# Patient Record
Sex: MALE | Race: WHITE | NOT HISPANIC OR LATINO | Employment: OTHER | URBAN - METROPOLITAN AREA
[De-identification: names, ages, dates, MRNs, and addresses within clinical notes are randomized per-mention and may not be internally consistent; named-entity substitution may affect disease eponyms.]

---

## 2017-01-06 ENCOUNTER — MYC MEDICAL ADVICE (OUTPATIENT)
Dept: FAMILY MEDICINE | Facility: CLINIC | Age: 62
End: 2017-01-06

## 2017-01-14 DIAGNOSIS — E78.5 HYPERLIPIDEMIA LDL GOAL <100: ICD-10-CM

## 2017-01-14 LAB
CHOLEST SERPL-MCNC: 189 MG/DL
HDLC SERPL-MCNC: 39 MG/DL
LDLC SERPL CALC-MCNC: 130 MG/DL
NONHDLC SERPL-MCNC: 150 MG/DL
TRIGL SERPL-MCNC: 102 MG/DL

## 2017-01-14 PROCEDURE — 36415 COLL VENOUS BLD VENIPUNCTURE: CPT | Performed by: FAMILY MEDICINE

## 2017-01-14 PROCEDURE — 80061 LIPID PANEL: CPT | Performed by: FAMILY MEDICINE

## 2017-02-13 ENCOUNTER — TRANSFERRED RECORDS (OUTPATIENT)
Dept: HEALTH INFORMATION MANAGEMENT | Facility: CLINIC | Age: 62
End: 2017-02-13

## 2017-02-22 ENCOUNTER — OFFICE VISIT (OUTPATIENT)
Dept: FAMILY MEDICINE | Facility: CLINIC | Age: 62
End: 2017-02-22
Payer: COMMERCIAL

## 2017-02-22 VITALS
RESPIRATION RATE: 16 BRPM | SYSTOLIC BLOOD PRESSURE: 134 MMHG | HEART RATE: 72 BPM | HEIGHT: 71 IN | DIASTOLIC BLOOD PRESSURE: 72 MMHG | OXYGEN SATURATION: 98 % | TEMPERATURE: 96.2 F | WEIGHT: 228 LBS | BODY MASS INDEX: 31.92 KG/M2

## 2017-02-22 DIAGNOSIS — E66.811 OBESITY, CLASS I, BMI 30-34.9: ICD-10-CM

## 2017-02-22 DIAGNOSIS — E11.22 TYPE 2 DIABETES MELLITUS WITH STAGE 2 CHRONIC KIDNEY DISEASE, WITHOUT LONG-TERM CURRENT USE OF INSULIN (H): ICD-10-CM

## 2017-02-22 DIAGNOSIS — Z00.01 ENCOUNTER FOR ROUTINE ADULT HEALTH EXAMINATION WITH ABNORMAL FINDINGS: Primary | ICD-10-CM

## 2017-02-22 DIAGNOSIS — E78.5 HYPERLIPIDEMIA LDL GOAL <100: ICD-10-CM

## 2017-02-22 DIAGNOSIS — N18.2 TYPE 2 DIABETES MELLITUS WITH STAGE 2 CHRONIC KIDNEY DISEASE, WITHOUT LONG-TERM CURRENT USE OF INSULIN (H): ICD-10-CM

## 2017-02-22 DIAGNOSIS — N18.2 CKD (CHRONIC KIDNEY DISEASE) STAGE 2, GFR 60-89 ML/MIN: ICD-10-CM

## 2017-02-22 DIAGNOSIS — S13.4XXD WHIPLASH INJURY TO NECK, SUBSEQUENT ENCOUNTER: ICD-10-CM

## 2017-02-22 DIAGNOSIS — Z13.89 SCREENING FOR DIABETIC PERIPHERAL NEUROPATHY: ICD-10-CM

## 2017-02-22 DIAGNOSIS — F33.0 MAJOR DEPRESSIVE DISORDER, RECURRENT EPISODE, MILD (H): ICD-10-CM

## 2017-02-22 DIAGNOSIS — Z79.899 ENCOUNTER FOR MEDICATION MANAGEMENT: ICD-10-CM

## 2017-02-22 LAB
ANION GAP SERPL CALCULATED.3IONS-SCNC: 8 MMOL/L (ref 3–14)
BUN SERPL-MCNC: 19 MG/DL (ref 7–30)
CALCIUM SERPL-MCNC: 9 MG/DL (ref 8.5–10.1)
CHLORIDE SERPL-SCNC: 104 MMOL/L (ref 94–109)
CO2 SERPL-SCNC: 29 MMOL/L (ref 20–32)
CREAT SERPL-MCNC: 1.04 MG/DL (ref 0.66–1.25)
CREAT UR-MCNC: 117 MG/DL
GFR SERPL CREATININE-BSD FRML MDRD: 73 ML/MIN/1.7M2
GLUCOSE SERPL-MCNC: 105 MG/DL (ref 70–99)
MICROALBUMIN UR-MCNC: 615 MG/L
MICROALBUMIN/CREAT UR: 525.64 MG/G CR (ref 0–17)
POTASSIUM SERPL-SCNC: 4.3 MMOL/L (ref 3.4–5.3)
SODIUM SERPL-SCNC: 141 MMOL/L (ref 133–144)

## 2017-02-22 PROCEDURE — 99396 PREV VISIT EST AGE 40-64: CPT | Performed by: FAMILY MEDICINE

## 2017-02-22 PROCEDURE — 80048 BASIC METABOLIC PNL TOTAL CA: CPT | Performed by: FAMILY MEDICINE

## 2017-02-22 PROCEDURE — 99207 C FOOT EXAM  NO CHARGE: CPT | Mod: 25 | Performed by: FAMILY MEDICINE

## 2017-02-22 PROCEDURE — 82043 UR ALBUMIN QUANTITATIVE: CPT | Performed by: FAMILY MEDICINE

## 2017-02-22 PROCEDURE — 36415 COLL VENOUS BLD VENIPUNCTURE: CPT | Performed by: FAMILY MEDICINE

## 2017-02-22 RX ORDER — ASPIRIN 325 MG
TABLET, DELAYED RELEASE (ENTERIC COATED) ORAL
Qty: 100 TABLET | Refills: 3 | Status: ON HOLD | COMMUNITY
Start: 2017-02-22 | End: 2020-02-04

## 2017-02-22 RX ORDER — METHOCARBAMOL 750 MG/1
750 TABLET, FILM COATED ORAL 4 TIMES DAILY PRN
Qty: 60 TABLET | Refills: 3 | Status: SHIPPED | OUTPATIENT
Start: 2017-02-22 | End: 2017-11-08

## 2017-02-22 RX ORDER — FOLIC ACID 0.8 MG
TABLET ORAL
Qty: 30 CAPSULE | COMMUNITY
Start: 2017-02-22 | End: 2022-04-07

## 2017-02-22 ASSESSMENT — PAIN SCALES - GENERAL: PAINLEVEL: NO PAIN (0)

## 2017-02-22 NOTE — PATIENT INSTRUCTIONS
Preventive Health Recommendations  Male Ages 50 - 64    Yearly exam:             See your health care provider every year in order to  o   Review health changes.   o   Discuss preventive care.    o   Review your medicines if your doctor has prescribed any.     Have a cholesterol test every 5 years, or more frequently if you are at risk for high cholesterol/heart disease.     Have a diabetes test (fasting glucose) every three years. If you are at risk for diabetes, you should have this test more often.     Have a colonoscopy at age 50, or have a yearly FIT test (stool test). These exams will check for colon cancer.      Talk with your health care provider about whether or not a prostate cancer screening test (PSA) is right for you.    You should be tested each year for STDs (sexually transmitted diseases), if you re at risk.     Shots: Get a flu shot each year. Get a tetanus shot every 10 years.     Nutrition:    Eat at least 5 servings of fruits and vegetables daily.     Eat whole-grain bread, whole-wheat pasta and brown rice instead of white grains and rice.     Talk to your provider about Calcium and Vitamin D.     Lifestyle    Exercise for at least 150 minutes a week (30 minutes a day, 5 days a week). This will help you control your weight and prevent disease.     Limit alcohol to one drink per day.     No smoking.     Wear sunscreen to prevent skin cancer.     See your dentist every six months for an exam and cleaning.     See your eye doctor every 1 to 2 years.  Keep up the exercise.   Next time I see you we can treat the foot.
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
## 2017-02-22 NOTE — NURSING NOTE
"Chief Complaint   Patient presents with     Physical     Diabetes     Lipids     Gastric Problem       Initial /72 (BP Location: Right arm, Patient Position: Chair, Cuff Size: Adult Large)  Pulse 72  Temp 96.2  F (35.7  C) (Temporal)  Resp 16  Ht 5' 10.5\" (1.791 m)  Wt 228 lb (103.4 kg)  SpO2 98%  BMI 32.25 kg/m2 Estimated body mass index is 32.25 kg/(m^2) as calculated from the following:    Height as of this encounter: 5' 10.5\" (1.791 m).    Weight as of this encounter: 228 lb (103.4 kg).  Medication Reconciliation: complete  "
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
## 2017-05-08 ENCOUNTER — OFFICE VISIT (OUTPATIENT)
Dept: FAMILY MEDICINE | Facility: CLINIC | Age: 62
End: 2017-05-08
Payer: COMMERCIAL

## 2017-05-08 VITALS
DIASTOLIC BLOOD PRESSURE: 60 MMHG | TEMPERATURE: 98.1 F | WEIGHT: 227 LBS | SYSTOLIC BLOOD PRESSURE: 124 MMHG | HEART RATE: 93 BPM | BODY MASS INDEX: 32.11 KG/M2 | RESPIRATION RATE: 16 BRPM | OXYGEN SATURATION: 98 %

## 2017-05-08 DIAGNOSIS — G47.33 OSA (OBSTRUCTIVE SLEEP APNEA): ICD-10-CM

## 2017-05-08 DIAGNOSIS — B07.0 PLANTAR WARTS: ICD-10-CM

## 2017-05-08 DIAGNOSIS — F33.0 MAJOR DEPRESSIVE DISORDER, RECURRENT EPISODE, MILD (H): Primary | ICD-10-CM

## 2017-05-08 PROCEDURE — 17110 DESTRUCTION B9 LES UP TO 14: CPT | Performed by: FAMILY MEDICINE

## 2017-05-08 PROCEDURE — 99213 OFFICE O/P EST LOW 20 MIN: CPT | Mod: 25 | Performed by: FAMILY MEDICINE

## 2017-05-08 ASSESSMENT — ANXIETY QUESTIONNAIRES
GAD7 TOTAL SCORE: 3
6. BECOMING EASILY ANNOYED OR IRRITABLE: MORE THAN HALF THE DAYS
7. FEELING AFRAID AS IF SOMETHING AWFUL MIGHT HAPPEN: NOT AT ALL
IF YOU CHECKED OFF ANY PROBLEMS ON THIS QUESTIONNAIRE, HOW DIFFICULT HAVE THESE PROBLEMS MADE IT FOR YOU TO DO YOUR WORK, TAKE CARE OF THINGS AT HOME, OR GET ALONG WITH OTHER PEOPLE: SOMEWHAT DIFFICULT
5. BEING SO RESTLESS THAT IT IS HARD TO SIT STILL: NOT AT ALL
3. WORRYING TOO MUCH ABOUT DIFFERENT THINGS: NOT AT ALL
2. NOT BEING ABLE TO STOP OR CONTROL WORRYING: NOT AT ALL
1. FEELING NERVOUS, ANXIOUS, OR ON EDGE: NOT AT ALL

## 2017-05-08 ASSESSMENT — PAIN SCALES - GENERAL: PAINLEVEL: NO PAIN (0)

## 2017-05-08 ASSESSMENT — PATIENT HEALTH QUESTIONNAIRE - PHQ9: 5. POOR APPETITE OR OVEREATING: SEVERAL DAYS

## 2017-05-08 NOTE — MR AVS SNAPSHOT
After Visit Summary   5/8/2017    Joel Pike    MRN: [id number]           Patient Information     Date Of Birth          1955        Visit Information        Provider Department      5/8/2017 4:15 PM Cayden Ramsey MD [facility]        Care Instructions    Treat the area like a burn,  it should be better and not too painful in a short time  All else the same         Follow-ups after your visit        Who to contact     If you have questions or need follow up information about today's clinic visit or your schedule please contact [facility] directly at [phone].  Normal or non-critical lab and imaging results will be communicated to you by Aidinhart, letter or phone within 4 business days after the clinic has received the results. If you do not hear from us within 7 days, please contact the clinic through Peg Band[employer] or phone. If you have a critical or abnormal lab result, we will notify you by phone as soon as possible.  Submit refill requests through [employer] or call your pharmacy and they will forward the refill request to us. Please allow 3 business days for your refill to be completed.          Additional Information About Your Visit        MyChart Information     [employer] gives you secure access to your electronic health record. If you see a primary care provider, you can also send messages to your care team and make appointments. If you have questions, please call your primary care clinic.  If you do not have a primary care provider, please call [phone] and they will assist you.        Care EveryWhere ID     This is your Care EveryWhere ID. This could be used by other organizations to access your [place] medical records  [id number]        Your Vitals Were     Pulse Temperature Respirations Pulse Oximetry BMI (Body Mass Index)       93 98.1  F (36.7  C) (Tympanic) 16 98% 32.11 kg/m2        Blood Pressure from Last 3 Encounters:   05/08/17 124/60    02/22/17 134/72   11/09/16 136/68    Weight from Last 3 Encounters:   05/08/17 227 lb (103 kg)   02/22/17 228 lb (103.4 kg)   11/09/16 236 lb (107 kg)              Today, you had the following     No orders found for display       Primary Care Provider Office Phone # Fax #    Cayden Ramsey [phone]       [place] [address] [place] [address]        Thank you!     Thank you for choosing [facility]  for your care. Our goal is always to provide you with excellent care. Hearing back from our patients is one way we can continue to improve our services. Please take a few minutes to complete the written survey that you may receive in the mail after your visit with us. Thank you!             Your Updated Medication List - Protect others around you: Learn how to safely use, store and throw away your medicines at www.disposemymeds.org.          This list is accurate as of: 5/8/17  4:38 PM.  Always use your most recent med list.                   Brand Name Dispense Instructions for use    albuterol 108 (90 BASE) MCG/ACT Inhaler    albuterol    1 Inhaler    1-2 puffs every 2 -4 hrs as needed       aspirin 325 MG EC tablet     100 tablet    One a day       blood glucose calibration NORMAL solution     1 each    Use to calibrate blood glucose monitor as directed.       blood glucose lancets standard    no brand specified    1 Box    Use to test blood sugar one time daily or as directed.       blood glucose monitoring test strip    HUGO CONTOUR    1 Month    Use to test blood sugars 1 time daily or as directed.       buPROPion 150 MG 24 hr tablet    WELLBUTRIN XL    90 tablet    Take 1 tablet (150 mg) by mouth daily       cholecalciferol 5000 UNITS Tabs tablet    vitamin D3     Take 1 tablet (5,000 Units) by mouth       coenzyme Q-10 capsule     30 capsule    Take 1 capsule by mouth daily.       esomeprazole 40 MG CR capsule    nexIUM    90 capsule    TAKE  1 CAPSULE EVERY MORNING BEFORE BREAKFAST, 30 TO 60 MINUTES BEFORE EATING.       ezetimibe 10 MG tablet    ZETIA    90 tablet    Take 1 tablet (10 mg) by mouth daily       fexofenadine-pseudoePHEDrine 180-240 MG per 24 hr tablet    ALLEGRA-D 24    90 tablet    Take 1 tablet by mouth daily       fish oil-omega-3 fatty acids 1000 MG capsule     180 capsule    Take  by mouth. Take daily       levothyroxine 50 MCG tablet    SYNTHROID/LEVOTHROID    90 tablet    TAKE 1 TABLET EVERY MORNING       losartan 50 MG tablet    COZAAR    90 tablet    Take 1 tablet (50 mg) by mouth daily       Magnesium 500 MG Caps     30 capsule    One twice a day       metFORMIN 500 MG tablet    GLUCOPHAGE    180 tablet    TAKE 1 TABLET TWICE DAILY WITH MEALS       methocarbamol 750 MG tablet    ROBAXIN    60 tablet    Take 1 tablet (750 mg) by mouth 4 times daily as needed       * Multiple vitamin  s-Minerals Caps      Take  by mouth.       * multivitamin Tabs tablet     30 each    Take 1 tablet by mouth daily       NEW MED      Energy and Metabolism Anthony Men daily       OSTEO BI-FLEX ADV JOINT SHIELD Tabs      Take  by mouth.       STATIN NOT PRESCRIBED (INTENTIONAL)      Statin not prescribed intentionally due to Intolerance (with supporting documentation of trying a statin at least once within the last 5 years)       * Notice:  This list has 2 medication(s) that are the same as other medications prescribed for you. Read the directions carefully, and ask your doctor or other care provider to review them with you.

## 2017-05-08 NOTE — PROGRESS NOTES
SUBJECTIVE:                                                    Joel Pike is a 61 year old male who presents to clinic today for the following health issues:      Depression Followup    Status since last visit: Stable     See PHQ-9 for current symptoms.  Other associated symptoms: None    Complicating factors:   Significant life event:  No   Current substance abuse:  None  Anxiety or Panic symptoms:  yes    PHQ-9  English PHQ-9   Any Language            Amount of exercise or physical activity: None    Problems taking medications regularly: No    Medication side effects: none    Diet: regular (no restrictions)      Re: Mood and anxiety doing very well with current management. He will be retiring in July. He is looking forward to this. He will find other employment which is closer to home and less intense.  He has seen Equipment is wearing out. He would like evaluation for new equipment and would consent to study if necessary it has been sometime since his last study  On the bottom of his left foot he has a plantars wart that he could not take care of himself. Previously had another plantar wart which we burned out with electricity. He would like same done this time. The last one he waited too long.    Problem list and histories reviewed & adjusted, as indicated.  Additional history: as documented    BP Readings from Last 3 Encounters:   05/08/17 124/60   02/22/17 134/72   11/09/16 136/68    Wt Readings from Last 3 Encounters:   05/08/17 227 lb (103 kg)   02/22/17 228 lb (103.4 kg)   11/09/16 236 lb (107 kg)                  Labs reviewed in EPIC    Reviewed and updated as needed this visit by clinical staff  Tobacco  Allergies  Meds       Reviewed and updated as needed this visit by Provider         ROS:  Constitutional, HEENT, cardiovascular, pulmonary, gi and gu systems are negative, except as otherwise noted.    OBJECTIVE:                                                    /60  Pulse 93  Temp 98.1  " F (36.7  C) (Tympanic)  Resp 16  Wt 227 lb (103 kg)  SpO2 98%  BMI 32.11 kg/m2  Body mass index is 32.11 kg/(m^2).  GENERAL: healthy, alert and no distress  EYES: Eyes grossly normal to inspection, PERRL and conjunctivae and sclerae normal  NECK: no adenopathy, no asymmetry, masses, or scars and thyroid normal to palpation  RESP: lungs clear to auscultation - no rales, rhonchi or wheezes  CV: regular rate and rhythm, normal S1 S2, no S3 or S4, no murmur, click or rub, no peripheral edema and peripheral pulses strong  ABDOMEN: soft, nontender, no hepatosplenomegaly, no masses and bowel sounds normal  MS: no gross musculoskeletal defects noted, no edema  SKIN: warts and basically one 5 mm plantars wart in the mid MP joint area of the left foot.  PSYCH: mentation appears normal, affect normal/bright    Diagnostic Test Results:  none      ASSESSMENT/PLAN:                                                    Depression; recurrent episode-- Mild and Full remission   Associated with the following complications:    None   Plan:  No changes in the patient's current treatment plan      BMI:   Estimated body mass index is 32.11 kg/(m^2) as calculated from the following:    Height as of 2/22/17: 5' 10.5\" (1.791 m).    Weight as of this encounter: 227 lb (103 kg).   Weight management plan: Discussed healthy diet and exercise guidelines and patient will follow up in 6 months in clinic to re-evaluate.      1. Major depressive disorder, recurrent episode, mild (H)  Continue current treatments and no change made. jail may be helpful    2. HILARIO (obstructive sleep apnea)  Referral to sleep study team to determine if he needs another study ordered they can just manages equipment  - SLEEP EVALUATION & MANAGEMENT REFERRAL - ADULT; Future    3. Plantar warts  The area on the bottom of his foot is cleansed with alcohol number is 1% Xylocaine and hyfrecation is used to destroy the wart. He understands he will act like a burn but " should heal over the next 2 weeks  - DESTRUCT PREMALIGNANT LESION, FIRST    MEDICATIONS:  Continue current medications without change  Patient Instructions   Treat the area like a burn,  it should be better and not too painful in a short time  All else the same       Cayden Simone Ramsey MD  [facility]

## 2017-05-08 NOTE — PATIENT INSTRUCTIONS
Treat the area like a burn,  it should be better and not too painful in a short time  All else the same

## 2017-05-09 ASSESSMENT — PATIENT HEALTH QUESTIONNAIRE - PHQ9: SUM OF ALL RESPONSES TO PHQ QUESTIONS 1-9: 2

## 2017-05-09 ASSESSMENT — ANXIETY QUESTIONNAIRES: GAD7 TOTAL SCORE: 3

## 2017-06-07 ENCOUNTER — OFFICE VISIT (OUTPATIENT)
Dept: ORTHOPEDICS | Facility: CLINIC | Age: 62
End: 2017-06-07
Payer: COMMERCIAL

## 2017-06-07 ENCOUNTER — RADIANT APPOINTMENT (OUTPATIENT)
Dept: GENERAL RADIOLOGY | Facility: CLINIC | Age: 62
End: 2017-06-07
Attending: ORTHOPAEDIC SURGERY
Payer: COMMERCIAL

## 2017-06-07 VITALS — BODY MASS INDEX: 32.07 KG/M2 | WEIGHT: 229.1 LBS | TEMPERATURE: 98.1 F | HEIGHT: 71 IN

## 2017-06-07 DIAGNOSIS — M25.531 PAIN IN BOTH WRISTS: ICD-10-CM

## 2017-06-07 DIAGNOSIS — M25.532 PAIN IN BOTH WRISTS: ICD-10-CM

## 2017-06-07 DIAGNOSIS — G56.03 BILATERAL CARPAL TUNNEL SYNDROME: Primary | ICD-10-CM

## 2017-06-07 PROCEDURE — 73110 X-RAY EXAM OF WRIST: CPT | Mod: TC

## 2017-06-07 PROCEDURE — 99203 OFFICE O/P NEW LOW 30 MIN: CPT | Performed by: ORTHOPAEDIC SURGERY

## 2017-06-07 ASSESSMENT — PAIN SCALES - GENERAL: PAINLEVEL: MODERATE PAIN (4)

## 2017-06-07 NOTE — NURSING NOTE
"Chief Complaint   Patient presents with     Musculoskeletal Problem     Bilateral CTS     Consult       Initial Temp 98.1  F (36.7  C) (Temporal)  Ht 1.791 m (5' 10.5\")  Wt 103.9 kg (229 lb 1.6 oz)  BMI 32.41 kg/m2 Estimated body mass index is 32.41 kg/(m^2) as calculated from the following:    Height as of this encounter: 1.791 m (5' 10.5\").    Weight as of this encounter: 103.9 kg (229 lb 1.6 oz).  Medication Reconciliation: complete   Dajuan/KARI     "

## 2017-06-07 NOTE — MR AVS SNAPSHOT
After Visit Summary   6/7/2017    Joel Pike    MRN: [id number]           Patient Information     Date Of Birth          1955        Visit Information        Provider Department      6/7/2017 10:20 AM Jason Berman DO [facility]        Today's Diagnoses     Bilateral carpal tunnel syndrome    -  1       Follow-ups after your visit        Your next 10 appointments already scheduled     Jun 07, 2017 10:00 AM CDT   XR WRIST BILATERAL G/E 3 VIEWS with PHXRSP1   [facility])    [address] [place] [id number]              Please bring a list of your current medicines to your exam. (Include vitamins, minerals and over-thecounter medicines.) Leave your valuables at home.  Tell your doctor if there is a chance you may be pregnant.  You do not need to do anything special for this exam.            Jun 07, 2017 10:20 AM CDT   New Visit with Jason Berman DO   [facility] ([address] [id number] [phone]              Future tests that were ordered for you today     Open Future Orders        Priority Expected Expires Ordered    EMG Routine  12/7/2017 6/7/2017            Who to contact     If you have questions or need follow up information about today's clinic visit or your schedule please contact [facility] directly at [phone].  Normal or non-critical lab and imaging results will be communicated to you by MyChart, letter or phone within 4 business days after the clinic has received the results. If you do not hear from us within 7 days, please contact the clinic through MyChart or phone. If you have a critical or abnormal lab result, we will notify you by phone as soon as possible.  Submit refill requests through [employer] or call your pharmacy and they will forward the refill request to us. Please allow 3 business days  "for your refill to be completed.          Additional Information About Your Visit        MyChart Information     [employer] gives you secure access to your electronic health record. If you see a primary care provider, you can also send messages to your care team and make appointments. If you have questions, please call your primary care clinic.  If you do not have a primary care provider, please call [phone] and they will assist you.        Care EveryWhere ID     This is your Care EveryWhere ID. This could be used by other organizations to access your [place] medical records  [id number]        Your Vitals Were     Temperature Height BMI (Body Mass Index)             98.1  F (36.7  C) (Temporal) 5' 10.5\" (1.791 m) 32.41 kg/m2          Blood Pressure from Last 3 Encounters:   05/08/17 124/60   02/22/17 134/72   11/09/16 136/68    Weight from Last 3 Encounters:   06/07/17 229 lb 1.6 oz (103.9 kg)   05/08/17 227 lb (103 kg)   02/22/17 228 lb (103.4 kg)               Primary Care Provider Office Phone # Fax #    Cayden Simone Ramsey [phone]       [facility] [address] [place] [address]        Thank you!     Thank you for choosing [facility]  for your care. Our goal is always to provide you with excellent care. Hearing back from our patients is one way we can continue to improve our services. Please take a few minutes to complete the written survey that you may receive in the mail after your visit with us. Thank you!             Your Updated Medication List - Protect others around you: Learn how to safely use, store and throw away your medicines at www.disposemymeds.org.          This list is accurate as of: 6/7/17  9:56 AM.  Always use your most recent med list.                   Brand Name Dispense Instructions for use    albuterol 108 (90 BASE) MCG/ACT Inhaler    albuterol    1 Inhaler    1-2 puffs every 2 -4 hrs as needed       aspirin 325 MG EC " tablet     100 tablet    One a day       blood glucose calibration NORMAL solution     1 each    Use to calibrate blood glucose monitor as directed.       blood glucose lancets standard    no brand specified    1 Box    Use to test blood sugar one time daily or as directed.       blood glucose monitoring test strip    HUGO CONTOUR    1 Month    Use to test blood sugars 1 time daily or as directed.       buPROPion 150 MG 24 hr tablet    WELLBUTRIN XL    90 tablet    Take 1 tablet (150 mg) by mouth daily       cholecalciferol 5000 UNITS Tabs tablet    vitamin D3     Take 1 tablet (5,000 Units) by mouth       coenzyme Q-10 capsule     30 capsule    Take 1 capsule by mouth daily.       esomeprazole 40 MG CR capsule    nexIUM    90 capsule    TAKE 1 CAPSULE EVERY MORNING BEFORE BREAKFAST, 30 TO 60 MINUTES BEFORE EATING.       ezetimibe 10 MG tablet    ZETIA    90 tablet    Take 1 tablet (10 mg) by mouth daily       fexofenadine-pseudoePHEDrine 180-240 MG per 24 hr tablet    ALLEGRA-D 24    90 tablet    Take 1 tablet by mouth daily       fish oil-omega-3 fatty acids 1000 MG capsule     180 capsule    Take  by mouth. Take daily       levothyroxine 50 MCG tablet    SYNTHROID/LEVOTHROID    90 tablet    TAKE 1 TABLET EVERY MORNING       losartan 50 MG tablet    COZAAR    90 tablet    Take 1 tablet (50 mg) by mouth daily       Magnesium 500 MG Caps     30 capsule    One twice a day       metFORMIN 500 MG tablet    GLUCOPHAGE    180 tablet    TAKE 1 TABLET TWICE DAILY WITH MEALS       methocarbamol 750 MG tablet    ROBAXIN    60 tablet    Take 1 tablet (750 mg) by mouth 4 times daily as needed       * Multiple vitamin  s-Minerals Caps      Take  by mouth.       * multivitamin Tabs tablet     30 each    Take 1 tablet by mouth daily       NEW MED      Energy and Metabolism Anthony Men daily       OSTEO BI-FLEX ADV JOINT SHIELD Tabs      Take  by mouth.       STATIN NOT PRESCRIBED (INTENTIONAL)      Statin not prescribed  intentionally due to Intolerance (with supporting documentation of trying a statin at least once within the last 5 years)       * Notice:  This list has 2 medication(s) that are the same as other medications prescribed for you. Read the directions carefully, and ask your doctor or other care provider to review them with you.

## 2017-06-12 ENCOUNTER — TRANSFERRED RECORDS (OUTPATIENT)
Dept: HEALTH INFORMATION MANAGEMENT | Facility: CLINIC | Age: 62
End: 2017-06-12

## 2017-06-14 ENCOUNTER — TELEPHONE (OUTPATIENT)
Dept: ORTHOPEDICS | Facility: CLINIC | Age: 62
End: 2017-06-14

## 2017-06-14 ENCOUNTER — OFFICE VISIT (OUTPATIENT)
Dept: ORTHOPEDICS | Facility: CLINIC | Age: 62
End: 2017-06-14
Payer: COMMERCIAL

## 2017-06-14 VITALS — BODY MASS INDEX: 32.06 KG/M2 | TEMPERATURE: 98.1 F | HEIGHT: 71 IN | WEIGHT: 229 LBS

## 2017-06-14 DIAGNOSIS — G56.01 CARPAL TUNNEL SYNDROME OF RIGHT WRIST: Primary | ICD-10-CM

## 2017-06-14 PROCEDURE — 99213 OFFICE O/P EST LOW 20 MIN: CPT | Performed by: ORTHOPAEDIC SURGERY

## 2017-06-14 ASSESSMENT — PAIN SCALES - GENERAL: PAINLEVEL: MILD PAIN (3)

## 2017-06-14 NOTE — NURSING NOTE
"Chief Complaint   Patient presents with     RECHECK     EMG results       Initial Temp 98.1  F (36.7  C) (Temporal)  Ht 1.791 m (5' 10.5\")  Wt 103.9 kg (229 lb)  BMI 32.39 kg/m2 Estimated body mass index is 32.39 kg/(m^2) as calculated from the following:    Height as of this encounter: 1.791 m (5' 10.5\").    Weight as of this encounter: 103.9 kg (229 lb).  Medication Reconciliation: complete   Dajuan/KARI     "

## 2017-06-14 NOTE — TELEPHONE ENCOUNTER
EMG results reviewed by Dr. Berman and copy sent to scanning.................................................................Yoselin Denise CMA  ([facility])

## 2017-06-14 NOTE — MR AVS SNAPSHOT
After Visit Summary   6/14/2017    Joel Pike    MRN: [id number]           Patient Information     Date Of Birth          1955        Visit Information        Provider Department      6/14/2017 9:40 AM Jason Berman DO [facility]        Today's Diagnoses     Carpal tunnel syndrome of right wrist    -  1       Follow-ups after your visit        Your next 10 appointments already scheduled     Jun 28, 2017  8:10 AM CDT   [facility]    [address] [place] [id number] [phone]              Who to contact     If you have questions or need follow up information about today's clinic visit or your schedule please contact [facility] directly at [phone].  Normal or non-critical lab and imaging results will be communicated to you by MyChart, letter or phone within 4 business days after the clinic has received the results. If you do not hear from us within 7 days, please contact the clinic through Declarahart or phone. If you have a critical or abnormal lab result, we will notify you by phone as soon as possible.  Submit refill requests through [employer] or call your pharmacy and they will forward the refill request to us. Please allow 3 business days for your refill to be completed.          Additional Information About Your Visit        MyChart Information     [employer] gives you secure access to your electronic health record. If you see a primary care provider, you can also send messages to your care team and make appointments. If you have questions, please call your primary care clinic.  If you do not have a primary care provider, please call [phone] and they will assist you.        Care EveryWhere ID     This is your Care EveryWhere ID. This could be used by other organizations to access your [place] medical records  [id number]        Your  "Vitals Were     Temperature Height BMI (Body Mass Index)             98.1  F (36.7  C) (Temporal) 5' 10.5\" (1.791 m) 32.39 kg/m2          Blood Pressure from Last 3 Encounters:   05/08/17 124/60   02/22/17 134/72   11/09/16 136/68    Weight from Last 3 Encounters:   06/14/17 229 lb (103.9 kg)   06/07/17 229 lb 1.6 oz (103.9 kg)   05/08/17 227 lb (103 kg)              Today, you had the following     No orders found for display       Primary Care Provider Office Phone # Fax #    Cayden Simone Ramsey [phone]       [facility] [address] [place] [phone]        Thank you!     Thank you for choosing [facility]  for your care. Our goal is always to provide you with excellent care. Hearing back from our patients is one way we can continue to improve our services. Please take a few minutes to complete the written survey that you may receive in the mail after your visit with us. Thank you!             Your Updated Medication List - Protect others around you: Learn how to safely use, store and throw away your medicines at www.disposemymeds.org.          This list is accurate as of: 6/14/17 10:04 AM.  Always use your most recent med list.                   Brand Name Dispense Instructions for use    albuterol 108 (90 BASE) MCG/ACT Inhaler    albuterol    1 Inhaler    1-2 puffs every 2 -4 hrs as needed       aspirin 325 MG EC tablet     100 tablet    One a day       blood glucose calibration NORMAL solution     1 each    Use to calibrate blood glucose monitor as directed.       blood glucose lancets standard    no brand specified    1 Box    Use to test blood sugar one time daily or as directed.       blood glucose monitoring test strip    HUGO CONTOUR    1 Month    Use to test blood sugars 1 time daily or as directed.       buPROPion 150 MG 24 hr tablet    WELLBUTRIN XL    90 tablet    Take 1 tablet (150 mg) by mouth daily       cholecalciferol 5000 UNITS Tabs " tablet    vitamin D3     Take 1 tablet (5,000 Units) by mouth       coenzyme Q-10 capsule     30 capsule    Take 1 capsule by mouth daily.       esomeprazole 40 MG CR capsule    nexIUM    90 capsule    TAKE 1 CAPSULE EVERY MORNING BEFORE BREAKFAST, 30 TO 60 MINUTES BEFORE EATING.       ezetimibe 10 MG tablet    ZETIA    90 tablet    Take 1 tablet (10 mg) by mouth daily       fexofenadine-pseudoePHEDrine 180-240 MG per 24 hr tablet    ALLEGRA-D 24    90 tablet    Take 1 tablet by mouth daily       fish oil-omega-3 fatty acids 1000 MG capsule     180 capsule    Take  by mouth. Take daily       levothyroxine 50 MCG tablet    SYNTHROID/LEVOTHROID    90 tablet    TAKE 1 TABLET EVERY MORNING       losartan 50 MG tablet    COZAAR    90 tablet    Take 1 tablet (50 mg) by mouth daily       Magnesium 500 MG Caps     30 capsule    One twice a day       metFORMIN 500 MG tablet    GLUCOPHAGE    180 tablet    TAKE 1 TABLET TWICE DAILY WITH MEALS       methocarbamol 750 MG tablet    ROBAXIN    60 tablet    Take 1 tablet (750 mg) by mouth 4 times daily as needed       * Multiple vitamin  s-Minerals Caps      Take  by mouth.       * multivitamin Tabs tablet     30 each    Take 1 tablet by mouth daily       NEW MED      Energy and Metabolism Anthony Men daily       OSTEO BI-FLEX ADV JOINT SHIELD Tabs      Take  by mouth.       STATIN NOT PRESCRIBED (INTENTIONAL)      Statin not prescribed intentionally due to Intolerance (with supporting documentation of trying a statin at least once within the last 5 years)       * Notice:  This list has 2 medication(s) that are the same as other medications prescribed for you. Read the directions carefully, and ask your doctor or other care provider to review them with you.

## 2017-06-14 NOTE — LETTER
"  6/14/2017       RE: Joel Pike  [id number] [facility] [id number]           Dear Colleague,    Thank you for referring your patient, Joel Pike, to the [facility]. Please see a copy of my visit note below.    Office Visit-Follow up    Chief Complaint: Joel Pike is a 61 year old male who is being seen for   Chief Complaint   Patient presents with     RECHECK     EMG results       History of Present Illness:   Today's visit:  Symptoms unchanged. Returns for EMG results.  June 7, 2017 visit  Presents with bilateral intermittent hand numbness and tingling associated with some pain. He reports bilateral carpal tunnel releases 25 years ago from a work compensation injury. He initially received a short-term improvement however shortly after noticed a return of symptoms. Small finger is generally spared. He has issues with his . He has dropped objects. Over the years he has attempted anti-inflammatories, Tylenol, bracing with no improvement. Pain and numbness bother him when he is at work and at home.      REVIEW OF SYSTEMS  General: negative for, night sweats, dizziness, fatigue  Resp: No shortness of breath and no cough  CV: negative for chest pain, syncope or near-syncope  GI: negative for nausea, vomiting and diarrhea  : negative for dysuria and hematuria  Musculoskeletal: as above  Neurologic: negative for syncope   Hematologic: negative for bleeding disorder    Physical Exam:  Vitals: Temp 98.1  F (36.7  C) (Temporal)  Ht 5' 10.5\" (1.791 m)  Wt 229 lb (103.9 kg)  BMI 32.39 kg/m2  BMI= Body mass index is 32.39 kg/(m^2).  Constitutional: healthy, alert and no acute distress   Psychiatric: mentation appears normal and affect normal/bright  NEURO: no focal deficits  RESP: Normal with easy respirations and no use of accessory muscles to breathe, no audible wheezing or retractions  CV: RUE:  no edema           SKIN: No erythema, rashes, excoriation, or breakdown. No evidence of " infection.   JOINT/EXTREMITIES:right hand/wrist: Positive Tinel's. Positive carpal compression. 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 Affecting sensory components only. No other pathology noted. Could be residuals from previous carpal tunnel release.  Independent visualization of the images was performed.      Impression: bilateral carpal tunnel syndrome    Plan:  All of the above pertinent physical exam and imaging modalities findings was reviewed with Joel and his wife.    The patient has attempted conservative treatments that include rest, activity modification, bracing, Tylenol, anti-inflammatories yet still continues to have significant issues. These issues include pain at rest, increased pain with activity, progressive numbness. Furthermore, the symptoms aren't present many years. Secondary to these reasons I have offered surgery in the form of right carpal tunnel release with a left carpal tunnel release to follow in a staged manner.    The risks, benefits, alternatives, complication, limitations and expectations were reviewed at length. Complications such as infection, bleeding, nerve damage, incomplete release, pillar pain, repeat surgery, skin problems, scar sensitivity, aspiration, heart attack, stroke, and death were discussed. Also reviewed were expectations and the goal of surgery. With surgery the goal would be to prevent further damage to the nerve, the surgery may not be able to reverse any current nerve damage. Postoperatively there will be limitation in use for approximately 3-4 weeks or as clinically indicated. All questions were answered. The patient agrees to proceed with surgery.           Return to clinic 10 days post-operatively. , or sooner as needed for changes.  Re-x-ray on return: Sylvia Berman D.O.    Please schedule for surgery, pre op H&P, and post ops.      Patient  Name:  Joel Pike ([id number]).  :  1955  Gender:  male  Patient Type:  Same Day Surgery  Surgeon:  Jason Berman DO  Physician requests assist from:  first assist    Procedures:    Right carpal tunnel release   Approach:  NA  Diagnosis:  Carpal tunnel syndrome of right wrist  C-arm:  No  Mini C-arm:   No  Pathology Scheduled:  No  Special instruments/supplies:  None  Vendor Rep:    Anesthesia:  Local  Block:  No   Block type:   Time needed:  30 minutes    [facility] Discussed:  NO    Post op 1:                    Again, thank you for allowing me to participate in the care of your patient.        Sincerely,              Jason Berman DO

## 2017-06-14 NOTE — TELEPHONE ENCOUNTER
Surgery Scheduled    Date of Surgery 6/16/17 Time of Surgery   Procedure: Right carpal tunnel release  Hospital/Surgical Facility: [facility]  Surgeon: Dr. Berman  Type of Anesthesia : Local  Pre-op NA with   Post op:6/28/17 with Dr. Berman        Surgery packet mailed to patient's home address. Patients instructed to arrive 1 hours prior to surgery. Patient understood and agrees to plan.    Nataly Chambers  Surgery Scheduler

## 2017-06-14 NOTE — PROGRESS NOTES
"Office Visit-Follow up    Chief Complaint: Joel Pike is a 61 year old male who is being seen for   Chief Complaint   Patient presents with     RECHECK     EMG results       History of Present Illness:   Today's visit:  Symptoms unchanged. Returns for EMG results.  June 7, 2017 visit  Presents with bilateral intermittent hand numbness and tingling associated with some pain. He reports bilateral carpal tunnel releases 25 years ago from a work compensation injury. He initially received a short-term improvement however shortly after noticed a return of symptoms. Small finger is generally spared. He has issues with his . He has dropped objects. Over the years he has attempted anti-inflammatories, Tylenol, bracing with no improvement. Pain and numbness bother him when he is at work and at home.      REVIEW OF SYSTEMS  General: negative for, night sweats, dizziness, fatigue  Resp: No shortness of breath and no cough  CV: negative for chest pain, syncope or near-syncope  GI: negative for nausea, vomiting and diarrhea  : negative for dysuria and hematuria  Musculoskeletal: as above  Neurologic: negative for syncope   Hematologic: negative for bleeding disorder    Physical Exam:  Vitals: Temp 98.1  F (36.7  C) (Temporal)  Ht 5' 10.5\" (1.791 m)  Wt 229 lb (103.9 kg)  BMI 32.39 kg/m2  BMI= Body mass index is 32.39 kg/(m^2).  Constitutional: healthy, alert and no acute distress   Psychiatric: mentation appears normal and affect normal/bright  NEURO: no focal deficits  RESP: Normal with easy respirations and no use of accessory muscles to breathe, no audible wheezing or retractions  CV: RUE:  no edema           SKIN: No erythema, rashes, excoriation, or breakdown. No evidence of infection.   JOINT/EXTREMITIES:right hand/wrist: Positive Tinel's. Positive carpal compression. Full motion  GAIT: not tested             Diagnostic Modalities:  bilateral upper extremity EMG/NCV  very mild bilateral median neuropathy at the " wrist. Affecting sensory components only. No other pathology noted. Could be residuals from previous carpal tunnel release.  Independent visualization of the images was performed.      Impression: bilateral carpal tunnel syndrome    Plan:  All of the above pertinent physical exam and imaging modalities findings was reviewed with Joel and his wife.    The patient has attempted conservative treatments that include rest, activity modification, bracing, Tylenol, anti-inflammatories yet still continues to have significant issues. These issues include pain at rest, increased pain with activity, progressive numbness. Furthermore, the symptoms aren't present many years. Secondary to these reasons I have offered surgery in the form of right carpal tunnel release with a left carpal tunnel release to follow in a staged manner.    The risks, benefits, alternatives, complication, limitations and expectations were reviewed at length. Complications such as infection, bleeding, nerve damage, incomplete release, pillar pain, repeat surgery, skin problems, scar sensitivity, aspiration, heart attack, stroke, and death were discussed. Also reviewed were expectations and the goal of surgery. With surgery the goal would be to prevent further damage to the nerve, the surgery may not be able to reverse any current nerve damage. Postoperatively there will be limitation in use for approximately 3-4 weeks or as clinically indicated. All questions were answered. The patient agrees to proceed with surgery.           Return to clinic 10 days post-operatively. , or sooner as needed for changes.  Re-x-ray on return: Sylvia Berman D.O.    Please schedule for surgery, pre op H&P, and post ops.      Patient Name:  Joel Pike ([id number]).  :  1955  Gender:  male  Patient Type:  Same Day Surgery  Surgeon:  Jason Berman DO  Physician requests assist from:  first assist    Procedures:    Right carpal tunnel release   Approach:   NA  Diagnosis:  Carpal tunnel syndrome of right wrist  C-arm:  No  Mini C-arm:   No  Pathology Scheduled:  No  Special instruments/supplies:  None  Vendor Rep:    Anesthesia:  Local  Block:  No   Block type:   Time needed:  30 minutes    [facility] Discussed:  NO    Post op 1:

## 2017-06-15 ENCOUNTER — TELEPHONE (OUTPATIENT)
Dept: ORTHOPEDICS | Facility: CLINIC | Age: 62
End: 2017-06-15

## 2017-06-15 NOTE — TELEPHONE ENCOUNTER
Our goal is to have forms completed with 72 hours, however some forms may require a visit or additional information.    Who is the form from?: [employer] benefit [employer] (if other please explain)  Where the form came from: form was faxed in  What clinic location was the form placed at?: [place]  Where the form was placed: 's Box  What number is listed as a contact on the form?: [phone]    Phone call message- patient request for a letter, form or note:    Date needed: as soon as possible  Please fax to [phone]  Has the patient signed a consent form for release of information? d    Additional comments:     Call taken on 6/15/2017 at 9:53 AM by Deepali Traylor    Type of letter, form or note: disability

## 2017-06-16 ENCOUNTER — OFFICE VISIT (OUTPATIENT)
Dept: SLEEP MEDICINE | Facility: CLINIC | Age: 62
End: 2017-06-16
Payer: COMMERCIAL

## 2017-06-16 ENCOUNTER — HOSPITAL ENCOUNTER (OUTPATIENT)
Facility: CLINIC | Age: 62
Discharge: HOME | End: 2017-06-16
Attending: ORTHOPAEDIC SURGERY | Admitting: ORTHOPAEDIC SURGERY
Payer: COMMERCIAL

## 2017-06-16 ENCOUNTER — SURGERY (OUTPATIENT)
Age: 62
End: 2017-06-16

## 2017-06-16 VITALS
HEIGHT: 71 IN | OXYGEN SATURATION: 98 % | WEIGHT: 225 LBS | SYSTOLIC BLOOD PRESSURE: 127 MMHG | DIASTOLIC BLOOD PRESSURE: 67 MMHG | HEART RATE: 75 BPM | BODY MASS INDEX: 31.5 KG/M2

## 2017-06-16 VITALS
DIASTOLIC BLOOD PRESSURE: 70 MMHG | OXYGEN SATURATION: 99 % | TEMPERATURE: 98.7 F | RESPIRATION RATE: 16 BRPM | SYSTOLIC BLOOD PRESSURE: 141 MMHG

## 2017-06-16 DIAGNOSIS — G56.01 CARPAL TUNNEL SYNDROME OF RIGHT WRIST: Primary | ICD-10-CM

## 2017-06-16 DIAGNOSIS — G47.33 OSA (OBSTRUCTIVE SLEEP APNEA): Primary | ICD-10-CM

## 2017-06-16 LAB — GLUCOSE BLDC GLUCOMTR-MCNC: 125 MG/DL (ref 70–99)

## 2017-06-16 PROCEDURE — 99213 OFFICE O/P EST LOW 20 MIN: CPT | Performed by: PHYSICIAN ASSISTANT

## 2017-06-16 PROCEDURE — S0020 INJECTION, BUPIVICAINE HYDRO: HCPCS | Performed by: ORTHOPAEDIC SURGERY

## 2017-06-16 PROCEDURE — 64721 CARPAL TUNNEL SURGERY: CPT | Mod: RT | Performed by: ORTHOPAEDIC SURGERY

## 2017-06-16 PROCEDURE — 27210794 ZZH OR GENERAL SUPPLY STERILE: Performed by: ORTHOPAEDIC SURGERY

## 2017-06-16 PROCEDURE — 40000306 ZZH STATISTIC PRE PROC ASSESS II: Performed by: ORTHOPAEDIC SURGERY

## 2017-06-16 PROCEDURE — 82962 GLUCOSE BLOOD TEST: CPT

## 2017-06-16 PROCEDURE — 25000125 ZZHC RX 250: Performed by: ORTHOPAEDIC SURGERY

## 2017-06-16 PROCEDURE — 36000050 ZZH SURGERY LEVEL 2 1ST 30 MIN: Performed by: ORTHOPAEDIC SURGERY

## 2017-06-16 PROCEDURE — 71000027 ZZH RECOVERY PHASE 2 EACH 15 MINS: Performed by: ORTHOPAEDIC SURGERY

## 2017-06-16 RX ORDER — BUPIVACAINE HYDROCHLORIDE 5 MG/ML
INJECTION, SOLUTION PERINEURAL PRN
Status: DISCONTINUED | OUTPATIENT
Start: 2017-06-16 | End: 2017-06-16 | Stop reason: HOSPADM

## 2017-06-16 RX ORDER — HYDROCODONE BITARTRATE AND ACETAMINOPHEN 5; 325 MG/1; MG/1
1-2 TABLET ORAL
Status: DISCONTINUED | OUTPATIENT
Start: 2017-06-16 | End: 2017-06-16 | Stop reason: HOSPADM

## 2017-06-16 RX ADMIN — BUPIVACAINE HYDROCHLORIDE 10 ML: 5 INJECTION, SOLUTION PERINEURAL at 09:12

## 2017-06-16 NOTE — PATIENT INSTRUCTIONS

## 2017-06-16 NOTE — NURSING NOTE
"Chief Complaint   Patient presents with     Sleep Problem     needs dme orders for supplies       Initial /67  Pulse 75  Ht 1.791 m (5' 10.5\")  Wt 102.1 kg (225 lb)  SpO2 98%  BMI 31.83 kg/m2 Estimated body mass index is 31.83 kg/(m^2) as calculated from the following:    Height as of this encounter: 1.791 m (5' 10.5\").    Weight as of this encounter: 102.1 kg (225 lb).  Medication Reconciliation: complete    "

## 2017-06-16 NOTE — BRIEF OP NOTE
[facility] Brief Operative Note    Pre-operative diagnosis: carpal tunnel syndrome of right wrist   Post-operative diagnosis: Same   Procedure: Procedure(s):  RELEASE CARPAL TUNNEL-revision   Surgeon: Jason Berman DO   Assistant(s): Renetta Lopez PA-C   Estimated blood loss:  Fluids: Minimal  0 Crystalloids   Specimens: None   Findings: See dictated operative report for full details      Jacky Berman D.O.

## 2017-06-16 NOTE — DISCHARGE INSTRUCTIONS
Carpal Tunnel Release Discharge Instructions                                     [phone]  Bone and Joint Service Line for issues or concerns          General Care:  After surgery you may feel tired/sleepy. This is normal. . If you have any question along the way please contact the office. If you feel it is an issue cannot wait for normal office hours, contact the on-call physician.    Bandages:   Remove your bandage at 3-4 days after surgery. Keep this bandage clean and dry. Then keep the area clean, dry, and covered.    Bathing:  Do not submerge your incision in water such as a bath or pool. Keep your splint/bandage clean and dry. Keep your incision/splint covered with a sealed bandage when bathing. Saran wrap and a bag works well.     Follow up:  Your follow up appointment should already be scheduled. If its not, please call the office to verify an appointment about 10 days after surgery.     Diet:  Start with non-alcoholic liquids at first, particularly water or sports drinks after surgery. Progress to bland foods such as crackers and bread and finally to your normal diet if you have no problems.     Pain control:  Take your pain medications as prescribed. These medications may make you sleepy. Do not drive, operate equipment, or drink alcohol when taking these.  You may take Tylenol (Generic name is acetaminophen) as directed on the bottle in place of the prescribed pain medications as your pain gets better. You may take NSAIDs (Motrin, Ibuprofen, Aleve, Naproxen) as directed on the bottle for minor discomfort. If the medications cause a reaction such as nausea or skin rash, stop taking them and contact your doctor. Please plan accordingly, pain medications will not be re-filled on the weekends or at night. Call the office during the day if you need more medications.    Physical Therapy/Occupational Therapy:  At your first post-operative visit, your doctor will direct you on your personal therapy program if  needed.     Activity:  Keep your hand elevated for the first couple days after surgery. Avoid lifting, pushing, and or pulling with the operated hand.       Normal findings after surgery:  Numbness and tenderness around the incisions is normal.  You may have bruising around the incisions.  Low grade fevers less than 100.5 degrees Fahrenheit are normal.     When to call the Office:  Temperature greater than 101.5 degrees Fahreheit.  Fever, chills, and increasing pain in the hand and wrist.  Excessive drainage from the incisions that include bright red blood.  Drainage from the incisions sites that appear yellow, pus-like, or foul smelling.  Persistent nausea or vomiting.  Any other effects you feel are significant.

## 2017-06-16 NOTE — OP NOTE
PREOPERATIVE DIAGNOSES:   1. Right carpal tunnel syndrome.     POSTOPERATIVE DIAGNOSES:   1. Right carpal tunnel syndrome.     PROCEDURE:   1. Right carpal tunnel release-revision.     SURGEON: Jason Berman DO   ASSISTANT: Renetta Lopez PA-C  ANESTHESIA: Local   COMPLICATIONS: None.   ESTIMATED BLOOD LOSS: Minimal.   COUNTS: Correct.   TOURNIQUET TIME: 5 minutes at 250mm Hg  GROSS FINDINGS: There was evidence of compression of the median nerve. There was an hourglass deformity to the nerve. There were no space-occupying lesions or masses in the carpal tunnel. There was some scaring throughout the tissue planes.   DISPOSITION: To PACU in stable condition.     NOTE/INDICATIONS:Mr. Pike is a pleasant 61 year old year-old male with complaints of Bilateral hand numbness and tingling. The patient has attempted conservative treatments that include rest, activity modification, bracing, Tylenol, anti-inflammatories yet still continues to have significant issues. These issues include pain at rest, increased pain with activity, progressive numbness. Furthermore, the symptoms aren't present many years. Secondary to these reasons I have offered surgery in the form of right carpal tunnel release with a left carpal tunnel release to follow in a staged manner.    All risks, benefits, complications, alternatives, anticipated postop course were discussed at length prior to surgery.  Complications such as infection, bleeding, nerve damage, incomplete release, repeat surgery, scar sensitivity, and skin problems were discussed. Also reviewed were expectations and the goal of surgery. With surgery the goal would be to prevent further damage to the nerve, the surgery may not be able to reverse any current damage. Postoperatively there will be limitation in use for approximately 3-4 weeks or as clinically indicated. All questions were answered. The patient agrees to proceed with surgery.   DETAILS OF PROCEDURE: Mr. Pike was met in the  preop care unit. Again, informed consent was verified. The appropriate extremity was marked and the patient was wheeled back to the Operative Suite at [facility]. he was transferred off the cart to the OR table without incident. All bony prominence were padded and the patient was secured to the table.     A time-out was performed. The appropriate patient, extremity and surgery were verified. Then the skin and carpal tunnel on the surgical wrist was injected with a total of 10 mL of 0.25% Marcaine. ,The Right upper extremity was prepped and draped in a normal manner. An Esmarch was utilized to exsanguinate the extremity. The tourniquet was inflated. A longitudinal skin incision was made through the skin over the carpal tunnel. Care was taken to protect all pertinent neurovascular structures. Using a combination of sharp and blunt dissection the incision was advanced down to the transverse carpal ligament. Utilizing a knife and a scissors, the transverse carpal ligament was released in its entirety. There were no space-occupying lesions or masses. There was evidence of compression on the nerve as noted above. Next, the tourniquet was deflated. Hemostasis was achieved. The skin was closed with 4-0 nylon suture in interrupted format. A sterile dressing  was applied to the Right upper extremity.     When deemed appropriate the patient was transferred to the PACU in stable condition. The patient will be discharged home with pain medication and detailed post-operative care instructions.     Jason Berman D.O.
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
## 2017-06-16 NOTE — MR AVS SNAPSHOT
After Visit Summary   6/16/2017    Joel Pike    MRN: [id number]           Patient Information     Date Of Birth          1955        Visit Information        Provider Department      6/16/2017 10:30 AM Nik Andino PA-C [facility] [place]        Today's Diagnoses     HILARIO (obstructive sleep apnea)    -  1      Care Instructions      Your BMI is Body mass index is 31.83 kg/(m^2).  Weight management is a personal decision.  If you are interested in exploring weight loss strategies, the following discussion covers the approaches that may be successful. Body mass index (BMI) is one way to tell whether you are at a healthy weight, overweight, or obese. It measures your weight in relation to your height.  A BMI of 18.5 to 24.9 is in the healthy range. A person with a BMI of 25 to 29.9 is considered overweight, and someone with a BMI of 30 or greater is considered obese. More than two-thirds of American adults are considered overweight or obese.  Being overweight or obese increases the risk for further weight gain. Excess weight may lead to heart disease and diabetes.  Creating and following plans for healthy eating and physical activity may help you improve your health.  Weight control is part of healthy lifestyle and includes exercise, emotional health, and healthy eating habits. Careful eating habits lifelong are the mainstay of weight control. Though there are significant health benefits from weight loss, long-term weight loss with diet alone may be very difficult to achieve- studies show long-term success with dietary management in less than 10% of people. Attaining a healthy weight may be especially difficult to achieve in those with severe obesity. In some cases, medications, devices and surgical management might be considered.  What can you do?  If you are overweight or obese and are interested in methods for weight loss, you should discuss this with your provider.      Consider reducing daily calorie intake by 500 calories.     Keep a food journal.     Avoiding skipping meals, consider cutting portions instead.    Diet combined with exercise helps maintain muscle while optimizing fat loss. Strength training is particularly important for building and maintaining muscle mass. Exercise helps reduce stress, increase energy, and improves fitness. Increasing exercise without diet control, however, may not burn enough calories to loose weight.       Start walking three days a week 10-20 minutes at a time    Work towards walking thirty minutes five days a week     Eventually, increase the speed of your walking for 1-2 minutes at time    In addition, we recommend that you review healthy lifestyles and methods for weight loss available through the National Institutes of Health patient information sites:  http://win.niddk.nih.gov/publications/index.htm    And look into health and wellness programs that may be available through your health insurance provider, employer, local community center, or parag club.    Weight management plan: Patient was referred to their PCP to discuss a diet and exercise plan.              Follow-ups after your visit        Follow-up notes from your care team     Return in about 4 weeks (around 7/14/2017).      Your next 10 appointments already scheduled     Jun 22, 2017 12:30 PM CDT   PAP SETUP REPLACEMENT with  SLEEP CENTER DME   [facility] ([facility])    [address] [place] [id number] [phone]            Jun 28, 2017  8:10 AM CDT   Return Visit with Jason Berman,    [facility] ([facility])    [address] [place] [id number] [phone]              Who to contact     If you have questions or need follow up information about today's clinic visit or your schedule please contact [facility] directly at  "[phone].  Normal or non-critical lab and imaging results will be communicated to you by MyChart, letter or phone within 4 business days after the clinic has received the results. If you do not hear from us within 7 days, please contact the clinic through [email or web address] or phone. If you have a critical or abnormal lab result, we will notify you by phone as soon as possible.  Submit refill requests through [employer] or call your pharmacy and they will forward the refill request to us. Please allow 3 business days for your refill to be completed.          Additional Information About Your Visit        [email or web address] Information     [employer] gives you secure access to your electronic health record. If you see a primary care provider, you can also send messages to your care team and make appointments. If you have questions, please call your primary care clinic.  If you do not have a primary care provider, please call [phone] and they will assist you.        Care EveryWhere ID     This is your Care EveryWhere ID. This could be used by other organizations to access your [place] medical records  [id number]        Your Vitals Were     Pulse Height Pulse Oximetry BMI (Body Mass Index)          75 1.791 m (5' 10.5\") 98% 31.83 kg/m2         Blood Pressure from Last 3 Encounters:   06/16/17 127/67   06/16/17 141/70   05/08/17 124/60    Weight from Last 3 Encounters:   06/16/17 102.1 kg (225 lb)   06/14/17 103.9 kg (229 lb)   06/07/17 103.9 kg (229 lb 1.6 oz)              We Performed the Following     Comprehensive DME     Comprehensive DME        Primary Care Provider Office Phone # Fax #    Cayden Ramsey [phone]       [place] [id number] [place] [address]        Thank you!     Thank you for choosing [facility]  for your care. Our goal is always to provide you with excellent care. Hearing back from our patients is one way we can continue to improve our " services. Please take a few minutes to complete the written survey that you may receive in the mail after your visit with us. Thank you!             Your Updated Medication List - Protect others around you: Learn how to safely use, store and throw away your medicines at www.disposemymeds.org.          This list is accurate as of: 6/16/17 11:07 AM.  Always use your most recent med list.                   Brand Name Dispense Instructions for use    albuterol 108 (90 BASE) MCG/ACT Inhaler    albuterol    1 Inhaler    1-2 puffs every 2 -4 hrs as needed       aspirin 325 MG EC tablet     100 tablet    One a day       blood glucose calibration NORMAL solution     1 each    Use to calibrate blood glucose monitor as directed.       blood glucose lancets standard    no brand specified    1 Box    Use to test blood sugar one time daily or as directed.       blood glucose monitoring test strip    HUGO CONTOUR    1 Month    Use to test blood sugars 1 time daily or as directed.       buPROPion 150 MG 24 hr tablet    WELLBUTRIN XL    90 tablet    Take 1 tablet (150 mg) by mouth daily       cholecalciferol 5000 UNITS Tabs tablet    vitamin D3     Take 1 tablet (5,000 Units) by mouth       coenzyme Q-10 capsule     30 capsule    Take 1 capsule by mouth daily.       esomeprazole 40 MG CR capsule    nexIUM    90 capsule    TAKE 1 CAPSULE EVERY MORNING BEFORE BREAKFAST, 30 TO 60 MINUTES BEFORE EATING.       ezetimibe 10 MG tablet    ZETIA    90 tablet    Take 1 tablet (10 mg) by mouth daily       fexofenadine-pseudoePHEDrine 180-240 MG per 24 hr tablet    ALLEGRA-D 24    90 tablet    Take 1 tablet by mouth daily       fish oil-omega-3 fatty acids 1000 MG capsule     180 capsule    Take  by mouth. Take daily       levothyroxine 50 MCG tablet    SYNTHROID/LEVOTHROID    90 tablet    TAKE 1 TABLET EVERY MORNING       losartan 50 MG tablet    COZAAR    90 tablet    Take 1 tablet (50 mg) by mouth daily       Magnesium 500 MG Caps     30  capsule    One twice a day       metFORMIN 500 MG tablet    GLUCOPHAGE    180 tablet    TAKE 1 TABLET TWICE DAILY WITH MEALS       methocarbamol 750 MG tablet    ROBAXIN    60 tablet    Take 1 tablet (750 mg) by mouth 4 times daily as needed       * Multiple vitamin  s-Minerals Caps      Take  by mouth.       * multivitamin Tabs tablet     30 each    Take 1 tablet by mouth daily       NEW MED      Energy and Metabolism Anthony Men daily       OSTEO BI-FLEX ADV JOINT SHIELD Tabs      Take  by mouth.       STATIN NOT PRESCRIBED (INTENTIONAL)      Statin not prescribed intentionally due to Intolerance (with supporting documentation of trying a statin at least once within the last 5 years)       * Notice:  This list has 2 medication(s) that are the same as other medications prescribed for you. Read the directions carefully, and ask your doctor or other care provider to review them with you.

## 2017-06-16 NOTE — IP AVS SNAPSHOT
[facility]    [address]     [place] [address]    Phone:  [phone]                                       After Visit Summary   6/16/2017    Joel Pike    MRN: [id number]           After Visit Summary Signature Page     I have received my discharge instructions, and my questions have been answered. I have discussed any challenges I see with this plan with the nurse or doctor.    ..........................................................................................................................................  Patient/Patient Representative Signature      ..........................................................................................................................................  Patient Representative Print Name and Relationship to Patient    ..................................................               ................................................  Date                                            Time    ..........................................................................................................................................  Reviewed by Signature/Title    ...................................................              ..............................................  Date                                                            Time

## 2017-06-20 ENCOUNTER — TELEPHONE (OUTPATIENT)
Dept: FAMILY MEDICINE | Facility: CLINIC | Age: 62
End: 2017-06-20

## 2017-06-20 DIAGNOSIS — E11.22 TYPE 2 DIABETES MELLITUS WITH STAGE 2 CHRONIC KIDNEY DISEASE, WITHOUT LONG-TERM CURRENT USE OF INSULIN (H): Primary | ICD-10-CM

## 2017-06-20 DIAGNOSIS — N18.2 TYPE 2 DIABETES MELLITUS WITH STAGE 2 CHRONIC KIDNEY DISEASE, WITHOUT LONG-TERM CURRENT USE OF INSULIN (H): Primary | ICD-10-CM

## 2017-06-20 NOTE — TELEPHONE ENCOUNTER
----- Message from Joel Pike sent at 6/19/2017  4:50 PM CDT -----  Regarding: Appointment Request ()  Contact: [phone]  Appointment Request From: Joel Pike    With Provider: Cayden Ramsey MD [-Primary Care Physician-]    Preferred Date Range: Any date 6/28/2017 or later    Preferred Times: Any    Reason: To address the following health maintenance concerns.  A1c Q6 Mo    Comments:    I have an appointment with Dr Berman at 8:10 to have sticsteve taken out . Would it be possible to get one with you around 9:00 for this test. thank you

## 2017-06-22 ENCOUNTER — TELEPHONE (OUTPATIENT)
Dept: ORTHOPEDICS | Facility: CLINIC | Age: 62
End: 2017-06-22

## 2017-06-22 ENCOUNTER — DOCUMENTATION ONLY (OUTPATIENT)
Dept: SLEEP MEDICINE | Facility: CLINIC | Age: 62
End: 2017-06-22

## 2017-06-22 DIAGNOSIS — G47.33 OSA (OBSTRUCTIVE SLEEP APNEA): Primary | ICD-10-CM

## 2017-06-22 DIAGNOSIS — E11.22 TYPE 2 DIABETES MELLITUS WITH STAGE 2 CHRONIC KIDNEY DISEASE, WITHOUT LONG-TERM CURRENT USE OF INSULIN (H): ICD-10-CM

## 2017-06-22 DIAGNOSIS — N18.2 TYPE 2 DIABETES MELLITUS WITH STAGE 2 CHRONIC KIDNEY DISEASE, WITHOUT LONG-TERM CURRENT USE OF INSULIN (H): ICD-10-CM

## 2017-06-22 DIAGNOSIS — K21.9 GASTROESOPHAGEAL REFLUX DISEASE WITHOUT ESOPHAGITIS: Primary | ICD-10-CM

## 2017-06-22 LAB — HBA1C MFR BLD: 5.6 % (ref 4.3–6)

## 2017-06-22 PROCEDURE — 83036 HEMOGLOBIN GLYCOSYLATED A1C: CPT | Mod: QW | Performed by: FAMILY MEDICINE

## 2017-06-22 PROCEDURE — 36415 COLL VENOUS BLD VENIPUNCTURE: CPT | Performed by: FAMILY MEDICINE

## 2017-06-22 RX ORDER — OMEPRAZOLE 40 MG/1
40 CAPSULE, DELAYED RELEASE ORAL DAILY
Qty: 30 CAPSULE | Refills: 1 | Status: SHIPPED | OUTPATIENT
Start: 2017-06-22 | End: 2017-07-19

## 2017-06-22 NOTE — TELEPHONE ENCOUNTER
Metformin 500 mg tabs         Last Written Prescription Date: 02/22/2017  Last Fill Quantity: 180, # refills: 00  Last Office Visit with [facility], U[facility] or  Health prescribing provider:  06/16/2017   Next 5 appointments (look out 90 days)     Jun 28, 2017  8:10 AM CDT   [facility]   [facility]    [address] [place] [id number] [phone]                   BP Readings from Last 3 Encounters:   06/16/17 127/67   06/16/17 141/70   05/08/17 124/60     Lab Results   Component Value Date    MICROL 615 02/22/2017     Lab Results   Component Value Date    UMALCR 525.64 02/22/2017     Creatinine   Date Value Ref Range Status   02/22/2017 1.04 0.66 - 1.25 mg/dL Final   ]  GFR Estimate   Date Value Ref Range Status   02/22/2017 73 >60 mL/min/1.7m2 Final     Comment:     Non  GFR Calc   11/09/2016 68 >60 mL/min/1.7m2 Final     Comment:     Non  GFR Calc   02/17/2016 71 >60 mL/min/1.7m2 Final     Comment:     Non  GFR Calc     GFR Estimate If Black   Date Value Ref Range Status   02/22/2017 88 >60 mL/min/1.7m2 Final     Comment:      GFR Calc   11/09/2016 82 >60 mL/min/1.7m2 Final     Comment:      GFR Calc   02/17/2016 85 >60 mL/min/1.7m2 Final     Comment:      GFR Calc     Lab Results   Component Value Date    CHOL 189 01/14/2017     Lab Results   Component Value Date    HDL 39 01/14/2017     Lab Results   Component Value Date     01/14/2017     Lab Results   Component Value Date    TRIG 102 01/14/2017     Lab Results   Component Value Date    CHOLHDLRATIO 4.0 02/16/2015     Lab Results   Component Value Date     08/01/2014     Lab Results   Component Value Date    ALT 49 09/19/2014     Lab Results   Component Value Date    A1C 5.6 11/09/2016    A1C 5.4 02/17/2016    A1C 5.9 07/01/2015    A1C 6.0 02/16/2015    A1C 5.8  09/19/2014     Potassium   Date Value Ref Range Status   02/22/2017 4.3 3.4 - 5.3 mmol/L Final     Omeprazole 40 mg caps      Last Written Prescription Date: n/a  Last Fill Quantity: 30  # refills: 1   Last Office Visit with [facility], [facility] or [facility] prescribing provider: 06-                                         Next 5 appointments (look out 90 days)     Jun 28, 2017  8:10 AM CDT   [facility]   [facility]    [address] [place] [id number] [phone]                    The patient usually does his RX's through [facility]'s mail order service.  However he is completely out of both of these meds and would like to have them filled here at [facility] pharmacy      Honorio Andrade Technician  [facility]

## 2017-06-22 NOTE — PROGRESS NOTES
Once again great # for this HGBA1C, recheck for me could be one year but Health Maintenance may require it sooner.

## 2017-06-22 NOTE — TELEPHONE ENCOUNTER
Reason for Call:  Form, our goal is to have forms completed with 72 hours, however, some forms may require a visit or additional information.    Type of letter, form or note:  disability    Who is the form from?: Insurance comp    Where did the form come from: Patient or family brought in       What clinic location was the form placed at?: [place]    Where the form was placed: 's Box    What number is listed as a contact on the form?: fax # [phone]       Additional comments: [employer] Benefit admin     Call taken on 6/22/2017 at 12:29 PM by Deepali Bass

## 2017-06-22 NOTE — PROGRESS NOTES
Patient was offered choice of vendor and chose [facility].  Patient Joel Pike was set up at [place] on June 22, 2017. Patient received a Resmed AirSense 10 Auto. Pressures were set at 8-14 cm H2O.   Patient s ramp is 5 cm H2O for Off and FLEX/EPR is EPR.  Patient received a [employer] Mask name: BREVIDA  Pillow mask Size Medium, heated tubing and heated humidifier.  Patient is not enrolled in the STM Program and does not need to meet compliance. Patient has a follow up on TBD with Dr. Owusu.    Kaci Muse

## 2017-06-28 ENCOUNTER — TELEPHONE (OUTPATIENT)
Dept: ORTHOPEDICS | Facility: CLINIC | Age: 62
End: 2017-06-28

## 2017-06-28 ENCOUNTER — OFFICE VISIT (OUTPATIENT)
Dept: ORTHOPEDICS | Facility: CLINIC | Age: 62
End: 2017-06-28
Payer: COMMERCIAL

## 2017-06-28 VITALS — WEIGHT: 231 LBS | BODY MASS INDEX: 32.68 KG/M2 | TEMPERATURE: 98.1 F

## 2017-06-28 DIAGNOSIS — G56.03 BILATERAL CARPAL TUNNEL SYNDROME: Primary | ICD-10-CM

## 2017-06-28 PROCEDURE — 99024 POSTOP FOLLOW-UP VISIT: CPT | Performed by: ORTHOPAEDIC SURGERY
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
## 2017-06-28 NOTE — PROGRESS NOTES
Orthopedic Clinic Post-Operative Note    CHIEF COMPLAINT:   Chief Complaint   Patient presents with     Surgical Followup     DOS: 6/16/17~Right carpal tunnel release-revision~12 days postop       HISTORY OF PRESENT ILLNESS  Doing very well. No pain. No further numbness and tingling. He is eager to have his left side done. Returns with his wife.      Patient's past medical, surgical, social and family histories reviewed.     Past Medical History:   Diagnosis Date     Calculus of kidney      CKD (chronic kidney disease) stage 2, GFR 60-89 ml/min 10/30/2015     Depressive disorder, not elsewhere classified      Diabetic eye exam (H) 02/06/12, 11/1/13     Diabetic eye exam (H) 12/17/14     Hyperlipidaemia      Hypothyroidism 1/17/2010     Obesity, Class I, BMI 30-34.9 10/30/2015       Past Surgical History:   Procedure Laterality Date     COLONOSCOPY  02/02/09    Repeat in 5 yrs     COLONOSCOPY N/A 9/5/2014    Procedure: COMBINED COLONOSCOPY, SINGLE BIOPSY/POLYPECTOMY BY BIOPSY;  Surgeon: Denis Oakes MD;  Location:  GI     ESOPHAGOSCOPY, GASTROSCOPY, DUODENOSCOPY (EGD), COMBINED N/A 2/20/2015    Procedure: COMBINED ESOPHAGOSCOPY, GASTROSCOPY, DUODENOSCOPY (EGD), BIOPSY SINGLE OR MULTIPLE;  Surgeon: Alfred Young MD;  Location:  GI     HC REMV CATARACT EXTRACAP,INSERT LENS  2000    Bilateral     HC REVISE MEDIAN N/CARPAL TUNNEL SURG  1991     HC TOOTH EXTRACTION W/FORCEP  1980's    [place] teeth removed     RELEASE CARPAL TUNNEL Right 6/16/2017    Procedure: RELEASE CARPAL TUNNEL;  Right carpal tunnel release;  Surgeon: Jason Berman DO;  Location:  OR       Medications:    Current Outpatient Prescriptions on File Prior to Visit:  metFORMIN (GLUCOPHAGE) 500 MG tablet TAKE 1 TABLET TWICE DAILY WITH MEALS   omeprazole (PRILOSEC) 40 MG capsule Take 1 capsule (40 mg) by mouth daily Take 30-60 minutes before a meal.   order for DME Equipment ordered: RESMED Auto PAP Mask type: Nasal Settings:  8-14 CM H2O   aspirin 325 MG EC tablet One a day   cholecalciferol (VITAMIN D3) 5000 UNITS TABS tablet Take 1 tablet (5,000 Units) by mouth   Magnesium 500 MG CAPS One twice a day   multivitamin (OCUVITE) TABS tablet Take 1 tablet by mouth daily   NEW MED Energy and Metabolism Anthony Men daily   STATIN NOT PRESCRIBED, INTENTIONAL, Statin not prescribed intentionally due to Intolerance (with supporting documentation of trying a statin at least once within the last 5 years)   methocarbamol (ROBAXIN) 750 MG tablet Take 1 tablet (750 mg) by mouth 4 times daily as needed   ezetimibe (ZETIA) 10 MG tablet Take 1 tablet (10 mg) by mouth daily   esomeprazole (NEXIUM) 40 MG CR capsule TAKE 1 CAPSULE EVERY MORNING BEFORE BREAKFAST, 30 TO 60 MINUTES BEFORE EATING.   blood glucose calibration (HUGO CONTOUR) NORMAL solution Use to calibrate blood glucose monitor as directed.   blood glucose monitoring (HUGO CONTOUR) test strip Use to test blood sugars 1 time daily or as directed.   albuterol (ALBUTEROL) 108 (90 BASE) MCG/ACT inhaler 1-2 puffs every 2 -4 hrs as needed   blood glucose (NO BRAND SPECIFIED) lancets standard Use to test blood sugar one time daily or as directed.   fexofenadine-pseudoePHEDrine (ALLEGRA-D 24) 180-240 MG per tablet Take 1 tablet by mouth daily   buPROPion (WELLBUTRIN XL) 150 MG 24 hr tablet Take 1 tablet (150 mg) by mouth daily   levothyroxine (SYNTHROID, LEVOTHROID) 50 MCG tablet TAKE 1 TABLET EVERY MORNING   losartan (COZAAR) 50 MG tablet Take 1 tablet (50 mg) by mouth daily   Misc Natural Products (OSTEO BI-FLEX ADV JOINT SHIELD) TABS Take  by mouth.   Coenzyme Q-10 capsule Take 1 capsule by mouth daily.   Multiple Vitamins-Minerals CAPS Take  by mouth.   fish oil-omega-3 fatty acids 1000 MG capsule Take  by mouth. Take daily     No current facility-administered medications on file prior to visit.     Allergies   Allergen Reactions     Dust Mites Cough     Penicillins Rash       Social History      Occupational History      [employer]     Social History Main Topics     Smoking status: Never Smoker     Smokeless tobacco: Never Used     Alcohol use No     Drug use: No     Sexual activity: No       Family History   Problem Relation Age of Onset     Hypertension Father      DIABETES Father      Adult     HEART DISEASE Father      Hx: Bypass     CANCER Sister      Liver     Thyroid Disease Brother        REVIEW OF SYSTEMS  General: negative for, night sweats, dizziness, fatigue  Resp: No shortness of breath and no cough  CV: negative for chest pain, syncope or near-syncope  GI: negative for nausea, vomiting and diarrhea  : negative for dysuria and hematuria  Musculoskeletal: as above  Neurologic: negative for syncope   Hematologic: negative for bleeding disorder    Physical Exam:  Vitals: Temp 98.1  F (36.7  C) (Temporal)  Wt 231 lb (104.8 kg)  BMI 32.68 kg/m2  BMI= Body mass index is 32.68 kg/(m^2).  Constitutional: healthy, alert and no acute distress   Psychiatric: mentation appears normal and affect normal/bright  NEURO: no focal deficits  SKIN: .healing well, well approximated skin edges, without signs of infection including no erythema, incision breakdown or purlent drainage  JOINT/EXTREMITIES: Right: Full motion. Appropriate incisional tenderness. No edema. Opposition intact  GAIT: not tested     Diagnostic Modalities:  None today.  Independent visualization of the images was performed.      Impression:   Chief Complaint   Patient presents with     Surgical Followup     DOS: 6/16/17~Right carpal tunnel release-revision~12 days postop    doing extremely well. Very happy with his outcome.    Plan:   Activity: Keep incision clean and dry, Gradual return back to full activity  Staples/Sutures out: Yes.  Pain controlled: Yes. Continue to use: Nothing  Immobilzation: No  Physical Therapy: none at this time.     Left carpal tunnel syndrome  Plan to proceed with surgical  decompression in the near future. Local anesthetic.  Return to clinic 10 days post-operatively. , or sooner as needed for changes.    Re-x-ray on return: No    Jacky Berman D.O.    Please schedule for surgery, pre op H&P, and post ops.      Patient Name:  Joel Pike ([id number]).  :  1955  Gender:  male  Patient Type:  Same Day Surgery  Surgeon:  Jason Berman DO  Physician requests assist from:  Single scrub tech    Procedures:    Left carpal tunnel release   Approach:  NA  Diagnosis:  Left carpal tunnel syndrome  C-arm:  No  Mini C-arm:   No  Pathology Scheduled:  No  Special instruments/supplies:  None  Vendor Rep:    Anesthesia:  Local only  Block:  No   Block type:   Time needed:  30 minutes    [facility] Discussed:  Not Applicable    Post op 1:

## 2017-06-28 NOTE — TELEPHONE ENCOUNTER
Surgery Scheduled    Date of Surgery 7/11/17 Time of Surgery   Procedure: Left carpal tunnel release  Hospital/Surgical Facility: [facility]  Surgeon: Dr. Berman  Type of Anesthesia : local only  Pre-op na with   Post op:7/26/17 with Dr. Berman        Surgery packet mailed to patient's home address. Patients instructed to arrive 1 hours prior to surgery. Patient understood and agrees to plan.    Nataly Chambers  Surgery Scheduler

## 2017-06-28 NOTE — LETTER
6/28/2017         RE: Joel Pike  [id number]  [facility] [address]        Dear Colleague,    Thank you for referring your patient, Joel Pike, to the [facility]. Please see a copy of my visit note below.    Orthopedic Clinic Post-Operative Note    CHIEF COMPLAINT:   Chief Complaint   Patient presents with     Surgical Followup     DOS: 6/16/17~Right carpal tunnel release-revision~12 days postop       HISTORY OF PRESENT ILLNESS  Doing very well. No pain. No further numbness and tingling. He is eager to have his left side done. Returns with his wife.      Patient's past medical, surgical, social and family histories reviewed.     Past Medical History:   Diagnosis Date     Calculus of kidney      CKD (chronic kidney disease) stage 2, GFR 60-89 ml/min 10/30/2015     Depressive disorder, not elsewhere classified      Diabetic eye exam (H) 02/06/12, 11/1/13     Diabetic eye exam (H) 12/17/14     Hyperlipidaemia      Hypothyroidism 1/17/2010     Obesity, Class I, BMI 30-34.9 10/30/2015       Past Surgical History:   Procedure Laterality Date     COLONOSCOPY  02/02/09    Repeat in 5 yrs     COLONOSCOPY N/A 9/5/2014    Procedure: COMBINED COLONOSCOPY, SINGLE BIOPSY/POLYPECTOMY BY BIOPSY;  Surgeon: Denis Oakes MD;  Location:  GI     ESOPHAGOSCOPY, GASTROSCOPY, DUODENOSCOPY (EGD), COMBINED N/A 2/20/2015    Procedure: COMBINED ESOPHAGOSCOPY, GASTROSCOPY, DUODENOSCOPY (EGD), BIOPSY SINGLE OR MULTIPLE;  Surgeon: Alfred Young MD;  Location:  GI     HC REMV CATARACT EXTRACAP,INSERT LENS  2000    Bilateral     HC REVISE MEDIAN N/CARPAL TUNNEL SURG  1991     HC TOOTH EXTRACTION W/FORCEP  1980's    [place] teeth removed     RELEASE CARPAL TUNNEL Right 6/16/2017    Procedure: RELEASE CARPAL TUNNEL;  Right carpal tunnel release;  Surgeon: Jason Berman DO;  Location:  OR       Medications:    Current Outpatient Prescriptions on File Prior to Visit:  metFORMIN  (GLUCOPHAGE) 500 MG tablet TAKE 1 TABLET TWICE DAILY WITH MEALS   omeprazole (PRILOSEC) 40 MG capsule Take 1 capsule (40 mg) by mouth daily Take 30-60 minutes before a meal.   order for DME Equipment ordered: RESMED Auto PAP Mask type: Nasal Settings: 8-14 CM H2O   aspirin 325 MG EC tablet One a day   cholecalciferol (VITAMIN D3) 5000 UNITS TABS tablet Take 1 tablet (5,000 Units) by mouth   Magnesium 500 MG CAPS One twice a day   multivitamin (OCUVITE) TABS tablet Take 1 tablet by mouth daily   NEW MED Energy and Metabolism Anthony Men daily   STATIN NOT PRESCRIBED, INTENTIONAL, Statin not prescribed intentionally due to Intolerance (with supporting documentation of trying a statin at least once within the last 5 years)   methocarbamol (ROBAXIN) 750 MG tablet Take 1 tablet (750 mg) by mouth 4 times daily as needed   ezetimibe (ZETIA) 10 MG tablet Take 1 tablet (10 mg) by mouth daily   esomeprazole (NEXIUM) 40 MG CR capsule TAKE 1 CAPSULE EVERY MORNING BEFORE BREAKFAST, 30 TO 60 MINUTES BEFORE EATING.   blood glucose calibration (HUGO CONTOUR) NORMAL solution Use to calibrate blood glucose monitor as directed.   blood glucose monitoring (HUGO CONTOUR) test strip Use to test blood sugars 1 time daily or as directed.   albuterol (ALBUTEROL) 108 (90 BASE) MCG/ACT inhaler 1-2 puffs every 2 -4 hrs as needed   blood glucose (NO BRAND SPECIFIED) lancets standard Use to test blood sugar one time daily or as directed.   fexofenadine-pseudoePHEDrine (ALLEGRA-D 24) 180-240 MG per tablet Take 1 tablet by mouth daily   buPROPion (WELLBUTRIN XL) 150 MG 24 hr tablet Take 1 tablet (150 mg) by mouth daily   levothyroxine (SYNTHROID, LEVOTHROID) 50 MCG tablet TAKE 1 TABLET EVERY MORNING   losartan (COZAAR) 50 MG tablet Take 1 tablet (50 mg) by mouth daily   Misc Natural Products (OSTEO BI-FLEX ADV JOINT SHIELD) TABS Take  by mouth.   Coenzyme Q-10 capsule Take 1 capsule by mouth daily.   Multiple Vitamins-Minerals CAPS Take  by mouth.    fish oil-omega-3 fatty acids 1000 MG capsule Take  by mouth. Take daily     No current facility-administered medications on file prior to visit.     Allergies   Allergen Reactions     Dust Mites Cough     Penicillins Rash       Social History     Occupational History      [employer]     Social History Main Topics     Smoking status: Never Smoker     Smokeless tobacco: Never Used     Alcohol use No     Drug use: No     Sexual activity: No       Family History   Problem Relation Age of Onset     Hypertension Father      DIABETES Father      Adult     HEART DISEASE Father      Hx: Bypass     CANCER Sister      Liver     Thyroid Disease Brother        REVIEW OF SYSTEMS  General: negative for, night sweats, dizziness, fatigue  Resp: No shortness of breath and no cough  CV: negative for chest pain, syncope or near-syncope  GI: negative for nausea, vomiting and diarrhea  : negative for dysuria and hematuria  Musculoskeletal: as above  Neurologic: negative for syncope   Hematologic: negative for bleeding disorder    Physical Exam:  Vitals: Temp 98.1  F (36.7  C) (Temporal)  Wt 231 lb (104.8 kg)  BMI 32.68 kg/m2  BMI= Body mass index is 32.68 kg/(m^2).  Constitutional: healthy, alert and no acute distress   Psychiatric: mentation appears normal and affect normal/bright  NEURO: no focal deficits  SKIN: .healing well, well approximated skin edges, without signs of infection including no erythema, incision breakdown or purlent drainage  JOINT/EXTREMITIES: Right: Full motion. Appropriate incisional tenderness. No edema. Opposition intact  GAIT: not tested     Diagnostic Modalities:  None today.  Independent visualization of the images was performed.      Impression:   Chief Complaint   Patient presents with     Surgical Followup     DOS: 6/16/17~Right carpal tunnel release-revision~12 days postop    doing extremely well. Very happy with his outcome.    Plan:   Activity: Keep incision clean and  dry, Gradual return back to full activity  Staples/Sutures out: Yes.  Pain controlled: Yes. Continue to use: Nothing  Immobilzation: No  Physical Therapy: none at this time.     Left carpal tunnel syndrome  Plan to proceed with surgical decompression in the near future. Local anesthetic.  Return to clinic 10 days post-operatively. , or sooner as needed for changes.    Re-x-ray on return: No    Jacky Berman D.O.    Please schedule for surgery, pre op H&P, and post ops.      Patient Name:  Joel Pike ([id number]).  :  1955  Gender:  male  Patient Type:  Same Day Surgery  Surgeon:  Jason Berman DO  Physician requests assist from:  Single scrub tech    Procedures:    Left carpal tunnel release   Approach:  NA  Diagnosis:  Left carpal tunnel syndrome  C-arm:  No  Mini C-arm:   No  Pathology Scheduled:  No  Special instruments/supplies:  None  Vendor Rep:    Anesthesia:  Local only  Block:  No   Block type:   Time needed:  30 minutes    [facility] Discussed:  Not Applicable    Post op 1:              Again, thank you for allowing me to participate in the care of your patient.        Sincerely,        Jason Berman DO

## 2017-06-28 NOTE — NURSING NOTE
"Chief Complaint   Patient presents with     Surgical Followup     DOS: 6/16/17~Right carpal tunnel release-revision~12 days postop       Initial Temp 98.1  F (36.7  C) (Temporal)  Wt 104.8 kg (231 lb)  BMI 32.68 kg/m2 Estimated body mass index is 32.68 kg/(m^2) as calculated from the following:    Height as of 6/16/17: 1.791 m (5' 10.5\").    Weight as of this encounter: 104.8 kg (231 lb).  Medication Reconciliation: complete    "

## 2017-07-11 ENCOUNTER — SURGERY (OUTPATIENT)
Age: 62
End: 2017-07-11

## 2017-07-11 ENCOUNTER — HOSPITAL ENCOUNTER (OUTPATIENT)
Facility: CLINIC | Age: 62
Discharge: HOME | End: 2017-07-11
Attending: ORTHOPAEDIC SURGERY | Admitting: ORTHOPAEDIC SURGERY
Payer: COMMERCIAL

## 2017-07-11 VITALS
RESPIRATION RATE: 16 BRPM | OXYGEN SATURATION: 99 % | DIASTOLIC BLOOD PRESSURE: 99 MMHG | SYSTOLIC BLOOD PRESSURE: 151 MMHG | TEMPERATURE: 99 F

## 2017-07-11 DIAGNOSIS — G56.02 CARPAL TUNNEL SYNDROME OF LEFT WRIST: Primary | ICD-10-CM

## 2017-07-11 LAB — GLUCOSE BLDC GLUCOMTR-MCNC: 126 MG/DL (ref 70–99)

## 2017-07-11 PROCEDURE — 36000050 ZZH SURGERY LEVEL 2 1ST 30 MIN: Performed by: ORTHOPAEDIC SURGERY

## 2017-07-11 PROCEDURE — 64721 CARPAL TUNNEL SURGERY: CPT | Mod: 79 | Performed by: ORTHOPAEDIC SURGERY

## 2017-07-11 PROCEDURE — S0020 INJECTION, BUPIVICAINE HYDRO: HCPCS | Performed by: ORTHOPAEDIC SURGERY

## 2017-07-11 PROCEDURE — 25000125 ZZHC RX 250: Performed by: ORTHOPAEDIC SURGERY

## 2017-07-11 PROCEDURE — 36000052 ZZH SURGERY LEVEL 2 EA 15 ADDTL MIN: Performed by: ORTHOPAEDIC SURGERY

## 2017-07-11 PROCEDURE — 27210794 ZZH OR GENERAL SUPPLY STERILE: Performed by: ORTHOPAEDIC SURGERY

## 2017-07-11 PROCEDURE — 82962 GLUCOSE BLOOD TEST: CPT

## 2017-07-11 PROCEDURE — 71000027 ZZH RECOVERY PHASE 2 EACH 15 MINS: Performed by: ORTHOPAEDIC SURGERY

## 2017-07-11 PROCEDURE — 40000305 ZZH STATISTIC PRE PROC ASSESS I: Performed by: ORTHOPAEDIC SURGERY

## 2017-07-11 RX ORDER — HYDROCODONE BITARTRATE AND ACETAMINOPHEN 5; 325 MG/1; MG/1
1-2 TABLET ORAL
Status: DISCONTINUED | OUTPATIENT
Start: 2017-07-11 | End: 2017-07-11 | Stop reason: HOSPADM

## 2017-07-11 RX ORDER — BUPIVACAINE HYDROCHLORIDE 5 MG/ML
INJECTION, SOLUTION PERINEURAL PRN
Status: DISCONTINUED | OUTPATIENT
Start: 2017-07-11 | End: 2017-07-11 | Stop reason: HOSPADM

## 2017-07-11 RX ADMIN — BUPIVACAINE HYDROCHLORIDE 10 ML: 5 INJECTION, SOLUTION PERINEURAL at 09:40

## 2017-07-11 NOTE — BRIEF OP NOTE
[facility] Brief Operative Note    Pre-operative diagnosis: Left carpal tunnel syndrome   Post-operative diagnosis: Same   Procedure: Procedure(s):  RELEASE CARPAL TUNNEL   Surgeon: Jason Berman DO   Assistant(s): None   Estimated blood loss:  Fluids: 2 ml  0 Crystalloids   Specimens: None   Findings: See dictated operative report for full details      Jacky Berman D.O.

## 2017-07-11 NOTE — IP AVS SNAPSHOT
[facility]    [address]     [place] [address]    Phone:  [phone]                                       After Visit Summary   7/11/2017    Joel Pike    MRN: [id number]           After Visit Summary Signature Page     I have received my discharge instructions, and my questions have been answered. I have discussed any challenges I see with this plan with the nurse or doctor.    ..........................................................................................................................................  Patient/Patient Representative Signature      ..........................................................................................................................................  Patient Representative Print Name and Relationship to Patient    ..................................................               ................................................  Date                                            Time    ..........................................................................................................................................  Reviewed by Signature/Title    ...................................................              ..............................................  Date                                                            Time

## 2017-07-11 NOTE — DISCHARGE INSTRUCTIONS
Carpal Tunnel Release Discharge Instructions                                     [phone]  [facility] Service Line for issues or concerns          General Care:  After surgery you may feel tired/sleepy. This is normal.  If you have any question along the way please contact the office. If you feel it is an issue cannot wait for normal office hours, contact the on-call physician.    Bandages:   Remove your bandage at 3-4 days after surgery. Keep this bandage clean and dry. Then keep the area clean, dry, and covered.    Bathing:  Do not submerge your incision in water such as a bath or pool. Keep your splint/bandage clean and dry. Keep your incision/splint covered with a sealed bandage when bathing. Saran wrap and a bag works well.     Follow up:  Your follow up appointment should already be scheduled. If its not, please call the office to verify an appointment about 10 days after surgery.     Diet:  Start with non-alcoholic liquids at first, particularly water or sports drinks after surgery. Progress to bland foods such as crackers and bread and finally to your normal diet if you have no problems.     Pain control:  Take your pain medications as prescribed. These medications may make you sleepy. Do not drive, operate equipment, or drink alcohol when taking these.  You may take Tylenol (Generic name is acetaminophen) as directed on the bottle in place of the prescribed pain medications as your pain gets better. You may take NSAIDs (Motrin, Ibuprofen, Aleve, Naproxen) as directed on the bottle for minor discomfort. If the medications cause a reaction such as nausea or skin rash, stop taking them and contact your doctor. Please plan accordingly, pain medications will not be re-filled on the weekends or at night. Call the office during the day if you need more medications.    Physical Therapy/Occupational Therapy:  At your first post-operative visit, your doctor will direct you on your personal therapy program if  needed.     Activity:  Keep your hand elevated for the first couple days after surgery. Avoid lifting, pushing, and or pulling with the operated hand.       Normal findings after surgery:  Numbness and tenderness around the incisions is normal.  You may have bruising around the incisions.  Low grade fevers less than 100.5 degrees Fahrenheit are normal.     When to call the Office:  Temperature greater than 101.5 degrees Fahreheit.  Fever, chills, and increasing pain in the hand and wrist.  Excessive drainage from the incisions that include bright red blood.  Drainage from the incisions sites that appear yellow, pus-like, or foul smelling.  Persistent nausea or vomiting.  Any other effects you feel are significant.
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
## 2017-07-11 NOTE — OP NOTE
PREOPERATIVE DIAGNOSES:   1. Left carpal tunnel syndrome.     POSTOPERATIVE DIAGNOSES:   1. Left carpal tunnel syndrome.     PROCEDURE:   1. Left carpal tunnel release.     SURGEON: Jason Berman DO   ASSISTANT: None  ANESTHESIA: Local   COMPLICATIONS: None.   ESTIMATED BLOOD LOSS: Minimal.   COUNTS: Correct.   TOURNIQUET TIME: 15 minutes at 250mm Hg  GROSS FINDINGS: There was evidence of compression of the median nerve. There was an hourglass deformity to the nerve. Significant scarring and thickened tissues throughout the palm. There were no space-occupying lesions or masses in the carpal tunnel.   DISPOSITION: To PACU in stable condition.     NOTE/INDICATIONS:Mr. Pike is a pleasant 61 year old year-old male with complaints of Left hand numbness and tingling. The patient has attempted conservative treatments that include rest, activity modification, bracing, Tylenol, anti-inflammatories yet still continues to have significant issues. These issues include pain at rest, increased pain with activity, progressive numbness. Furthermore, the symptoms aren't present many years. Secondary to these reasons I have offered surgery in the form of left carpal tunnel release.    All risks, benefits, complications, alternatives, anticipated postop course were discussed at length prior to surgery.  Complications such as infection, bleeding, nerve damage, incomplete release, repeat surgery, scar sensitivity, and skin problems were discussed. Also reviewed were expectations and the goal of surgery. With surgery the goal would be to prevent further damage to the nerve, the surgery may not be able to reverse any current damage. Postoperatively there will be limitation in use for approximately 3-4 weeks or as clinically indicated. All questions were answered. The patient agrees to proceed with surgery.   DETAILS OF PROCEDURE: Mr. Pike was met in the preop care unit. Again, informed consent was verified. The appropriate extremity was  marked and the patient was wheeled back to the Operative Suite at [facility]. he was transferred off the cart to the OR table without incident. All bony prominence were padded and the patient was secured to the table.     A time-out was performed. The appropriate patient, extremity and surgery were verified. Then the skin and carpal tunnel on the surgical wrist was injected with a total of 10 mL of 0.25% Marcaine. ,The Left upper extremity was prepped and draped in a normal manner. An Esmarch was utilized to exsanguinate the extremity. The tourniquet was inflated. A longitudinal skin incision was made through the skin over the carpal tunnel. Care was taken to protect all pertinent neurovascular structures. Using a combination of sharp and blunt dissection the incision was advanced down to the transverse carpal ligament. Utilizing a knife and a scissors, the transverse carpal ligament was released in its entirety. There were no space-occupying lesions or masses. There was evidence of compression on the nerve as noted above. Next, the tourniquet was deflated. Hemostasis was achieved. The skin was closed with 4-0 nylon suture in interrupted format. A sterile dressing  was applied to the Left upper extremity.     When deemed appropriate the patient was transferred to the PACU in stable condition. The patient will be discharged home with pain medication and detailed post-operative care instructions.     Jason Berman D.O.

## 2017-07-19 DIAGNOSIS — N18.2 TYPE 2 DIABETES MELLITUS WITH STAGE 2 CHRONIC KIDNEY DISEASE, WITHOUT LONG-TERM CURRENT USE OF INSULIN (H): ICD-10-CM

## 2017-07-19 DIAGNOSIS — E11.22 TYPE 2 DIABETES MELLITUS WITH STAGE 2 CHRONIC KIDNEY DISEASE, WITHOUT LONG-TERM CURRENT USE OF INSULIN (H): ICD-10-CM

## 2017-07-19 DIAGNOSIS — K21.9 GASTROESOPHAGEAL REFLUX DISEASE WITHOUT ESOPHAGITIS: ICD-10-CM

## 2017-07-19 NOTE — TELEPHONE ENCOUNTER
Metformin      Last Written Prescription Date: 6/22/17  Last Fill Quantity: 180,  # refills: 3   Last Office Visit with [facility], [facility] or [facility] prescribing provider: 5/8/17                                         Next 5 appointments (look out 90 days)     Jul 26, 2017  9:10 AM CDT   Return Visit with Jason Berman DO   [facility] ([address] [id number] [phone]                    omeprazole      Last Written Prescription Date: 6/22/17  Last Fill Quantity: 30,  # refills: 1   Last Office Visit with [facility], [facility] or  [facility] prescribing provider: 5/8/17                                         Next 5 appointments (look out 90 days)     Jul 26, 2017  9:10 AM CDT   Return Visit with Jason Berman DO   [facility] ([facility])    [address] [place] [id number]   [phone]                    Maddie Shah MA 7/19/2017

## 2017-07-20 RX ORDER — OMEPRAZOLE 40 MG/1
40 CAPSULE, DELAYED RELEASE ORAL DAILY
Qty: 30 CAPSULE | Refills: 9 | Status: SHIPPED | OUTPATIENT
Start: 2017-07-20 | End: 2017-11-08

## 2017-07-25 ENCOUNTER — OFFICE VISIT (OUTPATIENT)
Dept: SLEEP MEDICINE | Facility: CLINIC | Age: 62
End: 2017-07-25
Payer: COMMERCIAL

## 2017-07-25 VITALS
BODY MASS INDEX: 31.5 KG/M2 | OXYGEN SATURATION: 98 % | WEIGHT: 225 LBS | HEIGHT: 71 IN | SYSTOLIC BLOOD PRESSURE: 153 MMHG | DIASTOLIC BLOOD PRESSURE: 66 MMHG | HEART RATE: 80 BPM

## 2017-07-25 DIAGNOSIS — G47.33 OSA ON CPAP: Primary | ICD-10-CM

## 2017-07-25 PROCEDURE — 99213 OFFICE O/P EST LOW 20 MIN: CPT | Performed by: PHYSICIAN ASSISTANT
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
## 2017-07-25 NOTE — NURSING NOTE
"Chief Complaint   Patient presents with     CPAP Follow Up     Pt states that it is going good.       Initial /66  Pulse 80  Ht 1.791 m (5' 10.5\")  Wt 102.1 kg (225 lb)  SpO2 98%  BMI 31.83 kg/m2 Estimated body mass index is 31.83 kg/(m^2) as calculated from the following:    Height as of this encounter: 1.791 m (5' 10.5\").    Weight as of this encounter: 102.1 kg (225 lb).  Medication Reconciliation: complete       Elizabeth Hernandez CMA (AAMA)      "

## 2017-07-26 ENCOUNTER — OFFICE VISIT (OUTPATIENT)
Dept: ORTHOPEDICS | Facility: CLINIC | Age: 62
End: 2017-07-26
Payer: COMMERCIAL

## 2017-07-26 VITALS — TEMPERATURE: 98 F | BODY MASS INDEX: 31.97 KG/M2 | WEIGHT: 226 LBS

## 2017-07-26 DIAGNOSIS — G56.03 BILATERAL CARPAL TUNNEL SYNDROME: Primary | ICD-10-CM

## 2017-07-26 PROCEDURE — 99024 POSTOP FOLLOW-UP VISIT: CPT | Performed by: ORTHOPAEDIC SURGERY

## 2017-07-26 NOTE — NURSING NOTE
TIERA Berman-all stitches removed, steri-strips applied. Patient tolerated removal with no concerns......................................................Yoselin Denise CMA  ([facility])

## 2017-07-26 NOTE — LETTER
7/26/2017         RE: Joel Pike  [id number]  [facility] [address]        Dear Colleague,    Thank you for referring your patient, Joel Pike, to the [facility]. Please see a copy of my visit note below.    Orthopedic Clinic Post-Operative Note    CHIEF COMPLAINT:   Chief Complaint   Patient presents with     Surgical Followup     DOS: 7/11/17~Left carpal tunnel release~15 days postop       HISTORY OF PRESENT ILLNESS  Doing great. No pain. No further numbness and tingling in either hand.    Patient's past medical, surgical, social and family histories reviewed.     Past Medical History:   Diagnosis Date     Calculus of kidney      CKD (chronic kidney disease) stage 2, GFR 60-89 ml/min 10/30/2015     Depressive disorder, not elsewhere classified      Diabetes (H)      Diabetic eye exam (H) 02/06/12, 11/1/13     Diabetic eye exam (H) 12/17/14     Hyperlipidaemia      Hypothyroidism 1/17/2010     Obesity, Class I, BMI 30-34.9 10/30/2015       Past Surgical History:   Procedure Laterality Date     COLONOSCOPY  02/02/09    Repeat in 5 yrs     COLONOSCOPY N/A 9/5/2014    Procedure: COMBINED COLONOSCOPY, SINGLE BIOPSY/POLYPECTOMY BY BIOPSY;  Surgeon: Denis Oakes MD;  Location:  GI     ESOPHAGOSCOPY, GASTROSCOPY, DUODENOSCOPY (EGD), COMBINED N/A 2/20/2015    Procedure: COMBINED ESOPHAGOSCOPY, GASTROSCOPY, DUODENOSCOPY (EGD), BIOPSY SINGLE OR MULTIPLE;  Surgeon: Alfred Young MD;  Location:  GI     HC REMV CATARACT EXTRACAP,INSERT LENS  2000    Bilateral     HC REVISE MEDIAN N/CARPAL TUNNEL SURG  1991     HC TOOTH EXTRACTION W/FORCEP  1980's    [place] teeth removed     RELEASE CARPAL TUNNEL Right 6/16/2017    Procedure: RELEASE CARPAL TUNNEL;  Right carpal tunnel release;  Surgeon: Jason Berman DO;  Location: [facility] OR     RELEASE CARPAL TUNNEL Left 7/11/2017    Procedure: RELEASE CARPAL TUNNEL;  Left carpal tunnel release;  Surgeon: Jason Berman DO;   Location: [facility] OR       Medications:    Current Outpatient Prescriptions on File Prior to Visit:  omeprazole (PRILOSEC) 40 MG capsule Take 1 capsule (40 mg) by mouth daily Take 30-60 minutes before a meal.   metFORMIN (GLUCOPHAGE) 500 MG tablet TAKE 1 TABLET TWICE DAILY WITH MEALS   order for DME Equipment ordered: RESMED Auto PAP Mask type: Nasal Settings: 8-14 CM H2O   aspirin 325 MG EC tablet One a day   cholecalciferol (VITAMIN D3) 5000 UNITS TABS tablet Take 1 tablet (5,000 Units) by mouth   Magnesium 500 MG CAPS One twice a day   multivitamin (OCUVITE) TABS tablet Take 1 tablet by mouth daily   NEW MED Energy and Metabolism Anthony Men daily   STATIN NOT PRESCRIBED, INTENTIONAL, Statin not prescribed intentionally due to Intolerance (with supporting documentation of trying a statin at least once within the last 5 years)   methocarbamol (ROBAXIN) 750 MG tablet Take 1 tablet (750 mg) by mouth 4 times daily as needed   ezetimibe (ZETIA) 10 MG tablet Take 1 tablet (10 mg) by mouth daily   esomeprazole (NEXIUM) 40 MG CR capsule TAKE 1 CAPSULE EVERY MORNING BEFORE BREAKFAST, 30 TO 60 MINUTES BEFORE EATING.   blood glucose calibration (HUGO CONTOUR) NORMAL solution Use to calibrate blood glucose monitor as directed.   blood glucose monitoring (HUGO CONTOUR) test strip Use to test blood sugars 1 time daily or as directed.   albuterol (ALBUTEROL) 108 (90 BASE) MCG/ACT inhaler 1-2 puffs every 2 -4 hrs as needed   blood glucose (NO BRAND SPECIFIED) lancets standard Use to test blood sugar one time daily or as directed.   fexofenadine-pseudoePHEDrine (ALLEGRA-D 24) 180-240 MG per tablet Take 1 tablet by mouth daily   buPROPion (WELLBUTRIN XL) 150 MG 24 hr tablet Take 1 tablet (150 mg) by mouth daily   levothyroxine (SYNTHROID, LEVOTHROID) 50 MCG tablet TAKE 1 TABLET EVERY MORNING   losartan (COZAAR) 50 MG tablet Take 1 tablet (50 mg) by mouth daily   Misc Natural Products (OSTEO BI-FLEX ADV JOINT SHIELD) TABS Take  by mouth.    Coenzyme Q-10 capsule Take 1 capsule by mouth daily.   Multiple Vitamins-Minerals CAPS Take  by mouth.   fish oil-omega-3 fatty acids 1000 MG capsule Take  by mouth. Take daily     No current facility-administered medications on file prior to visit.     Allergies   Allergen Reactions     Dust Mites Cough     Penicillins Rash and Hives       Social History     Occupational History      [employer]     Social History Main Topics     Smoking status: Never Smoker     Smokeless tobacco: Never Used     Alcohol use No     Drug use: No     Sexual activity: No       Family History   Problem Relation Age of Onset     Hypertension Father      DIABETES Father      Adult     HEART DISEASE Father      Hx: Bypass     CANCER Sister      Liver     Thyroid Disease Brother        REVIEW OF SYSTEMS  General: negative for, night sweats, dizziness, fatigue  Resp: No shortness of breath and no cough  CV: negative for chest pain, syncope or near-syncope  GI: negative for nausea, vomiting and diarrhea  : negative for dysuria and hematuria  Musculoskeletal: as above  Neurologic: negative for syncope   Hematologic: negative for bleeding disorder    Physical Exam:  Vitals: Temp 98  F (36.7  C) (Temporal)  Wt 226 lb (102.5 kg)  BMI 31.97 kg/m2  BMI= Body mass index is 31.97 kg/(m^2).  Constitutional: healthy, alert and no acute distress   Psychiatric: mentation appears normal and affect normal/bright  NEURO: no focal deficits  SKIN: .healing well, well approximated skin edges, without signs of infection including no erythema, incision breakdown or purlent drainage  JOINT/EXTREMITIES: Full motion. Appropriate incisional tenderness. No edema. Distal neurovascular intact  GAIT: not tested     Diagnostic Modalities:  None today.  Independent visualization of the images was performed.      Impression:   Chief Complaint   Patient presents with     Surgical Followup     DOS: 7/11/17~Left carpal tunnel release~15 days  postop    doing well.    Plan:   Activity: Keep incision clean and dry, Gradual return back to full activity  Staples/Sutures out: Yes.  Pain controlled: Yes. Continue to use: Nothing  Immobilzation: No  Physical Therapy: none at this time.   Rest  Return to clinic PRN, or sooner as needed for changes.    Re-x-ray on return: No    Jacky Berman D.O.    Again, thank you for allowing me to participate in the care of your patient.        Sincerely,        Jason Berman, DO

## 2017-07-26 NOTE — NURSING NOTE
"Chief Complaint   Patient presents with     Surgical Followup     DOS: 7/11/17~Left carpal tunnel release~15 days postop       Initial Temp 98  F (36.7  C) (Temporal)  Wt 102.5 kg (226 lb)  BMI 31.97 kg/m2 Estimated body mass index is 31.97 kg/(m^2) as calculated from the following:    Height as of 7/25/17: 1.791 m (5' 10.5\").    Weight as of this encounter: 102.5 kg (226 lb).  Medication Reconciliation: complete    "
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
## 2017-07-26 NOTE — PROGRESS NOTES
Orthopedic Clinic Post-Operative Note    CHIEF COMPLAINT:   Chief Complaint   Patient presents with     Surgical Followup     DOS: 7/11/17~Left carpal tunnel release~15 days postop       HISTORY OF PRESENT ILLNESS  Doing great. No pain. No further numbness and tingling in either hand.    Patient's past medical, surgical, social and family histories reviewed.     Past Medical History:   Diagnosis Date     Calculus of kidney      CKD (chronic kidney disease) stage 2, GFR 60-89 ml/min 10/30/2015     Depressive disorder, not elsewhere classified      Diabetes (H)      Diabetic eye exam (H) 02/06/12, 11/1/13     Diabetic eye exam (H) 12/17/14     Hyperlipidaemia      Hypothyroidism 1/17/2010     Obesity, Class I, BMI 30-34.9 10/30/2015       Past Surgical History:   Procedure Laterality Date     COLONOSCOPY  02/02/09    Repeat in 5 yrs     COLONOSCOPY N/A 9/5/2014    Procedure: COMBINED COLONOSCOPY, SINGLE BIOPSY/POLYPECTOMY BY BIOPSY;  Surgeon: Denis Oakes MD;  Location: [facility]     ESOPHAGOSCOPY, GASTROSCOPY, DUODENOSCOPY (EGD), COMBINED N/A 2/20/2015    Procedure: COMBINED ESOPHAGOSCOPY, GASTROSCOPY, DUODENOSCOPY (EGD), BIOPSY SINGLE OR MULTIPLE;  Surgeon: Alfred Young MD;  Location: [facility]     HC REMV CATARACT EXTRACAP,INSERT LENS  2000    Bilateral     HC REVISE MEDIAN N/CARPAL TUNNEL SURG  1991     HC TOOTH EXTRACTION W/FORCEP  1980's    [place] teeth removed     RELEASE CARPAL TUNNEL Right 6/16/2017    Procedure: RELEASE CARPAL TUNNEL;  Right carpal tunnel release;  Surgeon: Jason Berman DO;  Location: [facility]     RELEASE CARPAL TUNNEL Left 7/11/2017    Procedure: RELEASE CARPAL TUNNEL;  Left carpal tunnel release;  Surgeon: Jason Berman DO;  Location: [facility]       Medications:    Current Outpatient Prescriptions on File Prior to Visit:  omeprazole (PRILOSEC) 40 MG capsule Take 1 capsule (40 mg) by mouth daily Take 30-60 minutes before a meal.   metFORMIN (GLUCOPHAGE) 500 MG  tablet TAKE 1 TABLET TWICE DAILY WITH MEALS   order for DME Equipment ordered: RESMED Auto PAP Mask type: Nasal Settings: 8-14 CM H2O   aspirin 325 MG EC tablet One a day   cholecalciferol (VITAMIN D3) 5000 UNITS TABS tablet Take 1 tablet (5,000 Units) by mouth   Magnesium 500 MG CAPS One twice a day   multivitamin (OCUVITE) TABS tablet Take 1 tablet by mouth daily   NEW MED Energy and Metabolism Anthony Men daily   STATIN NOT PRESCRIBED, INTENTIONAL, Statin not prescribed intentionally due to Intolerance (with supporting documentation of trying a statin at least once within the last 5 years)   methocarbamol (ROBAXIN) 750 MG tablet Take 1 tablet (750 mg) by mouth 4 times daily as needed   ezetimibe (ZETIA) 10 MG tablet Take 1 tablet (10 mg) by mouth daily   esomeprazole (NEXIUM) 40 MG CR capsule TAKE 1 CAPSULE EVERY MORNING BEFORE BREAKFAST, 30 TO 60 MINUTES BEFORE EATING.   blood glucose calibration (HUGO CONTOUR) NORMAL solution Use to calibrate blood glucose monitor as directed.   blood glucose monitoring (HUGO CONTOUR) test strip Use to test blood sugars 1 time daily or as directed.   albuterol (ALBUTEROL) 108 (90 BASE) MCG/ACT inhaler 1-2 puffs every 2 -4 hrs as needed   blood glucose (NO BRAND SPECIFIED) lancets standard Use to test blood sugar one time daily or as directed.   fexofenadine-pseudoePHEDrine (ALLEGRA-D 24) 180-240 MG per tablet Take 1 tablet by mouth daily   buPROPion (WELLBUTRIN XL) 150 MG 24 hr tablet Take 1 tablet (150 mg) by mouth daily   levothyroxine (SYNTHROID, LEVOTHROID) 50 MCG tablet TAKE 1 TABLET EVERY MORNING   losartan (COZAAR) 50 MG tablet Take 1 tablet (50 mg) by mouth daily   Misc Natural Products (OSTEO BI-FLEX ADV JOINT SHIELD) TABS Take  by mouth.   Coenzyme Q-10 capsule Take 1 capsule by mouth daily.   Multiple Vitamins-Minerals CAPS Take  by mouth.   fish oil-omega-3 fatty acids 1000 MG capsule Take  by mouth. Take daily     No current facility-administered medications on file  prior to visit.     Allergies   Allergen Reactions     Dust Mites Cough     Penicillins Rash and Hives       Social History     Occupational History      [employer]     Social History Main Topics     Smoking status: Never Smoker     Smokeless tobacco: Never Used     Alcohol use No     Drug use: No     Sexual activity: No       Family History   Problem Relation Age of Onset     Hypertension Father      DIABETES Father      Adult     HEART DISEASE Father      Hx: Bypass     CANCER Sister      Liver     Thyroid Disease Brother        REVIEW OF SYSTEMS  General: negative for, night sweats, dizziness, fatigue  Resp: No shortness of breath and no cough  CV: negative for chest pain, syncope or near-syncope  GI: negative for nausea, vomiting and diarrhea  : negative for dysuria and hematuria  Musculoskeletal: as above  Neurologic: negative for syncope   Hematologic: negative for bleeding disorder    Physical Exam:  Vitals: Temp 98  F (36.7  C) (Temporal)  Wt 226 lb (102.5 kg)  BMI 31.97 kg/m2  BMI= Body mass index is 31.97 kg/(m^2).  Constitutional: healthy, alert and no acute distress   Psychiatric: mentation appears normal and affect normal/bright  NEURO: no focal deficits  SKIN: .healing well, well approximated skin edges, without signs of infection including no erythema, incision breakdown or purlent drainage  JOINT/EXTREMITIES: Full motion. Appropriate incisional tenderness. No edema. Distal neurovascular intact  GAIT: not tested     Diagnostic Modalities:  None today.  Independent visualization of the images was performed.      Impression:   Chief Complaint   Patient presents with     Surgical Followup     DOS: 7/11/17~Left carpal tunnel release~15 days postop    doing well.    Plan:   Activity: Keep incision clean and dry, Gradual return back to full activity  Staples/Sutures out: Yes.  Pain controlled: Yes. Continue to use: Nothing  Immobilzation: No  Physical Therapy: none at this  time.   Rest  Return to clinic PRN, or sooner as needed for changes.    Re-x-ray on return: No    Jacky Berman D.O.

## 2017-08-02 ENCOUNTER — TELEPHONE (OUTPATIENT)
Dept: ORTHOPEDICS | Facility: CLINIC | Age: 62
End: 2017-08-02

## 2017-08-28 ENCOUNTER — TELEPHONE (OUTPATIENT)
Dept: FAMILY MEDICINE | Facility: CLINIC | Age: 62
End: 2017-08-28

## 2017-08-28 DIAGNOSIS — F33.0 MAJOR DEPRESSIVE DISORDER, RECURRENT EPISODE, MILD (H): ICD-10-CM

## 2017-08-28 DIAGNOSIS — E03.8 SUBCLINICAL HYPOTHYROIDISM: ICD-10-CM

## 2017-08-28 NOTE — TELEPHONE ENCOUNTER
Levothyroxine     Last Written Prescription Date: 9/2/16  Last Quantity: 90, # refills: 3  Last Office Visit with [facility], [facility] or [facility] prescribing provider: 5/8/17        TSH   Date Value Ref Range Status   02/17/2016 3.56 0.40 - 4.00 mU/L Final     Bupropion       Last Written Prescription Date: 9/2/16  Last Fill Quantity: 90; # refills: 3  Last Office Visit with [facility], [facility] or [facility] prescribing provider:  5/8/17        Last PHQ-9 score on record=   PHQ-9 SCORE 5/8/2017   Total Score -   Total Score 2       Lab Results   Component Value Date     08/01/2014     Lab Results   Component Value Date    ALT 49 09/19/2014     Maddie Shah MA 8/28/2017

## 2017-08-30 RX ORDER — LEVOTHYROXINE SODIUM 50 UG/1
TABLET ORAL
Qty: 90 TABLET | Refills: 2 | Status: SHIPPED | OUTPATIENT
Start: 2017-08-30 | End: 2017-09-15

## 2017-08-30 RX ORDER — BUPROPION HYDROCHLORIDE 150 MG/1
TABLET ORAL
Qty: 90 TABLET | Refills: 0 | Status: SHIPPED | OUTPATIENT
Start: 2017-08-30 | End: 2017-11-08

## 2017-08-30 NOTE — TELEPHONE ENCOUNTER
Wellbutrin  Prescription approved per [facility] Refill Protocol.    Levothyroxine  Rx was filled, but called to adjust quantity.  RX was sent to [facility] and caller was informed that rx might be not be able to stopped but if it is, a new rx will need to be sent.  They will call to notify pt if rx is cancelled.    Future TSH lab ordered, routing to schedulers    Augustine Bellamy RN, BSN

## 2017-08-31 ENCOUNTER — TELEPHONE (OUTPATIENT)
Dept: FAMILY MEDICINE | Facility: CLINIC | Age: 62
End: 2017-08-31

## 2017-08-31 DIAGNOSIS — E03.8 SUBCLINICAL HYPOTHYROIDISM: ICD-10-CM

## 2017-08-31 LAB — TSH SERPL DL<=0.005 MIU/L-ACNC: 6.87 MU/L (ref 0.4–4)

## 2017-08-31 PROCEDURE — 36415 COLL VENOUS BLD VENIPUNCTURE: CPT | Performed by: FAMILY MEDICINE

## 2017-08-31 PROCEDURE — 84443 ASSAY THYROID STIM HORMONE: CPT | Performed by: FAMILY MEDICINE

## 2017-08-31 NOTE — TELEPHONE ENCOUNTER
With patient's TSH today, the levothyroxine dose of 50  g approved yesterday should be changed to 75  g daily. This would be one and a half tablets daily. Please notify patient. Cayden Ramsey M.D.

## 2017-09-01 NOTE — TELEPHONE ENCOUNTER
When can patient be worked in with Dr. Ramsey if he is willing to come in for the appt?  Thank you,  Laura Santamaria   for [facility]

## 2017-09-15 ENCOUNTER — TELEPHONE (OUTPATIENT)
Dept: FAMILY MEDICINE | Facility: CLINIC | Age: 62
End: 2017-09-15

## 2017-09-15 DIAGNOSIS — E03.8 SUBCLINICAL HYPOTHYROIDISM: ICD-10-CM

## 2017-09-15 RX ORDER — LEVOTHYROXINE SODIUM 50 UG/1
75 TABLET ORAL DAILY
Qty: 90 TABLET | Refills: 0 | Status: SHIPPED | OUTPATIENT
Start: 2017-09-15 | End: 2017-11-08

## 2017-09-15 NOTE — TELEPHONE ENCOUNTER
Note from PCP:  With patient's TSH today, the levothyroxine dose of 50  g approved yesterday should be changed to 75  g daily. This would be one and a half tablets daily. Please notify patient. Cayden Ramsey M.D.    Rx refilled per RN protocol.  For 2 months as he will need to recheck his TSH 2 months after dose change.  Closing this encounter.  Deepali Jurado RN

## 2017-09-15 NOTE — TELEPHONE ENCOUNTER
Patient is now getting medication filled at [facility]. Says he was told his Levothyroxine is a dose increase to 75mcg now. Please verify and send new RX to [facility], thank you.    Eleni Andrade, Pharmacy Technician  [facility]  [phone]

## 2017-09-17 DIAGNOSIS — N18.2 TYPE 2 DIABETES MELLITUS WITH STAGE 2 CHRONIC KIDNEY DISEASE (H): ICD-10-CM

## 2017-09-17 DIAGNOSIS — E11.22 TYPE 2 DIABETES MELLITUS WITH STAGE 2 CHRONIC KIDNEY DISEASE (H): ICD-10-CM

## 2017-09-18 NOTE — TELEPHONE ENCOUNTER
losartan (COZAAR) 50 MG tablet      Last Written Prescription Date: 9/2/16  Last Fill Quantity: 90, # refills: 3  [facility] Visit with [facility] prescribing provider: 5/8/17  Next 5 appointments (look out 90 days)     Nov 08, 2017  7:00 AM CST   [facility]    [address] [place] [address] [phone]                   Potassium   Date Value Ref Range Status   02/22/2017 4.3 3.4 - 5.3 mmol/L Final     Creatinine   Date Value Ref Range Status   02/22/2017 1.04 0.66 - 1.25 mg/dL Final     BP Readings from Last 3 Encounters:   07/25/17 153/66   07/11/17 (!) 151/99   06/16/17 127/67

## 2017-09-19 ENCOUNTER — ALLIED HEALTH/NURSE VISIT (OUTPATIENT)
Dept: FAMILY MEDICINE | Facility: OTHER | Age: 62
End: 2017-09-19
Payer: COMMERCIAL

## 2017-09-19 DIAGNOSIS — Z23 NEED FOR PROPHYLACTIC VACCINATION AND INOCULATION AGAINST INFLUENZA: Primary | ICD-10-CM

## 2017-09-19 PROCEDURE — 90686 IIV4 VACC NO PRSV 0.5 ML IM: CPT

## 2017-09-19 PROCEDURE — 90471 IMMUNIZATION ADMIN: CPT

## 2017-09-19 RX ORDER — LOSARTAN POTASSIUM 50 MG/1
TABLET ORAL
Qty: 90 TABLET | Refills: 1 | Status: SHIPPED | OUTPATIENT
Start: 2017-09-19 | End: 2017-11-08
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
## 2017-09-19 NOTE — TELEPHONE ENCOUNTER
Prescription approved per [facility] Refill Protocol..............CATY Reis

## 2017-09-19 NOTE — NURSING NOTE
Injectable Influenza Immunization Documentation      1.  Is the person to be vaccinated sick today?  No    2. Does the person to be vaccinated have an allergy to eggs or to a component of the vaccine?   No      3. Has the person to be vaccinated today ever had a serious reaction to influenza vaccine in the past?  No      4. Has the person to be vaccinated ever had Guillain-Folsom syndrome?  No    Prior to injection verified patient identity using patient's name and date of birth.    Patient instructed to wait 20 minutes and report any reactions such as shortness of breath, swelling, itching to medical staff.     Form completed by Gretchen Lloyd MA

## 2017-11-08 ENCOUNTER — OFFICE VISIT (OUTPATIENT)
Dept: FAMILY MEDICINE | Facility: CLINIC | Age: 62
End: 2017-11-08
Payer: COMMERCIAL

## 2017-11-08 VITALS
HEART RATE: 72 BPM | TEMPERATURE: 97.5 F | BODY MASS INDEX: 33.24 KG/M2 | WEIGHT: 235 LBS | DIASTOLIC BLOOD PRESSURE: 60 MMHG | SYSTOLIC BLOOD PRESSURE: 122 MMHG | RESPIRATION RATE: 16 BRPM | OXYGEN SATURATION: 99 %

## 2017-11-08 DIAGNOSIS — K21.00 GASTROESOPHAGEAL REFLUX DISEASE WITH ESOPHAGITIS: ICD-10-CM

## 2017-11-08 DIAGNOSIS — S13.4XXD WHIPLASH INJURY TO NECK, SUBSEQUENT ENCOUNTER: ICD-10-CM

## 2017-11-08 DIAGNOSIS — E78.5 HYPERLIPIDEMIA LDL GOAL <100: ICD-10-CM

## 2017-11-08 DIAGNOSIS — E11.22 TYPE 2 DIABETES MELLITUS WITH STAGE 2 CHRONIC KIDNEY DISEASE, WITHOUT LONG-TERM CURRENT USE OF INSULIN (H): ICD-10-CM

## 2017-11-08 DIAGNOSIS — E03.8 SUBCLINICAL HYPOTHYROIDISM: ICD-10-CM

## 2017-11-08 DIAGNOSIS — Z12.5 SCREENING FOR PROSTATE CANCER: ICD-10-CM

## 2017-11-08 DIAGNOSIS — N18.2 TYPE 2 DIABETES MELLITUS WITH STAGE 2 CHRONIC KIDNEY DISEASE, WITHOUT LONG-TERM CURRENT USE OF INSULIN (H): ICD-10-CM

## 2017-11-08 DIAGNOSIS — F33.0 MAJOR DEPRESSIVE DISORDER, RECURRENT EPISODE, MILD (H): Primary | ICD-10-CM

## 2017-11-08 LAB
ANION GAP SERPL CALCULATED.3IONS-SCNC: 5 MMOL/L (ref 3–14)
BUN SERPL-MCNC: 25 MG/DL (ref 7–30)
CALCIUM SERPL-MCNC: 9 MG/DL (ref 8.5–10.1)
CHLORIDE SERPL-SCNC: 107 MMOL/L (ref 94–109)
CHOLEST SERPL-MCNC: 197 MG/DL
CO2 SERPL-SCNC: 28 MMOL/L (ref 20–32)
CREAT SERPL-MCNC: 1.16 MG/DL (ref 0.66–1.25)
ERYTHROCYTE [DISTWIDTH] IN BLOOD BY AUTOMATED COUNT: 13.1 % (ref 10–15)
GFR SERPL CREATININE-BSD FRML MDRD: 64 ML/MIN/1.7M2
GLUCOSE SERPL-MCNC: 144 MG/DL (ref 70–99)
HCT VFR BLD AUTO: 45.7 % (ref 40–53)
HDLC SERPL-MCNC: 43 MG/DL
HGB BLD-MCNC: 15.2 G/DL (ref 13.3–17.7)
LDLC SERPL CALC-MCNC: 131 MG/DL
MCH RBC QN AUTO: 31.2 PG (ref 26.5–33)
MCHC RBC AUTO-ENTMCNC: 33.3 G/DL (ref 31.5–36.5)
MCV RBC AUTO: 94 FL (ref 78–100)
NONHDLC SERPL-MCNC: 154 MG/DL
PLATELET # BLD AUTO: 212 10E9/L (ref 150–450)
POTASSIUM SERPL-SCNC: 4.5 MMOL/L (ref 3.4–5.3)
PSA SERPL-ACNC: 2.98 UG/L (ref 0–4)
RBC # BLD AUTO: 4.87 10E12/L (ref 4.4–5.9)
SODIUM SERPL-SCNC: 140 MMOL/L (ref 133–144)
TRIGL SERPL-MCNC: 115 MG/DL
TSH SERPL DL<=0.005 MIU/L-ACNC: 3.78 MU/L (ref 0.4–4)
WBC # BLD AUTO: 8.2 10E9/L (ref 4–11)

## 2017-11-08 PROCEDURE — 80061 LIPID PANEL: CPT | Performed by: FAMILY MEDICINE

## 2017-11-08 PROCEDURE — 80048 BASIC METABOLIC PNL TOTAL CA: CPT | Performed by: FAMILY MEDICINE

## 2017-11-08 PROCEDURE — 99214 OFFICE O/P EST MOD 30 MIN: CPT | Performed by: FAMILY MEDICINE

## 2017-11-08 PROCEDURE — 36415 COLL VENOUS BLD VENIPUNCTURE: CPT | Performed by: FAMILY MEDICINE

## 2017-11-08 PROCEDURE — G0103 PSA SCREENING: HCPCS | Performed by: FAMILY MEDICINE

## 2017-11-08 PROCEDURE — 84443 ASSAY THYROID STIM HORMONE: CPT | Performed by: FAMILY MEDICINE

## 2017-11-08 PROCEDURE — 85027 COMPLETE CBC AUTOMATED: CPT | Performed by: FAMILY MEDICINE

## 2017-11-08 RX ORDER — SUCRALFATE ORAL 1 G/10ML
1 SUSPENSION ORAL 4 TIMES DAILY
Qty: 420 ML | Refills: 1 | Status: SHIPPED | OUTPATIENT
Start: 2017-11-08 | End: 2018-02-23

## 2017-11-08 RX ORDER — OMEPRAZOLE 40 MG/1
40 CAPSULE, DELAYED RELEASE ORAL DAILY
Qty: 90 CAPSULE | Refills: 3 | Status: SHIPPED | OUTPATIENT
Start: 2017-11-08 | End: 2018-02-23

## 2017-11-08 RX ORDER — BUPROPION HYDROCHLORIDE 150 MG/1
150 TABLET ORAL DAILY
Qty: 90 TABLET | Refills: 3 | Status: SHIPPED | OUTPATIENT
Start: 2017-11-08 | End: 2018-02-23

## 2017-11-08 RX ORDER — METHOCARBAMOL 750 MG/1
750 TABLET, FILM COATED ORAL 4 TIMES DAILY PRN
Qty: 60 TABLET | Refills: 5 | Status: SHIPPED | OUTPATIENT
Start: 2017-11-08 | End: 2018-02-23

## 2017-11-08 RX ORDER — EZETIMIBE 10 MG/1
10 TABLET ORAL DAILY
Qty: 90 TABLET | Refills: 3 | Status: SHIPPED | OUTPATIENT
Start: 2017-11-08 | End: 2018-02-23

## 2017-11-08 RX ORDER — ALBUTEROL SULFATE 90 UG/1
AEROSOL, METERED RESPIRATORY (INHALATION)
Qty: 1 INHALER | Refills: 5 | Status: SHIPPED | OUTPATIENT
Start: 2017-11-08 | End: 2018-02-23

## 2017-11-08 RX ORDER — LOSARTAN POTASSIUM 50 MG/1
50 TABLET ORAL DAILY
Qty: 90 TABLET | Refills: 3 | Status: SHIPPED | OUTPATIENT
Start: 2017-11-08 | End: 2018-02-23

## 2017-11-08 RX ORDER — LEVOTHYROXINE SODIUM 50 UG/1
75 TABLET ORAL DAILY
Qty: 90 TABLET | Refills: 3 | Status: SHIPPED | OUTPATIENT
Start: 2017-11-08 | End: 2018-02-23

## 2017-11-08 RX ORDER — ESOMEPRAZOLE MAGNESIUM 40 MG/1
CAPSULE, DELAYED RELEASE ORAL
Qty: 90 CAPSULE | Refills: 3 | Status: SHIPPED | OUTPATIENT
Start: 2017-11-08 | End: 2018-02-23

## 2017-11-08 ASSESSMENT — PAIN SCALES - GENERAL: PAINLEVEL: MILD PAIN (2)

## 2017-11-08 ASSESSMENT — ANXIETY QUESTIONNAIRES
2. NOT BEING ABLE TO STOP OR CONTROL WORRYING: NOT AT ALL
IF YOU CHECKED OFF ANY PROBLEMS ON THIS QUESTIONNAIRE, HOW DIFFICULT HAVE THESE PROBLEMS MADE IT FOR YOU TO DO YOUR WORK, TAKE CARE OF THINGS AT HOME, OR GET ALONG WITH OTHER PEOPLE: NOT DIFFICULT AT ALL
5. BEING SO RESTLESS THAT IT IS HARD TO SIT STILL: SEVERAL DAYS
1. FEELING NERVOUS, ANXIOUS, OR ON EDGE: SEVERAL DAYS
6. BECOMING EASILY ANNOYED OR IRRITABLE: MORE THAN HALF THE DAYS
GAD7 TOTAL SCORE: 4
3. WORRYING TOO MUCH ABOUT DIFFERENT THINGS: NOT AT ALL
7. FEELING AFRAID AS IF SOMETHING AWFUL MIGHT HAPPEN: NOT AT ALL

## 2017-11-08 ASSESSMENT — PATIENT HEALTH QUESTIONNAIRE - PHQ9
SUM OF ALL RESPONSES TO PHQ QUESTIONS 1-9: 3
5. POOR APPETITE OR OVEREATING: NOT AT ALL

## 2017-11-08 NOTE — LETTER
My Depression Action Plan  Name: Joel Pike   Date of Birth 1955  Date: 11/8/2017    My doctor: Cayden Ramsey   My clinic: [address] [address] [phone]          GREEN    ZONE   Good Control    What it looks like:     Things are going generally well. You have normal up s and down s. You may even feel depressed from time to time, but bad moods usually last less than a day.   What you need to do:  1. Continue to care for yourself (see self care plan)  2. Check your depression survival kit and update it as needed  3. Follow your physician s recommendations including any medication.  4. Do not stop taking medication unless you consult with your physician first.           YELLOW         ZONE Getting Worse    What it looks like:     Depression is starting to interfere with your life.     It may be hard to get out of bed; you may be starting to isolate yourself from others.    Symptoms of depression are starting to last most all day and this has happened for several days.     You may have suicidal thoughts but they are not constant.   What you need to do:     1. Call your care team, your response to treatment will improve if you keep your care team informed of your progress. Yellow periods are signs an adjustment may need to be made.     2. Continue your self-care, even if you have to fake it!    3. Talk to someone in your support network    4. Open up your depression survival kit           RED    ZONE Medical Alert - Get Help    What it looks like:     Depression is seriously interfering with your life.     You may experience these or other symptoms: You can t get out of bed most days, can t work or engage in other necessary activities, you have trouble taking care of basic hygiene, or basic responsibilities, thoughts of suicide or death that will not go away, self-injurious behavior.     What you need to do:  1. Call your care team and  request a same-day appointment. If they are not available (weekends or after hours) call your local crisis line, emergency room or 911.      Electronically signed by: Macey Calvert, November 8, 2017    Depression Self Care Plan / Survival Kit    Self-Care for Depression  Here s the deal. Your body and mind are really not as separate as most people think.  What you do and think affects how you feel and how you feel influences what you do and think. This means if you do things that people who feel good do, it will help you feel better.  Sometimes this is all it takes.  There is also a place for medication and therapy depending on how severe your depression is, so be sure to consult with your medical provider and/ or Behavioral Health Consultant if your symptoms are worsening or not improving.     In order to better manage my stress, I will:    Exercise  Get some form of exercise, every day. This will help reduce pain and release endorphins, the  feel good  chemicals in your brain. This is almost as good as taking antidepressants!  This is not the same as joining a gym and then never going! (they count on that by the way ) It can be as simple as just going for a walk or doing some gardening, anything that will get you moving.      Hygiene   Maintain good hygiene (Get out of bed in the morning, Make your bed, Brush your teeth, Take a shower, and Get dressed like you were going to work, even if you are unemployed).  If your clothes don't fit try to get ones that do.    Diet  I will strive to eat foods that are good for me, drink plenty of water, and avoid excessive sugar, caffeine, alcohol, and other mood-altering substances.  Some foods that are helpful in depression are: complex carbohydrates, B vitamins, flaxseed, fish or fish oil, fresh fruits and vegetables.    Psychotherapy  I agree to participate in Individual Therapy (if recommended).    Medication  If prescribed medications, I agree to take them.  Missing doses  can result in serious side effects.  I understand that drinking alcohol, or other illicit drug use, may cause potential side effects.  I will not stop my medication abruptly without first discussing it with my provider.    Staying Connected With Others  I will stay in touch with my friends, family members, and my primary care provider/team.    Use your imagination  Be creative.  We all have a creative side; it doesn t matter if it s oil painting, sand castles, or mud pies! This will also kick up the endorphins.    Witness Beauty  (AKA stop and smell the roses) Take a look outside, even in mid-winter. Notice colors, textures. Watch the squirrels and birds.     Service to others  Be of service to others.  There is always someone else in need.  By helping others we can  get out of ourselves  and remember the really important things.  This also provides opportunities for practicing all the other parts of the program.    Humor  Laugh and be silly!  Adjust your TV habits for less news and crime-drama and more comedy.    Control your stress  Try breathing deep, massage therapy, biofeedback, and meditation. Find time to relax each day.     My support system    Clinic Contact:  Phone number:    Contact 1:  Phone number:    Contact 2:  Phone number:    Restoration/:  Phone number:    Therapist:  Phone number:    Local crisis center:    Phone number:    Other community support:  Phone number:
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
## 2017-11-08 NOTE — PATIENT INSTRUCTIONS
Take sucralfate 4 times daily to start, if better in a couple weeks, bedtime and maybe morning should work.   All else the same for meds  Stretching is good

## 2017-11-08 NOTE — NURSING NOTE
"Chief Complaint   Patient presents with     Diabetes     Recheck     Lipids     Recheck     Thyroid Disease     Recheck     Anxiety     Recheck     Depression     Recheck     Gastrophageal Reflux     Recheck       Initial /60 (BP Location: Right arm, Patient Position: Sitting, Cuff Size: Adult Large)  Pulse 72  Temp 97.5  F (36.4  C) (Temporal)  Resp 16  Wt 235 lb (106.6 kg)  SpO2 99%  BMI 33.24 kg/m2 Estimated body mass index is 33.24 kg/(m^2) as calculated from the following:    Height as of 7/25/17: 5' 10.5\" (1.791 m).    Weight as of this encounter: 235 lb (106.6 kg).  Medication Reconciliation: complete  "

## 2017-11-09 ASSESSMENT — ANXIETY QUESTIONNAIRES: GAD7 TOTAL SCORE: 4

## 2017-11-28 DIAGNOSIS — F33.0 MAJOR DEPRESSIVE DISORDER, RECURRENT EPISODE, MILD (H): ICD-10-CM

## 2017-11-29 RX ORDER — BUPROPION HYDROCHLORIDE 150 MG/1
TABLET ORAL
Qty: 90 TABLET | Refills: 0 | OUTPATIENT
Start: 2017-11-29

## 2017-12-29 DIAGNOSIS — F33.0 MAJOR DEPRESSIVE DISORDER, RECURRENT EPISODE, MILD (H): ICD-10-CM

## 2017-12-29 RX ORDER — BUPROPION HYDROCHLORIDE 150 MG/1
150 TABLET ORAL DAILY
Qty: 90 TABLET | Refills: 3 | OUTPATIENT
Start: 2017-12-29

## 2017-12-29 NOTE — TELEPHONE ENCOUNTER
Wellbutrin  Filled 11/8/17 for 90 tabs w/3 refills.  Refill not appropriate, should have refills available.    Augustine Bellamy RN, BSN          Requested Prescriptions   Pending Prescriptions Disp Refills     buPROPion (WELLBUTRIN XL) 150 MG 24 hr tablet 90 tablet 3     Sig: Take 1 tablet (150 mg) by mouth daily    SSRIs Protocol Passed    12/29/2017 11:36 AM       Passed - PHQ-9 score less than 5 in past 6 months    The PHQ-9 criteria is meant to fail. It requires a PHQ-9 score review         Passed - Medication is Bupropion    If the medication is Bupropion (Wellbutrin), and the patient is taking for smoking cessation; OK to refill.         Passed - Patient is age 18 or older       Passed - Recent (6 mo) or future visit with authorizing provider's specialty    Patient had office visit in the last 6 months or has a visit in the next 30 days with authorizing provider.  See chart review.

## 2017-12-29 NOTE — TELEPHONE ENCOUNTER
Joel would like to  today please    Thanks  Moira Ramos [facility]   [phone]

## 2018-01-03 DIAGNOSIS — E78.5 HYPERLIPIDEMIA LDL GOAL <100: ICD-10-CM

## 2018-01-03 DIAGNOSIS — E11.22 TYPE 2 DIABETES MELLITUS WITH STAGE 2 CHRONIC KIDNEY DISEASE, WITHOUT LONG-TERM CURRENT USE OF INSULIN (H): ICD-10-CM

## 2018-01-03 DIAGNOSIS — N18.2 TYPE 2 DIABETES MELLITUS WITH STAGE 2 CHRONIC KIDNEY DISEASE, WITHOUT LONG-TERM CURRENT USE OF INSULIN (H): ICD-10-CM

## 2018-01-03 RX ORDER — EZETIMIBE 10 MG/1
TABLET ORAL
Qty: 90 TABLET | Refills: 2 | Status: SHIPPED | OUTPATIENT
Start: 2018-01-03 | End: 2018-09-30

## 2018-01-03 NOTE — TELEPHONE ENCOUNTER
Last Written Prescription Date:  11/8/17  Last Fill Quantity: 90,  # refills: 3   Last Office Visit with [facility] or [facility] prescribing provider:  11/8/17   Future Office Visit:     Requested Prescriptions   Pending Prescriptions Disp Refills     ezetimibe (ZETIA) 10 MG tablet [Pharmacy Med Name: EZETIMIBE TABS 10MG] 90 tablet 3     Sig: TAKE 1 TABLET DAILY    Antihyperlipidemic agents Failed    1/3/2018 12:17 AM       Failed - Lipid panel on file in past 12 mos    Recent Labs   Lab Test  11/08/17   0755   02/16/15   0908   CHOL  197   < >  148   TRIG  115   < >  102   HDL  43   < >  37*   LDL  131*   < >  91   VLDL   --    --   20   CHOLHDLRATIO   --    --   4.0    < > = values in this interval not displayed.              Failed - Normal serum ALT on record in past 12 mos    Recent Labs   Lab Test  09/19/14   0751   ALT  49            Passed - Recent or future visit with authorizing provider's specialty    Patient had office visit in the last year or has a visit in the next 30 days with authorizing provider.  See chart review.              Passed - Patient is age 18 years or older

## 2018-01-03 NOTE — TELEPHONE ENCOUNTER
Prescription approved per [facility] Refill Protocol.  Shauna Campbell RN

## 2018-02-23 ENCOUNTER — OFFICE VISIT (OUTPATIENT)
Dept: FAMILY MEDICINE | Facility: CLINIC | Age: 63
End: 2018-02-23
Payer: COMMERCIAL

## 2018-02-23 VITALS
DIASTOLIC BLOOD PRESSURE: 60 MMHG | HEIGHT: 70 IN | TEMPERATURE: 98 F | BODY MASS INDEX: 32.74 KG/M2 | WEIGHT: 228.7 LBS | HEART RATE: 63 BPM | SYSTOLIC BLOOD PRESSURE: 120 MMHG | OXYGEN SATURATION: 99 %

## 2018-02-23 DIAGNOSIS — J31.0 CHRONIC RHINITIS, UNSPECIFIED TYPE: ICD-10-CM

## 2018-02-23 DIAGNOSIS — N18.2 TYPE 2 DIABETES MELLITUS WITH STAGE 2 CHRONIC KIDNEY DISEASE, WITHOUT LONG-TERM CURRENT USE OF INSULIN (H): ICD-10-CM

## 2018-02-23 DIAGNOSIS — K21.00 GASTROESOPHAGEAL REFLUX DISEASE WITH ESOPHAGITIS: ICD-10-CM

## 2018-02-23 DIAGNOSIS — E03.8 SUBCLINICAL HYPOTHYROIDISM: ICD-10-CM

## 2018-02-23 DIAGNOSIS — F33.42 RECURRENT MAJOR DEPRESSION IN COMPLETE REMISSION (H): ICD-10-CM

## 2018-02-23 DIAGNOSIS — E11.22 TYPE 2 DIABETES MELLITUS WITH STAGE 2 CHRONIC KIDNEY DISEASE, WITHOUT LONG-TERM CURRENT USE OF INSULIN (H): ICD-10-CM

## 2018-02-23 DIAGNOSIS — E78.5 HYPERLIPIDEMIA LDL GOAL <100: ICD-10-CM

## 2018-02-23 DIAGNOSIS — S13.4XXD WHIPLASH INJURY TO NECK, SUBSEQUENT ENCOUNTER: ICD-10-CM

## 2018-02-23 DIAGNOSIS — Z00.01 ENCOUNTER FOR ROUTINE ADULT HEALTH EXAMINATION WITH ABNORMAL FINDINGS: Primary | ICD-10-CM

## 2018-02-23 LAB
ANION GAP SERPL CALCULATED.3IONS-SCNC: 8 MMOL/L (ref 3–14)
BUN SERPL-MCNC: 23 MG/DL (ref 7–30)
CALCIUM SERPL-MCNC: 9 MG/DL (ref 8.5–10.1)
CHLORIDE SERPL-SCNC: 103 MMOL/L (ref 94–109)
CHOLEST SERPL-MCNC: 190 MG/DL
CO2 SERPL-SCNC: 28 MMOL/L (ref 20–32)
CREAT SERPL-MCNC: 1.21 MG/DL (ref 0.66–1.25)
CREAT UR-MCNC: 144 MG/DL
ERYTHROCYTE [DISTWIDTH] IN BLOOD BY AUTOMATED COUNT: 13.2 % (ref 10–15)
GFR SERPL CREATININE-BSD FRML MDRD: 61 ML/MIN/1.7M2
GLUCOSE SERPL-MCNC: 122 MG/DL (ref 70–99)
HCT VFR BLD AUTO: 47.1 % (ref 40–53)
HDLC SERPL-MCNC: 33 MG/DL
HGB BLD-MCNC: 15.7 G/DL (ref 13.3–17.7)
LDLC SERPL CALC-MCNC: 138 MG/DL
MCH RBC QN AUTO: 31.2 PG (ref 26.5–33)
MCHC RBC AUTO-ENTMCNC: 33.3 G/DL (ref 31.5–36.5)
MCV RBC AUTO: 94 FL (ref 78–100)
MICROALBUMIN UR-MCNC: 632 MG/L
MICROALBUMIN/CREAT UR: 438.89 MG/G CR (ref 0–17)
NONHDLC SERPL-MCNC: 157 MG/DL
PLATELET # BLD AUTO: 217 10E9/L (ref 150–450)
POTASSIUM SERPL-SCNC: 4.6 MMOL/L (ref 3.4–5.3)
RBC # BLD AUTO: 5.03 10E12/L (ref 4.4–5.9)
SODIUM SERPL-SCNC: 139 MMOL/L (ref 133–144)
TRIGL SERPL-MCNC: 94 MG/DL
WBC # BLD AUTO: 7.9 10E9/L (ref 4–11)

## 2018-02-23 PROCEDURE — 80048 BASIC METABOLIC PNL TOTAL CA: CPT | Performed by: FAMILY MEDICINE

## 2018-02-23 PROCEDURE — 99396 PREV VISIT EST AGE 40-64: CPT | Performed by: FAMILY MEDICINE

## 2018-02-23 PROCEDURE — 80061 LIPID PANEL: CPT | Performed by: FAMILY MEDICINE

## 2018-02-23 PROCEDURE — 82043 UR ALBUMIN QUANTITATIVE: CPT | Performed by: FAMILY MEDICINE

## 2018-02-23 PROCEDURE — 85027 COMPLETE CBC AUTOMATED: CPT | Performed by: FAMILY MEDICINE

## 2018-02-23 PROCEDURE — 36415 COLL VENOUS BLD VENIPUNCTURE: CPT | Performed by: FAMILY MEDICINE

## 2018-02-23 RX ORDER — METHOCARBAMOL 750 MG/1
750 TABLET, FILM COATED ORAL 4 TIMES DAILY PRN
Qty: 60 TABLET | Refills: 5 | Status: SHIPPED | OUTPATIENT
Start: 2018-02-23 | End: 2019-10-29

## 2018-02-23 RX ORDER — LOSARTAN POTASSIUM 50 MG/1
50 TABLET ORAL DAILY
Qty: 90 TABLET | Refills: 3 | Status: SHIPPED | OUTPATIENT
Start: 2018-02-23 | End: 2019-03-11

## 2018-02-23 RX ORDER — LEVOTHYROXINE SODIUM 50 UG/1
75 TABLET ORAL DAILY
Qty: 90 TABLET | Refills: 3 | Status: SHIPPED | OUTPATIENT
Start: 2018-02-23 | End: 2019-03-11

## 2018-02-23 RX ORDER — BUPROPION HYDROCHLORIDE 150 MG/1
150 TABLET ORAL DAILY
Qty: 90 TABLET | Refills: 3 | Status: SHIPPED | OUTPATIENT
Start: 2018-02-23 | End: 2019-02-25 | Stop reason: DRUGHIGH

## 2018-02-23 RX ORDER — ESOMEPRAZOLE MAGNESIUM 40 MG/1
CAPSULE, DELAYED RELEASE ORAL
Qty: 90 CAPSULE | Refills: 3 | Status: SHIPPED | OUTPATIENT
Start: 2018-02-23 | End: 2019-02-25

## 2018-02-23 RX ORDER — FEXOFENADINE HCL AND PSEUDOEPHEDRINE HCL 180; 240 MG/1; MG/1
1 TABLET, EXTENDED RELEASE ORAL DAILY
Qty: 90 TABLET | Refills: 3 | Status: SHIPPED | OUTPATIENT
Start: 2018-02-23 | End: 2019-05-24

## 2018-02-23 RX ORDER — SUCRALFATE ORAL 1 G/10ML
1 SUSPENSION ORAL 4 TIMES DAILY
Qty: 420 ML | Refills: 1 | Status: SHIPPED | OUTPATIENT
Start: 2018-02-23 | End: 2019-02-25

## 2018-02-23 RX ORDER — ALBUTEROL SULFATE 90 UG/1
AEROSOL, METERED RESPIRATORY (INHALATION)
Qty: 1 INHALER | Refills: 5 | Status: SHIPPED | OUTPATIENT
Start: 2018-02-23 | End: 2022-04-07

## 2018-02-23 RX ORDER — OMEPRAZOLE 40 MG/1
40 CAPSULE, DELAYED RELEASE ORAL DAILY
Qty: 90 CAPSULE | Refills: 3 | Status: SHIPPED | OUTPATIENT
Start: 2018-02-23 | End: 2019-02-25 | Stop reason: ALTCHOICE

## 2018-02-23 NOTE — PROGRESS NOTES
"  SUBJECTIVE:   CC: Joel Pike is an 62 year old male who presents for preventative health visit.     Healthy Habits:    Do you get at least three servings of calcium containing foods daily (dairy, green leafy vegetables, etc.)? {YES/NO, DAIRY INTAKE:663953::\"yes\"}    Amount of exercise or daily activities, outside of work: {AMOUNT EXERCISE:893403}    Problems taking medications regularly {Yes /No default:057477::\"No\"}    Medication side effects: {Yes /No default.:886725::\"No\"}    Have you had an eye exam in the past two years? {YESNOBLANK:139496}    Do you see a dentist twice per year? {YESNOBLANK:970286}    Do you have sleep apnea, excessive snoring or daytime drowsiness?{YESNOBLANK:474930}  {Outside tests to abstract? :939794}     {additional problems to add (Optional):407366}    Today's PHQ-2 Score:   PHQ-2 ( 1999 [employer]) 2/22/2018 2/22/2017   Q1: Little interest or pleasure in doing things 1 0   Q2: Feeling down, depressed or hopeless 1 0   PHQ-2 Score 2 0   Q1: Little interest or pleasure in doing things Several days -   Q2: Feeling down, depressed or hopeless Several days -   PHQ-2 Score 2 -     {PHQ-2 LOOK IN ASSESSMENTS :171958}  Abuse: Current or Past(Physical, Sexual or Emotional)- {YES/NO/NA:099152}  Do you feel safe in your environment - {YES/NO/NA:045430}    Social History   Substance Use Topics     Smoking status: Never Smoker     Smokeless tobacco: Never Used     Alcohol use No      If you drink alcohol do you typically have >3 drinks per day or >7 drinks per week? {ETOH :368102}                      Last PSA:   PSA   Date Value Ref Range Status   11/08/2017 2.98 0 - 4 ug/L Final     Comment:     Assay Method:  Chemiluminescence using Siemens Vista analyzer       Reviewed orders with patient. Reviewed health maintenance and updated orders accordingly - {Yes/No:527881::\"Yes\"}  {Chronicprobdata (Optional):509891}    Reviewed and updated as needed this visit by clinical staff         Reviewed and " "updated as needed this visit by Provider        {HISTORY OPTIONS (Optional):059948}    ROS:  {MALE ROS-adult preventive care package:081157::\"C: NEGATIVE for fever, chills, change in weight\",\"I: NEGATIVE for worrisome rashes, moles or lesions\",\"E: NEGATIVE for vision changes or irritation\",\"ENT: NEGATIVE for ear, mouth and throat problems\",\"R: NEGATIVE for significant cough or SOB\",\"CV: NEGATIVE for chest pain, palpitations or peripheral edema\",\"GI: NEGATIVE for nausea, abdominal pain, heartburn, or change in bowel habits\",\" male: negative for dysuria, hematuria, decreased urinary stream, erectile dysfunction, urethral discharge\",\"M: NEGATIVE for significant arthralgias or myalgia\",\"N: NEGATIVE for weakness, dizziness or paresthesias\",\"P: NEGATIVE for changes in mood or affect\"}    OBJECTIVE:   There were no vitals taken for this visit.  EXAM:  {Exam Choices:077357}    ASSESSMENT/PLAN:   {Diag Picklist:067308}    COUNSELING:  {MALE COUNSELING MESSAGES:718410::\"Reviewed preventive health counseling, as reflected in patient instructions\"}    {BP Counseling- Complete if BP >= 120/80  (Optional):308940}   reports that he has never smoked. He has never used smokeless tobacco.  {Tobacco Cessation -- Complete if patient is a smoker (Optional):586668}  Estimated body mass index is 33.24 kg/(m^2) as calculated from the following:    Height as of 7/25/17: 5' 10.5\" (1.791 m).    Weight as of 11/8/17: 235 lb (106.6 kg).   {Weight Management Plan (ACO) Complete if BMI is abnormal-  Ages 18-64  BMI >24.9.  Age 65+ with BMI <23 or >30 (Optional):084424}    Counseling Resources:  ATP IV Guidelines  Pooled Cohorts Equation Calculator  FRAX Risk Assessment  ICSI Preventive Guidelines  Dietary Guidelines for Americans, 2010  USDA's MyPlate  ASA Prophylaxis  Lung CA Screening    Cayden Simone Ramsey MD  [facility]  "
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
## 2018-02-23 NOTE — NURSING NOTE
"Chief Complaint   Patient presents with     Physical     Male health care        Initial /60 (BP Location: Right arm, Patient Position: Chair, Cuff Size: Adult Large)  Pulse 63  Temp 98  F (36.7  C) (Temporal)  Ht 5' 10.25\" (1.784 m)  Wt 228 lb 11.2 oz (103.7 kg)  SpO2 99%  BMI 32.58 kg/m2 Estimated body mass index is 32.58 kg/(m^2) as calculated from the following:    Height as of this encounter: 5' 10.25\" (1.784 m).    Weight as of this encounter: 228 lb 11.2 oz (103.7 kg).  Medication Reconciliation: complete     "
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
## 2018-04-23 ENCOUNTER — TRANSFERRED RECORDS (OUTPATIENT)
Dept: HEALTH INFORMATION MANAGEMENT | Facility: CLINIC | Age: 63
End: 2018-04-23

## 2018-04-27 ENCOUNTER — TELEPHONE (OUTPATIENT)
Dept: FAMILY MEDICINE | Facility: CLINIC | Age: 63
End: 2018-04-27

## 2018-04-27 NOTE — TELEPHONE ENCOUNTER
Panel Management Review      Patient has the following on his problem list:     Diabetes    ASA: Passed    Last A1C  Lab Results   Component Value Date    A1C 5.6 06/22/2017    A1C 5.6 11/09/2016    A1C 5.4 02/17/2016    A1C 5.9 07/01/2015    A1C 6.0 02/16/2015     A1C tested: FAILED    Last LDL:    Lab Results   Component Value Date    CHOL 190 02/23/2018     Lab Results   Component Value Date    HDL 33 02/23/2018     Lab Results   Component Value Date     02/23/2018     Lab Results   Component Value Date    TRIG 94 02/23/2018     Lab Results   Component Value Date    CHOLHDLRATIO 4.0 02/16/2015     Lab Results   Component Value Date    NHDL 157 02/23/2018       Is the patient on a Statin? NO             Is the patient on Aspirin? YES    Medications     HMG CoA Reductase Inhibitors    STATIN NOT PRESCRIBED, INTENTIONAL,    Salicylates    aspirin 325 MG EC tablet          Last three blood pressure readings:  BP Readings from Last 3 Encounters:   02/23/18 120/60   11/08/17 122/60   07/25/17 153/66       Date of last diabetes office visit: 2/23/2018     Tobacco History:     History   Smoking Status     Never Smoker   Smokeless Tobacco     Never Used           Composite cancer screening  Chart review shows that this patient is due/due soon for the following None  Summary:    Patient is due/failing the following:   A1C    Action needed:   Patient needs non-fasting lab only appointment    Type of outreach:    Sent Sweeten message.    Questions for provider review:    None                                                                                                                                    Janay Yates CMA (AAMA)       Chart routed to Closed

## 2018-05-02 DIAGNOSIS — E11.22 TYPE 2 DIABETES MELLITUS WITH STAGE 2 CHRONIC KIDNEY DISEASE, WITHOUT LONG-TERM CURRENT USE OF INSULIN (H): ICD-10-CM

## 2018-05-02 DIAGNOSIS — N18.2 TYPE 2 DIABETES MELLITUS WITH STAGE 2 CHRONIC KIDNEY DISEASE, WITHOUT LONG-TERM CURRENT USE OF INSULIN (H): ICD-10-CM

## 2018-05-02 LAB — HBA1C MFR BLD: 5.6 % (ref 0–6.4)

## 2018-05-02 PROCEDURE — 36415 COLL VENOUS BLD VENIPUNCTURE: CPT | Performed by: FAMILY MEDICINE

## 2018-05-02 PROCEDURE — 83036 HEMOGLOBIN GLYCOSYLATED A1C: CPT | Mod: QW | Performed by: FAMILY MEDICINE

## 2018-07-25 DIAGNOSIS — G47.33 OSA (OBSTRUCTIVE SLEEP APNEA): Primary | ICD-10-CM

## 2018-09-30 DIAGNOSIS — E11.22 TYPE 2 DIABETES MELLITUS WITH STAGE 2 CHRONIC KIDNEY DISEASE, WITHOUT LONG-TERM CURRENT USE OF INSULIN (H): ICD-10-CM

## 2018-09-30 DIAGNOSIS — N18.2 TYPE 2 DIABETES MELLITUS WITH STAGE 2 CHRONIC KIDNEY DISEASE, WITHOUT LONG-TERM CURRENT USE OF INSULIN (H): ICD-10-CM

## 2018-09-30 DIAGNOSIS — E78.5 HYPERLIPIDEMIA LDL GOAL <100: ICD-10-CM

## 2018-10-01 RX ORDER — EZETIMIBE 10 MG/1
TABLET ORAL
Qty: 90 TABLET | Refills: 1 | Status: SHIPPED | OUTPATIENT
Start: 2018-10-01 | End: 2018-10-25

## 2018-10-01 NOTE — TELEPHONE ENCOUNTER
"ezetimibe  Last Written Prescription Date:  01/03/2018  Last Fill Quantity: 90,  # refills: 2   Last office visit: 2/23/2018 with prescribing provider:  Roxy   Future Office Visit:      Requested Prescriptions   Pending Prescriptions Disp Refills     ezetimibe (ZETIA) 10 MG tablet [Pharmacy Med Name: EZETIMIBE TABS 10MG] 90 tablet 2     Sig: TAKE 1 TABLET DAILY    Antihyperlipidemic agents Failed    9/30/2018  6:01 AM       Failed - Normal serum ALT on record in past 12 mos    Recent Labs   Lab Test  09/19/14   0751   ALT  49            Passed - Lipid panel on file in past 12 mos    Recent Labs   Lab Test  02/23/18   0852   02/16/15   0908   CHOL  190   < >  148   TRIG  94   < >  102   HDL  33*   < >  37*   LDL  138*   < >  91   NHDL  157*   < >   --    VLDL   --    --   20   CHOLHDLRATIO   --    --   4.0    < > = values in this interval not displayed.              Passed - Recent (12 mo) or future (30 days) visit within the authorizing provider's specialty    Patient had office visit in the last 12 months or has a visit in the next 30 days with authorizing provider or within the authorizing provider's specialty.  See \"Patient Info\" tab in inbasket, or \"Choose Columns\" in Meds & Orders section of the refill encounter.           Passed - Patient is age 18 years or older          Routing refill request to provider for review/approval because:  Labs out of range:  LDL  138 also ALT is from 2014  Shauna Campbell RN on 10/1/2018 at 2:45 PM    "

## 2018-10-25 DIAGNOSIS — E11.22 TYPE 2 DIABETES MELLITUS WITH STAGE 2 CHRONIC KIDNEY DISEASE, WITHOUT LONG-TERM CURRENT USE OF INSULIN (H): ICD-10-CM

## 2018-10-25 DIAGNOSIS — E78.5 HYPERLIPIDEMIA LDL GOAL <100: ICD-10-CM

## 2018-10-25 DIAGNOSIS — N18.2 TYPE 2 DIABETES MELLITUS WITH STAGE 2 CHRONIC KIDNEY DISEASE, WITHOUT LONG-TERM CURRENT USE OF INSULIN (H): ICD-10-CM

## 2018-10-25 NOTE — TELEPHONE ENCOUNTER
Joel would like to  at [facility] instead of the [facility] pharmacy please    Thanks  Moira Ramos [facility]   [phone]

## 2018-10-26 RX ORDER — EZETIMIBE 10 MG/1
10 TABLET ORAL DAILY
Qty: 30 TABLET | Refills: 0 | Status: SHIPPED | OUTPATIENT
Start: 2018-10-26 | End: 2018-11-28

## 2018-10-26 NOTE — TELEPHONE ENCOUNTER
"Last Written Prescription Date:  10/1/18  Last Fill Quantity: 90,  # refills: 1   Last office visit: 2/23/2018 with prescribing provider:  Cayden Ramsey   Future Office Visit:      Requested Prescriptions   Pending Prescriptions Disp Refills     ezetimibe (ZETIA) 10 MG tablet 90 tablet 3     Sig: Take 1 tablet (10 mg) by mouth daily    Antihyperlipidemic agents Failed    10/25/2018 10:26 AM       Failed - Normal serum ALT on record in past 12 mos    Recent Labs   Lab Test  09/19/14   0751   ALT  49            Passed - Lipid panel on file in past 12 mos    Recent Labs   Lab Test  02/23/18   0852   02/16/15   0908   CHOL  190   < >  148   TRIG  94   < >  102   HDL  33*   < >  37*   LDL  138*   < >  91   NHDL  157*   < >   --    VLDL   --    --   20   CHOLHDLRATIO   --    --   4.0    < > = values in this interval not displayed.              Passed - Recent (12 mo) or future (30 days) visit within the authorizing provider's specialty    Patient had office visit in the last 12 months or has a visit in the next 30 days with authorizing provider or within the authorizing provider's specialty.  See \"Patient Info\" tab in inbasket, or \"Choose Columns\" in Meds & Orders section of the refill encounter.             Passed - Patient is age 18 years or older        Medication is being filled for 1 time refill only due to:  Future labs ordered ALT. Routing to provider to sign pended order and to scheduling to contact pt for lab appt.     Lina Isaac RN        "

## 2018-10-31 DIAGNOSIS — R94.5 ABNORMAL RESULTS OF LIVER FUNCTION STUDIES: Primary | ICD-10-CM

## 2018-10-31 DIAGNOSIS — N18.2 TYPE 2 DIABETES MELLITUS WITH STAGE 2 CHRONIC KIDNEY DISEASE, WITHOUT LONG-TERM CURRENT USE OF INSULIN (H): ICD-10-CM

## 2018-10-31 DIAGNOSIS — E11.22 TYPE 2 DIABETES MELLITUS WITH STAGE 2 CHRONIC KIDNEY DISEASE, WITHOUT LONG-TERM CURRENT USE OF INSULIN (H): ICD-10-CM

## 2018-10-31 DIAGNOSIS — E78.5 HYPERLIPIDEMIA LDL GOAL <100: ICD-10-CM

## 2018-10-31 LAB — ALT SERPL W P-5'-P-CCNC: 91 U/L (ref 0–70)

## 2018-10-31 PROCEDURE — 84460 ALANINE AMINO (ALT) (SGPT): CPT | Performed by: FAMILY MEDICINE

## 2018-10-31 PROCEDURE — 36415 COLL VENOUS BLD VENIPUNCTURE: CPT | Performed by: FAMILY MEDICINE

## 2018-11-28 DIAGNOSIS — N18.2 TYPE 2 DIABETES MELLITUS WITH STAGE 2 CHRONIC KIDNEY DISEASE, WITHOUT LONG-TERM CURRENT USE OF INSULIN (H): ICD-10-CM

## 2018-11-28 DIAGNOSIS — E78.5 HYPERLIPIDEMIA LDL GOAL <100: ICD-10-CM

## 2018-11-28 DIAGNOSIS — K21.00 GASTROESOPHAGEAL REFLUX DISEASE WITH ESOPHAGITIS: ICD-10-CM

## 2018-11-28 DIAGNOSIS — E11.22 TYPE 2 DIABETES MELLITUS WITH STAGE 2 CHRONIC KIDNEY DISEASE, WITHOUT LONG-TERM CURRENT USE OF INSULIN (H): ICD-10-CM

## 2018-11-30 RX ORDER — EZETIMIBE 10 MG/1
TABLET ORAL
Qty: 30 TABLET | Refills: 0 | Status: SHIPPED | OUTPATIENT
Start: 2018-11-30 | End: 2018-12-28

## 2018-11-30 RX ORDER — ESOMEPRAZOLE MAGNESIUM 40 MG/1
CAPSULE, DELAYED RELEASE ORAL
Qty: 90 CAPSULE | Refills: 3 | OUTPATIENT
Start: 2018-11-30

## 2018-11-30 NOTE — TELEPHONE ENCOUNTER
"ezetimibe  Last Written Prescription Date:  10/26/2018  Last Fill Quantity: 30,  # refills: 0   Last office visit: 2/23/2018 with prescribing provider:      Future Office Visit:      Requested Prescriptions   Pending Prescriptions Disp Refills     ezetimibe (ZETIA) 10 MG tablet [Pharmacy Med Name: EZETIMIBE 10MG TABS] 30 tablet 0     Sig: TAKE ONE TABLET BY MOUTH EVERY DAY (LAB APPOINTMENT NEDED FOR FURTHER REFILLS)    Antihyperlipidemic agents Failed    11/28/2018  8:14 PM       Failed - Normal serum ALT on record in past 12 mos    Recent Labs   Lab Test  10/31/18   0732   ALT  91*            Passed - Lipid panel on file in past 12 mos    Recent Labs   Lab Test  02/23/18   0852   02/16/15   0908   CHOL  190   < >  148   TRIG  94   < >  102   HDL  33*   < >  37*   LDL  138*   < >  91   NHDL  157*   < >   --    VLDL   --    --   20   CHOLHDLRATIO   --    --   4.0    < > = values in this interval not displayed.              Passed - Recent (12 mo) or future (30 days) visit within the authorizing provider's specialty    Patient had office visit in the last 12 months or has a visit in the next 30 days with authorizing provider or within the authorizing provider's specialty.  See \"Patient Info\" tab in inbasket, or \"Choose Columns\" in Meds & Orders section of the refill encounter.             Passed - Patient is age 18 years or older     Routing refill request to provider for review/approval because:  Labs out of range:  ALT 91              Esomeprazole  Last Written Prescription Date:  02/23/2019  Last Fill Quantity: 90,  # refills: 3   Last office visit: 2/23/2018 with prescribing provider:      Future Office Visit:       esomeprazole (NEXIUM) 40 MG DR capsule [Pharmacy Med Name: ESOMEPRAZOLE MAGNESIUM 40MG CPDR] 90 capsule 3     Sig: TAKE ONE CAPSULE BY MOUTH EVERY MORNING BEFORE BREAKFAST, 30 TO 60 MINUTES BEFORE EATING    PPI Protocol Passed    11/28/2018  8:14 PM       Passed - Not on Clopidogrel (unless " "Pantoprazole ordered)       Passed - No diagnosis of osteoporosis on record       Passed - Recent (12 mo) or future (30 days) visit within the authorizing provider's specialty    Patient had office visit in the last 12 months or has a visit in the next 30 days with authorizing provider or within the authorizing provider's specialty.  See \"Patient Info\" tab in inbasket, or \"Choose Columns\" in Meds & Orders section of the refill encounter.             Passed - Patient is age 18 or older          Refused-has refills avail through pharmacy    Shauna Campbell RN on 11/30/2018 at 11:53 AM    "

## 2018-12-28 DIAGNOSIS — E78.5 HYPERLIPIDEMIA LDL GOAL <100: ICD-10-CM

## 2018-12-28 DIAGNOSIS — N18.2 TYPE 2 DIABETES MELLITUS WITH STAGE 2 CHRONIC KIDNEY DISEASE, WITHOUT LONG-TERM CURRENT USE OF INSULIN (H): ICD-10-CM

## 2018-12-28 DIAGNOSIS — E11.22 TYPE 2 DIABETES MELLITUS WITH STAGE 2 CHRONIC KIDNEY DISEASE, WITHOUT LONG-TERM CURRENT USE OF INSULIN (H): ICD-10-CM

## 2018-12-31 RX ORDER — EZETIMIBE 10 MG/1
10 TABLET ORAL DAILY
Qty: 30 TABLET | Refills: 2 | Status: SHIPPED | OUTPATIENT
Start: 2018-12-31 | End: 2019-04-01

## 2018-12-31 NOTE — TELEPHONE ENCOUNTER
"ezetimibe  Last Written Prescription Date:  11/30/2018  Last Fill Quantity: 30,  # refills: 0   Last office visit: 2/23/2018 with prescribing provider:      Future Office Visit:      Requested Prescriptions   Pending Prescriptions Disp Refills     ezetimibe (ZETIA) 10 MG tablet [Pharmacy Med Name: EZETIMIBE 10MG TABS] 30 tablet 0     Sig: TAKE ONE TABLET BY MOUTH EVERY DAY (LAB APPOINTMENT NEEDED FOR FURTHER REFILLS)    Antihyperlipidemic agents Failed - 12/28/2018 11:59 AM       Failed - Normal serum ALT on record in past 12 mos    Recent Labs   Lab Test 10/31/18  0732   ALT 91*            Passed - Lipid panel on file in past 12 mos    Recent Labs   Lab Test 02/23/18  0852  02/16/15  0908   CHOL 190   < > 148   TRIG 94   < > 102   HDL 33*   < > 37*   *   < > 91   NHDL 157*   < >  --    VLDL  --   --  20   CHOLHDLRATIO  --   --  4.0    < > = values in this interval not displayed.              Passed - Recent (12 mo) or future (30 days) visit within the authorizing provider's specialty    Patient had office visit in the last 12 months or has a visit in the next 30 days with authorizing provider or within the authorizing provider's specialty.  See \"Patient Info\" tab in inbasket, or \"Choose Columns\" in Meds & Orders section of the refill encounter.             Passed - Patient is age 18 years or older          Prescription approved per [facility] Refill Protocol.      Shauna Campbell RN on 12/31/2018 at 12:33 PM    "

## 2019-02-24 ASSESSMENT — ENCOUNTER SYMPTOMS
DIZZINESS: 1
WEAKNESS: 1
ARTHRALGIAS: 1
SHORTNESS OF BREATH: 1
FREQUENCY: 1
MYALGIAS: 1
ABDOMINAL PAIN: 1
HEARTBURN: 0
PALPITATIONS: 1
CHILLS: 1
NERVOUS/ANXIOUS: 1
COUGH: 1

## 2019-02-25 ENCOUNTER — OFFICE VISIT (OUTPATIENT)
Dept: FAMILY MEDICINE | Facility: CLINIC | Age: 64
End: 2019-02-25
Payer: COMMERCIAL

## 2019-02-25 VITALS
BODY MASS INDEX: 33.61 KG/M2 | HEART RATE: 78 BPM | WEIGHT: 235.9 LBS | TEMPERATURE: 98.1 F | OXYGEN SATURATION: 98 % | RESPIRATION RATE: 18 BRPM | DIASTOLIC BLOOD PRESSURE: 62 MMHG | SYSTOLIC BLOOD PRESSURE: 124 MMHG

## 2019-02-25 DIAGNOSIS — R80.9 MICROALBUMINURIA: ICD-10-CM

## 2019-02-25 DIAGNOSIS — N18.2 TYPE 2 DIABETES MELLITUS WITH STAGE 2 CHRONIC KIDNEY DISEASE, WITHOUT LONG-TERM CURRENT USE OF INSULIN (H): ICD-10-CM

## 2019-02-25 DIAGNOSIS — Z00.01 ENCOUNTER FOR ROUTINE ADULT HEALTH EXAMINATION WITH ABNORMAL FINDINGS: Primary | ICD-10-CM

## 2019-02-25 DIAGNOSIS — E11.22 TYPE 2 DIABETES MELLITUS WITH STAGE 2 CHRONIC KIDNEY DISEASE, WITHOUT LONG-TERM CURRENT USE OF INSULIN (H): ICD-10-CM

## 2019-02-25 DIAGNOSIS — N18.2 CKD (CHRONIC KIDNEY DISEASE) STAGE 2, GFR 60-89 ML/MIN: ICD-10-CM

## 2019-02-25 DIAGNOSIS — M51.379 DEGENERATION OF LUMBAR OR LUMBOSACRAL INTERVERTEBRAL DISC: ICD-10-CM

## 2019-02-25 DIAGNOSIS — K21.00 GASTROESOPHAGEAL REFLUX DISEASE WITH ESOPHAGITIS: ICD-10-CM

## 2019-02-25 DIAGNOSIS — F33.42 RECURRENT MAJOR DEPRESSION IN COMPLETE REMISSION (H): ICD-10-CM

## 2019-02-25 DIAGNOSIS — R05.9 COUGH: ICD-10-CM

## 2019-02-25 DIAGNOSIS — G47.33 OSA (OBSTRUCTIVE SLEEP APNEA): ICD-10-CM

## 2019-02-25 DIAGNOSIS — E03.8 SUBCLINICAL HYPOTHYROIDISM: ICD-10-CM

## 2019-02-25 LAB
ALBUMIN SERPL-MCNC: 3.8 G/DL (ref 3.4–5)
ALP SERPL-CCNC: 87 U/L (ref 40–150)
ALT SERPL W P-5'-P-CCNC: 96 U/L (ref 0–70)
ANION GAP SERPL CALCULATED.3IONS-SCNC: 7 MMOL/L (ref 3–14)
AST SERPL W P-5'-P-CCNC: 38 U/L (ref 0–45)
BILIRUB SERPL-MCNC: 0.7 MG/DL (ref 0.2–1.3)
BUN SERPL-MCNC: 20 MG/DL (ref 7–30)
CALCIUM SERPL-MCNC: 8.8 MG/DL (ref 8.5–10.1)
CHLORIDE SERPL-SCNC: 109 MMOL/L (ref 94–109)
CHOLEST SERPL-MCNC: 224 MG/DL
CO2 SERPL-SCNC: 26 MMOL/L (ref 20–32)
CREAT SERPL-MCNC: 1.15 MG/DL (ref 0.66–1.25)
CREAT UR-MCNC: 129 MG/DL
ERYTHROCYTE [DISTWIDTH] IN BLOOD BY AUTOMATED COUNT: 12.5 % (ref 10–15)
GFR SERPL CREATININE-BSD FRML MDRD: 68 ML/MIN/{1.73_M2}
GLUCOSE SERPL-MCNC: 128 MG/DL (ref 70–99)
HBA1C MFR BLD: 5.7 % (ref 0–5.6)
HCT VFR BLD AUTO: 45.6 % (ref 40–53)
HDLC SERPL-MCNC: 38 MG/DL
HGB BLD-MCNC: 15.7 G/DL (ref 13.3–17.7)
LDLC SERPL CALC-MCNC: 162 MG/DL
MCH RBC QN AUTO: 31.8 PG (ref 26.5–33)
MCHC RBC AUTO-ENTMCNC: 34.4 G/DL (ref 31.5–36.5)
MCV RBC AUTO: 93 FL (ref 78–100)
MICROALBUMIN UR-MCNC: 857 MG/L
MICROALBUMIN/CREAT UR: 664.34 MG/G CR (ref 0–17)
NONHDLC SERPL-MCNC: 186 MG/DL
PLATELET # BLD AUTO: 215 10E9/L (ref 150–450)
POTASSIUM SERPL-SCNC: 4.5 MMOL/L (ref 3.4–5.3)
PROT SERPL-MCNC: 7.1 G/DL (ref 6.8–8.8)
RBC # BLD AUTO: 4.93 10E12/L (ref 4.4–5.9)
SODIUM SERPL-SCNC: 142 MMOL/L (ref 133–144)
TRIGL SERPL-MCNC: 119 MG/DL
TSH SERPL DL<=0.005 MIU/L-ACNC: 1.84 MU/L (ref 0.4–4)
WBC # BLD AUTO: 8.5 10E9/L (ref 4–11)

## 2019-02-25 PROCEDURE — 80061 LIPID PANEL: CPT | Performed by: FAMILY MEDICINE

## 2019-02-25 PROCEDURE — 85027 COMPLETE CBC AUTOMATED: CPT | Performed by: FAMILY MEDICINE

## 2019-02-25 PROCEDURE — 99207 C FOOT EXAM  NO CHARGE: CPT | Mod: 25 | Performed by: FAMILY MEDICINE

## 2019-02-25 PROCEDURE — 36415 COLL VENOUS BLD VENIPUNCTURE: CPT | Performed by: FAMILY MEDICINE

## 2019-02-25 PROCEDURE — 84443 ASSAY THYROID STIM HORMONE: CPT | Performed by: FAMILY MEDICINE

## 2019-02-25 PROCEDURE — 82043 UR ALBUMIN QUANTITATIVE: CPT | Performed by: FAMILY MEDICINE

## 2019-02-25 PROCEDURE — 99214 OFFICE O/P EST MOD 30 MIN: CPT | Mod: 25 | Performed by: FAMILY MEDICINE

## 2019-02-25 PROCEDURE — 99396 PREV VISIT EST AGE 40-64: CPT | Performed by: FAMILY MEDICINE

## 2019-02-25 PROCEDURE — 80053 COMPREHEN METABOLIC PANEL: CPT | Performed by: FAMILY MEDICINE

## 2019-02-25 PROCEDURE — 83036 HEMOGLOBIN GLYCOSYLATED A1C: CPT | Performed by: FAMILY MEDICINE

## 2019-02-25 RX ORDER — SUCRALFATE ORAL 1 G/10ML
1 SUSPENSION ORAL 4 TIMES DAILY
Qty: 420 ML | Refills: 1 | Status: SHIPPED | OUTPATIENT
Start: 2019-02-25 | End: 2020-11-04

## 2019-02-25 RX ORDER — BUPROPION HYDROCHLORIDE 300 MG/1
300 TABLET ORAL EVERY MORNING
Qty: 90 TABLET | Refills: 3 | Status: SHIPPED | OUTPATIENT
Start: 2019-02-25 | End: 2020-02-24

## 2019-02-25 RX ORDER — ESOMEPRAZOLE MAGNESIUM 40 MG/1
CAPSULE, DELAYED RELEASE ORAL
Qty: 90 CAPSULE | Refills: 3 | Status: SHIPPED | OUTPATIENT
Start: 2019-02-25 | End: 2020-02-24

## 2019-02-25 ASSESSMENT — ENCOUNTER SYMPTOMS
MYALGIAS: 1
NERVOUS/ANXIOUS: 1
COUGH: 1
SHORTNESS OF BREATH: 1
ARTHRALGIAS: 1
FREQUENCY: 1
CHILLS: 1
ABDOMINAL PAIN: 1
WEAKNESS: 1
HEARTBURN: 0
PALPITATIONS: 1
DIZZINESS: 1

## 2019-02-25 ASSESSMENT — PATIENT HEALTH QUESTIONNAIRE - PHQ9: SUM OF ALL RESPONSES TO PHQ QUESTIONS 1-9: 4

## 2019-02-25 ASSESSMENT — PAIN SCALES - GENERAL: PAINLEVEL: MILD PAIN (3)

## 2019-02-25 NOTE — PROGRESS NOTES
"SUBJECTIVE:   CC: Joel Pike is an 63 year old male who presents for preventative health visit.     Physical   Annual:     Getting at least 3 servings of Calcium per day:  Yes    Bi-annual eye exam:  Yes    Dental care twice a year:  Yes    Sleep apnea or symptoms of sleep apnea:  Excessive snoring and Sleep apnea    Diet:  Low salt, Diabetic, Carbohydrate counting, Gluten-free/reduced and Other    Frequency of exercise:  4-5 days/week    Duration of exercise:  Greater than 60 minutes    Taking medications regularly:  Yes    Medication side effects:  Muscle aches, Significant flushing and Lightheadedness    Additional concerns today:  No    PHQ-2 Total Score: 1          Coughing a lot more recently.   \"Depression is getting the best of me\" \"sometimes not every day\"      Today's PHQ-2 Score:   PHQ-2 ( 1999 [employer]) 2/24/2019   Q1: Little interest or pleasure in doing things 0   Q2: Feeling down, depressed or hopeless 1   PHQ-2 Score 1   Q1: Little interest or pleasure in doing things Not at all   Q2: Feeling down, depressed or hopeless Several days   PHQ-2 Score 1       Abuse: Current or Past(Physical, Sexual or Emotional)- No  Do you feel safe in your environment? Yes    Social History     Tobacco Use     Smoking status: Never Smoker     Smokeless tobacco: Never Used   Substance Use Topics     Alcohol use: No     Alcohol/week: 0.0 oz     Alcohol Use 2/24/2019   If you drink alcohol do you typically have greater than 3 drinks per day OR greater than 7 drinks per week? Not Applicable   No flowsheet data found.    Last PSA:   PSA   Date Value Ref Range Status   11/08/2017 2.98 0 - 4 ug/L Final     Comment:     Assay Method:  Chemiluminescence using Siemens Vista analyzer       Reviewed orders with patient. Reviewed health maintenance and updated orders accordingly - Yes  Labs reviewed in EPIC  BP Readings from Last 3 Encounters:   02/25/19 124/62   02/23/18 120/60   11/08/17 122/60    Wt Readings from Last 3 " Encounters:   02/25/19 107 kg (235 lb 14.4 oz)   02/23/18 103.7 kg (228 lb 11.2 oz)   11/08/17 106.6 kg (235 lb)                    Reviewed and updated as needed this visit by clinical staff         Reviewed and updated as needed this visit by Provider            Review of Systems   Constitutional: Positive for chills.   HENT: Positive for congestion, ear pain and hearing loss.    Eyes: Positive for visual disturbance.   Respiratory: Positive for cough and shortness of breath.    Cardiovascular: Positive for palpitations and peripheral edema.   Gastrointestinal: Positive for abdominal pain. Negative for heartburn.   Genitourinary: Positive for frequency and urgency. Negative for discharge and impotence.   Musculoskeletal: Positive for arthralgias and myalgias.   Neurological: Positive for dizziness and weakness.   Psychiatric/Behavioral: Positive for mood changes. The patient is nervous/anxious.        OBJECTIVE:   /62   Pulse 78   Temp 98.1  F (36.7  C) (Temporal)   Resp 18   Wt 107 kg (235 lb 14.4 oz)   SpO2 98%   BMI 33.61 kg/m      Physical Exam  GENERAL: healthy, alert and no distress  EYES: Eyes grossly normal to inspection, PERRL and conjunctivae and sclerae normal  HENT: ear canals and TM's normal, nose and mouth without ulcers or lesions  NECK: no adenopathy, no asymmetry, masses, or scars and thyroid normal to palpation  RESP: lungs clear to auscultation - no rales, rhonchi or wheezes  CV: regular rate and rhythm, normal S1 S2, no S3 or S4, no murmur, click or rub, no peripheral edema and peripheral pulses strong  ABDOMEN: soft, nontender, no hepatosplenomegaly, no masses and bowel sounds normal  MS: mild protects his back with movement, slightly tender low back to palpations  SKIN: no suspicious lesions or rashes  NEURO: Normal strength and tone, sensory exam grossly normal and mentation intact  PSYCH: mentation appears normal, affect normal/bright  LYMPH: no cervical, supraclavicular,  axillary, or inguinal adenopathy    Diagnostic Test Results:  Results for orders placed or performed in visit on 02/25/19   Lipid panel reflex to direct LDL Fasting   Result Value Ref Range    Cholesterol 224 (H) <200 mg/dL    Triglycerides 119 <150 mg/dL    HDL Cholesterol 38 (L) >39 mg/dL    LDL Cholesterol Calculated 162 (H) <100 mg/dL    Non HDL Cholesterol 186 (H) <130 mg/dL   Hemoglobin A1c   Result Value Ref Range    Hemoglobin A1C 5.7 (H) 0 - 5.6 %   Comprehensive metabolic panel (BMP + Alb, Alk Phos, ALT, AST, Total. Bili, TP)   Result Value Ref Range    Sodium 142 133 - 144 mmol/L    Potassium 4.5 3.4 - 5.3 mmol/L    Chloride 109 94 - 109 mmol/L    Carbon Dioxide 26 20 - 32 mmol/L    Anion Gap 7 3 - 14 mmol/L    Glucose 128 (H) 70 - 99 mg/dL    Urea Nitrogen 20 7 - 30 mg/dL    Creatinine 1.15 0.66 - 1.25 mg/dL    GFR Estimate 68 >60 mL/min/[1.73_m2]    GFR Estimate If Black 78 >60 mL/min/[1.73_m2]    Calcium 8.8 8.5 - 10.1 mg/dL    Bilirubin Total 0.7 0.2 - 1.3 mg/dL    Albumin 3.8 3.4 - 5.0 g/dL    Protein Total 7.1 6.8 - 8.8 g/dL    Alkaline Phosphatase 87 40 - 150 U/L    ALT 96 (H) 0 - 70 U/L    AST 38 0 - 45 U/L   Albumin Random Urine Quantitative with Creat Ratio   Result Value Ref Range    Creatinine Urine 129 mg/dL    Albumin Urine mg/L 857 mg/L    Albumin Urine mg/g Cr 664.34 (H) 0 - 17 mg/g Cr   CBC with platelets   Result Value Ref Range    WBC 8.5 4.0 - 11.0 10e9/L    RBC Count 4.93 4.4 - 5.9 10e12/L    Hemoglobin 15.7 13.3 - 17.7 g/dL    Hematocrit 45.6 40.0 - 53.0 %    MCV 93 78 - 100 fl    MCH 31.8 26.5 - 33.0 pg    MCHC 34.4 31.5 - 36.5 g/dL    RDW 12.5 10.0 - 15.0 %    Platelet Count 215 150 - 450 10e9/L   TSH   Result Value Ref Range    TSH 1.84 0.40 - 4.00 mU/L       ASSESSMENT/PLAN:   1. Encounter for routine adult health examination with abnormal findings  Multiple problems but most are controlled and health ok    2. Recurrent major depression in complete remission (H)  I will double  his antidepressant dose and see if this helps or hurts. He will let me know if still issues in a few weeks. It has affected relationships and his wife will tell him if more is needed  - buPROPion (WELLBUTRIN XL) 300 MG 24 hr tablet; Take 1 tablet (300 mg) by mouth every morning  Dispense: 90 tablet; Refill: 3    3. Type 2 diabetes mellitus with stage 2 chronic kidney disease, without long-term current use of insulin (H)  Well controlled and will continue meds, diet and exercise. He does not tolerate a statin and LDL not as good as should be.  Will follow in 6 months  - Lipid panel reflex to direct LDL Fasting  - Hemoglobin A1c  - Comprehensive metabolic panel (BMP + Alb, Alk Phos, ALT, AST, Total. Bili, TP)  - FOOT EXAM  - Albumin Random Urine Quantitative with Creat Ratio    4. Gastroesophageal reflux disease with esophagitis  Has long standing cough, may be from this.  Omeprazole not as effectove as esomeprazole. Will switch and use sucralfate  - sucralfate (CARAFATE) 1 GM/10ML suspension; Take 10 mLs (1 g) by mouth 4 times daily  Dispense: 420 mL; Refill: 1  - esomeprazole (NEXIUM) 40 MG DR capsule; TAKE 1 CAPSULE EVERY MORNING BEFORE BREAKFAST, 30 TO 60 MINUTES BEFORE EATING.  Dispense: 90 capsule; Refill: 3  - CBC with platelets    5. Cough  See above, it may be ARB but suspect GERD    6. CKD (chronic kidney disease) stage 2, GFR 60-89 ml/min  Remains the same but spills some albumin in urine    7. Subclinical hypothyroidism  controlled    8. HILARIO (obstructive sleep apnea)  Wears CPAP  - TSH    9. Degeneration of lumbar or lumbosacral intervertebral disc  In fitness and is careful with activity    10. Microalbuminuria  Present, recheck 6 months      COUNSELING:   Reviewed preventive health counseling, as reflected in patient instructions       Regular exercise       Healthy diet/nutrition       Colon cancer screening    BP Readings from Last 1 Encounters:   02/23/18 120/60     Estimated body mass index is 32.58  "kg/m  as calculated from the following:    Height as of 2/23/18: 1.784 m (5' 10.25\").    Weight as of 2/23/18: 103.7 kg (228 lb 11.2 oz).      Weight management plan: Discussed healthy diet and exercise guidelines     reports that  has never smoked. he has never used smokeless tobacco.      Counseling Resources:  ATP IV Guidelines  Pooled Cohorts Equation Calculator  FRAX Risk Assessment  ICSI Preventive Guidelines  Dietary Guidelines for Americans, 2010  USDA's MyPlate  ASA Prophylaxis  Lung CA Screening    Cayden Simone Ramsey MD  [facility]  "

## 2019-02-26 PROBLEM — R80.9 MICROALBUMINURIA: Status: ACTIVE | Noted: 2019-02-26

## 2019-03-11 DIAGNOSIS — E03.8 SUBCLINICAL HYPOTHYROIDISM: ICD-10-CM

## 2019-03-11 DIAGNOSIS — E11.22 TYPE 2 DIABETES MELLITUS WITH STAGE 2 CHRONIC KIDNEY DISEASE, WITHOUT LONG-TERM CURRENT USE OF INSULIN (H): ICD-10-CM

## 2019-03-11 DIAGNOSIS — N18.2 TYPE 2 DIABETES MELLITUS WITH STAGE 2 CHRONIC KIDNEY DISEASE, WITHOUT LONG-TERM CURRENT USE OF INSULIN (H): ICD-10-CM

## 2019-03-13 RX ORDER — LOSARTAN POTASSIUM 50 MG/1
TABLET ORAL
Qty: 90 TABLET | Refills: 3 | Status: SHIPPED | OUTPATIENT
Start: 2019-03-13 | End: 2020-02-24

## 2019-03-13 RX ORDER — LEVOTHYROXINE SODIUM 50 UG/1
TABLET ORAL
Qty: 135 TABLET | Refills: 3 | Status: SHIPPED | OUTPATIENT
Start: 2019-03-13 | End: 2020-03-18

## 2019-03-13 NOTE — TELEPHONE ENCOUNTER
"Requested Prescriptions   Pending Prescriptions Disp Refills     levothyroxine (SYNTHROID/LEVOTHROID) 50 MCG tablet [Pharmacy Med Name: LEVOTHYROXINE SODIUM 50MCG TABS] 135 tablet 3    Last Written Prescription Date:  2/23/18  Last Fill Quantity: 90,  # refills: 3   Last office visit: 2/25/2019 with prescribing provider:     Future Office Visit:     Sig: TAKE ONE AND ONE-HALF TABLETS BY MOUTH DAILY    Thyroid Protocol Passed - 3/11/2019  6:51 AM       Passed - Patient is 12 years or older       Passed - Recent (12 mo) or future (30 days) visit within the authorizing provider's specialty    Patient had office visit in the last 12 months or has a visit in the next 30 days with authorizing provider or within the authorizing provider's specialty.  See \"Patient Info\" tab in inbasket, or \"Choose Columns\" in Meds & Orders section of the refill encounter.           Passed - Medication is active on med list       Passed - Normal TSH on file in past 12 months    Recent Labs   Lab Test 02/25/19  1047   TSH 1.84       Prescription approved per [facility] Refill Protocol.            metFORMIN (GLUCOPHAGE) 500 MG tablet [Pharmacy Med Name: METFORMIN HCL 500MG TABS] 180 tablet 3    Last Written Prescription Date:  2/23/18  Last Fill Quantity: 180,  # refills: 3   Last office visit: 2/25/2019 with prescribing provider:     Future Office Visit:     Sig: TAKE ONE TABLET BY MOUTH TWICE A DAY WITH MEALS    Biguanide Agents Passed - 3/11/2019  6:51 AM       Passed - Blood pressure less than 140/90 in past 6 months    BP Readings from Last 3 Encounters:   02/25/19 124/62   02/23/18 120/60   11/08/17 122/60          Passed - Patient has documented LDL within the past 12 mos.    Recent Labs   Lab Test 02/25/19  1047   *          Passed - Patient has had a Microalbumin in the past 15 mos.    Recent Labs   Lab Test 02/25/19  1054   MICROL 857   UMALCR 664.34*          Passed - Patient is age 10 or older       Passed - Patient has documented " "A1c within the specified period of time.    If HgbA1C is 8 or greater, it needs to be on file within the past 3 months.  If less than 8, must be on file within the past 6 months.     Recent Labs   Lab Test 02/25/19  1047   A1C 5.7*          Passed - Patient's CR is NOT>1.4 OR Patient's EGFR is NOT<45 within past 12 mos.    Recent Labs   Lab Test 02/25/19  1047   GFRESTIMATED 68   GFRESTBLACK 78     Recent Labs   Lab Test 02/25/19  1047   CR 1.15          Passed - Patient does NOT have a diagnosis of CHF.       Passed - Medication is active on med list       Passed - Recent (6 mo) or future (30 days) visit within the authorizing provider's specialty    Patient had office visit in the last 6 months or has a visit in the next 30 days with authorizing provider or within the authorizing provider's specialty.  See \"Patient Info\" tab in inbasket, or \"Choose Columns\" in Meds & Orders section of the refill encounter.       Prescription approved per [facility] Refill Protocol.           losartan (COZAAR) 50 MG tablet [Pharmacy Med Name: LOSARTAN POTASSIUM 50MG TABS] 90 tablet 3    Last Written Prescription Date:  2/23/18  Last Fill Quantity: 90,  # refills: 3   Last office visit: 2/25/2019 with prescribing provider:     Future Office Visit:     Sig: TAKE ONE TABLET BY MOUTH EVERY DAY    Angiotensin-II Receptors Passed - 3/11/2019  6:51 AM       Passed - Blood pressure under 140/90 in past 12 months    BP Readings from Last 3 Encounters:   02/25/19 124/62   02/23/18 120/60   11/08/17 122/60          Passed - Recent (12 mo) or future (30 days) visit within the authorizing provider's specialty    Patient had office visit in the last 12 months or has a visit in the next 30 days with authorizing provider or within the authorizing provider's specialty.  See \"Patient Info\" tab in inbasket, or \"Choose Columns\" in Meds & Orders section of the refill encounter.           Passed - Medication is active on med list       Passed - Patient is age " 18 or older       Passed - Normal serum creatinine on file in past 12 months    Recent Labs   Lab Test 02/25/19  1047   CR 1.15          Passed - Normal serum potassium on file in past 12 months    Recent Labs   Lab Test 02/25/19  1047   POTASSIUM 4.5          Prescription approved per [facility] Refill Protocol.    Lay Santamaria RN

## 2019-04-01 DIAGNOSIS — N18.2 TYPE 2 DIABETES MELLITUS WITH STAGE 2 CHRONIC KIDNEY DISEASE, WITHOUT LONG-TERM CURRENT USE OF INSULIN (H): ICD-10-CM

## 2019-04-01 DIAGNOSIS — E11.22 TYPE 2 DIABETES MELLITUS WITH STAGE 2 CHRONIC KIDNEY DISEASE, WITHOUT LONG-TERM CURRENT USE OF INSULIN (H): ICD-10-CM

## 2019-04-01 DIAGNOSIS — E78.5 HYPERLIPIDEMIA LDL GOAL <100: Primary | ICD-10-CM

## 2019-04-03 RX ORDER — EZETIMIBE 10 MG/1
TABLET ORAL
Qty: 30 TABLET | Refills: 4 | Status: SHIPPED | OUTPATIENT
Start: 2019-04-03 | End: 2019-09-18

## 2019-04-03 NOTE — TELEPHONE ENCOUNTER
"ZETIA  Last Written Prescription Date:  12/31/18  Last Fill Quantity: 30,  # refills: 2   Last office visit: 2/25/2019 with prescribing provider:     Future Office Visit:  NONE  Requested Prescriptions   Pending Prescriptions Disp Refills     ezetimibe (ZETIA) 10 MG tablet [Pharmacy Med Name: EZETIMIBE 10MG TABS] 30 tablet 2     Sig: TAKE ONE TABLET BY MOUTH ONCE DAILY    Antihyperlipidemic agents Failed - 4/1/2019 12:52 PM       Failed - Normal serum ALT on record in past 12 mos    Recent Labs   Lab Test 02/25/19  1047   ALT 96*            Passed - Lipid panel on file in past 12 mos    Recent Labs   Lab Test 02/25/19  1047  02/16/15  0908   CHOL 224*   < > 148   TRIG 119   < > 102   HDL 38*   < > 37*   *   < > 91   NHDL 186*   < >  --    VLDL  --   --  20   CHOLHDLRATIO  --   --  4.0    < > = values in this interval not displayed.              Passed - Recent (12 mo) or future (30 days) visit within the authorizing provider's specialty    Patient had office visit in the last 12 months or has a visit in the next 30 days with authorizing provider or within the authorizing provider's specialty.  See \"Patient Info\" tab in inbasket, or \"Choose Columns\" in Meds & Orders section of the refill encounter.             Passed - Medication is active on med list       Passed - Patient is age 18 years or older        Prescription approved per [facility] Refill Protocol. For 6 months as Lipid is above goal. Will need to recheck it again in August.   CATY Reis        "

## 2019-05-21 ENCOUNTER — APPOINTMENT (OUTPATIENT)
Dept: SLEEP MEDICINE | Facility: CLINIC | Age: 64
End: 2019-05-21
Payer: COMMERCIAL

## 2019-05-22 DIAGNOSIS — J31.0 CHRONIC RHINITIS: ICD-10-CM

## 2019-05-24 RX ORDER — FEXOFENADINE HCL AND PSEUDOEPHEDRINE HCL 180; 240 MG/1; MG/1
1 TABLET, EXTENDED RELEASE ORAL DAILY
Qty: 90 TABLET | Refills: 0 | Status: SHIPPED | OUTPATIENT
Start: 2019-05-24 | End: 2020-11-06

## 2019-06-28 ENCOUNTER — TRANSFERRED RECORDS (OUTPATIENT)
Dept: HEALTH INFORMATION MANAGEMENT | Facility: CLINIC | Age: 64
End: 2019-06-28

## 2019-09-18 DIAGNOSIS — E11.22 TYPE 2 DIABETES MELLITUS WITH STAGE 2 CHRONIC KIDNEY DISEASE, WITHOUT LONG-TERM CURRENT USE OF INSULIN (H): ICD-10-CM

## 2019-09-18 DIAGNOSIS — N18.2 TYPE 2 DIABETES MELLITUS WITH STAGE 2 CHRONIC KIDNEY DISEASE, WITHOUT LONG-TERM CURRENT USE OF INSULIN (H): ICD-10-CM

## 2019-09-18 DIAGNOSIS — E78.5 HYPERLIPIDEMIA LDL GOAL <100: ICD-10-CM

## 2019-09-19 NOTE — TELEPHONE ENCOUNTER
"ZETIA  Last Written Prescription Date:  4/3/19  Last Fill Quantity: 30,  # refills: 4   Last office visit: 2/25/2019 with prescribing provider:     Future Office Visit:  NONE  Requested Prescriptions   Pending Prescriptions Disp Refills     ezetimibe (ZETIA) 10 MG tablet [Pharmacy Med Name: EZETIMIBE 10MG TABS] 30 tablet 4     Sig: TAKE ONE TABLET BY MOUTH ONCE DAILY       Antihyperlipidemic agents Failed - 9/18/2019  7:47 PM        Failed - Normal serum ALT on record in past 12 mos     Recent Labs   Lab Test 02/25/19  1047   ALT 96*           Passed - Lipid panel on file in past 12 mos     Recent Labs   Lab Test 02/25/19  1047  02/16/15  0908   CHOL 224*   < > 148   TRIG 119   < > 102   HDL 38*   < > 37*   *   < > 91   NHDL 186*   < >  --    VLDL  --   --  20   CHOLHDLRATIO  --   --  4.0    < > = values in this interval not displayed.           Passed - Recent (12 mo) or future (30 days) visit within the authorizing provider's specialty     Patient had office visit in the last 12 months or has a visit in the next 30 days with authorizing provider or within the authorizing provider's specialty.  See \"Patient Info\" tab in inbasket, or \"Choose Columns\" in Meds & Orders section of the refill encounter.         Passed - Medication is active on med list        Passed - Patient is age 18 years or older      Routing refill request to provider for review/approval because:  Labs out of range:  Will forward to Dr. Andrade in the absence of Dr. Ramsey from office.  CATY Reis              "

## 2019-09-20 RX ORDER — EZETIMIBE 10 MG/1
TABLET ORAL
Qty: 30 TABLET | Refills: 4 | Status: SHIPPED | OUTPATIENT
Start: 2019-09-20 | End: 2020-02-24

## 2019-09-27 ENCOUNTER — HEALTH MAINTENANCE LETTER (OUTPATIENT)
Age: 64
End: 2019-09-27

## 2019-10-01 ENCOUNTER — TELEPHONE (OUTPATIENT)
Dept: FAMILY MEDICINE | Facility: CLINIC | Age: 64
End: 2019-10-01

## 2019-10-01 NOTE — TELEPHONE ENCOUNTER
Reason for Call:  Same Day Appointment, Requested Provider:  Cayden Ramsey MD    PCP: Cayden Ramsey    Reason for visit: fell last April, left leg and knee pain    Duration of symptoms: months    Have you been treated for this in the past? no  Additional comments: can you work in    Can we leave a detailed message on this number? YES    Phone number patient can be reached at: Cell number on file:    Telephone Information:   Mobile [phone]       Best Time: any    Call taken on 10/1/2019 at 10:49 AM by Renetta Zhao

## 2019-10-01 NOTE — TELEPHONE ENCOUNTER
Left message to call back. Please assist in scheduling pt: 8 am on 10/2    Per Dr. Ramsey:  He may have Ktyenifere's time 10/2 as she was seen last week. We saved it for something like this. I VERY much doubt she will come and if she does it is my fault.

## 2019-10-02 ENCOUNTER — OFFICE VISIT (OUTPATIENT)
Dept: FAMILY MEDICINE | Facility: CLINIC | Age: 64
End: 2019-10-02
Payer: COMMERCIAL

## 2019-10-02 VITALS
DIASTOLIC BLOOD PRESSURE: 68 MMHG | OXYGEN SATURATION: 98 % | HEART RATE: 82 BPM | RESPIRATION RATE: 16 BRPM | WEIGHT: 225.1 LBS | TEMPERATURE: 98.2 F | HEIGHT: 70 IN | BODY MASS INDEX: 32.22 KG/M2 | SYSTOLIC BLOOD PRESSURE: 124 MMHG

## 2019-10-02 DIAGNOSIS — R80.9 MICROALBUMINURIA: ICD-10-CM

## 2019-10-02 DIAGNOSIS — N18.2 TYPE 2 DIABETES MELLITUS WITH STAGE 2 CHRONIC KIDNEY DISEASE, WITHOUT LONG-TERM CURRENT USE OF INSULIN (H): ICD-10-CM

## 2019-10-02 DIAGNOSIS — E11.22 TYPE 2 DIABETES MELLITUS WITH STAGE 2 CHRONIC KIDNEY DISEASE, WITHOUT LONG-TERM CURRENT USE OF INSULIN (H): ICD-10-CM

## 2019-10-02 DIAGNOSIS — M25.562 CHRONIC PAIN OF LEFT KNEE: ICD-10-CM

## 2019-10-02 DIAGNOSIS — G89.29 CHRONIC PAIN OF LEFT KNEE: ICD-10-CM

## 2019-10-02 DIAGNOSIS — M79.605 PAIN OF LEFT LOWER EXTREMITY: Primary | ICD-10-CM

## 2019-10-02 DIAGNOSIS — Z12.11 SPECIAL SCREENING FOR MALIGNANT NEOPLASMS, COLON: ICD-10-CM

## 2019-10-02 DIAGNOSIS — N18.2 CKD (CHRONIC KIDNEY DISEASE) STAGE 2, GFR 60-89 ML/MIN: ICD-10-CM

## 2019-10-02 DIAGNOSIS — K21.9 GASTROESOPHAGEAL REFLUX DISEASE WITHOUT ESOPHAGITIS: ICD-10-CM

## 2019-10-02 PROCEDURE — 99213 OFFICE O/P EST LOW 20 MIN: CPT | Performed by: FAMILY MEDICINE

## 2019-10-02 ASSESSMENT — PAIN SCALES - GENERAL: PAINLEVEL: SEVERE PAIN (6)

## 2019-10-02 ASSESSMENT — MIFFLIN-ST. JEOR: SCORE: 1825.43

## 2019-10-02 NOTE — PROGRESS NOTES
"Subjective     Joel Pike is a 64 year old male who presents to clinic today for the following health issues:    HPI   Joint Pain    Onset: x 5 months    Description:   Location: left knee and leg  Character: Sharp, Dull ache, Stabbing and Cramping    Intensity: severe    Progression of Symptoms: same    Accompanying Signs & Symptoms:  Other symptoms: numbness, tingling, weakness of leg and swelling    History:   Previous similar pain: no       Precipitating factors:   Trauma or overuse: YES    Alleviating factors:  Improved by: \"left over pain pills\"    Therapies Tried and outcome: ice, heat      In April patient was fishing and fell down a river bank.  He felt something snap in his left lower extremity any is uncertain if it was hip or knee area.  He has had increasing pain in that region since that time.  Massage therapy has helped minimally.  Currently he is having trouble anytime he tries to move his left lower extremity and he feels the pain especially around his left knee.  He has trouble getting in and out of a car.  He has trouble turning over in bed.  He noticed no swelling initially.  Foot and ankle seem okay.  He is uncertain about hip.  Abduction of the hip is more uncomfortable than abduction.  He feels the pain in his knee though when he does this.    BP Readings from Last 3 Encounters:   10/02/19 124/68   02/25/19 124/62   02/23/18 120/60    Wt Readings from Last 3 Encounters:   10/02/19 102.1 kg (225 lb 1.6 oz)   02/25/19 107 kg (235 lb 14.4 oz)   02/23/18 103.7 kg (228 lb 11.2 oz)                      Reviewed and updated as needed this visit by Provider         Review of Systems   ROS COMP: Constitutional, HEENT, cardiovascular, pulmonary, gi and gu systems are negative, except as otherwise noted.      Objective    /68   Pulse 82   Temp 98.2  F (36.8  C) (Temporal)   Resp 16   Ht 1.783 m (5' 10.2\")   Wt 102.1 kg (225 lb 1.6 oz)   SpO2 98%   BMI 32.12 kg/m    Body mass index is " 32.12 kg/m .  Physical Exam   GENERAL: healthy, alert and no distress  MS: Patient's right lower extremity is negative.  Left lower extremity there is pain in and around his knee when he does certain activities.  When lying on his back and lifting his left lower extremity with flexing at the hip, pain was noted in the knee.  With knee bent internal and external rotation of the hip was uncomfortable.  Palpably though hip and knee are not tender.  There is some laxity of the kneecap.  Very definitely walks protecting left lower extremity.  He cannot stand on his tiptoe left leg.  Right lower extremity again is negative.  Low back is unremarkable.  SKIN: no suspicious lesions or rashes  NEURO: He seems to have equal strength in muscles of the lower extremity hip to knee to ankle.    Diagnostic Test Results:  Labs reviewed in Epic        Assessment & Plan     1. Pain of left lower extremity  This seems to be localized more to his knee after exam than on history although cannot rule out hip or back problems.  Because of this apparent localization to the knee and previous knee problems and persistent problems for months, MRI of the knee will be done first.  We may need to investigate other joints.  No changes in treatment at this point.    2. Special screening for malignant neoplasms, colon  Colonoscopy is arranged for this year with every 5-year screening needed      3. Chronic pain of left knee  MRI as discussed above #1      4. CKD (chronic kidney disease) stage 2, GFR 60-89 ml/min  Patient will be coming for routine exam later this month and all labs below are ordered accordingly    5. Microalbuminuria    - Albumin Random Urine Quantitative with Creat Ratio; Future    6. Type 2 diabetes mellitus with stage 2 chronic kidney disease, without long-term current use of insulin (H)    - **A1C FUTURE anytime; Future  - **Basic metabolic panel FUTURE anytime; Future  - **CBC with platelets FUTURE anytime; Future  - Albumin  "Random Urine Quantitative with Creat Ratio; Future    7. Gastroesophageal reflux disease without esophagitis    - **CBC with platelets FUTURE anytime; Future     BMI:   Estimated body mass index is 32.12 kg/m  as calculated from the following:    Height as of this encounter: 1.783 m (5' 10.2\").    Weight as of this encounter: 102.1 kg (225 lb 1.6 oz).           Patient Instructions   We will obtain the MRI and decide what to do next.      Return in about 3 weeks (around 10/23/2019) for Physical Exam.    Cayden Ramsey MD  [facility]      "

## 2019-10-04 ENCOUNTER — TELEPHONE (OUTPATIENT)
Dept: FAMILY MEDICINE | Facility: CLINIC | Age: 64
End: 2019-10-04

## 2019-10-04 NOTE — TELEPHONE ENCOUNTER
Date of colonoscopy/EGD: 10/9  Surgeon: Dr. Fair  Prep:Miralax  Packet:Colonoscopy/EGD instructions mailed to patient's home address. and will get via [employer]   Date: 10/4/2019      Surgery Scheduler

## 2019-10-04 NOTE — TELEPHONE ENCOUNTER
Patient states he has no CKD. Can you please verify diagnosis and update chart.    I did give patient Miralax prep for upcoming colonoscopy.  If he truly has CKD, and needs Golytley please let me know ASAP as scope is 10/9

## 2019-10-04 NOTE — TELEPHONE ENCOUNTER
Prep given for his colonoscopy is appropriate.  Technically he has chronic kidney disease stage II because of diabetes which is really insignificant.  Cayden Ramsey MD

## 2019-10-07 NOTE — H&P
[facility] History and Physical    Joel Pike MRN# [id number]   Age: 64 year old YOB: 1955     Date of Admission:  (Not on file)    Home clinic: [facility]  Primary care provider: Cayden Ramsey          Impression and Plan:   Impression:   Screening for colon cancer [Z12.11]  Hx of polyps.  Last colonoscopy 2014      Plan:   Proceed to Colonoscopy as planned.  The procedure, risks(bleeding, perforation), benefits and alternatives were discussed and the patient agrees to proceed. Cleared for Anesthesia             Chief Complaint:   Screening for colon cancer [Z12.11]    History is obtained from the patient          History of Present Illness:   This 64 year old male is being seen at this time for evaluation for repeat colonoscopy.  Due for repeat due to polyps.  No complaints.           Past Medical History:     Past Medical History:   Diagnosis Date     Calculus of kidney      CKD (chronic kidney disease) stage 2, GFR 60-89 ml/min 10/30/2015     Depressive disorder, not elsewhere classified      Diabetes (H)      Diabetic eye exam (H) 02/06/12, 11/1/13     Diabetic eye exam (H) 12/17/14     Hyperlipidaemia      Hypothyroidism 1/17/2010     Obesity, Class I, BMI 30-34.9 10/30/2015            Past Surgical History:     Past Surgical History:   Procedure Laterality Date     COLONOSCOPY  02/02/09    Repeat in 5 yrs     COLONOSCOPY N/A 9/5/2014    Procedure: COMBINED COLONOSCOPY, SINGLE BIOPSY/POLYPECTOMY BY BIOPSY;  Surgeon: Denis Oakes MD;  Location:  GI     ESOPHAGOSCOPY, GASTROSCOPY, DUODENOSCOPY (EGD), COMBINED N/A 2/20/2015    Procedure: COMBINED ESOPHAGOSCOPY, GASTROSCOPY, DUODENOSCOPY (EGD), BIOPSY SINGLE OR MULTIPLE;  Surgeon: Alfred Young MD;  Location:  GI     HC REMV CATARACT EXTRACAP,INSERT LENS  2000    Bilateral     HC REVISE MEDIAN N/CARPAL TUNNEL SURG  1991     HC TOOTH EXTRACTION W/FORCEP  1980's    [place] teeth removed     RELEASE CARPAL  TUNNEL Right 6/16/2017    Procedure: RELEASE CARPAL TUNNEL;  Right carpal tunnel release;  Surgeon: Jason Berman DO;  [facility]     RELEASE CARPAL TUNNEL Left 7/11/2017    Procedure: RELEASE CARPAL TUNNEL;  Left carpal tunnel release;  Surgeon: Jason Berman DO;  Location: [facility]            Social History:     Social History     Tobacco Use     Smoking status: Never Smoker     Smokeless tobacco: Never Used   Substance Use Topics     Alcohol use: No     Alcohol/week: 0.0 standard drinks            Family History:     Family History   Problem Relation Age of Onset     Hypertension Father      Diabetes Father         Adult     Heart Disease Father         Hx: Bypass     Cancer Sister         Liver     Thyroid Disease Brother             Immunizations:     VACCINE/DOSE   Diptheria   DPT   DTAP   HBIG   Hepatitis A   Hepatitis B   HIB   Influenza   Measles   Meningococcal   MMR   Mumps   Pneumococcal   Polio   Rubella   Small Pox   TDAP   Varicella   Zoster            Allergies:     Allergies   Allergen Reactions     Dust Mites Cough     Penicillins Rash and Hives            Medications:     No current facility-administered medications for this encounter.      Current Outpatient Medications   Medication Sig     albuterol (PROAIR HFA) 108 (90 BASE) MCG/ACT Inhaler 1-2 puffs every 2 -4 hrs as needed (Patient not taking: Reported on 2/25/2019)     aspirin 325 MG EC tablet One a day     blood glucose (NO BRAND SPECIFIED) lancets standard Use to test blood sugar one time daily or as directed.     blood glucose calibration (HUGO CONTOUR) NORMAL solution Use to calibrate blood glucose monitor as directed.     blood glucose monitoring (UHGO CONTOUR) test strip Use to test blood sugars 1 time daily or as directed.     buPROPion (WELLBUTRIN XL) 300 MG 24 hr tablet Take 1 tablet (300 mg) by mouth every morning     cholecalciferol (VITAMIN D3) 5000 UNITS TABS tablet Take 1 tablet (5,000 Units) by  mouth     Coenzyme Q-10 capsule Take 1 capsule by mouth daily.     esomeprazole (NEXIUM) 40 MG DR capsule TAKE 1 CAPSULE EVERY MORNING BEFORE BREAKFAST, 30 TO 60 MINUTES BEFORE EATING.     ezetimibe (ZETIA) 10 MG tablet TAKE ONE TABLET BY MOUTH ONCE DAILY     fexofenadine-pseudoePHEDrine (ALLEGRA-D 24) 180-240 MG 24 hr tablet Take 1 tablet by mouth daily (Patient not taking: Reported on 10/2/2019)     fish oil-omega-3 fatty acids 1000 MG capsule Take  by mouth. Take daily       levothyroxine (SYNTHROID/LEVOTHROID) 50 MCG tablet TAKE ONE AND ONE-HALF TABLETS BY MOUTH DAILY     losartan (COZAAR) 50 MG tablet TAKE ONE TABLET BY MOUTH EVERY DAY     Magnesium 500 MG CAPS One twice a day     metFORMIN (GLUCOPHAGE) 500 MG tablet TAKE ONE TABLET BY MOUTH TWICE A DAY WITH MEALS     methocarbamol (ROBAXIN) 750 MG tablet Take 1 tablet (750 mg) by mouth 4 times daily as needed     Misc Natural Products (OSTEO BI-FLEX ADV JOINT SHIELD) TABS Take  by mouth.     Multiple Vitamins-Minerals CAPS Take  by mouth.     multivitamin (OCUVITE) TABS tablet Take 1 tablet by mouth daily     NEW MED [employer] Met[employer] Anthony Men daily     order for DME Equipment ordered: RESMED Auto PAP Mask type: Nasal Settings: 8-14 CM H2O     STATIN NOT PRESCRIBED, INTENTIONAL, 1 each 8 times daily Please choose reason not prescribed, below     sucralfate (CARAFATE) 1 GM/10ML suspension Take 10 mLs (1 g) by mouth 4 times daily             Review of Systems:   The review of systems was positive for the following findings.  polyps.  The remainder of the review of systems was unremarkable.          Physical Exam:   All vitals have been reviewed  There were no vitals taken for this visit.  No intake or output data in the 24 hours ending 10/07/19 1630  SHEENT examination revealed NC/AT, EOMI.  Examination of the chest revealed CTA  Examination of the heart revealed S1, S2, (-)m/r/g  Examination of the abdomen revealed soft, non tender, non distended  The  neuromuscular examination was WNL          Data:   All laboratory data reviewed  Results for orders placed or performed in visit on 02/25/19   Lipid panel reflex to direct LDL Fasting   Result Value Ref Range    Cholesterol 224 (H) <200 mg/dL    Triglycerides 119 <150 mg/dL    HDL Cholesterol 38 (L) >39 mg/dL    LDL Cholesterol Calculated 162 (H) <100 mg/dL    Non HDL Cholesterol 186 (H) <130 mg/dL   Hemoglobin A1c   Result Value Ref Range    Hemoglobin A1C 5.7 (H) 0 - 5.6 %   Comprehensive metabolic panel (BMP + Alb, Alk Phos, ALT, AST, Total. Bili, TP)   Result Value Ref Range    Sodium 142 133 - 144 mmol/L    Potassium 4.5 3.4 - 5.3 mmol/L    Chloride 109 94 - 109 mmol/L    Carbon Dioxide 26 20 - 32 mmol/L    Anion Gap 7 3 - 14 mmol/L    Glucose 128 (H) 70 - 99 mg/dL    Urea Nitrogen 20 7 - 30 mg/dL    Creatinine 1.15 0.66 - 1.25 mg/dL    GFR Estimate 68 >60 mL/min/[1.73_m2]    GFR Estimate If Black 78 >60 mL/min/[1.73_m2]    Calcium 8.8 8.5 - 10.1 mg/dL    Bilirubin Total 0.7 0.2 - 1.3 mg/dL    Albumin 3.8 3.4 - 5.0 g/dL    Protein Total 7.1 6.8 - 8.8 g/dL    Alkaline Phosphatase 87 40 - 150 U/L    ALT 96 (H) 0 - 70 U/L    AST 38 0 - 45 U/L   Albumin Random Urine Quantitative with Creat Ratio   Result Value Ref Range    Creatinine Urine 129 mg/dL    Albumin Urine mg/L 857 mg/L    Albumin Urine mg/g Cr 664.34 (H) 0 - 17 mg/g Cr   CBC with platelets   Result Value Ref Range    WBC 8.5 4.0 - 11.0 10e9/L    RBC Count 4.93 4.4 - 5.9 10e12/L    Hemoglobin 15.7 13.3 - 17.7 g/dL    Hematocrit 45.6 40.0 - 53.0 %    MCV 93 78 - 100 fl    MCH 31.8 26.5 - 33.0 pg    MCHC 34.4 31.5 - 36.5 g/dL    RDW 12.5 10.0 - 15.0 %    Platelet Count 215 150 - 450 10e9/L   TSH   Result Value Ref Range    TSH 1.84 0.40 - 4.00 mU/L        Eric Fair MD, FACS

## 2019-10-08 ENCOUNTER — HOSPITAL ENCOUNTER (OUTPATIENT)
Dept: MRI IMAGING | Facility: CLINIC | Age: 64
Discharge: HOME | End: 2019-10-08
Attending: FAMILY MEDICINE | Admitting: FAMILY MEDICINE
Payer: COMMERCIAL

## 2019-10-08 ENCOUNTER — ANESTHESIA EVENT (OUTPATIENT)
Dept: GASTROENTEROLOGY | Facility: CLINIC | Age: 64
End: 2019-10-08
Payer: COMMERCIAL

## 2019-10-08 DIAGNOSIS — M25.562 CHRONIC PAIN OF LEFT KNEE: ICD-10-CM

## 2019-10-08 DIAGNOSIS — G89.29 CHRONIC PAIN OF LEFT KNEE: ICD-10-CM

## 2019-10-08 DIAGNOSIS — M79.605 PAIN OF LEFT LOWER EXTREMITY: ICD-10-CM

## 2019-10-08 PROCEDURE — 73721 MRI JNT OF LWR EXTRE W/O DYE: CPT | Mod: LT

## 2019-10-09 ENCOUNTER — ANESTHESIA (OUTPATIENT)
Dept: GASTROENTEROLOGY | Facility: CLINIC | Age: 64
End: 2019-10-09
Payer: COMMERCIAL

## 2019-10-09 ENCOUNTER — HOSPITAL ENCOUNTER (OUTPATIENT)
Facility: CLINIC | Age: 64
Discharge: HOME | End: 2019-10-09
Attending: SPECIALIST | Admitting: SPECIALIST
Payer: COMMERCIAL

## 2019-10-09 VITALS
SYSTOLIC BLOOD PRESSURE: 120 MMHG | OXYGEN SATURATION: 100 % | DIASTOLIC BLOOD PRESSURE: 70 MMHG | HEART RATE: 64 BPM | TEMPERATURE: 98.5 F | RESPIRATION RATE: 16 BRPM

## 2019-10-09 DIAGNOSIS — E11.22 TYPE 2 DIABETES MELLITUS WITH STAGE 2 CHRONIC KIDNEY DISEASE, WITHOUT LONG-TERM CURRENT USE OF INSULIN (H): ICD-10-CM

## 2019-10-09 DIAGNOSIS — N18.2 TYPE 2 DIABETES MELLITUS WITH STAGE 2 CHRONIC KIDNEY DISEASE, WITHOUT LONG-TERM CURRENT USE OF INSULIN (H): ICD-10-CM

## 2019-10-09 LAB
COLONOSCOPY: NORMAL
GLUCOSE BLDC GLUCOMTR-MCNC: 107 MG/DL (ref 70–99)

## 2019-10-09 PROCEDURE — 88305 TISSUE EXAM BY PATHOLOGIST: CPT | Mod: 26 | Performed by: SPECIALIST

## 2019-10-09 PROCEDURE — 25800030 ZZH RX IP 258 OP 636: Performed by: NURSE ANESTHETIST, CERTIFIED REGISTERED

## 2019-10-09 PROCEDURE — 82962 GLUCOSE BLOOD TEST: CPT

## 2019-10-09 PROCEDURE — 88305 TISSUE EXAM BY PATHOLOGIST: CPT | Performed by: SPECIALIST

## 2019-10-09 PROCEDURE — 45385 COLONOSCOPY W/LESION REMOVAL: CPT | Mod: PT | Performed by: SPECIALIST

## 2019-10-09 PROCEDURE — 45385 COLONOSCOPY W/LESION REMOVAL: CPT | Performed by: SPECIALIST

## 2019-10-09 PROCEDURE — 25000125 ZZHC RX 250: Performed by: NURSE ANESTHETIST, CERTIFIED REGISTERED

## 2019-10-09 PROCEDURE — 25000128 H RX IP 250 OP 636: Performed by: NURSE ANESTHETIST, CERTIFIED REGISTERED

## 2019-10-09 PROCEDURE — 37000008 ZZH ANESTHESIA TECHNICAL FEE, 1ST 30 MIN: Performed by: SPECIALIST

## 2019-10-09 RX ORDER — PROPOFOL 10 MG/ML
INJECTION, EMULSION INTRAVENOUS CONTINUOUS PRN
Status: DISCONTINUED | OUTPATIENT
Start: 2019-10-09 | End: 2019-10-09

## 2019-10-09 RX ORDER — LIDOCAINE HYDROCHLORIDE 20 MG/ML
INJECTION, SOLUTION INFILTRATION; PERINEURAL PRN
Status: DISCONTINUED | OUTPATIENT
Start: 2019-10-09 | End: 2019-10-09

## 2019-10-09 RX ORDER — ONDANSETRON 2 MG/ML
4 INJECTION INTRAMUSCULAR; INTRAVENOUS EVERY 30 MIN PRN
Status: DISCONTINUED | OUTPATIENT
Start: 2019-10-09 | End: 2019-10-09 | Stop reason: HOSPADM

## 2019-10-09 RX ORDER — NALOXONE HYDROCHLORIDE 0.4 MG/ML
.1-.4 INJECTION, SOLUTION INTRAMUSCULAR; INTRAVENOUS; SUBCUTANEOUS
Status: DISCONTINUED | OUTPATIENT
Start: 2019-10-09 | End: 2019-10-09 | Stop reason: HOSPADM

## 2019-10-09 RX ORDER — LIDOCAINE 40 MG/G
CREAM TOPICAL
Status: DISCONTINUED | OUTPATIENT
Start: 2019-10-09 | End: 2019-10-09 | Stop reason: HOSPADM

## 2019-10-09 RX ORDER — SODIUM CHLORIDE, SODIUM LACTATE, POTASSIUM CHLORIDE, CALCIUM CHLORIDE 600; 310; 30; 20 MG/100ML; MG/100ML; MG/100ML; MG/100ML
INJECTION, SOLUTION INTRAVENOUS CONTINUOUS
Status: DISCONTINUED | OUTPATIENT
Start: 2019-10-09 | End: 2019-10-09 | Stop reason: HOSPADM

## 2019-10-09 RX ORDER — ONDANSETRON 4 MG/1
4 TABLET, ORALLY DISINTEGRATING ORAL EVERY 30 MIN PRN
Status: DISCONTINUED | OUTPATIENT
Start: 2019-10-09 | End: 2019-10-09 | Stop reason: HOSPADM

## 2019-10-09 RX ORDER — FLUMAZENIL 0.1 MG/ML
0.2 INJECTION, SOLUTION INTRAVENOUS
Status: DISCONTINUED | OUTPATIENT
Start: 2019-10-09 | End: 2019-10-09 | Stop reason: HOSPADM

## 2019-10-09 RX ORDER — PROPOFOL 10 MG/ML
INJECTION, EMULSION INTRAVENOUS PRN
Status: DISCONTINUED | OUTPATIENT
Start: 2019-10-09 | End: 2019-10-09

## 2019-10-09 RX ORDER — NALOXONE HYDROCHLORIDE 0.4 MG/ML
.1-.4 INJECTION, SOLUTION INTRAMUSCULAR; INTRAVENOUS; SUBCUTANEOUS
Status: DISCONTINUED | OUTPATIENT
Start: 2019-10-09 | End: 2019-10-09

## 2019-10-09 RX ADMIN — SODIUM CHLORIDE, POTASSIUM CHLORIDE, SODIUM LACTATE AND CALCIUM CHLORIDE: 600; 310; 30; 20 INJECTION, SOLUTION INTRAVENOUS at 15:03

## 2019-10-09 RX ADMIN — LIDOCAINE HYDROCHLORIDE 100 MG: 20 INJECTION, SOLUTION INFILTRATION; PERINEURAL at 15:29

## 2019-10-09 RX ADMIN — PROPOFOL 175 MCG/KG/MIN: 10 INJECTION, EMULSION INTRAVENOUS at 15:31

## 2019-10-09 RX ADMIN — PROPOFOL 50 MG: 10 INJECTION, EMULSION INTRAVENOUS at 15:31

## 2019-10-09 RX ADMIN — PROPOFOL 50 MG: 10 INJECTION, EMULSION INTRAVENOUS at 15:30

## 2019-10-09 NOTE — ANESTHESIA PREPROCEDURE EVALUATION
Anesthesia Pre-Procedure Evaluation    Patient: Joel Pike   MRN: [id number] : 1955          Preoperative Diagnosis: Screening for colon cancer [Z12.11]    Procedure(s):  COLONOSCOPY    Past Medical History:   Diagnosis Date     Calculus of kidney      CKD (chronic kidney disease) stage 2, GFR 60-89 ml/min 10/30/2015     Depressive disorder, not elsewhere classified      Diabetes (H)      Diabetic eye exam (H) 12, 13     Diabetic eye exam (H) 14     Hyperlipidaemia      Hypothyroidism 2010     Obesity, Class I, BMI 30-34.9 10/30/2015     Past Surgical History:   Procedure Laterality Date     COLONOSCOPY  09    Repeat in 5 yrs     COLONOSCOPY N/A 2014    Procedure: COMBINED COLONOSCOPY, SINGLE BIOPSY/POLYPECTOMY BY BIOPSY;  Surgeon: Denis Oakes MD;  Location:  GI     ESOPHAGOSCOPY, GASTROSCOPY, DUODENOSCOPY (EGD), COMBINED N/A 2015    Procedure: COMBINED ESOPHAGOSCOPY, GASTROSCOPY, DUODENOSCOPY (EGD), BIOPSY SINGLE OR MULTIPLE;  Surgeon: Alfred Young MD;  Location:  GI     HC REMV CATARACT EXTRACAP,INSERT LENS      Bilateral     HC REVISE MEDIAN N/CARPAL TUNNEL SURG       HC TOOTH EXTRACTION W/FORCEP      [place] teeth removed     RELEASE CARPAL TUNNEL Right 2017    Procedure: RELEASE CARPAL TUNNEL;  Right carpal tunnel release;  Surgeon: Jason Berman DO;  [facility]     RELEASE CARPAL TUNNEL Left 2017    Procedure: RELEASE CARPAL TUNNEL;  Left carpal tunnel release;  Surgeon: Jason Berman DO;  Location: [facility]       Anesthesia Evaluation     . Pt has had prior anesthetic. Type: MAC    No history of anesthetic complications          ROS/MED HX    ENT/Pulmonary:     (+)sleep apnea, uses CPAP , . .    Neurologic:  - neg neurologic ROS     Cardiovascular:     (+) Dyslipidemia, ----. : . . . :. . No previous cardiac testing       METS/Exercise Tolerance:     Hematologic:  - neg hematologic  ROS        Musculoskeletal:  - neg musculoskeletal ROS       GI/Hepatic:     (+) GERD Asymptomatic on medication,       Renal/Genitourinary:     (+) chronic renal disease, type: CRI, Nephrolithiasis ,       Endo:     (+) type I DM, Last HgA1c: 5.7 date: 2/25/19 Not using insulin - not using insulin pump Normal glucose range: low 100's not previously admitted for DM/DKA thyroid problem hypothyroidism, .      Psychiatric:     (+) psychiatric history depression and anxiety      Infectious Disease:  - neg infectious disease ROS       Malignancy:      - no malignancy   Other:    - neg other ROS                      Physical Exam  Normal systems: cardiovascular, pulmonary and dental    Airway   Mallampati: II  TM distance: >3 FB  Neck ROM: full    Dental     Cardiovascular   Rhythm and rate: regular and normal      Pulmonary    breath sounds clear to auscultation            Lab Results   Component Value Date    WBC 8.5 02/25/2019    HGB 15.7 02/25/2019    HCT 45.6 02/25/2019     02/25/2019    SED 6 12/20/2006     02/25/2019    POTASSIUM 4.5 02/25/2019    CHLORIDE 109 02/25/2019    CO2 26 02/25/2019    BUN 20 02/25/2019    CR 1.15 02/25/2019     (H) 02/25/2019    AURORA 8.8 02/25/2019    ALBUMIN 3.8 02/25/2019    PROTTOTAL 7.1 02/25/2019    ALT 96 (H) 02/25/2019    AST 38 02/25/2019    ALKPHOS 87 02/25/2019    BILITOTAL 0.7 02/25/2019    PTT 26 12/20/2006    INR 0.96 12/20/2006    TSH 1.84 02/25/2019    T4 0.87 05/14/2003       Preop Vitals  BP Readings from Last 3 Encounters:   10/02/19 124/68   02/25/19 124/62   02/23/18 120/60    Pulse Readings from Last 3 Encounters:   10/02/19 82   02/25/19 78   02/23/18 63      Resp Readings from Last 3 Encounters:   10/02/19 16   02/25/19 18   11/08/17 16    SpO2 Readings from Last 3 Encounters:   10/02/19 98%   02/25/19 98%   02/23/18 99%      Temp Readings from Last 1 Encounters:   10/02/19 98.2  F (36.8  C) (Temporal)    Ht Readings from Last 1 Encounters:   10/02/19  "1.783 m (5' 10.2\")      Wt Readings from Last 1 Encounters:   10/02/19 102.1 kg (225 lb 1.6 oz)    Estimated body mass index is 32.12 kg/m  as calculated from the following:    Height as of 10/2/19: 1.783 m (5' 10.2\").    Weight as of 10/2/19: 102.1 kg (225 lb 1.6 oz).       Anesthesia Plan      History & Physical Review  History and physical reviewed and following examination; no interval change.    ASA Status:  3 .    NPO Status:  > 8 hours    Plan for MAC Maintenance will be TIVA.  Reason for MAC:  Deep or markedly invasive procedure (G8)         Postoperative Care      Consents  Anesthetic plan, risks, benefits and alternatives discussed with:  Patient.  Use of blood products discussed: No .   .                 SYLVIA Samaniego CRNA  "

## 2019-10-09 NOTE — TELEPHONE ENCOUNTER
"  Requested Prescriptions   Pending Prescriptions Disp Refills     blood glucose (HUGO CONTOUR) test strip 1 Box 3     Sig: Use to test blood sugars 1 time daily or as directed.   Last Written Prescription Date:  11/8/2017  Last Fill Quantity: 1 box,  # refills: 3   Last office visit: 10/2/2019 with prescribing provider:     Future Office Visit:   Next 5 appointments (look out 90 days)    Oct 28, 2019  9:30 AM CDT  Office Visit with Cayden Ramsey MD  [facility] ([address] [address]  [phone]             Diabetic Supplies Protocol Passed - 10/9/2019  5:23 PM        Passed - Medication is active on med list        Passed - Patient is 18 years of age or older        Passed - Recent (6 mo) or future (30 days) visit within the authorizing provider's specialty     Patient had office visit in the last 6 months or has a visit in the next 30 days with authorizing provider.  See \"Patient Info\" tab in inbasket, or \"Choose Columns\" in Meds & Orders section of the refill encounter.          Prescription approved per [facility] Refill Protocol.  Lay Santamaria RN    "

## 2019-10-09 NOTE — ANESTHESIA POSTPROCEDURE EVALUATION
Patient: Joel Pike    Procedure(s):  COLONOSCOPY, FLEXIBLE, WITH polyp REMOVAL USING SNARE    Diagnosis:Screening for colon cancer [Z12.11]  Diagnosis Additional Information: No value filed.    Anesthesia Type:  MAC    Note:  Anesthesia Post Evaluation    Patient location during evaluation: Phase 2 and Bedside  Patient participation: Able to fully participate in evaluation  Level of consciousness: awake  Pain management: adequate  Airway patency: patent  Cardiovascular status: acceptable and hemodynamically stable  Respiratory status: acceptable, room air and nonlabored ventilation  Hydration status: stable  PONV: none     Anesthetic complications: None    Comments: Patient was happy with the anesthesia care received and no anesthesia related complications were noted.  I will follow up with the patient again if it is needed.        Last vitals:  Vitals:    10/09/19 1343   BP: (!) 141/86   Resp: 16   Temp: 98.5  F (36.9  C)   SpO2: 99%         Electronically Signed By: SYLVIA Samaniego CRNA  October 9, 2019  3:50 PM

## 2019-10-09 NOTE — ANESTHESIA CARE TRANSFER NOTE
Patient: Joel Pike    Procedure(s):  COLONOSCOPY, FLEXIBLE, WITH polyp REMOVAL USING SNARE    Diagnosis: Screening for colon cancer [Z12.11]  Diagnosis Additional Information: No value filed.    Anesthesia Type:   MAC     Note:  Airway :Room Air  Patient transferred to:Phase II  Handoff Report: Identifed the Patient, Identified the Reponsible Provider, Reviewed the pertinent medical history, Discussed the surgical course, Reviewed Intra-OP anesthesia mangement and issues during anesthesia, Set expectations for post-procedure period and Allowed opportunity for questions and acknowledgement of understanding      Vitals: (Last set prior to Anesthesia Care Transfer)    CRNA VITALS  10/9/2019 1516 - 10/9/2019 1550      10/9/2019             Resp Rate (observed):  (!) 5                Electronically Signed By: SYLVIA Samaniego CRNA  October 9, 2019  3:50 PM

## 2019-10-09 NOTE — DISCHARGE INSTRUCTIONS
[facility]    Home Care Following Endoscopy          Activity:    You have just undergone an endoscopic procedure usually performed with conscious sedation.  Do not work or operate machinery (including a car) for at least 12 hours.      I encourage you to walk and attempt to pass this air as soon as possible.    Diet:    Return to the diet you were on before your procedure but eat lightly for the first 12-24 hours.    Drink plenty of water.    Resume any regular medications unless otherwise advised by your physician.  Please begin any new medication prescribed as a result of your procedure as directed by your physician.     If you had any biopsy or polyp removed please refrain from aspirin or aspirin products for 2 days.  If on Coumadin please restart as instructed by your physician.   Pain:    You may take Tylenol as needed for pain.  Expected Recovery:    You can expect some mild abdominal fullness and/or discomfort due to the air used to inflate your intestinal tract. It is also normal to have a mild sore throat after upper endoscopy.    Call Your Physician if You Have:    After Upper Endoscopy:  o Shoulder, back or chest pain.  o Difficulty breathing or swallowing.  o Vomiting blood.    After Colonoscopy:  o Worsening persisting abdominal pain which is worse with activity.  o Fevers (>101 degrees F), chills or shakes.  o Passage of continued blood with bowel movements.   Any questions or concerns about your recovery, please call [phone] or after hours [phone] Nurse Advice Line.    Follow-up Care:    You should receive a call or letter with your results within 1 week. Please call if you have not received a notification of your results.  If asked to return to clinic please make an appointment 1 week after your procedure.  Call [phone].

## 2019-10-09 NOTE — RESULT ENCOUNTER NOTE
Due to automatic fill in, last message stated this was normal and reported same to patient.  In reality we told him about the major damage and need to see orthopedist.

## 2019-10-15 LAB — COPATH REPORT: NORMAL

## 2019-10-24 ENCOUNTER — OFFICE VISIT (OUTPATIENT)
Dept: ORTHOPEDICS | Facility: OTHER | Age: 64
End: 2019-10-24
Attending: FAMILY MEDICINE
Payer: COMMERCIAL

## 2019-10-24 ENCOUNTER — ANCILLARY PROCEDURE (OUTPATIENT)
Dept: GENERAL RADIOLOGY | Facility: OTHER | Age: 64
End: 2019-10-24
Attending: ORTHOPAEDIC SURGERY
Payer: COMMERCIAL

## 2019-10-24 VITALS
SYSTOLIC BLOOD PRESSURE: 120 MMHG | WEIGHT: 222.5 LBS | DIASTOLIC BLOOD PRESSURE: 70 MMHG | HEIGHT: 70 IN | BODY MASS INDEX: 31.85 KG/M2

## 2019-10-24 DIAGNOSIS — M17.12 PRIMARY OSTEOARTHRITIS OF LEFT KNEE: Primary | ICD-10-CM

## 2019-10-24 DIAGNOSIS — M25.562 LEFT KNEE PAIN: ICD-10-CM

## 2019-10-24 PROCEDURE — 20610 DRAIN/INJ JOINT/BURSA W/O US: CPT | Mod: LT | Performed by: ORTHOPAEDIC SURGERY

## 2019-10-24 PROCEDURE — 73564 X-RAY EXAM KNEE 4 OR MORE: CPT | Mod: LT

## 2019-10-24 PROCEDURE — 99213 OFFICE O/P EST LOW 20 MIN: CPT | Mod: 25 | Performed by: ORTHOPAEDIC SURGERY

## 2019-10-24 RX ORDER — TRIAMCINOLONE ACETONIDE 40 MG/ML
40 INJECTION, SUSPENSION INTRA-ARTICULAR; INTRAMUSCULAR ONCE
Status: COMPLETED | OUTPATIENT
Start: 2019-10-24 | End: 2019-10-24

## 2019-10-24 RX ADMIN — TRIAMCINOLONE ACETONIDE 40 MG: 40 INJECTION, SUSPENSION INTRA-ARTICULAR; INTRAMUSCULAR at 18:33

## 2019-10-24 ASSESSMENT — PAIN SCALES - GENERAL: PAINLEVEL: SEVERE PAIN (7)

## 2019-10-24 ASSESSMENT — MIFFLIN-ST. JEOR: SCORE: 1813.68

## 2019-10-24 NOTE — PROGRESS NOTES
Clinic Administered Medication Documentation    MEDICATION LIST:   Injection Documentation     Injection was administered by provider (please see MAR for given by information). Please see MAR and medication order for additional information.     Site: Joint injection   Medication Used: Kenalog 40mg    Exp: 4/2021  The following medication was given by SYLVIA Stevens, CNP, DNP:     MEDICATION: Kenalog 40mg/1ml  ROUTE: Joint Injection  SITE: LEFT KNEE  DOSE: 1 mL  LOT #: [id number]  : Calm  EXPIRATION DATE:  4/2021  NDC: [address]-3

## 2019-10-24 NOTE — LETTER
10/24/2019         RE: Joel Pike  [address]  [facility] [address]        Dear Colleague,    Thank you for referring your patient, Joel Pike, to the [facility]. Please see a copy of my visit note below.    Clinic Administered Medication Documentation    MEDICATION LIST:   Injection Documentation     Injection was administered by provider (please see MAR for given by information). Please see MAR and medication order for additional information.     Site: Joint injection   Medication Used: Kenalog 40mg    Exp: 4/2021  The following medication was given by Alden Montanez, APRN, CNP, DNP:     MEDICATION: Kenalog 40mg/1ml  ROUTE: Joint Injection  SITE: LEFT KNEE  DOSE: 1 mL  LOT #: [id number]  : [employer]  EXPIRATION DATE:  4/2021  NDC: [address]-9                            ORTHOPEDIC CONSULT      Chief Complaint: Jeol Pike is a 64 year old male who is being seen for   Chief Complaint   Patient presents with     Knee Pain     left knee pain       History of Present Illness:   Seen for other issues in the past  Mechanism of Injury: trip [place] fall [place]  Location: left knee generally, initially pain from hip to ankle which has resolved  Duration of Pain:  Date of injury: April 2019  Rating of Pain:  moderate.    Pain Quality: aching and sharp  Pain is better with: Rest  Pain is worse with:  weightbearing  Treatment so far consists of:  rest, Tylenol, activity modification.   Associated Features: Swelling  Prior history of related problems:   No previous problem in the affected area.  Pain is limiting normal activities of daily living.            Patient's past medical, surgical, social and family histories reviewed.     Past Medical History:   Diagnosis Date     Calculus of kidney      CKD (chronic kidney disease) stage 2, GFR 60-89 ml/min 10/30/2015     Depressive disorder, not elsewhere classified      Diabetes (H)      Diabetic eye exam (H)  02/06/12, 11/1/13     Diabetic eye exam (H) 12/17/14     Hyperlipidaemia      Hypothyroidism 1/17/2010     Obesity, Class I, BMI 30-34.9 10/30/2015       Past Surgical History:   Procedure Laterality Date     COLONOSCOPY  02/02/09    Repeat in 5 yrs     COLONOSCOPY N/A 9/5/2014    Procedure: COMBINED COLONOSCOPY, SINGLE BIOPSY/POLYPECTOMY BY BIOPSY;  Surgeon: Denis Oakes MD;  [facility]: [facility]     ESOPHAGOSCOPY, GASTROSCOPY, DUODENOSCOPY (EGD), COMBINED N/A 2/20/2015    Procedure: COMBINED ESOPHAGOSCOPY, GASTROSCOPY, DUODENOSCOPY (EGD), BIOPSY SINGLE OR MULTIPLE;  Surgeon: Alfred Young MD;  Location: [facility]     HC REMV CATARACT EXTRACAP,INSERT LENS  2000    Bilateral     HC REVISE MEDIAN N/CARPAL TUNNEL SURG  1991     HC TOOTH EXTRACTION W/FORCEP  1980's    [place] teeth removed     RELEASE CARPAL TUNNEL Right 6/16/2017    Procedure: RELEASE CARPAL TUNNEL;  Right carpal tunnel release;  Surgeon: Jason Berman DO;  [facility]     RELEASE CARPAL TUNNEL Left 7/11/2017    Procedure: RELEASE CARPAL TUNNEL;  Left carpal tunnel release;  Surgeon: Jason Berman DO;  [facility]       Medications:  albuterol (PROAIR HFA) 108 (90 BASE) MCG/ACT Inhaler, 1-2 puffs every 2 -4 hrs as needed  aspirin 325 MG EC tablet, One a day  blood glucose (HUGO CONTOUR) test strip, Use to test blood sugars 1 time daily or as directed.  blood glucose (NO BRAND SPECIFIED) lancets standard, Use to test blood sugar one time daily or as directed.  blood glucose calibration (HUGO CONTOUR) NORMAL solution, Use to calibrate blood glucose monitor as directed.  buPROPion (WELLBUTRIN XL) 300 MG 24 hr tablet, Take 1 tablet (300 mg) by mouth every morning  cholecalciferol (VITAMIN D3) 5000 UNITS TABS tablet, Take 1 tablet (5,000 Units) by mouth  Coenzyme Q-10 capsule, Take 1 capsule by mouth daily.  esomeprazole (NEXIUM) 40 MG DR capsule, TAKE 1 CAPSULE EVERY MORNING BEFORE BREAKFAST, 30 TO 60 MINUTES BEFORE  EATING.  ezetimibe (ZETIA) 10 MG tablet, TAKE ONE TABLET BY MOUTH ONCE DAILY  fexofenadine-pseudoePHEDrine (ALLEGRA-D 24) 180-240 MG 24 hr tablet, Take 1 tablet by mouth daily  fish oil-omega-3 fatty acids 1000 MG capsule, Take  by mouth. Take daily    levothyroxine (SYNTHROID/LEVOTHROID) 50 MCG tablet, TAKE ONE AND ONE-HALF TABLETS BY MOUTH DAILY  losartan (COZAAR) 50 MG tablet, TAKE ONE TABLET BY MOUTH EVERY DAY  Magnesium 500 MG CAPS, One twice a day  metFORMIN (GLUCOPHAGE) 500 MG tablet, TAKE ONE TABLET BY MOUTH TWICE A DAY WITH MEALS  Misc Natural Products (OSTEO BI-FLEX ADV JOINT SHIELD) TABS, Take  by mouth.  Multiple Vitamins-Minerals CAPS, Take  by mouth.  multivitamin (OCUVITE) TABS tablet, Take 1 tablet by mouth daily  order for DME, Equipment ordered: RESMED Auto PAP Mask type: Nasal Settings: 8-14 CM H2O  STATIN NOT PRESCRIBED, INTENTIONAL,, 1 each 8 times daily Please choose reason not prescribed, below  sucralfate (CARAFATE) 1 GM/10ML suspension, Take 10 mLs (1 g) by mouth 4 times daily  methocarbamol (ROBAXIN) 750 MG tablet, Take 1 tablet (750 mg) by mouth 4 times daily as needed (Patient not taking: Reported on 10/24/2019)    No current facility-administered medications on file prior to visit.       Allergies   Allergen Reactions     Dust Mites Cough     Penicillins Rash and Hives       Social History     Occupational History     Occupation:      Employer: [employer]   Tobacco Use     Smoking status: Never Smoker     Smokeless tobacco: Never Used   Substance and Sexual Activity     Alcohol use: No     Alcohol/week: 0.0 standard drinks     Drug use: No     Sexual activity: Not Currently     Partners: Female       Family History   Problem Relation Age of Onset     Hypertension Father      Diabetes Father         Adult     Heart Disease Father         Hx: Bypass     Cancer Sister         Liver     Thyroid Disease Brother        REVIEW OF SYSTEMS  10 point review systems  "performed otherwise negative as noted as per history of present illness.    Physical Exam:  Vitals: /70   Ht 1.783 m (5' 10.2\")   Wt 100.9 kg (222 lb 8 oz)   BMI 31.74 kg/m     BMI= Body mass index is 31.74 kg/m .  Constitutional: healthy, alert and no acute distress   Psychiatric: mentation appears normal and affect normal/bright  NEURO: no focal deficits  RESP: Normal with easy respirations and no use of accessory muscles to breathe, no audible wheezing or retractions  CV: LLE:  no edema         Regular rate and rhythm by palpation  SKIN: No erythema, rashes, excoriation, or breakdown. No evidence of infection.   JOINT/EXTREMITIES:left Knee Exam: Inspection: small effusion, No quad atrophy, varus deformity  Tender: medial joint line  Non-tender: medial tibial plateau, lateral tibial plateau, medial femoral condyle, lateral femoral condyle  Active Range of Motion: 0-105  Strength: some weakness with extension, extension mechanism intact  Special tests: pseudolaxity with valgus testing. Collaterals intact    GAIT: antalgic    Diagnostic Modalities:  left knee X-ray: varus alignment medial compartment:  with bone on bone wear lateral compartment:  no significant joint space narrowing patellofemoral compartment:  with moderate joint space narrowing, osteophytes  left knee MRI:  Subtle tear medial meniscus posterior horn. Grade 4 changes medial compartment, 3-4 changes patellofemoral. Lateral compartment intact. Stress reaction medial aspect of LFC   Independent visualization of the images was performed.      Impression: left knee primary osteoarthritis     Plan:  All of the above pertinent physical exam and imaging modalities findings was reviewed with Joel.                                          CONSERVATIVE CARE:  I recommend conservative care for the patient to include Tylenol, focused self directed physical therapy, formal physical therapy, steroid injections, activity modifications. Today I provided " or dispensed physical therapy.                                        INJECTION PROCEDURE:  The patient was counseled about an  injection, including discussion of risks (including infection), contents of the injection, rationale for performing the injection, and expected benefits of the injection. The skin was prepped with alcohol and betadine and then utilizing sterile technique an injection of the left knee joint from the anterolateral approach in the seated position was performed. The injection consisted 1ml of Kenalog (40mg per 1 ml) mixed with 3ml of 0.5% Marcaine. The patient tolerated the injection well, and there were no complications. The injection site was covered with a Band-Aid. The injection was performed by Jakob Montanez, APRN, CNP, DNP    Options reviewed. Injury most likely exacerbated underlying osteoarthritis. MRI does show underlying osteoarthritis with subtle meniscus tear. Would not recommend treating meniscus tear given the degree of osteoarthritis.     Provided him a work note for a few days off in order to allow for injection to help.    Return to clinic 4-6 , week(s), or sooner as needed for changes.  Re-x-ray on return: No    Jacky Berman D.O.    Again, thank you for allowing me to participate in the care of your patient.        Sincerely,        Jason Berman, DO

## 2019-10-24 NOTE — LETTER
[address]  [address]  [facility] [address]  Phone: [phone]    October 24, 2019        Joel Pike  [address] [facility] [address]          To whom it may concern:    RE: Joel Pike    Patient was seen and treated today at our clinic.  Patient may return to work Monday 10/28/2019 with the following:  No restrictions    Please contact me for questions or concerns.      Sincerely,        Jason Berman, DO

## 2019-10-25 NOTE — PROGRESS NOTES
ORTHOPEDIC CONSULT      Chief Complaint: Joel Pike is a 64 year old male who is being seen for   Chief Complaint   Patient presents with     Knee Pain     left knee pain       History of Present Illness:   Seen for other issues in the past  Mechanism of Injury: trip and fall [place] a [place]  Location: left knee generally, initially pain from hip to ankle which has resolved  Duration of Pain:  Date of injury: April 2019  Rating of Pain:  moderate.    Pain Quality: aching and sharp  Pain is better with: Rest  Pain is worse with:  weightbearing  Treatment so far consists of:  rest, Tylenol, activity modification.   Associated Features: Swelling  Prior history of related problems:   No previous problem in the affected area.  Pain is limiting normal activities of daily living.            Patient's past medical, surgical, social and family histories reviewed.     Past Medical History:   Diagnosis Date     Calculus of kidney      CKD (chronic kidney disease) stage 2, GFR 60-89 ml/min 10/30/2015     Depressive disorder, not elsewhere classified      Diabetes (H)      Diabetic eye exam (H) 02/06/12, 11/1/13     Diabetic eye exam (H) 12/17/14     Hyperlipidaemia      Hypothyroidism 1/17/2010     Obesity, Class I, BMI 30-34.9 10/30/2015       Past Surgical History:   Procedure Laterality Date     COLONOSCOPY  02/02/09    Repeat in 5 yrs     COLONOSCOPY N/A 9/5/2014    Procedure: COMBINED COLONOSCOPY, SINGLE BIOPSY/POLYPECTOMY BY BIOPSY;  Surgeon: Denis Oakes MD;  Location:  GI     ESOPHAGOSCOPY, GASTROSCOPY, DUODENOSCOPY (EGD), COMBINED N/A 2/20/2015    Procedure: COMBINED ESOPHAGOSCOPY, GASTROSCOPY, DUODENOSCOPY (EGD), BIOPSY SINGLE OR MULTIPLE;  Surgeon: Alfred Young MD;  Location:  GI     HC REMV CATARACT EXTRACAP,INSERT LENS  2000    Bilateral     HC REVISE MEDIAN N/CARPAL TUNNEL SURG  1991     HC TOOTH EXTRACTION W/FORCEP  1980's    [place] teeth removed     RELEASE CARPAL TUNNEL Right 6/16/2017     Procedure: RELEASE CARPAL TUNNEL;  Right carpal tunnel release;  Surgeon: Jason Berman DO;  [facility]     RELEASE CARPAL TUNNEL Left 7/11/2017    Procedure: RELEASE CARPAL TUNNEL;  Left carpal tunnel release;  Surgeon: Jason Berman DO;  [facility]       Medications:  albuterol (PROAIR HFA) 108 (90 BASE) MCG/ACT Inhaler, 1-2 puffs every 2 -4 hrs as needed  aspirin 325 MG EC tablet, One a day  blood glucose (HUGO CONTOUR) test strip, Use to test blood sugars 1 time daily or as directed.  blood glucose (NO BRAND SPECIFIED) lancets standard, Use to test blood sugar one time daily or as directed.  blood glucose calibration (HUGO CONTOUR) NORMAL solution, Use to calibrate blood glucose monitor as directed.  buPROPion (WELLBUTRIN XL) 300 MG 24 hr tablet, Take 1 tablet (300 mg) by mouth every morning  cholecalciferol (VITAMIN D3) 5000 UNITS TABS tablet, Take 1 tablet (5,000 Units) by mouth  Coenzyme Q-10 capsule, Take 1 capsule by mouth daily.  esomeprazole (NEXIUM) 40 MG DR capsule, TAKE 1 CAPSULE EVERY MORNING BEFORE BREAKFAST, 30 TO 60 MINUTES BEFORE EATING.  ezetimibe (ZETIA) 10 MG tablet, TAKE ONE TABLET BY MOUTH ONCE DAILY  fexofenadine-pseudoePHEDrine (ALLEGRA-D 24) 180-240 MG 24 hr tablet, Take 1 tablet by mouth daily  fish oil-omega-3 fatty acids 1000 MG capsule, Take  by mouth. Take daily    levothyroxine (SYNTHROID/LEVOTHROID) 50 MCG tablet, TAKE ONE AND ONE-HALF TABLETS BY MOUTH DAILY  losartan (COZAAR) 50 MG tablet, TAKE ONE TABLET BY MOUTH EVERY DAY  Magnesium 500 MG CAPS, One twice a day  metFORMIN (GLUCOPHAGE) 500 MG tablet, TAKE ONE TABLET BY MOUTH TWICE A DAY WITH MEALS  Misc Natural Products (OSTEO BI-FLEX ADV JOINT SHIELD) TABS, Take  by mouth.  Multiple Vitamins-Minerals CAPS, Take  by mouth.  multivitamin (OCUVITE) TABS tablet, Take 1 tablet by mouth daily  order for DME, Equipment ordered: RESMED Auto PAP Mask type: Nasal Settings: 8-14 CM H2O  STATIN NOT  "PRESCRIBED, INTENTIONAL,, 1 each 8 times daily Please choose reason not prescribed, below  sucralfate (CARAFATE) 1 GM/10ML suspension, Take 10 mLs (1 g) by mouth 4 times daily  methocarbamol (ROBAXIN) 750 MG tablet, Take 1 tablet (750 mg) by mouth 4 times daily as needed (Patient not taking: Reported on 10/24/2019)    No current facility-administered medications on file prior to visit.       Allergies   Allergen Reactions     Dust Mites Cough     Penicillins Rash and Hives       Social History     Occupational History     Occupation:      Employer: [employer]   Tobacco Use     Smoking status: Never Smoker     Smokeless tobacco: Never Used   Substance and Sexual Activity     Alcohol use: No     Alcohol/week: 0.0 standard drinks     Drug use: No     Sexual activity: Not Currently     Partners: Female       Family History   Problem Relation Age of Onset     Hypertension Father      Diabetes Father         Adult     Heart Disease Father         Hx: Bypass     Cancer Sister         Liver     Thyroid Disease Brother        REVIEW OF SYSTEMS  10 point review systems performed otherwise negative as noted as per history of present illness.    Physical Exam:  Vitals: /70   Ht 1.783 m (5' 10.2\")   Wt 100.9 kg (222 lb 8 oz)   BMI 31.74 kg/m    BMI= Body mass index is 31.74 kg/m .  Constitutional: healthy, alert and no acute distress   Psychiatric: mentation appears normal and affect normal/bright  NEURO: no focal deficits  RESP: Normal with easy respirations and no use of accessory muscles to breathe, no audible wheezing or retractions  CV: LLE:  no edema         Regular rate and rhythm by palpation  SKIN: No erythema, rashes, excoriation, or breakdown. No evidence of infection.   JOINT/EXTREMITIES:left Knee Exam: Inspection: small effusion, No quad atrophy, varus deformity  Tender: medial joint line  Non-tender: medial tibial plateau, lateral tibial plateau, medial femoral condyle, lateral " femoral condyle  Active Range of Motion: 0-105  Strength: some weakness with extension, extension mechanism intact  Special tests: pseudolaxity with valgus testing. Collaterals intact    GAIT: antalgic    Diagnostic Modalities:  left knee X-ray: varus alignment medial compartment:  with bone on bone wear lateral compartment:  no significant joint space narrowing patellofemoral compartment:  with moderate joint space narrowing, osteophytes  left knee MRI:  Subtle tear medial meniscus posterior horn. Grade 4 changes medial compartment, 3-4 changes patellofemoral. Lateral compartment intact. Stress reaction medial aspect of LFC   Independent visualization of the images was performed.      Impression: left knee primary osteoarthritis     Plan:  All of the above pertinent physical exam and imaging modalities findings was reviewed with Joel.                                          CONSERVATIVE CARE:  I recommend conservative care for the patient to include Tylenol, focused self directed physical therapy, formal physical therapy, steroid injections, activity modifications. Today I provided or dispensed physical therapy.                                        INJECTION PROCEDURE:  The patient was counseled about an  injection, including discussion of risks (including infection), contents of the injection, rationale for performing the injection, and expected benefits of the injection. The skin was prepped with alcohol and betadine and then utilizing sterile technique an injection of the left knee joint from the anterolateral approach in the seated position was performed. The injection consisted 1ml of Kenalog (40mg per 1 ml) mixed with 3ml of 0.5% Marcaine. The patient tolerated the injection well, and there were no complications. The injection site was covered with a Band-Aid. The injection was performed by Jakob Montanez, APRN, CNP, DNP    Options reviewed. Injury most likely exacerbated underlying osteoarthritis. MRI does  show underlying osteoarthritis with subtle meniscus tear. Would not recommend treating meniscus tear given the degree of osteoarthritis.     Provided him a work note for a few days off in order to allow for injection to help.    Return to clinic 4-6 , week(s), or sooner as needed for changes.  Re-x-ray on return: No    Jacky Berman D.O.

## 2019-10-28 ENCOUNTER — OFFICE VISIT (OUTPATIENT)
Dept: FAMILY MEDICINE | Facility: CLINIC | Age: 64
End: 2019-10-28
Payer: COMMERCIAL

## 2019-10-28 VITALS
WEIGHT: 223.3 LBS | BODY MASS INDEX: 31.86 KG/M2 | HEART RATE: 74 BPM | OXYGEN SATURATION: 100 % | DIASTOLIC BLOOD PRESSURE: 76 MMHG | SYSTOLIC BLOOD PRESSURE: 138 MMHG | RESPIRATION RATE: 16 BRPM | TEMPERATURE: 96.7 F

## 2019-10-28 DIAGNOSIS — M17.12 PRIMARY OSTEOARTHRITIS OF LEFT KNEE: ICD-10-CM

## 2019-10-28 DIAGNOSIS — E11.22 TYPE 2 DIABETES MELLITUS WITH STAGE 2 CHRONIC KIDNEY DISEASE, WITHOUT LONG-TERM CURRENT USE OF INSULIN (H): Primary | ICD-10-CM

## 2019-10-28 DIAGNOSIS — N18.2 TYPE 2 DIABETES MELLITUS WITH STAGE 2 CHRONIC KIDNEY DISEASE, WITHOUT LONG-TERM CURRENT USE OF INSULIN (H): Primary | ICD-10-CM

## 2019-10-28 DIAGNOSIS — R80.9 MICROALBUMINURIA: ICD-10-CM

## 2019-10-28 DIAGNOSIS — K21.9 GASTROESOPHAGEAL REFLUX DISEASE WITHOUT ESOPHAGITIS: ICD-10-CM

## 2019-10-28 LAB
ANION GAP SERPL CALCULATED.3IONS-SCNC: 3 MMOL/L (ref 3–14)
BUN SERPL-MCNC: 22 MG/DL (ref 7–30)
CALCIUM SERPL-MCNC: 9.1 MG/DL (ref 8.5–10.1)
CHLORIDE SERPL-SCNC: 105 MMOL/L (ref 94–109)
CO2 SERPL-SCNC: 31 MMOL/L (ref 20–32)
CREAT SERPL-MCNC: 1.08 MG/DL (ref 0.66–1.25)
CREAT UR-MCNC: 123 MG/DL
ERYTHROCYTE [DISTWIDTH] IN BLOOD BY AUTOMATED COUNT: 12.3 % (ref 10–15)
GFR SERPL CREATININE-BSD FRML MDRD: 73 ML/MIN/{1.73_M2}
GLUCOSE SERPL-MCNC: 132 MG/DL (ref 70–99)
HBA1C MFR BLD: 5.5 % (ref 0–5.6)
HCT VFR BLD AUTO: 45 % (ref 40–53)
HGB BLD-MCNC: 15.4 G/DL (ref 13.3–17.7)
MCH RBC QN AUTO: 32.6 PG (ref 26.5–33)
MCHC RBC AUTO-ENTMCNC: 34.2 G/DL (ref 31.5–36.5)
MCV RBC AUTO: 95 FL (ref 78–100)
MICROALBUMIN UR-MCNC: 787 MG/L
MICROALBUMIN/CREAT UR: 639.84 MG/G CR (ref 0–17)
PLATELET # BLD AUTO: 227 10E9/L (ref 150–450)
POTASSIUM SERPL-SCNC: 4.4 MMOL/L (ref 3.4–5.3)
RBC # BLD AUTO: 4.72 10E12/L (ref 4.4–5.9)
SODIUM SERPL-SCNC: 139 MMOL/L (ref 133–144)
WBC # BLD AUTO: 9.5 10E9/L (ref 4–11)

## 2019-10-28 PROCEDURE — 36415 COLL VENOUS BLD VENIPUNCTURE: CPT | Performed by: FAMILY MEDICINE

## 2019-10-28 PROCEDURE — 83036 HEMOGLOBIN GLYCOSYLATED A1C: CPT | Performed by: FAMILY MEDICINE

## 2019-10-28 PROCEDURE — 82043 UR ALBUMIN QUANTITATIVE: CPT | Performed by: FAMILY MEDICINE

## 2019-10-28 PROCEDURE — 99214 OFFICE O/P EST MOD 30 MIN: CPT | Performed by: FAMILY MEDICINE

## 2019-10-28 PROCEDURE — 80048 BASIC METABOLIC PNL TOTAL CA: CPT | Performed by: FAMILY MEDICINE

## 2019-10-28 PROCEDURE — 85027 COMPLETE CBC AUTOMATED: CPT | Performed by: FAMILY MEDICINE

## 2019-10-28 ASSESSMENT — PAIN SCALES - GENERAL: PAINLEVEL: MODERATE PAIN (5)

## 2019-10-28 NOTE — PATIENT INSTRUCTIONS
Michelle may make an appointment with float nurse for flu shot  Give cortisone shot a couple more weeks  If the knee is not doing well, contact Dr Berman team someetime next month to arrange next step.  If we do a preop in the next 3 montsh, we will cover all that is needed  By amber  Mid February is your next wellness exam time

## 2019-10-28 NOTE — PROGRESS NOTES
Subjective     Joel Pike is a 64 year old male who presents to clinic today for the following health issues:    HPI   Diabetes Follow-up      How often are you checking your blood sugar? Rarely    What time of day are you checking your blood sugars (select all that apply)?  Before meals    Have you had any blood sugars above 200?  No    Have you had any blood sugars below 70?  No    What symptoms do you notice when your blood sugar is low?  None    What concerns do you have today about your diabetes? None     Do you have any of these symptoms? (Select all that apply)  No numbness or tingling in feet.  No redness, sores or blisters on feet.  No complaints of excessive thirst.  No reports of blurry vision.  No significant changes to weight.     Have you had a diabetic eye exam in the last 12 months? Yes- Date of last eye exam: 6/28/2019    BP Readings from Last 2 Encounters:   10/28/19 138/76   10/24/19 120/70     Hemoglobin A1C (%)   Date Value   10/28/2019 5.5   02/25/2019 5.7 (H)     LDL Cholesterol Calculated (mg/dL)   Date Value   02/25/2019 162 (H)   02/23/2018 138 (H)       Diabetes Management Resources      How many servings of fruits and vegetables do you eat daily?  2-3    On average, how many sweetened beverages do you drink each day (soda, juice, sweet tea, etc)?   0    How many days per week do you miss taking your medication? 0        Patient has maintained weight despite having problems with both knees.  He was noted to have turned meniscus of his left knee from injury.  Discussed options with orthopedics and cortisone injection was done in the last week.  Left knee still hurts considerably and he wonders what further options might be from here since basically he has bone-on-bone in both knees.    Regarding diabetes he has modified his diet greatly and reduced sweets and sugar-containing carbonated beverages.    Recent Labs   Lab Test 10/28/19  0854 02/25/19  1047 10/31/18  0732 05/02/18  0742  02/23/18  0852 11/08/17  0755  09/19/14  0751   A1C 5.5 5.7*  --  5.6  --   --    < > 5.8   LDL  --  162*  --   --  138* 131*   < >  --    HDL  --  38*  --   --  33* 43   < >  --    TRIG  --  119  --   --  94 115   < >  --    ALT  --  96* 91*  --   --   --   --  49   CR 1.08 1.15  --   --  1.21 1.16   < > 1.09   GFRESTIMATED 73 68  --   --  61 64   < > 69   GFRESTBLACK 84 78  --   --  74 77   < > 84   POTASSIUM 4.4 4.5  --   --  4.6 4.5   < > 4.5   TSH  --  1.84  --   --   --  3.78   < >  --     < > = values in this interval not displayed.      BP Readings from Last 3 Encounters:   10/28/19 138/76   10/24/19 120/70   10/09/19 120/70    Wt Readings from Last 3 Encounters:   10/28/19 101.3 kg (223 lb 4.8 oz)   10/24/19 100.9 kg (222 lb 8 oz)   10/02/19 102.1 kg (225 lb 1.6 oz)                      Reviewed and updated as needed this visit by Provider         Review of Systems   ROS COMP: Constitutional, HEENT, cardiovascular, pulmonary, gi and gu systems are negative, except as otherwise noted.      Objective    /76   Pulse 74   Temp 96.7  F (35.9  C) (Temporal)   Resp 16   Wt 101.3 kg (223 lb 4.8 oz)   SpO2 100%   BMI 31.86 kg/m    Body mass index is 31.86 kg/m .  Physical Exam   GENERAL: healthy, alert and no distress  EYES: Eyes grossly normal to inspection, PERRL and conjunctivae and sclerae normal  NECK: no adenopathy, no asymmetry, masses, or scars and thyroid normal to palpation  RESP: lungs clear to auscultation - no rales, rhonchi or wheezes  CV: regular rate and rhythm, normal S1 S2, no S3 or S4, no murmur, click or rub, no peripheral edema and peripheral pulses strong  ABDOMEN: soft, nontender, no hepatosplenomegaly, no masses and bowel sounds normal  MS: Very much favors both knees but especially his left with diminished range of motion.  Tenderness in the joint spaces.  Not a lot of swelling today in either knee.  There is no edema.  Mostly okay use of upper extremities as well.  SKIN: no  suspicious lesions or rashes  NEURO: Normal strength and tone, mentation intact and some diminished sensory touch fingers and feet.  PSYCH: mentation appears normal, affect normal/bright  Diabetic foot exam: normal DP and PT pulses and no trophic changes or ulcerative lesions    Diagnostic Test Results:  Labs reviewed in Epic  Results for orders placed or performed in visit on 10/28/19   Albumin Random Urine Quantitative with Creat Ratio   Result Value Ref Range    Creatinine Urine 123 mg/dL    Albumin Urine mg/L 787 mg/L    Albumin Urine mg/g Cr 639.84 (H) 0 - 17 mg/g Cr   **CBC with platelets FUTURE anytime   Result Value Ref Range    WBC 9.5 4.0 - 11.0 10e9/L    RBC Count 4.72 4.4 - 5.9 10e12/L    Hemoglobin 15.4 13.3 - 17.7 g/dL    Hematocrit 45.0 40.0 - 53.0 %    MCV 95 78 - 100 fl    MCH 32.6 26.5 - 33.0 pg    MCHC 34.2 31.5 - 36.5 g/dL    RDW 12.3 10.0 - 15.0 %    Platelet Count 227 150 - 450 10e9/L   **Basic metabolic panel FUTURE anytime   Result Value Ref Range    Sodium 139 133 - 144 mmol/L    Potassium 4.4 3.4 - 5.3 mmol/L    Chloride 105 94 - 109 mmol/L    Carbon Dioxide 31 20 - 32 mmol/L    Anion Gap 3 3 - 14 mmol/L    Glucose 132 (H) 70 - 99 mg/dL    Urea Nitrogen 22 7 - 30 mg/dL    Creatinine 1.08 0.66 - 1.25 mg/dL    GFR Estimate 73 >60 mL/min/[1.73_m2]    GFR Estimate If Black 84 >60 mL/min/[1.73_m2]    Calcium 9.1 8.5 - 10.1 mg/dL   **A1C FUTURE anytime   Result Value Ref Range    Hemoglobin A1C 5.5 0 - 5.6 %           Assessment & Plan     1. Type 2 diabetes mellitus with stage 2 chronic kidney disease, without long-term current use of insulin (H)  Regarding blood testing he is doing quite well.  He continues to have moderate or more months of albumin in his urine.  This should be followed with at least every 6-month exams.  - Albumin Random Urine Quantitative with Creat Ratio  - **CBC with platelets FUTURE anytime  - **Basic metabolic panel FUTURE anytime  - **A1C FUTURE anytime    2.  "Microalbuminuria  As noted above.  Perhaps there is some underlying renal problems but blood work looks okay with slightly diminished GFR and chronic kidney disease considered to be stage II.  We just need to follow as above  - Albumin Random Urine Quantitative with Creat Ratio    3. Gastroesophageal reflux disease without esophagitis  He does well as long as he takes his medications.  Will continue on the same.  - **CBC with platelets FUTURE anytime    4. Primary osteoarthritis of left knee  Discussed what to do about his left knee.  Told him cortisone may still have an effect since it has only been slightly more than a week since injection.  He will determine how much better it becomes.  He asks about surgery.  I suspect knee replacement will be the suggestion.  He is wondering about timing of this surgery because he has met most of his out-of-pocket expenses already this year.  He will contact his orthopedist and ask about potential surgeries this year versus next year.       BMI:   Estimated body mass index is 31.86 kg/m  as calculated from the following:    Height as of 10/24/19: 1.783 m (5' 10.2\").    Weight as of this encounter: 101.3 kg (223 lb 4.8 oz).   Weight management plan: Discussed healthy diet and exercise guidelines        Patient Instructions   Michelle may make an appointment with float nurse for flu shot  Give cortisone shot a couple more weeks  If the knee is not doing well, contact Dr Berman team someetime next month to arrange next step.  If we do a preop in the next 3 montsh, we will cover all that is needed  By amber  Mid February is your next wellness exam time      Return in about 3 months (around 1/28/2020).    Cayden Simone Ramsey MD  [facility]      "

## 2019-11-04 ENCOUNTER — HOSPITAL ENCOUNTER (OUTPATIENT)
Dept: PHYSICAL THERAPY | Facility: CLINIC | Age: 64
Setting detail: THERAPIES SERIES
End: 2019-11-04
Attending: ORTHOPAEDIC SURGERY
Payer: COMMERCIAL

## 2019-11-04 DIAGNOSIS — M17.12 PRIMARY OSTEOARTHRITIS OF LEFT KNEE: ICD-10-CM

## 2019-11-04 PROCEDURE — 97110 THERAPEUTIC EXERCISES: CPT | Mod: GP

## 2019-11-04 PROCEDURE — 97161 PT EVAL LOW COMPLEX 20 MIN: CPT | Mod: GP

## 2019-11-04 NOTE — PROGRESS NOTES
"   19 0829   General Information   Type of Visit Initial OP Ortho PT Evaluation   Start of Care Date 19   Referring Physician Jason Berman, DO    Patient/Family Goals Statement Hoping to get through the next few months so he can get his surgeries.    Orders Evaluate and Treat   Date of Order 10/24/19   Certification Required? No   Medical Diagnosis Primary OA of left knee   Surgical/Medical history reviewed Yes   Precautions/Limitations no known precautions/limitations   Weight-Bearing Status - LUE full weight-bearing   Weight-Bearing Status - RUE full weight-bearing   Weight-Bearing Status - LLE full weight-bearing   Weight-Bearing Status - RLE full weight-bearing   Body Part(s)   Body Part(s) Knee   Presentation and Etiology   Pertinent history of current problem (include personal factors and/or comorbidities that impact the POC) Pt presents with knee pain. Last April/May was fishing and slid down on his knee caps onto rocks/boulders, and hasnt' really gotten better. Did get a steriod shot which helped. Has had periods of R knee \"going out on him\" which would cause him to tip, but can always catch himself. Has been having pain for years. R used to be worse but L took more abuse during the fall. PMHx: Injection 10/24/19 (Kenalog/Marcaine) to L knee, primary OA L knee, lumbar disc degeneration, CKD II, DM, HTN, hyperlipidemia, hypothyroidism, Obesity class 1. Sur2017 carpal tunnel release L, 2017 carpal tunnel release R,  cataract surgery. MEDS: mm relaxers and OTC per pt.   Impairments A. Pain;B. Decreased WB tolerance;D. Decreased ROM;E. Decreased flexibility;F. Decreased strength and endurance;H. Impaired gait   Functional Limitations perform required work activities;perform activities of daily living;perform desired leisure / sports activities   Symptom Location All over the knee, usually lower thigh and back of superior calf, can be all the way down the lateral thigh and over " "the foot.    How/Where did it occur With a fall  (slipped down a slope while fishing)   Onset date of current episode/exacerbation 19  (\"April or may\")   Chronicity Recurrent   Pain rating (0-10 point scale) Best (/10);Worst (/10)  (av/10)   Best (/10) 5/10   Worst (/10) 10/10   Pain quality A. Sharp;C. Aching;D. Burning;E. Shooting  (\"everything but dull\")   Frequency of pain/symptoms A. Constant   Pain/symptoms are: Worse during the day  (worse as workday progresses)   Pain/symptoms exacerbated by B. Walking  (stairs (1 step at a time, Left leads ascending, R leads desc)   Pain/symptoms eased by J. Braces/supports;I. OTC medication(s);C. Rest  (steroid injection, topical, mm relaxer)   Progression of symptoms since onset: Worsened  (apart from recent improvement related to steroid shot)   Current / Previous Interventions   Diagnostic Tests: X-ray;MRI   X-ray Results Results   X-ray results left knee X-ray: varus alignment Medial compartment:  with bone on bone wear, Lateral compartment:  no significant joint space narrowing patellofemoral compartment:  with moderate joint space narrowing, osteophytes.    MRI Results Results   MRI results left knee MRI:  Subtle tear medial meniscus posterior horn. Grade 4 changes medial compartment, 3-4 changes patellofemoral. Lateral compartment intact. Stress reaction medial aspect of LFC. Independent visualization of the images was performed.   Prior Level of Function   Prior Level of Function-Mobility Difficulty getting in and out of car (self assist of L leg)   Prior Level of Function-ADLs Difficulty putting socks on    Functional Level Prior Comment Able to perform all functional movements but with additional pain/difficulty   Current Level of Function   Current Community Support Family/friend caregiver   Patient role/employment history A. Employed   Employment Comments Part time at [employer], notices it most when working foot-pedal for reeling in line, bending, " "standing, picking things up off floor and lower shelves   [facility]   Home/community accessibility 5 steps to get to porch and 4 steps getting in, rails on both. Taking steps in step-to pattern. Flat inside house. Pain with getting into shower/tub but able to do.    Current equipment-Gait/Locomotion None   Current equipment-ADL None   Fall Risk Screen   Fall screen completed by PT   Have you fallen 2 or more times in the past year? No   Have you fallen and had an injury in the past year? Yes   Is patient a fall risk? No   Fall screen comments Able to self correct when knee \"goes out\" and does not truly fall. Initial fall was a matter of circumstance (slippery slope involved).   Functional Scales   Functional Scales Other   Other Scales  LEFS: 31   Knee Objective Findings   Side (if bilateral, select both right and left) Left;Right   Observation Massages knee, able to bend and straighten but moves frequently while sitting as if uncomfortable.    Gait/Locomotion Antalgic L, significant toe-out/pronated gait   Balance/Proprioception (Single Leg Stance) 9\" on R 4\" on L   Foot Position In Standing Toe out, slight flexion at knee   Knee ROM Comment Limited by pain, PROM and AROM FLX not tested d/t time   Lachmans Test Neg B   Varus Stress Test Neg B   Valgus Stress Test Neg B   Apley's Test Pos L neg R   Palpation Pain on L knee at quad tendon, patellar tendon, patellar fat pad, and posterior knee including HS tendons, pain on R with patellar fatpad and posterior knee (less tender than L). Low back/SI: Piriformis L tender and pain down L posterior leg to knee but did not feel like typical knee pain, sacrum and L4-S1 tender to compression but did not increase knee sx.    Accessory Motion/Joint Mobility Normal movement of patella BL   Left Knee Extension AROM 3 degrees   Left Knee Extension PROM -1 with heel propped   Left Knee Flexion AROM 95 in heel slide with pain   Left Knee Flexion PROM 116 with " heel slide and pressure from therapist   Left Knee Flexion Strength 4+/5 with pain   Left Knee Extension Strength 4/5 with pain   Left Hip Abduction Strength 3+/5   Left Quad Set Strength Fair   L VMO Strength Fair   Left Quadricep Flexibility Normal R, limited L d/t pain in knee (just past 90 deg)   Left ITB Flexibility neg obers   Right Knee Extension AROM 0   Right Knee Flexion Strength 5/5 with pain   Right Knee Extension Strength 5/5 with shaking   Right Hip Abduction Strength 4-/5   Right Quad Set Strength Good   R VMO Strength Good   Right ITB Flexibility neg obers   Planned Therapy Interventions   Planned Therapy Interventions balance training;gait training;joint mobilization;manual therapy;neuromuscular re-education;ROM;strengthening;stretching   Planned Modality Interventions   Planned Modality Interventions Electrical stimulation;Cryotherapy;Hot packs   Planned Modality Interventions Comments prn   Clinical Impression   Criteria for Skilled Therapeutic Interventions Met yes, treatment indicated   PT Diagnosis B knee pain   Influenced by the following impairments Pain, limited ROM, weakness, impaired balance   Functional limitations due to impairments Impaired work function, impaired ADLs, impaired home mobility   Clinical Presentation Stable/Uncomplicated   Clinical Presentation Rationale Improving since steriod shot, typical and predictable presentation of OA   Clinical Decision Making (Complexity) Low complexity   Therapy Frequency 1 time/week   Predicted Duration of Therapy Intervention (days/wks) 8 weeks   Risk & Benefits of therapy have been explained Yes   Patient, Family & other staff in agreement with plan of care Yes   Clinical Impression Comments Pt presents with typical OA in B knees, worse on L, with reduced hip and knee mm strength, worse on L, reduced ROM and reduced balance. Pt is planning on B knee replacement in the coming calendar year and hoping to fulfill requirements of attempting  conservative treatment and be as comfortable and ready as possible for surgeries.    Education Assessment   Preferred Learning Style Listening;Demonstration;Pictures/video   Barriers to Learning No barriers   ORTHO GOALS   PT Ortho Eval Goals 1;2;3   Ortho Goal 1   Goal Identifier LEFS   Goal Description Pt will improve LEFS score by 9 points in order to show clinically significant change in LE function.    Target Date 12/30/19   Ortho Goal 2   Goal Identifier Work   Goal Description Pt will report an average pain of 3/10 during days that he works in order to improve tolerance of work.    Target Date 12/30/19   Ortho Goal 3   Goal Identifier Strength   Goal Description Pt will demonstrate 4+/5 or higher B hip and knee strength in all motions in order to better support the knee and prepare for surgery   Target Date 12/30/19   Total Evaluation Time   PT Jennifer, Low Complexity Minutes ([id number]) 45     Thank you for your referral,    Huy Echavarria, PT, DPT    [facility]    O: [phone]  E: [email or web address]

## 2019-11-11 ENCOUNTER — HOSPITAL ENCOUNTER (OUTPATIENT)
Dept: PHYSICAL THERAPY | Facility: CLINIC | Age: 64
Setting detail: THERAPIES SERIES
End: 2019-11-11
Attending: ORTHOPAEDIC SURGERY
Payer: COMMERCIAL

## 2019-11-11 PROCEDURE — 97110 THERAPEUTIC EXERCISES: CPT | Mod: GP

## 2019-11-21 ENCOUNTER — HOSPITAL ENCOUNTER (OUTPATIENT)
Dept: PHYSICAL THERAPY | Facility: CLINIC | Age: 64
Setting detail: THERAPIES SERIES
End: 2019-11-21
Attending: ORTHOPAEDIC SURGERY
Payer: COMMERCIAL

## 2019-11-21 PROCEDURE — 97110 THERAPEUTIC EXERCISES: CPT | Mod: GP

## 2019-11-25 ENCOUNTER — HOSPITAL ENCOUNTER (OUTPATIENT)
Dept: PHYSICAL THERAPY | Facility: CLINIC | Age: 64
Setting detail: THERAPIES SERIES
End: 2019-11-25
Attending: ORTHOPAEDIC SURGERY
Payer: COMMERCIAL

## 2019-11-25 PROCEDURE — 97112 NEUROMUSCULAR REEDUCATION: CPT | Mod: GP

## 2019-11-25 PROCEDURE — 97110 THERAPEUTIC EXERCISES: CPT | Mod: GP

## 2019-12-02 ENCOUNTER — HOSPITAL ENCOUNTER (OUTPATIENT)
Dept: PHYSICAL THERAPY | Facility: CLINIC | Age: 64
Setting detail: THERAPIES SERIES
End: 2019-12-02
Attending: ORTHOPAEDIC SURGERY
Payer: COMMERCIAL

## 2019-12-02 PROCEDURE — 97110 THERAPEUTIC EXERCISES: CPT | Mod: GP

## 2019-12-02 NOTE — PROGRESS NOTES
"Outpatient Physical Therapy Progress Note     Patient: Joel Pike  : 1955    Beginning/End Dates of Reporting Period:  2019 to 2019    Referring Provider: Jason Berman DO    Therapy Diagnosis: B Knee Pain     Client Self Report: Overall feeling pretty good. L knee does hurt, but doing okay. Starts out at work at 3-4/10 pain and at end of day gets up toward 8-9/10. Feels the more he uses is leg the more tired he gets, and the more tired he gets the more it hurts. Knees feel swollen and tight. Feels things are getting a bit better, starting to do things a little bit easier. \"50/50\" regarding consistency getting to exercises, tries to do them when he can. Sometimes feels he doesn't have enough time. Tries to target painful exercises rather than avoid them, feels he gets freer range of motion after he's done with them. Feels walking is getting easier, especially on level ground. Prefers walking/standing on carpet over harder surfaces. He still feels surgery is going to be the path to go; he can feel what we're doing is helping but can tell it's not enough to get him back to where he wants to be. He also mentioned he felt his doctors did not feel he had an option, that surgery was the only thing that would really help.     Objective Measurements:  Objective Measure: Pain  Details: 6/10 during N[place]ep, 4/10 when arrived.  Objective Measure: AROM L knee EXT LAQ  Details: 4 deg  Objective Measure: B MMT  Details: L knee EXT 5-/5 and FLX 4/5; R knee EXT 5/5 and FLX 5/5. Hip ABD 5/5 B. Hip EXT 4/5 B.  Objective Measure: L Knee ROM  Details: 0 with heel prop-99 with towel stretch  Objective Measure: LEFS  Details: 38 (compared to 31 at Eval), MCID of 9     Goals:  Goal Identifier LEFS   Goal Description Pt will improve LEFS score by 9 points in order to show clinically significant change in LE function.    Target Date 19   Date Met      Progress: Not met, though pt did present with improved score of " 38 Compared to eval score of 31.      Goal Identifier Work   Goal Description Pt will report an average pain of 3/10 during days that he works in order to improve tolerance of work.    Target Date 12/30/19   Date Met      Progress: Not met; pt feels things are improving overall but still feels that work duties aggravate symptoms, even if feeling better at the beginning of his shift.      Goal Identifier Strength   Goal Description Pt will demonstrate 4+/5 or higher B hip and knee strength in all motions in order to better support the knee and prepare for surgery   Target Date 12/30/19   Date Met      Progress: Not met with knee flexion or hip extension, but improvements noted in all other motions.      Goal Identifier L Knee ROM   Goal Description Pt will demonstrate L knee ROM of 0-120 in order to improve gait pattern and functional movement.    Target Date 12/30/19   Date Met      Progress: New Goal Today         Progress Toward Goals:   Progress this reporting period: Pt has shown slow but steady progress toward his goals, reporting some improvement in ease of movement and symptoms, but he still feels surgery will be his best option in the coming year. In therapy and at home he has been working on strengthening and stretching exercises to build support for his knees and reduce symptoms, as well as balance for overall functional movement and increased proprioception. He has shown improvements in strength, ROM, and functional movement self report, and could still benefit from skilled therapy to continue improving and increase the likelihood of success after surgery, if he does end up getting a TKA.           Plan:  Continue therapy per current plan of care.    Discharge:  No

## 2019-12-09 ENCOUNTER — HOSPITAL ENCOUNTER (OUTPATIENT)
Dept: PHYSICAL THERAPY | Facility: CLINIC | Age: 64
Setting detail: THERAPIES SERIES
End: 2019-12-09
Attending: ORTHOPAEDIC SURGERY
Payer: COMMERCIAL

## 2019-12-09 PROCEDURE — 97140 MANUAL THERAPY 1/> REGIONS: CPT | Mod: GP

## 2019-12-09 PROCEDURE — 97110 THERAPEUTIC EXERCISES: CPT | Mod: GP

## 2019-12-16 ENCOUNTER — HOSPITAL ENCOUNTER (OUTPATIENT)
Dept: PHYSICAL THERAPY | Facility: CLINIC | Age: 64
Setting detail: THERAPIES SERIES
End: 2019-12-16
Attending: ORTHOPAEDIC SURGERY
Payer: COMMERCIAL

## 2019-12-16 PROCEDURE — 97110 THERAPEUTIC EXERCISES: CPT | Mod: GP

## 2019-12-23 ENCOUNTER — HOSPITAL ENCOUNTER (OUTPATIENT)
Dept: PHYSICAL THERAPY | Facility: CLINIC | Age: 64
Setting detail: THERAPIES SERIES
End: 2019-12-23
Attending: ORTHOPAEDIC SURGERY
Payer: COMMERCIAL

## 2019-12-23 PROCEDURE — 97110 THERAPEUTIC EXERCISES: CPT | Mod: GP

## 2019-12-23 NOTE — PROGRESS NOTES
Outpatient Physical Therapy Discharge Note     Patient: Joel Pike  : 1955    Beginning/End Dates of Reporting Period:  2019 to 2019    Referring Provider: Dr. Jason Berman    Therapy Diagnosis: B Knee Pain     Client Self Report: Knees are actually feeling pretty good, but his low back on the R side has been bothering him a lot. Feeling good about being done with therapy, likes his HEP, feels things are improving.     Objective Measurements:  Objective Measure: Pain in L knee  Details: 2/10  Objective Measure: L Knee ROM  Details: 0 with PT overpressure-116 with PT overpressure  Objective Measure: LEFS  Details: 42, up from 38 from last progress note and 31 from eval (MCID of 9).  Objective Measure: Strength  Details: L Knee FLX 4+/5, L hip EXT 4+/5    Goals:  Goal Identifier LEFS   Goal Description Pt will improve LEFS score by 9 points in order to show clinically significant change in LE function.    Target Date 19   Date Met   19   Progress:12 point increase     Goal Identifier Work   Goal Description Pt will report an average pain of 3/10 during days that he works in order to improve tolerance of work.    Target Date 19   Date Met      Progress: Not measurable, as pt has not worked in the past 2 weeks, but not met based on data then, still painful, though knees are improving overall.     Goal Identifier Strength   Goal Description Pt will demonstrate 4+/5 or higher B hip and knee strength in all motions in order to better support the knee and prepare for surgery   Target Date 19   Date Met   2019   Progress: Knee FLX and hip EXT tested today at 4+/5 each. Other motions measured previously at or above goal.      Goal Identifier L Knee ROM   Goal Description Pt will demonstrate L knee ROM of 0-120 in order to improve gait pattern and functional movement.    Target Date 19   Date Met      Progress: Not met, 0-116, but improved overall. Pt will continue  address this at home.        Progress Toward Goals:   Progress this reporting period: Pt has made significant progress this reporting period, working on strengthening and stabilization for his L knee at home and in treatment session, and reached the majority of his goals. He will continue to improve and prepare for TKAs this coming year with his comprehensive home program. Pt is ready to discharge.           Plan:  Discharge from therapy.    Discharge:    Reason for Discharge: Patient chooses to discontinue therapy.    Equipment Issued: None    Discharge Plan: Patient to continue home program.

## 2020-01-06 ENCOUNTER — OFFICE VISIT (OUTPATIENT)
Dept: ORTHOPEDICS | Facility: CLINIC | Age: 65
End: 2020-01-06
Payer: COMMERCIAL

## 2020-01-06 ENCOUNTER — TELEPHONE (OUTPATIENT)
Dept: ORTHOPEDICS | Facility: OTHER | Age: 65
End: 2020-01-06

## 2020-01-06 VITALS
BODY MASS INDEX: 32.71 KG/M2 | WEIGHT: 228.5 LBS | HEIGHT: 70 IN | DIASTOLIC BLOOD PRESSURE: 82 MMHG | SYSTOLIC BLOOD PRESSURE: 124 MMHG

## 2020-01-06 DIAGNOSIS — M17.11 PRIMARY OSTEOARTHRITIS OF RIGHT KNEE: ICD-10-CM

## 2020-01-06 DIAGNOSIS — M17.12 PRIMARY OSTEOARTHRITIS OF LEFT KNEE: Primary | ICD-10-CM

## 2020-01-06 DIAGNOSIS — Z01.818 PREOPERATIVE EXAMINATION: Primary | ICD-10-CM

## 2020-01-06 PROCEDURE — 99214 OFFICE O/P EST MOD 30 MIN: CPT | Performed by: ORTHOPAEDIC SURGERY

## 2020-01-06 ASSESSMENT — PAIN SCALES - GENERAL: PAINLEVEL: SEVERE PAIN (6)

## 2020-01-06 ASSESSMENT — MIFFLIN-ST. JEOR: SCORE: 1837.72

## 2020-01-06 NOTE — PROGRESS NOTES
Office Visit-Follow up    Chief Complaint: Joel Pike is a 64 year old male who is being seen for   Chief Complaint   Patient presents with     RECHECK     left knee primary osteoarthritis        History of Present Illness:   Today's visit  Returns to clinic.  States therapy has helped with some of the motion but the pain continues. The injection at the last visit lasted a few weeks but the pain has been progressively getting worse and coming back. Difficult time sleeping, difficult time walking, and difficult time with work due to the pain and stiffness. Negatively impacting his quality of life. Would like to proceed with left knee replacement.   Treatments to date: 6 weeks formal physical therapy, 1 x steroid injection, tylenol, rest, activity modification.   Hx of DM: last A1c 5.5.  Denies blood thinner use except aspirin.   Does note has had a long hx of occasional shooting pain from back to the foot and very rare numbness that follows this path. States limping from the knee has worsened those symptoms. Those symptoms are different from the knee.   10/24/19 visit  Seen for other issues in the past  Mechanism of Injury: trip and fall [place] a [place]  Location: left knee generally, initially pain from hip to ankle which has resolved  Duration of Pain:  Date of injury: April 2019  Rating of Pain:  moderate.    Pain Quality: aching and sharp  Pain is better with: Rest  Pain is worse with:  weightbearing  Treatment so far consists of:  rest, Tylenol, activity modification.   Associated Features: Swelling  Prior history of related problems:   No previous problem in the affected area.  Pain is limiting normal activities of daily living.       Social History     Occupational History     Occupation:      Employer: [employer]   Tobacco Use     Smoking status: Never Smoker     Smokeless tobacco: Never Used   Substance and Sexual Activity     Alcohol use: No     Alcohol/week: 0.0 standard drinks  "    Drug use: No     Sexual activity: Not Currently     Partners: Female       REVIEW OF SYSTEMS  General: negative for, night sweats, dizziness, fatigue  Resp: No shortness of breath and no cough  CV: negative for chest pain, syncope or near-syncope  GI: negative for nausea, vomiting and diarrhea  : negative for dysuria and hematuria  Musculoskeletal: as above  Neurologic: negative for syncope   Hematologic: negative for bleeding disorder    Physical Exam:  Vitals: /82   Ht 1.778 m (5' 10\")   Wt 103.6 kg (228 lb 8 oz)   BMI 32.79 kg/m    BMI= Body mass index is 32.79 kg/m .  Constitutional: healthy, alert and no acute distress   Psychiatric: mentation appears normal and affect normal/bright  NEURO: no focal deficits  RESP: Normal with easy respirations and no use of accessory muscles to breathe, no audible wheezing or retractions  CV: LLE:  no edema         Regular rate and rhythm by palpation  SKIN: No erythema, rashes, excoriation, or breakdown. No evidence of infection.  JOINT/EXTREMITIES:left Knee moderate effusion, AROM 3-90, PROM 0-95. Extensor intact.  No quad atrophy, varus deformity, corrects to midline with valgus stressing.  tender at  medial joint line, and some to the lateral joint line along with popliteal tenderness. pseudolaxity with valgus testing. Collaterals intact. Distal neurovascular grossly intact. PF/DF intact and strong.     Diagnostic Modalities:  None today.  Previous imaging reviewed.  Independent visualization of the images was performed.      Impression: left greater than right primary knee osteoarthritis    Plan:  All of the above pertinent physical exam and imaging modalities findings was reviewed with Joel. He has failed conservative care and would like to proceed with knee replacement.     The patient has attempted conservative treatments that include 6 weeks formal physical therapy, 1 x steroid injection, tylenol, rest, activity modification yet still continues to have " significant issues. These issues include Difficult time sleeping, difficult time walking, and difficult time with work due to the pain and stiffness. Limp is causing  Negatively impacting his quality of life.. Secondary to these reasons I have offered surgery in the form of left total knee arthroplasty    Risks, benefits, complications, alternatives, limitations, and post operative course were discussed. Risks including infection (requiring long-term antibiotics and repeat surgeries), implant loosening (requiring revision surgery), heart attacks, strokes, bleeding (possibly requiring blood transfusion), scars, instability, numbness around the knee, stiffness (requiring manipulations or repeat surgeries), instability and dislocations, fractures, blood clots the legs and lungs, nerve injury (possibly leading to foot drop).  Postoperative course was discussed as far as limitations and expected recovery times. It was also discussed that after surgery there is a need for blood thinners to prevent blood clots, but even when being treated it is possible to develop a blood clot. Anticipate being hospitalized 1 days and subsequently either discharged home or to a rehabilitation facility. Determinations of this will be based on progress with physical therapy while in the hospital. The importance of post-operative physical therapy was discussed. If discharge home from the hospital expect [facility] home physical therapy for about 2 weeks and then transition to going to a physical therapy location for more aggressive therapy. Without physical therapy, outcomes may not be optimal. All questions were answered. The patient agrees to proceed with surgery.  Past medical and surgical history was reviewed. It is positive for diabetes. Mr. Pike will need a pre-operative medical evaluation and clearance by a primary care provider. We will obtain a CBC, UA, nasal swab for MRSA as well as the appropriate radiographs prior to surgery. I  will leave it to the discretion of the primary care provider for additional pre-operative testing.     Has help at home, no stairs. Anticipate POD1 discharge with aspirin for DVT prevention.    Surgery will be scheduled after 1/24/20, as that would be 3 months since last injection.  He would like to proceed as soon as possible with the right side.  Recommend following clinical progression.    Return to clinic 1 week prior to surgery, 14 days post-op. or sooner as needed for changes.  Re-x-ray on return: Yes; post-op only.     Scribed by:  SYLVIA Yoder, CNP  11:41 AM  1/6/2020  The information in this document, created by a scribe for me, accurately reflects the services I personally performed and the decisions made by me. I have reviewed and approved this document for accuracy    Jason Berman, DO

## 2020-01-06 NOTE — LETTER
1/6/2020         RE: Joel Pike  [address]  [facility] [id number]        Dear Colleague,    Thank you for referring your patient, Joel Pike, to the [facility]. Please see a copy of my visit note below.    Office Visit-Follow up    Chief Complaint: Joel Pike is a 64 year old male who is being seen for   Chief Complaint   Patient presents with     RECHECK     left knee primary osteoarthritis        History of Present Illness:   Today's visit  Returns to clinic.  States therapy has helped with some of the motion but the pain continues. The injection at the last visit lasted a few weeks but the pain has been progressively getting worse and coming back. Difficult time sleeping, difficult time walking, and difficult time with work due to the pain and stiffness. Negatively impacting his quality of life. Would like to proceed with left knee replacement.   Treatments to date: 6 weeks formal physical therapy, 1 x steroid injection, tylenol, rest, activity modification.   Hx of DM: last A1c 5.5.  Denies blood thinner use except aspirin.   Does note has had a long hx of occasional shooting pain from back to the foot and very rare numbness that follows this path. States limping from the knee has worsened those symptoms. Those symptoms are different from the knee.   10/24/19 visit  Seen for other issues in the past  Mechanism of Injury: trip and fall on a river [place]  Location: left knee generally, initially pain from hip to ankle which has resolved  Duration of Pain:  Date of injury: April 2019  Rating of Pain:  moderate.    Pain Quality: aching and sharp  Pain is better with: Rest  Pain is worse with:  weightbearing  Treatment so far consists of:  rest, Tylenol, activity modification.   Associated Features: Swelling  Prior history of related problems:   No previous problem in the affected area.  Pain is limiting normal activities of daily living.       Social History     Occupational  "History     Occupation:      Employer: [employer]   Tobacco Use     Smoking status: Never Smoker     Smokeless tobacco: Never Used   Substance and Sexual Activity     Alcohol use: No     Alcohol/week: 0.0 standard drinks     Drug use: No     Sexual activity: Not Currently     Partners: Female       REVIEW OF SYSTEMS  General: negative for, night sweats, dizziness, fatigue  Resp: No shortness of breath and no cough  CV: negative for chest pain, syncope or near-syncope  GI: negative for nausea, vomiting and diarrhea  : negative for dysuria and hematuria  Musculoskeletal: as above  Neurologic: negative for syncope   Hematologic: negative for bleeding disorder    Physical Exam:  Vitals: /82   Ht 1.778 m (5' 10\")   Wt 103.6 kg (228 lb 8 oz)   BMI 32.79 kg/m     BMI= Body mass index is 32.79 kg/m .  Constitutional: healthy, alert and no acute distress   Psychiatric: mentation appears normal and affect normal/bright  NEURO: no focal deficits  RESP: Normal with easy respirations and no use of accessory muscles to breathe, no audible wheezing or retractions  CV: LLE:  no edema         Regular rate and rhythm by palpation  SKIN: No erythema, rashes, excoriation, or breakdown. No evidence of infection.  JOINT/EXTREMITIES:left Knee  moderate effusion, AROM 3-90, PROM 0-95. Extensor intact.  No quad atrophy, varus deformity, corrects to midline with valgus stressing.  tender at  medial joint line, and some to the lateral joint line along with popliteal tenderness. pseudolaxity with valgus testing. Collaterals intact. Distal neurovascular grossly intact. PF/DF intact and strong.     Diagnostic Modalities:  None today.  Previous imaging reviewed.  Independent visualization of the images was performed.      Impression: left greater than right primary knee osteoarthritis    Plan:  All of the above pertinent physical exam and imaging modalities findings was reviewed with Joel. He has failed " conservative care and would like to proceed with knee replacement.     The patient has attempted conservative treatments that include 6 weeks formal physical therapy, 1 x steroid injection, tylenol, rest, activity modification yet still continues to have significant issues. These issues include Difficult time sleeping, difficult time walking, and difficult time with work due to the pain and stiffness. Limp is causing  Negatively impacting his quality of life.. Secondary to these reasons I have offered surgery in the form of left total knee arthroplasty    Risks, benefits, complications, alternatives, limitations, and post operative course were discussed. Risks including infection (requiring long-term antibiotics and repeat surgeries), implant loosening (requiring revision surgery), heart attacks, strokes, bleeding (possibly requiring blood transfusion), scars, instability, numbness around the knee, stiffness (requiring manipulations or repeat surgeries), instability and dislocations, fractures, blood clots the legs and lungs, nerve injury (possibly leading to foot drop).  Postoperative course was discussed as far as limitations and expected recovery times. It was also discussed that after surgery there is a need for blood thinners to prevent blood clots, but even when being treated it is possible to develop a blood clot. Anticipate being hospitalized 1 days and subsequently either discharged home or to a rehabilitation facility. Determinations of this will be based on progress with physical therapy while in the hospital. The importance of post-operative physical therapy was discussed. If discharge home from the hospital expect [facility] home physical therapy for about 2 weeks and then transition to going to a physical therapy location for more aggressive therapy. Without physical therapy, outcomes may not be optimal. All questions were answered. The patient agrees to proceed with surgery.  Past medical and surgical  history was reviewed. It is positive for diabetes. Mr. Pike will need a pre-operative medical evaluation and clearance by a primary care provider. We will obtain a CBC, UA, nasal swab for MRSA as well as the appropriate radiographs prior to surgery. I will leave it to the discretion of the primary care provider for additional pre-operative testing.     Has help at home, no stairs. Anticipate POD1 discharge with aspirin for DVT prevention.    Surgery will be scheduled after 1/24/20, as that would be 3 months since last injection.  He would like to proceed as soon as possible with the right side.  Recommend following clinical progression.    Return to clinic 1 week prior to surgery, 14 days post-op. or sooner as needed for changes.  Re-x-ray on return: Yes; post-op only.     Scribed by:  SYLVIA Yoder, CNP  11:41 AM  1/6/2020  The information in this document, created by a scribe for me, accurately reflects the services I personally performed and the decisions made by me. I have reviewed and approved this document for accuracy    Jason Berman DO          Again, thank you for allowing me to participate in the care of your patient.        Sincerely,        Jason Berman DO

## 2020-01-06 NOTE — TELEPHONE ENCOUNTER
Patient is scheduled on 1/29/2020 at 8:00 am with Dr. Ramsey. Patient was informed.    Eliana Manuel [facility]

## 2020-01-06 NOTE — TELEPHONE ENCOUNTER
Type of surgery: left total knee replacement  Location of surgery: [facility]   Date of surgery: 2/4/20  Surgeon: Dr. Berman  Pre-Op Appt Date: message sent to PCP  Post-Op Appt Date: 2/19/20   Packet sent out: Surgery packet was given to patient in clinic.   Pre-cert/Authorization completed: NA  Date: 1/6/2020    *labs ordered  *class scheduled  *soap given      Nataly Chambers  Surgery Scheduler

## 2020-01-07 NOTE — TELEPHONE ENCOUNTER
Tried to reach patient, left message for patient to call the clinic back.      Patient is scheduled for preop on 1/17/2020 at 1030 AM. If he is unable to make this appointment, please cancel it and let us know. If this does work for him, please cancel 1/29/2020 appointment with Dr. Ramsey.    Thank you.    Eliana Manuel CMA

## 2020-01-08 ENCOUNTER — MYC MEDICAL ADVICE (OUTPATIENT)
Dept: FAMILY MEDICINE | Facility: CLINIC | Age: 65
End: 2020-01-08

## 2020-01-08 NOTE — TELEPHONE ENCOUNTER
Patient was informed. He is scheduled for 1/17/2020 at 1030 AM with Dr. Ramsey.    Eliana Manuel, [facility]

## 2020-01-08 NOTE — TELEPHONE ENCOUNTER
Tried to reach patient, left message for patient to call the clinic back. MyChart sent to patient.    Eliana Manuel [facility]

## 2020-01-17 ENCOUNTER — OFFICE VISIT (OUTPATIENT)
Dept: FAMILY MEDICINE | Facility: CLINIC | Age: 65
End: 2020-01-17
Payer: COMMERCIAL

## 2020-01-17 VITALS
DIASTOLIC BLOOD PRESSURE: 70 MMHG | HEART RATE: 82 BPM | BODY MASS INDEX: 32.73 KG/M2 | WEIGHT: 228.1 LBS | OXYGEN SATURATION: 98 % | TEMPERATURE: 97.6 F | SYSTOLIC BLOOD PRESSURE: 122 MMHG | RESPIRATION RATE: 16 BRPM

## 2020-01-17 DIAGNOSIS — E03.8 SUBCLINICAL HYPOTHYROIDISM: ICD-10-CM

## 2020-01-17 DIAGNOSIS — N18.2 TYPE 2 DIABETES MELLITUS WITH STAGE 2 CHRONIC KIDNEY DISEASE, WITHOUT LONG-TERM CURRENT USE OF INSULIN (H): ICD-10-CM

## 2020-01-17 DIAGNOSIS — N18.2 CKD (CHRONIC KIDNEY DISEASE) STAGE 2, GFR 60-89 ML/MIN: ICD-10-CM

## 2020-01-17 DIAGNOSIS — M17.12 PRIMARY OSTEOARTHRITIS OF LEFT KNEE: ICD-10-CM

## 2020-01-17 DIAGNOSIS — Z01.818 PREOP GENERAL PHYSICAL EXAM: Primary | ICD-10-CM

## 2020-01-17 DIAGNOSIS — E11.22 TYPE 2 DIABETES MELLITUS WITH STAGE 2 CHRONIC KIDNEY DISEASE, WITHOUT LONG-TERM CURRENT USE OF INSULIN (H): ICD-10-CM

## 2020-01-17 DIAGNOSIS — G47.33 OSA (OBSTRUCTIVE SLEEP APNEA): ICD-10-CM

## 2020-01-17 PROCEDURE — 99215 OFFICE O/P EST HI 40 MIN: CPT | Performed by: FAMILY MEDICINE

## 2020-01-17 PROCEDURE — 93000 ELECTROCARDIOGRAM COMPLETE: CPT | Performed by: FAMILY MEDICINE

## 2020-01-17 ASSESSMENT — PAIN SCALES - GENERAL: PAINLEVEL: MODERATE PAIN (5)

## 2020-01-17 NOTE — PROGRESS NOTES
[address] [address]  [phone]  Dept: [phone]    PRE-OP EVALUATION:  Today's date: 2020    Joel Pike (: 1955) presents for pre-operative evaluation assessment as requested by Dr. Berman.  He requires evaluation and anesthesia risk assessment prior to undergoing surgery/procedure for treatment of knee pain .    Proposed Surgery/ Procedure: left total knee replacement  Date of Surgery/ Procedure: 2020  Time of Surgery/ Procedure: 7:30 am  Hospital/Surgical Facility: [facility]  Fax number for surgical facility: local facility  Primary Physician: Cayden Ramsey  Type of Anesthesia Anticipated: Spinal    Patient has a Health Care Directive or Living Will:  YES     1. NO - Do you have a history of heart attack, stroke, stent, bypass or surgery on an artery in the head, neck, heart or legs?  2. NO - Do you ever have any pain or discomfort in your chest?  3. NO - Do you have a history of  Heart Failure?  4. NO - Are you troubled by shortness of breath when: walking on the level, up a slight hill or at night?  5. NO - Do you currently have a cold, bronchitis or other respiratory infection?  6. NO - Do you have a cough, shortness of breath or wheezing?  7. NO - Do you sometimes get pains in the calves of your legs when you walk?  8. YES - DO YOU OR ANYONE IN YOUR FAMILY HAVE PREVIOUS HISTORY OF BLOOD CLOTS? Only surface leg inflammation  9. NO - Do you or does anyone in your family have a serious bleeding problem such as prolonged bleeding following surgeries or cuts?  10. NO - Have you ever had problems with anemia or been told to take iron pills?  11. NO - Have you had any abnormal blood loss such as black, tarry or bloody stools, or abnormal vaginal bleeding?  12. NO - Have you ever had a blood transfusion?  13. YES - HAVE YOU OR ANY OF YOUR RELATIVES EVER HAD PROBLEMS WITH ANESTHESIA? Slowly effective when wisdom teeth  removed  14. YES - DO YOU HAVE SLEEP APNEA, EXCESSIVE SNORING OR DAYTIME DROWSINESS? CPAP, HILARIO  15. NO - Do you have any prosthetic heart valves?  16. NO - Do you have prosthetic joints?  17. NO - Is there any chance that you may be pregnant?      HPI:     HPI related to upcoming procedure: TOTALLY worn out knee, will be replaced because unable to walk without pain, left      See problem list for active medical problems.  Problems all longstanding and stable, except as noted/documented.  See ROS for pertinent symptoms related to these conditions.      MEDICAL HISTORY:     Patient Active Problem List    Diagnosis Date Noted     Primary osteoarthritis of right knee 01/06/2020     Priority: Medium     Primary osteoarthritis of left knee 10/24/2019     Priority: Medium     Microalbuminuria 02/26/2019     Priority: Medium     Recurrent major depression in complete remission (H) 11/08/2017     Priority: Medium     CKD (chronic kidney disease) stage 2, GFR 60-89 ml/min 10/30/2015     Priority: Medium     Obesity, Class I, BMI 30-34.9 10/30/2015     Priority: Medium     Type 2 diabetes mellitus with stage 2 chronic kidney disease (H) 04/19/2013     Priority: Medium     Subclinical hypothyroidism 03/02/2012     Priority: Medium     Anxiety 03/02/2012     Priority: Medium     Sebaceous cyst 02/03/2012     Priority: Medium     left forehead       Advanced directives, counseling/discussion 08/19/2011     Priority: Medium     Advance Directive Problem List Overview:   Name Relationship Phone    Primary Health Care Agent            Alternative Health Care Agent          Discussed advance care planning with patient; information given to patient to review.//Macey Calvert/AISHA(AAMA)  8/19/2011 and again 11/9/16         Tinnitus 07/22/2011     Priority: Medium     right ear, began after accident, immediately       Hyperlipidemia LDL goal <100 01/28/2011     Priority: Medium     HILARIO (obstructive sleep apnea) 01/28/2011     Priority:  Medium     Chronic rhinitis 01/15/2010     Priority: Medium     Degeneration of lumbar or lumbosacral intervertebral disc 12/19/2005     Priority: Medium     Cough 12/19/2005     Priority: Medium     DERMATITIS WHEN IN THE SUN, NOT BURN 12/19/2005     Priority: Medium     Gastroesophageal reflux disease without esophagitis 05/14/2003     Priority: Medium      Past Medical History:   Diagnosis Date     Calculus of kidney      CKD (chronic kidney disease) stage 2, GFR 60-89 ml/min 10/30/2015     Degeneration of lumbar or lumbosacral intervertebral disc      Depressive disorder      Depressive disorder, not elsewhere classified      Diabetes (H)      Diabetic eye exam (H) 02/06/12, 11/1/13     Diabetic eye exam (H) 12/17/14     Hyperlipidaemia      Hypertension      Hypothyroidism 1/17/2010     Obesity, Class I, BMI 30-34.9 10/30/2015     Primary osteoarthritis of left knee      Uncomplicated asthma      Past Surgical History:   Procedure Laterality Date     COLONOSCOPY  02/02/09    Repeat in 5 yrs     COLONOSCOPY N/A 9/5/2014    Procedure: COMBINED COLONOSCOPY, SINGLE BIOPSY/POLYPECTOMY BY BIOPSY;  Surgeon: Denis Oakes MD;  Location: [facility]     ESOPHAGOSCOPY, GASTROSCOPY, DUODENOSCOPY (EGD), COMBINED N/A 2/20/2015    Procedure: COMBINED ESOPHAGOSCOPY, GASTROSCOPY, DUODENOSCOPY (EGD), BIOPSY SINGLE OR MULTIPLE;  Surgeon: Alfred Young MD;  Location: [facility]     HC REMV CATARACT EXTRACAP,INSERT LENS, W/O ECP  2000    Bilateral     HC REVISE MEDIAN N/CARPAL TUNNEL SURG  1991     HC TOOTH EXTRACTION W/FORCEP  1980's    [place] teeth removed     RELEASE CARPAL TUNNEL Right 6/16/2017    Procedure: RELEASE CARPAL TUNNEL;  Right carpal tunnel release;  Surgeon: Jason Berman DO;  Location: [facility]     RELEASE CARPAL TUNNEL Left 7/11/2017    Procedure: RELEASE CARPAL TUNNEL;  Left carpal tunnel release;  Surgeon: Jason Berman DO;  Location: [facility]     SOFT TISSUE SURGERY       Current  Outpatient Medications   Medication Sig Dispense Refill     aspirin 325 MG EC tablet One a day 100 tablet 3     buPROPion (WELLBUTRIN XL) 300 MG 24 hr tablet Take 1 tablet (300 mg) by mouth every morning 90 tablet 3     cholecalciferol (VITAMIN D3) 5000 UNITS TABS tablet Take 1 tablet (5,000 Units) by mouth       Coenzyme Q-10 capsule Take 1 capsule by mouth daily. 30 capsule 12     esomeprazole (NEXIUM) 40 MG DR capsule TAKE 1 CAPSULE EVERY MORNING BEFORE BREAKFAST, 30 TO 60 MINUTES BEFORE EATING. 90 capsule 3     ezetimibe (ZETIA) 10 MG tablet TAKE ONE TABLET BY MOUTH ONCE DAILY 30 tablet 4     fexofenadine-pseudoePHEDrine (ALLEGRA-D 24) 180-240 MG 24 hr tablet Take 1 tablet by mouth daily 90 tablet 0     fish oil-omega-3 fatty acids 1000 MG capsule Take  by mouth. Take daily   180 capsule 12     levothyroxine (SYNTHROID/LEVOTHROID) 50 MCG tablet TAKE ONE AND ONE-HALF TABLETS BY MOUTH DAILY 135 tablet 3     losartan (COZAAR) 50 MG tablet TAKE ONE TABLET BY MOUTH EVERY DAY 90 tablet 3     Magnesium 500 MG CAPS One twice a day 30 capsule      metFORMIN (GLUCOPHAGE) 500 MG tablet TAKE ONE TABLET BY MOUTH TWICE A DAY WITH MEALS 180 tablet 3     Misc Natural Products (OSTEO BI-FLEX ADV JOINT SHIELD) TABS Take  by mouth.       Multiple Vitamins-Minerals CAPS Take  by mouth.       multivitamin (OCUVITE) TABS tablet Take 1 tablet by mouth daily 30 each 0     sucralfate (CARAFATE) 1 GM/10ML suspension Take 10 mLs (1 g) by mouth 4 times daily 420 mL 1     albuterol (PROAIR HFA) 108 (90 BASE) MCG/ACT Inhaler 1-2 puffs every 2 -4 hrs as needed (Patient not taking: Reported on 1/6/2020) 1 Inhaler 5     blood glucose (HUGO CONTOUR) test strip Use to test blood sugars 1 time daily or as directed. 1 Box 5     blood glucose (NO BRAND SPECIFIED) lancets standard Use to test blood sugar one time daily or as directed. 100 each 3     blood glucose calibration (HUGO CONTOUR) NORMAL solution Use to calibrate blood glucose monitor as  directed. 1 each 3     order for DME Equipment ordered: RESMED Auto PAP Mask type: Nasal Settings: 8-14 CM H2O       STATIN NOT PRESCRIBED, INTENTIONAL, 1 each 8 times daily Please choose reason not prescribed, below       OTC products: None, except as noted above    Allergies   Allergen Reactions     Dust Mites Cough     Penicillins Rash and Hives      Latex Allergy: NO    Social History     Tobacco Use     Smoking status: Never Smoker     Smokeless tobacco: Never Used   Substance Use Topics     Alcohol use: No     Alcohol/week: 0.0 standard drinks     History   Drug Use No       REVIEW OF SYSTEMS:   CONSTITUTIONAL: NEGATIVE for fever, chills, change in weight  INTEGUMENTARY/SKIN: NEGATIVE for worrisome rashes, moles or lesions  EYES: NEGATIVE for vision changes or irritation  ENT/MOUTH: NEGATIVE for ear, mouth and throat problems  RESP: NEGATIVE for significant cough or SOB  BREAST: NEGATIVE for masses, tenderness or discharge  CV: NEGATIVE for chest pain, palpitations or peripheral edema  GI: NEGATIVE for nausea, abdominal pain, heartburn, or change in bowel habits  : NEGATIVE for frequency, dysuria, or hematuria  MUSCULOSKELETAL:worn out left knee and sometimes back pain  NEURO: NEGATIVE for weakness, dizziness or paresthesias  ENDOCRINE: NEGATIVE for temperature intolerance, skin/hair changes  HEME: NEGATIVE for bleeding problems  PSYCHIATRIC: NEGATIVE for changes in mood or affect    EXAM:   /70   Pulse 82   Temp 97.6  F (36.4  C) (Temporal)   Resp 16   Wt 103.5 kg (228 lb 1.6 oz)   SpO2 98%   BMI 32.73 kg/m      GENERAL APPEARANCE: healthy, alert and no distress     EYES: EOMI,  PERRL     HENT: ear canals and TM's normal and nose and mouth without ulcers or lesions     NECK: no adenopathy, no asymmetry, masses, or scars and thyroid normal to palpation     RESP: lungs clear to auscultation - no rales, rhonchi or wheezes     CV: regular rates and rhythm, normal S1 S2, no S3 or S4 and no murmur,  click or rub     ABDOMEN:  soft, nontender, no HSM or masses and bowel sounds normal     MS: extremities normal- no gross deformities noted and except favors left knee     SKIN: no suspicious lesions or rashes     NEURO: Normal strength and tone, sensory exam grossly normal, mentation intact and speech normal     PSYCH: mentation appears normal. and affect normal/bright     LYMPHATICS: No cervical adenopathy    DIAGNOSTICS:   EKG: appears normal, NSR, normal axis, normal intervals, no acute ST/T changes c/w ischemia, no LVH by voltage criteria, and maybe minor changes with PAC'S    Recent Labs   Lab Test 10/28/19  0854 02/25/19  1047   HGB 15.4 15.7    215    142   POTASSIUM 4.4 4.5   CR 1.08 1.15   A1C 5.5 5.7*        IMPRESSION:   Reason for surgery/procedure: worn out left knee needs replacement. Planned same.  Diagnosis/reason for consult:     ICD-10-CM    1. Preop general physical exam Z01.818 EKG 12-lead complete w/read - Clinics   2. Primary osteoarthritis of left knee M17.12    3. HILARIO (obstructive sleep apnea) G47.33    4. CKD (chronic kidney disease) stage 2, GFR 60-89 ml/min N18.2    5. Type 2 diabetes mellitus with stage 2 chronic kidney disease, without long-term current use of insulin (H) E11.22 **A1C FUTURE anytime    N18.2 **Basic metabolic panel FUTURE anytime   6. Subclinical hypothyroidism E03.9 **TSH with free T4 reflex FUTURE anytime       The proposed surgical procedure is considered INTERMEDIATE risk.    REVISED CARDIAC RISK INDEX  The patient has the following serious cardiovascular risks for perioperative complications such as (MI, PE, VFib and 3  AV Block):  No serious cardiac risks  INTERPRETATION: 0 risks: Class I (very low risk - 0.4% complication rate)    The patient has the following additional risks for perioperative complications:  No identified additional risks      ICD-10-CM    1. Preop general physical exam Z01.818 EKG 12-lead complete w/read - Clinics   2. Primary  osteoarthritis of left knee M17.12    3. HILARIO (obstructive sleep apnea) G47.33    4. CKD (chronic kidney disease) stage 2, GFR 60-89 ml/min N18.2    5. Type 2 diabetes mellitus with stage 2 chronic kidney disease, without long-term current use of insulin (H) E11.22 **A1C FUTURE anytime    N18.2 **Basic metabolic panel FUTURE anytime   6. Subclinical hypothyroidism E03.9 **TSH with free T4 reflex FUTURE anytime     Labs will be done before surgery. I expect no surprises.  RECOMMENDATIONS:     --Consult hospital rounder / IM to assist post-op medical management if needed  --Patient is to take all scheduled medications on the day of surgery if surgery directs him to take them or as directed which was to hold all meds. None really needed. Holding ASA in time    APPROVAL GIVEN to proceed with proposed procedure, without further diagnostic evaluation       Signed Electronically by: Cayden Ramsey MD    Copy of this evaluation report is provided to requesting physician.    [facility] Preop Guidelines    Revised Cardiac Risk Index

## 2020-01-17 NOTE — NURSING NOTE
Per Dr. Ramsey, I have performed an EKG on pt. Results given to provider. Pt tolerated well. Janay Yates CMA ([facility])

## 2020-01-17 NOTE — H&P (VIEW-ONLY)
[address] [address]  [phone]  Dept: [phone]    PRE-OP EVALUATION:  Today's date: 2020    Joel Pike (: 1955) presents for pre-operative evaluation assessment as requested by Dr. Berman.  He requires evaluation and anesthesia risk assessment prior to undergoing surgery/procedure for treatment of knee pain .    Proposed Surgery/ Procedure: left total knee replacement  Date of Surgery/ Procedure: 2020  Time of Surgery/ Procedure: 7:30 am  Hospital/Surgical Facility: [facility]  Fax number for surgical facility: local facility  Primary Physician: Cayden Ramsey  Type of Anesthesia Anticipated: Spinal    Patient has a Health Care Directive or Living Will:  YES     1. NO - Do you have a history of heart attack, stroke, stent, bypass or surgery on an artery in the head, neck, heart or legs?  2. NO - Do you ever have any pain or discomfort in your chest?  3. NO - Do you have a history of  Heart Failure?  4. NO - Are you troubled by shortness of breath when: walking on the level, up a slight hill or at night?  5. NO - Do you currently have a cold, bronchitis or other respiratory infection?  6. NO - Do you have a cough, shortness of breath or wheezing?  7. NO - Do you sometimes get pains in the calves of your legs when you walk?  8. YES - DO YOU OR ANYONE IN YOUR FAMILY HAVE PREVIOUS HISTORY OF BLOOD CLOTS? Only surface leg inflammation  9. NO - Do you or does anyone in your family have a serious bleeding problem such as prolonged bleeding following surgeries or cuts?  10. NO - Have you ever had problems with anemia or been told to take iron pills?  11. NO - Have you had any abnormal blood loss such as black, tarry or bloody stools, or abnormal vaginal bleeding?  12. NO - Have you ever had a blood transfusion?  13. YES - HAVE YOU OR ANY OF YOUR RELATIVES EVER HAD PROBLEMS WITH ANESTHESIA? Slowly effective when wisdom teeth  removed  14. YES - DO YOU HAVE SLEEP APNEA, EXCESSIVE SNORING OR DAYTIME DROWSINESS? CPAP, HILARIO  15. NO - Do you have any prosthetic heart valves?  16. NO - Do you have prosthetic joints?  17. NO - Is there any chance that you may be pregnant?      HPI:     HPI related to upcoming procedure: TOTALLY worn out knee, will be replaced because unable to walk without pain, left      See problem list for active medical problems.  Problems all longstanding and stable, except as noted/documented.  See ROS for pertinent symptoms related to these conditions.      MEDICAL HISTORY:     Patient Active Problem List    Diagnosis Date Noted     Primary osteoarthritis of right knee 01/06/2020     Priority: Medium     Primary osteoarthritis of left knee 10/24/2019     Priority: Medium     Microalbuminuria 02/26/2019     Priority: Medium     Recurrent major depression in complete remission (H) 11/08/2017     Priority: Medium     CKD (chronic kidney disease) stage 2, GFR 60-89 ml/min 10/30/2015     Priority: Medium     Obesity, Class I, BMI 30-34.9 10/30/2015     Priority: Medium     Type 2 diabetes mellitus with stage 2 chronic kidney disease (H) 04/19/2013     Priority: Medium     Subclinical hypothyroidism 03/02/2012     Priority: Medium     Anxiety 03/02/2012     Priority: Medium     Sebaceous cyst 02/03/2012     Priority: Medium     left forehead       Advanced directives, counseling/discussion 08/19/2011     Priority: Medium     Advance Directive Problem List Overview:   Name Relationship Phone    Primary Health Care Agent            Alternative Health Care Agent          Discussed advance care planning with patient; information given to patient to review.//Macey Calvert/AISHA([facility])  8/19/2011 and again 11/9/16         Tinnitus 07/22/2011     Priority: Medium     right ear, began after accident, immediately       Hyperlipidemia LDL goal <100 01/28/2011     Priority: Medium     HILARIO (obstructive sleep apnea) 01/28/2011     Priority:  Medium     Chronic rhinitis 01/15/2010     Priority: Medium     Degeneration of lumbar or lumbosacral intervertebral disc 12/19/2005     Priority: Medium     Cough 12/19/2005     Priority: Medium     DERMATITIS WHEN IN THE SUN, NOT BURN 12/19/2005     Priority: Medium     Gastroesophageal reflux disease without esophagitis 05/14/2003     Priority: Medium      Past Medical History:   Diagnosis Date     Calculus of kidney      CKD (chronic kidney disease) stage 2, GFR 60-89 ml/min 10/30/2015     Degeneration of lumbar or lumbosacral intervertebral disc      Depressive disorder      Depressive disorder, not elsewhere classified      Diabetes (H)      Diabetic eye exam (H) 02/06/12, 11/1/13     Diabetic eye exam (H) 12/17/14     Hyperlipidaemia      Hypertension      Hypothyroidism 1/17/2010     Obesity, Class I, BMI 30-34.9 10/30/2015     Primary osteoarthritis of left knee      Uncomplicated asthma      Past Surgical History:   Procedure Laterality Date     COLONOSCOPY  02/02/09    Repeat in 5 yrs     COLONOSCOPY N/A 9/5/2014    Procedure: COMBINED COLONOSCOPY, SINGLE BIOPSY/POLYPECTOMY BY BIOPSY;  Surgeon: Denis Oakes MD;  Location: [facility]     ESOPHAGOSCOPY, GASTROSCOPY, DUODENOSCOPY (EGD), COMBINED N/A 2/20/2015    Procedure: COMBINED ESOPHAGOSCOPY, GASTROSCOPY, DUODENOSCOPY (EGD), BIOPSY SINGLE OR MULTIPLE;  Surgeon: Alfred Young MD;  Location: [facility]     HC REMV CATARACT EXTRACAP,INSERT LENS, W/O ECP  2000    Bilateral     HC REVISE MEDIAN N/CARPAL TUNNEL SURG  1991     HC TOOTH EXTRACTION W/FORCEP  1980's    [place] teeth removed     RELEASE CARPAL TUNNEL Right 6/16/2017    Procedure: RELEASE CARPAL TUNNEL;  Right carpal tunnel release;  Surgeon: Jason Berman DO;  Location: [facility]     RELEASE CARPAL TUNNEL Left 7/11/2017    Procedure: RELEASE CARPAL TUNNEL;  Left carpal tunnel release;  Surgeon: Jason Berman DO;  Location: [facility]     SOFT TISSUE SURGERY       Current  Outpatient Medications   Medication Sig Dispense Refill     aspirin 325 MG EC tablet One a day 100 tablet 3     buPROPion (WELLBUTRIN XL) 300 MG 24 hr tablet Take 1 tablet (300 mg) by mouth every morning 90 tablet 3     cholecalciferol (VITAMIN D3) 5000 UNITS TABS tablet Take 1 tablet (5,000 Units) by mouth       Coenzyme Q-10 capsule Take 1 capsule by mouth daily. 30 capsule 12     esomeprazole (NEXIUM) 40 MG DR capsule TAKE 1 CAPSULE EVERY MORNING BEFORE BREAKFAST, 30 TO 60 MINUTES BEFORE EATING. 90 capsule 3     ezetimibe (ZETIA) 10 MG tablet TAKE ONE TABLET BY MOUTH ONCE DAILY 30 tablet 4     fexofenadine-pseudoePHEDrine (ALLEGRA-D 24) 180-240 MG 24 hr tablet Take 1 tablet by mouth daily 90 tablet 0     fish oil-omega-3 fatty acids 1000 MG capsule Take  by mouth. Take daily   180 capsule 12     levothyroxine (SYNTHROID/LEVOTHROID) 50 MCG tablet TAKE ONE AND ONE-HALF TABLETS BY MOUTH DAILY 135 tablet 3     losartan (COZAAR) 50 MG tablet TAKE ONE TABLET BY MOUTH EVERY DAY 90 tablet 3     Magnesium 500 MG CAPS One twice a day 30 capsule      metFORMIN (GLUCOPHAGE) 500 MG tablet TAKE ONE TABLET BY MOUTH TWICE A DAY WITH MEALS 180 tablet 3     Misc Natural Products (OSTEO BI-FLEX ADV JOINT SHIELD) TABS Take  by mouth.       Multiple Vitamins-Minerals CAPS Take  by mouth.       multivitamin (OCUVITE) TABS tablet Take 1 tablet by mouth daily 30 each 0     sucralfate (CARAFATE) 1 GM/10ML suspension Take 10 mLs (1 g) by mouth 4 times daily 420 mL 1     albuterol (PROAIR HFA) 108 (90 BASE) MCG/ACT Inhaler 1-2 puffs every 2 -4 hrs as needed (Patient not taking: Reported on 1/6/2020) 1 Inhaler 5     blood glucose (HUGO CONTOUR) test strip Use to test blood sugars 1 time daily or as directed. 1 Box 5     blood glucose (NO BRAND SPECIFIED) lancets standard Use to test blood sugar one time daily or as directed. 100 each 3     blood glucose calibration (HUGO CONTOUR) NORMAL solution Use to calibrate blood glucose monitor as  directed. 1 each 3     order for DME Equipment ordered: RESMED Auto PAP Mask type: Nasal Settings: 8-14 CM H2O       STATIN NOT PRESCRIBED, INTENTIONAL, 1 each 8 times daily Please choose reason not prescribed, below       OTC products: None, except as noted above    Allergies   Allergen Reactions     Dust Mites Cough     Penicillins Rash and Hives      Latex Allergy: NO    Social History     Tobacco Use     Smoking status: Never Smoker     Smokeless tobacco: Never Used   Substance Use Topics     Alcohol use: No     Alcohol/week: 0.0 standard drinks     History   Drug Use No       REVIEW OF SYSTEMS:   CONSTITUTIONAL: NEGATIVE for fever, chills, change in weight  INTEGUMENTARY/SKIN: NEGATIVE for worrisome rashes, moles or lesions  EYES: NEGATIVE for vision changes or irritation  ENT/MOUTH: NEGATIVE for ear, mouth and throat problems  RESP: NEGATIVE for significant cough or SOB  BREAST: NEGATIVE for masses, tenderness or discharge  CV: NEGATIVE for chest pain, palpitations or peripheral edema  GI: NEGATIVE for nausea, abdominal pain, heartburn, or change in bowel habits  : NEGATIVE for frequency, dysuria, or hematuria  MUSCULOSKELETAL:worn out left knee and sometimes back pain  NEURO: NEGATIVE for weakness, dizziness or paresthesias  ENDOCRINE: NEGATIVE for temperature intolerance, skin/hair changes  HEME: NEGATIVE for bleeding problems  PSYCHIATRIC: NEGATIVE for changes in mood or affect    EXAM:   /70   Pulse 82   Temp 97.6  F (36.4  C) (Temporal)   Resp 16   Wt 103.5 kg (228 lb 1.6 oz)   SpO2 98%   BMI 32.73 kg/m      GENERAL APPEARANCE: healthy, alert and no distress     EYES: EOMI,  PERRL     HENT: ear canals and TM's normal and nose and mouth without ulcers or lesions     NECK: no adenopathy, no asymmetry, masses, or scars and thyroid normal to palpation     RESP: lungs clear to auscultation - no rales, rhonchi or wheezes     CV: regular rates and rhythm, normal S1 S2, no S3 or S4 and no murmur,  click or rub     ABDOMEN:  soft, nontender, no HSM or masses and bowel sounds normal     MS: extremities normal- no gross deformities noted and except favors left knee     SKIN: no suspicious lesions or rashes     NEURO: Normal strength and tone, sensory exam grossly normal, mentation intact and speech normal     PSYCH: mentation appears normal. and affect normal/bright     LYMPHATICS: No cervical adenopathy    DIAGNOSTICS:   EKG: appears normal, NSR, normal axis, normal intervals, no acute ST/T changes c/w ischemia, no LVH by voltage criteria, and maybe minor changes with PAC'S    Recent Labs   Lab Test 10/28/19  0854 02/25/19  1047   HGB 15.4 15.7    215    142   POTASSIUM 4.4 4.5   CR 1.08 1.15   A1C 5.5 5.7*        IMPRESSION:   Reason for surgery/procedure: worn out left knee needs replacement. Planned same.  Diagnosis/reason for consult:     ICD-10-CM    1. Preop general physical exam Z01.818 EKG 12-lead complete w/read - Clinics   2. Primary osteoarthritis of left knee M17.12    3. HILARIO (obstructive sleep apnea) G47.33    4. CKD (chronic kidney disease) stage 2, GFR 60-89 ml/min N18.2    5. Type 2 diabetes mellitus with stage 2 chronic kidney disease, without long-term current use of insulin (H) E11.22 **A1C FUTURE anytime    N18.2 **Basic metabolic panel FUTURE anytime   6. Subclinical hypothyroidism E03.9 **TSH with free T4 reflex FUTURE anytime       The proposed surgical procedure is considered INTERMEDIATE risk.    REVISED CARDIAC RISK INDEX  The patient has the following serious cardiovascular risks for perioperative complications such as (MI, PE, VFib and 3  AV Block):  No serious cardiac risks  INTERPRETATION: 0 risks: Class I (very low risk - 0.4% complication rate)    The patient has the following additional risks for perioperative complications:  No identified additional risks      ICD-10-CM    1. Preop general physical exam Z01.818 EKG 12-lead complete w/read - Clinics   2. Primary  osteoarthritis of left knee M17.12    3. HILARIO (obstructive sleep apnea) G47.33    4. CKD (chronic kidney disease) stage 2, GFR 60-89 ml/min N18.2    5. Type 2 diabetes mellitus with stage 2 chronic kidney disease, without long-term current use of insulin (H) E11.22 **A1C FUTURE anytime    N18.2 **Basic metabolic panel FUTURE anytime   6. Subclinical hypothyroidism E03.9 **TSH with free T4 reflex FUTURE anytime     Labs will be done before surgery. I expect no surprises.  RECOMMENDATIONS:     --Consult hospital rounder / IM to assist post-op medical management if needed  --Patient is to take all scheduled medications on the day of surgery if surgery directs him to take them or as directed which was to hold all meds. None really needed. Holding ASA in time    APPROVAL GIVEN to proceed with proposed procedure, without further diagnostic evaluation       Signed Electronically by: Cayden Ramsey MD    Copy of this evaluation report is provided to requesting physician.    [place] Preop Guidelines    Revised Cardiac Risk Index

## 2020-01-17 NOTE — PATIENT INSTRUCTIONS
Before Your Surgery      Call your surgeon if there is any change in your health. This includes signs of a cold or flu (such as a sore throat, runny nose, cough, rash or fever).    Do not smoke, drink alcohol or take over the counter medicine (unless your surgeon or primary care doctor tells you to) for the 24 hours before and after surgery.    If you take prescribed drugs: Follow your doctor s orders about which medicines to take and which to stop until after surgery.    Eating and drinking prior to surgery: follow the instructions from your surgeon    Take a shower or bath the night before surgery. Use the soap your surgeon gave you to gently clean your skin. If you do not have soap from your surgeon, use your regular soap. Do not shave or scrub the surgery site.  Wear clean pajamas and have clean sheets on your bed.     Have lab work again sometime about 1 week before surgery. Schedule with lab and stop at  before going to lab    Stop aspirin one week ahead    Do not take any meds morning of surgery

## 2020-01-27 ENCOUNTER — OFFICE VISIT (OUTPATIENT)
Dept: ORTHOPEDICS | Facility: CLINIC | Age: 65
End: 2020-01-27
Payer: COMMERCIAL

## 2020-01-27 VITALS
DIASTOLIC BLOOD PRESSURE: 66 MMHG | SYSTOLIC BLOOD PRESSURE: 110 MMHG | HEIGHT: 70 IN | WEIGHT: 227.5 LBS | BODY MASS INDEX: 32.57 KG/M2

## 2020-01-27 DIAGNOSIS — E03.8 SUBCLINICAL HYPOTHYROIDISM: ICD-10-CM

## 2020-01-27 DIAGNOSIS — Z01.818 PREOPERATIVE EXAMINATION: ICD-10-CM

## 2020-01-27 DIAGNOSIS — N18.2 TYPE 2 DIABETES MELLITUS WITH STAGE 2 CHRONIC KIDNEY DISEASE, WITHOUT LONG-TERM CURRENT USE OF INSULIN (H): ICD-10-CM

## 2020-01-27 DIAGNOSIS — E11.22 TYPE 2 DIABETES MELLITUS WITH STAGE 2 CHRONIC KIDNEY DISEASE, WITHOUT LONG-TERM CURRENT USE OF INSULIN (H): ICD-10-CM

## 2020-01-27 LAB
ALBUMIN UR-MCNC: 100 MG/DL
ANION GAP SERPL CALCULATED.3IONS-SCNC: 6 MMOL/L (ref 3–14)
APPEARANCE UR: CLEAR
BILIRUB UR QL STRIP: NEGATIVE
BUN SERPL-MCNC: 18 MG/DL (ref 7–30)
CALCIUM SERPL-MCNC: 9 MG/DL (ref 8.5–10.1)
CHLORIDE SERPL-SCNC: 108 MMOL/L (ref 94–109)
CO2 SERPL-SCNC: 27 MMOL/L (ref 20–32)
COLOR UR AUTO: YELLOW
CREAT SERPL-MCNC: 1.25 MG/DL (ref 0.66–1.25)
ERYTHROCYTE [DISTWIDTH] IN BLOOD BY AUTOMATED COUNT: 12.2 % (ref 10–15)
GFR SERPL CREATININE-BSD FRML MDRD: 61 ML/MIN/{1.73_M2}
GLUCOSE SERPL-MCNC: 129 MG/DL (ref 70–99)
GLUCOSE UR STRIP-MCNC: NEGATIVE MG/DL
HBA1C MFR BLD: 5.5 % (ref 0–5.6)
HCT VFR BLD AUTO: 44.9 % (ref 40–53)
HGB BLD-MCNC: 15 G/DL (ref 13.3–17.7)
HGB UR QL STRIP: NEGATIVE
KETONES UR STRIP-MCNC: NEGATIVE MG/DL
LEUKOCYTE ESTERASE UR QL STRIP: NEGATIVE
MCH RBC QN AUTO: 31.8 PG (ref 26.5–33)
MCHC RBC AUTO-ENTMCNC: 33.4 G/DL (ref 31.5–36.5)
MCV RBC AUTO: 95 FL (ref 78–100)
MUCOUS THREADS #/AREA URNS LPF: PRESENT /LPF
NITRATE UR QL: NEGATIVE
PH UR STRIP: 6 PH (ref 5–7)
PLATELET # BLD AUTO: 240 10E9/L (ref 150–450)
POTASSIUM SERPL-SCNC: 4.7 MMOL/L (ref 3.4–5.3)
RBC # BLD AUTO: 4.71 10E12/L (ref 4.4–5.9)
RBC #/AREA URNS AUTO: <1 /HPF (ref 0–2)
SODIUM SERPL-SCNC: 141 MMOL/L (ref 133–144)
SOURCE: ABNORMAL
SP GR UR STRIP: 1.01 (ref 1–1.03)
T4 FREE SERPL-MCNC: 0.86 NG/DL (ref 0.76–1.46)
TSH SERPL DL<=0.005 MIU/L-ACNC: 8.46 MU/L (ref 0.4–4)
UROBILINOGEN UR STRIP-MCNC: 0 MG/DL (ref 0–2)
WBC # BLD AUTO: 8.3 10E9/L (ref 4–11)
WBC #/AREA URNS AUTO: 0 /HPF (ref 0–5)

## 2020-01-27 PROCEDURE — 80048 BASIC METABOLIC PNL TOTAL CA: CPT | Performed by: FAMILY MEDICINE

## 2020-01-27 PROCEDURE — 36415 COLL VENOUS BLD VENIPUNCTURE: CPT | Performed by: FAMILY MEDICINE

## 2020-01-27 PROCEDURE — 81001 URINALYSIS AUTO W/SCOPE: CPT | Performed by: ORTHOPAEDIC SURGERY

## 2020-01-27 PROCEDURE — 99207 ZZC PREOP VISIT IN GLOBAL PKG: CPT | Performed by: ORTHOPAEDIC SURGERY

## 2020-01-27 PROCEDURE — 85027 COMPLETE CBC AUTOMATED: CPT | Performed by: ORTHOPAEDIC SURGERY

## 2020-01-27 PROCEDURE — 84439 ASSAY OF FREE THYROXINE: CPT | Performed by: FAMILY MEDICINE

## 2020-01-27 PROCEDURE — 83036 HEMOGLOBIN GLYCOSYLATED A1C: CPT | Mod: QW | Performed by: FAMILY MEDICINE

## 2020-01-27 PROCEDURE — 84443 ASSAY THYROID STIM HORMONE: CPT | Performed by: FAMILY MEDICINE

## 2020-01-27 ASSESSMENT — PAIN SCALES - GENERAL: PAINLEVEL: MODERATE PAIN (4)

## 2020-01-27 ASSESSMENT — MIFFLIN-ST. JEOR: SCORE: 1833.18

## 2020-01-27 NOTE — LETTER
1/27/2020         RE: Joel Pike  [address] [facility] [facility] [address]        Dear Colleague,    Thank you for referring your patient, Joel Pike, to the [facility]. Please see a copy of my visit note below.    History and physical reviewed-recommended hospitalist to follow.  Labs reviewed unremarkable.  Wife present.  Anticipate next day discharge.  Aspirin for DVT prophylaxis.  All questions answered.  Will see his clinical progression on this side before considering contralateral side.  However he would like to do it sooner than later if possible.    Again, thank you for allowing me to participate in the care of your patient.        Sincerely,        Jason Berman, DO

## 2020-01-27 NOTE — PROGRESS NOTES
History and physical reviewed-recommended hospitalist to follow.  Labs reviewed unremarkable.  Wife present.  Anticipate next day discharge.  Aspirin for DVT prophylaxis.  All questions answered.  Will see his clinical progression on this side before considering contralateral side.  However he would like to do it sooner than later if possible.

## 2020-02-03 ENCOUNTER — ANESTHESIA EVENT (OUTPATIENT)
Dept: SURGERY | Facility: CLINIC | Age: 65
End: 2020-02-03
Payer: COMMERCIAL

## 2020-02-04 ENCOUNTER — APPOINTMENT (OUTPATIENT)
Dept: PHYSICAL THERAPY | Facility: CLINIC | Age: 65
End: 2020-02-04
Attending: ORTHOPAEDIC SURGERY
Payer: COMMERCIAL

## 2020-02-04 ENCOUNTER — APPOINTMENT (OUTPATIENT)
Dept: GENERAL RADIOLOGY | Facility: CLINIC | Age: 65
End: 2020-02-04
Attending: ORTHOPAEDIC SURGERY
Payer: COMMERCIAL

## 2020-02-04 ENCOUNTER — HOSPITAL ENCOUNTER (OUTPATIENT)
Facility: CLINIC | Age: 65
LOS: 1 days | Discharge: HOME | End: 2020-02-05
Attending: ORTHOPAEDIC SURGERY | Admitting: ORTHOPAEDIC SURGERY
Payer: COMMERCIAL

## 2020-02-04 ENCOUNTER — ANESTHESIA (OUTPATIENT)
Dept: SURGERY | Facility: CLINIC | Age: 65
End: 2020-02-04
Payer: COMMERCIAL

## 2020-02-04 ENCOUNTER — ANCILLARY PROCEDURE (OUTPATIENT)
Dept: ULTRASOUND IMAGING | Facility: CLINIC | Age: 65
End: 2020-02-04
Payer: COMMERCIAL

## 2020-02-04 DIAGNOSIS — Z96.652 S/P TOTAL KNEE REPLACEMENT USING CEMENT, LEFT: Primary | ICD-10-CM

## 2020-02-04 DIAGNOSIS — M17.12 PRIMARY OSTEOARTHRITIS OF LEFT KNEE: ICD-10-CM

## 2020-02-04 LAB
GLUCOSE BLDC GLUCOMTR-MCNC: 102 MG/DL (ref 70–99)
GLUCOSE BLDC GLUCOMTR-MCNC: 193 MG/DL (ref 70–99)
GLUCOSE BLDC GLUCOMTR-MCNC: 90 MG/DL (ref 70–99)

## 2020-02-04 PROCEDURE — 25800030 ZZH RX IP 258 OP 636: Performed by: ORTHOPAEDIC SURGERY

## 2020-02-04 PROCEDURE — 27447 TOTAL KNEE ARTHROPLASTY: CPT | Mod: LT | Performed by: ORTHOPAEDIC SURGERY

## 2020-02-04 PROCEDURE — 25800030 ZZH RX IP 258 OP 636: Performed by: NURSE ANESTHETIST, CERTIFIED REGISTERED

## 2020-02-04 PROCEDURE — 25000128 H RX IP 250 OP 636: Performed by: NURSE PRACTITIONER

## 2020-02-04 PROCEDURE — 27810169 ZZH OR IMPLANT GENERAL: Performed by: ORTHOPAEDIC SURGERY

## 2020-02-04 PROCEDURE — 25000125 ZZHC RX 250: Performed by: ORTHOPAEDIC SURGERY

## 2020-02-04 PROCEDURE — 25000128 H RX IP 250 OP 636: Performed by: NURSE ANESTHETIST, CERTIFIED REGISTERED

## 2020-02-04 PROCEDURE — 82962 GLUCOSE BLOOD TEST: CPT | Mod: 91

## 2020-02-04 PROCEDURE — 27447 TOTAL KNEE ARTHROPLASTY: CPT | Mod: AS | Performed by: NURSE PRACTITIONER

## 2020-02-04 PROCEDURE — 40000306 ZZH STATISTIC PRE PROC ASSESS II: Performed by: ORTHOPAEDIC SURGERY

## 2020-02-04 PROCEDURE — 25000132 ZZH RX MED GY IP 250 OP 250 PS 637: Performed by: NURSE PRACTITIONER

## 2020-02-04 PROCEDURE — 97162 PT EVAL MOD COMPLEX 30 MIN: CPT | Mod: GP | Performed by: PHYSICAL THERAPIST

## 2020-02-04 PROCEDURE — 97110 THERAPEUTIC EXERCISES: CPT | Mod: GP | Performed by: PHYSICAL THERAPIST

## 2020-02-04 PROCEDURE — 25000125 ZZHC RX 250: Performed by: NURSE ANESTHETIST, CERTIFIED REGISTERED

## 2020-02-04 PROCEDURE — 25000128 H RX IP 250 OP 636: Performed by: ORTHOPAEDIC SURGERY

## 2020-02-04 PROCEDURE — 37000008 ZZH ANESTHESIA TECHNICAL FEE, 1ST 30 MIN: Performed by: ORTHOPAEDIC SURGERY

## 2020-02-04 PROCEDURE — 27210794 ZZH OR GENERAL SUPPLY STERILE: Performed by: ORTHOPAEDIC SURGERY

## 2020-02-04 PROCEDURE — 97530 THERAPEUTIC ACTIVITIES: CPT | Mod: GP | Performed by: PHYSICAL THERAPIST

## 2020-02-04 PROCEDURE — 97116 GAIT TRAINING THERAPY: CPT | Mod: GP | Performed by: PHYSICAL THERAPIST

## 2020-02-04 PROCEDURE — 25000132 ZZH RX MED GY IP 250 OP 250 PS 637: Performed by: ORTHOPAEDIC SURGERY

## 2020-02-04 PROCEDURE — 37000009 ZZH ANESTHESIA TECHNICAL FEE, EACH ADDTL 15 MIN: Performed by: ORTHOPAEDIC SURGERY

## 2020-02-04 PROCEDURE — 36000063 ZZH SURGERY LEVEL 4 EA 15 ADDTL MIN: Performed by: ORTHOPAEDIC SURGERY

## 2020-02-04 PROCEDURE — 71000014 ZZH RECOVERY PHASE 1 LEVEL 2 FIRST HR: Performed by: ORTHOPAEDIC SURGERY

## 2020-02-04 PROCEDURE — 25800030 ZZH RX IP 258 OP 636: Performed by: NURSE PRACTITIONER

## 2020-02-04 PROCEDURE — 36000093 ZZH SURGERY LEVEL 4 1ST 30 MIN: Performed by: ORTHOPAEDIC SURGERY

## 2020-02-04 PROCEDURE — C1776 JOINT DEVICE (IMPLANTABLE): HCPCS | Performed by: ORTHOPAEDIC SURGERY

## 2020-02-04 PROCEDURE — 40000985 XR KNEE PORT LT 1/2 VW: Mod: LT

## 2020-02-04 DEVICE — BONE CEMENT GENTAMYCIN SMART SET GHV 5450-35-500: Type: IMPLANTABLE DEVICE | Site: KNEE | Status: FUNCTIONAL

## 2020-02-04 DEVICE — IMPLANTABLE DEVICE: Type: IMPLANTABLE DEVICE | Site: KNEE | Status: FUNCTIONAL

## 2020-02-04 RX ORDER — ACETAMINOPHEN 325 MG/1
975 TABLET ORAL EVERY 8 HOURS PRN
Qty: 100 TABLET | Refills: 1 | Status: SHIPPED | OUTPATIENT
Start: 2020-02-04 | End: 2021-01-06

## 2020-02-04 RX ORDER — OXYCODONE HYDROCHLORIDE 5 MG/1
5-10 TABLET ORAL
Status: DISCONTINUED | OUTPATIENT
Start: 2020-02-04 | End: 2020-02-05 | Stop reason: HOSPADM

## 2020-02-04 RX ORDER — SODIUM CHLORIDE, SODIUM LACTATE, POTASSIUM CHLORIDE, CALCIUM CHLORIDE 600; 310; 30; 20 MG/100ML; MG/100ML; MG/100ML; MG/100ML
INJECTION, SOLUTION INTRAVENOUS CONTINUOUS
Status: DISCONTINUED | OUTPATIENT
Start: 2020-02-04 | End: 2020-02-05 | Stop reason: HOSPADM

## 2020-02-04 RX ORDER — LIDOCAINE 40 MG/G
CREAM TOPICAL
Status: DISCONTINUED | OUTPATIENT
Start: 2020-02-04 | End: 2020-02-05 | Stop reason: HOSPADM

## 2020-02-04 RX ORDER — BUPIVACAINE HYDROCHLORIDE AND EPINEPHRINE 5; 5 MG/ML; UG/ML
INJECTION, SOLUTION PERINEURAL PRN
Status: DISCONTINUED | OUTPATIENT
Start: 2020-02-04 | End: 2020-02-04

## 2020-02-04 RX ORDER — DIMENHYDRINATE 50 MG/ML
25 INJECTION, SOLUTION INTRAMUSCULAR; INTRAVENOUS
Status: DISCONTINUED | OUTPATIENT
Start: 2020-02-04 | End: 2020-02-04 | Stop reason: HOSPADM

## 2020-02-04 RX ORDER — ONDANSETRON 2 MG/ML
INJECTION INTRAMUSCULAR; INTRAVENOUS PRN
Status: DISCONTINUED | OUTPATIENT
Start: 2020-02-04 | End: 2020-02-04

## 2020-02-04 RX ORDER — PROCHLORPERAZINE MALEATE 5 MG
10 TABLET ORAL EVERY 6 HOURS PRN
Status: DISCONTINUED | OUTPATIENT
Start: 2020-02-04 | End: 2020-02-05 | Stop reason: HOSPADM

## 2020-02-04 RX ORDER — HYDROXYZINE HYDROCHLORIDE 25 MG/1
25 TABLET, FILM COATED ORAL EVERY 6 HOURS PRN
Qty: 40 TABLET | Refills: 1 | Status: SHIPPED | OUTPATIENT
Start: 2020-02-04 | End: 2020-03-23

## 2020-02-04 RX ORDER — ASPIRIN 325 MG
325 TABLET, DELAYED RELEASE (ENTERIC COATED) ORAL 2 TIMES DAILY
Qty: 84 TABLET | Refills: 0 | Status: SHIPPED | OUTPATIENT
Start: 2020-02-04 | End: 2020-03-17

## 2020-02-04 RX ORDER — NALOXONE HYDROCHLORIDE 0.4 MG/ML
.1-.4 INJECTION, SOLUTION INTRAMUSCULAR; INTRAVENOUS; SUBCUTANEOUS
Status: ACTIVE | OUTPATIENT
Start: 2020-02-04 | End: 2020-02-05

## 2020-02-04 RX ORDER — NICOTINE POLACRILEX 4 MG
15-30 LOZENGE BUCCAL
Status: DISCONTINUED | OUTPATIENT
Start: 2020-02-04 | End: 2020-02-05 | Stop reason: HOSPADM

## 2020-02-04 RX ORDER — ONDANSETRON 4 MG/1
4 TABLET, ORALLY DISINTEGRATING ORAL EVERY 30 MIN PRN
Status: DISCONTINUED | OUTPATIENT
Start: 2020-02-04 | End: 2020-02-04 | Stop reason: HOSPADM

## 2020-02-04 RX ORDER — DOCUSATE SODIUM 100 MG/1
100 CAPSULE, LIQUID FILLED ORAL 2 TIMES DAILY PRN
Qty: 60 CAPSULE | Refills: 1 | Status: ON HOLD | OUTPATIENT
Start: 2020-02-04 | End: 2021-01-19

## 2020-02-04 RX ORDER — CLINDAMYCIN PHOSPHATE 900 MG/50ML
900 INJECTION, SOLUTION INTRAVENOUS SEE ADMIN INSTRUCTIONS
Status: DISCONTINUED | OUTPATIENT
Start: 2020-02-04 | End: 2020-02-04 | Stop reason: HOSPADM

## 2020-02-04 RX ORDER — TIZANIDINE 2 MG/1
2-4 TABLET ORAL EVERY 6 HOURS PRN
Status: DISCONTINUED | OUTPATIENT
Start: 2020-02-04 | End: 2020-02-05 | Stop reason: HOSPADM

## 2020-02-04 RX ORDER — ONDANSETRON 2 MG/ML
4 INJECTION INTRAMUSCULAR; INTRAVENOUS EVERY 30 MIN PRN
Status: DISCONTINUED | OUTPATIENT
Start: 2020-02-04 | End: 2020-02-04 | Stop reason: HOSPADM

## 2020-02-04 RX ORDER — BUPIVACAINE HYDROCHLORIDE 7.5 MG/ML
INJECTION, SOLUTION INTRASPINAL PRN
Status: DISCONTINUED | OUTPATIENT
Start: 2020-02-04 | End: 2020-02-04

## 2020-02-04 RX ORDER — ONDANSETRON 4 MG/1
4 TABLET, ORALLY DISINTEGRATING ORAL EVERY 6 HOURS PRN
Status: DISCONTINUED | OUTPATIENT
Start: 2020-02-04 | End: 2020-02-05 | Stop reason: HOSPADM

## 2020-02-04 RX ORDER — NALOXONE HYDROCHLORIDE 0.4 MG/ML
.1-.4 INJECTION, SOLUTION INTRAMUSCULAR; INTRAVENOUS; SUBCUTANEOUS
Status: DISCONTINUED | OUTPATIENT
Start: 2020-02-04 | End: 2020-02-05 | Stop reason: HOSPADM

## 2020-02-04 RX ORDER — METOPROLOL TARTRATE 1 MG/ML
1-2 INJECTION, SOLUTION INTRAVENOUS EVERY 5 MIN PRN
Status: DISCONTINUED | OUTPATIENT
Start: 2020-02-04 | End: 2020-02-04 | Stop reason: HOSPADM

## 2020-02-04 RX ORDER — HYDROMORPHONE HYDROCHLORIDE 1 MG/ML
0.2 INJECTION, SOLUTION INTRAMUSCULAR; INTRAVENOUS; SUBCUTANEOUS
Status: DISCONTINUED | OUTPATIENT
Start: 2020-02-04 | End: 2020-02-05 | Stop reason: HOSPADM

## 2020-02-04 RX ORDER — CLINDAMYCIN PHOSPHATE 900 MG/50ML
900 INJECTION, SOLUTION INTRAVENOUS
Status: COMPLETED | OUTPATIENT
Start: 2020-02-04 | End: 2020-02-04

## 2020-02-04 RX ORDER — DEXTROSE MONOHYDRATE 25 G/50ML
25-50 INJECTION, SOLUTION INTRAVENOUS
Status: DISCONTINUED | OUTPATIENT
Start: 2020-02-04 | End: 2020-02-05 | Stop reason: HOSPADM

## 2020-02-04 RX ORDER — CALCIUM CARBONATE 500 MG/1
1000 TABLET, CHEWABLE ORAL 4 TIMES DAILY PRN
Status: DISCONTINUED | OUTPATIENT
Start: 2020-02-04 | End: 2020-02-05 | Stop reason: HOSPADM

## 2020-02-04 RX ORDER — HYDROMORPHONE HYDROCHLORIDE 1 MG/ML
.3-.5 INJECTION, SOLUTION INTRAMUSCULAR; INTRAVENOUS; SUBCUTANEOUS EVERY 5 MIN PRN
Status: DISCONTINUED | OUTPATIENT
Start: 2020-02-04 | End: 2020-02-04 | Stop reason: HOSPADM

## 2020-02-04 RX ORDER — ONDANSETRON 2 MG/ML
4 INJECTION INTRAMUSCULAR; INTRAVENOUS EVERY 6 HOURS PRN
Status: DISCONTINUED | OUTPATIENT
Start: 2020-02-04 | End: 2020-02-05 | Stop reason: HOSPADM

## 2020-02-04 RX ORDER — FENTANYL CITRATE 50 UG/ML
INJECTION, SOLUTION INTRAMUSCULAR; INTRAVENOUS PRN
Status: DISCONTINUED | OUTPATIENT
Start: 2020-02-04 | End: 2020-02-04

## 2020-02-04 RX ORDER — ACETAMINOPHEN 325 MG/1
975 TABLET ORAL EVERY 8 HOURS
Status: DISCONTINUED | OUTPATIENT
Start: 2020-02-04 | End: 2020-02-05 | Stop reason: HOSPADM

## 2020-02-04 RX ORDER — HYDROXYZINE HYDROCHLORIDE 25 MG/1
25 TABLET, FILM COATED ORAL EVERY 6 HOURS PRN
Status: DISCONTINUED | OUTPATIENT
Start: 2020-02-04 | End: 2020-02-05 | Stop reason: HOSPADM

## 2020-02-04 RX ORDER — FENTANYL CITRATE 50 UG/ML
25-50 INJECTION, SOLUTION INTRAMUSCULAR; INTRAVENOUS
Status: DISCONTINUED | OUTPATIENT
Start: 2020-02-04 | End: 2020-02-04 | Stop reason: HOSPADM

## 2020-02-04 RX ORDER — PROPOFOL 10 MG/ML
INJECTION, EMULSION INTRAVENOUS CONTINUOUS PRN
Status: DISCONTINUED | OUTPATIENT
Start: 2020-02-04 | End: 2020-02-04

## 2020-02-04 RX ORDER — SODIUM CHLORIDE, SODIUM LACTATE, POTASSIUM CHLORIDE, CALCIUM CHLORIDE 600; 310; 30; 20 MG/100ML; MG/100ML; MG/100ML; MG/100ML
INJECTION, SOLUTION INTRAVENOUS CONTINUOUS
Status: DISCONTINUED | OUTPATIENT
Start: 2020-02-04 | End: 2020-02-04 | Stop reason: HOSPADM

## 2020-02-04 RX ORDER — DEXAMETHASONE SODIUM PHOSPHATE 10 MG/ML
INJECTION, SOLUTION INTRAMUSCULAR; INTRAVENOUS PRN
Status: DISCONTINUED | OUTPATIENT
Start: 2020-02-04 | End: 2020-02-04

## 2020-02-04 RX ORDER — OXYCODONE HYDROCHLORIDE 5 MG/1
5-10 TABLET ORAL
Qty: 50 TABLET | Refills: 0 | Status: SHIPPED | OUTPATIENT
Start: 2020-02-04 | End: 2020-02-19

## 2020-02-04 RX ORDER — PROCHLORPERAZINE 25 MG
25 SUPPOSITORY, RECTAL RECTAL EVERY 12 HOURS PRN
Status: DISCONTINUED | OUTPATIENT
Start: 2020-02-04 | End: 2020-02-05 | Stop reason: HOSPADM

## 2020-02-04 RX ORDER — TIZANIDINE HYDROCHLORIDE 4 MG/1
4 CAPSULE, GELATIN COATED ORAL 3 TIMES DAILY PRN
Qty: 45 CAPSULE | Refills: 1 | Status: SHIPPED | OUTPATIENT
Start: 2020-02-04 | End: 2020-02-19

## 2020-02-04 RX ORDER — DOCUSATE SODIUM 100 MG/1
100 CAPSULE, LIQUID FILLED ORAL 2 TIMES DAILY
Status: DISCONTINUED | OUTPATIENT
Start: 2020-02-04 | End: 2020-02-05 | Stop reason: HOSPADM

## 2020-02-04 RX ORDER — CLINDAMYCIN PHOSPHATE 900 MG/50ML
900 INJECTION, SOLUTION INTRAVENOUS EVERY 8 HOURS
Status: COMPLETED | OUTPATIENT
Start: 2020-02-04 | End: 2020-02-05

## 2020-02-04 RX ORDER — ONDANSETRON 4 MG/1
4 TABLET, ORALLY DISINTEGRATING ORAL EVERY 8 HOURS PRN
Qty: 10 TABLET | Refills: 1 | Status: SHIPPED | OUTPATIENT
Start: 2020-02-04 | End: 2020-11-10

## 2020-02-04 RX ADMIN — METFORMIN HYDROCHLORIDE 500 MG: 500 TABLET ORAL at 17:15

## 2020-02-04 RX ADMIN — FENTANYL CITRATE 50 MCG: 50 INJECTION, SOLUTION INTRAMUSCULAR; INTRAVENOUS at 07:22

## 2020-02-04 RX ADMIN — METFORMIN HYDROCHLORIDE 500 MG: 500 TABLET ORAL at 11:07

## 2020-02-04 RX ADMIN — TRANEXAMIC ACID 1 G: 100 INJECTION, SOLUTION INTRAVENOUS at 08:47

## 2020-02-04 RX ADMIN — Medication 10 ML: at 09:46

## 2020-02-04 RX ADMIN — Medication 10 ML: at 09:45

## 2020-02-04 RX ADMIN — OXYCODONE HYDROCHLORIDE 5 MG: 5 TABLET ORAL at 17:15

## 2020-02-04 RX ADMIN — ONDANSETRON 4 MG: 2 INJECTION INTRAMUSCULAR; INTRAVENOUS at 08:40

## 2020-02-04 RX ADMIN — DEXAMETHASONE SODIUM PHOSPHATE 3 MG: 10 INJECTION, SOLUTION INTRAMUSCULAR; INTRAVENOUS at 09:44

## 2020-02-04 RX ADMIN — CLINDAMYCIN PHOSPHATE 900 MG: 900 INJECTION, SOLUTION INTRAVENOUS at 23:26

## 2020-02-04 RX ADMIN — ACETAMINOPHEN 975 MG: 325 TABLET, FILM COATED ORAL at 11:07

## 2020-02-04 RX ADMIN — DOCUSATE SODIUM 100 MG: 100 CAPSULE, LIQUID FILLED ORAL at 20:26

## 2020-02-04 RX ADMIN — PROPOFOL 25 MCG/KG/MIN: 10 INJECTION, EMULSION INTRAVENOUS at 07:29

## 2020-02-04 RX ADMIN — FENTANYL CITRATE 50 MCG: 50 INJECTION, SOLUTION INTRAMUSCULAR; INTRAVENOUS at 07:29

## 2020-02-04 RX ADMIN — DOCUSATE SODIUM 100 MG: 100 CAPSULE, LIQUID FILLED ORAL at 11:07

## 2020-02-04 RX ADMIN — MIDAZOLAM 1 MG: 1 INJECTION INTRAMUSCULAR; INTRAVENOUS at 07:20

## 2020-02-04 RX ADMIN — ASPIRIN 325 MG: 325 TABLET, COATED ORAL at 20:26

## 2020-02-04 RX ADMIN — CLINDAMYCIN PHOSPHATE 900 MG: 900 INJECTION, SOLUTION INTRAVENOUS at 17:14

## 2020-02-04 RX ADMIN — ASPIRIN 325 MG: 325 TABLET, COATED ORAL at 11:07

## 2020-02-04 RX ADMIN — SODIUM CHLORIDE, POTASSIUM CHLORIDE, SODIUM LACTATE AND CALCIUM CHLORIDE: 600; 310; 30; 20 INJECTION, SOLUTION INTRAVENOUS at 06:48

## 2020-02-04 RX ADMIN — MIDAZOLAM 1 MG: 1 INJECTION INTRAMUSCULAR; INTRAVENOUS at 07:42

## 2020-02-04 RX ADMIN — OXYCODONE HYDROCHLORIDE 5 MG: 5 TABLET ORAL at 20:26

## 2020-02-04 RX ADMIN — HYDROXYZINE HYDROCHLORIDE 25 MG: 25 TABLET, FILM COATED ORAL at 14:17

## 2020-02-04 RX ADMIN — Medication 10 ML: at 09:44

## 2020-02-04 RX ADMIN — SODIUM CHLORIDE, POTASSIUM CHLORIDE, SODIUM LACTATE AND CALCIUM CHLORIDE: 600; 310; 30; 20 INJECTION, SOLUTION INTRAVENOUS at 10:43

## 2020-02-04 RX ADMIN — DEXAMETHASONE SODIUM PHOSPHATE 4 MG: 10 INJECTION, SOLUTION INTRAMUSCULAR; INTRAVENOUS at 09:46

## 2020-02-04 RX ADMIN — ACETAMINOPHEN 975 MG: 325 TABLET, FILM COATED ORAL at 18:13

## 2020-02-04 RX ADMIN — DEXAMETHASONE SODIUM PHOSPHATE 3 MG: 10 INJECTION, SOLUTION INTRAMUSCULAR; INTRAVENOUS at 09:45

## 2020-02-04 RX ADMIN — CLINDAMYCIN PHOSPHATE 900 MG: 900 INJECTION, SOLUTION INTRAVENOUS at 07:44

## 2020-02-04 RX ADMIN — BUPIVACAINE HYDROCHLORIDE IN DEXTROSE 2 ML: 7.5 INJECTION, SOLUTION SUBARACHNOID at 07:33

## 2020-02-04 RX ADMIN — SODIUM CHLORIDE, POTASSIUM CHLORIDE, SODIUM LACTATE AND CALCIUM CHLORIDE: 600; 310; 30; 20 INJECTION, SOLUTION INTRAVENOUS at 08:59

## 2020-02-04 NOTE — ANESTHESIA PROCEDURE NOTES
Peripheral nerve/Neuraxial procedure note : intrathecal  Pre-Procedure  Performed by  Bouchra Corley APRN CRNA   Location: OR      Pre-Anesthestic Checklist: patient identified, IV checked, risks and benefits discussed, informed consent, monitors and equipment checked and pre-op evaluation    Timeout  Correct Patient: Yes   Correct Procedure: Yes   Correct Site: Yes   Correct Laterality: N/A   Correct Position: Yes   Site Marked: N/A   .   Procedure Documentation    .    Procedure: intrathecal, .   Patient Position:sitting Insertion Site:L3-4  (midline approach)     Patient Prep/Sterile Barriers; mask, sterile gloves, povidone-iodine 7.5% surgical scrub, patient draped.  .  Needle:  Spinal Needle (gauge): 25  Spinal/LP Needle Length (inches): 3.5 # of attempts: 1 and  # of redirects:  3 Introducer used Introducer: 20 G .        Assessment/Narrative  Paresthesias: No.  .  .  clear CSF fluid removed . Time Injected:while sitting   Sensory Level: T6 Comments:  Spinal placed as per note above without difficulty.  Dano XAVIER under the direct supervision of Bouchra Corley CRNA attempted once.  Bouchra Corley CRNA with a re-direct was able to get clear CSF negative for paresthesia.  No anesthesia concerns.

## 2020-02-04 NOTE — PLAN OF CARE
Discharge Planner PT   Patient plan for discharge: Home with wife  Current status: Patient is a 64 year old male, day 0 status post left total knee arthroplasty. Patient with a previous medical history of bilateral knee OA, depression, anxiety, CKD, obesity, DM type 2, HILARIO, tinnitus, hyperlipidemia, and DDD of lumbar and lumbosacral spine. Prior to surgery patient living in a single family home with 9 stairs to enter, none within. His wife is available for 24/7 assistance at discharge. Patient at baseline reports not using any assistance device, he has a walker and cane available for use upon discharge. Patient attended outpatient PT and pre-operative teaching course prior to surgery. Patient reports no falls in the past 6 months. Currently, patient with 4/10 pain at rest, wife present and supportive. IND supine to/from sitting EOB with knee immobilizer. Sit to/from stand with front wheeled walker with SBA, good control and no LOB. Able to static stance without hand support and increased WB on RLE. Bed to toilet transfer SBA with walker, fair quad control. Standing to void with good tolerance. Ambulated 50 ft with 2WW, verbal cues for sequencing, knee control and short step length for limb control. Patient IND doffing knee immobilizer. Completed post operative HEP with verbal cues, patient co-kassandra and able to complete properly with verbal cues. Right knee AROM 0-80 degrees without pain. Throughout visit patient and wife educated regarding: knee immobilizer, HHPT and OP PT, walker, activity instructions at home, constipation prevention, HEP, ice and elevate.   Barriers to return to prior living situation: Medical status, stairs  Recommendations for discharge: Home with 24/7 assistance, HHPT and family transport  Rationale for recommendations: Patient with good post operative expectations, good support at home and tolerated mobilization well. Anticipate patient to make good progress towards discharge home  tomorrow. Patient would benefit from continued skilled therapeutic intervention in order to progress them towards a higher level of function in accordance with their surgeon's protocol.       Entered by: Carolina Brenner 02/04/2020 5:06 PM     Thank you for your referral.  Carolina Brenner, PT, DPT, ATC    [facility]  O: [phone]  E: [email or web address]

## 2020-02-04 NOTE — ANESTHESIA CARE TRANSFER NOTE
Patient: Joel Pike    Procedure(s):  left total knee replacement    Diagnosis: Primary osteoarthritis of left knee [M17.12]  Diagnosis Additional Information: No value filed.    Anesthesia Type:   MAC, Spinal, Peripheral Nerve Block for post-op pain at surgeon's request     Note:  Airway :Face Mask  Patient transferred to:PACU  Handoff Report: Identifed the Patient, Identified the Reponsible Provider, Reviewed the pertinent medical history, Discussed the surgical course, Reviewed Intra-OP anesthesia mangement and issues during anesthesia, Set expectations for post-procedure period and Allowed opportunity for questions and acknowledgement of understanding      Vitals: (Last set prior to Anesthesia Care Transfer)    CRNA VITALS  2/4/2020 0851 - 2/4/2020 0925      2/4/2020             Resp Rate (observed):  (!) 6                Electronically Signed By: SYLVIA Ritter CRNA  February 4, 2020  9:25 AM

## 2020-02-04 NOTE — OP NOTE
Procedure Date: 02/04/2020      PREOPERATIVE DIAGNOSIS:  Left knee primary osteoarthritis.      POSTOPERATIVE DIAGNOSIS:  Left knee primary osteoarthritis.      PROCEDURE:  Left total knee arthroplasty.      SURGEON:  Jason Berman DO.      FIRST ASSISTANT:  SYLVIA Yoder (utilized throughout the procedure, assisting with soft tissue retraction, assisting with trial and final implant placement, and he provided skin closure).      ANESTHESIA:  Spinal with IV sedation.      COMPLICATIONS:  None.      ESTIMATED BLOOD LOSS:  Less than 10 mL.      TOURNIQUET TIME/PRESSURE:  75 minutes at 360 mmHg.      URINE OUTPUT:  250 mL.      FLUIDS:  1 liter crystalloids.      COUNTS:  Correct.      DISPOSITION:  To PACU in stable condition.      GROSS FINDINGS:  Preoperative motion was approximately 2-125 degrees.  There was a flexible varus contracture with bone-on-bone arthritis, medial compartment with rimming osteophytes.  There was also complete articular loss throughout the patellofemoral compartment, more so the patella than the trochlea.  Lateral compartment had some full thickness fissuring.      INDICATIONS:  This is a pleasant 64-year-old male with approximately 1-year history of progressive knee pain.  He has attempted rest, activity modification, formal physical therapy, home therapy, steroid injections, Tylenol with no improvement.  Complains of swelling.  He has been limping.  Has pain at rest, difficulty sleeping and walking.  His radiographs are consistent with his clinical exam.  Given his continued pain refractory to conservative care, impacting the quality of his life, we discussed proceeding with left total knee replacement.  The risks, benefits, complications, alternatives and anticipated postop course discussed.  Postoperative timeframe for recovery reviewed.  Once this was thoroughly reviewed, he opted to proceed.      DETAILS OF PROCEDURE:  He was met preoperatively and again informed consent was  verified, appropriate extremity was marked, and he was wheeled to operative suite #1.  Transferred to the OR table without any issues.  When deemed appropriate by Anesthesia, the left leg was sterilely prepped and draped in normal manner.  Prior to incision, a timeout was performed.  Again, the appropriate patient, surgery and extremity were verified and antibiotics had been administered.  A HemaClear tourniquet was applied; total tourniquet time was 75 minutes at 316 mmHg.        A midline skin incision was performed.  This was followed with a medial parapatellar arthrotomy.  The anterior fat pad was excised.  The medial tibia was skeletonized with no releases being performed.  Rimming osteophytes were removed.  The knee was gently flexed up.  Collateral ligament retractors were placed and maintained through the key components of the procedure.  A drill was utilized to enter the distal intramedullary canal of the femur with no issues.  The contents were aspirated.  The distal guide was very gradually and easily inserted intramedullary.  It was set to take 10 mm off distally in 5 degrees of valgus.  With this jig pinned into position and the collateral ligaments protected, the medial and lateral aspect of the distal femur was cut with no issues.  The ACL was excised.  A posterior retractor was placed, presenting the tibia as well as protecting the posterior neurovascular structures.  Once again with retractors in placed on the medial and lateral aspect, the extramedullary tibia guide was placed.  It was set to take 4 mm off the medial side.  With care to recreate the slope and varus and valgus alignment, the jig was pinned into position and the cut on the medial and lateral aspect of the proximal tibia was performed with no issues.  The bone remnant was removed.  The knee was brought out to full extension.  A spacer block was placed.  It was balanced in full extension and with the drop aaliyah showed normal alignment.   The block was removed.  The knee was flexed up to 90 degrees and a tibial tray was placed.  A drop aaliyah was then again placed showing an appropriate resection of the tibia.  The plate was removed.  Retractors were placed.  The sizing guide was placed on the distal femur.  Rotation was based on the Nikunj line and the epicondylar axis.  The pins were placed.  It measured a size 7.  The size 7, 4-in-1 block was placed.  At 90 degrees of flexion on the posterior surface of the block the flexion and extension space was equivalent.  The cuts were then performed with no issues.  This was followed with the box cut.  Excess bone and meniscal remnants were removed.  Trial components were placed.  He had 0-130 degrees of motion.  The patella tracked midline with the no thumbs technique.  The knee was balanced through the full arc of motion.  The patella was then resurfaced back down to anatomic dimensions using a freehand technique.  Again, taken through full range of motion, showing normal tracking.  The tibia and femur were then prepared.  The final components as listed above were cemented in position, held in approximately 20 degrees of flexion until the cement cured.  During this process, a 3-minute dilute Betadine lavage was performed followed with pulse lavage.  Excess bone and cement fragments had been removed.  The arthrotomy was then closed with 0 Vicryl, followed with 2-0 Vicryl subcutaneous, followed with a running Monocryl suture with skin glue.  A sterile bandage applied and he was subsequently transferred to the PACU in stable condition.      He will be brought to the hospital for orthopedic care, DVT prophylaxis, pain management, and physical therapy.         MARIAM PORRAS DO             D: 2020   T: 2020   MT: MARLENE      Name:     YANI ARIZMENDI   MRN:      [id number]        Account:        [id number]   :      1955           Procedure Date: 2020      Document: [id number]

## 2020-02-04 NOTE — INTERVAL H&P NOTE
This H&P has been reviewed and there are no clinically significant changes in the patient s condition.  The patient was evaluated by myself as well as Dr. Ramsey prior to surgery. The Patient is approved for the surgery and the stated surgical procedure is still clinically indicated.

## 2020-02-04 NOTE — ANESTHESIA PREPROCEDURE EVALUATION
Anesthesia Pre-Procedure Evaluation    Patient: Joel Pike   MRN: [id number] : 1955          Preoperative Diagnosis: Primary osteoarthritis of left knee [M17.12]    Procedure(s):  left total knee replacement    Past Medical History:   Diagnosis Date     Calculus of kidney      CKD (chronic kidney disease) stage 2, GFR 60-89 ml/min 10/30/2015     Degeneration of lumbar or lumbosacral intervertebral disc      Depressive disorder      Depressive disorder, not elsewhere classified      Diabetes (H)      Diabetic eye exam (H) 12, 13     Diabetic eye exam (H) 14     Hyperlipidaemia      Hypertension      Hypothyroidism 2010     Obesity, Class I, BMI 30-34.9 10/30/2015     Primary osteoarthritis of left knee      Uncomplicated asthma      Past Surgical History:   Procedure Laterality Date     COLONOSCOPY  09    Repeat in 5 yrs     COLONOSCOPY N/A 2014    Procedure: COMBINED COLONOSCOPY, SINGLE BIOPSY/POLYPECTOMY BY BIOPSY;  Surgeon: Denis Oakes MD;  Location:  GI     ESOPHAGOSCOPY, GASTROSCOPY, DUODENOSCOPY (EGD), COMBINED N/A 2015    Procedure: COMBINED ESOPHAGOSCOPY, GASTROSCOPY, DUODENOSCOPY (EGD), BIOPSY SINGLE OR MULTIPLE;  Surgeon: Alfred Young MD;  Location:  GI     HC REMV CATARACT EXTRACAP,INSERT LENS, W/O ECP      Bilateral     HC REVISE MEDIAN N/CARPAL TUNNEL SURG       HC TOOTH EXTRACTION W/FORCEP      [place] teeth removed     RELEASE CARPAL TUNNEL Right 2017    Procedure: RELEASE CARPAL TUNNEL;  Right carpal tunnel release;  Surgeon: Jason Berman DO;  Location: [facility]     RELEASE CARPAL TUNNEL Left 2017    Procedure: RELEASE CARPAL TUNNEL;  Left carpal tunnel release;  Surgeon: Jason Berman DO;  Location: [facility]     SOFT TISSUE SURGERY         Anesthesia Evaluation     . Pt has had prior anesthetic. Type: MAC    History of anesthetic complications   - PONV        ROS/MED HX    ENT/Pulmonary:  Comment: tinnitus     (+)sleep apnea, HILARIO risk factors allergic rhinitis, Mild Persistent asthma uses CPAP , . .    Neurologic:  - neg neurologic ROS     Cardiovascular:     (+) Dyslipidemia, hypertension----. : . . . :. . Previous cardiac testing date:results:date: results:ECG reviewed date:1/17/20 results:SR with occasional PVC's date: results:          METS/Exercise Tolerance:     Hematologic:  - neg hematologic  ROS       Musculoskeletal:   (+)  other musculoskeletal- DDD lumbar, knee pain      GI/Hepatic:     (+) GERD Asymptomatic on medication,       Renal/Genitourinary:     (+) chronic renal disease, type: CRI, Nephrolithiasis , Pt does not require dialysis, Pt has no history of transplant,       Endo:     (+) type I DM, Last HgA1c: 5.7 date: 2/25/19 Not using insulin - not using insulin pump Normal glucose range: low 100's not previously admitted for DM/DKA Diabetic complications: retinopathy, thyroid problem hypothyroidism, .      Psychiatric:     (+) psychiatric history depression and anxiety      Infectious Disease:  - neg infectious disease ROS       Malignancy:      - no malignancy   Other:    (+) No chance of pregnancy C-spine cleared: N/A, H/O Chronic Pain,  - neg other ROS                             Lab Results   Component Value Date    WBC 8.3 01/27/2020    HGB 15.0 01/27/2020    HCT 44.9 01/27/2020     01/27/2020    SED 6 12/20/2006     01/27/2020    POTASSIUM 4.7 01/27/2020    CHLORIDE 108 01/27/2020    CO2 27 01/27/2020    BUN 18 01/27/2020    CR 1.25 01/27/2020     (H) 01/27/2020    AURORA 9.0 01/27/2020    ALBUMIN 3.8 02/25/2019    PROTTOTAL 7.1 02/25/2019    ALT 96 (H) 02/25/2019    AST 38 02/25/2019    ALKPHOS 87 02/25/2019    BILITOTAL 0.7 02/25/2019    PTT 26 12/20/2006    INR 0.96 12/20/2006    TSH 8.46 (H) 01/27/2020    T4 0.86 01/27/2020       Preop Vitals  BP Readings from Last 3 Encounters:   01/27/20 110/66   01/17/20 122/70   01/06/20 124/82    Pulse Readings from  "Last 3 Encounters:   01/17/20 82   10/28/19 74   10/09/19 64      Resp Readings from Last 3 Encounters:   01/17/20 16   10/28/19 16   10/09/19 16    SpO2 Readings from Last 3 Encounters:   01/17/20 98%   10/28/19 100%   10/09/19 100%      Temp Readings from Last 1 Encounters:   01/17/20 97.6  F (36.4  C) (Temporal)    Ht Readings from Last 1 Encounters:   01/27/20 1.778 m (5' 10\")      Wt Readings from Last 1 Encounters:   01/27/20 103.2 kg (227 lb 8 oz)    Estimated body mass index is 32.64 kg/m  as calculated from the following:    Height as of 1/27/20: 1.778 m (5' 10\").    Weight as of 1/27/20: 103.2 kg (227 lb 8 oz).       Anesthesia Plan      History & Physical Review  History and physical reviewed and following examination; no interval change.    ASA Status:  3 .    NPO Status:  > 8 hours    Plan for Spinal and MAC with Intravenous and Propofol induction. Maintenance will be Balanced.  Reason for MAC:  Deep or markedly invasive procedure (G8)  PONV prophylaxis:  Ondansetron (or other 5HT-3)       Postoperative Care  Postoperative pain management:  Peripheral nerve block (Single Shot) and Multi-modal analgesia.      Consents  Anesthetic plan, risks, benefits and alternatives discussed with:  Patient..                 SYLVIA Ritter CRNA  "

## 2020-02-04 NOTE — PROGRESS NOTES
Saw and examined patient.  POD0 s/p total knee replacement, left  Per patient doing very well. Mild pain controlled by oral medications.  No current nausea toleraing oral intact, will be advancing diet as tolerated.  Has voided. Has stood at bedside. Numbness wearing off from spinal.  Patient has 24/7 help available upon discharge.  No current concerns.    Answered all questions.     BP (!) 152/76 (BP Location: Right arm)   Pulse 74   Temp 98.6  F (37  C) (Oral)   Resp 16   SpO2 97%     Alert and orient x 4, sitting up in bed in NAD.   Dressing CDI.  Immobilizer in place.   Sensation, motor intact to foot.  Toes well perfused. D.p. Pulse 2+    Plan: will see again tomorrow Am.   Plan for discharge tomorrow afternoon pending progress.    SYLVIA Stevens, CNP  Orthopedic Surgery

## 2020-02-04 NOTE — BRIEF OP NOTE
[facility] Brief Operative Note    Pre-operative diagnosis: Primary osteoarthritis of left knee [M17.12]   Post-operative diagnosis: Same   Procedure: Procedure(s):  left total knee replacement   Surgeon:  Anesthesia: Jason Berman DO  Spinal   Assistant(s): Jakob Montanez APRN, CNP, DNP (A advanced practice provider was necessary for his expertise, exposure and surgical assistance throughout the case.)   Estimated blood loss:  Tourniquet time/pressure:  Urine output:  Fluids: Less than 10 ml  75 minutes at 316 mmHg  250 ml  1000 ml Crystalloids   Specimens: None   Findings: left knee primary osteoarthritis [id number]     Jacky Berman D.O.      Implant Name Type Inv. Item Serial No.  Lot No. LRB No. Used   BONE CEMENT GENTAMYCIN SMART SET V 5450- Cement, Bone BONE CEMENT GENTAMYCIN SMART SET GHV [id number]  J [id number], [id number] Left 1   FEMORAL SIZE 7    Depuy [id number] Left 1   TIBIAL BASE ROTATING PLATFORM     Depuy [id number] Left 1   TIBIAL INSERT SIZE 7    Depuy [id number] Left 1   PATELLA DOME 38MM     [id number] Left 1

## 2020-02-04 NOTE — PROGRESS NOTES
02/04/20 1600   Quick Adds   Type of Visit Initial PT Evaluation       Present no   Living Environment   Lives With spouse   Living Arrangements house   Home Accessibility stairs to enter home;stairs within home   Number of Stairs, Main Entrance 5  (+4)   Stair Railings, Main Entrance railing on left side (ascending)   Number of Stairs, Within Home, Primary none   Transportation Anticipated family or friend will provide  (crossover vehicle)   Living Environment Comment Wife available for 24/7 assistance. Step over tub.    Self-Care   Usual Activity Tolerance moderate   Current Activity Tolerance fair   Regular Exercise No   Equipment Currently Used at Home cane, straight;walker, rolling  (walking cane)   Activity/Exercise/Self-Care Comment PT HEP as tolerated, however pain prevented greater activity   Functional Level Prior   Ambulation 0-->independent   Transferring 0-->independent   Toileting 0-->independent   Bathing 0-->independent   Communication 0-->understands/communicates without difficulty   Swallowing 0-->swallows foods/liquids without difficulty   Cognition 0 - no cognition issues reported   Fall history within last six months no   Which of the above functional risks had a recent onset or change? none   General Information   Onset of Illness/Injury or Date of Surgery - Date 02/04/20   Referring Physician Dr. Berman   Patient/Family Goals Statement Home with HHPT for short time then outpatient PT at [facility]   Pertinent History of Current Problem (include personal factors and/or comorbidities that impact the POC) Patient is a 64 year old male, day 0 status post left total knee arthroplasty. Patient with a previous medical history of bilateral knee OA, depression, anxiety, CKD, obesity, DM type 2, HILARIO, tinnitus, hyperlipidemia, and DDD of lumbar and lumbosacral spine.    Precautions/Limitations fall precautions   Weight-Bearing Status - LUE full weight-bearing   Weight-Bearing Status -  RUE full weight-bearing   Weight-Bearing Status - LLE weight-bearing as tolerated   Weight-Bearing Status - RLE full weight-bearing   General Observations wife present and supportive.    General Info Comments PT orders: eval and treat post operative knee ambulate and exercise. Activity orders: up with assistance, up in chair, ice, incentive spirometer, WBAT, position knee, elevate, knee immobilizer, PCDs and CAPNO.    Cognitive Status Examination   Orientation orientation to person, place and time   Level of Consciousness alert   Follows Commands and Answers Questions 100% of the time;able to follow multistep instructions   Personal Safety and Judgment intact   Memory intact   Pain Assessment   Patient Currently in Pain Yes, see Vital Sign flowsheet  (4/10)   Integumentary/Edema   Integumentary/Edema Comments post operative dressings CDI   Posture    Posture Not impaired   Range of Motion (ROM)   ROM Comment left knee flexion 80 degrees   Strength   Strength Comments good quad strength, able to SLR flexion LLE   Bed Mobility   Bed Mobility Bed mobility skill: Rolling/Turning;Bed mobility skill: Scooting/Bridging;Bed mobility skill: Sit to supine;Bed mobility skill: Supine to sit   Bed Mobility Comments baseline bed height nearly to patient's hip   Bed Mobility Skill: Rolling/Turning   Level of [place]: Rolling/Turning independent   Bed Mobility Skill: Scooting/Bridging   Level of [place]: Scooting/Bridging independent   Bed Mobility Skill: Sit to Supine   Level of [place]: Sit/Supine independent   Bed Mobility Skill: Supine to Sit   Level of [place]: Supine/Sit independent   Transfer Skills   Transfer Transfer Skill: Sit to Stand;Transfer Skill:  Stand to Sit   Transfer Skill:  Sit to Stand   Level of [place]: Sit/Stand stand-by assist   Physical Assist/Nonphysical Assist: Sit/Stand supervision;verbal cues   Weightbearing Restrictions: Sit/Stand weight-bearing as tolerated   Assistive Device  for Transfer: Sit/Stand rolling walker   Transfer Skill: Stand to Sit   Level of [place]: Stand/Sit stand-by assist   Physical Assist/Nonphysical Assist: Stand/Sit supervision;verbal cues   Weight-Bearing Restrictions: Stand/Sit weight-bearing as tolerated   Assistive Device: Stand to Sit rolling walker   Gait   Gait Gait Skill;Gait Analysis   Gait Skills   Level of [place]: Gait contact guard   Physical Assist/Nonphysical Assist: Gait supervision;verbal cues   Weight-Bearing Restrictions: Gait weight-bearing as tolerated   Assistive Device for Transfer: Gait rolling walker   Gait Distance 50 feet   Gait Analysis   Gait Pattern Used swing-to gait   Gait Deviations Noted decreased toe-to-floor clearance;decreased stride length;decreased step length;increased time in double stance;decreased german   Impairments Contributing to Gait Deviations decreased ROM;pain;decreased strength   Balance   Balance Comments No LOB with mobilization however reliance on knee immobilizer and walker for gait demontrates decreased safety   Sensory Examination   Sensory Perception Comments decreased left medial knee sensation   Coordination   Coordination no deficits were identified   Muscle Tone   Muscle Tone no deficits were identified   Modality Interventions   Planned Modality Interventions Cryotherapy   Planned Modality Interventions Comments left knee PRN   General Therapy Interventions   Planned Therapy Interventions gait training;ROM;strengthening;home program guidelines;progressive activity/exercise   Clinical Impression   Criteria for Skilled Therapeutic Intervention yes, treatment indicated   PT Diagnosis muscle weakness, muscle tension, impaired gait, impaired balance   Influenced by the following impairments day 0 status post TKA   Functional limitations due to impairments decreased activity tolerance, impaired functional mobility, increased physical assistance for baseline mobility   Clinical Presentation  "Evolving/Changing   Clinical Presentation Rationale clinical judgement, pain, medical history, post operative status   Clinical Decision Making (Complexity) Moderate complexity   Therapy Frequency 2x/day   Predicted Duration of Therapy Intervention (days/wks) 1-2 days   Anticipated Equipment Needs at Discharge   (none)   Anticipated Discharge Disposition Home with Assist;Home with Home Therapy   Risk & Benefits of therapy have been explained Yes   Patient, Family & other staff in agreement with plan of care Yes   Clinical Impression Comments Patient with good post operative expectations, good support at home and tolerated mobilization well. Anticipate patient to make good progress towards discharge home tomorrow. Patient would benefit from continued skilled therapeutic intervention in order to progress them towards a higher level of function in accordance with their surgeon's protocol.   [facility] "[employer]-PAC TM \"6 Clicks\"   2016, Trustees of [facility], under license to [employer].  All rights reserved.   6 Clicks Short Forms Basic Mobility Inpatient Short Form   [facility] AM-PAC  \"6 Clicks\" V.2 Basic Mobility Inpatient Short Form   1. Turning from your back to your side while in a flat bed without using bedrails? 4 - None   2. Moving from lying on your back to sitting on the side of a flat bed without using bedrails? 4 - None   3. Moving to and from a bed to a chair (including a wheelchair)? 3 - A Little   4. Standing up from a chair using your arms (e.g., wheelchair, or bedside chair)? 4 - None   5. To walk in hospital room? 3 - A Little   6. Climbing 3-5 steps with a railing? 2 - A Lot   Basic Mobility Raw Score (Score out of 24.Lower scores equate to lower levels of function) 20   Total Evaluation Time   Total Evaluation Time (Minutes) 15     Thank you for your referral.  Carolina Brenner, PT, DPT, ATC    [facility]  O: [phone]  E: [email or web address]      "

## 2020-02-04 NOTE — OR NURSING
Transfer from  PACU to Room MED/SURG  Transferred to bed via AIR MAT     S: 63 y/o MALE  S/P LEFT TOTAL KNEE       Anesthesia Type:  GEN WITH A SPINAL       Surgeon:  Dr. PORRAS       Allergies:  See Medication Reconciliation Record       DNR: NO     B:  Pertinent Medical History:   Past Medical History:   Diagnosis Date     Calculus of kidney      CKD (chronic kidney disease) stage 2, GFR 60-89 ml/min 10/30/2015     Degeneration of lumbar or lumbosacral intervertebral disc      Depressive disorder      Depressive disorder, not elsewhere classified      Diabetes (H)      Diabetic eye exam (H) 02/06/12, 11/1/13     Diabetic eye exam (H) 12/17/14     Hyperlipidaemia      Hypertension      Hypothyroidism 1/17/2010     Obesity, Class I, BMI 30-34.9 10/30/2015     Primary osteoarthritis of left knee      Uncomplicated asthma                Surgical History:    Past Surgical History:   Procedure Laterality Date     COLONOSCOPY  02/02/09    Repeat in 5 yrs     COLONOSCOPY N/A 9/5/2014    Procedure: COMBINED COLONOSCOPY, SINGLE BIOPSY/POLYPECTOMY BY BIOPSY;  Surgeon: Denis Oakes MD;  Location:  GI     ESOPHAGOSCOPY, GASTROSCOPY, DUODENOSCOPY (EGD), COMBINED N/A 2/20/2015    Procedure: COMBINED ESOPHAGOSCOPY, GASTROSCOPY, DUODENOSCOPY (EGD), BIOPSY SINGLE OR MULTIPLE;  Surgeon: Alfred Young MD;  Location:  GI     HC REMV CATARACT EXTRACAP,INSERT LENS, W/O ECP  2000    Bilateral     HC REVISE MEDIAN N/CARPAL TUNNEL SURG  1991     HC TOOTH EXTRACTION W/FORCEP  1980's    [place] teeth removed     RELEASE CARPAL TUNNEL Right 6/16/2017    Procedure: RELEASE CARPAL TUNNEL;  Right carpal tunnel release;  Surgeon: Jason Porras DO;  Location: [facility]     RELEASE CARPAL TUNNEL Left 7/11/2017    Procedure: RELEASE CARPAL TUNNEL;  Left carpal tunnel release;  Surgeon: Jason Porras DO;  Location: [facility] OR     SOFT TISSUE SURGERY         A:  EBL: 10        IVF:  1600        UOP:  50        NPO:   ___Yes ___No         Vomiting:  ___Yes ___No         Drainage: NONE        Skin Integrity: INTACT EXCEPT INCISIONS       RFO: ___YesX__No (identify item if present)        SSI Patient?  __X_Yes___No (if yes, see checklist for actions)        Brace/sling/equipment:  _X__Yes___No KNEE IMMOBILIZER         See PACU record for ongoing assessment, vital signs and pain assessment.    R: Post-Op vitals and assessments as ordered/indicated per patient's condition.       Follow Post-Op orders and notify Physician prn.       Continue to involve patient/family in plan of care and discharge planning.       Reinforce Pre-Operative education.       Implement skin safety interventions as appropriate.

## 2020-02-04 NOTE — PROGRESS NOTES
S-(situation): Patient registered to Out pt in a bed. Patient arrived to room 256 via cart from PACU    B-(background): L knee replacement    A-(assessment): VSS. A & O x4. Denies pain. O2 98% on RA  Scheduled tylenol given. No c/o NV. LR at 100/hr. Knee immobilizer on. Ice pack to groin. Good oral intake. Tolerated regular diet snack. Due to void by 6 pm. CMS intact.     R-(recommendations): Orders and observation goals reviewed with patient and wife    Nursing Observation criteria listed below was met:    Skin issues/needs documented:Yes  Isolation needs addressed, if appropriate: NA  Fall Prevention: Education given and documented: Yes  Education Assessment documented:Yes  Education Documented (Pre-existing chronic infection such as, MRSA/VRE need education on admission): Yes  Medication Reconciliation Complete: Yes  New medication patient education completed and documented (Possible Side Effects of Common Medications handout): Yes  Home medications if not able to send immediately home with family stored here: NA  Reminder note placed in discharge instructions: NA  Patient has discharge needs (If yes, please explain): No

## 2020-02-04 NOTE — ANESTHESIA PROCEDURE NOTES
Peripheral nerve/Neuraxial procedure note : Adductor canal  Pre-Procedure  Performed by  Bandar Samano APRN CRNA   Location: post-op      Pre-Anesthestic Checklist: patient identified, IV checked, site marked, risks and benefits discussed, informed consent, monitors and equipment checked, pre-op evaluation, at physician/surgeon's request and post-op pain management    Timeout  Correct Patient: Yes   Correct Procedure: Yes   Correct Site: Yes   Correct Laterality: Yes   Correct Position: Yes   Site Marked: Yes   .   Procedure Documentation    Diagnosis:SP LEFT TKA.    Procedure: Adductor canal, left.   Patient Position:supine Local skin infiltrated with 1 mL of 1% lidocaine.    Ultrasound used to identify targeted nerve, plexus, or vascular marker and placed a needle adjacent to it., Ultrasound was used to visualize the spread of the anesthetic in close proximity to the above stated nerve. A permanent image is entered into the patient's record.  Patient Prep/Sterile Barriers; mask, sterile gloves, chlorhexidine gluconate and isopropyl alcohol.  .  Needle: insulated   Needle Gauge: 22.  Needle Length (millimeters) 100  Insertion Method: Single Shot.        Assessment/Narrative  Injection made incrementally with aspirations every 5 mL..  The placement was negative for: blood aspirated, painful injection and site bleeding.  Bolus given via needle..   Secured via.   Complications: none. Comments:  Left adductor canal block performed per MORENITA Luna under the direct observation / supervision of SANTA Samano CRNA.  No problems/complications were noted during the block.    Pt tolerated procedure well.  Patient's pain rated as 0/10.  Obvious effects remain in effect from spinal anesthetic.  No complications noted.  Will follow if needed.

## 2020-02-05 ENCOUNTER — APPOINTMENT (OUTPATIENT)
Dept: PHYSICAL THERAPY | Facility: CLINIC | Age: 65
End: 2020-02-05
Attending: ORTHOPAEDIC SURGERY
Payer: COMMERCIAL

## 2020-02-05 VITALS
RESPIRATION RATE: 20 BRPM | SYSTOLIC BLOOD PRESSURE: 149 MMHG | HEART RATE: 64 BPM | OXYGEN SATURATION: 98 % | DIASTOLIC BLOOD PRESSURE: 73 MMHG | TEMPERATURE: 97.9 F

## 2020-02-05 LAB
GLUCOSE BLDC GLUCOMTR-MCNC: 151 MG/DL (ref 70–99)
HGB BLD-MCNC: 13.7 G/DL (ref 13.3–17.7)

## 2020-02-05 PROCEDURE — 97110 THERAPEUTIC EXERCISES: CPT | Mod: GP | Performed by: PHYSICAL THERAPIST

## 2020-02-05 PROCEDURE — 97530 THERAPEUTIC ACTIVITIES: CPT | Mod: GP | Performed by: PHYSICAL THERAPIST

## 2020-02-05 PROCEDURE — 85018 HEMOGLOBIN: CPT | Performed by: ORTHOPAEDIC SURGERY

## 2020-02-05 PROCEDURE — 25000132 ZZH RX MED GY IP 250 OP 250 PS 637: Performed by: ORTHOPAEDIC SURGERY

## 2020-02-05 PROCEDURE — 25000132 ZZH RX MED GY IP 250 OP 250 PS 637: Performed by: NURSE PRACTITIONER

## 2020-02-05 PROCEDURE — 82962 GLUCOSE BLOOD TEST: CPT

## 2020-02-05 PROCEDURE — 97116 GAIT TRAINING THERAPY: CPT | Mod: GP | Performed by: PHYSICAL THERAPIST

## 2020-02-05 PROCEDURE — 36415 COLL VENOUS BLD VENIPUNCTURE: CPT | Performed by: ORTHOPAEDIC SURGERY

## 2020-02-05 RX ADMIN — OXYCODONE HYDROCHLORIDE 5 MG: 5 TABLET ORAL at 09:08

## 2020-02-05 RX ADMIN — ASPIRIN 325 MG: 325 TABLET, COATED ORAL at 08:09

## 2020-02-05 RX ADMIN — HYDROXYZINE HYDROCHLORIDE 25 MG: 25 TABLET, FILM COATED ORAL at 03:16

## 2020-02-05 RX ADMIN — METFORMIN HYDROCHLORIDE 500 MG: 500 TABLET ORAL at 08:09

## 2020-02-05 RX ADMIN — OXYCODONE HYDROCHLORIDE 5 MG: 5 TABLET ORAL at 05:21

## 2020-02-05 RX ADMIN — ACETAMINOPHEN 975 MG: 325 TABLET, FILM COATED ORAL at 03:16

## 2020-02-05 RX ADMIN — OXYCODONE HYDROCHLORIDE 5 MG: 5 TABLET ORAL at 10:36

## 2020-02-05 RX ADMIN — DOCUSATE SODIUM 100 MG: 100 CAPSULE, LIQUID FILLED ORAL at 08:09

## 2020-02-05 RX ADMIN — ACETAMINOPHEN 975 MG: 325 TABLET, FILM COATED ORAL at 10:36

## 2020-02-05 NOTE — PLAN OF CARE
Discharge Planner PT   Patient plan for discharge: Home with wife and HHPT  Current status: IND donning and doffing knee immobilizer with wife. IND supine to/from sitting EOB with knee immobilizer with bed at home environment set up. Sit to/from stand with 2WW and knee immobilizer MOD IND. Ambulated 20 ft MOD IND with walker and knee immobilizer. MOD IND standing balance to void at toilet with right weight shift, no LOB. Ambulated 250 ft with 2WW, knee immobilizer, MOD IND, no LOB, good mechanics and good knee control. Ascend/descend 12 stairs with IND pattern recall, step to method using hand rails per home environment. Completed post operative HEP in supine and short sitting, left knee AROM 0-85 degrees in supine, able to complete SLR flexion however encouraged to use brace through POD 3. Reviewed education with patient and wife throughout visit: ice, elevation, position at rest, HHPT, knee immobilizer, OP PT scheduled 2/20/20 with desired PT, activity instructions, car transfer, constipation prevention, HANK stockings.   Barriers to return to prior living situation: none  Recommendations for discharge: Home with 24/7 assistance, HHPT, walker from home  Rationale for recommendations: patient completed functional mobility greater than his home environment without physical assistance. Patient and wife with good recall of previous education and eager to progress his program. Anticipate patient to do well with discharge home. Patient would benefit from continued skilled therapeutic intervention in order to progress them towards a higher level of function in accordance with their surgeon's protocol.       Entered by: Carolina Brenner 02/05/2020 10:26 AM     Physical Therapy Discharge Summary    Reason for therapy discharge:    Discharged to home with home therapy.    Progress towards therapy goal(s). See goals on Care Plan in [facility] electronic health record for goal details.  Goals met    Therapy recommendation(s):     Continued therapy is recommended.  Rationale/Recommendations:  see above.    Thank you for your referral.  Carolina Brenner PT, DPT, ATC    [facility]  O: [phone]  E: [email or web address]

## 2020-02-05 NOTE — PROGRESS NOTES
S-(situation): Patient discharged to home via private car with wife.    B-(background): LTKA    A-(assessment): Patient is alert and oriented x4. Patient reports pain in left knee and states oxycodone is effective for pain relief. Patient is able to ambulate with minimal stand by assistance and walker/gaitbelt. Vital signs are stable on room air, afebrile.     R-(recommendations): Discharge instructions reviewed with patient and wife. Listed belongings gathered and returned to patient.          Discharge Nursing Criteria:     Care Plan and Patient education resolved: Yes    New Medications- pt has been educated about purpose and side effects: Yes    Vaccines  Influenza status verified at discharge:  Yes    MISC  Prescriptions if needed, hard copies sent with patient  NA  Home and hospital aquired medications returned to patient: NA  Medication Bin checked and emptied on discharge Yes  Patient reports post-discharge pain management plan is effective: Yes    TKA/PAULA ONLY:   Discharged with HANK Stockings Yes  HANK stocking Education Completed Yes

## 2020-02-05 NOTE — DISCHARGE INSTRUCTIONS
Total Knee Replacement Discharge Instructions                                     [phone]  Bone and Joint Service Line for issues or concerns    General Care:  After surgery you may feel tired/sleepy. This is normal. If you have any question along the way please contact the office. If you feel it is an issue cannot wait for normal office hours, contact the 24 hour bone and joint line at [phone]. You should not drive or operate machines/equipment until released by your physician to do so.     Wound Care:  Keep incision covered with hospital dressing (Aquacel) for one week. It is okay to shower with this dressing on. However, do not submerge your dressing and incision in water for roughly 2 weeks. After one week remove this dressing and then keep it clean, dry, and covered. If this dressing become looses or falls off it is ok to cover with gauze and tape.  Wash the incision gently with warm soapy water after removing your hospital dressing and lightly pat dry.       Diet:  Start with non-alcoholic liquids at first, particularly water or sports drinks after surgery. Progress to bland foods such as crackers and bread and finally to your normal diet if you have no problems. Avoid alcohol when taking narcotic pain medications.      Pain control:  Take your pain medications as prescribed. These medications may make you sleepy. Do not drive, operate equipment, or drink alcohol when taking these.  You may take Tylenol (Generic name is acetaminophen) as directed on the bottle for additional relief or in place of the prescribed pain medications as your pain gets better. Do not take any other NSAIDs (Motrin, Ibuprofen, Aleve, Naproxen) while taking the blood thinner. If the medications cause a reaction such as nausea or skin rash, stop taking them and contact your doctor. Please plan accordingly, pain medications will not be re-filled on the weekends or at night. Call the office during the day if you need more  medications.    Blood thinner:  It is very important to take it as prescribed. It is a medication to help prevent blood clots in your legs or lungs. No medication is perfect, so if you notice a sudden onset of pain/swelling in your calf area call your doctor. If you notice a sudden onset of troubles breathing and/or chest pain call 911.     Swelling (edema) control:  Preventing swelling (edema) in your legs after surgery is very important. It is helpful for achieving optimal range of motion as well as preventing blood clots.  It starts with simple things such as elevation of your legs and icing. Elevate your legs above your heart.    Do this for 20 minutes every couple hours the first few days after surgery. We also recommend HANK hose (compression hose) to be worn. Wear on both legs during the day. You may remove at night. Wear these until directed to stop.      Icing:  It is common for some swelling, aching and stiffness to occur for up to 6-9 months after a knee replacement. If you knee swells, get off your feet, elevate your leg and apply some ice packs. Apply for 20 minutes at a time. For the first 1-2 weeks apply ice 2-3 x day or more after therapy.    Walker/crutches:  Use a walker/crutches when you go home. You will transition to the use of a cane and finally to no additional support.     Braces:  You will go home with a knee immobilizer. You can take it off when you are lying or sitting down. Wear it when you are walking. This immobilizer can come off after you are doing a straight leg raise. Your physician and or physical therapist will help to determine when to stop wearing it.     Physical Therapy:  The success of your knee replacement is based on doing physical therapy. You will have some pain and discomfort along the way. If you feel your pain is limiting your progress make sure to take some pain medication prior to your therapy session. If you pain medications are not working talk to your surgeon.    For the first 2 weeks after going home you will have in-home physical therapy. The goal is to work on range of motion. While it is important to working on bending your knee, it is equally important to make sure you knee comes out all the way straight. To assist in this do not place any bumps, pillows and or blankets under your knee when you are lying down. You should have an outpatient physical therapy appointment scheduled for about 2 weeks after surgery.     Activity:  Unless otherwise instructed, you can weight bear as tolerated. While laying or sitting down you should straighten your knee all the way out and then gently work on bending the knee back. Do not worry at first if your knee feels stiff and will not bend like normal, this will get better. Never put a pillow or bump under you knee, instead put a pillow under your ankle so your knee will straighten out all the way.     Normal findings after surgery:  Numbness and tenderness around the incisions and to the outside of the incision is normal.  You may have bruising around the incisions and down the lower leg.   Your knee will be swollen for months after surgery. It will feel  tight  to move.   Low grade fevers less than 100.5 degrees Fahrenheit are normal.   You may have some minor swelling in the leg/calf area.  You will have some increased pain after your therapy sessions.     When to call the Office:  Temperature greater than 101.5 degrees Fahreheit.  Fever, chills, and increasing pain in the knee.  Excessive drainage from the incisions that include bright red blood.  Drainage from the incisions sites that appear yellow, pus-like, or foul smelling.  Increasing pain the knee not relieved by the prescribed pain medications or ice.  Persistent nausea or vomiting not helped by the Zofran.  Increased pain or swelling in your calf area (in back above your ankle) that wasn t there when in the hospital.  Any other effects you feel are significant.  Call 911 if  you experience any chest pain and/or shortness or breath.

## 2020-02-05 NOTE — PROGRESS NOTES
[facility]  Orthopedics Progress Note           Assessment and Plan:    Assessment:   Post-operative day #1 Procedure(s):  left total knee replacement        Plan:   Encourage IS  Start or continue physical therapy  Activity as tolerated  Weight-bear as tolerated.  Discharge from hospital today.  Pain control measures  Advance diet as tolerated  Routine wound care  DVT Prophylaxis: Aspirin , SCD's, Compression Hose  No acute medical issues.            Interval History:   Doing well.  Continues to improve. Mild pain controlled with oral medications. No fevers.  Ambulating, voiding, tolerating oral intake. Expresses desire for discharge. Has no concerns, questions, or issues. Mildly HTN, has hx of this. Holding ARB for 24 hours post-op. Can resume at home. Asymptomatic with this.           Review of Systems:    The patient denies any chest pain, shortness of breath, excessive pain, fever, chills, purulent drainage from the wound, nausea or vomiting.               Physical Exam:   General: awake, alert, appropriate and in no acute distress  Blood pressure (!) 149/73, pulse 64, temperature 97.9  F (36.6  C), temperature source Oral, resp. rate 20, SpO2 98 %.  Left knee:  Dressing clean, dry and intact. Surrounding skin intact, no breakdown. Calf is soft, nontender with no significant swelling. Distal neurovascular grossly intact.  Compartments soft and non-tender.  2+ distal pulse, sensation intact to foot, able to df/pf against resistance. Brisk cap refill.            Data:     Results for orders placed or performed during the hospital encounter of 02/04/20 (from the past 24 hour(s))   POC US GUIDANCE NEEDLE PLACEMENT    Impression    Needle placement for left adductor canal block   XR Knee Port Left 1/2 Views    Narrative    KNEE PORTABLE LEFT 1/2 VIEW(S)   2/4/2020 9:54 AM     HISTORY: Post-op total knee.    COMPARISON: None.      Impression    IMPRESSION: Single AP view demonstrates a total knee  arthroplasty in  place without evidence of complication.    LORENZO CARIAS MD   Glucose by meter   Result Value Ref Range    Glucose 90 70 - 99 mg/dL   Glucose by meter   Result Value Ref Range    Glucose 193 (H) 70 - 99 mg/dL   Hemoglobin   Result Value Ref Range    Hemoglobin 13.7 13.3 - 17.7 g/dL     SYLVIA Stevens, CNP  Orthopedic Surgery

## 2020-02-05 NOTE — CONSULTS
CARE TRANSITION SOCIAL WORK INITIAL ASSESSMENT:      Met with: Patient and Family.    DATA  Principal Problem:    Primary osteoarthritis of left knee  Active Problems:    S/P total knee replacement using cement, left        ASSESSMENT  Cognitive Status: awake, alert and oriented.       Resources List: Home Care       Insurance Concerns: No Insurance issues identified  Living Arrangements: house    This writer met with pt and his wife, introduced self and role. Discussed discharge planning and medicare guidelines in regards to home care and SNF benefits. Pt/family was given the Medicare Compare list for Home Care, with associated star ratings to assist with choice for referrals/discharge planning Yes    Education was given to pt/family that star ratings are updated/maintained by Medicare and can be reviewed by visiting www.medicare.gov Yes    [facility] Phone: [phone] for P/T.    PLAN    [facility] P/T    LYDIA Epps  [facility] [phone]/ [facility] [phone]

## 2020-02-05 NOTE — PROGRESS NOTES
Name: Joel Pike    MRN#: [id number]    Reason for Hospitalization: Primary osteoarthritis of left knee [M17.12]    Discharge Date: 2/5/2020    Patient / Family response to discharge plan: in agreement    Other Providers (Care Coordinator, County Services, PCA services etc): No    CTS Hand Off Completed: Not needed    PAS #: N/A    MELINA Score: N/A    Future Appointments:   Future Appointments   Date Time Provider Department Center   2/5/2020  9:15 AM Carolina Brenner, PT PHPT [facility]   2/5/2020  1:00 PM [facility]   2/19/2020 11:00 AM Jason Berman,  [facility]   2/20/2020  1:45 PM Huy Echavarria, PT PHPT [facility]   2/24/2020 10:30 AM Cayden Ramsey MD [facility]       Discharge Disposition: home    Discharge Planner   Discharge Plans in progress: [facility] P/T  Barriers to discharge plan: None  Follow up plan: Ortho    LYDIA Epps  [facility] [phone]/ [facility] [phone]         Entered by: Kristi Renee 02/05/2020 9:09 AM

## 2020-02-05 NOTE — ANESTHESIA POSTPROCEDURE EVALUATION
Patient: Joel Pike    Procedure(s):  left total knee replacement    Diagnosis:Primary osteoarthritis of left knee [M17.12]  Diagnosis Additional Information: No value filed.    Anesthesia Type:  MAC, Spinal, Peripheral Nerve Block for post-op pain at surgeon's request    Note:  Anesthesia Post Evaluation    Patient participation: Able to fully participate in evaluation  Level of consciousness: awake and alert  Pain management: adequate  Airway patency: patent  Respiratory status: acceptable and room air  Hydration status: acceptable  PONV: none     Anesthetic complications: None (Call to home)    Comments:  Patient was happy with the anesthesia care received and no anesthesia related complications were noted. Patient stated that his pain has been well controlled and has denied nausea postoperatively. I will follow up with the patient again if it is needed.        Last vitals:  Vitals:    02/04/20 2337 02/05/20 0317 02/05/20 0655   BP: 133/68 (!) 159/80 (!) 149/73   Pulse: 68 59 64   Resp: 18 12 20   Temp:  97.1  F (36.2  C) 97.9  F (36.6  C)   SpO2: 98% 98% 98%         Electronically Signed By: SYLVIA Barber CRNA  February 5, 2020  2:36 PM

## 2020-02-05 NOTE — DISCHARGE SUMMARY
[facility] Discharge Summary     Joel Pike MRN# [id number]   YOB: 1955 Age: 64 year old     Date of Admission:  2/4/2020  Date of Discharge:  2/5/2020 11:10 AM  Admitting Physician:  Jason Berman DO  Discharge Physician:  SYLVIA Rodriges CNP  Discharging Service:  Orthopedics     Primary Provider: Cayden Ramsey          Admission Diagnoses:   Primary osteoarthritis of left knee [M17.12]          Discharge Diagnosis:     Principal Problem:    Primary osteoarthritis of left knee  Active Problems:    S/P total knee replacement using cement, left               Discharge Disposition:     Discharged to home           Condition on Discharge:     Discharge condition: Stable   Discharge vitals: Blood pressure (!) 149/73, pulse 64, temperature 97.9  F (36.6  C), temperature source Oral, resp. rate 20, SpO2 98 %.   Code status on discharge: Full Code           Procedures / Labs / Imaging:   No procedures performed during this admission          Medications Prior to Admission:     Medications Prior to Admission   Medication Sig Dispense Refill Last Dose     blood glucose (HUGO CONTOUR) test strip Use to test blood sugars 1 time daily or as directed. 1 Box 5 2/3/2020 at Unknown time     blood glucose (NO BRAND SPECIFIED) lancets standard Use to test blood sugar one time daily or as directed. 100 each 3 2/3/2020 at Unknown time     blood glucose calibration (HUGO CONTOUR) NORMAL solution Use to calibrate blood glucose monitor as directed. 1 each 3 2/3/2020 at Unknown time     buPROPion (WELLBUTRIN XL) 300 MG 24 hr tablet Take 1 tablet (300 mg) by mouth every morning 90 tablet 3 2/3/2020 at Unknown time     cholecalciferol (VITAMIN D3) 5000 UNITS TABS tablet Take 1 tablet (5,000 Units) by mouth   2/3/2020 at Unknown time     Coenzyme Q-10 capsule Take 1 capsule by mouth daily. 30 capsule 12 2/3/2020 at Unknown time     ezetimibe (ZETIA) 10 MG tablet TAKE ONE TABLET BY MOUTH  ONCE DAILY 30 tablet 4 2/3/2020 at Unknown time     fish oil-omega-3 fatty acids 1000 MG capsule Take  by mouth. Take daily   180 capsule 12 Past Week at Unknown time     levothyroxine (SYNTHROID/LEVOTHROID) 50 MCG tablet TAKE ONE AND ONE-HALF TABLETS BY MOUTH DAILY 135 tablet 3 2/3/2020 at Unknown time     losartan (COZAAR) 50 MG tablet TAKE ONE TABLET BY MOUTH EVERY DAY 90 tablet 3 2/3/2020 at Unknown time     Magnesium 500 MG CAPS One twice a day 30 capsule  2/3/2020 at Unknown time     metFORMIN (GLUCOPHAGE) 500 MG tablet TAKE ONE TABLET BY MOUTH TWICE A DAY WITH MEALS 180 tablet 3 2/3/2020 at Unknown time     Misc Natural Products (OSTEO BI-FLEX ADV JOINT SHIELD) TABS Take  by mouth.   2/3/2020 at Unknown time     Multiple Vitamins-Minerals CAPS Take  by mouth.   2/3/2020 at Unknown time     multivitamin (OCUVITE) TABS tablet Take 1 tablet by mouth daily 30 each 0 2/3/2020 at Unknown time     order for DME Equipment ordered: RESMED Auto PAP Mask type: Nasal Settings: 8-14 CM H2O   2/3/2020 at Unknown time     STATIN NOT PRESCRIBED, INTENTIONAL, 1 each 8 times daily Please choose reason not prescribed, below   2/3/2020 at Unknown time     albuterol (PROAIR HFA) 108 (90 BASE) MCG/ACT Inhaler 1-2 puffs every 2 -4 hrs as needed 1 Inhaler 5 Unknown at Unknown time     esomeprazole (NEXIUM) 40 MG DR capsule TAKE 1 CAPSULE EVERY MORNING BEFORE BREAKFAST, 30 TO 60 MINUTES BEFORE EATING. 90 capsule 3 Unknown at Unknown time     fexofenadine-pseudoePHEDrine (ALLEGRA-D 24) 180-240 MG 24 hr tablet Take 1 tablet by mouth daily 90 tablet 0 More than a month at Unknown time     sucralfate (CARAFATE) 1 GM/10ML suspension Take 10 mLs (1 g) by mouth 4 times daily 420 mL 1 More than a month at Unknown time     [DISCONTINUED] aspirin 325 MG EC tablet One a day 100 tablet 3 Past Week at Unknown time             Discharge Medications:     Current Discharge Medication List      START taking these medications    Details    acetaminophen (TYLENOL) 325 MG tablet Take 3 tablets (975 mg) by mouth every 8 hours as needed for mild pain  Qty: 100 tablet, Refills: 1    Associated Diagnoses: S/P total knee replacement using cement, left      docusate sodium (COLACE) 100 MG capsule Take 1 capsule (100 mg) by mouth 2 times daily as needed for constipation  Qty: 60 capsule, Refills: 1    Associated Diagnoses: S/P total knee replacement using cement, left      hydrOXYzine (ATARAX) 25 MG tablet Take 1 tablet (25 mg) by mouth every 6 hours as needed for itching or other (adjuvant pain)  Qty: 40 tablet, Refills: 1    Associated Diagnoses: S/P total knee replacement using cement, left      ondansetron (ZOFRAN-ODT) 4 MG ODT tab Take 1 tablet (4 mg) by mouth every 8 hours as needed for nausea or vomiting  Qty: 10 tablet, Refills: 1    Associated Diagnoses: S/P total knee replacement using cement, left      oxyCODONE (ROXICODONE) 5 MG tablet Take 1-2 tablets (5-10 mg) by mouth every 3 hours as needed for moderate to severe pain  Qty: 50 tablet, Refills: 0    Associated Diagnoses: S/P total knee replacement using cement, left      tiZANidine (ZANAFLEX) 4 MG capsule Take 1 capsule (4 mg) by mouth 3 times daily as needed for muscle spasms  Qty: 45 capsule, Refills: 1    Associated Diagnoses: S/P total knee replacement using cement, left         CONTINUE these medications which have CHANGED    Details   aspirin (ASA) 325 MG EC tablet Take 1 tablet (325 mg) by mouth 2 times daily  Qty: 84 tablet, Refills: 0    Associated Diagnoses: S/P total knee replacement using cement, left         CONTINUE these medications which have NOT CHANGED    Details   blood glucose (HUGO CONTOUR) test strip Use to test blood sugars 1 time daily or as directed.  Qty: 1 Box, Refills: 5    Associated Diagnoses: Type 2 diabetes mellitus with stage 2 chronic kidney disease, without long-term current use of insulin (H)      blood glucose (NO BRAND SPECIFIED) lancets standard Use to  test blood sugar one time daily or as directed.  Qty: 100 each, Refills: 3    Comments: Profile Rx: patient will contact pharmacy when needed  Associated Diagnoses: Type 2 diabetes mellitus with stage 2 chronic kidney disease, without long-term current use of insulin (H)      blood glucose calibration (HUGO CONTOUR) NORMAL solution Use to calibrate blood glucose monitor as directed.  Qty: 1 each, Refills: 3    Comments: Profile Rx: patient will contact pharmacy when needed  Associated Diagnoses: Type 2 diabetes mellitus with stage 2 chronic kidney disease, without long-term current use of insulin (H)      buPROPion (WELLBUTRIN XL) 300 MG 24 hr tablet Take 1 tablet (300 mg) by mouth every morning  Qty: 90 tablet, Refills: 3    Comments: Note dose increase  Associated Diagnoses: Recurrent major depression in complete remission (H)      cholecalciferol (VITAMIN D3) 5000 UNITS TABS tablet Take 1 tablet (5,000 Units) by mouth      Coenzyme Q-10 capsule Take 1 capsule by mouth daily.  Qty: 30 capsule, Refills: 12      ezetimibe (ZETIA) 10 MG tablet TAKE ONE TABLET BY MOUTH ONCE DAILY  Qty: 30 tablet, Refills: 4    Associated Diagnoses: Type 2 diabetes mellitus with stage 2 chronic kidney disease, without long-term current use of insulin (H); Hyperlipidemia LDL goal <100      fish oil-omega-3 fatty acids 1000 MG capsule Take  by mouth. Take daily    Qty: 180 capsule, Refills: 12      levothyroxine (SYNTHROID/LEVOTHROID) 50 MCG tablet TAKE ONE AND ONE-HALF TABLETS BY MOUTH DAILY  Qty: 135 tablet, Refills: 3    Associated Diagnoses: Subclinical hypothyroidism      losartan (COZAAR) 50 MG tablet TAKE ONE TABLET BY MOUTH EVERY DAY  Qty: 90 tablet, Refills: 3    Associated Diagnoses: Type 2 diabetes mellitus with stage 2 chronic kidney disease, without long-term current use of insulin (H)      Magnesium 500 MG CAPS One twice a day  Qty: 30 capsule      metFORMIN (GLUCOPHAGE) 500 MG tablet TAKE ONE TABLET BY MOUTH TWICE A DAY  WITH MEALS  Qty: 180 tablet, Refills: 3    Associated Diagnoses: Type 2 diabetes mellitus with stage 2 chronic kidney disease, without long-term current use of insulin (H)      Misc Natural Products (OSTEO BI-FLEX ADV JOINT SHIELD) TABS Take  by mouth.      Multiple Vitamins-Minerals CAPS Take  by mouth.      multivitamin (OCUVITE) TABS tablet Take 1 tablet by mouth daily  Qty: 30 each, Refills: 0      order for DME Equipment ordered: RESMED Auto PAP Mask type: Nasal Settings: 8-14 CM H2O      STATIN NOT PRESCRIBED, INTENTIONAL, 1 each 8 times daily Please choose reason not prescribed, below    Associated Diagnoses: Type 2 diabetes mellitus with stage 2 chronic kidney disease, without long-term current use of insulin (H)      albuterol (PROAIR HFA) 108 (90 BASE) MCG/ACT Inhaler 1-2 puffs every 2 -4 hrs as needed  Qty: 1 Inhaler, Refills: 5    Associated Diagnoses: Gastroesophageal reflux disease with esophagitis      esomeprazole (NEXIUM) 40 MG DR capsule TAKE 1 CAPSULE EVERY MORNING BEFORE BREAKFAST, 30 TO 60 MINUTES BEFORE EATING.  Qty: 90 capsule, Refills: 3    Comments: If able but want to replace current PPI with this one which works better for him. When he requests omeprazole next time.  Associated Diagnoses: Gastroesophageal reflux disease with esophagitis      fexofenadine-pseudoePHEDrine (ALLEGRA-D 24) 180-240 MG 24 hr tablet Take 1 tablet by mouth daily  Qty: 90 tablet, Refills: 0    Associated Diagnoses: Chronic rhinitis      sucralfate (CARAFATE) 1 GM/10ML suspension Take 10 mLs (1 g) by mouth 4 times daily  Qty: 420 mL, Refills: 1    Associated Diagnoses: Gastroesophageal reflux disease with esophagitis                   Consultations:     No consultations were requested during this admission             Brief History of Illness:   See operative report          Hospital Course:     Patient admitted after routine elective surgery.  Patient discharge POD1 without incident.  By day of discharge discharge:  Did well. Per patient felt good.    Continued to improve.  Pain was well-controlled with oral medications.  No fevers.   denied N/v. tolerated oral intake.   [place] had been discontinued in recovery room and had voiding adequate.  Per patient PT went well. Patient expressed desire to discharge this day.  Has family at home to help.  No concerns, questions, or new issues.  Therapy oked for discharge home with family support.                    Significant Results:     None             Pending Results:     None           Discharge Instructions and Follow-Up:     Discharge diet: Diabetic (2000 ADA)   Discharge activity: Activity as tolerated   Discharge follow-up: 14 days with Dr. Berman   Effected Extremity Left leg       Weight Bearing status Weight bearing as tolerated       Lines and drains: None    Wound care: Keep incision covered with hospital dressing (Aquacel) for one week. It is okay to shower with this dressing on. However, do not submerge your dressing and incision in water for roughly 2 weeks. After one week remove this dressing and then keep it clean, dry, and covered. If this dressing become looses or falls off it is ok to cover with gauze and tape.  Wash the incision gently with warm soapy water after removing your hospital dressing and lightly pat dry.            SYLVIA Stevens, CNP  Orthopedic Surgery

## 2020-02-05 NOTE — PROGRESS NOTES
Patient vital signs are at baseline: Yes  Patient able to ambulate as they were prior to admission or with assist devices provided by therapies during their stay:  Yes with walker   Patient MUST void prior to discharge:  Yes  Patient able to tolerate oral intake:  Yes  Pain has adequate pain control using Oral analgesics:  Yes

## 2020-02-05 NOTE — PLAN OF CARE
S-(situation): End of shift note    B-(background): Left knee replacement    A-(assessment): Vital signs stable. BP (!) 159/80 (BP Location: Right arm)   Pulse 59   Temp 97.1  F (36.2  C) (Oral)   Resp 12   SpO2 98% .  Afebrile. Lung sounds CTA B/L. CPAP used at night for sleeping.   A&O Pt rated knee pain 3/10. Controlled well with 5 mg oxycodone x 1 per MAR and Atarax x 1. Ambulating with SBA, walker and gait belt. Steady on feet and using proper mechanics. UTV incision, ace wrapped, CDI. CMS intact.  Voiding adequately.  Able to make needs known and uses call light appropriately.     R-(recommendations): Continue to monitor per care plan.

## 2020-02-19 ENCOUNTER — ANCILLARY PROCEDURE (OUTPATIENT)
Dept: GENERAL RADIOLOGY | Facility: CLINIC | Age: 65
End: 2020-02-19
Attending: ORTHOPAEDIC SURGERY
Payer: COMMERCIAL

## 2020-02-19 ENCOUNTER — OFFICE VISIT (OUTPATIENT)
Dept: ORTHOPEDICS | Facility: CLINIC | Age: 65
End: 2020-02-19
Payer: COMMERCIAL

## 2020-02-19 VITALS
BODY MASS INDEX: 31.74 KG/M2 | WEIGHT: 226.7 LBS | SYSTOLIC BLOOD PRESSURE: 153 MMHG | HEIGHT: 71 IN | DIASTOLIC BLOOD PRESSURE: 88 MMHG

## 2020-02-19 DIAGNOSIS — Z96.652 S/P TOTAL KNEE REPLACEMENT USING CEMENT, LEFT: Primary | ICD-10-CM

## 2020-02-19 DIAGNOSIS — Z96.652 S/P TOTAL KNEE REPLACEMENT USING CEMENT, LEFT: ICD-10-CM

## 2020-02-19 PROCEDURE — 73562 X-RAY EXAM OF KNEE 3: CPT | Mod: TC

## 2020-02-19 PROCEDURE — 99024 POSTOP FOLLOW-UP VISIT: CPT | Performed by: NURSE PRACTITIONER

## 2020-02-19 RX ORDER — TIZANIDINE HYDROCHLORIDE 4 MG/1
4 CAPSULE, GELATIN COATED ORAL 3 TIMES DAILY PRN
Qty: 45 CAPSULE | Refills: 1 | Status: SHIPPED | OUTPATIENT
Start: 2020-02-19 | End: 2020-11-10

## 2020-02-19 RX ORDER — OXYCODONE HYDROCHLORIDE 5 MG/1
5-10 TABLET ORAL EVERY 6 HOURS PRN
Qty: 25 TABLET | Refills: 0 | Status: SHIPPED | OUTPATIENT
Start: 2020-02-19 | End: 2020-10-29

## 2020-02-19 ASSESSMENT — PAIN SCALES - GENERAL: PAINLEVEL: MODERATE PAIN (4)

## 2020-02-19 ASSESSMENT — MIFFLIN-ST. JEOR: SCORE: 1837.49

## 2020-02-19 NOTE — LETTER
"    2/19/2020         RE: Joel Pike  [address]  [place] [facility] [address]        Dear Colleague,    Thank you for referring your patient, Joel Pike, to the [facility]. Please see a copy of my visit note below.    Joel to follow up with Primary Care provider regarding elevated blood pressure.    Orthopedic Clinic Post-Operative Note    CHIEF COMPLAINT:   Chief Complaint   Patient presents with     Surgical Followup     DOS: 2/4/2020~Left total knee arthroplasty~15 days postop       HISTORY OF PRESENT ILLNESS  Returns 2 weeks post-op. States feels the knee is already much improved from pre-op. \"night and day difference\". Taking very occasional oxycodone, would like a refill.  Taking tylenol, zanaflex, atarax more regularly.  Starting outpatient physical therapy next week, has finished with home therapy. No incision concerns. No new injuries. No new symptoms. Ambulating improving daily. Using a walking stick for ambulation only. Taking aspirin and using TEDs.     Patient's past medical, surgical, social and family histories reviewed.     Past Medical History:   Diagnosis Date     Calculus of kidney      CKD (chronic kidney disease) stage 2, GFR 60-89 ml/min 10/30/2015     Degeneration of lumbar or lumbosacral intervertebral disc      Depressive disorder      Depressive disorder, not elsewhere classified      Diabetes (H)      Diabetic eye exam (H) 02/06/12, 11/1/13     Diabetic eye exam (H) 12/17/14     Hyperlipidaemia      Hypertension      Hypothyroidism 1/17/2010     Obesity, Class I, BMI 30-34.9 10/30/2015     Primary osteoarthritis of left knee      Uncomplicated asthma        Past Surgical History:   Procedure Laterality Date     ARTHROPLASTY KNEE Left 2/4/2020    Procedure: left total knee replacement;  Surgeon: Jason Berman DO;  Location: [facility] OR     COLONOSCOPY  02/02/09    Repeat in 5 yrs     COLONOSCOPY N/A 9/5/2014    Procedure: COMBINED COLONOSCOPY, SINGLE " BIOPSY/POLYPECTOMY BY BIOPSY;  Surgeon: Denis Oakes MD;  Location: [facility] GI     ESOPHAGOSCOPY, GASTROSCOPY, DUODENOSCOPY (EGD), COMBINED N/A 2/20/2015    Procedure: COMBINED ESOPHAGOSCOPY, GASTROSCOPY, DUODENOSCOPY (EGD), BIOPSY SINGLE OR MULTIPLE;  Surgeon: Alfred Young MD;  Location: [facility] GI     HC REMV CATARACT EXTRACAP,INSERT LENS, W/O ECP  2000    Bilateral     HC REVISE MEDIAN N/CARPAL TUNNEL SURG  1991     HC TOOTH EXTRACTION W/FORCEP  1980's    [place] teeth removed     RELEASE CARPAL TUNNEL Right 6/16/2017    Procedure: RELEASE CARPAL TUNNEL;  Right carpal tunnel release;  Surgeon: Jason Berman DO;  Location: [facility] OR     RELEASE CARPAL TUNNEL Left 7/11/2017    Procedure: RELEASE CARPAL TUNNEL;  Left carpal tunnel release;  Surgeon: Jason Berman DO;  Location: [facility] OR     SOFT TISSUE SURGERY         Medications:  aspirin (ASA) 325 MG EC tablet, Take 1 tablet (325 mg) by mouth 2 times daily  buPROPion (WELLBUTRIN XL) 300 MG 24 hr tablet, Take 1 tablet (300 mg) by mouth every morning  esomeprazole (NEXIUM) 40 MG DR capsule, TAKE 1 CAPSULE EVERY MORNING BEFORE BREAKFAST, 30 TO 60 MINUTES BEFORE EATING.  levothyroxine (SYNTHROID/LEVOTHROID) 50 MCG tablet, TAKE ONE AND ONE-HALF TABLETS BY MOUTH DAILY  losartan (COZAAR) 50 MG tablet, TAKE ONE TABLET BY MOUTH EVERY DAY  acetaminophen (TYLENOL) 325 MG tablet, Take 3 tablets (975 mg) by mouth every 8 hours as needed for mild pain  albuterol (PROAIR HFA) 108 (90 BASE) MCG/ACT Inhaler, 1-2 puffs every 2 -4 hrs as needed  blood glucose (HUGO CONTOUR) test strip, Use to test blood sugars 1 time daily or as directed.  blood glucose (NO BRAND SPECIFIED) lancets standard, Use to test blood sugar one time daily or as directed.  blood glucose calibration (HUGO CONTOUR) NORMAL solution, Use to calibrate blood glucose monitor as directed.  cholecalciferol (VITAMIN D3) 5000 UNITS TABS tablet, Take 1 tablet (5,000 Units) by mouth  Coenzyme Q-10  capsule, Take 1 capsule by mouth daily.  docusate sodium (COLACE) 100 MG capsule, Take 1 capsule (100 mg) by mouth 2 times daily as needed for constipation  ezetimibe (ZETIA) 10 MG tablet, TAKE ONE TABLET BY MOUTH ONCE DAILY  fexofenadine-pseudoePHEDrine (ALLEGRA-D 24) 180-240 MG 24 hr tablet, Take 1 tablet by mouth daily  fish oil-omega-3 fatty acids 1000 MG capsule, Take  by mouth. Take daily    hydrOXYzine (ATARAX) 25 MG tablet, Take 1 tablet (25 mg) by mouth every 6 hours as needed for itching or other (adjuvant pain)  Magnesium 500 MG CAPS, One twice a day  metFORMIN (GLUCOPHAGE) 500 MG tablet, TAKE ONE TABLET BY MOUTH TWICE A DAY WITH MEALS  Misc Natural Products (OSTEO BI-FLEX ADV JOINT SHIELD) TABS, Take  by mouth.  Multiple Vitamins-Minerals CAPS, Take  by mouth.  multivitamin (OCUVITE) TABS tablet, Take 1 tablet by mouth daily  ondansetron (ZOFRAN-ODT) 4 MG ODT tab, Take 1 tablet (4 mg) by mouth every 8 hours as needed for nausea or vomiting (Patient not taking: Reported on 2/19/2020)  order for DME, Equipment ordered: RESMED Auto PAP Mask type: Nasal Settings: 8-14 CM H2O  STATIN NOT PRESCRIBED, INTENTIONAL,, 1 each 8 times daily Please choose reason not prescribed, below  sucralfate (CARAFATE) 1 GM/10ML suspension, Take 10 mLs (1 g) by mouth 4 times daily    No current facility-administered medications on file prior to visit.       Allergies   Allergen Reactions     Dust Mites Cough     Penicillins Rash and Hives       Social History     Occupational History     Occupation:      Employer: [employer]   Tobacco Use     Smoking status: Never Smoker     Smokeless tobacco: Never Used   Substance and Sexual Activity     Alcohol use: No     Alcohol/week: 0.0 standard drinks     Drug use: No     Sexual activity: Not Currently     Partners: Female       Family History   Problem Relation Age of Onset     Hypertension Mother      Hypertension Father      Diabetes Father         Adult      "Heart Disease Father         Hx: Bypass     Cancer Sister         Liver     Thyroid Disease Brother      Thyroid Disease Brother        REVIEW OF SYSTEMS  General: negative for, night sweats, dizziness, fatigue  Resp: No shortness of breath and no cough  CV: negative for chest pain, syncope or near-syncope  GI: negative for nausea, vomiting and diarrhea  : negative for dysuria and hematuria  Musculoskeletal: as above  Neurologic: negative for syncope   Hematologic: negative for bleeding disorder    Physical Exam:  Vitals: BP (!) 153/88   Ht 1.791 m (5' 10.5\")   Wt 102.8 kg (226 lb 11.2 oz)   BMI 32.07 kg/m     BMI= Body mass index is 32.07 kg/m .  Constitutional: healthy, alert and no acute distress   Psychiatric: mentation appears normal and affect normal/bright  NEURO: no focal deficits  SKIN: .healing well, well approximated skin edges, without signs of infection including no erythema, incision breakdown or purlent drainage  JOINT/EXTREMITIES: mild expected post-op swelling to the knee. None to the lower extremity otherwise. AROM 5-100, PROM 0-105. Extensor intact. Good quad tone. No instability. Calf soft non-tender. Distal neurovascular grossly intact.   GAIT: with assistive device (walking stick)    Diagnostic Modalities:  left knee X-ray: The prosthesis has acceptable alignment. No fractures or dislocations. Prosthesis is well seated with no evidence of loosening. Small lucency proximal to the prosthesis to the femur. Probable postop gas.   Independent visualization of the images was performed.      Impression:   Chief Complaint   Patient presents with     Surgical Followup     DOS: 2/4/2020~Left total knee arthroplasty~15 days postop   Recovering well     Plan:   Activity: starting with outpatient physical therapy, continue to increase activity as tolerated. Continue to work on ROM.  Did refill oxycodone, to use as needed, will probably not need further refills.  Refilled zanaflex. Continue 325mg BID " aspirin for another 4 weeks. Continue TEDs for another 4 weeks, new Rx provided.   Staples/Sutures out: Not applicable. monocryl tail to the distal pole of the incision clipped to skin. exofin still in place.   Rest, Ice, Elevation and compression (with HANK)  Return to clinic 4, or sooner as needed for changes.    Re-x-ray on return: SYLVIA Thorne, CNP  Orthopedic Surgery        Again, thank you for allowing me to participate in the care of your patient.        Sincerely,        Jason Berman, DO

## 2020-02-19 NOTE — PROGRESS NOTES
"Orthopedic Clinic Post-Operative Note    CHIEF COMPLAINT:   Chief Complaint   Patient presents with     Surgical Followup     DOS: 2/4/2020~Left total knee arthroplasty~15 days postop       HISTORY OF PRESENT ILLNESS  Returns 2 weeks post-op. States feels the knee is already much improved from pre-op. \"night and day difference\". Taking very occasional oxycodone, would like a refill.  Taking tylenol, zanaflex, atarax more regularly.  Starting outpatient physical therapy next week, has finished with home therapy. No incision concerns. No new injuries. No new symptoms. Ambulating improving daily. Using a walking stick for ambulation only. Taking aspirin and using TEDs.     Patient's past medical, surgical, social and family histories reviewed.     Past Medical History:   Diagnosis Date     Calculus of kidney      CKD (chronic kidney disease) stage 2, GFR 60-89 ml/min 10/30/2015     Degeneration of lumbar or lumbosacral intervertebral disc      Depressive disorder      Depressive disorder, not elsewhere classified      Diabetes (H)      Diabetic eye exam (H) 02/06/12, 11/1/13     Diabetic eye exam (H) 12/17/14     Hyperlipidaemia      Hypertension      Hypothyroidism 1/17/2010     Obesity, Class I, BMI 30-34.9 10/30/2015     Primary osteoarthritis of left knee      Uncomplicated asthma        Past Surgical History:   Procedure Laterality Date     ARTHROPLASTY KNEE Left 2/4/2020    Procedure: left total knee replacement;  Surgeon: Jason Berman DO;  Location:  OR     COLONOSCOPY  02/02/09    Repeat in 5 yrs     COLONOSCOPY N/A 9/5/2014    Procedure: COMBINED COLONOSCOPY, SINGLE BIOPSY/POLYPECTOMY BY BIOPSY;  Surgeon: Denis Oakes MD;  Location:  GI     ESOPHAGOSCOPY, GASTROSCOPY, DUODENOSCOPY (EGD), COMBINED N/A 2/20/2015    Procedure: COMBINED ESOPHAGOSCOPY, GASTROSCOPY, DUODENOSCOPY (EGD), BIOPSY SINGLE OR MULTIPLE;  Surgeon: Alfred Young MD;  Location:  GI     HC REMV CATARACT " EXTRACAP,INSERT LENS, W/O ECP  2000    Bilateral     HC REVISE MEDIAN N/CARPAL TUNNEL SURG  1991     HC TOOTH EXTRACTION W/FORCEP  1980's    [place] teeth removed     RELEASE CARPAL TUNNEL Right 6/16/2017    Procedure: RELEASE CARPAL TUNNEL;  Right carpal tunnel release;  Surgeon: Jason Berman DO;  Location: [facility] OR     RELEASE CARPAL TUNNEL Left 7/11/2017    Procedure: RELEASE CARPAL TUNNEL;  Left carpal tunnel release;  Surgeon: Jason Berman DO;  Location: [facility] OR     SOFT TISSUE SURGERY         Medications:  aspirin (ASA) 325 MG EC tablet, Take 1 tablet (325 mg) by mouth 2 times daily  buPROPion (WELLBUTRIN XL) 300 MG 24 hr tablet, Take 1 tablet (300 mg) by mouth every morning  esomeprazole (NEXIUM) 40 MG DR capsule, TAKE 1 CAPSULE EVERY MORNING BEFORE BREAKFAST, 30 TO 60 MINUTES BEFORE EATING.  levothyroxine (SYNTHROID/LEVOTHROID) 50 MCG tablet, TAKE ONE AND ONE-HALF TABLETS BY MOUTH DAILY  losartan (COZAAR) 50 MG tablet, TAKE ONE TABLET BY MOUTH EVERY DAY  acetaminophen (TYLENOL) 325 MG tablet, Take 3 tablets (975 mg) by mouth every 8 hours as needed for mild pain  albuterol (PROAIR HFA) 108 (90 BASE) MCG/ACT Inhaler, 1-2 puffs every 2 -4 hrs as needed  blood glucose (HUGO CONTOUR) test strip, Use to test blood sugars 1 time daily or as directed.  blood glucose (NO BRAND SPECIFIED) lancets standard, Use to test blood sugar one time daily or as directed.  blood glucose calibration (HUGO CONTOUR) NORMAL solution, Use to calibrate blood glucose monitor as directed.  cholecalciferol (VITAMIN D3) 5000 UNITS TABS tablet, Take 1 tablet (5,000 Units) by mouth  Coenzyme Q-10 capsule, Take 1 capsule by mouth daily.  docusate sodium (COLACE) 100 MG capsule, Take 1 capsule (100 mg) by mouth 2 times daily as needed for constipation  ezetimibe (ZETIA) 10 MG tablet, TAKE ONE TABLET BY MOUTH ONCE DAILY  fexofenadine-pseudoePHEDrine (ALLEGRA-D 24) 180-240 MG 24 hr tablet, Take 1 tablet by mouth  daily  fish oil-omega-3 fatty acids 1000 MG capsule, Take  by mouth. Take daily    hydrOXYzine (ATARAX) 25 MG tablet, Take 1 tablet (25 mg) by mouth every 6 hours as needed for itching or other (adjuvant pain)  Magnesium 500 MG CAPS, One twice a day  metFORMIN (GLUCOPHAGE) 500 MG tablet, TAKE ONE TABLET BY MOUTH TWICE A DAY WITH MEALS  Misc Natural Products (OSTEO BI-FLEX ADV JOINT SHIELD) TABS, Take  by mouth.  Multiple Vitamins-Minerals CAPS, Take  by mouth.  multivitamin (OCUVITE) TABS tablet, Take 1 tablet by mouth daily  ondansetron (ZOFRAN-ODT) 4 MG ODT tab, Take 1 tablet (4 mg) by mouth every 8 hours as needed for nausea or vomiting (Patient not taking: Reported on 2/19/2020)  order for DME, Equipment ordered: RESMED Auto PAP Mask type: Nasal Settings: 8-14 CM H2O  STATIN NOT PRESCRIBED, INTENTIONAL,, 1 each 8 times daily Please choose reason not prescribed, below  sucralfate (CARAFATE) 1 GM/10ML suspension, Take 10 mLs (1 g) by mouth 4 times daily    No current facility-administered medications on file prior to visit.       Allergies   Allergen Reactions     Dust Mites Cough     Penicillins Rash and Hives       Social History     Occupational History     Occupation:      Employer: [email or web address]   Tobacco Use     Smoking status: Never Smoker     Smokeless tobacco: Never Used   Substance and Sexual Activity     Alcohol use: No     Alcohol/week: 0.0 standard drinks     Drug use: No     Sexual activity: Not Currently     Partners: Female       Family History   Problem Relation Age of Onset     Hypertension Mother      Hypertension Father      Diabetes Father         Adult     Heart Disease Father         Hx: Bypass     Cancer Sister         Liver     Thyroid Disease Brother      Thyroid Disease Brother        REVIEW OF SYSTEMS  General: negative for, night sweats, dizziness, fatigue  Resp: No shortness of breath and no cough  CV: negative for chest pain, syncope or near-syncope  GI:  "negative for nausea, vomiting and diarrhea  : negative for dysuria and hematuria  Musculoskeletal: as above  Neurologic: negative for syncope   Hematologic: negative for bleeding disorder    Physical Exam:  Vitals: BP (!) 153/88   Ht 1.791 m (5' 10.5\")   Wt 102.8 kg (226 lb 11.2 oz)   BMI 32.07 kg/m    BMI= Body mass index is 32.07 kg/m .  Constitutional: healthy, alert and no acute distress   Psychiatric: mentation appears normal and affect normal/bright  NEURO: no focal deficits  SKIN: .healing well, well approximated skin edges, without signs of infection including no erythema, incision breakdown or purlent drainage  JOINT/EXTREMITIES: mild expected post-op swelling to the knee. None to the lower extremity otherwise. AROM 5-100, PROM 0-105. Extensor intact. Good quad tone. No instability. Calf soft non-tender. Distal neurovascular grossly intact.   GAIT: with assistive device (walking stick)    Diagnostic Modalities:  left knee X-ray: The prosthesis has acceptable alignment. No fractures or dislocations. Prosthesis is well seated with no evidence of loosening. Small lucency proximal to the prosthesis to the femur. Probable postop gas.   Independent visualization of the images was performed.      Impression:   Chief Complaint   Patient presents with     Surgical Followup     DOS: 2/4/2020~Left total knee arthroplasty~15 days postop   Recovering well     Plan:   Activity: starting with outpatient physical therapy, continue to increase activity as tolerated. Continue to work on ROM.  Did refill oxycodone, to use as needed, will probably not need further refills.  Refilled zanaflex. Continue 325mg BID aspirin for another 4 weeks. Continue TEDs for another 4 weeks, new Rx provided.   Staples/Sutures out: Not applicable. monocryl tail to the distal pole of the incision clipped to skin. exofin still in place.   Rest, Ice, Elevation and compression (with HANK)  Return to clinic 4, or sooner as needed for " changes.    Re-x-ray on return: No    SYLVIA Stevens, CNP  Orthopedic Surgery

## 2020-02-20 ENCOUNTER — HOSPITAL ENCOUNTER (OUTPATIENT)
Dept: PHYSICAL THERAPY | Facility: CLINIC | Age: 65
Setting detail: THERAPIES SERIES
End: 2020-02-20
Attending: ORTHOPAEDIC SURGERY
Payer: COMMERCIAL

## 2020-02-20 DIAGNOSIS — Z96.652 S/P TOTAL KNEE REPLACEMENT USING CEMENT, LEFT: ICD-10-CM

## 2020-02-20 PROCEDURE — 97530 THERAPEUTIC ACTIVITIES: CPT | Mod: GP

## 2020-02-20 PROCEDURE — 97110 THERAPEUTIC EXERCISES: CPT | Mod: GP

## 2020-02-20 PROCEDURE — 97161 PT EVAL LOW COMPLEX 20 MIN: CPT | Mod: GP

## 2020-02-21 NOTE — PROGRESS NOTES
02/20/20 1353   General Information   Type of Visit Initial OP Ortho PT Evaluation   Start of Care Date 02/20/20   Referring Physician Alden Montanez APRN CNP    Patient/Family Goals Statement Get well enough on L leg to handle surgery on R   Orders Evaluate and Treat   Orders Comment PT to eval and treat, 2 weeeks in home followed by outpatient, s/p total knee replacement. Gait abnormality. DOS 2/4/2020. Anticipated discharge date: 2/5/2020. Call for seamless transition from 2 weeks in home therapy to outpatient physical therapy. Dx: s/p total knee replacement using cement, left.    Date of Order 02/04/20   Certification Required? No   Medical Diagnosis s/p total knee replacement using cement, left.    Surgical/Medical history reviewed Yes   Precautions/Limitations no known precautions/limitations   Weight-Bearing Status - LUE weight-bearing as tolerated       Present No   Body Part(s)   Body Part(s) Knee   Presentation and Etiology   Pertinent history of current problem (include personal factors and/or comorbidities that impact the POC) Pt presents s/p TKA L on 2/4/2020. Things are going quite well, a lot better than he had even hoped for. Had an immediate reduction in OA pain after surgery, then kept improving since surgery. Stairs are still hard, and he can't walk very far before getting fatigued. His exercises from home PT are going well, and he is glad he had OP PT prior to going to surgery, he feels he wouldn't be doing as well otherwise. He is planning on getting his R knee done as soon as he is feeling well enough with his L. 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 PMHx: PT end of 2019 for OA with mild improvement, back now after surgery: TKA L DOS 2/4/2020. CKD stage 2, obesity, hypothyroidism, degeneration of lumbar or lumbosacral  intervertebral disc, depression, diabetes, hypertension, hyperlimidaemia, uncomplicated asthma. SURG (additional): release of carpal tunnel B in 2017. MEDS: Tylenol, Zanaflex, atarax, occasional oxycodone, BID aspirin for 4 weeks.    Impairments A. Pain;B. Decreased WB tolerance;C. Swelling;D. Decreased ROM;E. Decreased flexibility;G. Impaired balance;H. Impaired gait;K. Numbness;M. Locking or catching   Functional Limitations perform activities of daily living;perform required work activities;perform desired leisure / sports activities   Symptom Location Around kneecap on both sides   How/Where did it occur From Degenerative Joint Disease  (TKA)   Onset date of current episode/exacerbation 02/04/20  (DOS)   Chronicity New  (surgery; from chronic OA)   Pain rating (0-10 point scale) Best (/10);Worst (/10)  (4/10 (just had a pill))   Best (/10) 2/10   Worst (/10) 7/10    Pain quality C. Aching  (pressure, throb)   Frequency of pain/symptoms A. Constant   Pain/symptoms are: Worse during the night  (evening after exercising)   Pain/symptoms exacerbated by B. Walking;J. ADL;K. Home tasks;L. Work tasks  (stairs)   Pain/symptoms eased by C. Rest;H. Cold;I. OTC medication(s)  (wearing TEDs)   Progression of symptoms since onset: Improved   Current / Previous Interventions   Diagnostic Tests: X-ray   X-ray Results Results   X-ray results left knee X-ray: The prosthesis has acceptable alignment. No fractures or dislocations. Prosthesis is well seated with no evidence of loosening. Small lucency proximal to the prosthesis to the femur. Probable postop gas.    Prior Level of Function   Prior Level of Function-Mobility IND   Prior Level of Function-ADLs IND   Functional Level Prior Comment Initially painful with OA, but able to perform indepently    Current Level of Function   Current Community Support Family/friend caregiver   Patient role/employment history A. 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    Current equipment-Gait/Locomotion Walker  (Using Walking stick, has brace--uses out and about)   Current equipment-ADL Shower/tub grab bar  (tub shower)   Fall Risk Screen   Fall screen completed by PT   Have you fallen 2 or more times in the past year? Yes   Have you fallen and had an injury in the past year? No   Timed Up and Go score (seconds) 14   Is patient a fall risk? Yes   Fall screen comments Recent LE surgery, hx of knee buckling   Functional Scales   Functional Scales Other   Other Scales  LEFS: 21/80   Knee Objective Findings   Side (if bilateral, select both right and left) Left   Observation Careful movements with L leg, came in with ornate wooden walking stick instead of cane. Unlike R leg which is tucked under, L foot out but knee bent while sitting   Integumentary  Several scabbed places on incision, no more steri strips, mild swelling   Posture Comfortable, no major abnormalities   Gait/Locomotion Limited L knee extension and flexion, toe out gait, reduced step length, heavily antalgic gait, utilizing walking stick in R hand.   Balance/Proprioception (Single Leg Stance) NT d/t time, test as able   Foot Position In Standing Toe out   Knee ROM Comment Limited and painful   Knee/Hip Strength Comments Hip flx 4/5 with limited range in sitting likely d/t weakness, though SLR easy in supine   Palpation Mild swelling compared to R in knee   Left Knee Extension AROM Lacking 11   Left Knee Extension PROM Lacking 5   Left Knee Flexion AROM 90   Left Knee Flexion PROM 100   Left Knee Flexion Strength 5-/5   Left Knee Extension Strength 5/5   Left Quad Set Strength Good   L VMO Strength Good   Planned Therapy  Interventions   Planned Therapy Interventions balance training;gait training;joint mobilization;manual therapy;ROM;strengthening;stretching   Planned Therapy Interventions Comment prn   Planned Modality Interventions   Planned Modality Interventions Cryotherapy;Electrical stimulation;Hot packs   Planned Modality Interventions Comments prn   Clinical Impression   Criteria for Skilled Therapeutic Interventions Met yes, treatment indicated   PT Diagnosis L knee pain s/p L TKA   Influenced by the following impairments Pain, ROM impairment, weakness, gait impairment, dependence on AD   Functional limitations due to impairments Unable to perform job tasks, impaired household tasks, impaired household and community ambulation   Clinical Presentation Stable/Uncomplicated   Clinical Presentation Rationale Typical presentation, improving; clinical judgment   Clinical Decision Making (Complexity) Low complexity   Therapy Frequency 2 times/Week   Predicted Duration of Therapy Intervention (days/wks) 8 weeks   Risk & Benefits of therapy have been explained Yes   Patient, Family & other staff in agreement with plan of care Yes   Clinical Impression Comments Pt presents s/p TKA L on 2/4/2020, improving well, still having pain and ROM loss and swell expected of condition. He has been doing home PT and starting OP today, familiar with exercises and open to progressing. Beginning with emphasis on ROM to progress to strength, functional movement, balance, and gait as able and appropriate.    Education Assessment   Preferred Learning Style Listening;Demonstration;Pictures/video   Barriers to Learning No barriers   ORTHO GOALS   PT Ortho Eval Goals 1;2;3   Ortho Goal 1   Goal Identifier ROM   Goal Description Pt will demonstrate 0-120 AROM in L knee in order to improve functional movement as related to L knee.    Target Date 04/16/20   Ortho Goal 2   Goal Identifier Stairs   Goal Description Pt will demonstrate or report  ascending/descending one flight of stairs with alternating pattern using the hand rail in order to ease household and community ambulation   Target Date 04/16/20   Ortho Goal 3   Goal Identifier LEFS   Goal Description Pt will improve LEFS score from 21/80 at eval to at least 45/80 in order to show significant improvement in functional movement.    Target Date 04/16/20   Total Evaluation Time   PT Eval, Low Complexity Minutes ([id number]) 35     Thank you for your referral,    Huy Echavarria, PT, DPT    [facility]    O: [phone]  E: [email or web address]

## 2020-02-22 ASSESSMENT — ENCOUNTER SYMPTOMS
ARTHRALGIAS: 1
FREQUENCY: 1
PARESTHESIAS: 0
ABDOMINAL PAIN: 0
CHILLS: 0
HEARTBURN: 1
NERVOUS/ANXIOUS: 0
CONSTIPATION: 0
DIZZINESS: 0
HEADACHES: 0
JOINT SWELLING: 0
SORE THROAT: 0
COUGH: 0
EYE PAIN: 0
HEMATOCHEZIA: 0
WEAKNESS: 0
PALPITATIONS: 0
DIARRHEA: 0
DYSURIA: 0
NAUSEA: 0
FEVER: 0
SHORTNESS OF BREATH: 0
MYALGIAS: 0
HEMATURIA: 0

## 2020-02-24 ENCOUNTER — OFFICE VISIT (OUTPATIENT)
Dept: FAMILY MEDICINE | Facility: CLINIC | Age: 65
End: 2020-02-24
Payer: COMMERCIAL

## 2020-02-24 VITALS
DIASTOLIC BLOOD PRESSURE: 62 MMHG | BODY MASS INDEX: 32.24 KG/M2 | SYSTOLIC BLOOD PRESSURE: 120 MMHG | WEIGHT: 227.9 LBS | HEART RATE: 85 BPM | RESPIRATION RATE: 16 BRPM | TEMPERATURE: 96.6 F | OXYGEN SATURATION: 99 %

## 2020-02-24 DIAGNOSIS — E11.22 TYPE 2 DIABETES MELLITUS WITH STAGE 2 CHRONIC KIDNEY DISEASE, WITHOUT LONG-TERM CURRENT USE OF INSULIN (H): ICD-10-CM

## 2020-02-24 DIAGNOSIS — N18.2 CKD (CHRONIC KIDNEY DISEASE) STAGE 2, GFR 60-89 ML/MIN: ICD-10-CM

## 2020-02-24 DIAGNOSIS — F33.42 RECURRENT MAJOR DEPRESSION IN COMPLETE REMISSION (H): ICD-10-CM

## 2020-02-24 DIAGNOSIS — E03.8 SUBCLINICAL HYPOTHYROIDISM: ICD-10-CM

## 2020-02-24 DIAGNOSIS — Z00.01 ENCOUNTER FOR ROUTINE ADULT HEALTH EXAMINATION WITH ABNORMAL FINDINGS: Primary | ICD-10-CM

## 2020-02-24 DIAGNOSIS — K21.00 GASTROESOPHAGEAL REFLUX DISEASE WITH ESOPHAGITIS: ICD-10-CM

## 2020-02-24 DIAGNOSIS — N18.2 TYPE 2 DIABETES MELLITUS WITH STAGE 2 CHRONIC KIDNEY DISEASE, WITHOUT LONG-TERM CURRENT USE OF INSULIN (H): ICD-10-CM

## 2020-02-24 DIAGNOSIS — E78.5 HYPERLIPIDEMIA LDL GOAL <100: ICD-10-CM

## 2020-02-24 DIAGNOSIS — Z96.652 S/P TOTAL KNEE REPLACEMENT USING CEMENT, LEFT: ICD-10-CM

## 2020-02-24 PROCEDURE — 99396 PREV VISIT EST AGE 40-64: CPT | Performed by: FAMILY MEDICINE

## 2020-02-24 RX ORDER — BUPROPION HYDROCHLORIDE 300 MG/1
300 TABLET ORAL EVERY MORNING
Qty: 90 TABLET | Refills: 3 | Status: SHIPPED | OUTPATIENT
Start: 2020-02-24 | End: 2021-03-26

## 2020-02-24 RX ORDER — LOSARTAN POTASSIUM 50 MG/1
50 TABLET ORAL DAILY
Qty: 90 TABLET | Refills: 3 | Status: SHIPPED | OUTPATIENT
Start: 2020-02-24 | End: 2021-02-26

## 2020-02-24 RX ORDER — EZETIMIBE 10 MG/1
10 TABLET ORAL DAILY
Qty: 90 TABLET | Refills: 3 | Status: SHIPPED | OUTPATIENT
Start: 2020-02-24 | End: 2021-03-03

## 2020-02-24 RX ORDER — ESOMEPRAZOLE MAGNESIUM 40 MG/1
CAPSULE, DELAYED RELEASE ORAL
Qty: 90 CAPSULE | Refills: 3 | Status: SHIPPED | OUTPATIENT
Start: 2020-02-24 | End: 2021-03-26

## 2020-02-24 ASSESSMENT — PAIN SCALES - GENERAL: PAINLEVEL: MODERATE PAIN (4)

## 2020-02-24 NOTE — PROGRESS NOTES
SUBJECTIVE:   CC: Joel Pike is an 64 year old male who presents for preventative health visit.     Healthy Habits:     Getting at least 3 servings of Calcium per day:  Yes    Bi-annual eye exam:  Yes    Dental care twice a year:  NO    Sleep apnea or symptoms of sleep apnea:  Sleep apnea    Diet:  Low salt and Diabetic    Frequency of exercise:  2-3 days/week    Duration of exercise:  30-45 minutes    Taking medications regularly:  Yes    Medication side effects:  None    PHQ-2 Total Score: 1    Additional concerns today:  No      Patient recently had left knee replacement.  He thinks this is going quite well for him.  His other medical problems are in reasonable control.  His wife who is here for part of the visit indicates that perhaps his mood is more volatile.  Patient himself had described in her absence that being at home 100% of the time recovering from surgery is difficult.        Today's PHQ-2 Score:   PHQ-2 ( 1999 [employer]) 2/22/2020   Q1: Little interest or pleasure in doing things 1   Q2: Feeling down, depressed or hopeless 0   PHQ-2 Score 1   Q1: Little interest or pleasure in doing things Several days   Q2: Feeling down, depressed or hopeless Not at all   PHQ-2 Score 1       Abuse: Current or Past(Physical, Sexual or Emotional)- No  Do you feel safe in your environment? Yes        Social History     Tobacco Use     Smoking status: Never Smoker     Smokeless tobacco: Never Used   Substance Use Topics     Alcohol use: No     Alcohol/week: 0.0 standard drinks         Alcohol Use 2/22/2020   Prescreen: >3 drinks/day or >7 drinks/week? No   Prescreen: >3 drinks/day or >7 drinks/week? -   No flowsheet data found.    Last PSA:   PSA   Date Value Ref Range Status   11/08/2017 2.98 0 - 4 ug/L Final     Comment:     Assay Method:  Chemiluminescence using Siemens Vista analyzer       Reviewed orders with patient. Reviewed health maintenance and updated orders accordingly - Yes  Labs reviewed in EPIC  BP  Readings from Last 3 Encounters:   02/24/20 120/62   02/19/20 (!) 153/88   02/05/20 (!) 149/73    Wt Readings from Last 3 Encounters:   02/24/20 103.4 kg (227 lb 14.4 oz)   02/19/20 102.8 kg (226 lb 11.2 oz)   01/27/20 103.2 kg (227 lb 8 oz)                    Reviewed and updated as needed this visit by clinical staff  Tobacco  Allergies  Meds  Fam Hx  Soc Hx        Reviewed and updated as needed this visit by Provider            Review of Systems  CONSTITUTIONAL: NEGATIVE for fever, chills, change in weight  INTEGUMENTARY/SKIN: NEGATIVE for worrisome rashes, moles or lesions  EYES: NEGATIVE for vision changes or irritation  ENT: NEGATIVE for ear, mouth and throat problems  RESP: NEGATIVE for significant cough or SOB  CV: NEGATIVE for chest pain, palpitations or peripheral edema  GI: NEGATIVE for nausea, abdominal pain, heartburn, or change in bowel habits   male: negative for dysuria, hematuria, decreased urinary stream, erectile dysfunction, urethral discharge  MUSCULOSKELETAL: NEGATIVE for significant arthralgias or myalgia  NEURO: NEGATIVE for weakness, dizziness or paresthesias  PSYCHIATRIC: NEGATIVE for changes in mood or affect    OBJECTIVE:   /62   Pulse 85   Temp 96.6  F (35.9  C) (Temporal)   Resp 16   Wt 103.4 kg (227 lb 14.4 oz)   SpO2 99%   BMI 32.24 kg/m      Physical Exam  GENERAL: healthy, alert and no distress  EYES: Eyes grossly normal to inspection, PERRL and conjunctivae and sclerae normal  NECK: no adenopathy, no asymmetry, masses, or scars and thyroid normal to palpation  RESP: lungs clear to auscultation - no rales, rhonchi or wheezes  CV: regular rate and rhythm, normal S1 S2, no S3 or S4, no murmur, click or rub, no peripheral edema and peripheral pulses strong  ABDOMEN: soft, nontender, no hepatosplenomegaly, no masses and bowel sounds normal  MS: Patient moves as if his knee did recently have a surgical replacement.  There is no swelling of the left leg.  Other  "extremities are working reasonably well for him today.  SKIN: no suspicious lesions or rashes  NEURO: Normal strength and tone, mentation intact and speech normal  PSYCH: mentation appears normal, affect normal/bright, judgement and insight intact and appearance well groomed  LYMPH: no cervical, supraclavicular, axillary, or inguinal adenopathy    Diagnostic Test Results:  Labs reviewed in Epic  No results found for any visits on 02/24/20.    ASSESSMENT/PLAN:       ICD-10-CM    1. Encounter for routine adult health examination with abnormal findings Z00.01    2. CKD (chronic kidney disease) stage 2, GFR 60-89 ml/min N18.2    3. Subclinical hypothyroidism E03.9 **TSH with free T4 reflex FUTURE anytime   4. Type 2 diabetes mellitus with stage 2 chronic kidney disease, without long-term current use of insulin (H) E11.22 ezetimibe (ZETIA) 10 MG tablet    N18.2 losartan (COZAAR) 50 MG tablet     metFORMIN (GLUCOPHAGE) 500 MG tablet   5. S/P total knee replacement using cement, left Z96.652    6. Recurrent major depression in complete remission (H) F33.42 buPROPion (WELLBUTRIN XL) 300 MG 24 hr tablet   7. Gastroesophageal reflux disease with esophagitis K21.0 esomeprazole (NEXIUM) 40 MG DR capsule   8. Hyperlipidemia LDL goal <100 E78.5 Lipid panel reflex to direct LDL Fasting     ezetimibe (ZETIA) 10 MG tablet       COUNSELING:   Reviewed preventive health counseling, as reflected in patient instructions       Regular exercise       Healthy diet/nutrition    Estimated body mass index is 32.24 kg/m  as calculated from the following:    Height as of 2/19/20: 1.791 m (5' 10.5\").    Weight as of this encounter: 103.4 kg (227 lb 14.4 oz).     Weight management plan: Discussed healthy diet and exercise guidelines     reports that he has never smoked. He has never used smokeless tobacco.      Counseling Resources:  ATP IV Guidelines  Pooled Cohorts Equation Calculator  FRAX Risk Assessment  ICSI Preventive Guidelines  Dietary " Guidelines for Americans, 2010  USDA's MyPlate  ASA Prophylaxis  Lung CA Screening    Cayden Simone Ramsey MD  [facility]

## 2020-02-25 ENCOUNTER — HOSPITAL ENCOUNTER (OUTPATIENT)
Dept: PHYSICAL THERAPY | Facility: CLINIC | Age: 65
Setting detail: THERAPIES SERIES
End: 2020-02-25
Attending: NURSE PRACTITIONER
Payer: COMMERCIAL

## 2020-02-25 PROCEDURE — 97116 GAIT TRAINING THERAPY: CPT | Mod: GP | Performed by: PHYSICAL THERAPIST

## 2020-02-25 PROCEDURE — 97110 THERAPEUTIC EXERCISES: CPT | Mod: GP | Performed by: PHYSICAL THERAPIST

## 2020-02-26 ENCOUNTER — HOSPITAL ENCOUNTER (OUTPATIENT)
Dept: PHYSICAL THERAPY | Facility: CLINIC | Age: 65
Setting detail: THERAPIES SERIES
End: 2020-02-26
Attending: NURSE PRACTITIONER
Payer: COMMERCIAL

## 2020-02-26 PROCEDURE — 97110 THERAPEUTIC EXERCISES: CPT | Mod: GP

## 2020-02-26 NOTE — PATIENT INSTRUCTIONS
Continue recovery from left knee and anticipate right knee surgery.  Please keep us informed about mood and listen to your wife  We should follow-up with labs etc. sometime in the next 6 months.

## 2020-03-02 ENCOUNTER — HOSPITAL ENCOUNTER (OUTPATIENT)
Dept: PHYSICAL THERAPY | Facility: CLINIC | Age: 65
Setting detail: THERAPIES SERIES
End: 2020-03-02
Attending: NURSE PRACTITIONER
Payer: COMMERCIAL

## 2020-03-02 PROCEDURE — 97110 THERAPEUTIC EXERCISES: CPT | Mod: GP | Performed by: PHYSICAL THERAPIST

## 2020-03-02 PROCEDURE — 97116 GAIT TRAINING THERAPY: CPT | Mod: GP | Performed by: PHYSICAL THERAPIST

## 2020-03-05 ENCOUNTER — HOSPITAL ENCOUNTER (OUTPATIENT)
Dept: PHYSICAL THERAPY | Facility: CLINIC | Age: 65
Setting detail: THERAPIES SERIES
End: 2020-03-05
Attending: NURSE PRACTITIONER
Payer: COMMERCIAL

## 2020-03-05 PROCEDURE — 97110 THERAPEUTIC EXERCISES: CPT | Mod: GP

## 2020-03-09 ENCOUNTER — TELEPHONE (OUTPATIENT)
Dept: ORTHOPEDICS | Facility: CLINIC | Age: 65
End: 2020-03-09

## 2020-03-09 ENCOUNTER — HOSPITAL ENCOUNTER (OUTPATIENT)
Dept: PHYSICAL THERAPY | Facility: CLINIC | Age: 65
Setting detail: THERAPIES SERIES
End: 2020-03-09
Attending: NURSE PRACTITIONER
Payer: COMMERCIAL

## 2020-03-09 PROCEDURE — 97140 MANUAL THERAPY 1/> REGIONS: CPT | Mod: GP | Performed by: PHYSICAL THERAPIST

## 2020-03-09 PROCEDURE — 97110 THERAPEUTIC EXERCISES: CPT | Mod: GP | Performed by: PHYSICAL THERAPIST

## 2020-03-09 PROCEDURE — 97116 GAIT TRAINING THERAPY: CPT | Mod: GP | Performed by: PHYSICAL THERAPIST

## 2020-03-09 NOTE — TELEPHONE ENCOUNTER
Our goal is to have forms completed with 72 hours, however some forms may require a visit or additional information.    Who is the form from?: Patient  Where the form came from: Patient or family brought in     What clinic location was the form placed at?: [place]  Where the form was placed:   What number is listed as a contact on the form?: [employer]    Phone call message- patient request for a letter, form or note:    Date needed: within one week  Please fax to [phone]  Has the patient signed a consent form for release of information? YES    Additional comments:     Call taken on 3/9/2020 at 7:53 AM by Norma Santana    Type of letter, form or note: disability

## 2020-03-11 ENCOUNTER — HOSPITAL ENCOUNTER (OUTPATIENT)
Dept: PHYSICAL THERAPY | Facility: CLINIC | Age: 65
Setting detail: THERAPIES SERIES
End: 2020-03-11
Attending: NURSE PRACTITIONER
Payer: COMMERCIAL

## 2020-03-11 PROCEDURE — 97110 THERAPEUTIC EXERCISES: CPT | Mod: GP | Performed by: PHYSICAL THERAPIST

## 2020-03-11 PROCEDURE — 97140 MANUAL THERAPY 1/> REGIONS: CPT | Mod: GP | Performed by: PHYSICAL THERAPIST

## 2020-03-18 DIAGNOSIS — N18.2 TYPE 2 DIABETES MELLITUS WITH STAGE 2 CHRONIC KIDNEY DISEASE, WITHOUT LONG-TERM CURRENT USE OF INSULIN (H): ICD-10-CM

## 2020-03-18 DIAGNOSIS — E11.22 TYPE 2 DIABETES MELLITUS WITH STAGE 2 CHRONIC KIDNEY DISEASE, WITHOUT LONG-TERM CURRENT USE OF INSULIN (H): ICD-10-CM

## 2020-03-18 DIAGNOSIS — E03.8 SUBCLINICAL HYPOTHYROIDISM: ICD-10-CM

## 2020-03-18 RX ORDER — LEVOTHYROXINE SODIUM 50 UG/1
TABLET ORAL
Qty: 135 TABLET | Refills: 3 | Status: SHIPPED | OUTPATIENT
Start: 2020-03-18 | End: 2020-11-04 | Stop reason: DRUGHIGH

## 2020-03-18 NOTE — TELEPHONE ENCOUNTER
Prescription approved per [facility] Refill Protocol.  Shauna Campbell RN

## 2020-03-23 ENCOUNTER — MYC REFILL (OUTPATIENT)
Dept: ORTHOPEDICS | Facility: CLINIC | Age: 65
End: 2020-03-23

## 2020-03-23 DIAGNOSIS — Z96.652 S/P TOTAL KNEE REPLACEMENT USING CEMENT, LEFT: ICD-10-CM

## 2020-03-24 RX ORDER — HYDROXYZINE HYDROCHLORIDE 25 MG/1
25 TABLET, FILM COATED ORAL EVERY 8 HOURS PRN
Qty: 40 TABLET | Refills: 0 | Status: SHIPPED | OUTPATIENT
Start: 2020-03-24 | End: 2021-02-01

## 2020-03-24 NOTE — TELEPHONE ENCOUNTER
I reviewed this patient's chart and this is appropriate to have the refill of the Atarax.  Prescription sent and signed.

## 2020-04-21 NOTE — PROGRESS NOTES
"Outpatient Physical Therapy Discharge Note     Patient: Joel Pike  : 1955    Beginning/End Dates of Reporting Period:  20 to 2020    Referring Provider: Alden Montanez APRN CNP    Therapy Diagnosis: L knee pain s/p L TKA     Client Self Report: Pretty good.      Objective Measurements:   not present at time of discharge    Goals:  Goal Identifier ROM   Goal Description Pt will demonstrate 0-120 AROM in L knee in order to improve functional movement as related to L knee.    Target Date 20   Date Met      Progress:  Measured 120 degrees flex last visit, but may be lacking a couple degrees in ext when flex achieved.     Goal Identifier Stairs   Goal Description Pt will demonstrate or report ascending/descending one flight of stairs with alternating pattern using the hand rail in order to ease household and community ambulation   Target Date 20   Date Met   20   Progress:     Goal Identifier LEFS   Goal Description Pt will improve LEFS score from 21/80 at eval to at least 45/80 in order to show significant improvement in functional movement.    Target Date 20   Date Met      Progress: not present to test score at time of discharge, but functionally doing very well     Progress Toward Goals:   Progress this reporting period: Joel has done very well in progressing with ROM and function with his knee.  His appointments were cancelled in March and April due to COVID time, but did well with home ex program.  He reports he is \"doing very well\" plans to try working at [employer] in May.    Plan:  Discharge from therapy.    Discharge:    Reason for Discharge: Patient has met goals and will continue to progress with home program.    Equipment Issued: .    Discharge Plan: Patient to continue home program.  "

## 2020-05-04 ENCOUNTER — TELEPHONE (OUTPATIENT)
Dept: ORTHOPEDICS | Facility: OTHER | Age: 65
End: 2020-05-04

## 2020-05-04 NOTE — TELEPHONE ENCOUNTER
Patient is post op left TKA from 2/4/2020 with Dr. Berman.     Angi MARLOW, Lead RN, BSN. . .  5/4/2020, 1:23 PM

## 2020-05-04 NOTE — LETTER
[address]  [facility] [address]  Phone: [phone]    May 4, 2020        Joel Pike  [address] [facility] [address]          To whom it may concern:    RE: Joel Pike    Patient may return to work with the following:  As tolerated.      Please contact me for questions or concerns.      Sincerely,        Jason Berman, DO

## 2020-05-04 NOTE — TELEPHONE ENCOUNTER
Reason for Call:  Other call back    Detailed comments: Call taken on 5/4/2020 at 10:27 AM by Ashley Perry  Pt would like a note so he can return to work. Please call him and let him know. [facility]  Micheal Gillette [phone] Fax #     Phone Number Patient can be reached at: Cell number on file:    Telephone Information:   Mobile [phone]       Best Time: NA    Can we leave a detailed message on this number? Not Applicable

## 2020-05-07 NOTE — TELEPHONE ENCOUNTER
I emailed the letter.  We have no way to know that it went through.    Pt notified via phone.    [employer]

## 2020-05-07 NOTE — TELEPHONE ENCOUNTER
Patient calling to state that his work didn't get this fax. Is there anyway that we can email it to this email address: [email or web address]  Thank you

## 2020-08-24 ENCOUNTER — TRANSFERRED RECORDS (OUTPATIENT)
Dept: HEALTH INFORMATION MANAGEMENT | Facility: CLINIC | Age: 65
End: 2020-08-24

## 2020-08-24 LAB — RETINOPATHY: NEGATIVE

## 2020-08-27 ENCOUNTER — ANCILLARY PROCEDURE (OUTPATIENT)
Dept: GENERAL RADIOLOGY | Facility: CLINIC | Age: 65
End: 2020-08-27
Attending: ORTHOPAEDIC SURGERY
Payer: COMMERCIAL

## 2020-08-27 ENCOUNTER — TELEPHONE (OUTPATIENT)
Dept: ORTHOPEDICS | Facility: CLINIC | Age: 65
End: 2020-08-27

## 2020-08-27 ENCOUNTER — OFFICE VISIT (OUTPATIENT)
Dept: ORTHOPEDICS | Facility: CLINIC | Age: 65
End: 2020-08-27
Payer: COMMERCIAL

## 2020-08-27 VITALS
BODY MASS INDEX: 33.89 KG/M2 | DIASTOLIC BLOOD PRESSURE: 74 MMHG | SYSTOLIC BLOOD PRESSURE: 128 MMHG | WEIGHT: 228.8 LBS | HEIGHT: 69 IN

## 2020-08-27 DIAGNOSIS — Z11.59 ENCOUNTER FOR SCREENING FOR OTHER VIRAL DISEASES: Primary | ICD-10-CM

## 2020-08-27 DIAGNOSIS — Z01.818 PREOPERATIVE EXAMINATION: ICD-10-CM

## 2020-08-27 DIAGNOSIS — M17.11 PRIMARY OSTEOARTHRITIS OF RIGHT KNEE: Primary | ICD-10-CM

## 2020-08-27 DIAGNOSIS — Z01.818 PREOP GENERAL PHYSICAL EXAM: Primary | ICD-10-CM

## 2020-08-27 DIAGNOSIS — M17.11 PRIMARY OSTEOARTHRITIS OF RIGHT KNEE: ICD-10-CM

## 2020-08-27 PROCEDURE — 73564 X-RAY EXAM KNEE 4 OR MORE: CPT | Mod: TC

## 2020-08-27 PROCEDURE — 99214 OFFICE O/P EST MOD 30 MIN: CPT | Performed by: ORTHOPAEDIC SURGERY

## 2020-08-27 ASSESSMENT — MIFFLIN-ST. JEOR: SCORE: 1822.17

## 2020-08-27 ASSESSMENT — PAIN SCALES - GENERAL: PAINLEVEL: NO PAIN (1)

## 2020-08-27 NOTE — TELEPHONE ENCOUNTER
Type of surgery: right total knee replacement  Location of surgery: [facility]   Date of surgery: 11/17/20  Surgeon: Dr. Berman  Pre-Op Appt Date: 11/4/20  Post-Op Appt Date: 12/2/20   Packet sent out: Surgery packet was given to patient in clinic.   Pre-cert/Authorization completed: NA  Date: 8/27/2020    *cbc ordered  *soap and packet given in clinic  *had class within a year    Nataly Chambers  Surgery Scheduler

## 2020-08-27 NOTE — LETTER
8/27/2020         RE: Joel Pike  [address] [facility] [address]        Dear Colleague,    Thank you for referring your patient, Joel Pike, to the [facility]. Please see a copy of my visit note below.    Office Visit-Follow up    Chief Complaint: Joel Pike is a 65 year old male who is being seen for   Chief Complaint   Patient presents with     Consult     Right total knee       History of Present Illness:   Treated for a contralateral left total knee replacement.  Presents today for right knee pain.    Longstanding right knee pain.  Progressively worsening.  Worse towards the end of the day workouts and weight.  Knee pain is caused him to fall.  It wakes him at night.  He is attempted rest activity modification occasional Tylenol occasional ibuprofen home exercise therapy and physical therapy.  He is done really well with his contralateral knee replacement.  It is the same type of pain not quite as intense she had no.  He would like to proceed with knee replacement.      Social History     Occupational History     Occupation:      Employer: [employer]   Tobacco Use     Smoking status: Never Smoker     Smokeless tobacco: Never Used   Substance and Sexual Activity     Alcohol use: No     Alcohol/week: 0.0 standard drinks     Drug use: No     Sexual activity: Not Currently     Partners: Female       REVIEW OF SYSTEMS  General: negative for, night sweats, dizziness, fatigue  Resp: No shortness of breath and no cough  CV: negative for chest pain, syncope or near-syncope  GI: negative for nausea, vomiting and diarrhea  : negative for dysuria and hematuria  Musculoskeletal: as above  Neurologic: negative for syncope   Hematologic: negative for bleeding disorder    Physical Exam:  Vitals: There were no vitals taken for this visit.  BMI= There is no height or weight on file to calculate BMI.  Constitutional: healthy, alert and no acute distress    Psychiatric: mentation appears normal and affect normal/bright  NEURO: no focal deficits  RESP: Normal with easy respirations and no use of accessory muscles to breathe, no audible wheezing or retractions  CV: RLE: no edema         Regular rate and rhythm by palpation  SKIN: No erythema, rashes, excoriation, or breakdown. No evidence of infection.   JOINT/EXTREMITIES:right knee: Varus alignment.  Knee corrects to neutral with valgus stressing.  Collateral ligaments are intact.  Small effusion.  No gross instability.  Patella tracks midline.  Good muscle tone  GAIT: not tested             Diagnostic Modalities:  right knee X-ray: Bone-on-bone arthritis medial compartment with varus deformity.  Patellofemoral compartment does show some subtle osteophytes noted along the lateral lateral patellar facet as well as the medial facet.  Overall lateral joint spaces well maintained.  Independent visualization of the images was performed.      Impression: right knee primary osteoarthritis    Plan:  All of the above pertinent physical exam and imaging modalities findings was reviewed with Joel.    The patient has attempted conservative treatments that include NSAIDs, Tylenol, focused self directed physical therapy, formal physical therapy, activity modifications, rest yet still continues to have significant issues. These issues include pain at rest, increased pain with activity, pain that wakes at night, gait disturbances, falls. Secondary to these reasons I have offered surgery in the form of right total knee replacement.    Risks, benefits, complications, alternatives, limitations, and post operative course were discussed. Risks including infection (requiring long-term antibiotics and repeat surgeries), implant loosening (requiring revision surgery), heart attacks, strokes, bleeding (possibly requiring blood transfusion), scars, instability, numbness around the knee, stiffness (requiring manipulations or repeat surgeries),  instability and dislocations, fractures, blood clots the legs and lungs, nerve injury (possibly leading to foot drop).  Postoperative course was discussed as far as limitations and expected recovery times. It was also discussed that after surgery there is a need for blood thinners to prevent blood clots, but even when being treated it is possible to develop a blood clot. Anticipate being hospitalized 1 days and subsequently either discharged home or to a rehabilitation facility. Determinations of this will be based on progress with physical therapy while in the hospital. The importance of post-operative physical therapy was discussed. If discharge home from the hospital expect [facility] home physical therapy for about 2 weeks and then transition to going to a physical therapy location for more aggressive therapy. Without physical therapy, outcomes may not be optimal. All questions were answered. The patient agrees to proceed with surgery.  Past medical and surgical history was reviewed. It is positive for DM. Mr. Pike will need a pre-operative medical evaluation and clearance by a primary care provider. We will obtain a CBC as well as the appropriate radiographs prior to surgery. I will leave it to the discretion of the primary care provider for additional pre-operative testing.     Anticipate next day discharge.  Aspirin for DVT prophylaxis.  He would like to proceed in November.  I will plan to see him 1 week prior to surgery.    Return to clinic 14 days post-operatively. , or sooner as needed for changes.  Re-x-ray on return: Yes.    Jacky Bemran D.O.          Again, thank you for allowing me to participate in the care of your patient.        Sincerely,        Jason Berman, DO

## 2020-08-27 NOTE — PROGRESS NOTES
Office Visit-Follow up    Chief Complaint: Joel Pike is a 65 year old male who is being seen for   Chief Complaint   Patient presents with     Knee Pain     Right knee pain       History of Present Illness:   Treated for a contralateral left total knee replacement.  Presents today for right knee pain.    Longstanding right knee pain.  Progressively worsening.  Worse towards the end of the day workouts and weight.  Knee pain is caused him to fall.  It wakes him at night.  He is attempted rest activity modification occasional Tylenol occasional ibuprofen home exercise therapy and physical therapy.  He is done really well with his contralateral knee replacement.  It is the same type of pain not quite as intense she had no.  He would like to proceed with knee replacement.      Social History     Occupational History     Occupation:      Employer: [employer]   Tobacco Use     Smoking status: Never Smoker     Smokeless tobacco: Never Used   Substance and Sexual Activity     Alcohol use: No     Alcohol/week: 0.0 standard drinks     Drug use: No     Sexual activity: Not Currently     Partners: Female       REVIEW OF SYSTEMS  General: negative for, night sweats, dizziness, fatigue  Resp: No shortness of breath and no cough  CV: negative for chest pain, syncope or near-syncope  GI: negative for nausea, vomiting and diarrhea  : negative for dysuria and hematuria  Musculoskeletal: as above  Neurologic: negative for syncope   Hematologic: negative for bleeding disorder    Physical Exam:  Vitals: There were no vitals taken for this visit.  BMI= There is no height or weight on file to calculate BMI.  Constitutional: healthy, alert and no acute distress   Psychiatric: mentation appears normal and affect normal/bright  NEURO: no focal deficits  RESP: Normal with easy respirations and no use of accessory muscles to breathe, no audible wheezing or retractions  CV: RLE: no edema         Regular rate and  rhythm by palpation  SKIN: No erythema, rashes, excoriation, or breakdown. No evidence of infection.   JOINT/EXTREMITIES:right knee: Varus alignment.  Knee corrects to neutral with valgus stressing.  Collateral ligaments are intact.  Small effusion.  No gross instability.  Patella tracks midline.  Good muscle tone  GAIT: not tested             Diagnostic Modalities:  right knee X-ray: Bone-on-bone arthritis medial compartment with varus deformity.  Patellofemoral compartment does show some subtle osteophytes noted along the lateral lateral patellar facet as well as the medial facet.  Overall lateral joint spaces well maintained.  Independent visualization of the images was performed.      Impression: right knee primary osteoarthritis    Plan:  All of the above pertinent physical exam and imaging modalities findings was reviewed with Joel.    The patient has attempted conservative treatments that include NSAIDs, Tylenol, focused self directed physical therapy, formal physical therapy, activity modifications, rest yet still continues to have significant issues. These issues include pain at rest, increased pain with activity, pain that wakes at night, gait disturbances, falls. Secondary to these reasons I have offered surgery in the form of right total knee replacement.    Risks, benefits, complications, alternatives, limitations, and post operative course were discussed. Risks including infection (requiring long-term antibiotics and repeat surgeries), implant loosening (requiring revision surgery), heart attacks, strokes, bleeding (possibly requiring blood transfusion), scars, instability, numbness around the knee, stiffness (requiring manipulations or repeat surgeries), instability and dislocations, fractures, blood clots the legs and lungs, nerve injury (possibly leading to foot drop).  Postoperative course was discussed as far as limitations and expected recovery times. It was also discussed that after surgery  there is a need for blood thinners to prevent blood clots, but even when being treated it is possible to develop a blood clot. Anticipate being hospitalized 1 days and subsequently either discharged home or to a rehabilitation facility. Determinations of this will be based on progress with physical therapy while in the hospital. The importance of post-operative physical therapy was discussed. If discharge home from the hospital expect [facility] home physical therapy for about 2 weeks and then transition to going to a physical therapy location for more aggressive therapy. Without physical therapy, outcomes may not be optimal. All questions were answered. The patient agrees to proceed with surgery.  Past medical and surgical history was reviewed. It is positive for DM. Mr. Pike will need a pre-operative medical evaluation and clearance by a primary care provider. We will obtain a CBC as well as the appropriate radiographs prior to surgery. I will leave it to the discretion of the primary care provider for additional pre-operative testing.     Anticipate next day discharge.  Aspirin for DVT prophylaxis.  He would like to proceed in November.  I will plan to see him 1 week prior to surgery.    Return to clinic 14 days post-operatively. , or sooner as needed for changes.  Re-x-ray on return: Yes.    Jakcy Berman D.O.

## 2020-10-20 ENCOUNTER — TELEPHONE (OUTPATIENT)
Dept: SLEEP MEDICINE | Facility: CLINIC | Age: 65
End: 2020-10-20

## 2020-10-20 DIAGNOSIS — G47.33 OSA (OBSTRUCTIVE SLEEP APNEA): Primary | ICD-10-CM

## 2020-10-20 NOTE — TELEPHONE ENCOUNTER
Reason for call:  Other   Patient called regarding (reason for call): prescription    Additional comments: Sleep Supply Refill, receives from [facility]    Phone number to reach patient:  Home number on file [phone] (home)    Best Time:  any    Can we leave a detailed message on this number?  YES    Travel screening: Not Applicable

## 2020-10-21 NOTE — TELEPHONE ENCOUNTER
The patient should see me in follow-up or one of my colleagues in [facility] sleep [place] even if it is virtual visit.  He has not been seen in a couple years.  I have refilled his sleep supplies for now.  However any further refills would require a visit.  Michoacano Owusu MD

## 2020-10-28 NOTE — PROGRESS NOTES
Obstructive Sleep Apnea - PAP Follow-Up Visit:    Chief Complaint   Patient presents with     CPAP Follow Up       Joel Pike comes in today for follow-up of their moderate sleep apnea, managed with CPAP.     No specialty comments available.    Overall, he rates the experience with PAP as 10 (0 poor, 10 great). The mask is comfortable.    The mask is not leaking .  He is not snoring with the mask on. He is not having gasp arousals.  He is not having significant oral/nasal dryness. The pressure is comfortable.   His PAP interface is Nasal Pillows.     The patient is usually getting 8 hours of sleep per night.    He does feel rested in the morning.    [place] Sleepiness Scale: 4/24      No data recorded    ResMed       Auto-PAP 8 - 14 cmH2O 30 day usage data:    29% of days with > 4 hours of use. 0/30 days with no use.   Average use 7 hours 45 minutes per day.   95%ile Leak 14.6 L/min.   CPAP 95% pressure 9.2 cm.   AHI 0 events per hour.               Past medical/surgical history, family history, social history, medications and allergies were reviewed.      Problem List:  Patient Active Problem List    Diagnosis Date Noted     Primary osteoarthritis of right knee 08/27/2020     Priority: Medium     Added automatically from request for surgery [id number]       S/P total knee replacement using cement, left 02/04/2020     Priority: Medium     Microalbuminuria 02/26/2019     Priority: Medium     Recurrent major depression in complete remission (H) 11/08/2017     Priority: Medium     CKD (chronic kidney disease) stage 2, GFR 60-89 ml/min 10/30/2015     Priority: Medium     Obesity, Class I, BMI 30-34.9 10/30/2015     Priority: Medium     Type 2 diabetes mellitus with stage 2 chronic kidney disease (H) 04/19/2013     Priority: Medium     Subclinical hypothyroidism 03/02/2012     Priority: Medium     Anxiety 03/02/2012     Priority: Medium     Sebaceous cyst 02/03/2012     Priority: Medium     left forehead       Advanced  directives, counseling/discussion 08/19/2011     Priority: Medium     Advance Directive Problem List Overview:   Name Relationship Phone    Primary Health Care Agent            Alternative Health Care Agent          Discussed advance care planning with patient; information given to patient to review.//Macey Calvert/AISHA(AAMA)  8/19/2011 and again 11/9/16         Tinnitus 07/22/2011     Priority: Medium     right ear, began after accident, immediately       Hyperlipidemia LDL goal <100 01/28/2011     Priority: Medium     HILARIO (obstructive sleep apnea) 01/28/2011     Priority: Medium     Chronic rhinitis 01/15/2010     Priority: Medium     Degeneration of lumbar or lumbosacral intervertebral disc 12/19/2005     Priority: Medium     Cough 12/19/2005     Priority: Medium     DERMATITIS WHEN IN THE SUN, NOT BURN 12/19/2005     Priority: Medium     Gastroesophageal reflux disease without esophagitis 05/14/2003     Priority: Medium        There were no vitals taken for this visit.    Impression/Plan:  Patient with moderate HILARIO optimally titrated.   Moderate Sleep apnea.  Tolerating PAP well. Daytime symptoms are improved.       Joel Pike will follow up in about 1 year(s).     Fifteen minutes spent with patient, all of which were spent face-to-face counseling, consulting, coordinating plan of care.      CC:  Cayden Ramsey Oleg Froymovich, MD

## 2020-10-28 NOTE — NURSING NOTE
Does Joel have a CPAP/Bipap?  Yes             Type of mask: Nasal Pillows     [place]: [facility] [phone]    [email or web address]     Weight management plan: Patient was referred to their PCP to discuss a diet and exercise plan.

## 2020-10-29 ENCOUNTER — OFFICE VISIT (OUTPATIENT)
Dept: SLEEP MEDICINE | Facility: CLINIC | Age: 65
End: 2020-10-29
Payer: COMMERCIAL

## 2020-10-29 VITALS
HEIGHT: 69 IN | BODY MASS INDEX: 33.92 KG/M2 | HEART RATE: 77 BPM | SYSTOLIC BLOOD PRESSURE: 128 MMHG | OXYGEN SATURATION: 93 % | WEIGHT: 229 LBS | DIASTOLIC BLOOD PRESSURE: 80 MMHG

## 2020-10-29 DIAGNOSIS — G47.33 OSA (OBSTRUCTIVE SLEEP APNEA): Primary | ICD-10-CM

## 2020-10-29 PROCEDURE — 99201 PR OFFICE/OUTPT VISIT, NEW, LEVEL I: CPT | Performed by: OTOLARYNGOLOGY

## 2020-10-29 ASSESSMENT — MIFFLIN-ST. JEOR: SCORE: 1823.08

## 2020-11-04 ENCOUNTER — OFFICE VISIT (OUTPATIENT)
Dept: FAMILY MEDICINE | Facility: CLINIC | Age: 65
End: 2020-11-04
Payer: COMMERCIAL

## 2020-11-04 VITALS
RESPIRATION RATE: 20 BRPM | WEIGHT: 229 LBS | OXYGEN SATURATION: 94 % | DIASTOLIC BLOOD PRESSURE: 82 MMHG | HEART RATE: 92 BPM | SYSTOLIC BLOOD PRESSURE: 138 MMHG | BODY MASS INDEX: 33.57 KG/M2 | TEMPERATURE: 98.3 F

## 2020-11-04 DIAGNOSIS — M17.11 PRIMARY OSTEOARTHRITIS OF RIGHT KNEE: ICD-10-CM

## 2020-11-04 DIAGNOSIS — E78.5 HYPERLIPIDEMIA LDL GOAL <100: ICD-10-CM

## 2020-11-04 DIAGNOSIS — E03.8 SUBCLINICAL HYPOTHYROIDISM: ICD-10-CM

## 2020-11-04 DIAGNOSIS — E11.22 TYPE 2 DIABETES MELLITUS WITH STAGE 2 CHRONIC KIDNEY DISEASE, WITHOUT LONG-TERM CURRENT USE OF INSULIN (H): ICD-10-CM

## 2020-11-04 DIAGNOSIS — N18.2 TYPE 2 DIABETES MELLITUS WITH STAGE 2 CHRONIC KIDNEY DISEASE, WITHOUT LONG-TERM CURRENT USE OF INSULIN (H): ICD-10-CM

## 2020-11-04 DIAGNOSIS — Z01.818 PREOP GENERAL PHYSICAL EXAM: Primary | ICD-10-CM

## 2020-11-04 DIAGNOSIS — E03.8 SUBCLINICAL HYPOTHYROIDISM: Primary | ICD-10-CM

## 2020-11-04 LAB
ALBUMIN SERPL-MCNC: 3.8 G/DL (ref 3.4–5)
ALP SERPL-CCNC: 102 U/L (ref 40–150)
ALT SERPL W P-5'-P-CCNC: 72 U/L (ref 0–70)
ANION GAP SERPL CALCULATED.3IONS-SCNC: 9 MMOL/L (ref 3–14)
AST SERPL W P-5'-P-CCNC: 38 U/L (ref 0–45)
BASOPHILS # BLD AUTO: 0 10E9/L (ref 0–0.2)
BASOPHILS NFR BLD AUTO: 0.7 %
BILIRUB SERPL-MCNC: 0.6 MG/DL (ref 0.2–1.3)
BUN SERPL-MCNC: 20 MG/DL (ref 7–30)
CALCIUM SERPL-MCNC: 9.3 MG/DL (ref 8.5–10.1)
CHLORIDE SERPL-SCNC: 106 MMOL/L (ref 94–109)
CHOLEST SERPL-MCNC: 174 MG/DL
CO2 SERPL-SCNC: 24 MMOL/L (ref 20–32)
CREAT SERPL-MCNC: 1.12 MG/DL (ref 0.66–1.25)
DIFFERENTIAL METHOD BLD: NORMAL
EOSINOPHIL NFR BLD AUTO: 4.5 %
ERYTHROCYTE [DISTWIDTH] IN BLOOD BY AUTOMATED COUNT: 12.2 % (ref 10–15)
GFR SERPL CREATININE-BSD FRML MDRD: 69 ML/MIN/{1.73_M2}
GLUCOSE SERPL-MCNC: 161 MG/DL (ref 70–99)
HBA1C MFR BLD: 5.7 % (ref 0–5.6)
HCT VFR BLD AUTO: 44.8 % (ref 40–53)
HDLC SERPL-MCNC: 34 MG/DL
HGB BLD-MCNC: 15.5 G/DL (ref 13.3–17.7)
IMM GRANULOCYTES # BLD: 0.1 10E9/L (ref 0–0.4)
IMM GRANULOCYTES NFR BLD: 1.7 %
LDLC SERPL CALC-MCNC: 90 MG/DL
LYMPHOCYTES # BLD AUTO: 1.3 10E9/L (ref 0.8–5.3)
LYMPHOCYTES NFR BLD AUTO: 21.4 %
MCH RBC QN AUTO: 31.9 PG (ref 26.5–33)
MCHC RBC AUTO-ENTMCNC: 34.6 G/DL (ref 31.5–36.5)
MCV RBC AUTO: 92 FL (ref 78–100)
MONOCYTES # BLD AUTO: 0.6 10E9/L (ref 0–1.3)
MONOCYTES NFR BLD AUTO: 10.3 %
NEUTROPHILS # BLD AUTO: 3.7 10E9/L (ref 1.6–8.3)
NEUTROPHILS NFR BLD AUTO: 61.4 %
NONHDLC SERPL-MCNC: 140 MG/DL
NRBC # BLD AUTO: 0 10*3/UL
NRBC BLD AUTO-RTO: 0 /100
PLATELET # BLD AUTO: 197 10E9/L (ref 150–450)
POTASSIUM SERPL-SCNC: 4.6 MMOL/L (ref 3.4–5.3)
PROT SERPL-MCNC: 7.2 G/DL (ref 6.8–8.8)
RBC # BLD AUTO: 4.86 10E12/L (ref 4.4–5.9)
SODIUM SERPL-SCNC: 139 MMOL/L (ref 133–144)
T4 FREE SERPL-MCNC: 1.08 NG/DL (ref 0.76–1.46)
TRIGL SERPL-MCNC: 252 MG/DL
TSH SERPL DL<=0.005 MIU/L-ACNC: 6.02 MU/L (ref 0.4–4)
WBC # BLD AUTO: 6 10E9/L (ref 4–11)

## 2020-11-04 PROCEDURE — 36415 COLL VENOUS BLD VENIPUNCTURE: CPT | Performed by: ORTHOPAEDIC SURGERY

## 2020-11-04 PROCEDURE — 84439 ASSAY OF FREE THYROXINE: CPT | Performed by: ORTHOPAEDIC SURGERY

## 2020-11-04 PROCEDURE — 80050 GENERAL HEALTH PANEL: CPT | Performed by: ORTHOPAEDIC SURGERY

## 2020-11-04 PROCEDURE — 83036 HEMOGLOBIN GLYCOSYLATED A1C: CPT | Performed by: ORTHOPAEDIC SURGERY

## 2020-11-04 PROCEDURE — 99214 OFFICE O/P EST MOD 30 MIN: CPT | Performed by: FAMILY MEDICINE

## 2020-11-04 PROCEDURE — 80061 LIPID PANEL: CPT | Performed by: ORTHOPAEDIC SURGERY

## 2020-11-04 RX ORDER — LEVOTHYROXINE SODIUM 100 UG/1
100 TABLET ORAL DAILY
Qty: 90 TABLET | Refills: 1 | Status: SHIPPED | OUTPATIENT
Start: 2020-11-04 | End: 2021-06-03

## 2020-11-04 ASSESSMENT — PAIN SCALES - GENERAL: PAINLEVEL: NO PAIN (0)

## 2020-11-04 NOTE — NURSING NOTE
Chief Complaint   Patient presents with     Pre-Op Exam     11/17/2020 Dr. Berman right total knee replacement

## 2020-11-04 NOTE — PATIENT INSTRUCTIONS

## 2020-11-04 NOTE — PROGRESS NOTES
[address] [address] [phone]  Dept: [phone]    PRE-OP EVALUATION:  Today's date: 2020    Joel Pike (: 1955) presents for pre-operative evaluation assessment as requested by Dr. Berman.  He requires evaluation and anesthesia risk assessment prior to undergoing surgery/procedure for treatment of right total knee replacement .    Fax number for surgical facility:   Primary Physician: Cayden Ramsey  Type of Anesthesia Anticipated: General    Preop Questionnnaire:  Pre-op Questionnaire 2020   1. Have you ever had a heart attack or stroke? No   2. Have you ever had surgery on your heart or blood vessels, such as a stent placement, a coronary artery bypass, or surgery on an artery in your head, neck, heart, or legs? No   3. Do you have chest pain with activity? No   4. Do you have a history of  heart failure? No   5. Do you currently have a cold, bronchitis or symptoms of other infection? No   6. Do you have a cough, shortness of breath, or wheezing? No   7. Do you or anyone in your family have previous history of blood clots? No   8. Do you or does anyone in your family have a serious bleeding problem such as prolonged bleeding following surgeries or cuts? No   9. Have you ever had problems with anemia or been told to take iron pills? No   10. Have you had any abnormal blood loss such as black, tarry or bloody stools? No   11. Have you ever had a blood transfusion? No   12. Are you willing to have a blood transfusion if it is medically needed before, during, or after your surgery? Yes   13. Have you or any of your relatives ever had problems with anesthesia? YES - had issues when he had his wisdom teeth taken out when he was a teenager, nothing recently   14. Do you have sleep apnea, excessive snoring or daytime drowsiness? YES - uses CPAP, will bring it to the hospital   14a. Do you have a CPAP machine? Yes   15. Do you have  any artifical heart valves or other implanted medical devices like a pacemaker, defibrillator, or continuous glucose monitor? No   16. Do you have artificial joints? YES - left knee   17. Are you allergic to latex? No         HPI:     HPI related to upcoming procedure: history of degenerative arthritis in both knees, left was replaced in 2/2020 and now scheduled for the right.      See problem list for active medical problems.  Problems all longstanding and stable, except as noted/documented.  See ROS for pertinent symptoms related to these conditions.      MEDICAL HISTORY:     Patient Active Problem List    Diagnosis Date Noted     Primary osteoarthritis of right knee 08/27/2020     Priority: Medium     Added automatically from request for surgery [id number]       S/P total knee replacement using cement, left 02/04/2020     Priority: Medium     Microalbuminuria 02/26/2019     Priority: Medium     Recurrent major depression in complete remission (H) 11/08/2017     Priority: Medium     CKD (chronic kidney disease) stage 2, GFR 60-89 ml/min 10/30/2015     Priority: Medium     Obesity, Class I, BMI 30-34.9 10/30/2015     Priority: Medium     Type 2 diabetes mellitus with stage 2 chronic kidney disease (H) 04/19/2013     Priority: Medium     Subclinical hypothyroidism 03/02/2012     Priority: Medium     Anxiety 03/02/2012     Priority: Medium     Sebaceous cyst 02/03/2012     Priority: Medium     left forehead       Advanced directives, counseling/discussion 08/19/2011     Priority: Medium     Advance Directive Problem List Overview:   Name Relationship Phone    Primary Health Care Agent            Alternative Health Care Agent          Discussed advance care planning with patient; information given to patient to review.//Macey Calvert/AISAH(AAMA)  8/19/2011 and again 11/9/16         Tinnitus 07/22/2011     Priority: Medium     right ear, began after accident, immediately       Hyperlipidemia LDL goal <100 01/28/2011      Priority: Medium     HILARIO (obstructive sleep apnea) 01/28/2011     Priority: Medium     Chronic rhinitis 01/15/2010     Priority: Medium     Degeneration of lumbar or lumbosacral intervertebral disc 12/19/2005     Priority: Medium     Cough 12/19/2005     Priority: Medium     DERMATITIS WHEN IN THE SUN, NOT BURN 12/19/2005     Priority: Medium     Gastroesophageal reflux disease without esophagitis 05/14/2003     Priority: Medium      Past Medical History:   Diagnosis Date     Calculus of kidney      CKD (chronic kidney disease) stage 2, GFR 60-89 ml/min 10/30/2015     Degeneration of lumbar or lumbosacral intervertebral disc      Depressive disorder      Depressive disorder, not elsewhere classified      Diabetes (H)      Diabetic eye exam (H) 02/06/12, 11/1/13     Diabetic eye exam (H) 12/17/14     Hyperlipidaemia      Hypertension      Hypothyroidism 1/17/2010     Obesity, Class I, BMI 30-34.9 10/30/2015     Primary osteoarthritis of left knee      Uncomplicated asthma      Past Surgical History:   Procedure Laterality Date     ARTHROPLASTY KNEE Left 2/4/2020    Procedure: left total knee replacement;  Surgeon: Jason Berman DO;  Location: [facility]     COLONOSCOPY  02/02/09    Repeat in 5 yrs     COLONOSCOPY N/A 9/5/2014    Procedure: COMBINED COLONOSCOPY, SINGLE BIOPSY/POLYPECTOMY BY BIOPSY;  Surgeon: Denis Oakes MD;  Location: [facility]     ESOPHAGOSCOPY, GASTROSCOPY, DUODENOSCOPY (EGD), COMBINED N/A 2/20/2015    Procedure: COMBINED ESOPHAGOSCOPY, GASTROSCOPY, DUODENOSCOPY (EGD), BIOPSY SINGLE OR MULTIPLE;  Surgeon: Alfred Young MD;  Location: [facility]     HC REMV CATARACT EXTRACAP,INSERT LENS, W/O ECP  2000    Bilateral     HC REVISE MEDIAN N/CARPAL TUNNEL SURG  1991     HC TOOTH EXTRACTION W/FORCEP  1980's    Arnold teeth removed     RELEASE CARPAL TUNNEL Right 6/16/2017    Procedure: RELEASE CARPAL TUNNEL;  Right carpal tunnel release;  Surgeon: Jason Berman DO;  Location: [facility]      RELEASE CARPAL TUNNEL Left 7/11/2017    Procedure: RELEASE CARPAL TUNNEL;  Left carpal tunnel release;  Surgeon: Jason Breman DO;  Location: [facility]     SOFT TISSUE SURGERY       Current Outpatient Medications   Medication Sig Dispense Refill     acetaminophen (TYLENOL) 325 MG tablet Take 3 tablets (975 mg) by mouth every 8 hours as needed for mild pain 100 tablet 1     albuterol (PROAIR HFA) 108 (90 BASE) MCG/ACT Inhaler 1-2 puffs every 2 -4 hrs as needed 1 Inhaler 5     blood glucose (HUGO CONTOUR) test strip Use to test blood sugars 1 time daily or as directed. 1 Box 5     blood glucose (NO BRAND SPECIFIED) lancets standard Use to test blood sugar one time daily or as directed. 100 each 3     blood glucose calibration (HUGO CONTOUR) NORMAL solution Use to calibrate blood glucose monitor as directed. 1 each 3     buPROPion (WELLBUTRIN XL) 300 MG 24 hr tablet Take 1 tablet (300 mg) by mouth every morning 90 tablet 3     cholecalciferol (VITAMIN D3) 5000 UNITS TABS tablet Take 1 tablet (5,000 Units) by mouth       Coenzyme Q-10 capsule Take 1 capsule by mouth daily. 30 capsule 12     docusate sodium (COLACE) 100 MG capsule Take 1 capsule (100 mg) by mouth 2 times daily as needed for constipation 60 capsule 1     esomeprazole (NEXIUM) 40 MG DR capsule TAKE 1 CAPSULE EVERY MORNING BEFORE BREAKFAST, 30 TO 60 MINUTES BEFORE EATING. 90 capsule 3     ezetimibe (ZETIA) 10 MG tablet Take 1 tablet (10 mg) by mouth daily 90 tablet 3     fexofenadine-pseudoePHEDrine (ALLEGRA-D 24) 180-240 MG 24 hr tablet Take 1 tablet by mouth daily 90 tablet 0     fish oil-omega-3 fatty acids 1000 MG capsule Take  by mouth. Take daily   180 capsule 12     hydrOXYzine (ATARAX) 25 MG tablet Take 1 tablet (25 mg) by mouth every 8 hours as needed for itching or other (adjuvant pain) 40 tablet 0     levothyroxine (SYNTHROID/LEVOTHROID) 50 MCG tablet TAKE ONE AND ONE-HALF TABLETS BY MOUTH DAILY 135 tablet 3     losartan (COZAAR) 50  MG tablet Take 1 tablet (50 mg) by mouth daily 90 tablet 3     Magnesium 500 MG CAPS One twice a day 30 capsule      metFORMIN (GLUCOPHAGE) 500 MG tablet TAKE ONE TABLET BY MOUTH TWICE A DAY WITH MEALS 180 tablet 3     Misc Natural Products (OSTEO BI-FLEX ADV JOINT SHIELD) TABS Take  by mouth.       multivitamin (OCUVITE) TABS tablet Take 1 tablet by mouth daily 30 each 0     ondansetron (ZOFRAN-ODT) 4 MG ODT tab Take 1 tablet (4 mg) by mouth every 8 hours as needed for nausea or vomiting 10 tablet 1     order for DME Equipment being ordered: Large thigh high compression stockings. 30-40mmHg for compression. 1 Device 1     sucralfate (CARAFATE) 1 GM/10ML suspension Take 10 mLs (1 g) by mouth 4 times daily 420 mL 1     tiZANidine 4 MG PO capsule Take 1 capsule (4 mg) by mouth 3 times daily as needed for muscle spasms 45 capsule 1     OTC products: None, except as noted above takes a daily aspirin but will stop 7 days prior to surgery    Allergies   Allergen Reactions     Dust Mites Cough     Penicillins Rash and Hives      Latex Allergy: NO    Social History     Tobacco Use     Smoking status: Never Smoker     Smokeless tobacco: Never Used   Substance Use Topics     Alcohol use: No     Alcohol/week: 0.0 standard drinks     History   Drug Use No       REVIEW OF SYSTEMS:   Constitutional, neuro, ENT, endocrine, pulmonary, cardiac, gastrointestinal, genitourinary, musculoskeletal, integument and psychiatric systems are negative, except as otherwise noted.    EXAM:   /82   Pulse 92   Temp 98.3  F (36.8  C) (Temporal)   Resp 20   Wt 103.9 kg (229 lb)   SpO2 94%   BMI 33.57 kg/m      GENERAL APPEARANCE: healthy, alert and no distress     EYES: EOMI,  PERRL     HENT: ear canals and TM's normal and nose and mouth without ulcers or lesions     NECK: no adenopathy, no asymmetry, masses, or scars and thyroid normal to palpation     RESP: lungs clear to auscultation - no rales, rhonchi or wheezes     CV: regular  rates and rhythm, normal S1 S2, no S3 or S4 and no murmur, click or rub     ABDOMEN:  soft, nontender, no HSM or masses and bowel sounds normal     MS: moves all extremities well.  No focal exam of right knee done today.     SKIN: no suspicious lesions or rashes     NEURO: none focal mentation intact and speech normal     PSYCH: mentation appears normal. and affect normal/bright     LYMPHATICS: No cervical adenopathy    DIAGNOSTICS:     Labs Drawn and in Process:   Unresulted Labs Ordered in the Past 30 Days of this Admission     No orders found from 10/5/2020 to 11/5/2020.          Recent Labs   Lab Test 02/05/20  0515 01/27/20  0745 10/28/19  0854   HGB 13.7 15.0 15.4   PLT  --  240 227   NA  --  141 139   POTASSIUM  --  4.7 4.4   CR  --  1.25 1.08   A1C  --  5.5 5.5        IMPRESSION:   Reason for surgery/procedure: Primary osteoarthritis (degenerative) of the right knee scheduled for a total knee replacement with Dr. Berman on 11/17/2020  Diagnosis/reason for consult: Preoperative evaluation for surgery and anesthesia     The proposed surgical procedure is considered INTERMEDIATE risk.    REVISED CARDIAC RISK INDEX  The patient has the following serious cardiovascular risks for perioperative complications such as (MI, PE, VFib and 3  AV Block):  No serious cardiac risks  INTERPRETATION: 0 risks: Class I (very low risk - 0.4% complication rate)    The patient has the following additional risks for perioperative complications:  No identified additional risks      ICD-10-CM    1. Preop general physical exam  Z01.818 Asymptomatic COVID-19 Virus (Coronavirus) by PCR     **CBC with platelets differential FUTURE 2mo   2. Primary osteoarthritis of right knee  M17.11 Asymptomatic COVID-19 Virus (Coronavirus) by PCR   3. Subclinical hypothyroidism  E03.9 **TSH with free T4 reflex FUTURE anytime   4. Hyperlipidemia LDL goal <100  E78.5 Lipid panel reflex to direct LDL Fasting     **Comprehensive metabolic panel FUTURE  anytime   5. Type 2 diabetes mellitus with stage 2 chronic kidney disease, without long-term current use of insulin (H)  E11.22 **A1C FUTURE anytime    N18.2 **Comprehensive metabolic panel FUTURE anytime       RECOMMENDATIONS:   His last knee replacement in February 2020 he did spinal anethesia.  He will do a spinal again with this surgery.  He was due for his labs today so did obtain them to make sure all is still stable.  As long as there is not any significant changes, he can proceed with surgery.    Diabetes Medication Use  -----Hold usual oral and non-insulin diabetic meds (e.g. Metformin, Actos, Glipizide) while NPO.       ACE Inhibitor or Angiotensin Receptor Blocker (ARB) Use  Ace inhibitor or Angiotensin Receptor Blocker (ARB) and should HOLD this medication for the 24 hours prior to surgery.    Will hold aspirin 7 days prior to surgery.  He will take his levothyroxine with a sip of water the morning of surgery.      APPROVAL GIVEN to proceed with proposed procedure, without further diagnostic evaluation       Signed Electronically by: Mal Gonzales MD    Copy of this evaluation report is provided to requesting physician.    [facility] Preop Guidelines    Revised Cardiac Risk Index

## 2020-11-05 ENCOUNTER — MYC MEDICAL ADVICE (OUTPATIENT)
Dept: FAMILY MEDICINE | Facility: CLINIC | Age: 65
End: 2020-11-05

## 2020-11-05 ENCOUNTER — OFFICE VISIT (OUTPATIENT)
Dept: ORTHOPEDICS | Facility: CLINIC | Age: 65
End: 2020-11-05
Payer: COMMERCIAL

## 2020-11-05 VITALS
BODY MASS INDEX: 33.77 KG/M2 | WEIGHT: 228 LBS | HEIGHT: 69 IN | SYSTOLIC BLOOD PRESSURE: 138 MMHG | DIASTOLIC BLOOD PRESSURE: 70 MMHG

## 2020-11-05 DIAGNOSIS — J31.0 CHRONIC RHINITIS: ICD-10-CM

## 2020-11-05 DIAGNOSIS — M17.11 PRIMARY OSTEOARTHRITIS OF RIGHT KNEE: Primary | ICD-10-CM

## 2020-11-05 DIAGNOSIS — Z20.822 COVID-19 RULED OUT: Primary | ICD-10-CM

## 2020-11-05 PROCEDURE — 99207 PR PREOP VISIT IN GLOBAL PKG: CPT | Performed by: NURSE PRACTITIONER

## 2020-11-05 ASSESSMENT — MIFFLIN-ST. JEOR: SCORE: 1818.54

## 2020-11-05 ASSESSMENT — PAIN SCALES - GENERAL: PAINLEVEL: MILD PAIN (3)

## 2020-11-05 NOTE — PROGRESS NOTES
History and physical reviewed he is been medically optimized for right total knee replacement.  Labs reviewed and unremarkable.  Anticipate next day discharge aspirin for DVT prophylaxis.  Antibiotic cement due to prior total.  Will plan for direct to outpatient physical therapy. Order placed today  Oxycodone for pain.   Left total knee arthroplasty performed February 2020.      SYLVIA Stevens, CNP  Orthopedic Surgery

## 2020-11-05 NOTE — LETTER
11/5/2020         RE: Joel Pike  [address]  [facility] [address]        Dear Colleague,    Thank you for referring your patient, Joel Pike, to the [facility]. Please see a copy of my visit note below.    History and physical reviewed he is been medically optimized for right total knee replacement.  Labs reviewed and unremarkable.  Anticipate next day discharge aspirin for DVT prophylaxis.  Antibiotic cement due to prior total.  Will plan for direct to outpatient physical therapy. Order placed today  Oxycodone for pain.   Left total knee arthroplasty performed February 2020.      SYLVIA Stevens, CNP  Orthopedic Surgery          Again, thank you for allowing me to participate in the care of your patient.        Sincerely,        Jason Berman, DO

## 2020-11-06 ENCOUNTER — VIRTUAL VISIT (OUTPATIENT)
Dept: FAMILY MEDICINE | Facility: CLINIC | Age: 65
End: 2020-11-06
Payer: COMMERCIAL

## 2020-11-06 DIAGNOSIS — Z20.822 EXPOSURE TO COVID-19 VIRUS: Primary | ICD-10-CM

## 2020-11-06 PROCEDURE — 99212 OFFICE O/P EST SF 10 MIN: CPT | Mod: 95 | Performed by: PHYSICIAN ASSISTANT

## 2020-11-06 RX ORDER — FEXOFENADINE HYDROCHLORIDE AND PSEUDOEPHEDRINE HYDROCHLORIDE 180; 240 MG/1; MG/1
TABLET, FILM COATED, EXTENDED RELEASE ORAL
Qty: 90 TABLET | Refills: 0 | Status: SHIPPED | OUTPATIENT
Start: 2020-11-06 | End: 2022-03-16

## 2020-11-06 NOTE — PROGRESS NOTES
"Joel Pike is a 65 year old male who is being evaluated via a billable telephone visit.      The patient has been notified of following:     \"This telephone visit will be conducted via a call between you and your physician/provider. We have found that certain health care needs can be provided without the need for a physical exam.  This service lets us provide the care you need with a short phone conversation.  If a prescription is necessary we can send it directly to your pharmacy.  If lab work is needed we can place an order for that and you can then stop by our lab to have the test done at a later time.    Telephone visits are billed at different rates depending on your insurance coverage. During this emergency period, for some insurers they may be billed the same as an in-person visit.  Please reach out to your insurance provider with any questions.    If during the course of the call the physician/provider feels a telephone visit is not appropriate, you will not be charged for this service.\"    Patient has given verbal consent for Telephone visit?  Yes    What phone number would you like to be contacted at?     How would you like to obtain your AVS? MyChart    Subjective     Joel Pike is a 65 year old male who presents via phone visit today for the following health issues:    HPI     Exposure to covid 9 days ago to brother and his wife and brother's wife has covid   No symptoms    I have knee surgery scheduled on the 17 th and they want covid test before the surgery  Patient's mother is 87 years old   covid test was ordered this morning by PCP but they have not been called to schedule the test         Review of Systems   Constitutional, HEENT, cardiovascular, pulmonary, gi and gu systems are negative, except as otherwise noted.       Objective          Vitals:  No vitals were obtained today due to virtual visit.    healthy, alert and no distress  PSYCH: Alert and oriented times 3; coherent speech, normal " "  rate and volume, able to articulate logical thoughts, able   to abstract reason, no tangential thoughts, no hallucinations   or delusions  His affect is normal and pleasant  RESP: No cough, no audible wheezing, able to talk in full sentences  Remainder of exam unable to be completed due to telephone visits            Assessment/Plan:    Assessment & Plan     Exposure to COVID-19 virus  Patient is asymptomatic- advised to quarantine for total of 14 days since exposure  Testing has been ordered and in chart from PCP this am.   Reviewed hygiene recommendations and advised someone would be calling to set up test - may need a second test before surgery.               Patient Instructions   Someone will call to schedule covid testing  Quarantine yourself for 14 days from exposure or 10 days since onset of symptoms or 24 hours since symptoms resolved- whichever is longer.   Ideally you should be tested 5-7 days since exposure but it has been long enough that any testing in the next several days is appropriate   Contact your surgeon to see   Discharge Instructions for COVID-19  Call [facility] [place] if no one has contacted you by Monday     Patients  You have--or may have--COVID-19. Please follow the instructions listed below.   If you have a weakened immune system, discuss with your doctor any other actions you need to take.  How can I protect others?  If you have symptoms (fever, cough, body aches or trouble breathing):    Stay home and away from others (self-isolate) until:  ? At least 10 days have passed since your symptoms started, And   ? You've had no fever--and no medicine that reduces fever--for 1 full day (24 hours), And    ? Your other symptoms have resolved (gotten better).  If you don't show symptoms, but testing showed that you have COVID-19:    Stay home and away from others (self-isolate). Follow the tips under \"How do I self-isolate?\" below for 10 days (20 days if you have a weak immune system).    You don't " "need to be retested for COVID-19 before going back to school or work. As long as you're fever-free and feeling better, you can go back to school, work and other activities after waiting the 10 or 20 days.   How do I self-isolate?    Stay in your own room, even for meals. Use your own bathroom if you can.    Stay away from others in your home. No hugging, kissing or shaking hands. No visitors.    Don't go to work, school or anywhere else.    Clean \"high touch\" surfaces often (doorknobs, counters, handles). Use household cleaning spray or wipes. You'll find a full list of  on the EPA website: www.epa.gov/pesticide-registration/list-n-disinfectants-use-against-sars-cov-2.    Cover your mouth and nose with a mask or other face covering to avoid spreading germs.    Wash your hands and face often. Use soap and water.    Caregivers in these groups are at risk for severe illness due to COVID-19:  ? People 65 years and older  ? People who live in a nursing home or long-term care facility  ? People with chronic disease (lung, heart, cancer, diabetes, kidney, liver, immunologic)  ? People who have a weakened immune system, including those who:    Are in cancer treatment    Take medicine that weakens the immune system, such as corticosteroids    Had a bone marrow or organ transplant    Have an immune deficiency    Have poorly controlled HIV or AIDS    Are obese (body mass index of 40 or higher)    Smoke regularly    Caregivers should wear gloves while washing dishes, handling laundry and cleaning bedrooms and bathrooms.    Use caution when washing and drying laundry: Don't shake dirty laundry and use the warmest water setting that you can.    For more tips on managing your health at home, go to www.cdc.gov/coronavirus/2019-ncov/downloads/10Things.pdf.  How can I take care of myself at home?  1. Get lots of rest. Drink extra fluids (unless a doctor has told you not to).    2. Take Tylenol (acetaminophen) for fever or " pain. If you have liver or kidney problems, ask your family doctor if it's okay to take Tylenol.     Adults can take either:  ? 650 mg (two 325 mg pills) every 4 to 6 hours, or   ? 1,000 mg (two 500 mg pills) every 8 hours as needed.  ? Note: Don't take more than 3,000 mg in one day. Acetaminophen is found in many medicines (both prescribed and over-the-counter medicines). Read all labels to be sure you don't take too much.   For children, check the Tylenol bottle for the right dose. The dose is based on the child's age or weight.  3. If you have other health problems (like cancer, heart failure, an organ transplant or severe kidney disease): Call your specialty clinic if you don't feel better in the next 2 days.    4. Know when to call 911. Emergency warning signs include:  ? Trouble breathing or shortness of breath  ? Pain or pressure in the chest that doesn't go away  ? Feeling confused like you haven't felt before, or not being able to wake up  ? Bluish-colored lips or face    5. Your doctor may have prescribed a blood thinner medicine. Follow their instructions.  Where can I get more information?    [facility] - About COVID-19: [email or web address]    CDC - What to Do If You're Sick: www.cdc.gov/coronavirus/2019-ncov/about/steps-when-sick.html    CDC - Ending Home Isolation: www.cdc.gov/coronavirus/2019-ncov/hcp/disposition-in-home-patients.html    CDC - Caring for Someone: www.cdc.gov/coronavirus/2019-ncov/if-you-are-sick/care-for-someone.html    [facility] - Interim Guidance for Hospital Discharge to Home: www.health.[facility].[place]/diseases/coronavirus/hcp/hospdischarge.pdf    [facility] clinical trials (COVID-19 research studies): clinicalaffairs.[facility]/umn-clinical-trials    Below are the COVID-19 hotlines at the [place] Department of Health ([facility]). Interpreters are available.  ? For health questions: Call 526-841-1459 or 1-147.936.9892 (7 a.m. to 7 p.m.)  ? For questions about schools  and childcare: Call 547-306-7716 or 1-289.452.6083 (7 a.m. to 7 p.m.)    For informational purposes only. Not to replace the advice of your health care provider. Clinically reviewed by the Infection Prevention Team. Copyright   2020 [facility]. All rights reserved. [employer] 244824 - REV 08/04/20.          Return in about 4 weeks (around 12/4/2020), or if symptoms worsen or fail to improve, for in person.    Mar Silverio PA-C  [facility]    Phone call duration:  9 minutes

## 2020-11-06 NOTE — PATIENT INSTRUCTIONS
"Someone will call to schedule covid testing  Quarantine yourself for 14 days from exposure or 10 days since onset of symptoms or 24 hours since symptoms resolved- whichever is longer.   Ideally you should be tested 5-7 days since exposure but it has been long enough that any testing in the next several days is appropriate   Contact your surgeon to see   Discharge Instructions for COVID-19  Call [phone] if no one has contacted you by Monday     Patients  You have--or may have--COVID-19. Please follow the instructions listed below.   If you have a weakened immune system, discuss with your doctor any other actions you need to take.  How can I protect others?  If you have symptoms (fever, cough, body aches or trouble breathing):    Stay home and away from others (self-isolate) until:  ? At least 10 days have passed since your symptoms started, And   ? You've had no fever--and no medicine that reduces fever--for 1 full day (24 hours), And    ? Your other symptoms have resolved (gotten better).  If you don't show symptoms, but testing showed that you have COVID-19:    Stay home and away from others (self-isolate). Follow the tips under \"How do I self-isolate?\" below for 10 days (20 days if you have a weak immune system).    You don't need to be retested for COVID-19 before going back to school or work. As long as you're fever-free and feeling better, you can go back to school, work and other activities after waiting the 10 or 20 days.   How do I self-isolate?    Stay in your own room, even for meals. Use your own bathroom if you can.    Stay away from others in your home. No hugging, kissing or shaking hands. No visitors.    Don't go to work, school or anywhere else.    Clean \"high touch\" surfaces often (doorknobs, counters, handles). Use household cleaning spray or wipes. You'll find a full list of  on the EPA website: www.epa.gov/pesticide-registration/list-n-disinfectants-use-against-sars-cov-2.    Cover " your mouth and nose with a mask or other face covering to avoid spreading germs.    Wash your hands and face often. Use soap and water.    Caregivers in these groups are at risk for severe illness due to COVID-19:  ? People 65 years and older  ? People who live in a nursing home or long-term care facility  ? People with chronic disease (lung, heart, cancer, diabetes, kidney, liver, immunologic)  ? People who have a weakened immune system, including those who:    Are in cancer treatment    Take medicine that weakens the immune system, such as corticosteroids    Had a bone marrow or organ transplant    Have an immune deficiency    Have poorly controlled HIV or AIDS    Are obese (body mass index of 40 or higher)    Smoke regularly    Caregivers should wear gloves while washing dishes, handling laundry and cleaning bedrooms and bathrooms.    Use caution when washing and drying laundry: Don't shake dirty laundry and use the warmest water setting that you can.    For more tips on managing your health at home, go to www.cdc.gov/coronavirus/2019-ncov/downloads/10Things.pdf.  How can I take care of myself at home?  1. Get lots of rest. Drink extra fluids (unless a doctor has told you not to).    2. Take Tylenol (acetaminophen) for fever or pain. If you have liver or kidney problems, ask your family doctor if it's okay to take Tylenol.     Adults can take either:  ? 650 mg (two 325 mg pills) every 4 to 6 hours, or   ? 1,000 mg (two 500 mg pills) every 8 hours as needed.  ? Note: Don't take more than 3,000 mg in one day. Acetaminophen is found in many medicines (both prescribed and over-the-counter medicines). Read all labels to be sure you don't take too much.   For children, check the Tylenol bottle for the right dose. The dose is based on the child's age or weight.  3. If you have other health problems (like cancer, heart failure, an organ transplant or severe kidney disease): Call your specialty clinic if you don't feel  better in the next 2 days.    4. Know when to call 911. Emergency warning signs include:  ? Trouble breathing or shortness of breath  ? Pain or pressure in the chest that doesn't go away  ? Feeling confused like you haven't felt before, or not being able to wake up  ? Bluish-colored lips or face    5. Your doctor may have prescribed a blood thinner medicine. Follow their instructions.  Where can I get more information?    [facility] - About COVID-19: [email or web address]    CDC - What to Do If You're Sick: www.cdc.gov/coronavirus/2019-ncov/about/steps-when-sick.html    CDC - Ending Home Isolation: www.cdc.gov/coronavirus/2019-ncov/hcp/disposition-in-home-patients.html    CDC - Caring for Someone: www.cdc.gov/coronavirus/2019-ncov/if-you-are-sick/care-for-someone.html    [facility] - Interim Guidance for Hospital Discharge to Home: www.[facility].[place]/diseases/coronavirus/hcp/hospdischarge.pdf    [facility] clinical trials (COVID-19 research studies): clinicalaffairs.[facility]-clinical-trials    Below are the COVID-19 hotlines at the [place] Department of [facility] ([facility]). Interpreters are available.  ? For health questions: Call 529-730-1820 or 1-871.629.9000 (7 a.m. to 7 p.m.)  ? For questions about schools and childcare: Call 492-418-3073 or 1-668.390.6661 (7 a.m. to 7 p.m.)    For informational purposes only. Not to replace the advice of your health care provider. Clinically reviewed by the Infection Prevention Team. Copyright   2020 [facility] [employer]. All rights reserved. [employer] 701747 - REV 08/04/20.

## 2020-11-12 NOTE — TELEPHONE ENCOUNTER
Notified patient that due to covid we are going to have to reschedule his surgery to another date. I will be in touch with him tomorrow with more details. Patient was very understand and was OK with this plan.

## 2020-11-13 DIAGNOSIS — Z11.59 ENCOUNTER FOR SCREENING FOR OTHER VIRAL DISEASES: Primary | ICD-10-CM

## 2020-11-13 NOTE — TELEPHONE ENCOUNTER
Surgery has been rescheduled to 11/24, ok per Lorge.  covid and postop were also reschedule.  Patient ok with new dates.

## 2020-11-20 NOTE — TELEPHONE ENCOUNTER
Due to Covid, please cancel 11/24 Surgery. Place in covid depot.    Patient informed we will call back to reschedule when we are able.

## 2020-12-22 NOTE — TELEPHONE ENCOUNTER
Type of surgery: right total knee replacement  Location of surgery: [facility]   Date of surgery: 1/18/21  Surgeon: Dr. Berman  Pre-Op Appt Date: 1/6/21  Post-Op Appt Date: 2/1/21   Packet sent out: Surgery packet mailed to patient's home address.   Pre-cert/Authorization completed: NA  Date: 12/22/2020    Nataly Chambers  Surgery Scheduler

## 2020-12-31 DIAGNOSIS — Z11.59 ENCOUNTER FOR SCREENING FOR OTHER VIRAL DISEASES: Primary | ICD-10-CM

## 2021-01-06 ENCOUNTER — OFFICE VISIT (OUTPATIENT)
Dept: FAMILY MEDICINE | Facility: CLINIC | Age: 66
End: 2021-01-06
Payer: COMMERCIAL

## 2021-01-06 VITALS
BODY MASS INDEX: 34.8 KG/M2 | WEIGHT: 235 LBS | HEIGHT: 69 IN | RESPIRATION RATE: 20 BRPM | DIASTOLIC BLOOD PRESSURE: 80 MMHG | TEMPERATURE: 97.3 F | SYSTOLIC BLOOD PRESSURE: 134 MMHG | HEART RATE: 94 BPM

## 2021-01-06 DIAGNOSIS — N18.2 CKD (CHRONIC KIDNEY DISEASE) STAGE 2, GFR 60-89 ML/MIN: ICD-10-CM

## 2021-01-06 DIAGNOSIS — N18.2 TYPE 2 DIABETES MELLITUS WITH STAGE 2 CHRONIC KIDNEY DISEASE, WITHOUT LONG-TERM CURRENT USE OF INSULIN (H): ICD-10-CM

## 2021-01-06 DIAGNOSIS — E11.22 TYPE 2 DIABETES MELLITUS WITH STAGE 2 CHRONIC KIDNEY DISEASE, WITHOUT LONG-TERM CURRENT USE OF INSULIN (H): ICD-10-CM

## 2021-01-06 DIAGNOSIS — Z01.818 PREOP GENERAL PHYSICAL EXAM: Primary | ICD-10-CM

## 2021-01-06 DIAGNOSIS — M17.11 PRIMARY OSTEOARTHRITIS OF RIGHT KNEE: ICD-10-CM

## 2021-01-06 DIAGNOSIS — G47.33 OSA (OBSTRUCTIVE SLEEP APNEA): ICD-10-CM

## 2021-01-06 LAB
ANION GAP SERPL CALCULATED.3IONS-SCNC: 4 MMOL/L (ref 3–14)
BUN SERPL-MCNC: 21 MG/DL (ref 7–30)
CALCIUM SERPL-MCNC: 9.6 MG/DL (ref 8.5–10.1)
CHLORIDE SERPL-SCNC: 106 MMOL/L (ref 94–109)
CO2 SERPL-SCNC: 30 MMOL/L (ref 20–32)
CREAT SERPL-MCNC: 1.21 MG/DL (ref 0.66–1.25)
CREAT UR-MCNC: 127 MG/DL
ERYTHROCYTE [DISTWIDTH] IN BLOOD BY AUTOMATED COUNT: 12.4 % (ref 10–15)
GFR SERPL CREATININE-BSD FRML MDRD: 63 ML/MIN/{1.73_M2}
GLUCOSE SERPL-MCNC: 140 MG/DL (ref 70–99)
HCT VFR BLD AUTO: 42.8 % (ref 40–53)
HGB BLD-MCNC: 14.7 G/DL (ref 13.3–17.7)
MCH RBC QN AUTO: 32 PG (ref 26.5–33)
MCHC RBC AUTO-ENTMCNC: 34.3 G/DL (ref 31.5–36.5)
MCV RBC AUTO: 93 FL (ref 78–100)
MICROALBUMIN UR-MCNC: 1140 MG/L
MICROALBUMIN/CREAT UR: 897.64 MG/G CR (ref 0–17)
PLATELET # BLD AUTO: 222 10E9/L (ref 150–450)
POTASSIUM SERPL-SCNC: 4.1 MMOL/L (ref 3.4–5.3)
RBC # BLD AUTO: 4.6 10E12/L (ref 4.4–5.9)
SODIUM SERPL-SCNC: 140 MMOL/L (ref 133–144)
WBC # BLD AUTO: 8.5 10E9/L (ref 4–11)

## 2021-01-06 PROCEDURE — 36415 COLL VENOUS BLD VENIPUNCTURE: CPT | Performed by: FAMILY MEDICINE

## 2021-01-06 PROCEDURE — 85027 COMPLETE CBC AUTOMATED: CPT | Performed by: FAMILY MEDICINE

## 2021-01-06 PROCEDURE — 99214 OFFICE O/P EST MOD 30 MIN: CPT | Performed by: FAMILY MEDICINE

## 2021-01-06 PROCEDURE — 82043 UR ALBUMIN QUANTITATIVE: CPT | Performed by: FAMILY MEDICINE

## 2021-01-06 PROCEDURE — 80048 BASIC METABOLIC PNL TOTAL CA: CPT | Performed by: FAMILY MEDICINE

## 2021-01-06 ASSESSMENT — ANXIETY QUESTIONNAIRES
GAD7 TOTAL SCORE: 1
GAD7 TOTAL SCORE: 1
6. BECOMING EASILY ANNOYED OR IRRITABLE: SEVERAL DAYS
7. FEELING AFRAID AS IF SOMETHING AWFUL MIGHT HAPPEN: NOT AT ALL
7. FEELING AFRAID AS IF SOMETHING AWFUL MIGHT HAPPEN: NOT AT ALL
2. NOT BEING ABLE TO STOP OR CONTROL WORRYING: NOT AT ALL
5. BEING SO RESTLESS THAT IT IS HARD TO SIT STILL: NOT AT ALL
GAD7 TOTAL SCORE: 1
3. WORRYING TOO MUCH ABOUT DIFFERENT THINGS: NOT AT ALL
4. TROUBLE RELAXING: NOT AT ALL
1. FEELING NERVOUS, ANXIOUS, OR ON EDGE: NOT AT ALL

## 2021-01-06 ASSESSMENT — PATIENT HEALTH QUESTIONNAIRE - PHQ9
SUM OF ALL RESPONSES TO PHQ QUESTIONS 1-9: 2
SUM OF ALL RESPONSES TO PHQ QUESTIONS 1-9: 2
10. IF YOU CHECKED OFF ANY PROBLEMS, HOW DIFFICULT HAVE THESE PROBLEMS MADE IT FOR YOU TO DO YOUR WORK, TAKE CARE OF THINGS AT HOME, OR GET ALONG WITH OTHER PEOPLE: NOT DIFFICULT AT ALL

## 2021-01-06 ASSESSMENT — PAIN SCALES - GENERAL: PAINLEVEL: NO PAIN (0)

## 2021-01-06 ASSESSMENT — MIFFLIN-ST. JEOR: SCORE: 1850.29

## 2021-01-06 NOTE — H&P (VIEW-ONLY)
[address] [address]  Phone: [phone]  Fax: [phone]  Primary Provider: Cayden Ramsey  Pre-op Performing Provider: JAMI BOB    PREOPERATIVE EVALUATION:  Today's date: 1/6/2021    Joel Pike is a 65 year old male who presents for a preoperative evaluation.    Surgical Information:  Surgery/Procedure: right total knee replacement  Surgery Location: [facility]  Surgeon: Cullen  Surgery Date: 01/18/2020  Time of Surgery: 1530  Where patient plans to recover: At home with family  Fax number for surgical facility: Note does not need to be faxed, will be available electronically in Epic.    Type of Anesthesia Anticipated: Spinal    Subjective     HPI related to upcoming procedure:     Preop Questions 1/6/2021   1. Have you ever had a heart attack or stroke? No   2. Have you ever had surgery on your heart or blood vessels, such as a stent placement, a coronary artery bypass, or surgery on an artery in your head, neck, heart, or legs? No   3. Do you have chest pain with activity? No   4. Do you have a history of  heart failure? No   5. Do you currently have a cold, bronchitis or symptoms of other infection? No   6. Do you have a cough, shortness of breath, or wheezing? No   7. Do you or anyone in your family have previous history of blood clots? YES - had superficial blood clot on leg several years ago - not DVT, was superficial due to varicose vein. Was not on blood thinner   8. Do you or does anyone in your family have a serious bleeding problem such as prolonged bleeding following surgeries or cuts? No   9. Have you ever had problems with anemia or been told to take iron pills? No   10. Have you had any abnormal blood loss such as black, tarry or bloody stools? No   11. Have you ever had a blood transfusion? No   12. Are you willing to have a blood transfusion if it is medically needed before, during, or after your surgery? Yes   13. Have you or  any of your relatives ever had problems with anesthesia? YES - did not respond to the anesthesia when 20 - not sure what kind.  Has been doing well since then.  No problem with the last surgery   14. Do you have sleep apnea, excessive snoring or daytime drowsiness? YES - on CPAP and is doing well   14a. Do you have a CPAP machine? Yes   15. Do you have any artifical heart valves or other implanted medical devices like a pacemaker, defibrillator, or continuous glucose monitor? No   16. Do you have artificial joints? YES - Left knee replaced   17. Are you allergic to latex? No       Health Care Directive:  Patient does not have a Health Care Directive or Living Will: Discussed advance care planning with patient; information given to patient to review.  Full code.  POA - Wife     Preoperative Review of :   reviewed - no concern finding.  not on pain med chronically      Joel is here today for pre-op physical. He is scheduled for right knee arthroplasty on 1/18/21 with Dr. Berman at [facility] due to osteoarthritis. Planned for general anesthesia and overnight inpatient recovery.   No personal or family history of anesthesia complication or pre-matured CAD or MI.  Has not been on steroid orally in the last 6 months.  Takes aspirin 325 mg but other form of blood thinner.  Last dose of NSAID was over a month ago.      Generally is  doing well.  Medical problem and medication list reviewed and updated.  Orthopedist diabetes and high blood pressure are controlled.  His asthma is also controlled, has not used the rescue inhaler for years.  He has sleep apnea which is also controlled with the CPAP.    He generally is doing well and has no concern today. No headache or dizziness. No running nose, nasal congestion, ST, coughing, fever or chill.  No chest pain or SOB.  No N/V/D/C or urinary problem.  He does not smoke and denied of having breathing problem.       Review of Systems  Constitutional, neuro, ENT,  endocrine, pulmonary, cardiac, gastrointestinal, genitourinary, musculoskeletal, integument and psychiatric systems are negative, except as otherwise noted.    Patient Active Problem List    Diagnosis Date Noted     Primary osteoarthritis of right knee 08/27/2020     Priority: Medium     Added automatically from request for surgery [id number]       S/P total knee replacement using cement, left 02/04/2020     Priority: Medium     Microalbuminuria 02/26/2019     Priority: Medium     Recurrent major depression in complete remission (H) 11/08/2017     Priority: Medium     CKD (chronic kidney disease) stage 2, GFR 60-89 ml/min 10/30/2015     Priority: Medium     Obesity, Class I, BMI 30-34.9 10/30/2015     Priority: Medium     Type 2 diabetes mellitus with stage 2 chronic kidney disease (H) 04/19/2013     Priority: Medium     Subclinical hypothyroidism 03/02/2012     Priority: Medium     Anxiety 03/02/2012     Priority: Medium     Sebaceous cyst 02/03/2012     Priority: Medium     left forehead       Advanced directives, counseling/discussion 08/19/2011     Priority: Medium     Advance Directive Problem List Overview:   Name Relationship Phone    Primary Health Care Agent            Alternative Health Care Agent          Discussed advance care planning with patient; information given to patient to review.//Macey Calvert/AISHA(AAMA)  8/19/2011 and again 11/9/16         Tinnitus 07/22/2011     Priority: Medium     right ear, began after accident, immediately       Hyperlipidemia LDL goal <100 01/28/2011     Priority: Medium     HILARIO (obstructive sleep apnea) 01/28/2011     Priority: Medium     Chronic rhinitis 01/15/2010     Priority: Medium     Degeneration of lumbar or lumbosacral intervertebral disc 12/19/2005     Priority: Medium     Cough 12/19/2005     Priority: Medium     DERMATITIS WHEN IN THE SUN, NOT BURN 12/19/2005     Priority: Medium     Gastroesophageal reflux disease without esophagitis 05/14/2003     Priority:  Medium      Past Medical History:   Diagnosis Date     Calculus of kidney      CKD (chronic kidney disease) stage 2, GFR 60-89 ml/min 10/30/2015     Degeneration of lumbar or lumbosacral intervertebral disc      Depressive disorder      Depressive disorder, not elsewhere classified      Diabetes (H)      Diabetic eye exam (H) 02/06/12, 11/1/13     Diabetic eye exam (H) 12/17/14     Hyperlipidaemia      Hypertension      Hypothyroidism 1/17/2010     Obesity, Class I, BMI 30-34.9 10/30/2015     Primary osteoarthritis of left knee      Uncomplicated asthma      Past Surgical History:   Procedure Laterality Date     ARTHROPLASTY KNEE Left 2/4/2020    Procedure: left total knee replacement;  Surgeon: Jason Berman DO;  Location: [facility]     COLONOSCOPY  02/02/09    Repeat in 5 yrs     COLONOSCOPY N/A 9/5/2014    Procedure: COMBINED COLONOSCOPY, SINGLE BIOPSY/POLYPECTOMY BY BIOPSY;  Surgeon: Denis Oakes MD;  Location: [facility]     ESOPHAGOSCOPY, GASTROSCOPY, DUODENOSCOPY (EGD), COMBINED N/A 2/20/2015    Procedure: COMBINED ESOPHAGOSCOPY, GASTROSCOPY, DUODENOSCOPY (EGD), BIOPSY SINGLE OR MULTIPLE;  Surgeon: Alfred Young MD;  Location: [facility]     HC REMV CATARACT EXTRACAP,INSERT LENS, W/O ECP  2000    Bilateral     HC REVISE MEDIAN N/CARPAL TUNNEL SURG  1991     HC TOOTH EXTRACTION W/FORCEP  1980's    [place] teeth removed     RELEASE CARPAL TUNNEL Right 6/16/2017    Procedure: RELEASE CARPAL TUNNEL;  Right carpal tunnel release;  Surgeon: Jason Berman DO;  Location: [facility]     RELEASE CARPAL TUNNEL Left 7/11/2017    Procedure: RELEASE CARPAL TUNNEL;  Left carpal tunnel release;  Surgeon: Jason Berman DO;  Location: [facility]     SOFT TISSUE SURGERY       Current Outpatient Medications   Medication Sig Dispense Refill     acetaminophen (TYLENOL) 325 MG tablet Take 3 tablets (975 mg) by mouth every 8 hours as needed for mild pain 100 tablet 1     ALLEGRA-D ALLERGY & CONGESTION  180-240 MG 24 hr tablet TAKE ONE TABLET BY MOUTH EVERY DAY 90 tablet 0     Ascorbic Acid (VITAMIN C) 500 MG CHEW Take 500 mg by mouth daily       blood glucose (HUGO CONTOUR) test strip Use to test blood sugars 1 time daily or as directed. 1 Box 5     blood glucose (NO BRAND SPECIFIED) lancets standard Use to test blood sugar one time daily or as directed. 100 each 3     blood glucose calibration (HUGO CONTOUR) NORMAL solution Use to calibrate blood glucose monitor as directed. 1 each 3     buPROPion (WELLBUTRIN XL) 300 MG 24 hr tablet Take 1 tablet (300 mg) by mouth every morning 90 tablet 3     cholecalciferol (VITAMIN D3) 5000 UNITS TABS tablet Take 1 tablet (5,000 Units) by mouth       Coenzyme Q-10 capsule Take 1 capsule by mouth daily. 30 capsule 12     docusate sodium (COLACE) 100 MG capsule Take 1 capsule (100 mg) by mouth 2 times daily as needed for constipation 60 capsule 1     esomeprazole (NEXIUM) 40 MG DR capsule TAKE 1 CAPSULE EVERY MORNING BEFORE BREAKFAST, 30 TO 60 MINUTES BEFORE EATING. 90 capsule 3     ezetimibe (ZETIA) 10 MG tablet Take 1 tablet (10 mg) by mouth daily 90 tablet 3     fish oil-omega-3 fatty acids 1000 MG capsule Take  by mouth. Take daily   180 capsule 12     hydrOXYzine (ATARAX) 25 MG tablet Take 1 tablet (25 mg) by mouth every 8 hours as needed for itching or other (adjuvant pain) 40 tablet 0     levothyroxine (SYNTHROID/LEVOTHROID) 100 MCG tablet Take 1 tablet (100 mcg) by mouth daily 90 tablet 1     losartan (COZAAR) 50 MG tablet Take 1 tablet (50 mg) by mouth daily 90 tablet 3     Magnesium 500 MG CAPS One twice a day 30 capsule      metFORMIN (GLUCOPHAGE) 500 MG tablet TAKE ONE TABLET BY MOUTH TWICE A DAY WITH MEALS 180 tablet 3     Misc Natural Products (OSTEO BI-FLEX ADV JOINT SHIELD) TABS Take  by mouth.       multivitamin (OCUVITE) TABS tablet Take 1 tablet by mouth daily 30 each 0     order for DME Equipment being ordered: Large thigh high compression stockings.  "30-40mmHg for compression. 1 Device 1     albuterol (PROAIR HFA) 108 (90 BASE) MCG/ACT Inhaler 1-2 puffs every 2 -4 hrs as needed (Patient not taking: Reported on 1/6/2021) 1 Inhaler 5     STATIN NOT PRESCRIBED (INTENTIONAL) Please choose reason not prescribed, below (Patient not taking: Reported on 1/6/2021)         Allergies   Allergen Reactions     Dust Mites Cough     Hmg-Coa-R Inhibitors      Body aches     Penicillins Rash and Hives        Social History     Tobacco Use     Smoking status: Never Smoker     Smokeless tobacco: Never Used   Substance Use Topics     Alcohol use: No     Alcohol/week: 0.0 standard drinks     Family History   Problem Relation Age of Onset     Hypertension Mother      Hypertension Father      Diabetes Father         Adult     Heart Disease Father         Hx: Bypass     Unknown/Adopted Maternal Grandmother      Unknown/Adopted Maternal Grandfather      Unknown/Adopted Paternal Grandmother      Unknown/Adopted Paternal Grandfather      Cancer Sister         Liver     Thyroid Disease Brother      No Known Problems Son      No Known Problems Sister      History   Drug Use No         Objective     /80   Pulse 94   Temp 97.3  F (36.3  C) (Temporal)   Resp 20   Ht 1.759 m (5' 9.25\")   Wt 106.6 kg (235 lb)   BMI 34.45 kg/m      Physical Exam    GENERAL APPEARANCE: healthy, alert and no distress     EYES: EOMI,  PERRL     HENT: ear canals and TM's normal and nose and mouth without ulcers or lesions.  Nares are non-congested. Oropharynx is pink and moist. No tender with palpation to the sinuses.      NECK: Supple, no lymphadenopathy or thyromegaly.  No tender with palpation to the cervical spine.     RESP: lungs clear to auscultation - no rales, rhonchi or wheezes     CV: regular rates and rhythm, no murmur     ABDOMEN:  soft, nontender, no HSM or masses and bowel sounds normal     MS: no gross deformities noted.  No focal weakness       NEURO: Normal strength and tone, mentation " intact and speech normal.  No focal neurological deficit.     PSYCH: mentation appears normal. and affect normal/bright     LYMPHATICS: No cervical adenopathy    Recent Labs   Lab Test 20  0953 20  0515 20  0745   HGB 15.5 13.7 15.0     --  240     --  141   POTASSIUM 4.6  --  4.7   CR 1.12  --  1.25   A1C 5.7*  --  5.5        Diagnostics:  Recent Results (from the past 168 hour(s))   CBC with platelets    Collection Time: 21  3:53 PM   Result Value Ref Range    WBC 8.5 4.0 - 11.0 10e9/L    RBC Count 4.60 4.4 - 5.9 10e12/L    Hemoglobin 14.7 13.3 - 17.7 g/dL    Hematocrit 42.8 40.0 - 53.0 %    MCV 93 78 - 100 fl    MCH 32.0 26.5 - 33.0 pg    MCHC 34.3 31.5 - 36.5 g/dL    RDW 12.4 10.0 - 15.0 %    Platelet Count 222 150 - 450 10e9/L   Basic metabolic panel  (Ca, Cl, CO2, Creat, Gluc, K, Na, BUN)    Collection Time: 21  3:53 PM   Result Value Ref Range    Sodium 140 133 - 144 mmol/L    Potassium 4.1 3.4 - 5.3 mmol/L    Chloride 106 94 - 109 mmol/L    Carbon Dioxide 30 20 - 32 mmol/L    Anion Gap 4 3 - 14 mmol/L    Glucose 140 (H) 70 - 99 mg/dL    Urea Nitrogen 21 7 - 30 mg/dL    Creatinine 1.21 0.66 - 1.25 mg/dL    GFR Estimate 63 >60 mL/min/[1.73_m2]    GFR Estimate If Black 73 >60 mL/min/[1.73_m2]    Calcium 9.6 8.5 - 10.1 mg/dL   Albumin Random Urine Quantitative with Creat Ratio    Collection Time: 21  4:17 PM   Result Value Ref Range    Creatinine Urine 127 mg/dL    Albumin Urine mg/L 1,140 mg/L    Albumin Urine mg/g Cr 897.64 (H) 0 - 17 mg/g Cr        EK/2020 showed sinus rhythm with occasional PAC, no concerning finding.    Revised Cardiac Risk Index (RCRI):  The patient has the following serious cardiovascular risks for perioperative complications:   - No serious cardiac risks = 0 points     RCRI Interpretation: 0 points: Class I (very low risk - 0.4% complication rate)       Assessment & Plan   The proposed surgical procedure is considered INTERMEDIATE  risk.        ICD-10-CM    1. Preop general physical exam  Z01.818 CBC with platelets   2. Primary osteoarthritis of right knee  M17.11    3. Type 2 diabetes mellitus with stage 2 chronic kidney disease, without long-term current use of insulin (H)  E11.22 Albumin Random Urine Quantitative with Creat Ratio    N18.2 Basic metabolic panel  (Ca, Cl, CO2, Creat, Gluc, K, Na, BUN)   4. CKD (chronic kidney disease) stage 2, GFR 60-89 ml/min  N18.2 Basic metabolic panel  (Ca, Cl, CO2, Creat, Gluc, K, Na, BUN)   5. HILARIO (obstructive sleep apnea)  G47.33           Risks and Recommendations:  The patient has the following additional risks and recommendations for perioperative complications:   - Consider consult Hospitalist / IM to assist with post-op medical management as needed for high blood pressure and/or diabetes. Otherwise resume his outpatients med after the surgery.      Cardiovascular:  No cardiovascular risks identified.  No history of heart disease or CVA.  His blood pressure is controlled and has been stable.  Most recent EKG in January 2020 showed sinus rhythm, no concerning finding was identified.  No further cardiovascular work-up is needed for this procedure.    Diabetes:  His diabetes is controlled with the Metformin.  He was instructed to hold off on Metformin for 24 hours before the procedure.  Recommend to resume the Metformin after the procedure unless contraindication is identified.  May consider insulin sliding scale during inpatient recovery to maintain his fasting blood sugar below 120.      Pulmonary:    - Incentive spirometry post-op   - Consider Respiratory Therapy (Respiratory Care IP Consult) post-op as needed to due to history of asthma.  His asthma has been controlled - has not used the rescue inhaler for years.  -  Oxygen as needed to maintain O2 sat above 93% during and before the procedure.    Obstructive Sleep Apnea:    - He is known to have sleep apnea which has been controlled with the  CPAP.  He was recommended to bring the CPAP with him to be used during the hospitalization.   - Recommended to monitor O2 sat closely and oxygen as needed to maintain O2 sat above 92%.    Anemia/Bleeding/Clotting:    - No history of PE/DVT, prophylaxis for DVT/PE per surgeon's recommendation/desire.  Recommend to consider leg pumps during and after the procedure until he is fully ambulatory.     - No history of blood transfusion or anemia he is okay to be transfused if necessary.    Social and Substance:    - Social concerns that may affect postoperative recovery plan:  None    Infection:    - No history of MRSA   - Prophylactic antibiotics per surgeon's recommendation/desire   - Covid screening per surgeon's order   - Shower with the provided antimicrobial shampoo the day before and a.m of procedure    Medication Instructions:  Please take your medications as prescribed except.  Hold aspirin for 7 days before the procedure.    No NSAID (Ibuprofen, Aleve, Motrin, Naproxen, ect.) for 7 days before the procedure.  Ok to take Tylenol as needed for the pain  Hold herbs and supplements for 5 days before the procedure  Hold Losartan and Metformin for 24 hours before the procedure. Resume them after the procedure    Shower with the provided shampoo the day before and am of procedure        Take other prescribed meds as usual on am of procedure with small sip of water.    RECOMMENDATION:  APPROVAL GIVEN to proceed with proposed procedure, without further diagnostic evaluation.    Signed Electronically by: Nishi Hdz Mai, MD    Copy of this evaluation report is provided to requesting physician.    [facility]    Revised Cardiac Risk Index    Answers for HPI/ROS submitted by the patient on 1/6/2021   If you checked off any problems, how difficult have these problems made it for you to do your work, take care of things at home, or get along with other people?: Not difficult at all  PHQ9  TOTAL SCORE: 2  PRISCILLA 7 TOTAL SCORE: 1

## 2021-01-06 NOTE — PATIENT INSTRUCTIONS

## 2021-01-06 NOTE — PROGRESS NOTES
[address] [address]  Phone: [phone]  Fax: [phone]  Primary Provider: Cayden Ramsey  Pre-op Performing Provider: JAMI BOB    PREOPERATIVE EVALUATION:  Today's date: 1/6/2021    Joel Pike is a 65 year old male who presents for a preoperative evaluation.    Surgical Information:  Surgery/Procedure: right total knee replacement  Surgery Location: [facility]  Surgeon: Cullen  Surgery Date: 01/18/2020  Time of Surgery: 1530  Where patient plans to recover: At home with family  Fax number for surgical facility: Note does not need to be faxed, will be available electronically in Epic.    Type of Anesthesia Anticipated: Spinal    Subjective     HPI related to upcoming procedure:     Preop Questions 1/6/2021   1. Have you ever had a heart attack or stroke? No   2. Have you ever had surgery on your heart or blood vessels, such as a stent placement, a coronary artery bypass, or surgery on an artery in your head, neck, heart, or legs? No   3. Do you have chest pain with activity? No   4. Do you have a history of  heart failure? No   5. Do you currently have a cold, bronchitis or symptoms of other infection? No   6. Do you have a cough, shortness of breath, or wheezing? No   7. Do you or anyone in your family have previous history of blood clots? YES - had superficial blood clot on leg several years ago - not DVT, was superficial due to varicose vein. Was not on blood thinner   8. Do you or does anyone in your family have a serious bleeding problem such as prolonged bleeding following surgeries or cuts? No   9. Have you ever had problems with anemia or been told to take iron pills? No   10. Have you had any abnormal blood loss such as black, tarry or bloody stools? No   11. Have you ever had a blood transfusion? No   12. Are you willing to have a blood transfusion if it is medically needed before, during, or after your surgery? Yes   13. Have you or  any of your relatives ever had problems with anesthesia? YES - did not respond to the anesthesia when 20 - not sure what kind.  Has been doing well since then.  No problem with the last surgery   14. Do you have sleep apnea, excessive snoring or daytime drowsiness? YES - on CPAP and is doing well   14a. Do you have a CPAP machine? Yes   15. Do you have any artifical heart valves or other implanted medical devices like a pacemaker, defibrillator, or continuous glucose monitor? No   16. Do you have artificial joints? YES - Left knee replaced   17. Are you allergic to latex? No       Health Care Directive:  Patient does not have a Health Care Directive or Living Will: Discussed advance care planning with patient; information given to patient to review.  Full code.  POA - Wife     Preoperative Review of :   reviewed - no concern finding.  not on pain med chronically      Joel is here today for pre-op physical. He is scheduled for right knee arthroplasty on 1/18/21 with Dr. Berman at [facility] due to osteoarthritis. Planned for general anesthesia and overnight inpatient recovery.   No personal or family history of anesthesia complication or pre-matured CAD or MI.  Has not been on steroid orally in the last 6 months.  Takes aspirin 325 mg but other form of blood thinner.  Last dose of NSAID was over a month ago.      Generally is  doing well.  Medical problem and medication list reviewed and updated.  Orthopedist diabetes and high blood pressure are controlled.  His asthma is also controlled, has not used the rescue inhaler for years.  He has sleep apnea which is also controlled with the CPAP.    He generally is doing well and has no concern today. No headache or dizziness. No running nose, nasal congestion, ST, coughing, fever or chill.  No chest pain or SOB.  No N/V/D/C or urinary problem.  He does not smoke and denied of having breathing problem.       Review of Systems  Constitutional, neuro, ENT,  endocrine, pulmonary, cardiac, gastrointestinal, genitourinary, musculoskeletal, integument and psychiatric systems are negative, except as otherwise noted.    Patient Active Problem List    Diagnosis Date Noted     Primary osteoarthritis of right knee 08/27/2020     Priority: Medium     Added automatically from request for surgery [id number]       S/P total knee replacement using cement, left 02/04/2020     Priority: Medium     Microalbuminuria 02/26/2019     Priority: Medium     Recurrent major depression in complete remission (H) 11/08/2017     Priority: Medium     CKD (chronic kidney disease) stage 2, GFR 60-89 ml/min 10/30/2015     Priority: Medium     Obesity, Class I, BMI 30-34.9 10/30/2015     Priority: Medium     Type 2 diabetes mellitus with stage 2 chronic kidney disease (H) 04/19/2013     Priority: Medium     Subclinical hypothyroidism 03/02/2012     Priority: Medium     Anxiety 03/02/2012     Priority: Medium     Sebaceous cyst 02/03/2012     Priority: Medium     left forehead       Advanced directives, counseling/discussion 08/19/2011     Priority: Medium     Advance Directive Problem List Overview:   Name Relationship Phone    Primary Health Care Agent            Alternative Health Care Agent          Discussed advance care planning with patient; information given to patient to review.//Macey Calvert/AISHA(AAMA)  8/19/2011 and again 11/9/16         Tinnitus 07/22/2011     Priority: Medium     right ear, began after accident, immediately       Hyperlipidemia LDL goal <100 01/28/2011     Priority: Medium     HILARIO (obstructive sleep apnea) 01/28/2011     Priority: Medium     Chronic rhinitis 01/15/2010     Priority: Medium     Degeneration of lumbar or lumbosacral intervertebral disc 12/19/2005     Priority: Medium     Cough 12/19/2005     Priority: Medium     DERMATITIS WHEN IN THE SUN, NOT BURN 12/19/2005     Priority: Medium     Gastroesophageal reflux disease without esophagitis 05/14/2003     Priority:  Medium      Past Medical History:   Diagnosis Date     Calculus of kidney      CKD (chronic kidney disease) stage 2, GFR 60-89 ml/min 10/30/2015     Degeneration of lumbar or lumbosacral intervertebral disc      Depressive disorder      Depressive disorder, not elsewhere classified      Diabetes (H)      Diabetic eye exam (H) 02/06/12, 11/1/13     Diabetic eye exam (H) 12/17/14     Hyperlipidaemia      Hypertension      Hypothyroidism 1/17/2010     Obesity, Class I, BMI 30-34.9 10/30/2015     Primary osteoarthritis of left knee      Uncomplicated asthma      Past Surgical History:   Procedure Laterality Date     ARTHROPLASTY KNEE Left 2/4/2020    Procedure: left total knee replacement;  Surgeon: Jason Berman DO;  [facility]     COLONOSCOPY  02/02/09    Repeat in 5 yrs     COLONOSCOPY N/A 9/5/2014    Procedure: COMBINED COLONOSCOPY, SINGLE BIOPSY/POLYPECTOMY BY BIOPSY;  Surgeon: Denis Oakes MD;  [facility]     ESOPHAGOSCOPY, GASTROSCOPY, DUODENOSCOPY (EGD), COMBINED N/A 2/20/2015    Procedure: COMBINED ESOPHAGOSCOPY, GASTROSCOPY, DUODENOSCOPY (EGD), BIOPSY SINGLE OR MULTIPLE;  Surgeon: Alfred Young MD;  [facility]     HC REMV CATARACT EXTRACAP,INSERT LENS, W/O ECP  2000    Bilateral     HC REVISE MEDIAN N/CARPAL TUNNEL SURG  1991     HC TOOTH EXTRACTION W/FORCEP  1980's    [place] teeth removed     RELEASE CARPAL TUNNEL Right 6/16/2017    Procedure: RELEASE CARPAL TUNNEL;  Right carpal tunnel release;  Surgeon: Jason Berman DO;  [facility]     RELEASE CARPAL TUNNEL Left 7/11/2017    Procedure: RELEASE CARPAL TUNNEL;  Left carpal tunnel release;  Surgeon: Jason Berman DO;  Location: [facility]     SOFT TISSUE SURGERY       Current Outpatient Medications   Medication Sig Dispense Refill     acetaminophen (TYLENOL) 325 MG tablet Take 3 tablets (975 mg) by mouth every 8 hours as needed for mild pain 100 tablet 1     ALLEGRA-D ALLERGY & CONGESTION  180-240 MG 24 hr tablet TAKE ONE TABLET BY MOUTH EVERY DAY 90 tablet 0     Ascorbic Acid (VITAMIN C) 500 MG CHEW Take 500 mg by mouth daily       blood glucose (HUGO CONTOUR) test strip Use to test blood sugars 1 time daily or as directed. 1 Box 5     blood glucose (NO BRAND SPECIFIED) lancets standard Use to test blood sugar one time daily or as directed. 100 each 3     blood glucose calibration (HUGO CONTOUR) NORMAL solution Use to calibrate blood glucose monitor as directed. 1 each 3     buPROPion (WELLBUTRIN XL) 300 MG 24 hr tablet Take 1 tablet (300 mg) by mouth every morning 90 tablet 3     cholecalciferol (VITAMIN D3) 5000 UNITS TABS tablet Take 1 tablet (5,000 Units) by mouth       Coenzyme Q-10 capsule Take 1 capsule by mouth daily. 30 capsule 12     docusate sodium (COLACE) 100 MG capsule Take 1 capsule (100 mg) by mouth 2 times daily as needed for constipation 60 capsule 1     esomeprazole (NEXIUM) 40 MG DR capsule TAKE 1 CAPSULE EVERY MORNING BEFORE BREAKFAST, 30 TO 60 MINUTES BEFORE EATING. 90 capsule 3     ezetimibe (ZETIA) 10 MG tablet Take 1 tablet (10 mg) by mouth daily 90 tablet 3     fish oil-omega-3 fatty acids 1000 MG capsule Take  by mouth. Take daily   180 capsule 12     hydrOXYzine (ATARAX) 25 MG tablet Take 1 tablet (25 mg) by mouth every 8 hours as needed for itching or other (adjuvant pain) 40 tablet 0     levothyroxine (SYNTHROID/LEVOTHROID) 100 MCG tablet Take 1 tablet (100 mcg) by mouth daily 90 tablet 1     losartan (COZAAR) 50 MG tablet Take 1 tablet (50 mg) by mouth daily 90 tablet 3     Magnesium 500 MG CAPS One twice a day 30 capsule      metFORMIN (GLUCOPHAGE) 500 MG tablet TAKE ONE TABLET BY MOUTH TWICE A DAY WITH MEALS 180 tablet 3     Misc Natural Products (OSTEO BI-FLEX ADV JOINT SHIELD) TABS Take  by mouth.       multivitamin (OCUVITE) TABS tablet Take 1 tablet by mouth daily 30 each 0     order for DME Equipment being ordered: Large thigh high compression stockings.  "30-40mmHg for compression. 1 Device 1     albuterol (PROAIR HFA) 108 (90 BASE) MCG/ACT Inhaler 1-2 puffs every 2 -4 hrs as needed (Patient not taking: Reported on 1/6/2021) 1 Inhaler 5     STATIN NOT PRESCRIBED (INTENTIONAL) Please choose reason not prescribed, below (Patient not taking: Reported on 1/6/2021)         Allergies   Allergen Reactions     Dust Mites Cough     Hmg-Coa-R Inhibitors      Body aches     Penicillins Rash and Hives        Social History     Tobacco Use     Smoking status: Never Smoker     Smokeless tobacco: Never Used   Substance Use Topics     Alcohol use: No     Alcohol/week: 0.0 standard drinks     Family History   Problem Relation Age of Onset     Hypertension Mother      Hypertension Father      Diabetes Father         Adult     Heart Disease Father         Hx: Bypass     Unknown/Adopted Maternal Grandmother      Unknown/Adopted Maternal Grandfather      Unknown/Adopted Paternal Grandmother      Unknown/Adopted Paternal Grandfather      Cancer Sister         Liver     Thyroid Disease Brother      No Known Problems Son      No Known Problems Sister      History   Drug Use No         Objective     /80   Pulse 94   Temp 97.3  F (36.3  C) (Temporal)   Resp 20   Ht 1.759 m (5' 9.25\")   Wt 106.6 kg (235 lb)   BMI 34.45 kg/m      Physical Exam    GENERAL APPEARANCE: healthy, alert and no distress     EYES: EOMI,  PERRL     HENT: ear canals and TM's normal and nose and mouth without ulcers or lesions.  Nares are non-congested. Oropharynx is pink and moist. No tender with palpation to the sinuses.      NECK: Supple, no lymphadenopathy or thyromegaly.  No tender with palpation to the cervical spine.     RESP: lungs clear to auscultation - no rales, rhonchi or wheezes     CV: regular rates and rhythm, no murmur     ABDOMEN:  soft, nontender, no HSM or masses and bowel sounds normal     MS: no gross deformities noted.  No focal weakness       NEURO: Normal strength and tone, mentation " intact and speech normal.  No focal neurological deficit.     PSYCH: mentation appears normal. and affect normal/bright     LYMPHATICS: No cervical adenopathy    Recent Labs   Lab Test 20  0953 20  0515 20  0745   HGB 15.5 13.7 15.0     --  240     --  141   POTASSIUM 4.6  --  4.7   CR 1.12  --  1.25   A1C 5.7*  --  5.5        Diagnostics:  Recent Results (from the past 168 hour(s))   CBC with platelets    Collection Time: 21  3:53 PM   Result Value Ref Range    WBC 8.5 4.0 - 11.0 10e9/L    RBC Count 4.60 4.4 - 5.9 10e12/L    Hemoglobin 14.7 13.3 - 17.7 g/dL    Hematocrit 42.8 40.0 - 53.0 %    MCV 93 78 - 100 fl    MCH 32.0 26.5 - 33.0 pg    MCHC 34.3 31.5 - 36.5 g/dL    RDW 12.4 10.0 - 15.0 %    Platelet Count 222 150 - 450 10e9/L   Basic metabolic panel  (Ca, Cl, CO2, Creat, Gluc, K, Na, BUN)    Collection Time: 21  3:53 PM   Result Value Ref Range    Sodium 140 133 - 144 mmol/L    Potassium 4.1 3.4 - 5.3 mmol/L    Chloride 106 94 - 109 mmol/L    Carbon Dioxide 30 20 - 32 mmol/L    Anion Gap 4 3 - 14 mmol/L    Glucose 140 (H) 70 - 99 mg/dL    Urea Nitrogen 21 7 - 30 mg/dL    Creatinine 1.21 0.66 - 1.25 mg/dL    GFR Estimate 63 >60 mL/min/[1.73_m2]    GFR Estimate If Black 73 >60 mL/min/[1.73_m2]    Calcium 9.6 8.5 - 10.1 mg/dL   Albumin Random Urine Quantitative with Creat Ratio    Collection Time: 21  4:17 PM   Result Value Ref Range    Creatinine Urine 127 mg/dL    Albumin Urine mg/L 1,140 mg/L    Albumin Urine mg/g Cr 897.64 (H) 0 - 17 mg/g Cr        EK/2020 showed sinus rhythm with occasional PAC, no concerning finding.    Revised Cardiac Risk Index (RCRI):  The patient has the following serious cardiovascular risks for perioperative complications:   - No serious cardiac risks = 0 points     RCRI Interpretation: 0 points: Class I (very low risk - 0.4% complication rate)       Assessment & Plan   The proposed surgical procedure is considered INTERMEDIATE  risk.        ICD-10-CM    1. Preop general physical exam  Z01.818 CBC with platelets   2. Primary osteoarthritis of right knee  M17.11    3. Type 2 diabetes mellitus with stage 2 chronic kidney disease, without long-term current use of insulin (H)  E11.22 Albumin Random Urine Quantitative with Creat Ratio    N18.2 Basic metabolic panel  (Ca, Cl, CO2, Creat, Gluc, K, Na, BUN)   4. CKD (chronic kidney disease) stage 2, GFR 60-89 ml/min  N18.2 Basic metabolic panel  (Ca, Cl, CO2, Creat, Gluc, K, Na, BUN)   5. HILARIO (obstructive sleep apnea)  G47.33           Risks and Recommendations:  The patient has the following additional risks and recommendations for perioperative complications:   - Consider consult Hospitalist / IM to assist with post-op medical management as needed for high blood pressure and/or diabetes. Otherwise resume his outpatients med after the surgery.      Cardiovascular:  No cardiovascular risks identified.  No history of heart disease or CVA.  His blood pressure is controlled and has been stable.  Most recent EKG in January 2020 showed sinus rhythm, no concerning finding was identified.  No further cardiovascular work-up is needed for this procedure.    Diabetes:  His diabetes is controlled with the Metformin.  He was instructed to hold off on Metformin for 24 hours before the procedure.  Recommend to resume the Metformin after the procedure unless contraindication is identified.  May consider insulin sliding scale during inpatient recovery to maintain his fasting blood sugar below 120.      Pulmonary:    - Incentive spirometry post-op   - Consider Respiratory Therapy (Respiratory Care IP Consult) post-op as needed to due to history of asthma.  His asthma has been controlled - has not used the rescue inhaler for years.  -  Oxygen as needed to maintain O2 sat above 93% during and before the procedure.    Obstructive Sleep Apnea:    - He is known to have sleep apnea which has been controlled with the  CPAP.  He was recommended to bring the CPAP with him to be used during the hospitalization.   - Recommended to monitor O2 sat closely and oxygen as needed to maintain O2 sat above 92%.    Anemia/Bleeding/Clotting:    - No history of PE/DVT, prophylaxis for DVT/PE per surgeon's recommendation/desire.  Recommend to consider leg pumps during and after the procedure until he is fully ambulatory.     - No history of blood transfusion or anemia he is okay to be transfused if necessary.    Social and Substance:    - Social concerns that may affect postoperative recovery plan:  None    Infection:    - No history of MRSA   - Prophylactic antibiotics per surgeon's recommendation/desire   - Covid screening per surgeon's order   - Shower with the provided antimicrobial shampoo the day before and a.m of procedure    Medication Instructions:  Please take your medications as prescribed except.  Hold aspirin for 7 days before the procedure.    No NSAID (Ibuprofen, Aleve, Motrin, Naproxen, ect.) for 7 days before the procedure.  Ok to take Tylenol as needed for the pain  Hold herbs and supplements for 5 days before the procedure  Hold Losartan and Metformin for 24 hours before the procedure. Resume them after the procedure    Shower with the provided shampoo the day before and am of procedure        Take other prescribed meds as usual on am of procedure with small sip of water.    RECOMMENDATION:  APPROVAL GIVEN to proceed with proposed procedure, without further diagnostic evaluation.    Signed Electronically by: Nishi Hdz Mai, MD    Copy of this evaluation report is provided to requesting physician.    [facility] Guidelines    Revised Cardiac Risk Index    Answers for HPI/ROS submitted by the patient on 1/6/2021   If you checked off any problems, how difficult have these problems made it for you to do your work, take care of things at home, or get along with other people?: Not difficult at all  PHQ9  TOTAL SCORE: 2  PRISCILLA 7 TOTAL SCORE: 1

## 2021-01-07 ASSESSMENT — ANXIETY QUESTIONNAIRES: GAD7 TOTAL SCORE: 1

## 2021-01-09 ENCOUNTER — HEALTH MAINTENANCE LETTER (OUTPATIENT)
Age: 66
End: 2021-01-09

## 2021-01-11 ENCOUNTER — OFFICE VISIT (OUTPATIENT)
Dept: ORTHOPEDICS | Facility: CLINIC | Age: 66
End: 2021-01-11
Payer: COMMERCIAL

## 2021-01-11 VITALS
BODY MASS INDEX: 33.36 KG/M2 | DIASTOLIC BLOOD PRESSURE: 79 MMHG | HEIGHT: 70 IN | SYSTOLIC BLOOD PRESSURE: 149 MMHG | WEIGHT: 233 LBS

## 2021-01-11 DIAGNOSIS — Z96.652 S/P TOTAL KNEE REPLACEMENT USING CEMENT, LEFT: ICD-10-CM

## 2021-01-11 DIAGNOSIS — M17.11 PRIMARY OSTEOARTHRITIS OF RIGHT KNEE: Primary | ICD-10-CM

## 2021-01-11 PROCEDURE — 99207 PR PREOP VISIT IN GLOBAL PKG: CPT | Performed by: PHYSICIAN ASSISTANT

## 2021-01-11 ASSESSMENT — MIFFLIN-ST. JEOR: SCORE: 1853.13

## 2021-01-11 ASSESSMENT — KOOS JR
HOW SEVERE IS YOUR KNEE STIFFNESS AFTER FIRST WAKING IN MORNING: MILD
KOOS JR SCORING: 68.28
RISING FROM SITTING: MILD
TWISING OR PIVOTING ON KNEE: MODERATE
BENDING TO THE FLOOR TO PICK UP OBJECT: MILD
GOING UP OR DOWN STAIRS: MILD
STANDING UPRIGHT: MILD

## 2021-01-11 ASSESSMENT — PAIN SCALES - GENERAL: PAINLEVEL: NO PAIN (1)

## 2021-01-11 NOTE — PROGRESS NOTES
One Week Prior to Total Joint    1. Surgery: 1/18/2021, right total knee arthroplasty by Dr. Berman  2. Labs: Within normal limits  3. H&P: Cleared by Dr. Lin 1/6/2021.  Dr. Lin's note is not complete however the after visit summary indicates the patient has been cleared.  Patient has chronic kidney disease stage II, GFR 63, anxiety, diabetes mellitus type 2, hemoglobin A1c 5.7, obesity, hypothyroidism, hyperlipidemia, sleep apnea, reflux.  History of left total knee arthroplasty on 2/4/2020 by Dr. Berman.  4. Covid test: Scheduled 1/15/2021  5. DVT prophylaxis: Asprin 325mg BID x 6 weeks   6. Dispo: Plan for discharge to home postop day 1 with his wife to take care of him, in-home physical therapy the patient would prefer.  Oxycodone for pain.  Patient would like to get an extra Kirill stocking if possible also.      This note was dictated with [place].    Alexei Vasquez PA-C

## 2021-01-11 NOTE — LETTER
1/11/2021         RE: Joel Pike  [address]  [facility] [address]        Dear Colleague,    Thank you for referring your patient, Joel Pike, to the [facility]. Please see a copy of my visit note below.    One Week Prior to Total Joint    1. Surgery: 1/18/2021, right total knee arthroplasty by Dr. Berman  2. Labs: Within normal limits  3. H&P: Cleared by Dr. Lin 1/6/2021.  Dr. Lin's note is not complete however the after visit summary indicates the patient has been cleared.  Patient has chronic kidney disease stage II, GFR 63, anxiety, diabetes mellitus type 2, hemoglobin A1c 5.7, obesity, hypothyroidism, hyperlipidemia, sleep apnea, reflux.  History of left total knee arthroplasty on 2/4/2020 by Dr. Berman.  4. Covid test: Scheduled 1/15/2021  5. DVT prophylaxis: Asprin 325mg BID x 6 weeks   6. Dispo: Plan for discharge to home postop day 1 with his wife to take care of him, in-home physical therapy the patient would prefer.  Oxycodone for pain.  Patient would like to get an extra Kirill stocking if possible also.      This note was dictated with [employer].    Alexei Vasquez PA-C          Again, thank you for allowing me to participate in the care of your patient.        Sincerely,        Alexei Vasquez PA-C

## 2021-01-15 DIAGNOSIS — Z11.59 ENCOUNTER FOR SCREENING FOR OTHER VIRAL DISEASES: ICD-10-CM

## 2021-01-15 LAB
LABORATORY COMMENT REPORT: NORMAL
SARS-COV-2 RNA RESP QL NAA+PROBE: NEGATIVE
SARS-COV-2 RNA RESP QL NAA+PROBE: NORMAL
SPECIMEN SOURCE: NORMAL
SPECIMEN SOURCE: NORMAL

## 2021-01-15 PROCEDURE — U0005 INFEC AGEN DETEC AMPLI PROBE: HCPCS | Performed by: ORTHOPAEDIC SURGERY

## 2021-01-15 PROCEDURE — U0003 INFECTIOUS AGENT DETECTION BY NUCLEIC ACID (DNA OR RNA); SEVERE ACUTE RESPIRATORY SYNDROME CORONAVIRUS 2 (SARS-COV-2) (CORONAVIRUS DISEASE [COVID-19]), AMPLIFIED PROBE TECHNIQUE, MAKING USE OF HIGH THROUGHPUT TECHNOLOGIES AS DESCRIBED BY CMS-2020-01-R: HCPCS | Performed by: ORTHOPAEDIC SURGERY

## 2021-01-18 ENCOUNTER — ANESTHESIA (OUTPATIENT)
Dept: SURGERY | Facility: CLINIC | Age: 66
End: 2021-01-18
Payer: COMMERCIAL

## 2021-01-18 ENCOUNTER — ANCILLARY PROCEDURE (OUTPATIENT)
Dept: ULTRASOUND IMAGING | Facility: CLINIC | Age: 66
End: 2021-01-18
Attending: NURSE ANESTHETIST, CERTIFIED REGISTERED
Payer: COMMERCIAL

## 2021-01-18 ENCOUNTER — HOSPITAL ENCOUNTER (OUTPATIENT)
Facility: CLINIC | Age: 66
Discharge: HOME | End: 2021-01-19
Attending: ORTHOPAEDIC SURGERY | Admitting: ORTHOPAEDIC SURGERY
Payer: COMMERCIAL

## 2021-01-18 ENCOUNTER — ANESTHESIA EVENT (OUTPATIENT)
Dept: SURGERY | Facility: CLINIC | Age: 66
End: 2021-01-18
Payer: COMMERCIAL

## 2021-01-18 ENCOUNTER — APPOINTMENT (OUTPATIENT)
Dept: GENERAL RADIOLOGY | Facility: CLINIC | Age: 66
End: 2021-01-18
Attending: NURSE PRACTITIONER
Payer: COMMERCIAL

## 2021-01-18 DIAGNOSIS — Z96.651 S/P TOTAL KNEE REPLACEMENT USING CEMENT, RIGHT: Primary | ICD-10-CM

## 2021-01-18 DIAGNOSIS — M17.11 PRIMARY OSTEOARTHRITIS OF RIGHT KNEE: ICD-10-CM

## 2021-01-18 LAB
GLUCOSE BLDC GLUCOMTR-MCNC: 121 MG/DL (ref 70–99)
GLUCOSE BLDC GLUCOMTR-MCNC: 154 MG/DL (ref 70–99)

## 2021-01-18 PROCEDURE — 250N000013 HC RX MED GY IP 250 OP 250 PS 637: Performed by: ORTHOPAEDIC SURGERY

## 2021-01-18 PROCEDURE — 27447 TOTAL KNEE ARTHROPLASTY: CPT | Mod: AS | Performed by: NURSE PRACTITIONER

## 2021-01-18 PROCEDURE — 999N000141 HC STATISTIC PRE-PROCEDURE NURSING ASSESSMENT: Performed by: ORTHOPAEDIC SURGERY

## 2021-01-18 PROCEDURE — 250N000009 HC RX 250: Performed by: ORTHOPAEDIC SURGERY

## 2021-01-18 PROCEDURE — 27447 TOTAL KNEE ARTHROPLASTY: CPT | Mod: RT | Performed by: ORTHOPAEDIC SURGERY

## 2021-01-18 PROCEDURE — 370N000017 HC ANESTHESIA TECHNICAL FEE, PER MIN: Performed by: ORTHOPAEDIC SURGERY

## 2021-01-18 PROCEDURE — 278N000051 HC OR IMPLANT GENERAL: Performed by: ORTHOPAEDIC SURGERY

## 2021-01-18 PROCEDURE — C1776 JOINT DEVICE (IMPLANTABLE): HCPCS | Performed by: ORTHOPAEDIC SURGERY

## 2021-01-18 PROCEDURE — 250N000013 HC RX MED GY IP 250 OP 250 PS 637: Performed by: NURSE PRACTITIONER

## 2021-01-18 PROCEDURE — 250N000011 HC RX IP 250 OP 636: Performed by: NURSE ANESTHETIST, CERTIFIED REGISTERED

## 2021-01-18 PROCEDURE — 258N000003 HC RX IP 258 OP 636: Performed by: NURSE ANESTHETIST, CERTIFIED REGISTERED

## 2021-01-18 PROCEDURE — 360N000077 HC SURGERY LEVEL 4, PER MIN: Performed by: ORTHOPAEDIC SURGERY

## 2021-01-18 PROCEDURE — 999N000063 XR KNEE PORT RT 1/2 VW: Mod: RT

## 2021-01-18 PROCEDURE — 250N000009 HC RX 250: Performed by: NURSE PRACTITIONER

## 2021-01-18 PROCEDURE — 272N000001 HC OR GENERAL SUPPLY STERILE: Performed by: ORTHOPAEDIC SURGERY

## 2021-01-18 PROCEDURE — 710N000010 HC RECOVERY PHASE 1, LEVEL 2, PER MIN: Performed by: ORTHOPAEDIC SURGERY

## 2021-01-18 PROCEDURE — 250N000009 HC RX 250: Performed by: NURSE ANESTHETIST, CERTIFIED REGISTERED

## 2021-01-18 PROCEDURE — 250N000011 HC RX IP 250 OP 636: Performed by: ORTHOPAEDIC SURGERY

## 2021-01-18 PROCEDURE — 999N001017 HC STATISTIC GLUCOSE BY METER IP

## 2021-01-18 PROCEDURE — 258N000003 HC RX IP 258 OP 636: Performed by: ORTHOPAEDIC SURGERY

## 2021-01-18 DEVICE — BONE CEMENT GENTAMYCIN SMART SET GHV 5450-35-500: Type: IMPLANTABLE DEVICE | Site: KNEE | Status: FUNCTIONAL

## 2021-01-18 DEVICE — IMPLANTABLE DEVICE: Type: IMPLANTABLE DEVICE | Site: KNEE | Status: FUNCTIONAL

## 2021-01-18 RX ORDER — HYDROXYZINE HYDROCHLORIDE 25 MG/1
25 TABLET, FILM COATED ORAL EVERY 6 HOURS PRN
Status: DISCONTINUED | OUTPATIENT
Start: 2021-01-18 | End: 2021-01-19 | Stop reason: HOSPADM

## 2021-01-18 RX ORDER — MEPERIDINE HYDROCHLORIDE 25 MG/ML
12.5 INJECTION INTRAMUSCULAR; INTRAVENOUS; SUBCUTANEOUS
Status: DISCONTINUED | OUTPATIENT
Start: 2021-01-18 | End: 2021-01-18 | Stop reason: HOSPADM

## 2021-01-18 RX ORDER — CLINDAMYCIN PHOSPHATE 900 MG/50ML
900 INJECTION, SOLUTION INTRAVENOUS SEE ADMIN INSTRUCTIONS
Status: DISCONTINUED | OUTPATIENT
Start: 2021-01-18 | End: 2021-01-18 | Stop reason: HOSPADM

## 2021-01-18 RX ORDER — CLINDAMYCIN PHOSPHATE 900 MG/50ML
900 INJECTION, SOLUTION INTRAVENOUS EVERY 8 HOURS
Status: COMPLETED | OUTPATIENT
Start: 2021-01-18 | End: 2021-01-19

## 2021-01-18 RX ORDER — PROPOFOL 10 MG/ML
INJECTION, EMULSION INTRAVENOUS CONTINUOUS PRN
Status: DISCONTINUED | OUTPATIENT
Start: 2021-01-18 | End: 2021-01-18

## 2021-01-18 RX ORDER — SODIUM CHLORIDE, SODIUM LACTATE, POTASSIUM CHLORIDE, CALCIUM CHLORIDE 600; 310; 30; 20 MG/100ML; MG/100ML; MG/100ML; MG/100ML
INJECTION, SOLUTION INTRAVENOUS CONTINUOUS
Status: DISCONTINUED | OUTPATIENT
Start: 2021-01-18 | End: 2021-01-18 | Stop reason: HOSPADM

## 2021-01-18 RX ORDER — NICOTINE POLACRILEX 4 MG
15-30 LOZENGE BUCCAL
Status: DISCONTINUED | OUTPATIENT
Start: 2021-01-18 | End: 2021-01-19 | Stop reason: HOSPADM

## 2021-01-18 RX ORDER — LEVOTHYROXINE SODIUM 100 UG/1
100 TABLET ORAL
Status: DISCONTINUED | OUTPATIENT
Start: 2021-01-19 | End: 2021-01-19 | Stop reason: HOSPADM

## 2021-01-18 RX ORDER — HYDROMORPHONE HYDROCHLORIDE 1 MG/ML
.3-.5 INJECTION, SOLUTION INTRAMUSCULAR; INTRAVENOUS; SUBCUTANEOUS EVERY 10 MIN PRN
Status: DISCONTINUED | OUTPATIENT
Start: 2021-01-18 | End: 2021-01-18 | Stop reason: HOSPADM

## 2021-01-18 RX ORDER — NALOXONE HYDROCHLORIDE 0.4 MG/ML
0.2 INJECTION, SOLUTION INTRAMUSCULAR; INTRAVENOUS; SUBCUTANEOUS
Status: DISCONTINUED | OUTPATIENT
Start: 2021-01-18 | End: 2021-01-19 | Stop reason: HOSPADM

## 2021-01-18 RX ORDER — FENTANYL CITRATE 50 UG/ML
25-50 INJECTION, SOLUTION INTRAMUSCULAR; INTRAVENOUS
Status: DISCONTINUED | OUTPATIENT
Start: 2021-01-18 | End: 2021-01-18 | Stop reason: HOSPADM

## 2021-01-18 RX ORDER — DEXAMETHASONE SODIUM PHOSPHATE 10 MG/ML
INJECTION, SOLUTION INTRAMUSCULAR; INTRAVENOUS PRN
Status: DISCONTINUED | OUTPATIENT
Start: 2021-01-18 | End: 2021-01-18

## 2021-01-18 RX ORDER — DIMENHYDRINATE 50 MG/ML
25 INJECTION, SOLUTION INTRAMUSCULAR; INTRAVENOUS
Status: DISCONTINUED | OUTPATIENT
Start: 2021-01-18 | End: 2021-01-18 | Stop reason: HOSPADM

## 2021-01-18 RX ORDER — NALOXONE HYDROCHLORIDE 0.4 MG/ML
0.4 INJECTION, SOLUTION INTRAMUSCULAR; INTRAVENOUS; SUBCUTANEOUS
Status: DISCONTINUED | OUTPATIENT
Start: 2021-01-18 | End: 2021-01-18 | Stop reason: HOSPADM

## 2021-01-18 RX ORDER — TIZANIDINE 2 MG/1
2-4 TABLET ORAL EVERY 6 HOURS PRN
Status: DISCONTINUED | OUTPATIENT
Start: 2021-01-18 | End: 2021-01-19 | Stop reason: HOSPADM

## 2021-01-18 RX ORDER — ONDANSETRON 2 MG/ML
4 INJECTION INTRAMUSCULAR; INTRAVENOUS EVERY 6 HOURS PRN
Status: DISCONTINUED | OUTPATIENT
Start: 2021-01-18 | End: 2021-01-19 | Stop reason: HOSPADM

## 2021-01-18 RX ORDER — HYDROMORPHONE HYDROCHLORIDE 1 MG/ML
0.4 INJECTION, SOLUTION INTRAMUSCULAR; INTRAVENOUS; SUBCUTANEOUS
Status: DISCONTINUED | OUTPATIENT
Start: 2021-01-18 | End: 2021-01-19 | Stop reason: HOSPADM

## 2021-01-18 RX ORDER — SODIUM CHLORIDE, SODIUM LACTATE, POTASSIUM CHLORIDE, CALCIUM CHLORIDE 600; 310; 30; 20 MG/100ML; MG/100ML; MG/100ML; MG/100ML
INJECTION, SOLUTION INTRAVENOUS CONTINUOUS
Status: DISCONTINUED | OUTPATIENT
Start: 2021-01-18 | End: 2021-01-19 | Stop reason: HOSPADM

## 2021-01-18 RX ORDER — BUPIVACAINE HYDROCHLORIDE AND EPINEPHRINE 5; 5 MG/ML; UG/ML
INJECTION, SOLUTION PERINEURAL PRN
Status: DISCONTINUED | OUTPATIENT
Start: 2021-01-18 | End: 2021-01-18

## 2021-01-18 RX ORDER — NALOXONE HYDROCHLORIDE 0.4 MG/ML
0.4 INJECTION, SOLUTION INTRAMUSCULAR; INTRAVENOUS; SUBCUTANEOUS
Status: DISCONTINUED | OUTPATIENT
Start: 2021-01-18 | End: 2021-01-19 | Stop reason: HOSPADM

## 2021-01-18 RX ORDER — ACETAMINOPHEN 325 MG/1
975 TABLET ORAL EVERY 8 HOURS
Status: DISCONTINUED | OUTPATIENT
Start: 2021-01-18 | End: 2021-01-19 | Stop reason: HOSPADM

## 2021-01-18 RX ORDER — ONDANSETRON 4 MG/1
4 TABLET, ORALLY DISINTEGRATING ORAL EVERY 30 MIN PRN
Status: DISCONTINUED | OUTPATIENT
Start: 2021-01-18 | End: 2021-01-18 | Stop reason: HOSPADM

## 2021-01-18 RX ORDER — LIDOCAINE 40 MG/G
CREAM TOPICAL
Status: DISCONTINUED | OUTPATIENT
Start: 2021-01-18 | End: 2021-01-19 | Stop reason: HOSPADM

## 2021-01-18 RX ORDER — AMOXICILLIN 250 MG
1-2 CAPSULE ORAL 2 TIMES DAILY PRN
Qty: 60 TABLET | Refills: 1 | Status: SHIPPED | OUTPATIENT
Start: 2021-01-18 | End: 2021-02-27

## 2021-01-18 RX ORDER — ONDANSETRON 2 MG/ML
INJECTION INTRAMUSCULAR; INTRAVENOUS PRN
Status: DISCONTINUED | OUTPATIENT
Start: 2021-01-18 | End: 2021-01-18

## 2021-01-18 RX ORDER — FAMOTIDINE 20 MG/1
20 TABLET, FILM COATED ORAL 2 TIMES DAILY
Status: DISCONTINUED | OUTPATIENT
Start: 2021-01-18 | End: 2021-01-19 | Stop reason: HOSPADM

## 2021-01-18 RX ORDER — ALBUTEROL SULFATE 0.83 MG/ML
2.5 SOLUTION RESPIRATORY (INHALATION) EVERY 4 HOURS PRN
Status: DISCONTINUED | OUTPATIENT
Start: 2021-01-18 | End: 2021-01-18 | Stop reason: HOSPADM

## 2021-01-18 RX ORDER — LABETALOL HYDROCHLORIDE 5 MG/ML
10 INJECTION, SOLUTION INTRAVENOUS
Status: DISCONTINUED | OUTPATIENT
Start: 2021-01-18 | End: 2021-01-18 | Stop reason: HOSPADM

## 2021-01-18 RX ORDER — TRANEXAMIC ACID 650 MG/1
1950 TABLET ORAL ONCE
Status: COMPLETED | OUTPATIENT
Start: 2021-01-18 | End: 2021-01-18

## 2021-01-18 RX ORDER — FENTANYL CITRATE 50 UG/ML
INJECTION, SOLUTION INTRAMUSCULAR; INTRAVENOUS PRN
Status: DISCONTINUED | OUTPATIENT
Start: 2021-01-18 | End: 2021-01-18

## 2021-01-18 RX ORDER — NALOXONE HYDROCHLORIDE 0.4 MG/ML
0.2 INJECTION, SOLUTION INTRAMUSCULAR; INTRAVENOUS; SUBCUTANEOUS
Status: DISCONTINUED | OUTPATIENT
Start: 2021-01-18 | End: 2021-01-18 | Stop reason: HOSPADM

## 2021-01-18 RX ORDER — LIDOCAINE 40 MG/G
CREAM TOPICAL
Status: DISCONTINUED | OUTPATIENT
Start: 2021-01-18 | End: 2021-01-18 | Stop reason: HOSPADM

## 2021-01-18 RX ORDER — OXYCODONE HYDROCHLORIDE 5 MG/1
5-10 TABLET ORAL
Qty: 50 TABLET | Refills: 0 | Status: SHIPPED | OUTPATIENT
Start: 2021-01-18 | End: 2021-01-28

## 2021-01-18 RX ORDER — HYDROXYZINE HYDROCHLORIDE 25 MG/1
25 TABLET, FILM COATED ORAL EVERY 6 HOURS PRN
Qty: 30 TABLET | Refills: 0 | Status: SHIPPED | OUTPATIENT
Start: 2021-01-18 | End: 2021-07-29

## 2021-01-18 RX ORDER — BUPIVACAINE HYDROCHLORIDE 7.5 MG/ML
INJECTION, SOLUTION INTRASPINAL PRN
Status: DISCONTINUED | OUTPATIENT
Start: 2021-01-18 | End: 2021-01-18

## 2021-01-18 RX ORDER — ONDANSETRON 4 MG/1
4 TABLET, ORALLY DISINTEGRATING ORAL EVERY 6 HOURS PRN
Status: DISCONTINUED | OUTPATIENT
Start: 2021-01-18 | End: 2021-01-19 | Stop reason: HOSPADM

## 2021-01-18 RX ORDER — OXYCODONE HYDROCHLORIDE 5 MG/1
5-10 TABLET ORAL
Status: DISCONTINUED | OUTPATIENT
Start: 2021-01-18 | End: 2021-01-19 | Stop reason: HOSPADM

## 2021-01-18 RX ORDER — CLINDAMYCIN PHOSPHATE 900 MG/50ML
900 INJECTION, SOLUTION INTRAVENOUS
Status: DISCONTINUED | OUTPATIENT
Start: 2021-01-18 | End: 2021-01-18 | Stop reason: HOSPADM

## 2021-01-18 RX ORDER — PROCHLORPERAZINE MALEATE 5 MG
10 TABLET ORAL EVERY 6 HOURS PRN
Status: DISCONTINUED | OUTPATIENT
Start: 2021-01-18 | End: 2021-01-19 | Stop reason: HOSPADM

## 2021-01-18 RX ORDER — ONDANSETRON 2 MG/ML
4 INJECTION INTRAMUSCULAR; INTRAVENOUS EVERY 30 MIN PRN
Status: DISCONTINUED | OUTPATIENT
Start: 2021-01-18 | End: 2021-01-18 | Stop reason: HOSPADM

## 2021-01-18 RX ORDER — HYDROMORPHONE HCL IN WATER/PF 6 MG/30 ML
0.2 PATIENT CONTROLLED ANALGESIA SYRINGE INTRAVENOUS
Status: DISCONTINUED | OUTPATIENT
Start: 2021-01-18 | End: 2021-01-19 | Stop reason: HOSPADM

## 2021-01-18 RX ORDER — HYDRALAZINE HYDROCHLORIDE 20 MG/ML
2.5-5 INJECTION INTRAMUSCULAR; INTRAVENOUS EVERY 10 MIN PRN
Status: DISCONTINUED | OUTPATIENT
Start: 2021-01-18 | End: 2021-01-18 | Stop reason: HOSPADM

## 2021-01-18 RX ORDER — DEXTROSE MONOHYDRATE 25 G/50ML
25-50 INJECTION, SOLUTION INTRAVENOUS
Status: DISCONTINUED | OUTPATIENT
Start: 2021-01-18 | End: 2021-01-19 | Stop reason: HOSPADM

## 2021-01-18 RX ORDER — ACETAMINOPHEN 325 MG/1
975 TABLET ORAL EVERY 8 HOURS PRN
Qty: 100 TABLET | Refills: 1 | Status: SHIPPED | OUTPATIENT
Start: 2021-01-18 | End: 2021-07-29

## 2021-01-18 RX ORDER — AMOXICILLIN 250 MG
1-2 CAPSULE ORAL 2 TIMES DAILY
Status: DISCONTINUED | OUTPATIENT
Start: 2021-01-18 | End: 2021-01-19 | Stop reason: HOSPADM

## 2021-01-18 RX ADMIN — CLINDAMYCIN PHOSPHATE 900 MG: 900 INJECTION, SOLUTION INTRAVENOUS at 22:24

## 2021-01-18 RX ADMIN — BUPIVACAINE HYDROCHLORIDE IN DEXTROSE 2 ML: 7.5 INJECTION, SOLUTION SUBARACHNOID at 15:42

## 2021-01-18 RX ADMIN — SODIUM CHLORIDE, POTASSIUM CHLORIDE, SODIUM LACTATE AND CALCIUM CHLORIDE: 600; 310; 30; 20 INJECTION, SOLUTION INTRAVENOUS at 16:14

## 2021-01-18 RX ADMIN — METFORMIN HYDROCHLORIDE 500 MG: 500 TABLET ORAL at 19:19

## 2021-01-18 RX ADMIN — FAMOTIDINE 20 MG: 10 INJECTION, SOLUTION INTRAVENOUS at 22:24

## 2021-01-18 RX ADMIN — DEXAMETHASONE SODIUM PHOSPHATE 10 MG: 10 INJECTION, SOLUTION INTRAMUSCULAR; INTRAVENOUS at 16:26

## 2021-01-18 RX ADMIN — PROPOFOL 100 MCG/KG/MIN: 10 INJECTION, EMULSION INTRAVENOUS at 15:43

## 2021-01-18 RX ADMIN — MIDAZOLAM 2 MG: 1 INJECTION INTRAMUSCULAR; INTRAVENOUS at 15:31

## 2021-01-18 RX ADMIN — SODIUM CHLORIDE, POTASSIUM CHLORIDE, SODIUM LACTATE AND CALCIUM CHLORIDE: 600; 310; 30; 20 INJECTION, SOLUTION INTRAVENOUS at 15:32

## 2021-01-18 RX ADMIN — FENTANYL CITRATE 50 MCG: 50 INJECTION, SOLUTION INTRAMUSCULAR; INTRAVENOUS at 15:41

## 2021-01-18 RX ADMIN — Medication 20 ML: at 17:34

## 2021-01-18 RX ADMIN — DOCUSATE SODIUM AND SENNOSIDES 1 TABLET: 8.6; 5 TABLET ORAL at 22:28

## 2021-01-18 RX ADMIN — FENTANYL CITRATE 50 MCG: 50 INJECTION, SOLUTION INTRAMUSCULAR; INTRAVENOUS at 15:36

## 2021-01-18 RX ADMIN — CLINDAMYCIN PHOSPHATE 900 MG: 900 INJECTION, SOLUTION INTRAVENOUS at 15:45

## 2021-01-18 RX ADMIN — ACETAMINOPHEN 975 MG: 325 TABLET, FILM COATED ORAL at 22:28

## 2021-01-18 RX ADMIN — ASPIRIN 325 MG: 325 TABLET, DELAYED RELEASE ORAL at 22:28

## 2021-01-18 RX ADMIN — ONDANSETRON 4 MG: 2 INJECTION INTRAMUSCULAR; INTRAVENOUS at 16:29

## 2021-01-18 RX ADMIN — TRANEXAMIC ACID 1950 MG: 650 TABLET ORAL at 14:01

## 2021-01-18 ASSESSMENT — MIFFLIN-ST. JEOR
SCORE: 1853.13
SCORE: 1853.13

## 2021-01-18 NOTE — OR NURSING
Transfer from  pacu  to Room ph-2 med/surg  Transferred to bed via Glyder Mat,    S: 66 y/o m  S/P right total knee replacement       Anesthesia Type: spinal       Surgeon:  Dr. Berman       Allergies:  See Medication Reconciliation Record       DNR: no  (Yes,No)    B:  Pertinent Medical History:   Past Medical History:   Diagnosis Date     Calculus of kidney      CKD (chronic kidney disease) stage 2, GFR 60-89 ml/min 10/30/2015     Degeneration of lumbar or lumbosacral intervertebral disc      Depressive disorder      Depressive disorder, not elsewhere classified      Diabetes (H)      Diabetic eye exam (H) 02/06/12, 11/1/13     Diabetic eye exam (H) 12/17/14     Hyperlipidaemia      Hypertension      Hypothyroidism 1/17/2010     Obesity, Class I, BMI 30-34.9 10/30/2015     Primary osteoarthritis of left knee      Uncomplicated asthma            Surgical History:    Past Surgical History:   Procedure Laterality Date     ARTHROPLASTY KNEE Left 2/4/2020    Procedure: left total knee replacement;  Surgeon: Jason Berman DO;  [facility]     COLONOSCOPY  02/02/09    Repeat in 5 yrs     COLONOSCOPY N/A 9/5/2014    Procedure: COMBINED COLONOSCOPY, SINGLE BIOPSY/POLYPECTOMY BY BIOPSY;  Surgeon: Denis Oakes MD;  [facility]     ESOPHAGOSCOPY, GASTROSCOPY, DUODENOSCOPY (EGD), COMBINED N/A 2/20/2015    Procedure: COMBINED ESOPHAGOSCOPY, GASTROSCOPY, DUODENOSCOPY (EGD), BIOPSY SINGLE OR MULTIPLE;  Surgeon: Alfred Young MD;  Location:  GI     HC REMV CATARACT EXTRACAP,INSERT LENS, W/O ECP  2000    Bilateral     HC REVISE MEDIAN N/CARPAL TUNNEL SURG  1991     HC TOOTH EXTRACTION W/FORCEP  1980's    [place] teeth removed     RELEASE CARPAL TUNNEL Right 6/16/2017    Procedure: RELEASE CARPAL TUNNEL;  Right carpal tunnel release;  Surgeon: Jason Berman DO;  Location: [facility]     RELEASE CARPAL TUNNEL Left 7/11/2017    Procedure: RELEASE CARPAL TUNNEL;  Left carpal tunnel release;   Surgeon: Jason Berman DO;  Location: [facility]     SOFT TISSUE SURGERY         A:  EBL: 10        IVF:  1300 LR        UOP:  No void post op bladder scan: 128 ml        NPO:  ___Yes _X__No         Vomiting:  ___Yes __X_No         Drainage: clean dry intact        Skin Integrity: incision right knee, BETHANY         Post op blood sugar 154         Brace/sling/equipment:  _X__Yes___No  Knee immobilizer        Pain: spinal anesthesia with post op adductor canal nerve block       See PACU record for ongoing assessment, vital signs and pain assessment.      Report given to med-surg nursing staff: Giulia CHING: Post-Op vitals and assessments as ordered/indicated per patient's condition.       Follow Post-Op orders and notify Physician prn.       Continue to involve patient/family in plan of care and discharge planning.       Reinforce Pre-Operative education.       Implement skin safety interventions as appropriate.    Name: Modesta BYRNE

## 2021-01-18 NOTE — ANESTHESIA PROCEDURE NOTES
Pre-Procedure   Staff -   Performed By: CRNA  Location: OR  Pre-Anesthestic Checklist: patient identified, IV checked, risks and benefits discussed, informed consent, monitors and equipment checked and pre-op evaluation  Timeout:  Correct Patient: Yes   Correct Procedure: Yes   Correct Site: Yes   Correct Position: Yes   Correct Laterality: N/A   Site Marked: N/A    Procedure Documentation  Procedure: intrathecal  Patient Position:sitting  Patient Prep/Sterile Barriers: sterile gloves, mask, Betadine, patient draped  Insertion Site: L3-4. (midline approach).  Spinal Needle (gauge): 25   Spinal/LP Needle Length (inches): 3.5  Spinal Needle Type: Tiny tipIntroducer used  Introducer: 20 G  # of attempts: 1 and # of redirects:     Assessment/Narrative      Paresthesias: No.  CSF fluid: clear.  Opening pressure was cmH2O while sitting.      Comments:  Pt tolerated procedure well.  No paresthesias, CSF clear.

## 2021-01-18 NOTE — ANESTHESIA PROCEDURE NOTES
Pre-Procedure   Staff -   Performed By: CRNA  Location: post-op  Pre-Anesthestic Checklist: patient identified, IV checked, site marked, risks and benefits discussed, informed consent, monitors and equipment checked, pre-op evaluation, at physician/surgeon's request and post-op pain management  Timeout:  Correct Patient: Yes   Correct Procedure: Yes   Correct Site: Yes   Correct Position: Yes   Correct Laterality: Yes   Site Marked: Yes    Procedure Documentation  Procedure: Adductor canal  Laterality: right  Patient Position:supine  Patient Prep/Sterile Barriers: sterile gloves, mask, Chloraprep  Local skin infiltrated with 3 mL of 2% lidocaine.   Needle type: insulated  Needle Gauge: 22.   Needle Length (millimeters) 100   Ultrasound guided, Ultrasound used to identify targeted nerve, plexus, vascular marker, or fascial plane and place a needle adjacent to it in real-time, Ultrasound was used to visualize the spread of anesthetic in close proximity to the above referenced structure  A permanent image is entered into the patient's record.    Assessment/Narrative      The placement was negative for: blood aspirated, painful injection and site bleeding  Paresthesias: No.  Test dose of mL at.   Test dose negative, 3 minutes after injection, for signs of intravascular, subdural, or intrathecal injection.  Bolus given via needle..   Secured via.   Insertion/Infusion Method: Single Shot  Complications: none  Injection made incrementally with aspirations every 5 mL.  Comments:  Placed with the assistance of an RN.  Good visualization of the femoral artery and the local spread lateral to the artery.  No HR increase with injections and no other complications noted.  I will follow up with the pt if needed.

## 2021-01-18 NOTE — INTERVAL H&P NOTE
This H&P has been reviewed and there are no clinically significant changes in the patient s condition.  The patient was evaluated by myself as well as Dr. Lin prior to surgery. The Patient is approved for the surgery and the stated surgical procedure is still clinically indicated.

## 2021-01-18 NOTE — BRIEF OP NOTE
[facility] Brief Operative Note    Pre-operative diagnosis: Primary osteoarthritis of right knee    Post-operative diagnosis: Same   Procedure: Procedure(s):  right total knee replacement   Surgeon:  Anesthesia: Jason Berman DO  Spinal   Assistant(s): Jakob Montanez APRN, CNP, DNP (A advanced practice provider was necessary for his expertise, exposure and surgical assistance throughout the case.)   Estimated blood loss:  Tourniquet time/pressure:  Urine output:  Fluids: Less than 10 ml  71 minutes at 300 mmHg  na  1200 ml Crystalloids   Specimens: None   Findings: right knee primary osteoarthritis [id number]     Jacky Berman D.O.      Implants:   Implant Name Type Inv. Item Serial No.  Lot No. LRB No. Used Action   BONE CEMENT GENTAMYCIN SMART SET V 5450- Cement, Bone BONE CEMENT GENTAMYCIN SMART SET GHV 5450-  J [id number] Right 2 Implanted   attune Pinning system    Depuy [id number] Right 1 Used as a Supply   Attune Femoral Posterior Stabilized Metallic Hardware/[place]   Depuy [id number] Right 1 Implanted   Attune Patella Medialized Dome    Depuy [id number] Right 1 Implanted   Attune tibial rotating platform Metallic Hardware/[place]   Depuy [id number] Right 1 Implanted   Attune Tibial base rotating platform Metallic Hardware/[place]   Depuy [id number] Right 1 Implanted

## 2021-01-18 NOTE — ANESTHESIA PREPROCEDURE EVALUATION
Anesthesia Pre-Procedure Evaluation    Patient: Joel Pike   MRN: [id number] : 1955          Preoperative Diagnosis: Primary osteoarthritis of right knee [M17.12]    Procedure(s):right total knee replacement    Past Medical History:   Diagnosis Date     Calculus of kidney      CKD (chronic kidney disease) stage 2, GFR 60-89 ml/min 10/30/2015     Degeneration of lumbar or lumbosacral intervertebral disc      Depressive disorder      Depressive disorder, not elsewhere classified      Diabetes (H)      Diabetic eye exam (H) 12, 13     Diabetic eye exam (H) 14     Hyperlipidaemia      Hypertension      Hypothyroidism 2010     Obesity, Class I, BMI 30-34.9 10/30/2015     Primary osteoarthritis of left knee      Uncomplicated asthma      Past Surgical History:   Procedure Laterality Date     ARTHROPLASTY KNEE Left 2020    Procedure: left total knee replacement;  Surgeon: Jason Berman DO;  Location: [facility] OR     COLONOSCOPY  09    Repeat in 5 yrs     COLONOSCOPY N/A 2014    Procedure: COMBINED COLONOSCOPY, SINGLE BIOPSY/POLYPECTOMY BY BIOPSY;  Surgeon: Denis Oakes MD;  Location:  GI     ESOPHAGOSCOPY, GASTROSCOPY, DUODENOSCOPY (EGD), COMBINED N/A 2015    Procedure: COMBINED ESOPHAGOSCOPY, GASTROSCOPY, DUODENOSCOPY (EGD), BIOPSY SINGLE OR MULTIPLE;  Surgeon: Alfred Young MD;  Location:  GI     HC REMV CATARACT EXTRACAP,INSERT LENS, W/O ECP      Bilateral     HC REVISE MEDIAN N/CARPAL TUNNEL SURG       HC TOOTH EXTRACTION W/FORCEP      [place] teeth removed     RELEASE CARPAL TUNNEL Right 2017    Procedure: RELEASE CARPAL TUNNEL;  Right carpal tunnel release;  Surgeon: Jason Berman DO;  Location: [facility] OR     RELEASE CARPAL TUNNEL Left 2017    Procedure: RELEASE CARPAL TUNNEL;  Left carpal tunnel release;  Surgeon: Jason Berman DO;  Location: [facility] OR     SOFT TISSUE SURGERY         Anesthesia  Evaluation     . Pt has had prior anesthetic. Type: MAC.    History of anesthetic complications   - PONV.        ROS/MED HX    ENT/Pulmonary: Comment: tinnitus     (+) sleep apnea, uses CPAP, HILARIO risk factors, allergic rhinitis, Mild Persistent, asthma     Neurologic:  - neg neurologic ROS     Cardiovascular:     (+) Dyslipidemia hypertension-----Previous cardiac testing   Echo: Date: Results:    Stress Test: Date: Results:    ECG Reviewed: Date: 1/17/20 Results:  SR with occasional PVC's   Cath: Date: Results:        METS/Exercise Tolerance:     Hematologic:     (+) History of blood clots, pt is not anticoagulated,       Musculoskeletal:   (+) other musculoskeletal- DDD lumbar, knee pain,       GI/Hepatic:     (+) GERD, Asymptomatic on medication,       Renal/Genitourinary:     (+) renal disease, type: CRI, Pt does not require dialysis, Nephrolithiasis ,    (-) History of transplant   Endo:     (+) type I DM, Last HgA1c: 5.7, date: 11/4/20, Not using insulin, - not using insulin pump. Normal glucose range: low 100's, not previously admitted for DM/DKA. Diabetic complications: retinopathy. thyroid problem, hypothyroidism,       Psychiatric:     (+) psychiatric history depression and anxiety    (-) chronic opioid use history   Infectious Disease:  - neg infectious disease ROS       Malignancy:       Other:    (+) , H/O Chronic Pain,  - neg other ROS                      Physical Exam  Normal systems: pulmonary and dental    Airway   Mallampati: II  TM distance: > 3 FB  Neck ROM: full  Mouth opening: > 3 cm    Dental     Cardiovascular   Rhythm and rate: regular and normal      Pulmonary             Lab Results   Component Value Date    WBC 8.5 01/06/2021    HGB 14.7 01/06/2021    HCT 42.8 01/06/2021     01/06/2021    SED 6 12/20/2006     01/06/2021    POTASSIUM 4.1 01/06/2021    CHLORIDE 106 01/06/2021    CO2 30 01/06/2021    BUN 21 01/06/2021    CR 1.21 01/06/2021     (H) 01/06/2021    AURORA 9.6  "01/06/2021    ALBUMIN 3.8 11/04/2020    PROTTOTAL 7.2 11/04/2020    ALT 72 (H) 11/04/2020    AST 38 11/04/2020    ALKPHOS 102 11/04/2020    BILITOTAL 0.6 11/04/2020    PTT 26 12/20/2006    INR 0.96 12/20/2006    TSH 6.02 (H) 11/04/2020    T4 1.08 11/04/2020       Preop Vitals  BP Readings from Last 3 Encounters:   01/11/21 (!) 149/79   01/06/21 134/80   11/05/20 138/70    Pulse Readings from Last 3 Encounters:   01/06/21 94   11/04/20 92   10/29/20 77      Resp Readings from Last 3 Encounters:   01/06/21 20   11/04/20 20   02/24/20 16    SpO2 Readings from Last 3 Encounters:   11/04/20 94%   10/29/20 93%   02/24/20 99%      Temp Readings from Last 1 Encounters:   01/06/21 97.3  F (36.3  C) (Temporal)    Ht Readings from Last 1 Encounters:   01/11/21 1.778 m (5' 10\")      Wt Readings from Last 1 Encounters:   01/11/21 105.7 kg (233 lb)    Estimated body mass index is 33.43 kg/m  as calculated from the following:    Height as of 1/11/21: 1.778 m (5' 10\").    Weight as of 1/11/21: 105.7 kg (233 lb).       Anesthesia Plan      History & Physical Review  History and physical reviewed and following examination; no interval change.    ASA Status:  3. NPO Status:  NPO Appropriate. .    Plan for Spinal and MAC with Intravenous and Propofol induction. Maintenance will be Balanced. Reason for MAC:  Deep or markedly invasive procedure (G8).         PONV prophylaxis:  Ondansetron (or other 5HT-3) and Dexamethasone or Solumedrol.         Postoperative Care  Postoperative pain management: Peripheral nerve block (Single Shot) and Multi-modal analgesia.     Consents  Anesthetic plan, risks, benefits and alternatives discussed with:  Patient.  Use of blood products discussed: No.   Use of blood products discussed: No.          .                 SYLVIA Delvalle CRNA    Anesthesia Evaluation   Pt has had prior anesthetic. Type: MAC.    History of anesthetic complications  - PONV.      ROS/MED HX  ENT/Pulmonary: Comment: tinnitus   "   (+) sleep apnea, uses CPAP, HILARIO risk factors, allergic rhinitis, Mild Persistent, asthma     Neurologic:  - neg neurologic ROS     Cardiovascular:     (+) Dyslipidemia hypertension-----Previous cardiac testing   Echo: Date: Results:    Stress Test: Date: Results:    ECG Reviewed: Date: 1/17/20 Results:  SR with occasional PVC's   Cath: Date: Results:      METS/Exercise Tolerance:     Hematologic:     (+) History of blood clots, pt is not anticoagulated,     Musculoskeletal:   (+) other musculoskeletal- DDD lumbar, knee pain,     GI/Hepatic:     (+) GERD, Asymptomatic on medication,     Renal/Genitourinary:     (+) renal disease, type: CRI, Pt does not require dialysis, Nephrolithiasis ,  (-) History of transplant   Endo:     (+) type I DM, Last HgA1c: 5.7, date: 11/4/20, Not using insulin, - not using insulin pump. Normal glucose range: low 100's, not previously admitted for DM/DKA. Diabetic complications: retinopathy. thyroid problem, hypothyroidism,     Psychiatric/Substance Use:     (+) psychiatric history depression and anxiety  (-) chronic opioid use history   Infectious Disease:  - neg infectious disease ROS     Malignancy:  - neg malignancy ROS     Other:  - neg other ROS    (+) , H/O Chronic Pain, (-) Any chance pregnant         Physical Exam    Airway  airway exam normal      Mallampati: II   TM distance: > 3 FB   Neck ROM: full   Mouth opening: > 3 cm    Respiratory Devices and Support     Face Mask      Dental  no notable dental history         Cardiovascular          Rhythm and rate: regular and normal     Pulmonary   pulmonary exam normal                  Anesthesia Plan     History & Physical Review  History and physical reviewed and following examination; no interval change.    ASA Status:  3. NPO Status:  NPO Appropriate. .  Plan for Spinal and MAC with Intravenous and Propofol induction. Maintenance will be Balanced. Reason for MAC:  Deep or markedly invasive procedure (G8).         PONV  prophylaxis:  Ondansetron (or other 5HT-3) and Dexamethasone or Solumedrol.       Consents  Anesthesia Plan(s) and associated risks, benefits, and realistic alternatives discussed.    Questions answered and patient/representative(s) expressed understanding.    Discussed with:  Patient.       Extended Intubation/Ventilatory Support Discussed No No     Use of blood products discussed: No.          Postoperative Care  Postoperative pain management: Peripheral nerve block (Single Shot) and Multi-modal analgesia.

## 2021-01-18 NOTE — ANESTHESIA CARE TRANSFER NOTE
Patient: Joel Pike    Procedure(s):  right total knee replacement    Diagnosis: Primary osteoarthritis of right knee [M17.11]  Diagnosis Additional Information: No value filed.    Anesthesia Type:   Spinal, MAC     Note:    Oropharynx: spontaneously breathing  Level of Consciousness: awake  Oxygen Supplementation: face mask    Independent Airway: airway patency satisfactory and stable  Dentition: dentition unchanged  Vital Signs Stable: post-procedure vital signs reviewed and stable  Report to RN Given: handoff report given  Patient transferred to: PACU    Handoff Report: Identifed the Patient, Identified the Reponsible Provider, Reviewed the pertinent medical history, Discussed the surgical course, Reviewed Intra-OP anesthesia mangement and issues during anesthesia, Set expectations for post-procedure period and Allowed opportunity for questions and acknowledgement of understanding      Vitals: (Last set prior to Anesthesia Care Transfer)  CRNA VITALS  1/18/2021 1653 - 1/18/2021 1741      1/18/2021             Pulse:  84    SpO2:  98 %        Electronically Signed By: SYLVIA Delvalle CRNA  January 18, 2021  5:41 PM

## 2021-01-19 ENCOUNTER — APPOINTMENT (OUTPATIENT)
Dept: PHYSICAL THERAPY | Facility: CLINIC | Age: 66
End: 2021-01-19
Attending: NURSE PRACTITIONER
Payer: COMMERCIAL

## 2021-01-19 VITALS
WEIGHT: 233 LBS | HEIGHT: 70 IN | SYSTOLIC BLOOD PRESSURE: 167 MMHG | RESPIRATION RATE: 18 BRPM | TEMPERATURE: 97.9 F | DIASTOLIC BLOOD PRESSURE: 71 MMHG | BODY MASS INDEX: 33.36 KG/M2 | HEART RATE: 89 BPM | OXYGEN SATURATION: 96 %

## 2021-01-19 LAB
GLUCOSE BLDC GLUCOMTR-MCNC: 233 MG/DL (ref 70–99)
GLUCOSE SERPL-MCNC: 217 MG/DL (ref 70–99)
HGB BLD-MCNC: 14 G/DL (ref 13.3–17.7)

## 2021-01-19 PROCEDURE — 97530 THERAPEUTIC ACTIVITIES: CPT | Mod: GP | Performed by: PHYSICAL THERAPIST

## 2021-01-19 PROCEDURE — 97161 PT EVAL LOW COMPLEX 20 MIN: CPT | Mod: GP | Performed by: PHYSICAL THERAPIST

## 2021-01-19 PROCEDURE — 97110 THERAPEUTIC EXERCISES: CPT | Mod: GP | Performed by: PHYSICAL THERAPIST

## 2021-01-19 PROCEDURE — 82947 ASSAY GLUCOSE BLOOD QUANT: CPT | Performed by: NURSE PRACTITIONER

## 2021-01-19 PROCEDURE — 250N000013 HC RX MED GY IP 250 OP 250 PS 637: Performed by: NURSE PRACTITIONER

## 2021-01-19 PROCEDURE — 999N001017 HC STATISTIC GLUCOSE BY METER IP

## 2021-01-19 PROCEDURE — 36415 COLL VENOUS BLD VENIPUNCTURE: CPT | Performed by: NURSE PRACTITIONER

## 2021-01-19 PROCEDURE — 250N000009 HC RX 250: Performed by: NURSE PRACTITIONER

## 2021-01-19 PROCEDURE — 85018 HEMOGLOBIN: CPT | Performed by: NURSE PRACTITIONER

## 2021-01-19 PROCEDURE — 97116 GAIT TRAINING THERAPY: CPT | Mod: GP | Performed by: PHYSICAL THERAPIST

## 2021-01-19 RX ORDER — TIZANIDINE 2 MG/1
2-4 TABLET ORAL EVERY 6 HOURS PRN
Qty: 60 TABLET | Refills: 1 | Status: SHIPPED | OUTPATIENT
Start: 2021-01-19 | End: 2021-02-27

## 2021-01-19 RX ADMIN — DOCUSATE SODIUM AND SENNOSIDES 2 TABLET: 8.6; 5 TABLET ORAL at 08:40

## 2021-01-19 RX ADMIN — ACETAMINOPHEN 975 MG: 325 TABLET, FILM COATED ORAL at 05:03

## 2021-01-19 RX ADMIN — CLINDAMYCIN PHOSPHATE 900 MG: 900 INJECTION, SOLUTION INTRAVENOUS at 06:42

## 2021-01-19 RX ADMIN — ASPIRIN 325 MG: 325 TABLET, DELAYED RELEASE ORAL at 08:41

## 2021-01-19 RX ADMIN — LEVOTHYROXINE SODIUM 100 MCG: 100 TABLET ORAL at 06:42

## 2021-01-19 RX ADMIN — FAMOTIDINE 20 MG: 20 TABLET, FILM COATED ORAL at 08:40

## 2021-01-19 RX ADMIN — OXYCODONE HYDROCHLORIDE 5 MG: 5 TABLET ORAL at 08:44

## 2021-01-19 RX ADMIN — METFORMIN HYDROCHLORIDE 500 MG: 500 TABLET ORAL at 08:40

## 2021-01-19 ASSESSMENT — ACTIVITIES OF DAILY LIVING (ADL): DEPENDENT_IADLS:: INDEPENDENT

## 2021-01-19 NOTE — PROGRESS NOTES
Care Management Discharge Note    Discharge Date: 01/19/21       Discharge Disposition: Home, Home Care    Discharge Services: None    Discharge DME:  Has equipment at home     Discharge Transportation: family or friend will provide([employer])    Private pay costs discussed: Not applicable    Patient/family educated on Medicare website which has current facility and service quality ratings: yes    Education Provided on the Discharge Plan: yes      Persons Notified of Discharge Plans: patient    Patient/Family in Agreement with the Plan: yes    Handoff Referral Completed: No- not needed      LYDIA Foreman  [facility]   [phone]

## 2021-01-19 NOTE — PROGRESS NOTES
"Saw and examined patient in PACU. Doing very well. No pain currently.  Denies nausea. Block still in place.   Had contralateral side replaced 1 year ago. Well versed in surgery and recovery. Has no current complaints. About to be transferred to the floor.   Answered all questions.     /67   Pulse 84   Temp 98.1  F (36.7  C)   Resp 18   Ht 1.778 m (5' 10\")   Wt 105.7 kg (233 lb)   SpO2 97%   BMI 33.43 kg/m      Alert and orient x 4, sitting up in bed in NAD.   Dressing CDI.    Has Immobilizer from previous surgery, spouse is bringing it to the hospital tonight.   Sensation and motor still blunted from adductor canal block/spinal.  Toes well perfused. D.p. Pulse 2+    Plan: will see again tomorrow Am.   Plan for discharge tomorrow afternoon pending progress.    SYLVIA Stevens, CNP  Orthopedic Surgery      "

## 2021-01-19 NOTE — DISCHARGE SUMMARY
"[facility] Discharge Summary     Joel Pike MRN# [id number]   YOB: 1955 Age: 65 year old     Date of Admission:  1/18/2021  Date of Discharge:  1/19/2021  Admitting Physician:  Jason Berman DO  Discharge Physician:  SYLVIA Rodriges CNP  Discharging Service:  Orthopedics     Primary Provider: Cayden aRmsey          Admission Diagnoses:   Primary osteoarthritis of right knee [M17.11]  S/P total knee replacement using cement, right [Z96.651]          Discharge Diagnosis:     Principal Problem:    Primary osteoarthritis of right knee  Active Problems:    S/P total knee replacement using cement, right               Discharge Disposition:     Discharged to home           Condition on Discharge:     Discharge condition: Stable   Discharge vitals: Blood pressure (!) 167/71, pulse 89, temperature 97.9  F (36.6  C), temperature source Oral, resp. rate 18, height 1.778 m (5' 10\"), weight 105.7 kg (233 lb), SpO2 96 %.   Code status on discharge: Full Code           Procedures / Labs / Imaging:   No significant procedures performed during this admission          Medications Prior to Admission:     Medications Prior to Admission   Medication Sig Dispense Refill Last Dose     ALLEGRA-D ALLERGY & CONGESTION 180-240 MG 24 hr tablet TAKE ONE TABLET BY MOUTH EVERY DAY 90 tablet 0 Past Week at Unknown time     Ascorbic Acid (VITAMIN C) 500 MG CHEW Take 500 mg by mouth daily   Past Week at Unknown time     blood glucose (HUGO CONTOUR) test strip Use to test blood sugars 1 time daily or as directed. 1 Box 5 1/18/2021 at Unknown time     blood glucose (NO BRAND SPECIFIED) lancets standard Use to test blood sugar one time daily or as directed. 100 each 3 1/18/2021 at Unknown time     blood glucose calibration (HUGO CONTOUR) NORMAL solution Use to calibrate blood glucose monitor as directed. 1 each 3 1/18/2021 at Unknown time     buPROPion (WELLBUTRIN XL) 300 MG 24 hr tablet Take 1 " tablet (300 mg) by mouth every morning 90 tablet 3 Past Week at Unknown time     cholecalciferol (VITAMIN D3) 5000 UNITS TABS tablet Take 1 tablet (5,000 Units) by mouth   Past Week at Unknown time     Coenzyme Q-10 capsule Take 1 capsule by mouth daily. 30 capsule 12 Past Week at Unknown time     docusate sodium (COLACE) 100 MG capsule Take 1 capsule (100 mg) by mouth 2 times daily as needed for constipation 60 capsule 1 Past Week at Unknown time     ezetimibe (ZETIA) 10 MG tablet Take 1 tablet (10 mg) by mouth daily 90 tablet 3 Past Week at Unknown time     fish oil-omega-3 fatty acids 1000 MG capsule Take  by mouth. Take daily   180 capsule 12 Past Week at Unknown time     hydrOXYzine (ATARAX) 25 MG tablet Take 1 tablet (25 mg) by mouth every 8 hours as needed for itching or other (adjuvant pain) 40 tablet 0 Past Week at Unknown time     levothyroxine (SYNTHROID/LEVOTHROID) 100 MCG tablet Take 1 tablet (100 mcg) by mouth daily 90 tablet 1 Past Week at Unknown time     losartan (COZAAR) 50 MG tablet Take 1 tablet (50 mg) by mouth daily 90 tablet 3 1/16/2021     Magnesium 500 MG CAPS One twice a day 30 capsule  Past Week at Unknown time     metFORMIN (GLUCOPHAGE) 500 MG tablet TAKE ONE TABLET BY MOUTH TWICE A DAY WITH MEALS 180 tablet 3 1/11/2021     Misc Natural Products (OSTEO BI-FLEX ADV JOINT SHIELD) TABS Take  by mouth.   Past Week at Unknown time     multivitamin (OCUVITE) TABS tablet Take 1 tablet by mouth daily 30 each 0 Past Week at Unknown time     order for DME Equipment being ordered: Large thigh high compression stockings. 30-40mmHg for compression. 1 Device 1      STATIN NOT PRESCRIBED (INTENTIONAL) Please choose reason not prescribed, below        albuterol (PROAIR HFA) 108 (90 BASE) MCG/ACT Inhaler 1-2 puffs every 2 -4 hrs as needed 1 Inhaler 5 Unknown at Unknown time     esomeprazole (NEXIUM) 40 MG DR capsule TAKE 1 CAPSULE EVERY MORNING BEFORE BREAKFAST, 30 TO 60 MINUTES BEFORE EATING. 90 capsule  3 More than a month at Unknown time             Discharge Medications:     Current Discharge Medication List      START taking these medications    Details   acetaminophen (TYLENOL) 325 MG tablet Take 3 tablets (975 mg) by mouth every 8 hours as needed for other (mild pain)  Qty: 100 tablet, Refills: 1    Associated Diagnoses: S/P total knee replacement using cement, right      aspirin (ASA) 325 MG EC tablet Take 1 tablet (325 mg) by mouth 2 times daily  Qty: 84 tablet, Refills: 0    Associated Diagnoses: S/P total knee replacement using cement, right      !! hydrOXYzine (ATARAX) 25 MG tablet Take 1 tablet (25 mg) by mouth every 6 hours as needed for itching or anxiety (with pain, moderate pain)  Qty: 30 tablet, Refills: 0    Associated Diagnoses: S/P total knee replacement using cement, right      oxyCODONE (ROXICODONE) 5 MG tablet Take 1-2 tablets (5-10 mg) by mouth every 3 hours as needed for pain (Moderate to Severe)  Qty: 50 tablet, Refills: 0    Associated Diagnoses: S/P total knee replacement using cement, right      senna-docusate (SENOKOT-S/PERICOLACE) 8.6-50 MG tablet Take 1-2 tablets by mouth 2 times daily as needed for constipation Take while on oral narcotics to prevent or treat constipation.  Qty: 60 tablet, Refills: 1    Comments: While taking narcotics  Associated Diagnoses: S/P total knee replacement using cement, right       !! - Potential duplicate medications found. Please discuss with provider.      CONTINUE these medications which have NOT CHANGED    Details   ALLEGRA-D ALLERGY & CONGESTION 180-240 MG 24 hr tablet TAKE ONE TABLET BY MOUTH EVERY DAY  Qty: 90 tablet, Refills: 0    Associated Diagnoses: Chronic rhinitis      Ascorbic Acid (VITAMIN C) 500 MG CHEW Take 500 mg by mouth daily      blood glucose (HUGO CONTOUR) test strip Use to test blood sugars 1 time daily or as directed.  Qty: 1 Box, Refills: 5    Associated Diagnoses: Type 2 diabetes mellitus with stage 2 chronic kidney disease,  without long-term current use of insulin (H)      blood glucose (NO BRAND SPECIFIED) lancets standard Use to test blood sugar one time daily or as directed.  Qty: 100 each, Refills: 3    Comments: Profile Rx: patient will contact pharmacy when needed  Associated Diagnoses: Type 2 diabetes mellitus with stage 2 chronic kidney disease, without long-term current use of insulin (H)      blood glucose calibration (HUGO CONTOUR) NORMAL solution Use to calibrate blood glucose monitor as directed.  Qty: 1 each, Refills: 3    Comments: Profile Rx: patient will contact pharmacy when needed  Associated Diagnoses: Type 2 diabetes mellitus with stage 2 chronic kidney disease, without long-term current use of insulin (H)      buPROPion (WELLBUTRIN XL) 300 MG 24 hr tablet Take 1 tablet (300 mg) by mouth every morning  Qty: 90 tablet, Refills: 3    Comments: Profile Rx: patient will contact pharmacy when needed.  Associated Diagnoses: Recurrent major depression in complete remission (H)      cholecalciferol (VITAMIN D3) 5000 UNITS TABS tablet Take 1 tablet (5,000 Units) by mouth      Coenzyme Q-10 capsule Take 1 capsule by mouth daily.  Qty: 30 capsule, Refills: 12      docusate sodium (COLACE) 100 MG capsule Take 1 capsule (100 mg) by mouth 2 times daily as needed for constipation  Qty: 60 capsule, Refills: 1    Associated Diagnoses: S/P total knee replacement using cement, left      ezetimibe (ZETIA) 10 MG tablet Take 1 tablet (10 mg) by mouth daily  Qty: 90 tablet, Refills: 3    Comments: Profile Rx: patient will contact pharmacy when needed.  Associated Diagnoses: Type 2 diabetes mellitus with stage 2 chronic kidney disease, without long-term current use of insulin (H); Hyperlipidemia LDL goal <100      fish oil-omega-3 fatty acids 1000 MG capsule Take  by mouth. Take daily    Qty: 180 capsule, Refills: 12      !! hydrOXYzine (ATARAX) 25 MG tablet Take 1 tablet (25 mg) by mouth every 8 hours as needed for itching or other  (adjuvant pain)  Qty: 40 tablet, Refills: 0    Associated Diagnoses: S/P total knee replacement using cement, left      levothyroxine (SYNTHROID/LEVOTHROID) 100 MCG tablet Take 1 tablet (100 mcg) by mouth daily  Qty: 90 tablet, Refills: 1    Associated Diagnoses: Subclinical hypothyroidism      losartan (COZAAR) 50 MG tablet Take 1 tablet (50 mg) by mouth daily  Qty: 90 tablet, Refills: 3    Comments: Profile Rx: patient will contact pharmacy when needed.  Associated Diagnoses: Type 2 diabetes mellitus with stage 2 chronic kidney disease, without long-term current use of insulin (H)      Magnesium 500 MG CAPS One twice a day  Qty: 30 capsule      metFORMIN (GLUCOPHAGE) 500 MG tablet TAKE ONE TABLET BY MOUTH TWICE A DAY WITH MEALS  Qty: 180 tablet, Refills: 3    Associated Diagnoses: Type 2 diabetes mellitus with stage 2 chronic kidney disease, without long-term current use of insulin (H)      Misc Natural Products (OSTEO BI-FLEX ADV JOINT SHIELD) TABS Take  by mouth.      multivitamin (OCUVITE) TABS tablet Take 1 tablet by mouth daily  Qty: 30 each, Refills: 0      order for DME Equipment being ordered: Large thigh high compression stockings. 30-40mmHg for compression.  Qty: 1 Device, Refills: 1    Associated Diagnoses: S/P total knee replacement using cement, left      STATIN NOT PRESCRIBED (INTENTIONAL) Please choose reason not prescribed, below  Qty:      Associated Diagnoses: Type 2 diabetes mellitus with stage 2 chronic kidney disease, without long-term current use of insulin (H)      albuterol (PROAIR HFA) 108 (90 BASE) MCG/ACT Inhaler 1-2 puffs every 2 -4 hrs as needed  Qty: 1 Inhaler, Refills: 5    Associated Diagnoses: Gastroesophageal reflux disease with esophagitis      esomeprazole (NEXIUM) 40 MG DR capsule TAKE 1 CAPSULE EVERY MORNING BEFORE BREAKFAST, 30 TO 60 MINUTES BEFORE EATING.  Qty: 90 capsule, Refills: 3    Comments: Profile Rx: patient will contact pharmacy when needed.  Associated Diagnoses:  Gastroesophageal reflux disease with esophagitis       !! - Potential duplicate medications found. Please discuss with provider.                Consultations:     No consultations were requested during this admission             Brief History of Illness:   See operative report           Hospital Course:     Patient admitted after routine elective surgery.  Patient discharge POD1 without incident.  By day of discharge discharge: Did well. Per patient felt good.    Continued to improve.  Pain was well-controlled with oral medications.  No fevers.   denied N/v. tolerated oral intake.  voiding adequate.  Patient expressed desire to discharge this day.  Has family at home to help.  No concerns, questions, or new issues.                 Significant Results:     None             Pending Results:     None           Discharge Instructions and Follow-Up:     Discharge diet: Regular   Discharge activity: Activity as tolerated   Discharge follow-up: 14 days with Dr. Berman, Troy Cruz, or Blanca Diallo   Effected Extremity Right leg       Weight Bearing status Weight bearing as tolerated       Lines and drains: None    Wound care: Keep incision covered with hospital dressing (Aquacel) for one week. It is okay to shower with this dressing on. However, do not submerge your dressing and incision in water for roughly 2 weeks. After one week remove this dressing and then keep it clean, dry, and covered. If this dressing become looses or falls off it is ok to cover with gauze and tape.  Wash the incision gently with warm soapy water after removing your hospital dressing and lightly pat dry.            SYLVIA Stevens, CNP  Orthopedic Surgery

## 2021-01-19 NOTE — PROGRESS NOTES
"[facility]  Orthopedics Progress Note           Assessment and Plan:    Assessment:   Post-operative day #1 Procedure(s):  right total knee replacement        Plan:   Encourage IS  Start or continue physical therapy  Activity as tolerated  Weight-bear as tolerated.  Discharge from hospital today.  Pain control measures  Advance diet as tolerated  Routine wound care: just prior to discharge RN to replace bulky dressing with sterile aquacell with sterile procedure, place HANK stocking after dressing change.  DVT Prophylaxis: Aspirin , SCD's, Compression Hose  No acute medical issues.            Interval History:   Doing well.  Continues to improve.  Pain is well-controlled.  No fevers.  Ambulating, voiding, tolerating oral intake.  Has family at home. Well versed with recovery as had the other side replaced about 1 year ago.  No questions.  Expresses desire for discharge.            Review of Systems:    The patient denies any chest pain, shortness of breath, excessive pain, fever, chills, purulent drainage from the wound, nausea or vomiting.               Physical Exam:   General: awake, alert, appropriate and in no acute distress  Blood pressure (!) 167/71, pulse 89, temperature 97.9  F (36.6  C), temperature source Oral, resp. rate 18, height 1.778 m (5' 10\"), weight 105.7 kg (233 lb), SpO2 96 %.  Right knee:  Dressing clean, dry and intact. Surrounding skin intact, no breakdown. Calf is soft, nontender with no significant swelling. Distal neurovascular grossly intact.  Compartments soft and non-tender.  2+ distal pulse, sensation intact to foot, able to df/pf against resistance. Brisk cap refill.            Data:     Results for orders placed or performed during the hospital encounter of 01/18/21 (from the past 24 hour(s))   Glucose by meter   Result Value Ref Range    Glucose 121 (H) 70 - 99 mg/dL   XR Knee Port Right 1/2 Views    Narrative    EXAM: XR KNEE PORT RT 1/2 VW  LOCATION: [facility]  DATE/TIME: 1/18/2021 1:00 PM    INDICATION: Postoperative follow-up.  COMPARISON: 8/27/2020.      Impression    IMPRESSION:  1.  Interval right total knee arthroplasty. The components are well seated without evidence of complication.  2.  No fracture or joint malalignment.  3.  Postoperative soft tissue gas about the knee.   Glucose by meter   Result Value Ref Range    Glucose 154 (H) 70 - 99 mg/dL   Hemoglobin   Result Value Ref Range    Hemoglobin 14.0 13.3 - 17.7 g/dL   Glucose   Result Value Ref Range    Glucose 217 (H) 70 - 99 mg/dL     SYLVIA Stevens, CNP  Orthopedic Surgery

## 2021-01-19 NOTE — PROGRESS NOTES
01/19/21 0730   Quick Adds   Type of Visit Initial PT Evaluation       Present no   Living Environment   People in home spouse   Current Living Arrangements house   Home Accessibility stairs to enter home   Number of Stairs, Main Entrance 10  (5+5)   Stair Railings, Main Entrance railing on left side (ascending)   Number of Stairs, Within Home, Primary none   Transportation Anticipated family or friend will provide  (equinox)   Living Environment Comments single level living. Step over tub. Wife available for assistance.    Self-Care   Usual Activity Tolerance good   Current Activity Tolerance moderate   Regular Exercise No   Equipment Currently Used at Home none;shower chair;raised toilet seat  (has a front wheeled walker from previous surgery)   Disability/Function   Hearing Difficulty or Deaf no   Wear Glasses or Blind no   Concentrating, Remembering or Making Decisions Difficulty no   Difficulty Communicating no   Difficulty Eating/Swallowing no   Walking or Climbing Stairs Difficulty no   Dressing/Bathing Difficulty no   Toileting issues no   Doing Errands Independently Difficulty (such as shopping) yes   Fall history within last six months no   Change in Functional Status Since Onset of Current Illness/Injury yes  (surgery results in difficulty with mobility)   General Information   Onset of Illness/Injury or Date of Surgery 01/18/21   Referring Physician Dr. Berman   Patient/Family Therapy Goals Statement (PT) home with wife and home health PT   Pertinent History of Current Problem (include personal factors and/or comorbidities that impact the POC) Patient is a 65 year old male, day 1 status post right total knee arthroplasty with spinal nerve block by Dr. Berman. Patient has a previous medical history of HILARIO with use of CPAP, CKD, obesity, DM type 2, hyperlipidema, anxiety and L TKA 2/4/20.   Existing Precautions/Restrictions fall   Weight-Bearing Status - LUE full weight-bearing    Weight-Bearing Status - RUE full weight-bearing   Weight-Bearing Status - LLE full weight-bearing   Weight-Bearing Status - RLE weight-bearing as tolerated   General Observations PT orders: eval and terat post operative knee. Activity orders: ambulate, up in chair, ice, elevate, knee immobilizer   Cognition   Orientation Status (Cognition) oriented x 4   Affect/Mental Status (Cognition) WNL   Follows Commands (Cognition) WNL   Pain Assessment   Patient Currently in Pain Yes, see Vital Sign flowsheet  (1/10)   Integumentary/Edema   Integumentary/Edema Comments post opertive dressing CDI   Posture    Posture Not impaired   Range of Motion (ROM)   ROM Comment right knee flexion 45 degrees   Strength   Strength Comments able to complete SLR, good quad set   Bed Mobility   Bed Mobility rolling left;rolling right;scooting/bridging;supine-sit;sit-supine   Rolling Left [place] (Bed Mobility) independent   Rolling Right [place] (Bed Mobility) independent   Scooting/Bridging Sax[place] (Bed Mobility) independent   Supine-Sit [place] (Bed Mobility) independent   Sit-Supine [place] (Bed Mobility) independent   Assistive Device (Bed Mobility)   (knee immobilizer)   Transfers   Transfers bed-chair transfer;sit-stand transfer   Transfer Safety Concerns Noted   (none)   Transfer Safety Comments moves well, completes short transfers without AD, cautioned against this   Bed-Chair Transfer   Bed-Chair Sax[place] (Transfers) modified independence   Assistive Device (Bed-Chair Transfers) walker, front-wheeled  (knee immobilizer)   Sit-Stand Transfer   Sit-Stand Saxe (Transfers) modified independence   Assistive Device (Sit-Stand Transfers) walker, front-wheeled  (knee immobilizer)   Gait/Stairs (Locomotion)   [place] Level (Gait) modified independence   Assistive Device (Gait) walker, front-wheeled  (knee immobilizer)   Distance in Feet (Required for LE Total Joints) 125   Pattern (Gait)  swing-through   Deviations/Abnormal Patterns (Gait) stride length decreased;gait speed decreased;base of support, wide   Maintains Weight-bearing Status (Gait) able to maintain   Negotiation (Stairs) stairs independence;stairs assistive device;handrail location;number of steps;ascending technique;descending technique;maintains weight-bearing status   [place] Level (Stairs) modified independence   Assistive Device (Stairs)   (knee immobilizer)   Handrail Location (Stairs) left side (ascending);left side (descending)   Number of Steps (Stairs) 12   Ascending Technique (Stairs) step-to-step   Descending Technique (Stairs) step-to-step   Maintains Weight-bearing Status (Stairs) able to maintain   Balance   Balance Comments no LOB, good control of RLE, moves quickly without pain    Sensory Examination   Sensory Perception WNL   Coordination   Coordination no deficits were identified   Muscle Tone   Muscle Tone no deficits were identified   Clinical Impression   Criteria for Skilled Therapeutic Intervention yes, treatment indicated   PT Diagnosis (PT) muscle weakness, joint stiffness, impaired gait   Influenced by the following impairments day 1 status post R TKA   Functional limitations due to impairments pain, use of walker and knee immobilizer for mobility, assistance for self cares by wife   Clinical Presentation Stable/Uncomplicated   Clinical Presentation Rationale pain increased to 2/10 with activity, post operative status, medical history, previous course of recovery   Clinical Decision Making (Complexity) low complexity   Therapy Frequency (PT) 2x/day   Predicted Duration of Therapy Intervention (days/wks) 1   Planned Therapy Interventions (PT) bed mobility training;gait training;ROM (range of motion);strengthening   Anticipated Equipment Needs at Discharge (PT)   (walker from home)   Risk & Benefits of therapy have been explained evaluation/treatment results reviewed;care plan/treatment goals  reviewed;risks/benefits reviewed;participants included;patient   Clinical Impression Comments Patient presents with signs and symptoms consistent with post operative TKA. Patient doing well overall. Patient motivated and eager to progress in his recovery. Patient able to mobilize MOD IND throughout activity and is anticipated to do well with discahrge home. Assistance at home encouraged for safety and to aid with proper recovery. Patient would benefit from continued PT services to address post operative impairments and progres him towards baseline function and safety in accordance with his surgeon's protocol.   PT Discharge Planning    PT Discharge Recommendation (DC Rec) home with assist;home with outpatient physical therapy  (patient requesting HHPT)   PT Rationale for DC Rec Patient able to complete functional mobility greater than his home environment without physical assistance and safely. Patient with good understanding of his post operative course. Anticipate patient to do well with discharge home. He would benefit from continued skilled PT intervention and requests HHPT, attempted to encourage outpatient however requests HHPT. Patient would benefit from PT to address post operative impairements and safely progress him towards higher level of functional mobility and safety in accordance with his surgeon's protocol.   PT Brief overview of current status  IND bed mobility, IND brace don and doff, IND HEP, MOD IND transfers and ambulation, MOD IND left hand rail and knee immobilizer for 12 stairs.   Total Evaluation Time   Total Evaluation Time (Minutes) 10       Physical Therapy Discharge Summary    Reason for therapy discharge:    Discharged to home with home therapy.    Progress towards therapy goal(s). [facility] for goal details.  Goals met    Therapy recommendation(s):    Continued therapy is recommended.  Rationale/Recommendations:   . Patient would benefit  from continued skilled therapeutic intervention in order to progress them towards a higher level of function in accordance with their surgeon's protocol.        Thank you for your referral.  Carolina Brenner, PT, DPT, ATC    [facility]  O: [phone]  E: [email or web address]

## 2021-01-19 NOTE — PLAN OF CARE
Patient vital signs are at baseline: Yes  Patient able to ambulate as they were prior to admission or with assist devices provided by therapies during their stay:  Yes  Patient MUST void prior to discharge:  Yes  Patient able to tolerate oral intake:  Yes  Pain has adequate pain control using Oral analgesics:  Yes    Patient stating pain 1/10 in right knee. CMS intact all throughout night. Tolerating regular diet, saline locked, walk 250 feet completed. Voiding well.    Neli Rivera RN on 1/19/2021 at 7:08 AM

## 2021-01-19 NOTE — ANESTHESIA POSTPROCEDURE EVALUATION
Patient: Joel Pike    Procedure(s):  right total knee replacement    Diagnosis:Primary osteoarthritis of right knee [M17.11]  Diagnosis Additional Information: No value filed.    Anesthesia Type:  Spinal, MAC    Note:  Disposition: Inpatient   Postop Pain Control: Uneventful            Sign Out: Well controlled pain   PONV: No   Neuro/Psych: Uneventful            Sign Out: Acceptable/Baseline neuro status   Airway/Respiratory: Uneventful            Sign Out: Acceptable/Baseline resp. status   CV/Hemodynamics: Uneventful            Sign Out: Acceptable CV status   Other NRE: NONE   DID A NON-ROUTINE EVENT OCCUR? No    Event details/Postop Comments:  Patient was very pleased with his anesthesia .  His pain was well controlled with his adductor canal block. No concerns.          Last vitals:  Vitals:    01/19/21 0113 01/19/21 0500 01/19/21 0720   BP: (!) 143/70 (!) 158/74 (!) 167/71   Pulse: 97 87 89   Resp: 18 18 18   Temp: 99.5  F (37.5  C) 99.1  F (37.3  C) 97.9  F (36.6  C)   SpO2: 100% 96% 96%       Electronically Signed By: SYLVIA Ritter CRNA  January 19, 2021  10:06 AM

## 2021-01-19 NOTE — CONSULTS
Care Management Initial Consult    General Information  Assessment completed with: Patient,    Type of CM/SW Visit: Initial Assessment    Primary Care Provider verified and updated as needed: Yes   Readmission within the last 30 days:        Reason for Consult: discharge planning  Advance Care Planning:    Has no scanned ACP documents        Communication Assessment  Patient's communication style: spoken language (English or Bilingual)    Hearing Difficulty or Deaf: no   Wear Glasses or Blind: no    Cognitive  Cognitive/Neuro/Behavioral: WDL     Arousal Level: opens eyes spontaneously                Living Environment:   People in home: spouse     Current living Arrangements: house      Able to return to prior arrangements: yes       Family/Social Support:  Care provided by: self  Provides care for:  Self   Marital Status:   Wife, Children  Michelle        Description of Support System: Supportive, Involved         Current Resources:   Skilled Home Care Services:  None   Community Resources: None  Equipment currently used at home: none, shower chair, raised toilet seat(has a front wheeled walker from previous surgery)  Supplies currently used at home:      Employment/Financial:  Employment Status:   Unknown         Financial Concerns: No concerns identified           Lifestyle & Psychosocial Needs:        Socioeconomic History     Marital status:      Spouse name: Michelle     Number of children: 1     Years of education: 12     Highest education level: Not on file   Occupational History     Occupation:      Employer: [employer]     Tobacco Use     Smoking status: Never Smoker     Smokeless tobacco: Never Used   Substance and Sexual Activity     Alcohol use: No     Alcohol/week: 0.0 standard drinks     Drug use: No     Sexual activity: Yes     Partners: Female       Functional Status:  Prior to admission patient needed assistance:   Dependent ADLs:: Independent  Dependent IADLs::  Independent  Assesssment of Functional Status: Not at baseline with mobility    Mental Health Status:  Mental Health Status: No Current Concerns       Chemical Dependency Status:  Chemical Dependency Status: No Current Concerns             Values/Beliefs:  Spiritual, Cultural Beliefs, Taoism Practices, Values that affect care: no               Additional Information:  Writer visited with patient regarding discharge planning.  Patient requesting [facility] for home PT services, as he used them last time with his left knee surgery.  No other discharge needs indicated at this time.  Writer has sent the referral to the home care.  Family to transport patient home today.      Patient commented that he really feels that the staff do a great job here at [facility].      LYDIA Foreman  [facility]   [phone]

## 2021-01-19 NOTE — OP NOTE
Procedure Date: 01/18/2021      PREOPERATIVE DIAGNOSIS:  Right knee primary osteoarthritis.      POSTOPERATIVE DIAGNOSIS:  Right knee primary osteoarthritis.      PROCEDURE:  Right total knee arthroplasty.      SURGEON:  Jason Berman DO.      FIRST ASSISTANT:  Jakob Montanez NP (utilized throughout the procedure, assisting with soft tissue retraction, assisting with trial and final implant placement, and he provided skin closure.      ANESTHESIA:  Spinal, IV sedation.      COMPLICATIONS:  None.      ESTIMATED BLOOD LOSS:  Less than 10 mL      FLUIDS:  1200 mL crystalloids.      TOURNIQUET TIME PRESSURE:  70 minutes at 300 mmHg.      COUNTS:  Correct.      DISPOSITION:  To PACU in stable condition.      FINDINGS:  Bone-on-bone arthritis with some rimming osteophytes.  He also has some complete cartilage loss along the weightbearing lateral femoral condyle.  The patella showed some wear as well, more along the apex and lateral facet.  Motion was 0-120 degrees.      IMPLANTS:  Includes DePuy Attune size 7 posterior stabilized femoral component, a size 7 rotating platform tibial baseplate, a 7 mm polyethylene insert, and a 38 mm patellar button.      INDICATIONS:  This is a pleasant 65-year-old male well known to myself.  I performed a contralateral knee replacement back in 02/2020.  He has done extremely well and has been very happy.  Complains of longstanding right knee pain similar to his contralateral side, progressively worsening, worsening towards the end of the day.  Knee pain is causing him to fall.  It wakes him at night.  He has attempted rest, activity modification, Tylenol, ibuprofen, home exercise and physical therapy with no improvement.  His radiographs are clinically correlated.  Given his continued pain refractory to conservative care and impacting the quality of his life, we discussed proceeding with knee replacement.  We had a lengthy discussion regarding risks, benefits, complications, and  alternatives.  We discussed postoperative timeframe for recovery.  Once thoroughly discussed with him, he opted to proceed.      DETAILS OF PROCEDURE:  He was met preoperatively.  Again informed consent was verified, appropriate extremity was marked, and he was wheeled to operative suite #1.  Transferred to the OR table without any issues.  When deemed appropriate by Anesthesia, the right lower extremity was sterilely prepped and draped in normal manner.  Prior to incision, a time-out was performed.  Again, the appropriate patient, surgery and extremity were verified.  Antibiotics had been administered.  An Esmarch was utilized to exsanguinate the extremity.  Tourniquet was inflated to 300 mmHg on the upper thigh.  A midline skin incision was followed with a medial parapatellar arthrotomy.  The anterior fat pad was excised.  The medial tibia was skeletonized with no releases being performed.  The rimming osteophytes were removed at this time.  The knee was flexed up.  A drill was utilized to enter the distal intramedullary canal of the femur with no issues.  The collateral ligament retractors were placed and maintained through the key components of the procedure.  The contents of the femur were aspirated.  The distal guide was very gradually and easily inserted intramedullary.  It was set in 5 degrees in valgus, taking 10 mm off distally.  With the jig pinned, the distal medial and lateral aspect of the femur was cut with no issues.  Attention was turned to the tibia.  The ACL excised.  A posterior retractor was placed on the tibia was presented.  Using the extramedullary tibia device set to take 4 mm off the low side, which was medially with care to recreate the appropriate slope and varus and valgus alignment and the jig was pinned into position.  With collateral ligaments and posterior neurovascular structures protected, a cut on the proximal tibia both medial and lateral aspect was performed with no issues.  The  knee was brought out to extension.  A spacer block was placed and showed a balanced resection.  A drop aaliyah was placed, which showed normal alignment.  The knee was flexed up to 90 degrees.  A tibial baseplate was placed.  Again, a drop aaliyah was placed showing acceptable resection on the tibia.  Retractors were placed.  The sizing guide was placed on the distal femur.  Rotation was based on [place] line and the epicondylar axis.  The pins were placed.  It measured a size 7.  The size 7, 4-in-1 block was placed.  The 90-degree flexion space under the block was consistent with his extension space.  The 4-in-1 cuts were then performed with no issues.  This was followed with the box cut.  Excess bone and meniscal remnants were removed.  Trial components were placed.  He had 0-130 degrees of motion.  The patella tracked with no thumbs technique through the full arc of motion.  The knee was balanced through the full arc of motion.  The patella was then resurfaced back down to anatomic dimensions and again taken through full range of motion, showing normal tracking.  The tibia and femur were then prepared.  The final components as listed above were cemented in position, held in approximately 20 degrees of flexion until the cement cured.  During this process, a dilute Betadine lavage performed for 3 minutes followed with pulse lavage.  The knee was inspected.  Excess bone and cement fragments had been removed.  The arthrotomy was then closed with 0 Vicryl, followed with 2-0 Vicryl subcutaneous, followed with running Monocryl suture and skin glue.  A sterile bandage was applied.  He subsequently transferred to PACU in stable condition, brought to the hospital for orthopedic care, DVT prophylaxis, pain management, and physical therapy.         MARIAM PORRAS DO             D: 2021   T: 2021   MT: JUANJOSE      Name:     YANI ARIZMENDI   MRN:      [id number]        Account:        [id number]   :      1955            Procedure Date: 01/18/2021      Document: [id number]

## 2021-01-19 NOTE — DISCHARGE INSTRUCTIONS
Total Knee Replacement Discharge Instructions                                     [phone]  Bone and Joint Service Line for issues or concerns    Wait for a couple days before restarting your lisinopril medication (for high blood pressure).  On Friday you can resume this. If you are feeling dizzy or lightheaded with getting up then wait to resume and have your blood pressure checked.  If you have a way to measure you blood pressure at home, once the systolic number (top number) is consistently >120 you may resume your lisinopril     General Care:  After surgery you may feel tired/sleepy. This is normal. If you have any question along the way please contact the office. If you feel it is an issue cannot wait for normal office hours, contact the 24 hour bone and joint line at [phone]. You should not drive or operate machines/equipment until released by your physician to do so.     Wound Care:  Keep incision covered with hospital dressing (Aquacel) for one week. It is okay to shower with this dressing on. However, do not submerge your dressing and incision in water for roughly 2 weeks. After one week remove this dressing and then keep it clean, dry, and covered. If this dressing become looses or falls off it is ok to cover with gauze and tape.  Wash the incision gently with warm soapy water after removing your hospital dressing and lightly pat dry.       Diet:  Start with non-alcoholic liquids at first, particularly water or sports drinks after surgery. Progress to bland foods such as crackers and bread and finally to your normal diet if you have no problems. Avoid alcohol when taking narcotic pain medications.      Pain control:  Take your pain medications as prescribed. These medications may make you sleepy. Do not drive, operate equipment, or drink alcohol when taking these.  You may take Tylenol (Generic name is acetaminophen) as directed on the bottle for additional relief or in place of the prescribed  pain medications as your pain gets better. Do not take any other NSAIDs (Motrin, Ibuprofen, Aleve, Naproxen) while taking the blood thinner. If the medications cause a reaction such as nausea or skin rash, stop taking them and contact your doctor. Please plan accordingly, pain medications will not be re-filled on the weekends or at night. Call the office during the day if you need more medications.    Blood thinner:  It is very important to take it as prescribed. It is a medication to help prevent blood clots in your legs or lungs. No medication is perfect, so if you notice a sudden onset of pain/swelling in your calf area call your doctor. If you notice a sudden onset of troubles breathing and/or chest pain call 911.     Swelling (edema) control:  Preventing swelling (edema) in your legs after surgery is very important. It is helpful for achieving optimal range of motion as well as preventing blood clots.  It starts with simple things such as elevation of your legs and icing. Elevate your legs above your heart.    Do this for 20 minutes every couple hours the first few days after surgery. We also recommend HANK hose (compression hose) to be worn. Wear on both legs during the day. You may remove at night. Wear these until directed to stop.      Icing:  It is common for some swelling, aching and stiffness to occur for up to 6-9 months after a knee replacement. If you knee swells, get off your feet, elevate your leg and apply some ice packs. Apply for 20 minutes at a time. For the first 1-2 weeks apply ice 2-3 x day or more after therapy.    Walker/crutches:  Use a walker/crutches when you go home. You will transition to the use of a cane and finally to no additional support.     Braces:  You will go home with a knee immobilizer. You can take it off when you are lying or sitting down. Wear it when you are walking. This immobilizer can come off after you are doing a straight leg raise. Your physician and or physical  therapist will help to determine when to stop wearing it.     Physical Therapy:  The success of your knee replacement is based on doing physical therapy. You will have some pain and discomfort along the way. If you feel your pain is limiting your progress make sure to take some pain medication prior to your therapy session. If you pain medications are not working talk to your surgeon.   For the first 2 weeks after going home you will have in-home physical therapy. The goal is to work on range of motion. While it is important to working on bending your knee, it is equally important to make sure you knee comes out all the way straight. To assist in this do not place any bumps, pillows and or blankets under your knee when you are lying down. You should have an outpatient physical therapy appointment scheduled for about 2 weeks after surgery.     Activity:  Unless otherwise instructed, you can weight bear as tolerated. While laying or sitting down you should straighten your knee all the way out and then gently work on bending the knee back. Do not worry at first if your knee feels stiff and will not bend like normal, this will get better. Never put a pillow or bump under you knee, instead put a pillow under your ankle so your knee will straighten out all the way.     Normal findings after surgery:  Numbness and tenderness around the incisions and to the outside of the incision is normal.  You may have bruising around the incisions and down the lower leg.   Your knee will be swollen for months after surgery. It will feel  tight  to move.   Low grade fevers less than 100.5 degrees Fahrenheit are normal.   You may have some minor swelling in the leg/calf area.  You will have some increased pain after your therapy sessions.     When to call the Office:  Temperature greater than 101.5 degrees Fahreheit.  Fever, chills, and increasing pain in the knee.  Excessive drainage from the incisions that include bright red  blood.  Drainage from the incisions sites that appear yellow, pus-like, or foul smelling.  Increasing pain the knee not relieved by the prescribed pain medications or ice.  Persistent nausea or vomiting not helped by the Zofran.  Increased pain or swelling in your calf area (in back above your ankle) that wasn t there when in the hospital.  Any other effects you feel are significant.  Call 911 if you experience any chest pain and/or shortness or breath.

## 2021-01-19 NOTE — PROGRESS NOTES
S-(situation): Patient registered to Outpatient in a bed. Patient arrived to room 271 via cart from PACU    B-(background): R knee replacement    A-(assessment): VSS. A & O x4. Denies pain. No nausea or vomitting. Crackers and juice tolerated without issue. Capnography on, sating 98% on RA. ACE wrap to R knee to stay on until tomorrow. Incision, know other skin issues. Ice to incision. SCDs on BLE. CMS intact. Mildl tingly from spinal anesthesia. IV saline locked as pt is tolerating fluids. Pts wife will be bringing his CPAP and knee immobilizer for him tonight.    R-(recommendations): Orders and observation goals reviewed with patient.    Nursing Observation criteria listed below was met:    Skin issues/needs documented:Yes  Isolation needs addressed and Signage up: NA  Fall Prevention: Education given and documented: Yes  Allergies Reviewed: Yes  Medication Reconciliation Complete: Yes  New medication patient education completed and documented (Possible Side Effects of Common Medications handout): Yes  Home medications if not able to send immediately home with family stored here: NA  Reminder note placed in discharge instructions: NA  Patient has discharge needs (If yes, please explain): No

## 2021-01-19 NOTE — PLAN OF CARE
S-(situation): Patient discharged to home via wheelchair with spouse    B-(background): Observation goals met     A-(assessment): pain controlled on scheduled tylenol and prn oxycodone, up standby assist in room with knee immobilizer in place, surgical dressing removed and aquacel dressing placed, minimal edema noted to incision site, vitals stable, elevated blood sugars will continue to manage with home medications    R-(recommendations): Discharge instructions reviewed with patient. Listed belongings gathered and returned to patient.  Patient Education resolved: Yes  New medications-Pt. Has been educated about reason of use and side effects Yes  Home and hospital acquired medications returned to patient NA  Medication Bin checked and emptied on discharge Yes

## 2021-01-19 NOTE — PLAN OF CARE
Patient vital signs are at baseline: Yes  Patient able to ambulate as they were prior to admission or with assist devices provided by therapies during their stay:  Yes  Patient MUST void prior to discharge:  Yes  Patient able to tolerate oral intake:  Yes  Pain has adequate pain control using Oral analgesics:  Yes    Neli Rivera RN on 1/19/2021 at 7:06 AM

## 2021-01-20 ENCOUNTER — MEDICAL CORRESPONDENCE (OUTPATIENT)
Dept: HEALTH INFORMATION MANAGEMENT | Facility: CLINIC | Age: 66
End: 2021-01-20

## 2021-01-25 NOTE — OP NOTE
Procedure Date: 01/18/2021      PREOPERATIVE DIAGNOSIS:  Right knee primary osteoarthritis.      POSTOPERATIVE DIAGNOSIS:  Right knee primary osteoarthritis.      PROCEDURE:  Right total knee arthroplasty.      SURGEON:  Jason Berman DO.      FIRST ASSISTANT:  Jakob Montanez NP (utilized throughout the procedure, assisting with soft tissue retraction, assisting with trial and final implant placement, and he provided skin closure.      ANESTHESIA:  Spinal, IV sedation.      COMPLICATIONS:  None.      ESTIMATED BLOOD LOSS:  Less than 10 mL      FLUIDS:  1200 mL crystalloids.      TOURNIQUET TIME PRESSURE:  70 minutes at 300 mmHg.      COUNTS:  Correct.      DISPOSITION:  To PACU in stable condition.      FINDINGS:  Bone-on-bone arthritis with some rimming osteophytes.  He also has some complete cartilage loss along the weightbearing lateral femoral condyle.  The patella showed some wear as well, more along the apex and lateral facet.  Motion was 0-120 degrees.      IMPLANTS:  Includes DePuy Attune size 7 posterior stabilized femoral component, a size 7 rotating platform tibial baseplate, a 7 mm polyethylene insert, and a 38 mm patellar button.      INDICATIONS:  This is a pleasant 65-year-old male well known to myself.  I performed a contralateral knee replacement back in 02/2020.  He has done extremely well and has been very happy.  Complains of longstanding right knee pain similar to his contralateral side, progressively worsening, worsening towards the end of the day.  Knee pain is causing him to fall.  It wakes him at night.  He has attempted rest, activity modification, Tylenol, ibuprofen, home exercise and physical therapy with no improvement.  His radiographs are clinically correlated.  Given his continued pain refractory to conservative care and impacting the quality of his life, we discussed proceeding with knee replacement.  We had a lengthy discussion regarding risks, benefits, complications, and  alternatives.  We discussed postoperative timeframe for recovery.  Once thoroughly discussed with him, he opted to proceed.      DETAILS OF PROCEDURE:  He was met preoperatively.  Again informed consent was verified, appropriate extremity was marked, and he was wheeled to operative suite #1.  Transferred to the OR table without any issues.  When deemed appropriate by Anesthesia, the right lower extremity was sterilely prepped and draped in normal manner.  Prior to incision, a time-out was performed.  Again, the appropriate patient, surgery and extremity were verified.  Antibiotics had been administered.  An Esmarch was utilized to exsanguinate the extremity.  Tourniquet was inflated to 300 mmHg on the upper thigh.  A midline skin incision was followed with a medial parapatellar arthrotomy.  The anterior fat pad was excised.  The medial tibia was skeletonized with no releases being performed.  The rimming osteophytes were removed at this time.  The knee was flexed up.  A drill was utilized to enter the distal intramedullary canal of the femur with no issues.  The collateral ligament retractors were placed and maintained through the key components of the procedure.  The contents of the femur were aspirated.  The distal guide was very gradually and easily inserted intramedullary.  It was set in 5 degrees in valgus, taking 10 mm off distally.  With the jig pinned, the distal medial and lateral aspect of the femur was cut with no issues.  Attention was turned to the tibia.  The ACL excised.  A posterior retractor was placed on the tibia was presented.  Using the extramedullary tibia device set to take 4 mm off the low side, which was medially with care to recreate the appropriate slope and varus and valgus alignment and the jig was pinned into position.  With collateral ligaments and posterior neurovascular structures protected, a cut on the proximal tibia both medial and lateral aspect was performed with no issues.  The  knee was brought out to extension.  A spacer block was placed and showed a balanced resection.  A drop aaliyah was placed, which showed normal alignment.  The knee was flexed up to 90 degrees.  A tibial baseplate was placed.  Again, a drop aaliyah was placed showing acceptable resection on the tibia.  Retractors were placed.  The sizing guide was placed on the distal femur.  Rotation was based on [place] line and the epicondylar axis.  The pins were placed.  It measured a size 7.  The size 7, 4-in-1 block was placed.  The 90-degree flexion space under the block was consistent with his extension space.  The 4-in-1 cuts were then performed with no issues.  This was followed with the box cut.  Excess bone and meniscal remnants were removed.  Trial components were placed.  He had 0-130 degrees of motion.  The patella tracked with no thumbs technique through the full arc of motion.  The knee was balanced through the full arc of motion.  The patella was then resurfaced back down to anatomic dimensions and again taken through full range of motion, showing normal tracking.  The tibia and femur were then prepared.  The final components as listed above were cemented in position, held in approximately 20 degrees of flexion until the cement cured.  During this process, a dilute Betadine lavage performed for 3 minutes followed with pulse lavage.  The knee was inspected.  Excess bone and cement fragments had been removed.  The arthrotomy was then closed with 0 Vicryl, followed with 2-0 Vicryl subcutaneous, followed with running Monocryl suture and skin glue.  A sterile bandage was applied.  He subsequently transferred to PACU in stable condition, brought to the hospital for orthopedic care, DVT prophylaxis, pain management, and physical therapy.      Revised encounter 01/25/2021  pat PORRAS DO             D: 01/18/2021   T: 01/19/2021   MT: JUANJOSE      Name:     YANI ARIZMENDI   MRN:      [id number]        Account:         [id number]   :      1955           Procedure Date: 2021      Document: [id number]     Warm

## 2021-01-27 DIAGNOSIS — Z96.651 S/P TOTAL KNEE REPLACEMENT USING CEMENT, RIGHT: ICD-10-CM

## 2021-01-28 RX ORDER — OXYCODONE HYDROCHLORIDE 5 MG/1
5-10 TABLET ORAL EVERY 4 HOURS PRN
Qty: 35 TABLET | Refills: 0 | Status: SHIPPED | OUTPATIENT
Start: 2021-01-28 | End: 2021-02-09

## 2021-01-28 NOTE — TELEPHONE ENCOUNTER
Appropriate. Less than 2 weeks out from total knee replacement. Checked against .     Start to wean.  Ensure utilizing non-narcotic adjuncts and non-pharmacological adjuncts and no red flags.     SYLVIA Stevens, CNP  Orthopedic Surgery

## 2021-02-01 ENCOUNTER — ANCILLARY PROCEDURE (OUTPATIENT)
Dept: GENERAL RADIOLOGY | Facility: CLINIC | Age: 66
End: 2021-02-01
Attending: NURSE PRACTITIONER
Payer: COMMERCIAL

## 2021-02-01 ENCOUNTER — HOSPITAL ENCOUNTER (OUTPATIENT)
Dept: ULTRASOUND IMAGING | Facility: CLINIC | Age: 66
End: 2021-02-01
Attending: NURSE PRACTITIONER
Payer: COMMERCIAL

## 2021-02-01 ENCOUNTER — OFFICE VISIT (OUTPATIENT)
Dept: ORTHOPEDICS | Facility: CLINIC | Age: 66
End: 2021-02-01
Payer: COMMERCIAL

## 2021-02-01 VITALS
DIASTOLIC BLOOD PRESSURE: 68 MMHG | TEMPERATURE: 98.5 F | BODY MASS INDEX: 33.36 KG/M2 | SYSTOLIC BLOOD PRESSURE: 138 MMHG | WEIGHT: 233 LBS | HEIGHT: 70 IN

## 2021-02-01 DIAGNOSIS — Z96.651 STATUS POST TOTAL RIGHT KNEE REPLACEMENT: ICD-10-CM

## 2021-02-01 DIAGNOSIS — Z53.9 DIAGNOSIS NOT YET DEFINED: Primary | ICD-10-CM

## 2021-02-01 DIAGNOSIS — Z96.651 STATUS POST TOTAL RIGHT KNEE REPLACEMENT: Primary | ICD-10-CM

## 2021-02-01 DIAGNOSIS — M79.89 RIGHT LEG SWELLING: ICD-10-CM

## 2021-02-01 PROCEDURE — 93971 EXTREMITY STUDY: CPT | Mod: RT

## 2021-02-01 PROCEDURE — G0180 MD CERTIFICATION HHA PATIENT: HCPCS | Performed by: ORTHOPAEDIC SURGERY

## 2021-02-01 PROCEDURE — 73562 X-RAY EXAM OF KNEE 3: CPT | Mod: TC | Performed by: RADIOLOGY

## 2021-02-01 PROCEDURE — 99024 POSTOP FOLLOW-UP VISIT: CPT | Performed by: NURSE PRACTITIONER

## 2021-02-01 ASSESSMENT — MIFFLIN-ST. JEOR: SCORE: 1853.13

## 2021-02-01 ASSESSMENT — PAIN SCALES - GENERAL: PAINLEVEL: MODERATE PAIN (5)

## 2021-02-01 NOTE — PROGRESS NOTES
Orthopedic Clinic Post-Operative Note    CHIEF COMPLAINT:   Chief Complaint   Patient presents with     Surgical Followup     DOS: 1/18/2021~Right total knee arthroplasty~2 weeks postop       HISTORY OF PRESENT ILLNESS  2 weeks post-op.  Overall doing well. Is having some significant right lower leg from knee to foot swelling. Was not able to wear the HANK stocking due to the amount of swelling so switched to ACE wrap.  Overall the swelling is slowly improving. Is having calf pain along with the knee pain. The pain is slowly improving. No new injury. No incision concerns. Icing,. Elevating, attending in home physical therapy.  Using oxycodone 1 tab QID, also using atarax, zanaflex, tylenol.  Recent refill none needed today.  No other concerns. Denies numbness.      Patient's past medical, surgical, social and family histories reviewed.     Past Medical History:   Diagnosis Date     Calculus of kidney      CKD (chronic kidney disease) stage 2, GFR 60-89 ml/min 10/30/2015     Degeneration of lumbar or lumbosacral intervertebral disc      Depressive disorder      Depressive disorder, not elsewhere classified      Diabetes (H)      Diabetic eye exam (H) 02/06/12, 11/1/13     Diabetic eye exam (H) 12/17/14     Hyperlipidaemia      Hypertension      Hypothyroidism 1/17/2010     Obesity, Class I, BMI 30-34.9 10/30/2015     Primary osteoarthritis of left knee      Uncomplicated asthma        Past Surgical History:   Procedure Laterality Date     ARTHROPLASTY KNEE Left 2/4/2020    Procedure: left total knee replacement;  Surgeon: Jason Berman DO;  [facility]: [facility]     ARTHROPLASTY KNEE Right 1/18/2021    Procedure: right total knee replacement;  Surgeon: Jason Berman DO;  Location:  OR     COLONOSCOPY  02/02/09    Repeat in 5 yrs     COLONOSCOPY N/A 9/5/2014    Procedure: COMBINED COLONOSCOPY, SINGLE BIOPSY/POLYPECTOMY BY BIOPSY;  Surgeon: Denis Oakes MD;  Location:  GI      ESOPHAGOSCOPY, GASTROSCOPY, DUODENOSCOPY (EGD), COMBINED N/A 2/20/2015    Procedure: COMBINED ESOPHAGOSCOPY, GASTROSCOPY, DUODENOSCOPY (EGD), BIOPSY SINGLE OR MULTIPLE;  Surgeon: Alfred Young MD;  Location: [facility]     HC REMV CATARACT EXTRACAP,INSERT LENS, W/O ECP  2000    Bilateral     HC REVISE MEDIAN N/CARPAL TUNNEL SURG  1991     HC TOOTH EXTRACTION W/FORCEP  1980's    [place] teeth removed     RELEASE CARPAL TUNNEL Right 6/16/2017    Procedure: RELEASE CARPAL TUNNEL;  Right carpal tunnel release;  Surgeon: Jason Berman DO;  [facility]     RELEASE CARPAL TUNNEL Left 7/11/2017    Procedure: RELEASE CARPAL TUNNEL;  Left carpal tunnel release;  Surgeon: Jason Berman DO;  Location[facility]     SOFT TISSUE SURGERY         Medications:       acetaminophen (TYLENOL) 325 MG tablet, Take 3 tablets (975 mg) by mouth every 8 hours as needed for other (mild pain)       ALLEGRA-D ALLERGY & CONGESTION 180-240 MG 24 hr tablet, TAKE ONE TABLET BY MOUTH EVERY DAY       Ascorbic Acid (VITAMIN C) 500 MG CHEW, Take 500 mg by mouth daily       aspirin (ASA) 325 MG EC tablet, Take 1 tablet (325 mg) by mouth 2 times daily       blood glucose (HUGO CONTOUR) test strip, Use to test blood sugars 1 time daily or as directed.       blood glucose (NO BRAND SPECIFIED) lancets standard, Use to test blood sugar one time daily or as directed.       blood glucose calibration (HUGO CONTOUR) NORMAL solution, Use to calibrate blood glucose monitor as directed.       buPROPion (WELLBUTRIN XL) 300 MG 24 hr tablet, Take 1 tablet (300 mg) by mouth every morning       cholecalciferol (VITAMIN D3) 5000 UNITS TABS tablet, Take 1 tablet (5,000 Units) by mouth       Coenzyme Q-10 capsule, Take 1 capsule by mouth daily.       esomeprazole (NEXIUM) 40 MG DR capsule, TAKE 1 CAPSULE EVERY MORNING BEFORE BREAKFAST, 30 TO 60 MINUTES BEFORE EATING.       ezetimibe (ZETIA) 10 MG tablet, Take 1 tablet (10 mg) by mouth daily        fish oil-omega-3 fatty acids 1000 MG capsule, Take  by mouth. Take daily         hydrOXYzine (ATARAX) 25 MG tablet, Take 1 tablet (25 mg) by mouth every 6 hours as needed for itching or anxiety (with pain, moderate pain)       levothyroxine (SYNTHROID/LEVOTHROID) 100 MCG tablet, Take 1 tablet (100 mcg) by mouth daily       losartan (COZAAR) 50 MG tablet, Take 1 tablet (50 mg) by mouth daily       Magnesium 500 MG CAPS, One twice a day       metFORMIN (GLUCOPHAGE) 500 MG tablet, TAKE ONE TABLET BY MOUTH TWICE A DAY WITH MEALS       Misc Natural Products (OSTEO BI-FLEX ADV JOINT SHIELD) TABS, Take  by mouth.       multivitamin (OCUVITE) TABS tablet, Take 1 tablet by mouth daily       order for DME, Equipment being ordered: Large thigh high compression stockings. 30-40mmHg for compression.       oxyCODONE (ROXICODONE) 5 MG tablet, Take 1-2 tablets (5-10 mg) by mouth every 4 hours as needed for pain (Moderate to Severe)       senna-docusate (SENOKOT-S/PERICOLACE) 8.6-50 MG tablet, Take 1-2 tablets by mouth 2 times daily as needed for constipation Take while on oral narcotics to prevent or treat constipation.       tiZANidine (ZANAFLEX) 2 MG tablet, Take 1-2 tablets (2-4 mg) by mouth every 6 hours as needed for muscle spasms (pain)       albuterol (PROAIR HFA) 108 (90 BASE) MCG/ACT Inhaler, 1-2 puffs every 2 -4 hrs as needed (Patient not taking: Reported on 2/1/2021)       STATIN NOT PRESCRIBED (INTENTIONAL), Please choose reason not prescribed, below (Patient not taking: Reported on 2/1/2021)    No current facility-administered medications on file prior to visit.       Allergies   Allergen Reactions     Dust Mites Cough     Hmg-Coa-R Inhibitors      Body aches     Penicillins Rash and Hives       Social History     Occupational History     Occupation:      Employer: [employer]   Tobacco Use     Smoking status: Never Smoker     Smokeless tobacco: Never Used   Substance and Sexual Activity      "Alcohol use: No     Alcohol/week: 0.0 standard drinks     Drug use: No     Sexual activity: Yes     Partners: Female       Family History   Problem Relation Age of Onset     Hypertension Mother      Hypertension Father      Diabetes Father         Adult     Heart Disease Father         Hx: Bypass     Unknown/Adopted Maternal Grandmother      Unknown/Adopted Maternal Grandfather      Unknown/Adopted Paternal Grandmother      Unknown/Adopted Paternal Grandfather      Cancer Sister         Liver     Thyroid Disease Brother      No Known Problems Son      No Known Problems Sister        REVIEW OF SYSTEMS  General: negative for, night sweats, dizziness, fatigue  Resp: No shortness of breath and no cough  CV: negative for chest pain, syncope or near-syncope  GI: negative for nausea, vomiting and diarrhea  : negative for dysuria and hematuria  Musculoskeletal: as above  Neurologic: negative for syncope   Hematologic: negative for bleeding disorder    Physical Exam:  Vitals: /68   Temp 98.5  F (36.9  C) (Temporal)   Ht 1.778 m (5' 10\")   Wt 105.7 kg (233 lb)   BMI 33.43 kg/m    BMI= Body mass index is 33.43 kg/m .  Constitutional: healthy, alert and no acute distress   Psychiatric: mentation appears normal and affect normal/bright  NEURO: no focal deficits  SKIN: .healing well, well approximated skin edges, without signs of infection including no erythema, incision breakdown or purlent drainage  JOINT/EXTREMITIES:right knee: 2-3+ pitting edema from knee to foot. No valgus or varus instability. AROM 2-95. No pain with motion. Strong extensor mechanism intact.  Global tenderness along the knee and calf. No focal areas of increase warmth, pain.  Bruising to distal leg into the foot. 2+ dorsal pedis pulse. Cap refill <2.  Bruising in the toes otherwise skin pink warm dry.  Sensation intact. Able to df/pf against resistance.    GAIT: not tested     Diagnostic Modalities:  right knee X-ray: The prosthesis has " acceptable alignment. No fractures or dislocations. Prosthesis is well seated with no evidence of loosening  Independent visualization of the images was performed.      Impression:   Chief Complaint   Patient presents with     Surgical Followup     DOS: 1/18/2021~Right total knee arthroplasty~2 weeks postop   Doing well overall though does have significant post-op swelling to right leg.     Plan:   Activity: discussed his activity he is being over active. Is in home physical therapy transitioning to outpatient physical therapy. May not need extensive outpatient as was recently through replacement on the other side. Is agreeable to starting outpatient physical therapy.   Regarding the swelling: recommended continued icing, elevating, and wrapping with ACE wrap from toes to superior of the knee.  Also will order ultrasound to rule out DVT given the recent proximity to surgery and amount of swelling.  If negative will continue with elevating, icing, wrapping, until swelling down enough then transition back to HANK stockings.  Red flags reviewed.  Return sooner if swelling not improving or getting worse.   Is taking aspirin and will continue to do so.   Monocryl and dermal glue closure.  No suture removal needed. Ok to leave open to air, cover if working in dirty conditions. Do not soak for another 2 weeks. Ok to leave open with showering.   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   Aspirin and HANK hose for 6 weeks total.   Transition from in home physical therapy to outpatient physical therapy.   Pain control: yes, continue current plan. Continue to wean on oxycodone as able.  No refills needed today. Would be ok for another   Immobilzation: No  Rest, Ice, Elevation, Compression  Return to clinic 4 weeks, or sooner as needed for changes.      Re-x-ray on return: No    SYLVIA Stevens, CNP  Orthopedic Surgery    Addendum: 1145 received call from ultrasound. Negative for DVT.  Will continue with the plan of icing, elevation, compression    SYLVIA Stevens, CNP  Orthopedic Surgery

## 2021-02-01 NOTE — LETTER
[address] [address]  Phone: [phone]  Fax: [phone]    February 1, 2021        Joel Pike  [address]  [facility] [address]          To whom it may concern:    RE: Joel Pike    Patient was seen and treated today at our clinic. Recommending no work currently. Will re-evaluate in 4 weeks.    Please contact me for questions or concerns.      Sincerely,            SYLVIA Stevens, CNP  Orthopedic Surgery

## 2021-02-01 NOTE — LETTER
2/1/2021         RE: Joel Pike  [address]  [facility] [address]        Dear Colleague,    Thank you for referring your patient, Joel Pike, to the [facility]. Please see a copy of my visit note below.    Orthopedic Clinic Post-Operative Note    CHIEF COMPLAINT:   Chief Complaint   Patient presents with     Surgical Followup     DOS: 1/18/2021~Right total knee arthroplasty~2 weeks postop       HISTORY OF PRESENT ILLNESS  2 weeks post-op.  Overall doing well. Is having some significant right lower leg from knee to foot swelling. Was not able to wear the HANK stocking due to the amount of swelling so switched to ACE wrap.  Overall the swelling is slowly improving. Is having calf pain along with the knee pain. The pain is slowly improving. No new injury. No incision concerns. Icing,. Elevating, attending in home physical therapy.  Using oxycodone 1 tab QID, also using atarax, zanaflex, tylenol.  Recent refill none needed today.  No other concerns. Denies numbness.      Patient's past medical, surgical, social and family histories reviewed.     Past Medical History:   Diagnosis Date     Calculus of kidney      CKD (chronic kidney disease) stage 2, GFR 60-89 ml/min 10/30/2015     Degeneration of lumbar or lumbosacral intervertebral disc      Depressive disorder      Depressive disorder, not elsewhere classified      Diabetes (H)      Diabetic eye exam (H) 02/06/12, 11/1/13     Diabetic eye exam (H) 12/17/14     Hyperlipidaemia      Hypertension      Hypothyroidism 1/17/2010     Obesity, Class I, BMI 30-34.9 10/30/2015     Primary osteoarthritis of left knee      Uncomplicated asthma        Past Surgical History:   Procedure Laterality Date     ARTHROPLASTY KNEE Left 2/4/2020    Procedure: left total knee replacement;  Surgeon: Jason Berman DO;  [facility]     ARTHROPLASTY KNEE Right 1/18/2021    Procedure: right total knee replacement;  Surgeon: Jason Berman  DO [place];  [facility]: [facility]     COLONOSCOPY  02/02/09    Repeat in 5 yrs     COLONOSCOPY N/A 9/5/2014    Procedure: COMBINED COLONOSCOPY, SINGLE BIOPSY/POLYPECTOMY BY BIOPSY;  Surgeon: Denis Oakes MD;  [facility]     ESOPHAGOSCOPY, GASTROSCOPY, DUODENOSCOPY (EGD), COMBINED N/A 2/20/2015    Procedure: COMBINED ESOPHAGOSCOPY, GASTROSCOPY, DUODENOSCOPY (EGD), BIOPSY SINGLE OR MULTIPLE;  Surgeon: Alfred Young MD;  [facility]     HC REMV CATARACT EXTRACAP,INSERT LENS, W/O ECP  2000    Bilateral     HC REVISE MEDIAN N/CARPAL TUNNEL SURG  1991     HC TOOTH EXTRACTION W/FORCEP  1980's    [place] teeth removed     RELEASE CARPAL TUNNEL Right 6/16/2017    Procedure: RELEASE CARPAL TUNNEL;  Right carpal tunnel release;  Surgeon: Jason Berman DO;  [facility]     RELEASE CARPAL TUNNEL Left 7/11/2017    Procedure: RELEASE CARPAL TUNNEL;  Left carpal tunnel release;  Surgeon: Jason Berman DO;  [facility]     SOFT TISSUE SURGERY         Medications:       acetaminophen (TYLENOL) 325 MG tablet, Take 3 tablets (975 mg) by mouth every 8 hours as needed for other (mild pain)       ALLEGRA-D ALLERGY & CONGESTION 180-240 MG 24 hr tablet, TAKE ONE TABLET BY MOUTH EVERY DAY       Ascorbic Acid (VITAMIN C) 500 MG CHEW, Take 500 mg by mouth daily       aspirin (ASA) 325 MG EC tablet, Take 1 tablet (325 mg) by mouth 2 times daily       blood glucose (HUGO CONTOUR) test strip, Use to test blood sugars 1 time daily or as directed.       blood glucose (NO BRAND SPECIFIED) lancets standard, Use to test blood sugar one time daily or as directed.       blood glucose calibration (HUGO CONTOUR) NORMAL solution, Use to calibrate blood glucose monitor as directed.       buPROPion (WELLBUTRIN XL) 300 MG 24 hr tablet, Take 1 tablet (300 mg) by mouth every morning       cholecalciferol (VITAMIN D3) 5000 UNITS TABS tablet, Take 1 tablet (5,000 Units) by mouth       Coenzyme Q-10 capsule,  Take 1 capsule by mouth daily.       esomeprazole (NEXIUM) 40 MG DR capsule, TAKE 1 CAPSULE EVERY MORNING BEFORE BREAKFAST, 30 TO 60 MINUTES BEFORE EATING.       ezetimibe (ZETIA) 10 MG tablet, Take 1 tablet (10 mg) by mouth daily       fish oil-omega-3 fatty acids 1000 MG capsule, Take  by mouth. Take daily         hydrOXYzine (ATARAX) 25 MG tablet, Take 1 tablet (25 mg) by mouth every 6 hours as needed for itching or anxiety (with pain, moderate pain)       levothyroxine (SYNTHROID/LEVOTHROID) 100 MCG tablet, Take 1 tablet (100 mcg) by mouth daily       losartan (COZAAR) 50 MG tablet, Take 1 tablet (50 mg) by mouth daily       Magnesium 500 MG CAPS, One twice a day       metFORMIN (GLUCOPHAGE) 500 MG tablet, TAKE ONE TABLET BY MOUTH TWICE A DAY WITH MEALS       Misc Natural Products (OSTEO BI-FLEX ADV JOINT SHIELD) TABS, Take  by mouth.       multivitamin (OCUVITE) TABS tablet, Take 1 tablet by mouth daily       order for DME, Equipment being ordered: Large thigh high compression stockings. 30-40mmHg for compression.       oxyCODONE (ROXICODONE) 5 MG tablet, Take 1-2 tablets (5-10 mg) by mouth every 4 hours as needed for pain (Moderate to Severe)       senna-docusate (SENOKOT-S/PERICOLACE) 8.6-50 MG tablet, Take 1-2 tablets by mouth 2 times daily as needed for constipation Take while on oral narcotics to prevent or treat constipation.       tiZANidine (ZANAFLEX) 2 MG tablet, Take 1-2 tablets (2-4 mg) by mouth every 6 hours as needed for muscle spasms (pain)       albuterol (PROAIR HFA) 108 (90 BASE) MCG/ACT Inhaler, 1-2 puffs every 2 -4 hrs as needed (Patient not taking: Reported on 2/1/2021)       STATIN NOT PRESCRIBED (INTENTIONAL), Please choose reason not prescribed, below (Patient not taking: Reported on 2/1/2021)    No current facility-administered medications on file prior to visit.       Allergies   Allergen Reactions     Dust Mites Cough     Hmg-Coa-R Inhibitors      Body aches     Penicillins Rash and  "Hives       Social History     Occupational History     Occupation:      Employer: [employer]   Tobacco Use     Smoking status: Never Smoker     Smokeless tobacco: Never Used   Substance and Sexual Activity     Alcohol use: No     Alcohol/week: 0.0 standard drinks     Drug use: No     Sexual activity: Yes     Partners: Female       Family History   Problem Relation Age of Onset     Hypertension Mother      Hypertension Father      Diabetes Father         Adult     Heart Disease Father         Hx: Bypass     Unknown/Adopted Maternal Grandmother      Unknown/Adopted Maternal Grandfather      Unknown/Adopted Paternal Grandmother      Unknown/Adopted Paternal Grandfather      Cancer Sister         Liver     Thyroid Disease Brother      No Known Problems Son      No Known Problems Sister        REVIEW OF SYSTEMS  General: negative for, night sweats, dizziness, fatigue  Resp: No shortness of breath and no cough  CV: negative for chest pain, syncope or near-syncope  GI: negative for nausea, vomiting and diarrhea  : negative for dysuria and hematuria  Musculoskeletal: as above  Neurologic: negative for syncope   Hematologic: negative for bleeding disorder    Physical Exam:  Vitals: /68   Temp 98.5  F (36.9  C) (Temporal)   Ht 1.778 m (5' 10\")   Wt 105.7 kg (233 lb)   BMI 33.43 kg/m    BMI= Body mass index is 33.43 kg/m .  Constitutional: healthy, alert and no acute distress   Psychiatric: mentation appears normal and affect normal/bright  NEURO: no focal deficits  SKIN: .healing well, well approximated skin edges, without signs of infection including no erythema, incision breakdown or purlent drainage  JOINT/EXTREMITIES:right knee: 2-3+ pitting edema from knee to foot. No valgus or varus instability. AROM 2-95. No pain with motion. Strong extensor mechanism intact.  Global tenderness along the knee and calf. No focal areas of increase warmth, pain.  Bruising to distal leg into the foot. " 2+ dorsal pedis pulse. Cap refill <2.  Bruising in the toes otherwise skin pink warm dry.  Sensation intact. Able to df/pf against resistance.    GAIT: not tested     Diagnostic Modalities:  right knee X-ray: The prosthesis has acceptable alignment. No fractures or dislocations. Prosthesis is well seated with no evidence of loosening  Independent visualization of the images was performed.      Impression:   Chief Complaint   Patient presents with     Surgical Followup     DOS: 1/18/2021~Right total knee arthroplasty~2 weeks postop   Doing well overall though does have significant post-op swelling to right leg.     Plan:   Activity: discussed his activity he is being over active. Is in home physical therapy transitioning to outpatient physical therapy. May not need extensive outpatient as was recently through replacement on the other side. Is agreeable to starting outpatient physical therapy.   Regarding the swelling: recommended continued icing, elevating, and wrapping with ACE wrap from toes to superior of the knee.  Also will order ultrasound to rule out DVT given the recent proximity to surgery and amount of swelling.  If negative will continue with elevating, icing, wrapping, until swelling down enough then transition back to HANK stockings.  Red flags reviewed.  Return sooner if swelling not improving or getting worse.   Is taking aspirin and will continue to do so.   Monocryl and dermal glue closure.  No suture removal needed. Ok to leave open to air, cover if working in dirty conditions. Do not soak for another 2 weeks. Ok to leave open with showering.   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   Aspirin and HANK hose for 6 weeks total.   Transition from in home physical therapy to outpatient physical therapy.   Pain control: yes, continue current plan. Continue to wean on oxycodone as able.  No refills needed today. Would be ok for another   Immobilzation: No  Rest, Ice,  Elevation, Compression  Return to clinic 4 weeks, or sooner as needed for changes.      Re-x-ray on return: No    SYLVIA Stevens, CNP  Orthopedic Surgery          Again, thank you for allowing me to participate in the care of your patient.        Sincerely,        SYLVIA Rodriges CNP

## 2021-02-08 DIAGNOSIS — Z96.651 S/P TOTAL KNEE REPLACEMENT USING CEMENT, RIGHT: ICD-10-CM

## 2021-02-09 RX ORDER — OXYCODONE HYDROCHLORIDE 5 MG/1
5-10 TABLET ORAL EVERY 6 HOURS PRN
Qty: 30 TABLET | Refills: 0 | Status: SHIPPED | OUTPATIENT
Start: 2021-02-09 | End: 2021-02-27

## 2021-02-10 NOTE — TELEPHONE ENCOUNTER
I called the patient and let him know that his refill was taken care of.  I had to leave a voicemail.  I did review his chart and this is appropriate.

## 2021-02-26 ENCOUNTER — OFFICE VISIT (OUTPATIENT)
Dept: FAMILY MEDICINE | Facility: CLINIC | Age: 66
End: 2021-02-26
Payer: COMMERCIAL

## 2021-02-26 ENCOUNTER — TELEPHONE (OUTPATIENT)
Dept: FAMILY MEDICINE | Facility: CLINIC | Age: 66
End: 2021-02-26

## 2021-02-26 VITALS
SYSTOLIC BLOOD PRESSURE: 138 MMHG | HEART RATE: 70 BPM | OXYGEN SATURATION: 99 % | DIASTOLIC BLOOD PRESSURE: 80 MMHG | TEMPERATURE: 98.7 F | RESPIRATION RATE: 20 BRPM | HEIGHT: 70 IN | WEIGHT: 230 LBS | BODY MASS INDEX: 32.93 KG/M2

## 2021-02-26 DIAGNOSIS — R80.9 MICROALBUMINURIA: ICD-10-CM

## 2021-02-26 DIAGNOSIS — K21.9 GASTROESOPHAGEAL REFLUX DISEASE WITHOUT ESOPHAGITIS: ICD-10-CM

## 2021-02-26 DIAGNOSIS — N18.2 CKD (CHRONIC KIDNEY DISEASE) STAGE 2, GFR 60-89 ML/MIN: ICD-10-CM

## 2021-02-26 DIAGNOSIS — R97.20 ELEVATED PROSTATE SPECIFIC ANTIGEN (PSA): ICD-10-CM

## 2021-02-26 DIAGNOSIS — Z00.00 ROUTINE GENERAL MEDICAL EXAMINATION AT A HEALTH CARE FACILITY: Primary | ICD-10-CM

## 2021-02-26 DIAGNOSIS — G47.33 OSA (OBSTRUCTIVE SLEEP APNEA): ICD-10-CM

## 2021-02-26 DIAGNOSIS — E78.5 HYPERLIPIDEMIA LDL GOAL <100: ICD-10-CM

## 2021-02-26 DIAGNOSIS — E66.811 OBESITY, CLASS I, BMI 30-34.9: ICD-10-CM

## 2021-02-26 DIAGNOSIS — F33.42 RECURRENT MAJOR DEPRESSION IN COMPLETE REMISSION (H): ICD-10-CM

## 2021-02-26 DIAGNOSIS — N18.2 TYPE 2 DIABETES MELLITUS WITH STAGE 2 CHRONIC KIDNEY DISEASE, WITHOUT LONG-TERM CURRENT USE OF INSULIN (H): ICD-10-CM

## 2021-02-26 DIAGNOSIS — E11.22 TYPE 2 DIABETES MELLITUS WITH STAGE 2 CHRONIC KIDNEY DISEASE, WITHOUT LONG-TERM CURRENT USE OF INSULIN (H): ICD-10-CM

## 2021-02-26 PROBLEM — M79.89 RIGHT LEG SWELLING: Status: RESOLVED | Noted: 2021-02-01 | Resolved: 2021-02-26

## 2021-02-26 PROBLEM — Z96.651 STATUS POST TOTAL RIGHT KNEE REPLACEMENT: Status: RESOLVED | Noted: 2021-02-01 | Resolved: 2021-02-26

## 2021-02-26 LAB
HBA1C MFR BLD: 6 % (ref 0–5.6)
PSA SERPL-ACNC: 12.7 UG/L (ref 0–4)

## 2021-02-26 PROCEDURE — 99213 OFFICE O/P EST LOW 20 MIN: CPT | Mod: 25 | Performed by: FAMILY MEDICINE

## 2021-02-26 PROCEDURE — 36415 COLL VENOUS BLD VENIPUNCTURE: CPT | Performed by: FAMILY MEDICINE

## 2021-02-26 PROCEDURE — 83036 HEMOGLOBIN GLYCOSYLATED A1C: CPT | Performed by: FAMILY MEDICINE

## 2021-02-26 PROCEDURE — G0103 PSA SCREENING: HCPCS | Performed by: FAMILY MEDICINE

## 2021-02-26 PROCEDURE — 99396 PREV VISIT EST AGE 40-64: CPT | Performed by: FAMILY MEDICINE

## 2021-02-26 RX ORDER — LOSARTAN POTASSIUM 50 MG/1
75 TABLET ORAL DAILY
Qty: 135 TABLET | Refills: 3 | Status: SHIPPED | OUTPATIENT
Start: 2021-02-26 | End: 2022-03-16

## 2021-02-26 ASSESSMENT — ENCOUNTER SYMPTOMS
DIZZINESS: 0
HEMATURIA: 0
SORE THROAT: 0
DYSURIA: 0
HEADACHES: 0
PALPITATIONS: 0
NERVOUS/ANXIOUS: 1
PARESTHESIAS: 0
FEVER: 0
CHILLS: 0
MYALGIAS: 1
CONSTIPATION: 0
JOINT SWELLING: 1
NAUSEA: 0
FREQUENCY: 1
SHORTNESS OF BREATH: 0
HEARTBURN: 0
HEMATOCHEZIA: 0
COUGH: 0
DIARRHEA: 0
EYE PAIN: 0
ARTHRALGIAS: 0
ABDOMINAL PAIN: 0

## 2021-02-26 ASSESSMENT — MIFFLIN-ST. JEOR: SCORE: 1839.52

## 2021-02-26 NOTE — TELEPHONE ENCOUNTER
Attempted to contact patient, phone rang without answer or voicemail.  Please try again later with results below.  Susi Medina MA on 2/26/2021 at 3:44 PM

## 2021-02-26 NOTE — PROGRESS NOTES
SUBJECTIVE:   CC: Joel Pike is an 65 year old male who presents for preventative health visit.       Patient has been advised of split billing requirements and indicates understanding: Yes  Healthy Habits:     Getting at least 3 servings of Calcium per day:  Yes    Bi-annual eye exam:  Yes    Dental care twice a year:  NO    Sleep apnea or symptoms of sleep apnea:  Daytime drowsiness and Sleep apnea    Diet:  Regular (no restrictions)    Frequency of exercise:  2-3 days/week    Duration of exercise:  15-30 minutes    Taking medications regularly:  Yes    Medication side effects:  None    PHQ-2 Total Score: 2    Additional concerns today:  No    Is a 65-year-old has to medical history significant type 2 diabetes, chronic kidney disease stage II, anxiety, depression, hypertension, and status post TKA left (01/2021) and TKA right (02/2020), he is here today for a physical exam. He has the following health concerns today:     First, is DM type II, which is well controlled on his 500 mg of Metformin. He checks his sugars periodically throughout the day with fasting sugars ranging from . He has one episode of fasting hypoglycemia every couple of months, where he feels lightheaded when he first gets up in the morning. Last eye exam was about 6 months ago. Denies any HA, acute changes in vision, chest palpitations, CP, numbness and tingling.     Second, hypothyroidism, which is well controlled on 100 MCG of Levothyroxine. He affirms feeling warm on occasion. Denies cold intolerance; no weight loss nor gain; denies CP and chest palpitations.     Third, joint pain, s/p right knee replacement 01/2021; Left knee replacement 02/2020. He is followed closely by his orthopedic surgeon Dr. Berman. Right knee pain has improved since the operation and is well controlled with PRN oxycodone which he takes rarely now. He is currently doing PT to increase ROM, and improve muscle strength.     Fourth, is his mood in regards to his  depression and anxiety sx. He feels that his mood is stable on 300 mg Wellbutrin and prn Hydroxazine, 25 mg. However, he is feeling that he lost his purpose as he has been house bound since 10/2020. 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  No suicidal homicidal ideation.  Wife has been supportive.  No problem with sleeping.    Lastly, hypertension; is well controlled on Losartan 50 mg PO every day. He does not check his blood pressures at home. He denies any palpitations, CP, LE edema; and orthopnea.     Otherwise, he is doing well. No major medical care or procedure, besides his right TKA done since the last physical.  No headache or dizziness.  No acute visual change. No running nose, nasal congestion, coughing, fever or chill.  No CP/SOB. No N/V/D/C, or problem with urination.  No abdominal pain.   No leg swelling.  No drug alcohol or tobacco use. Uses CPAP for sleeping, due to HILARIO. Feels safe at home and at work.  Lives with his wife.  No other concerns today      Today's PHQ-2 Score:   PHQ-2 ( 1999 Pfizer) 2/22/2020   Q1: Little interest or pleasure in doing things 1   Q2: Feeling down, depressed or hopeless 0   PHQ-2 Score 1   Q1: Little interest or pleasure in doing things Several days   Q2: Feeling down, depressed or hopeless Not at all   PHQ-2 Score 1       Abuse: Current or Past(Physical, Sexual or Emotional)- No  Do you feel safe in your environment? Yes        Social History     Tobacco Use     Smoking status: Never Smoker     Smokeless tobacco: Never Used   Substance Use Topics     Alcohol use: No     Alcohol/week: 0.0 standard drinks           Last PSA:   PSA   Date Value Ref Range Status   11/08/2017 2.98 0 - 4 ug/L Final     Comment:     Assay Method:  Chemiluminescence using Siemens Vista analyzer       Reviewed orders with patient. Reviewed health maintenance and updated orders accordingly -  "Yes    Reviewed and updated as needed this visit by clinical staff                 Reviewed and updated as needed this visit by Provider                  Review of Systems   Constitutional: Negative for chills and fever.   HENT: Positive for hearing loss. Negative for congestion, ear pain and sore throat.    Eyes: Negative for pain and visual disturbance.   Respiratory: Negative for cough and shortness of breath.    Cardiovascular: Positive for peripheral edema. Negative for chest pain and palpitations.   Gastrointestinal: Negative for abdominal pain, constipation, diarrhea, heartburn, hematochezia and nausea.   Genitourinary: Positive for frequency, impotence and urgency. Negative for discharge, dysuria, genital sores and hematuria.   Musculoskeletal: Positive for joint swelling and myalgias. Negative for arthralgias.   Skin: Negative for rash.   Neurological: Negative for dizziness, headaches and paresthesias.   Psychiatric/Behavioral: Negative for mood changes. The patient is nervous/anxious.        OBJECTIVE:   /80   Pulse 70   Temp 98.7  F (37.1  C) (Temporal)   Resp 20   Ht 1.778 m (5' 10\")   Wt 104.3 kg (230 lb)   SpO2 99%   BMI 33.00 kg/m      Physical Exam  GENERAL: healthy, alert and no distress  EYES: Eyes grossly normal to inspection, PERRL and conjunctivae and sclerae normal.  No nystagmus.  All 4 visual fields are intact  HENT: Ear canals and TM's normal.  Nares are non-congested. Oropharynx is pink and moist. No tender with palpation to the sinuses.  NECK: no adenopathy, supple, no lymphadenopathy or thyromegaly.  No tender with palpation to the cervical spine or its paraspinous muscle.  RESP: lungs clear to auscultation - no rales, rhonchi or wheezes  CV: regular rate and rhythm, no murmur.  ABDOMEN: soft, nondistended, nontender, no palpable masses or organomegaly with normal bowel sounds.  MS: no gross musculoskeletal defects noted, left knee brace in place.  Walk with limping due to " the brace.  All 4 extremities are equally in strength. Ankle, hips, shoulders, elbows and wrists exams normal except-- right knee: limited ROM due to knee brace and recent TKA;  Normal fine motor skills on fingers.  Back is straight, no lordosis or scoliosis.  No tender with palpation.  SKIN: no suspicious lesions or rashes  NEURO: Normal strength and tone, mentation intact and speech normal.  Cranial nerves II through XII intact, DTR +2 throughout, no focal neurological deficit.  PSYCH: mentation appears normal, affect normal/bright.  Thoughts intact, no hallucination.  No suicidal or homicidal ideation.  LYMPH: no cervical, supraclavicular or axillary adenopathy.   (male): Offered and recommended, but patient declined      Results for orders placed or performed in visit on 02/26/21   PSA, screen     Status: Abnormal   Result Value Ref Range    PSA 12.70 (H) 0 - 4 ug/L   Hemoglobin A1c     Status: Abnormal   Result Value Ref Range    Hemoglobin A1C 6.0 (H) 0 - 5.6 %        ASSESSMENT/PLAN:   1. Routine general medical examination at a health care facility  Overall Joel is healthy and doing well.  UTD for immunization; recommended Pneumococcal and influenza vaccinations, but will hold off at this time do to him likely getting the Covid-19 vaccination in the upcoming weeks.  Discussed about healthy diet, exercising, weight loss and fall and depression prevention. Recommended to daily exercise for at least 30 minutes, more as tolerated, once he finishes his PT for his right TKA.  Emphasized on healthy diet with adequate fluid intake and resting. Feels safe at home.  Follow in 1 year, earlier as needed.  Labs as ordered below.  UTD for colon cancer screening.   Discussed with him about the pros and cons of prostate screening cancer with PSA.  Also educated about the potential false positive which may require unnecessary work-up.  He was informed of negative/normal PSA leve does not rule out prostate cancer  completely although it is very unlikely.  He understands and is willing to take the risk.  All of his questions were answered.       - PSA, screen    2. Recurrent major depression in complete remission (H)  His depression is controlled on 300 mg Wellbutrin and prn hydroxazine.  No hallucinations, suicidal or homicidal ideation.  Healthy lifestyle as discussed above. no change in med.  Call in if has any concern otherwise follow-up in a year.  He feels comfortable with the plan and all of his questions were answered.    3. HILARIO (obstructive sleep apnea)  Continue use of CPAP machine every night. Follow up with ENT/Sleep medicine in 11/2021 as planned.     4. Gastroesophageal reflux disease without esophagitis  Stable and controlled.  Encouraged to avoid late meals as well as fatty, greasy or spicy food.  Continue Nexium 40 mg PO every day.  Follow-up as needed      5. Hyperlipidemia LDL goal <100   Cholesterol well controlled on Zetia 10 mg PO every day; not tolerated the statin.   Healthy life style modification discussed as above. Cholesterol (total) 174; Triglycerides 252 on 11/4/20 will recheck 11/2021.     6. Type 2 diabetes mellitus with stage 2 chronic kidney disease, without long-term current use of insulin (H)  DM is controlled with today's Hgb A1c of 6.0.  Discussed about the goal for fasting blood sugar and Hgb A1C.  Will continue with the current medications - Metformin 500 mg PO BID.  Emphasized the importance of annual retinal exam.  Encouraged daily foot check and yearly dental care.  Labs as ordered.  F/U in 6 months.  Increased his Losartan today to 75 mg PO every day due to high microalbuminuria. Will need to monitor his kidney function closely.     - Hemoglobin A1c  - losartan (COZAAR) 50 MG tablet; Take 1.5 tablets (75 mg) by mouth daily  Dispense: 135 tablet; Refill: 3  - FOOT EXAM    7. CKD (chronic kidney disease) stage 2, GFR 60-89 ml/min  Last Cr elevated 1.21, GFR stable at 65 (01/06/21);  "will increase his Losartan to 75 mg PO every day due to high urine albumin level. Will recheck BMP in 3 months.    8. Obesity, Class I, BMI 30-34.9  Today's BMI was 33.  Discussed about its potential long and short term effects.  Encouraged daily exercising when his right knee TKA permitting and discussed about healthy/portioned diet.  Recommend him to set a goal of losing weight daily.  Discussed about the goal for his weight and his BMI.      9. Microalbuminuria  Last urine albumin was elevated at 897, indicating proteinuria and a nephropathy. Losartan dose increased as above.  Recheck its level in 6 months - if it stays high, will refer to nephrology for further evaluation.     10.  Elevated PSA   Refer to urology for further evaluation.      Patient has been advised of split billing requirements and indicates understanding: Yes  COUNSELING:   Reviewed preventive health counseling, as reflected in patient instructions       Regular exercise       Healthy diet/nutrition       Vision screening       Hearing screening       Immunizations    Declined: Influenza and Pneumococcal due to Other: will likely be getting covid-19 vaccination in the upcoming weeks.                Alcohol Use        Family planning       Safe sex practices/STD prevention       Consider Hep C screening for all patients one time for ages 18-79 years       Colon cancer screening       Prostate cancer screening       Osteoporosis prevention/bone health       Advance Care Planning    Estimated body mass index is 33.43 kg/m  as calculated from the following:    Height as of 2/1/21: 1.778 m (5' 10\").    Weight as of 2/1/21: 105.7 kg (233 lb).     Weight management plan: Discussed healthy diet and exercise guidelines    He reports that he has never smoked. He has never used smokeless tobacco.      Counseling Resources:  ATP IV Guidelines  Pooled Cohorts Equation Calculator  FRAX Risk Assessment  ICSI Preventive Guidelines  Dietary Guidelines for " Americans, 2010  USDA's MyPlate  ASA Prophylaxis  Lung CA Screening     This document serves as a record of the services and decisions personally performed and made by Dr. Delia MD.  It was created on 2/26/21 behalf by Claudia Szymanski, a trained medical student.  The creation of this record is based on the provider's personal observations and the statements of the patient.     Claudia Szymanski, MS3  February 27, 2021    Nishi Hdz Mai, MD  [facility]

## 2021-02-26 NOTE — TELEPHONE ENCOUNTER
----- Message from Nishi Hdz Mai, MD sent at 2/26/2021  1:30 PM CST -----  Please let patient know that his PSA level is significantly high (12.7, normal is less than 4) as compared to 3 years ago (2.98).  I recommend further evaluation with urology referral and will refer him to see urologist.  His diabetes is well controlled with a hemoglobin A1c of 6.0.  The goal for his hemoglobin A1c is to be less than 7.0.  Encouraged him to keep the good work.     Nishi Lin MD.

## 2021-02-26 NOTE — PROGRESS NOTES
Joel is a 64 y/o male with pmh significant for type II DM, depression, HTN, Hypothyroidism, and s/p right TKA (01/2021), left TKA (02/2020), he is here today for physical exam, and he has the following health concerns today.     First, is DM,  Sugars:  (periodically throughout the day); well controlled on Metformin; last eye exam fall little bit lightheaded every couple of months;  No HA, No CP, No acute changes in vision, no palpitations; last Hg A1C 5.7    Second, Thyroid: feel warm usually; no weight loss nor gain;  Well controlled on levothyroxine 100 MCG.     Joint pain: sp r knee replacement 01/2021; L 02/2020; pain has improved, currently doing PT pain is well controlled     Mood: House bound since 10/2020, Wellbutrin 300; that dose is working okay and prn Hydroxazine, 25 mg, No thoughts of harming himself or anyone else   Has a part-time job in [employer]; will be able to return to work      HTN: No palpitations, no CP, no LE edema ; no orhopnea; controlled on Losartan 50 mg PO every day      Chronic kidney disease; last Cr, 1.21    HILARIO: use CPAP     Vericose veins:

## 2021-03-01 ENCOUNTER — TELEPHONE (OUTPATIENT)
Dept: FAMILY MEDICINE | Facility: CLINIC | Age: 66
End: 2021-03-01

## 2021-03-01 NOTE — TELEPHONE ENCOUNTER
LMTC: Please see message below.  Sherry Carpenter CMA ([facility])    Please refer him to urology for elevated PSA.  Ordered.

## 2021-03-02 NOTE — TELEPHONE ENCOUNTER
Spoke with patient and informed of results below. Patient understood, he has an appointment with urology on 4/07/2021. Has no further questions or concerns at this time.   Temitope Fritz MA

## 2021-03-03 ENCOUNTER — OFFICE VISIT (OUTPATIENT)
Dept: ORTHOPEDICS | Facility: CLINIC | Age: 66
End: 2021-03-03
Payer: COMMERCIAL

## 2021-03-03 VITALS
BODY MASS INDEX: 32.93 KG/M2 | SYSTOLIC BLOOD PRESSURE: 122 MMHG | HEIGHT: 70 IN | WEIGHT: 230 LBS | DIASTOLIC BLOOD PRESSURE: 78 MMHG | TEMPERATURE: 98.4 F

## 2021-03-03 DIAGNOSIS — Z96.651 STATUS POST TOTAL RIGHT KNEE REPLACEMENT: Primary | ICD-10-CM

## 2021-03-03 PROCEDURE — 99024 POSTOP FOLLOW-UP VISIT: CPT | Performed by: NURSE PRACTITIONER

## 2021-03-03 ASSESSMENT — MIFFLIN-ST. JEOR: SCORE: 1839.52

## 2021-03-03 ASSESSMENT — PAIN SCALES - GENERAL: PAINLEVEL: MODERATE PAIN (4)

## 2021-03-03 NOTE — LETTER
3/3/2021         RE: Joel Pike  [address]  [facility] [address]        Dear Colleague,    Thank you for referring your patient, Joel Pike, to the [facility]. Please see a copy of my visit note below.    Orthopedic Clinic Post-Operative Note    CHIEF COMPLAINT:   Chief Complaint   Patient presents with     RECHECK     right knee follow up     Surgical Followup     DOS: 1/18/2021~Right total knee arthroplasty~6 weeks postop       HISTORY OF PRESENT ILLNESS  6 weeks post-op. Doing very well. Pain is mild to moderate, 4/10 currently, slowly improving. Feels stiffness and motion improving. Ambulating without difficulty, driving without difficulty. Doing home exercise program, did not attend outpatient physical therapy to date yet.  Happy with progress, would like to get back to working (with restrictions) starting in couple of weeks, would also like to attend some outpatient physical therapy for further exercise and guidance as he approaches strengthening phase and return to work.   Does note since surgery has had very infrequent episodes of some numbness to plantar aspect. Happens once in awhile, lasts a couple minutes, resolves with rubbing the foot against the carpet. Overall improving.       Patient's past medical, surgical, social and family histories reviewed.     Past Medical History:   Diagnosis Date     Calculus of kidney      CKD (chronic kidney disease) stage 2, GFR 60-89 ml/min 10/30/2015     Degeneration of lumbar or lumbosacral intervertebral disc      Depressive disorder      Depressive disorder, not elsewhere classified      Diabetes (H)      Diabetic eye exam (H) 02/06/12, 11/1/13     Diabetic eye exam (H) 12/17/14     Hyperlipidaemia      Hypertension      Hypothyroidism 1/17/2010     Obesity, Class I, BMI 30-34.9 10/30/2015     Primary osteoarthritis of left knee      Uncomplicated asthma        Past Surgical History:   Procedure Laterality Date      ARTHROPLASTY KNEE Left 2/4/2020    Procedure: left total knee replacement;  Surgeon: Jason Berman DO;  Location: [facility]     ARTHROPLASTY KNEE Right 1/18/2021    Procedure: right total knee replacement;  Surgeon: Jason Berman DO;  Location: [facility] OR     COLONOSCOPY  02/02/09    Repeat in 5 yrs     COLONOSCOPY N/A 9/5/2014    Procedure: COMBINED COLONOSCOPY, SINGLE BIOPSY/POLYPECTOMY BY BIOPSY;  Surgeon: Denis Oakes MD;  Location: [facility]     ESOPHAGOSCOPY, GASTROSCOPY, DUODENOSCOPY (EGD), COMBINED N/A 2/20/2015    Procedure: COMBINED ESOPHAGOSCOPY, GASTROSCOPY, DUODENOSCOPY (EGD), BIOPSY SINGLE OR MULTIPLE;  Surgeon: Alfred Young MD;  Location: [facility]     HC REMV CATARACT EXTRACAP,INSERT LENS, W/O ECP  2000    Bilateral     HC REVISE MEDIAN N/CARPAL TUNNEL SURG  1991     HC TOOTH EXTRACTION W/FORCEP  1980's    [place] teeth removed     RELEASE CARPAL TUNNEL Right 6/16/2017    Procedure: RELEASE CARPAL TUNNEL;  Right carpal tunnel release;  Surgeon: Jason Berman DO;  Location: [facility]     RELEASE CARPAL TUNNEL Left 7/11/2017    Procedure: RELEASE CARPAL TUNNEL;  Left carpal tunnel release;  Surgeon: Jason Berman DO;  Location: [facility]     SOFT TISSUE SURGERY         Medications:  acetaminophen (TYLENOL) 325 MG tablet, Take 3 tablets (975 mg) by mouth every 8 hours as needed for other (mild pain)  ALLEGRA-D ALLERGY & CONGESTION 180-240 MG 24 hr tablet, TAKE ONE TABLET BY MOUTH EVERY DAY  Ascorbic Acid (VITAMIN C) 500 MG CHEW, Take 500 mg by mouth daily  blood glucose (HUGO CONTOUR) test strip, Use to test blood sugars 1 time daily or as directed.  blood glucose (NO BRAND SPECIFIED) lancets standard, Use to test blood sugar one time daily or as directed.  blood glucose calibration (HUGO CONTOUR) NORMAL solution, Use to calibrate blood glucose monitor as directed.  buPROPion (WELLBUTRIN XL) 300 MG 24 hr tablet, Take 1 tablet (300 mg) by mouth every  morning  cholecalciferol (VITAMIN D3) 5000 UNITS TABS tablet, Take 1 tablet (5,000 Units) by mouth  Coenzyme Q-10 capsule, Take 1 capsule by mouth daily.  esomeprazole (NEXIUM) 40 MG DR capsule, TAKE 1 CAPSULE EVERY MORNING BEFORE BREAKFAST, 30 TO 60 MINUTES BEFORE EATING.  ezetimibe (ZETIA) 10 MG tablet, Take 1 tablet (10 mg) by mouth daily  fish oil-omega-3 fatty acids 1000 MG capsule, Take  by mouth. Take daily    hydrOXYzine (ATARAX) 25 MG tablet, Take 1 tablet (25 mg) by mouth every 6 hours as needed for itching or anxiety (with pain, moderate pain)  levothyroxine (SYNTHROID/LEVOTHROID) 100 MCG tablet, Take 1 tablet (100 mcg) by mouth daily  losartan (COZAAR) 50 MG tablet, Take 1.5 tablets (75 mg) by mouth daily  Magnesium 500 MG CAPS, One twice a day  metFORMIN (GLUCOPHAGE) 500 MG tablet, TAKE ONE TABLET BY MOUTH TWICE A DAY WITH MEALS  Misc Natural Products (OSTEO BI-FLEX ADV JOINT SHIELD) TABS, Take  by mouth.  multivitamin (OCUVITE) TABS tablet, Take 1 tablet by mouth daily  order for DME, Equipment being ordered: Large thigh high compression stockings. 30-40mmHg for compression.  albuterol (PROAIR HFA) 108 (90 BASE) MCG/ACT Inhaler, 1-2 puffs every 2 -4 hrs as needed (Patient not taking: Reported on 2/1/2021)  STATIN NOT PRESCRIBED (INTENTIONAL), Please choose reason not prescribed, below (Patient not taking: Reported on 2/1/2021)    No current facility-administered medications on file prior to visit.       Allergies   Allergen Reactions     Dust Mites Cough     Hmg-Coa-R Inhibitors      Body aches     Penicillins Rash and Hives       Social History     Occupational History     Occupation:      Employer: [email or web address]   Tobacco Use     Smoking status: Never Smoker     Smokeless tobacco: Never Used   Substance and Sexual Activity     Alcohol use: No     Alcohol/week: 0.0 standard drinks     Drug use: No     Sexual activity: Yes     Partners: Female       Family History   Problem  "Relation Age of Onset     Hypertension Mother      Hypertension Father      Diabetes Father         Adult     Heart Disease Father         Hx: Bypass     Unknown/Adopted Maternal Grandmother      Cerebrovascular Disease Maternal Grandmother      No Known Problems Maternal Grandfather      No Known Problems Paternal Grandmother      No Known Problems Paternal Grandfather      Cancer Sister         Liver     Thyroid Disease Brother      No Known Problems Son      No Known Problems Sister        REVIEW OF SYSTEMS  General: negative for, night sweats, dizziness, fatigue  Resp: No shortness of breath and no cough  CV: negative for chest pain, syncope or near-syncope  GI: negative for nausea, vomiting and diarrhea  : negative for dysuria and hematuria  Musculoskeletal: as above  Neurologic: negative for syncope   Hematologic: negative for bleeding disorder    Physical Exam:  Vitals: /78 (BP Location: Right arm, Patient Position: Sitting, Cuff Size: Adult Large)   Temp 98.4  F (36.9  C) (Temporal)   Ht 1.778 m (5' 10\")   Wt 104.3 kg (230 lb)   BMI 33.00 kg/m    BMI= Body mass index is 33 kg/m .  Constitutional: healthy, alert and no acute distress   Psychiatric: mentation appears normal and affect normal/bright  NEURO: no focal deficits  SKIN: .well healed, no erythema, no incision breakdown and no drainage. Very small either scabbing or subcutaneous stitch above the skin. No suture abscess, clipped to skin. No open areas.   JOINT/EXTREMITIES: right knee: mild swelling. No swelling to distal of the knee. AROM 1-120, retest 0-120. No pain with motion. No instability with valgus or varus. Extensor intact. Calf soft non-tender. Distal neurovascular grossly intact. No decreased sensation to the foot.   GAIT: non-antalgic    Diagnostic Modalities:  None today.  Independent visualization of the images was performed.      Impression:   Chief Complaint   Patient presents with     RECHECK     right knee follow up     " Surgical Followup     DOS: 1/18/2021~Right total knee arthroplasty~6 weeks postop   Doing well     Plan:   Activity: continue to increase as tolerated. Looking at starting back 6 hours day/1 day a week in 2 weeks. [place] fishing gear at [employer].  Feels able to do this. Letter provided.   Clipped either small scabbing or possible vicryl suture back to skin. Incision is pristine and well healed.    Ok to discontinue teds and aspirin.  Placed order for outpatient physical therapy. Anticipate will be able to continue hep.   Discussed antibiotics for 1 year following knee replacement.     Return to clinic 6 weeks, or sooner as needed for changes.    Re-x-ray on return: Yes.    SYLVIA Stevens, CNP  Orthopedic Surgery          Again, thank you for allowing me to participate in the care of your patient.        Sincerely,        SYLVIA Rodriges CNP

## 2021-03-03 NOTE — PROGRESS NOTES
Orthopedic Clinic Post-Operative Note    CHIEF COMPLAINT:   Chief Complaint   Patient presents with     RECHECK     right knee follow up     Surgical Followup     DOS: 1/18/2021~Right total knee arthroplasty~6 weeks postop       HISTORY OF PRESENT ILLNESS  6 weeks post-op. Doing very well. Pain is mild to moderate, 4/10 currently, slowly improving. Feels stiffness and motion improving. Ambulating without difficulty, driving without difficulty. Doing home exercise program, did not attend outpatient physical therapy to date yet.  Happy with progress, would like to get back to working (with restrictions) starting in couple of weeks, would also like to attend some outpatient physical therapy for further exercise and guidance as he approaches strengthening phase and return to work.   Does note since surgery has had very infrequent episodes of some numbness to plantar aspect. Happens once in awhile, lasts a couple minutes, resolves with rubbing the foot against the carpet. Overall improving.       Patient's past medical, surgical, social and family histories reviewed.     Past Medical History:   Diagnosis Date     Calculus of kidney      CKD (chronic kidney disease) stage 2, GFR 60-89 ml/min 10/30/2015     Degeneration of lumbar or lumbosacral intervertebral disc      Depressive disorder      Depressive disorder, not elsewhere classified      Diabetes (H)      Diabetic eye exam (H) 02/06/12, 11/1/13     Diabetic eye exam (H) 12/17/14     Hyperlipidaemia      Hypertension      Hypothyroidism 1/17/2010     Obesity, Class I, BMI 30-34.9 10/30/2015     Primary osteoarthritis of left knee      Uncomplicated asthma        Past Surgical History:   Procedure Laterality Date     ARTHROPLASTY KNEE Left 2/4/2020    Procedure: left total knee replacement;  Surgeon: Jason Berman DO;  Location: [facility]     ARTHROPLASTY KNEE Right 1/18/2021    Procedure: right total knee replacement;  Surgeon: Jason Berman DO;   Location: [facility]     COLONOSCOPY  02/02/09    Repeat in 5 yrs     COLONOSCOPY N/A 9/5/2014    Procedure: COMBINED COLONOSCOPY, SINGLE BIOPSY/POLYPECTOMY BY BIOPSY;  Surgeon: Denis Oakes MD;  Location: [facility]     ESOPHAGOSCOPY, GASTROSCOPY, DUODENOSCOPY (EGD), COMBINED N/A 2/20/2015    Procedure: COMBINED ESOPHAGOSCOPY, GASTROSCOPY, DUODENOSCOPY (EGD), BIOPSY SINGLE OR MULTIPLE;  Surgeon: Alfred Young MD;  Location: [facility]     HC REMV CATARACT EXTRACAP,INSERT LENS, W/O ECP  2000    Bilateral     HC REVISE MEDIAN N/CARPAL TUNNEL SURG  1991     HC TOOTH EXTRACTION W/FORCEP  1980's    [place] teeth removed     RELEASE CARPAL TUNNEL Right 6/16/2017    Procedure: RELEASE CARPAL TUNNEL;  Right carpal tunnel release;  Surgeon: Jason Berman DO;  Location: [facility]     RELEASE CARPAL TUNNEL Left 7/11/2017    Procedure: RELEASE CARPAL TUNNEL;  Left carpal tunnel release;  Surgeon: Jason Berman DO;  Location: [facility]     SOFT TISSUE SURGERY         Medications:  acetaminophen (TYLENOL) 325 MG tablet, Take 3 tablets (975 mg) by mouth every 8 hours as needed for other (mild pain)  ALLEGRA-D ALLERGY & CONGESTION 180-240 MG 24 hr tablet, TAKE ONE TABLET BY MOUTH EVERY DAY  Ascorbic Acid (VITAMIN C) 500 MG CHEW, Take 500 mg by mouth daily  blood glucose (HUGO CONTOUR) test strip, Use to test blood sugars 1 time daily or as directed.  blood glucose (NO BRAND SPECIFIED) lancets standard, Use to test blood sugar one time daily or as directed.  blood glucose calibration (HUGO CONTOUR) NORMAL solution, Use to calibrate blood glucose monitor as directed.  buPROPion (WELLBUTRIN XL) 300 MG 24 hr tablet, Take 1 tablet (300 mg) by mouth every morning  cholecalciferol (VITAMIN D3) 5000 UNITS TABS tablet, Take 1 tablet (5,000 Units) by mouth  Coenzyme Q-10 capsule, Take 1 capsule by mouth daily.  esomeprazole (NEXIUM) 40 MG DR capsule, TAKE 1 CAPSULE EVERY MORNING BEFORE BREAKFAST, 30 TO 60 MINUTES BEFORE  EATING.  ezetimibe (ZETIA) 10 MG tablet, Take 1 tablet (10 mg) by mouth daily  fish oil-omega-3 fatty acids 1000 MG capsule, Take  by mouth. Take daily    hydrOXYzine (ATARAX) 25 MG tablet, Take 1 tablet (25 mg) by mouth every 6 hours as needed for itching or anxiety (with pain, moderate pain)  levothyroxine (SYNTHROID/LEVOTHROID) 100 MCG tablet, Take 1 tablet (100 mcg) by mouth daily  losartan (COZAAR) 50 MG tablet, Take 1.5 tablets (75 mg) by mouth daily  Magnesium 500 MG CAPS, One twice a day  metFORMIN (GLUCOPHAGE) 500 MG tablet, TAKE ONE TABLET BY MOUTH TWICE A DAY WITH MEALS  Misc Natural Products (OSTEO BI-FLEX ADV JOINT SHIELD) TABS, Take  by mouth.  multivitamin (OCUVITE) TABS tablet, Take 1 tablet by mouth daily  order for DME, Equipment being ordered: Large thigh high compression stockings. 30-40mmHg for compression.  albuterol (PROAIR HFA) 108 (90 BASE) MCG/ACT Inhaler, 1-2 puffs every 2 -4 hrs as needed (Patient not taking: Reported on 2/1/2021)  STATIN NOT PRESCRIBED (INTENTIONAL), Please choose reason not prescribed, below (Patient not taking: Reported on 2/1/2021)    No current facility-administered medications on file prior to visit.       Allergies   Allergen Reactions     Dust Mites Cough     Hmg-Coa-R Inhibitors      Body aches     Penicillins Rash and Hives       Social History     Occupational History     Occupation:      Employer: [employer]   Tobacco Use     Smoking status: Never Smoker     Smokeless tobacco: Never Used   Substance and Sexual Activity     Alcohol use: No     Alcohol/week: 0.0 standard drinks     Drug use: No     Sexual activity: Yes     Partners: Female       Family History   Problem Relation Age of Onset     Hypertension Mother      Hypertension Father      Diabetes Father         Adult     Heart Disease Father         Hx: Bypass     Unknown/Adopted Maternal Grandmother      Cerebrovascular Disease Maternal Grandmother      No Known Problems  "Maternal Grandfather      No Known Problems Paternal Grandmother      No Known Problems Paternal Grandfather      Cancer Sister         Liver     Thyroid Disease Brother      No Known Problems Son      No Known Problems Sister        REVIEW OF SYSTEMS  General: negative for, night sweats, dizziness, fatigue  Resp: No shortness of breath and no cough  CV: negative for chest pain, syncope or near-syncope  GI: negative for nausea, vomiting and diarrhea  : negative for dysuria and hematuria  Musculoskeletal: as above  Neurologic: negative for syncope   Hematologic: negative for bleeding disorder    Physical Exam:  Vitals: /78 (BP Location: Right arm, Patient Position: Sitting, Cuff Size: Adult Large)   Temp 98.4  F (36.9  C) (Temporal)   Ht 1.778 m (5' 10\")   Wt 104.3 kg (230 lb)   BMI 33.00 kg/m    BMI= Body mass index is 33 kg/m .  Constitutional: healthy, alert and no acute distress   Psychiatric: mentation appears normal and affect normal/bright  NEURO: no focal deficits  SKIN: .well healed, no erythema, no incision breakdown and no drainage. Very small either scabbing or subcutaneous stitch above the skin. No suture abscess, clipped to skin. No open areas.   JOINT/EXTREMITIES: right knee: mild swelling. No swelling to distal of the knee. AROM 1-120, retest 0-120. No pain with motion. No instability with valgus or varus. Extensor intact. Calf soft non-tender. Distal neurovascular grossly intact. No decreased sensation to the foot.   GAIT: non-antalgic    Diagnostic Modalities:  None today.  Independent visualization of the images was performed.      Impression:   Chief Complaint   Patient presents with     RECHECK     right knee follow up     Surgical Followup     DOS: 1/18/2021~Right total knee arthroplasty~6 weeks postop   Doing well     Plan:   Activity: continue to increase as tolerated. Looking at starting back 6 hours day/1 day a week in 2 weeks. [place] at [employer].  Feels able to do " this. Letter provided.   Clipped either small scabbing or possible vicryl suture back to skin. Incision is pristine and well healed.    Ok to discontinue teds and aspirin.  Placed order for outpatient physical therapy. Anticipate will be able to continue hep.   Discussed antibiotics for 1 year following knee replacement.     Return to clinic 6 weeks, or sooner as needed for changes.    Re-x-ray on return: Yes.    SYLVIA Stevens, CNP  Orthopedic Surgery

## 2021-03-05 ENCOUNTER — HOSPITAL ENCOUNTER (OUTPATIENT)
Dept: PHYSICAL THERAPY | Facility: CLINIC | Age: 66
Setting detail: THERAPIES SERIES
End: 2021-03-05
Attending: NURSE PRACTITIONER
Payer: COMMERCIAL

## 2021-03-05 DIAGNOSIS — Z96.651 STATUS POST TOTAL RIGHT KNEE REPLACEMENT: ICD-10-CM

## 2021-03-05 PROCEDURE — 97161 PT EVAL LOW COMPLEX 20 MIN: CPT | Mod: GP

## 2021-03-05 PROCEDURE — 97112 NEUROMUSCULAR REEDUCATION: CPT | Mod: GP

## 2021-03-05 PROCEDURE — 97110 THERAPEUTIC EXERCISES: CPT | Mod: GP

## 2021-03-05 NOTE — PROGRESS NOTES
"   03/05/21 1356   General Information   Type of Visit Initial OP Ortho PT Evaluation   Start of Care Date 03/05/21   Referring Physician Tarik/Dr. Jason Berman   Patient/Family Goals Statement \"Make sure I can go [place] this spring. I guess make sure I can get down and get back up.\"   Orders Evaluate and Treat   Date of Order 03/03/21   Certification Required? 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 I had the left one done about a year ago. I used a walker with the left one but somehow I was able to skip using it with this one. The doctor is really impressed with how I'm doing. He said for me to come here to see if there is anything else you can do for me but really I'm feeling/doing really well. I have about 5 steps to get onto the porch and then 5 steps into the house from there. I hang onto the railing and go step to step. In the bathroom, I have the sink really close so I can grab onto that when I'm getting into the shower. I walk around my house fine and I go to the store and things but if I have to walk far, I have a cart I can lean on.\"    Impairments F. Decreased strength and endurance;A. Pain;B. Decreased WB tolerance   Functional Limitations perform desired leisure / sports activities;perform activities of daily living   Symptom Location R medial knee running down medial fibula to ankle.   How/Where did it occur From Degenerative Joint Disease   Onset " "date of current episode/exacerbation 01/18/21   Chronicity New  (Post op TKA)   Pain rating (0-10 point scale) Best (/10);Worst (/10)   Best (/10) 2   Pain quality   (\"light thud\")   Frequency of pain/symptoms B. Intermittent   Pain/symptoms are:   (Depending on activity)   Pain/symptoms exacerbated by B. Walking;K. Home tasks   Pain/symptoms eased by I. OTC medication(s)  (Tylenol right before bed)   Progression of symptoms since onset: Improved   Prior Level of Function   Prior Level of Function-Mobility Independent   Prior Level of Function-ADLs Independent   Current Level of Function   Current Community Support Family/friend caregiver   Patient role/employment history A. Employed   Employment Comments [employer]   [facility]   Home/community accessibility Total of 10 stairs to get into house   Current equipment-Gait/Locomotion None   Current equipment-ADL None   Fall Risk Screen   Fall screen completed by PT   Have you fallen 2 or more times in the past year? No   Have you fallen and had an injury in the past year? No   Is patient a fall risk? No   Abuse Screen (yes response referral indicated)   Feels Unsafe at Home or Work/School no   Feels Threatened by Someone no   Does Anyone Try to Keep You From Having Contact with Others or Doing Things Outside Your Home? no   Physical Signs of Abuse Present no   System Outcome Measures   Outcome Measures   (LEFS: 63/80)   Knee Objective Findings   Side (if bilateral, select both right and left) Right   Observation Pt. stands w/bilat. feet externally rotated, genu varus bilat.   Gait/Locomotion Slightly antalgic RLE   Knee Special Test Comments - Hoda's on R   Palpation Mild-mod TTP along R medial gastroc   Right Knee Extension AROM +3   Right Knee Flexion AROM 125   Right Knee Flexion Strength 5/5   Right Knee Extension Strength 5/5   Right Hip Abduction Strength 5/5   Right Quad Set Strength 5/5   R VMO Strength 5/5   Right Gastrocnemius " Flexibility mild deficit   Right Hamstring Flexibility Approx 65 deg   Right Hip Flexor Flexibility Mild restriction`   Right Quadricep Flexibility Mild restriction   Planned Therapy Interventions   Planned Therapy Interventions strengthening;stretching;neuromuscular re-education   Clinical Impression   Criteria for Skilled Therapeutic Interventions Met yes, treatment indicated   PT Diagnosis Decreased R hip flexibility, mild pain, decreased eccentric quad strength, decreased mm endurance   Influenced by the following impairments Pain, decreased strength   Functional limitations due to impairments Decreased tolerance for prolonged walking, difficulty descending stairs   Clinical Presentation Stable/Uncomplicated   Clinical Presentation Rationale Evaluation findings   Clinical Decision Making (Complexity) Low complexity   Therapy Frequency 1 time/week   Predicted Duration of Therapy Intervention (days/wks) 4 weeks   Risk & Benefits of therapy have been explained Yes   Patient, Family & other staff in agreement with plan of care Yes   Clinical Impression Comments 65 y.o. male presents s/p R TKA. Pt. almost 7 weeks post op. Pt. demonstrates decreased strength/endurance, decreased flexibility, and mild pain complaints. These deficits contributing to difficulty negotiating stairs reciprocally and ambulating community distances. Pt. presents with good potential to benefit from skilled PT to address these deficits in order to facilitate improved functional abilities.   Education Assessment   Preferred Learning Style Listening;Demonstration;Pictures/video   Barriers to Learning No barriers   ORTHO GOALS   PT Ortho Eval Goals 1;2   Ortho Goal 1   Goal Identifier LEFS   Goal Description Pt. will improve LEFS score by 8 points or greater demonstrating improved functional abilities.   Target Date 04/02/21   Ortho Goal 2   Goal Identifier HEP   Goal Description Pt. will be ind/compliant with HEP in order to self manage sx.    Target Date 03/26/21   Total Evaluation Time   PT Eval, Low Complexity Minutes (13526) 20

## 2021-03-09 ENCOUNTER — TELEPHONE (OUTPATIENT)
Dept: ORTHOPEDICS | Facility: CLINIC | Age: 66
End: 2021-03-09

## 2021-03-09 NOTE — TELEPHONE ENCOUNTER
Forms were completed to the best of my ability and placed in the provider's basket to review and sign if appropriate.     Fax to [phone] when complete.     Angi MARLOW, Lead RN, BSN. . .  3/9/2021, 11:40 AM

## 2021-03-09 NOTE — TELEPHONE ENCOUNTER
Completed. Signed. The back page is a release of information form that has not been completed by the patient. Not sure if patient completed one earlier.    SYLVIA Stevens, CNP  Orthopedic Surgery

## 2021-03-12 NOTE — TELEPHONE ENCOUNTER
Writer called um about forms sent to us. They stated patient stopped working as of 10/08/20 and is asking for leave from 10/09/20 through end of restrictions.   His surgery and forms state 01/18/2021, and they will not approve due to the difference in dates.   He was supposed to have surgery in November of 2020, but due to COVID-19, this was rescheduled once in November 2020, once to Dec. 2020, and then to 01/18/2021.    Please advise as to how you want me to fill out the forms.     Thank you  Richard RODRIGUEZ CMA

## 2021-03-15 NOTE — TELEPHONE ENCOUNTER
This is reasonable as he was quite symptomatic and in pain. He was originally supposed to have surgery in Oct/nov but due to the COVID shutdown was unable to do so. Addendum noted on the forms. Give to Norma to rescan. Please inform patient.     SYLVIA Stevens, CNP  Orthopedic Surgery

## 2021-03-18 ENCOUNTER — HOSPITAL ENCOUNTER (OUTPATIENT)
Dept: PHYSICAL THERAPY | Facility: CLINIC | Age: 66
Setting detail: THERAPIES SERIES
End: 2021-03-18
Attending: NURSE PRACTITIONER
Payer: COMMERCIAL

## 2021-03-18 PROCEDURE — 97110 THERAPEUTIC EXERCISES: CPT | Mod: GP | Performed by: PHYSICAL THERAPIST

## 2021-03-25 DIAGNOSIS — N18.2 TYPE 2 DIABETES MELLITUS WITH STAGE 2 CHRONIC KIDNEY DISEASE, WITHOUT LONG-TERM CURRENT USE OF INSULIN (H): ICD-10-CM

## 2021-03-25 DIAGNOSIS — E11.22 TYPE 2 DIABETES MELLITUS WITH STAGE 2 CHRONIC KIDNEY DISEASE, WITHOUT LONG-TERM CURRENT USE OF INSULIN (H): ICD-10-CM

## 2021-03-25 DIAGNOSIS — F33.42 RECURRENT MAJOR DEPRESSION IN COMPLETE REMISSION (H): ICD-10-CM

## 2021-03-25 DIAGNOSIS — K21.00 GASTROESOPHAGEAL REFLUX DISEASE WITH ESOPHAGITIS: ICD-10-CM

## 2021-03-26 RX ORDER — ESOMEPRAZOLE MAGNESIUM 40 MG/1
CAPSULE, DELAYED RELEASE ORAL
Qty: 90 CAPSULE | Refills: 3 | Status: SHIPPED | OUTPATIENT
Start: 2021-03-26 | End: 2022-01-31

## 2021-03-26 RX ORDER — BUPROPION HYDROCHLORIDE 300 MG/1
TABLET ORAL
Qty: 90 TABLET | Refills: 3 | Status: SHIPPED | OUTPATIENT
Start: 2021-03-26 | End: 2022-03-16

## 2021-03-26 NOTE — TELEPHONE ENCOUNTER
Prescription approved per [facility] Refill Protocol.  Snow Turner RN, BSN  [place] [place]  March 26, 2021

## 2021-04-07 ENCOUNTER — OFFICE VISIT (OUTPATIENT)
Dept: UROLOGY | Facility: CLINIC | Age: 66
End: 2021-04-07
Payer: COMMERCIAL

## 2021-04-07 VITALS
BODY MASS INDEX: 33.36 KG/M2 | HEART RATE: 91 BPM | TEMPERATURE: 98.5 F | DIASTOLIC BLOOD PRESSURE: 82 MMHG | RESPIRATION RATE: 15 BRPM | OXYGEN SATURATION: 98 % | SYSTOLIC BLOOD PRESSURE: 147 MMHG | WEIGHT: 232.5 LBS

## 2021-04-07 DIAGNOSIS — N40.2 PROSTATE NODULE: ICD-10-CM

## 2021-04-07 DIAGNOSIS — N40.1 BENIGN PROSTATIC HYPERPLASIA WITH LOWER URINARY TRACT SYMPTOMS, SYMPTOM DETAILS UNSPECIFIED: ICD-10-CM

## 2021-04-07 DIAGNOSIS — R97.20 ELEVATED PROSTATE SPECIFIC ANTIGEN (PSA): ICD-10-CM

## 2021-04-07 DIAGNOSIS — R03.0 ELEVATED BP WITHOUT DIAGNOSIS OF HYPERTENSION: Primary | ICD-10-CM

## 2021-04-07 LAB
ALBUMIN UR-MCNC: 100 MG/DL
APPEARANCE UR: CLEAR
BILIRUB UR QL STRIP: NEGATIVE
COLOR UR AUTO: YELLOW
GLUCOSE UR STRIP-MCNC: NEGATIVE MG/DL
HGB UR QL STRIP: NEGATIVE
KETONES UR STRIP-MCNC: NEGATIVE MG/DL
LEUKOCYTE ESTERASE UR QL STRIP: NEGATIVE
MUCOUS THREADS #/AREA URNS LPF: PRESENT /LPF
NITRATE UR QL: NEGATIVE
PH UR STRIP: 5 PH (ref 5–7)
RBC #/AREA URNS AUTO: 1 /HPF (ref 0–2)
SOURCE: ABNORMAL
SP GR UR STRIP: 1.02 (ref 1–1.03)
UROBILINOGEN UR STRIP-MCNC: 0 MG/DL (ref 0–2)
WBC #/AREA URNS AUTO: <1 /HPF (ref 0–5)

## 2021-04-07 PROCEDURE — 99204 OFFICE O/P NEW MOD 45 MIN: CPT | Mod: 25 | Performed by: UROLOGY

## 2021-04-07 PROCEDURE — 81001 URINALYSIS AUTO W/SCOPE: CPT | Performed by: UROLOGY

## 2021-04-07 PROCEDURE — 51798 US URINE CAPACITY MEASURE: CPT | Performed by: UROLOGY

## 2021-04-07 RX ORDER — CIPROFLOXACIN 500 MG/1
500 TABLET, FILM COATED ORAL 2 TIMES DAILY
Qty: 6 TABLET | Refills: 0 | Status: SHIPPED | OUTPATIENT
Start: 2021-04-07 | End: 2021-04-10

## 2021-04-07 ASSESSMENT — PAIN SCALES - GENERAL: PAINLEVEL: NO PAIN (0)
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
## 2021-04-07 NOTE — PROGRESS NOTES
Bladder Scan performed. 0mL maximum residual urine detected after 3 scans. MD informed. Orly Burgos RN, BSN [facility]

## 2021-06-07 ENCOUNTER — TELEPHONE (OUTPATIENT)
Dept: UROLOGY | Facility: CLINIC | Age: 66
End: 2021-06-07

## 2021-06-07 NOTE — TELEPHONE ENCOUNTER
Left message for patient to call clinic.  Need to reschedule biopsy on 6/14  Courtney Hair RN     [phone]

## 2021-07-02 ENCOUNTER — ANCILLARY PROCEDURE (OUTPATIENT)
Dept: ULTRASOUND IMAGING | Facility: CLINIC | Age: 66
End: 2021-07-02
Payer: MEDICARE

## 2021-07-02 ENCOUNTER — OFFICE VISIT (OUTPATIENT)
Dept: UROLOGY | Facility: CLINIC | Age: 66
End: 2021-07-02
Payer: MEDICARE

## 2021-07-02 ENCOUNTER — TRANSFERRED RECORDS (OUTPATIENT)
Dept: HEALTH INFORMATION MANAGEMENT | Facility: CLINIC | Age: 66
End: 2021-07-02

## 2021-07-02 DIAGNOSIS — R97.20 ELEVATED PROSTATE SPECIFIC ANTIGEN (PSA): Primary | ICD-10-CM

## 2021-07-02 DIAGNOSIS — R97.20 ELEVATED PROSTATE SPECIFIC ANTIGEN (PSA): ICD-10-CM

## 2021-07-02 DIAGNOSIS — N40.2 PROSTATE NODULE: ICD-10-CM

## 2021-07-02 PROCEDURE — 55700 PR BIOPSY OF PROSTATE,NEEDLE/PUNCH: CPT | Performed by: UROLOGY

## 2021-07-02 PROCEDURE — 88305 TISSUE EXAM BY PATHOLOGIST: CPT | Mod: 26 | Performed by: PATHOLOGY

## 2021-07-02 PROCEDURE — 76942 ECHO GUIDE FOR BIOPSY: CPT | Performed by: UROLOGY

## 2021-07-02 PROCEDURE — 96372 THER/PROPH/DIAG INJ SC/IM: CPT | Mod: 59 | Performed by: UROLOGY

## 2021-07-02 RX ORDER — GENTAMICIN 40 MG/ML
80 INJECTION, SOLUTION INTRAMUSCULAR; INTRAVENOUS ONCE
Status: COMPLETED | OUTPATIENT
Start: 2021-07-02 | End: 2021-07-02

## 2021-07-02 RX ADMIN — GENTAMICIN 80 MG: 40 INJECTION, SOLUTION INTRAMUSCULAR; INTRAVENOUS at 08:02

## 2021-07-02 NOTE — PROGRESS NOTES
Patient is here for prostate biopsy.  He was placed in the dorsal lithotomy position.  Prostate ultrasound placed transrectally.  The neurovascular bundle was anesthetized using 1% lidocaine.  Prostate measurements obtained.  Prostate size is 31.1 cc.  12 biopsies obtained under guidance of prostate ultrasound using biopsy needle.  Patient tolerated procedure.  Return to clinic in one week for biopsy result.

## 2021-07-02 NOTE — LETTER
[facility]  [address] [place] [address]  [facility] [address]  Phone: [phone]    July 2, 2021        Joel Pike  [id number] [facility] [id number]          To whom it may concern:    RE: Joel Pike    Patient was seen and treated today at our clinic and need time off for recovery.  Patient may return to work 7/6/2021 with no restrictions.    Please contact me for questions or concerns.      Sincerely,        Alexei Ott MD

## 2021-07-02 NOTE — PROGRESS NOTES
Clinic Administered Medication Documentation    Administrations This Visit     gentamicin (GARAMYCIN) injection 80 mg     Admin Date  07/02/2021 Action  New Bag Dose  80 mg Route  Intramuscular Site  Right Gluteus Zeb Administered By  Courtney Hair RN    Ordering Provider: Alexei Ott MD    Patient Supplied?: No                  Injectable Medication Documentation    Patient was given Gentamicin. Prior to medication administration, verified patients identity using patient s name and date of birth. Please see MAR and medication order for additional information. Patient instructed to remain in clinic for 15 minutes and report any adverse reaction to staff immediately .      Was entire vial of medication used? Yes  Vial/Syringe: Single dose vial  Expiration Date:  11/21  Was this medication supplied by the patient? No     The following medication was given:     MEDICATION: Gentamicin   ROUTE: IM  SITE: RUQ - Gluteus  DOSE: 80 mg   LOT #: [id number]  :  Premier Pro  EXPIRATION DATE:  11/21  NDC#: 64976-989-71  Courtney Hair RN

## 2021-07-06 LAB — COPATH REPORT: NORMAL

## 2021-07-07 ENCOUNTER — OFFICE VISIT (OUTPATIENT)
Dept: UROLOGY | Facility: CLINIC | Age: 66
End: 2021-07-07
Payer: MEDICARE

## 2021-07-07 VITALS
RESPIRATION RATE: 16 BRPM | BODY MASS INDEX: 33.43 KG/M2 | HEART RATE: 102 BPM | WEIGHT: 233 LBS | DIASTOLIC BLOOD PRESSURE: 82 MMHG | SYSTOLIC BLOOD PRESSURE: 139 MMHG | OXYGEN SATURATION: 100 %

## 2021-07-07 DIAGNOSIS — C61 PROSTATE CANCER (H): Primary | ICD-10-CM

## 2021-07-07 PROCEDURE — 99213 OFFICE O/P EST LOW 20 MIN: CPT | Performed by: UROLOGY

## 2021-07-07 ASSESSMENT — PAIN SCALES - GENERAL: PAINLEVEL: NO PAIN (0)

## 2021-07-07 NOTE — PATIENT INSTRUCTIONS
Please call the [facility] radiology to schedule an MRI of the prostate. [phone].  Please call our office to schedule your follow up once you have the test scheduled, [phone].  Please contact your insurance company to make sure the MRI scan is covered under your insurance plan.     Patient Education     Understanding Prostate MRI  Magnetic resonance imaging (MRI) is a test that lets your doctor see detailed pictures of the inside of your body. It uses radio waves, magnets, and a computer to create images of the soft tissues in the body, such as the prostate gland. An MRI can be done in different ways. Your healthcare provider may use a dye (contrast agent) injected into the vein. The dye highlights certain details in the prostate. In some cases, the provider may place a probe in the rectum. This is called an endorectal coil. The coil may be used to make a better image. It also keeps the prostate from moving during the test. Newer MRI methods give clearer images of the prostate. This helps find possible areas of cancer and give information about how serious the cancer may be. The role of MRI in prostate cancer care has expanded, both before and after a diagnosis. Talk with your provider about your options and preferences.   Results of the MRI show the size and location of problems in the prostate. They also show how likely those problems are to be cancer. The results also show whether the cancer has spread beyond the prostate.   Why prostate MRI is done  A prostate MRI is done to:    Diagnose prostate cancer    Determine if a biopsy is needed    Help find the place in the prostate that may have cancer    Give information about how quickly the cancer may grow    Help with a biopsy of prostate tissue (MRI-guided targeted biopsy)    Show if cancer has spread to other parts of the body  How prostate MRI is done  The MRI machine is a large circular piece of equipment with a hole in the middle. Some newer MRIs are  open on the sides. You will lie on a table that can be slid into the MRI machine. If you have contrast dye, it will be injected through an IV (intravenous) tube. If you have a coil, it will be put into your rectum. A balloon is inflated at the end of the coil inside the rectum to hold it in place.   As the table moves through the machine, it takes pictures. The machine makes very loud banging sounds. You will be given headphones or earplugs to wear during the test. The technician will be in the next room watching you through a window and talking with you through a 2-way intercom.     Risks of prostate MRI  There is no radiation exposure during a MRI scan. All procedures have some risks.   You will have a detailed safety screening before having an MRI. But in general, tell your healthcare provider and the technologist if you have any implanted device or metal clips, or pins or metal fragments in your body. Some devices can't be used with MRI that has a strong magnetic field. You also may not be able to have an MRI if you have ever had an imaging test with contrast dye or are allergic to contrast dye, iodine, shellfish, or any medicines. Also tell your provider if you have a serious health problem such as diabetes or kidney disease.   You may have some discomfort if an endorectal coil is used. You may feel a warming sensation. Let the technologist know. If dye is used, you may have an allergic reaction to it. But this is rare.   [employer] last reviewed this educational content on 1/1/2020 2000-2021 [employer]. All rights reserved. This information is not intended as a substitute for professional medical care. Always follow your healthcare professional's instructions.           Patient Education     Bone Scan  A bone scan  is an imaging test that uses a special camera to make images of your bones. It's a nuclear medicine exam that uses a small amount of radioactive material that specifically goes to  where there is abnormal production of bone. It's used to diagnose bone problems, such as fractures or cancer. Or it's used to detect cancer that may have spread from another area of the body such as breast, lung, or prostate. It can also be used to diagnose infections and joint problems such as arthritis. It's also used to check joint replacements.   What to tell you healthcare provider  Let the technologist know if you:     Take any medicines    Are pregnant or think you may be pregnant, or are breastfeeding    Have had a nuclear medicine scan before    Have had a recent barium study such as a barium enema, esophagram, or upper GI    Have any fractures or artificial joints    Have any allergies  During your scan appointment  Your bone scan may take up to a half day. Bring something you can do while waiting to have your scan.   Before the scan     You will have an IV (intravenous) line placed into a vein in your arm or hand.    A tracer (a small amount of radioactive material) is injected into your vein.    You may be asked to drink several glasses of water and empty your bladder.    Your scan may be done right away or a few hours later. If your scan is done right away, you will have a second scan in a few hours.    During the scan    You will lie on a narrow imaging table.    A large camera is placed close to your body.    Remain as still as you can while the camera takes the pictures. This will ensure the best images. You may be asked to change positions during the scan. This is to help get pictures at different positions.    The table or camera may be adjusted to take more pictures.     A large camera takes images of your bones.     After your exam    Drink plenty of water to help clear the tracer from your body.    Your healthcare provider will discuss the test results with you during a follow-up visit or over the phone.  Your next appointment is:________________   [employer] last reviewed this educational content  on 6/1/2019 2000-2021 [employer]. All rights reserved. This information is not intended as a substitute for professional medical care. Always follow your healthcare professional's instructions.
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
## 2021-07-08 NOTE — DISCHARGE SUMMARY
Outpatient Physical Therapy Discharge Note     Patient: Joel Pike  : 1955    Beginning/End Dates of Reporting Period:  3/5/2021 to 2021    Referring Provider: SYLVIA Stevens CNP    Therapy Diagnosis: s/p R TKA     Client Self Report: Has now been going step over step with stairs. No assistive device. Way better than 2 weeks ago. Walks 1-2 hours per day.     Objective Measurements:  Objective Measure: LEFS 63/80 on 3/5/21  Details: 70/80      Goals:  Goal Identifier LEFS   Goal Description Pt. will improve LEFS score by 8 points or greater demonstrating improved functional abilities.   Target Date 21   Date Met   3/18/2021   Progress:     Goal Identifier HEP   Goal Description Pt. will be ind/compliant with HEP in order to self manage sx.   Target Date 21   Date Met  21   Progress:       Progress Toward Goals:   Patient has met his goals.      Plan:  Discharge from therapy.    Discharge:    Reason for Discharge: Patient has met all goals. No further therapy indicated.    Equipment Issued: none    Discharge Plan: Patient to continue home program.    Thank you for your referral.   Shantell Perry, PT, DPT    [facility]   O: [phone]  E: [email or web address]

## 2021-07-28 ENCOUNTER — HOSPITAL ENCOUNTER (OUTPATIENT)
Dept: NUCLEAR MEDICINE | Facility: CLINIC | Age: 66
Setting detail: NUCLEAR MEDICINE
End: 2021-07-28
Attending: UROLOGY
Payer: MEDICARE

## 2021-07-28 ENCOUNTER — HOSPITAL ENCOUNTER (OUTPATIENT)
Dept: MRI IMAGING | Facility: CLINIC | Age: 66
Discharge: HOME | End: 2021-07-28
Attending: UROLOGY | Admitting: UROLOGY
Payer: MEDICARE

## 2021-07-28 DIAGNOSIS — C61 PROSTATE CANCER (H): ICD-10-CM

## 2021-07-28 PROCEDURE — A9503 TC99M MEDRONATE: HCPCS | Performed by: UROLOGY

## 2021-07-28 PROCEDURE — G1004 CDSM NDSC: HCPCS | Mod: GC | Performed by: RADIOLOGY

## 2021-07-28 PROCEDURE — 343N000001 HC RX 343: Performed by: UROLOGY

## 2021-07-28 PROCEDURE — A9585 GADOBUTROL INJECTION: HCPCS | Performed by: UROLOGY

## 2021-07-28 PROCEDURE — G1004 CDSM NDSC: HCPCS | Performed by: RADIOLOGY

## 2021-07-28 PROCEDURE — 72197 MRI PELVIS W/O & W/DYE: CPT | Mod: 26 | Performed by: RADIOLOGY

## 2021-07-28 PROCEDURE — 72197 MRI PELVIS W/O & W/DYE: CPT | Mod: ME

## 2021-07-28 PROCEDURE — 78306 BONE IMAGING WHOLE BODY: CPT | Mod: ME

## 2021-07-28 PROCEDURE — G1004 CDSM NDSC: HCPCS

## 2021-07-28 PROCEDURE — 255N000002 HC RX 255 OP 636: Performed by: UROLOGY

## 2021-07-28 PROCEDURE — 78306 BONE IMAGING WHOLE BODY: CPT | Mod: 26 | Performed by: RADIOLOGY

## 2021-07-28 RX ORDER — TC 99M MEDRONATE 20 MG/10ML
20-30 INJECTION, POWDER, LYOPHILIZED, FOR SOLUTION INTRAVENOUS ONCE
Status: COMPLETED | OUTPATIENT
Start: 2021-07-28 | End: 2021-07-28

## 2021-07-28 RX ORDER — GADOBUTROL 604.72 MG/ML
10 INJECTION INTRAVENOUS ONCE
Status: COMPLETED | OUTPATIENT
Start: 2021-07-28 | End: 2021-07-28

## 2021-07-28 RX ADMIN — TC 99M MEDRONATE 26.4 MCI.: 20 INJECTION, POWDER, LYOPHILIZED, FOR SOLUTION INTRAVENOUS at 08:19

## 2021-07-28 RX ADMIN — GADOBUTROL 10 ML: 604.72 INJECTION INTRAVENOUS at 08:58

## 2021-07-29 ENCOUNTER — HOSPITAL ENCOUNTER (OUTPATIENT)
Dept: GENERAL RADIOLOGY | Facility: CLINIC | Age: 66
Discharge: HOME | End: 2021-07-29
Attending: UROLOGY | Admitting: UROLOGY
Payer: MEDICARE

## 2021-07-29 ENCOUNTER — VIRTUAL VISIT (OUTPATIENT)
Dept: UROLOGY | Facility: CLINIC | Age: 66
End: 2021-07-29
Payer: MEDICARE

## 2021-07-29 DIAGNOSIS — C61 PROSTATE CANCER (H): Primary | ICD-10-CM

## 2021-07-29 DIAGNOSIS — C61 PROSTATE CANCER (H): ICD-10-CM

## 2021-07-29 PROCEDURE — 72202 X-RAY EXAM SI JOINTS 3/> VWS: CPT

## 2021-07-29 PROCEDURE — 99213 OFFICE O/P EST LOW 20 MIN: CPT | Mod: 95 | Performed by: UROLOGY

## 2021-07-29 NOTE — PROGRESS NOTES
Joel is a 66 year old who is being evaluated via a billable video visit.      How would you like to obtain your AVS? MyChart  If the video visit is dropped, the invitation should be resent by: Text to cell phone:    Will anyone else be joining your video visit? No      Video Start Time: 1050    Assessment & Plan   Problem List Items Addressed This Visit     Prostate cancer (H) - Primary           Review of the result(s) of each unique test - bone scan / prostate MRI  20 minutes spent on the date of the encounter doing chart review, history and exam, documentation and further activities per the note       See Patient Instructions    No follow-ups on file.    Alexei Ott MD  [facility]    Subjective   Joel is a 66 year old who presents for the following health issues prostate cancer    HPI     F/u for prostate cancer stage T2-3 group 3.      Recent bone scan showed:    IMPRESSION: Single focal uptake by the right sacral ala (S3 level) and  corresponding sclerotic focus. This is highly suspicious for  metastatic focus.    Prostate MRI:      Neurovascular bundles: Both neurovascular bundles are likely involved  by malignancy.  A right prostatic vein is markedly enlarged compared  to the left, similar to 8/1/2014 CT.  Seminal vesicles: No seminal vesicle involvement by malignancy.  Lymph nodes: No clear adenopathy. Indeterminate nodes as follows: Left  pelvic 8 x 5 mm lymph node on series 5 image 27.  Bones: No suspicious lesions  Other pelvic organs: Colonic diverticulosis.                                                                           IMPRESSION:  1. Based on the most suspicious abnormality, this exam is  characterized as PIRADS 5 - Clinically significant cancer is highly  likely to be present.  The most suspicious abnormalities are located  at the right mid peripheral zone and left apical peripheral zone and  there is high suspicion of extraprostatic extension.  2. No  suspicious adenopathy or evidence of pelvic metastases.         Review of Systems   Constitutional, HEENT, cardiovascular, pulmonary, gi and gu systems are negative, except as otherwise noted.      Objective           Vitals:  No vitals were obtained today due to virtual visit.    Physical Exam   GENERAL: Healthy, alert and no distress  EYES: Eyes grossly normal to inspection.  No discharge or erythema, or obvious scleral/conjunctival abnormalities.  RESP: No audible wheeze, cough, or visible cyanosis.  No visible retractions or increased work of breathing.    SKIN: Visible skin clear. No significant rash, abnormal pigmentation or lesions.  NEURO: Cranial nerves grossly intact.  Mentation and speech appropriate for age.  PSYCH: Mentation appears normal, affect normal/bright, judgement and insight intact, normal speech and appearance well-groomed.    Prostate cancer:  Discussed treatment options.  Patient is interested in surgery.  Will schedule for sacrum plain film next given bone scan findings.            Video-Visit Details    Type of service:  Video Visit    Video End Time:11:04 AM    Originating Location (pt. Location): Home    Distant Location (provider location):  [facility]     Platform used for Video Visit: [employer]

## 2021-08-03 ENCOUNTER — MYC MEDICAL ADVICE (OUTPATIENT)
Dept: FAMILY MEDICINE | Facility: CLINIC | Age: 66
End: 2021-08-03

## 2021-08-04 NOTE — TELEPHONE ENCOUNTER
Called pt and discussed the notes that were documented in mychart. Information faxed to [place] referral per pts request and verbal consent given.    Karlie COOPER RN Specialty Triage 8/4/2021 12:47 PM

## 2021-08-09 ENCOUNTER — MYC MEDICAL ADVICE (OUTPATIENT)
Dept: UROLOGY | Facility: CLINIC | Age: 66
End: 2021-08-09

## 2021-08-09 NOTE — TELEPHONE ENCOUNTER
MD spoke with pt earlier today about the need for further imaging. He is scheduled for the requested imaging    Karlie COOPER RN Specialty Triage 8/9/2021 3:03 PM

## 2021-08-09 NOTE — PROGRESS NOTES
Per Dr. Ott, patient needs CT scan. Order entered.    patient advised and given number to call.  Meera Forrest, CMA

## 2021-08-10 ENCOUNTER — HOSPITAL ENCOUNTER (OUTPATIENT)
Dept: CT IMAGING | Facility: CLINIC | Age: 66
Discharge: HOME | End: 2021-08-10
Attending: UROLOGY | Admitting: UROLOGY
Payer: MEDICARE

## 2021-08-10 ENCOUNTER — TELEPHONE (OUTPATIENT)
Dept: UROLOGY | Facility: CLINIC | Age: 66
End: 2021-08-10

## 2021-08-10 DIAGNOSIS — C61 PROSTATE CANCER (H): Primary | ICD-10-CM

## 2021-08-10 DIAGNOSIS — C61 PROSTATE CANCER (H): ICD-10-CM

## 2021-08-10 LAB
CREAT BLD-MCNC: 1.3 MG/DL (ref 0.7–1.3)
GFR SERPL CREATININE-BSD FRML MDRD: 57 ML/MIN/1.73M2

## 2021-08-10 PROCEDURE — 250N000011 HC RX IP 250 OP 636: Performed by: RADIOLOGY

## 2021-08-10 PROCEDURE — 74177 CT ABD & PELVIS W/CONTRAST: CPT | Mod: MG

## 2021-08-10 PROCEDURE — 250N000009 HC RX 250: Performed by: RADIOLOGY

## 2021-08-10 PROCEDURE — 82565 ASSAY OF CREATININE: CPT

## 2021-08-10 RX ORDER — IOPAMIDOL 755 MG/ML
500 INJECTION, SOLUTION INTRAVASCULAR ONCE
Status: COMPLETED | OUTPATIENT
Start: 2021-08-10 | End: 2021-08-10

## 2021-08-10 RX ADMIN — IOPAMIDOL 100 ML: 755 INJECTION, SOLUTION INTRAVENOUS at 08:10

## 2021-08-10 RX ADMIN — SODIUM CHLORIDE 60 ML: 9 INJECTION, SOLUTION INTRAVENOUS at 08:10

## 2021-08-10 NOTE — TELEPHONE ENCOUNTER
Called pt and left vm to return call to be placed on nurse schedule for injection tomorrow.    Karlie COOPER RN Specialty Triage 8/10/2021 1:36 PM

## 2021-08-10 NOTE — TELEPHONE ENCOUNTER
Pt is on schedule for nurse visit tomorrow am.    Karlie COOPER RN Specialty Triage 8/10/2021 4:24 PM

## 2021-08-10 NOTE — TELEPHONE ENCOUNTER
Please start PA on Eligard. Let us know when complete.    Karlie COOPER RN Specialty Triage 8/10/2021 10:45 AM

## 2021-08-10 NOTE — PROGRESS NOTES
Pt had requested imaging done no further action.    Karlie COOPER RN Specialty Triage 8/10/2021 1:44 PM

## 2021-08-11 ENCOUNTER — OFFICE VISIT (OUTPATIENT)
Dept: UROLOGY | Facility: CLINIC | Age: 66
End: 2021-08-11
Payer: MEDICARE

## 2021-08-11 ENCOUNTER — MYC MEDICAL ADVICE (OUTPATIENT)
Dept: UROLOGY | Facility: CLINIC | Age: 66
End: 2021-08-11

## 2021-08-11 DIAGNOSIS — C61 PROSTATE CANCER (H): Primary | ICD-10-CM

## 2021-08-11 PROCEDURE — 96402 CHEMO HORMON ANTINEOPL SQ/IM: CPT | Performed by: UROLOGY

## 2021-08-11 RX ORDER — LIDOCAINE HYDROCHLORIDE 20 MG/ML
JELLY TOPICAL ONCE
Status: CANCELLED | OUTPATIENT
Start: 2021-08-11 | End: 2021-08-11

## 2021-08-11 NOTE — TELEPHONE ENCOUNTER
Patient has many questions regarding prostate cancer diagnosis (see Mychart message). Forwarding to Dr. Ott to review and advise. Orly Burgos RN, BSN Specialty Clinics

## 2021-08-11 NOTE — PROGRESS NOTES
Clinic Administered Medication Documentation    Administrations This Visit     leuprolide (ELIGARD) kit 45 mg     Admin Date  08/11/2021 Action  Given Dose  45 mg Route  Subcutaneous Site  Abdominal Tissue Administered By  Orly Burgos RN    Ordering Provider: Alexei Ott MD    Patient Supplied?: No                  Injectable Medication Documentation    Patient was given Eligard. Prior to medication administration, verified patients identity using patient s name and date of birth. Please see MAR and medication order for additional information. Patient instructed to remain in clinic for 15 minutes and report any adverse reaction to staff immediately .      Was entire vial of medication used? Yes  Vial/Syringe: Syringe  Expiration Date:  1/31/2023  Was this medication supplied by the patient? No   Orly Burgos RN, BSN Specialty Clinics

## 2021-08-12 NOTE — TELEPHONE ENCOUNTER
[facility] Call Center    Phone Message    May a detailed message be left on voicemail: yes     Reason for Call: Other: Pt returning call from 's office, he would like a call back to discuss.     Action Taken: Message routed to:  [facility] uro    Travel Screening: Not Applicable

## 2021-08-12 NOTE — TELEPHONE ENCOUNTER
Per Dr. Ott staff message, he spoke to patient at 1:24pm. Order entered for medical onc.  Meera Forrest, CMA

## 2021-08-13 ENCOUNTER — MYC MEDICAL ADVICE (OUTPATIENT)
Dept: UROLOGY | Facility: CLINIC | Age: 66
End: 2021-08-13

## 2021-08-13 ENCOUNTER — PATIENT OUTREACH (OUTPATIENT)
Dept: ONCOLOGY | Facility: CLINIC | Age: 66
End: 2021-08-13

## 2021-08-13 NOTE — PROGRESS NOTES
New Patient Oncology Nurse Navigator Note     Referring provider: Dr. Alexei Ott     Referring Clinic/Organization: [facility]- Urology     Referred to (specialty): med onc    Requested provider (if applicable): Dr. Nunes     Date Referral Received: 8/12/21     Evaluation for : likely metastatic prostate cancer     Clinical History (per Nurse review of records provided):  Pt had elevated PSA, 12.7 on 2/26/21, when he was referred to urology.  He saw Dr. Ott and had prostate bx on 7/2/21, Mary 4+3.     Bone Scan 7/28:  FINDINGS:   Small area of mild uptake in the right sacrum inferiorly visible on  the posterior view. SPECT-CT demonstrates that this focal uptake  corresponds to a sclerotic lesion on CT. No other suspicious areas of  focal uptake.     8/10/21 CT A/P indicates:  IMPRESSION:   1.  Multiple enlarged retroperitoneal periaortic and bilateral iliac  chain lymph nodes are highly suspicious for metastasis.  2.  Small faint areas of increased bone sclerosis involving the right  iliac are indeterminate for possible metastasis. Consider follow-up  bone scan for further evaluation.    Fito inititated 8/11/21 and referral to medical oncology made.           Records Location (Care Everywhere, Media, etc.): Epic, all work up and tx thus far done within system.     I called Joel to let him know Dr. Nunes is not starting his practice in [place] until October.  I let him know I have a hold on Dr. Solo's schedule for 9/8/21, and he would like to chose that.  I let him know a  will be reaching out to set that up.

## 2021-08-23 ENCOUNTER — MYC MEDICAL ADVICE (OUTPATIENT)
Dept: UROLOGY | Facility: CLINIC | Age: 66
End: 2021-08-23

## 2021-08-23 DIAGNOSIS — C61 PROSTATE CANCER (H): Primary | ICD-10-CM

## 2021-08-26 ENCOUNTER — TELEPHONE (OUTPATIENT)
Dept: ONCOLOGY | Facility: CLINIC | Age: 66
End: 2021-08-26

## 2021-08-26 NOTE — TELEPHONE ENCOUNTER
I called Joel to reschedule his 9/8/21 appt with Dr Isaias Solo to 8/30/21 at 10am with Dr Isaias Solo, as requested by Migdalia LEONARD via IB. Joel did not answer, so a voicemail was left requesting a return call to confirm his new appt date of 8/30/21 at 10am with Dr Solo at the [facility] [place] [facility]. IB sent to Migdalia with status.

## 2021-08-28 ENCOUNTER — HEALTH MAINTENANCE LETTER (OUTPATIENT)
Age: 66
End: 2021-08-28

## 2021-08-29 NOTE — PROGRESS NOTES
[facility] Medical Oncology New Patient Consultation Note       Date of visit: August 30, 2021    CC:  Possible metastatic prostate cancer     HPI:  Nervous today about diagnosis.  Wondering where his prostate cancer really is and what the treatment will be.  Energy has been low lately since starting ADT in early August.  Having hot flashes/sweats.  Breathing is OK, no chest pain.  Appetite is good.  Continuing to work in [employer].  Here today with his wife and son.      ONCOLOGY HISTORY:   -Elevated PSA noted 2/26/21 at 12.70. Previously normal in 2017.     -4/7/21-Initial urology visit.   -7/2/21-prostate biopsy 4+3, 9/12 biopsies positive.  Ductal component noted.     -7/28/21-MR prostate-PIRADS 5 lesion, R and L sided lesions with likely    neurovascular bundle invasion. No  seminal vesicle involvement. No clear LAD,  but some indeterminate pelvic nodes.  No suspicious bone lesions.     -7/28/21-NM bone scan suspicious lesion of R sacral ala S3 level.     -8/10/21-CT A/P with multiple enlarged periaortic/iliac LN   -8/11/21-First eligard dose 45mg (Q6M dosing). Due next 2/2022.   -8/30/21-Initial medical oncology visit with Dr. Solo.  Unclear if bony lesion is  metastasis based on current imaging.  Will start enzalutamide and continue    Eligard (next due 2/2022).      PMH:  HTN, HLD, bipolar disorder, DM2 on metformin     PSH:  Bilateral TKA, bilateral cataract extraction, bilateral carpal tunnel release     FAMILY HX:  No reported family history of prostate cancer     SOCIAL:  Retired, works at [employer] in [employer] now  Never smoker.   No EtOH  No recreational drugs.   No herbs/supplements other than on medication list.      MEDS:  Current Outpatient Medications   Medication Instructions     albuterol (PROAIR HFA) 108 (90 BASE) MCG/ACT Inhaler 1-2 puffs every 2 -4 hrs as needed     ALLEGRA-D ALLERGY & CONGESTION 180-240 MG 24 hr tablet TAKE ONE TABLET BY MOUTH EVERY DAY      blood glucose (HUGO CONTOUR) test strip Use to test blood sugars 1 time daily or as directed.     blood glucose (NO BRAND SPECIFIED) lancets standard Use to test blood sugar one time daily or as directed.     blood glucose calibration (HUGO CONTOUR) NORMAL solution Use to calibrate blood glucose monitor as directed.     buPROPion (WELLBUTRIN XL) 300 MG 24 hr tablet TAKE ONE TABLET BY MOUTH EVERY MORNING     Coenzyme Q-10 capsule 1 capsule, DAILY     esomeprazole (NEXIUM) 40 MG DR capsule TAKE ONE CAPSULE BY MOUTH EVERY MORNING BEFORE BREAKFAST , 30-60 MINUTES BEFORE EATING     ezetimibe (ZETIA) 10 MG tablet TAKE ONE TABLET BY MOUTH ONCE DAILY     fish oil-omega-3 fatty acids 1000 MG capsule Take  by mouth. Take daily       levothyroxine (SYNTHROID/LEVOTHROID) 112 mcg, Oral, DAILY     losartan (COZAAR) 75 mg, Oral, DAILY     Magnesium 500 MG CAPS One twice a day     metFORMIN (GLUCOPHAGE) 500 MG tablet TAKE ONE TABLET BY MOUTH TWICE A DAY WITH MEALS     Misc Natural Products (OSTEO BI-FLEX ADV JOINT SHIELD) TABS Take  by mouth.     multivitamin (OCUVITE) TABS tablet 1 tablet, Oral, DAILY     STATIN NOT PRESCRIBED (INTENTIONAL) Please choose reason not prescribed, below     Vitamin D3 (CHOLECALCIFEROL) 5,000 Units, Oral     ROS-Remainder of 14 point ROS reviewed and negative except as in HPI.    PHYSICAL EXAM:  Exam:  Constitutional: healthy, alert and no distress  Head: Normocephalic. No masses, lesions, tenderness or abnormalities  Neck: Neck supple.  Cardiovascular: negative, RRR, no m/r/g appreciated  Respiratory: CTAB, normal WOB on RA   Gastrointestinal: Abdomen soft, non-distended   : Deferred  Musculoskeletal: extremities normal- no gross deformities noted, gait normal   Skin: no suspicious lesions or rashes  Neurologic: negative, CNII-XII grossly intact, normal speech and mentation.   Psychiatric: affect normal/bright, linear thought processes, engaged     LABS AND IMAGES:    Prostate Biopsy 7/2/21  FINAL  DIAGNOSIS:   A: Prostate, left, biopsy   - Adenocarcinoma, [place]'s grade 4/3 with ductal component involving 4 of    6 needle core biopsies and 25% of   submitted tissue     B: Prostate, right, biopsy   - Adenocarcinoma, [place]'s grade 4/3 with ductal component involving 5 of    6 needle core biopsies and 25% of   submitted tissue     MR prostate 7/28/21  FINDINGS:  Size: 31 grams  Hemorrhage: Absent  Peripheral zone: Heterogeneous on T2-weighted images. Suspicious  lesions as detailed below.  Transition zone: Enlarged with BPH changes. No suspicious lesions  identified.     Lesion(s) in rank order of severity (highest score- to lowest score,  then by size)      Lesion 1:  Location: Right mid gland peripheral zone at the 7-9 o'clock position  relative to the urethra. Series 5 image 50.  Size: 20 x 10 mm  T2 description: Homogeneously hypointense  T2 numerical assessment: 5  DWI description: Marked restriction diffusion  DWI numerical assessment: 5  DCE assessment: Negative    Prostate margin: Capsular abutment>15 mm with enlarged neurovascular  bundle suggesting tumor involvement  Lesion overall PI-RADS category: 5     Lesion 2:  Location: Left apex peripheral zone at the 4 o'clock position relative  to the urethra. Series 5 image 63.  Size: 16 x 11 mm  T2 description: Homogeneously hypointense  T2 numerical assessment: 5  DWI description: Marked diffusion restriction  DWI numerical assessment: 5  DCE assessment: Positive    Prostate margin: Capsular abutment 6-15 mm with capsular irregularity  and focal bulging   Lesion overall PI-RADS category: 5     Neurovascular bundles: Both neurovascular bundles are likely involved  by malignancy.  A right prostatic vein is markedly enlarged compared  to the left, similar to 8/1/2014 CT.  Seminal vesicles: No seminal vesicle involvement by malignancy.  Lymph nodes: No clear adenopathy. Indeterminate nodes as follows: Left  pelvic 8 x 5 mm lymph node on series 5 image  27.  Bones: No suspicious lesions  Other pelvic organs: Colonic diverticulosis.                                                                         IMPRESSION:  1. Based on the most suspicious abnormality, this exam is  characterized as PIRADS 5 - Clinically significant cancer is highly  likely to be present.  The most suspicious abnormalities are located  at the right mid peripheral zone and left apical peripheral zone and  there is high suspicion of extraprostatic extension.  2. No suspicious adenopathy or evidence of pelvic metastases.    NM bone scan 7/28/21  FINDINGS:   Small area of mild uptake in the right sacrum inferiorly visible on  the posterior view. SPECT-CT demonstrates that this focal uptake  corresponds to a sclerotic lesion on CT. No other suspicious areas of  focal uptake.     Postsurgical changes of bilateral knee replacements.     Physiologic uptake by the kidneys and urinary bladder.                                                                      IMPRESSION: Single focal uptake by the right sacral ala (S3 level) and  corresponding sclerotic focus. This is highly suspicious for  metastatic focus.    CT A/P 8/10/21  FINDINGS:   LOWER CHEST: Normal.     HEPATOBILIARY: Diffuse hepatic steatosis. No significant mass. No bile  duct dilatation. No calcified gallstones.     PANCREAS: Normal.     SPLEEN: Normal.     ADRENAL GLANDS: Normal.     KIDNEYS/BLADDER: There are multiple bilateral nonobstructing  intrarenal calculi measuring up to 4 mm in largest size. Small  subcentimeter benign cortical cysts. Unremarkable bladder. No evidence  of hydronephrosis.     BOWEL: No evidence of bowel obstruction or inflammatory changes. Mild  sigmoid colon diverticulosis. No evidence of diverticulitis. Normal  appendix.     PELVIC ORGANS: Normal sized prostate gland.     ADDITIONAL FINDINGS: Multiple enlarged retroperitoneal periaortic and  bilateral iliac chain lymph nodes are highly suspicious  for  metastasis. The largest left retroperitoneal periaortic lymph node on  image 44 of series 2 measures 4.5 x 3.4 cm. 2.9 x 2.3 cm aortocaval  lymph node on image 45 of series 2. Distal left periaortic lymph node  on image 54 of series 2 measures 3.2 x 2.6 cm. Right common iliac  chain lymph node on image 65 of series 2 measures 2.3 x 2.2 cm. 1.6 cm  left common iliac chain node on image 59 of series 2.     MUSCULOSKELETAL: Small faint sites of increased bone sclerosis  involving the posterior medial right iliac on image 65 of series 2 and  image 69 of series 2 are indeterminate for possible metastasis.                                                                      IMPRESSION:   1.  Multiple enlarged retroperitoneal periaortic and bilateral iliac  chain lymph nodes are highly suspicious for metastasis.  2.  Small faint areas of increased bone sclerosis involving the right  iliac are indeterminate for possible metastasis. Consider follow-up  bone scan for further evaluation.  3.  Bilateral nonobstructing intrarenal calculi.    Results for JOEL PIKE (MRN [id number]) as of 8/30/2021 14:33   Ref. Range 12/19/2005 10:21 12/20/2006 09:59 12/21/2007 15:05 1/15/2010 17:20 2/8/2013 09:26 11/8/2017 07:55 2/26/2021 11:07 8/30/2021 11:50   PSA Latest Ref Range: 0.00 - 4.00 ug/L 0.36 0.72 0.99 0.71 1.23 2.98 12.70 (H) 30.40 (H)     IMPRESSION AND PLAN:    Joel Pike is a 67 y/o M with PMH of DM2 on metformin, depression/anxiety (pt reports as bipolar d/o), HTN, and HLD presenting for initial consultation for prostate cancer with possible bone metastasis.  We had a lengthy discussion at his initial and went over his pathology and staging scan results.  We discussed that it is not clear whether the lesion in wing of his ala is a true bony metastasis or whether it is another finding/artifact.  We discussed that this does not change the overall treatment of his disease with ADT, but may change whether he receives  radiation to his bony lesion in the future or not.  The role of ADT and the addition of enzalutamide were discussed today.  Given his existing DM2, we will attempt to avoid additional prednisone use by choosing enzalutamide.  There is a small, but non-zero risk of seizure with enzalutamide along with bupropion, which we will monitor him for.  We will refer to radiation oncology for evaluation/opinion on radiation of his possible bony lesion in addition to radiation of the prostate and RP LN bed.  We discussed ADT and RT work in tandem to treat therapy and ADT will target systemic disease as well, including circulating tumor cells.      Recommendations were made for a healthy diet of minimally processed foods and reduction in sugar as possible.  Otherwise we noted that this is a disease he will likely live many years with while on treatment.  Plan is to continue ADT for 2 years (through 8/2023).  His next Eligard is due 2/2022.  We will restage in 3 months with CT CAP in particular to evaluate his bony lesion of concern for progression/regression that may give a clue as to whether this is a metastasis or not.  He is present with his son and family and had multiple questions answered.    -Start enzalutamide.    -PSA, CBC, CMP today for baseline labs.    -Return in 5-6 weeks with Maria Esther Coelho and labs.    -Return with Dr. Solo in 3 months with CT CAP, labs (PSA, CMP, testosterone) prior.      Patient seen and discussed with Dr. Solo.     Michael Nuno MD, PhD  Hematology/Oncology Fellow  Pager: [phone]

## 2021-08-30 ENCOUNTER — DOCUMENTATION ONLY (OUTPATIENT)
Dept: ONCOLOGY | Facility: CLINIC | Age: 66
End: 2021-08-30

## 2021-08-30 ENCOUNTER — ONCOLOGY VISIT (OUTPATIENT)
Dept: ONCOLOGY | Facility: CLINIC | Age: 66
End: 2021-08-30
Attending: UROLOGY
Payer: MEDICARE

## 2021-08-30 VITALS
HEART RATE: 75 BPM | RESPIRATION RATE: 18 BRPM | TEMPERATURE: 98.9 F | SYSTOLIC BLOOD PRESSURE: 153 MMHG | OXYGEN SATURATION: 99 % | WEIGHT: 234 LBS | BODY MASS INDEX: 33.5 KG/M2 | DIASTOLIC BLOOD PRESSURE: 73 MMHG | HEIGHT: 70 IN

## 2021-08-30 DIAGNOSIS — C61 PROSTATE CANCER (H): Primary | ICD-10-CM

## 2021-08-30 LAB
ALBUMIN SERPL-MCNC: 3.7 G/DL (ref 3.4–5)
ALP SERPL-CCNC: 90 U/L (ref 40–150)
ALT SERPL W P-5'-P-CCNC: 55 U/L (ref 0–70)
ANION GAP SERPL CALCULATED.3IONS-SCNC: 8 MMOL/L (ref 3–14)
AST SERPL W P-5'-P-CCNC: 27 U/L (ref 0–45)
BILIRUB SERPL-MCNC: 0.4 MG/DL (ref 0.2–1.3)
BUN SERPL-MCNC: 26 MG/DL (ref 7–30)
CALCIUM SERPL-MCNC: 8.9 MG/DL (ref 8.5–10.1)
CHLORIDE BLD-SCNC: 107 MMOL/L (ref 94–109)
CO2 SERPL-SCNC: 24 MMOL/L (ref 20–32)
CREAT SERPL-MCNC: 0.98 MG/DL (ref 0.66–1.25)
ERYTHROCYTE [DISTWIDTH] IN BLOOD BY AUTOMATED COUNT: 12.2 % (ref 10–15)
GFR SERPL CREATININE-BSD FRML MDRD: 81 ML/MIN/1.73M2
GLUCOSE BLD-MCNC: 177 MG/DL (ref 70–99)
HCT VFR BLD AUTO: 44.7 % (ref 40–53)
HGB BLD-MCNC: 15.5 G/DL (ref 13.3–17.7)
MCH RBC QN AUTO: 31.6 PG (ref 26.5–33)
MCHC RBC AUTO-ENTMCNC: 34.7 G/DL (ref 31.5–36.5)
MCV RBC AUTO: 91 FL (ref 78–100)
PLATELET # BLD AUTO: 217 10E3/UL (ref 150–450)
POTASSIUM BLD-SCNC: 4.3 MMOL/L (ref 3.4–5.3)
PROT SERPL-MCNC: 7.2 G/DL (ref 6.8–8.8)
PSA SERPL-MCNC: 30.4 UG/L (ref 0–4)
RBC # BLD AUTO: 4.91 10E6/UL (ref 4.4–5.9)
SODIUM SERPL-SCNC: 139 MMOL/L (ref 133–144)
WBC # BLD AUTO: 8.3 10E3/UL (ref 4–11)

## 2021-08-30 PROCEDURE — 84153 ASSAY OF PSA TOTAL: CPT | Performed by: INTERNAL MEDICINE

## 2021-08-30 PROCEDURE — 80053 COMPREHEN METABOLIC PANEL: CPT | Performed by: INTERNAL MEDICINE

## 2021-08-30 PROCEDURE — 99205 OFFICE O/P NEW HI 60 MIN: CPT | Performed by: INTERNAL MEDICINE

## 2021-08-30 PROCEDURE — 36415 COLL VENOUS BLD VENIPUNCTURE: CPT | Performed by: INTERNAL MEDICINE

## 2021-08-30 PROCEDURE — 85027 COMPLETE CBC AUTOMATED: CPT | Performed by: INTERNAL MEDICINE

## 2021-08-30 ASSESSMENT — PAIN SCALES - GENERAL: PAINLEVEL: NO PAIN (0)

## 2021-08-30 ASSESSMENT — MIFFLIN-ST. JEOR: SCORE: 1852.67

## 2021-08-30 NOTE — NURSING NOTE
"Oncology Rooming Note    August 30, 2021 9:49 AM   Joel Pike is a 66 year old male who presents for:    Chief Complaint   Patient presents with     Oncology Clinic Visit     New; Prostate Ca     Initial Vitals: BP (!) 153/73   Pulse 75   Temp 98.9  F (37.2  C) (Oral)   Resp 18   Ht 1.778 m (5' 10\")   Wt 106.1 kg (234 lb)   SpO2 99%   BMI 33.58 kg/m   Estimated body mass index is 33.58 kg/m  as calculated from the following:    Height as of this encounter: 1.778 m (5' 10\").    Weight as of this encounter: 106.1 kg (234 lb). Body surface area is 2.29 meters squared.  No Pain (0) Comment: Data Unavailable   No LMP for male patient.  Allergies reviewed: Yes  Medications reviewed: Yes    Medications: Medication refills not needed today.  Pharmacy name entered into EPIC:    [facility] [place]  [facility] - [place], [place] - [address] [place] [address] PHARM[address]     Clinical concerns: New pt.       Rajani Pickering CMA              "

## 2021-08-30 NOTE — NURSING NOTE
I drew labs on this pt while in clinic. They tolerated this well.   Labs sent down to the first floor labs.    Keeley Ramirez MA

## 2021-08-30 NOTE — NURSING NOTE
"Oncology Rooming Note    August 30, 2021 9:45 AM   Joel Pike is a 66 year old male who presents for:    Chief Complaint   Patient presents with     Oncology Clinic Visit     New; Prostate Ca     Initial Vitals: Ht 1.778 m (5' 10\")   Wt 106.1 kg (234 lb)   BMI 33.58 kg/m   Estimated body mass index is 33.58 kg/m  as calculated from the following:    Height as of this encounter: 1.778 m (5' 10\").    Weight as of this encounter: 106.1 kg (234 lb). Body surface area is 2.29 meters squared.  No Pain (0) Comment: Data Unavailable   No LMP for male patient.  Allergies reviewed: Yes  Medications reviewed: Yes    Medications: Medication refills not needed today.  Pharmacy name entered into EPIC:    [facility] - [facility] - [facility], [place] - [address] [facility] [place], [place] - [address] [place] [address]     Clinical concerns: Newly diagnosed Prostate Ca.        Rajani Pickering CMA              "

## 2021-08-30 NOTE — PATIENT INSTRUCTIONS
Joel,   It was a pleasure to meet you and your family today.      As we discussed today, we will add enzalutamide (Xtandi) today to reduce your testosterone further.      You can pick this up from your local pharmacy in [place].      Please follow-up with our Nurse practitioner in 1 month with labs beforehand.      Follow-up with Dr. Solo in 3 months with repeat cancer staging to determine the status of the bone spot.      Dr. Nuno and Dr. Solo.

## 2021-08-30 NOTE — LETTER
8/30/2021         RE: Joel Pike  [address]  [place] [address]        Dear Colleague,    Thank you for referring your patient, Joel Pike, to the [facility]. Please see a copy of my visit note below.      [facility] Medical Oncology New Patient Consultation Note       Date of visit: August 30, 2021    CC:  Possible metastatic prostate cancer     HPI:  Nervous today about diagnosis.  Wondering where his prostate cancer really is and what the treatment will be.  Energy has been low lately since starting ADT in early August.  Having hot flashes/sweats.  Breathing is OK, no chest pain.  Appetite is good.  Continuing to work in [employer].  Here today with his wife and son.      ONCOLOGY HISTORY:   -Elevated PSA noted 2/26/21 at 12.70. Previously normal in 2017.     -4/7/21-Initial urology visit.   -7/2/21-prostate biopsy 4+3, 9/12 biopsies positive.  Ductal component noted.     -7/28/21-MR prostate-PIRADS 5 lesion, R and L sided lesions with likely    neurovascular bundle invasion. No  seminal vesicle involvement. No clear LAD,  but some indeterminate pelvic nodes.  No suspicious bone lesions.     -7/28/21-NM bone scan suspicious lesion of R sacral ala S3 level.     -8/10/21-CT A/P with multiple enlarged periaortic/iliac LN   -8/11/21-First eligard dose 45mg (Q6M dosing). Due next 2/2022.   -8/30/21-Initial medical oncology visit with Dr. Solo.  Unclear if bony lesion is  metastasis based on current imaging.  Will start enzalutamide and continue    Eligard (next due 2/2022).      PMH:  HTN, HLD, bipolar disorder, DM2 on metformin     PSH:  Bilateral TKA, bilateral cataract extraction, bilateral carpal tunnel release     FAMILY HX:  No reported family history of prostate cancer     SOCIAL:  Retired, works at [employer] in [place] now  Never smoker.   No EtOH  No recreational drugs.   No herbs/supplements other than on medication list.       MEDS:  Current Outpatient Medications   Medication Instructions     albuterol (PROAIR HFA) 108 (90 BASE) MCG/ACT Inhaler 1-2 puffs every 2 -4 hrs as needed     ALLEGRA-D ALLERGY & CONGESTION 180-240 MG 24 hr tablet TAKE ONE TABLET BY MOUTH EVERY DAY     blood glucose (HUGO CONTOUR) test strip Use to test blood sugars 1 time daily or as directed.     blood glucose (NO BRAND SPECIFIED) lancets standard Use to test blood sugar one time daily or as directed.     blood glucose calibration (HUGO CONTOUR) NORMAL solution Use to calibrate blood glucose monitor as directed.     buPROPion (WELLBUTRIN XL) 300 MG 24 hr tablet TAKE ONE TABLET BY MOUTH EVERY MORNING     Coenzyme Q-10 capsule 1 capsule, DAILY     esomeprazole (NEXIUM) 40 MG DR capsule TAKE ONE CAPSULE BY MOUTH EVERY MORNING BEFORE BREAKFAST , 30-60 MINUTES BEFORE EATING     ezetimibe (ZETIA) 10 MG tablet TAKE ONE TABLET BY MOUTH ONCE DAILY     fish oil-omega-3 fatty acids 1000 MG capsule Take  by mouth. Take daily       levothyroxine (SYNTHROID/LEVOTHROID) 112 mcg, Oral, DAILY     losartan (COZAAR) 75 mg, Oral, DAILY     Magnesium 500 MG CAPS One twice a day     metFORMIN (GLUCOPHAGE) 500 MG tablet TAKE ONE TABLET BY MOUTH TWICE A DAY WITH MEALS     Misc Natural Products (OSTEO BI-FLEX ADV JOINT SHIELD) TABS Take  by mouth.     multivitamin (OCUVITE) TABS tablet 1 tablet, Oral, DAILY     STATIN NOT PRESCRIBED (INTENTIONAL) Please choose reason not prescribed, below     Vitamin D3 (CHOLECALCIFEROL) 5,000 Units, Oral     ROS-Remainder of 14 point ROS reviewed and negative except as in HPI.    PHYSICAL EXAM:  Exam:  Constitutional: healthy, alert and no distress  Head: Normocephalic. No masses, lesions, tenderness or abnormalities  Neck: Neck supple.  Cardiovascular: negative, RRR, no m/r/g appreciated  Respiratory: CTAB, normal WOB on RA   Gastrointestinal: Abdomen soft, non-distended   : Deferred  Musculoskeletal: extremities normal- no gross deformities  noted, gait normal   Skin: no suspicious lesions or rashes  Neurologic: negative, CNII-XII grossly intact, normal speech and mentation.   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 Suspicious  lesions as detailed below.  Transition zone: Enlarged with BPH changes. No suspicious lesions  identified.     Lesion(s) in rank order of severity (highest score- to lowest score,  then by size)      Lesion 1:  Location: Right mid gland peripheral zone at the 7-9 o'clock position  relative to the urethra. Series 5 image 50.  Size: 20 x 10 mm  T2 description: Homogeneously hypointense  T2 numerical assessment: 5  DWI description: Marked restriction diffusion  DWI numerical assessment: 5  DCE assessment: Negative    Prostate margin: Capsular abutment>15 mm with enlarged neurovascular  bundle suggesting tumor involvement  Lesion overall PI-RADS category: 5     Lesion 2:  Location: Left apex peripheral zone at the 4 o'clock position relative  to the urethra. Series 5 image 63.  Size: 16 x 11 mm  T2 description: Homogeneously hypointense  T2 numerical assessment: 5  DWI description: Marked diffusion restriction  DWI numerical assessment: 5  DCE assessment: Positive    Prostate margin: Capsular abutment 6-15 mm with capsular irregularity  and focal bulging   Lesion overall PI-RADS category: 5     Neurovascular bundles: Both neurovascular bundles are likely involved  by malignancy.  A right prostatic  vein is markedly enlarged compared  to the left, similar to 8/1/2014 CT.  Seminal vesicles: No seminal vesicle involvement by malignancy.  Lymph nodes: No clear adenopathy. Indeterminate nodes as follows: Left  pelvic 8 x 5 mm lymph node on series 5 image 27.  Bones: No suspicious lesions  Other pelvic organs: Colonic diverticulosis.                                                                         IMPRESSION:  1. Based on the most suspicious abnormality, this exam is  characterized as PIRADS 5 - Clinically significant cancer is highly  likely to be present.  The most suspicious abnormalities are located  at the right mid peripheral zone and left apical peripheral zone and  there is high suspicion of extraprostatic extension.  2. No suspicious adenopathy or evidence of pelvic metastases.    NM bone scan 7/28/21  FINDINGS:   Small area of mild uptake in the right sacrum inferiorly visible on  the posterior view. SPECT-CT demonstrates that this focal uptake  corresponds to a sclerotic lesion on CT. No other suspicious areas of  focal uptake.     Postsurgical changes of bilateral knee replacements.     Physiologic uptake by the kidneys and urinary bladder.                                                                      IMPRESSION: Single focal uptake by the right sacral ala (S3 level) and  corresponding sclerotic focus. This is highly suspicious for  metastatic focus.    CT A/P 8/10/21  FINDINGS:   LOWER CHEST: Normal.     HEPATOBILIARY: Diffuse hepatic steatosis. No significant mass. No bile  duct dilatation. No calcified gallstones.     PANCREAS: Normal.     SPLEEN: Normal.     ADRENAL GLANDS: Normal.     KIDNEYS/BLADDER: There are multiple bilateral nonobstructing  intrarenal calculi measuring up to 4 mm in largest size. Small  subcentimeter benign cortical cysts. Unremarkable bladder. No evidence  of hydronephrosis.     BOWEL: No evidence of bowel obstruction or inflammatory changes. Mild  sigmoid colon  diverticulosis. No evidence of diverticulitis. Normal  appendix.     PELVIC ORGANS: Normal sized prostate gland.     ADDITIONAL FINDINGS: Multiple enlarged retroperitoneal periaortic and  bilateral iliac chain lymph nodes are highly suspicious for  metastasis. The largest left retroperitoneal periaortic lymph node on  image 44 of series 2 measures 4.5 x 3.4 cm. 2.9 x 2.3 cm aortocaval  lymph node on image 45 of series 2. Distal left periaortic lymph node  on image 54 of series 2 measures 3.2 x 2.6 cm. Right common iliac  chain lymph node on image 65 of series 2 measures 2.3 x 2.2 cm. 1.6 cm  left common iliac chain node on image 59 of series 2.     MUSCULOSKELETAL: Small faint sites of increased bone sclerosis  involving the posterior medial right iliac on image 65 of series 2 and  image 69 of series 2 are indeterminate for possible metastasis.                                                                      IMPRESSION:   1.  Multiple enlarged retroperitoneal periaortic and bilateral iliac  chain lymph nodes are highly suspicious for metastasis.  2.  Small faint areas of increased bone sclerosis involving the right  iliac are indeterminate for possible metastasis. Consider follow-up  bone scan for further evaluation.  3.  Bilateral nonobstructing intrarenal calculi.    Results for JOEL PIKE (MRN [id number]) as of 8/30/2021 14:33   Ref. Range 12/19/2005 10:21 12/20/2006 09:59 12/21/2007 15:05 1/15/2010 17:20 2/8/2013 09:26 11/8/2017 07:55 2/26/2021 11:07 8/30/2021 11:50   PSA Latest Ref Range: 0.00 - 4.00 ug/L 0.36 0.72 0.99 0.71 1.23 2.98 12.70 (H) 30.40 (H)     IMPRESSION AND PLAN:    Joel Pike is a 67 y/o M with PMH of DM2 on metformin, depression/anxiety (pt reports as bipolar d/o), HTN, and HLD presenting for initial consultation for prostate cancer with possible bone metastasis.  We had a lengthy discussion at his initial and went over his pathology and staging scan results.  We discussed that it is  not clear whether the lesion in wing of his ala is a true bony metastasis or whether it is another finding/artifact.  We discussed that this does not change the overall treatment of his disease with ADT, but may change whether he receives radiation to his bony lesion in the future or not.  The role of ADT and the addition of enzalutamide were discussed today.  Given his existing DM2, we will attempt to avoid additional prednisone use by choosing enzalutamide.  There is a small, but non-zero risk of seizure with enzalutamide along with bupropion, which we will monitor him for.  We will refer to radiation oncology for evaluation/opinion on radiation of his possible bony lesion in addition to radiation of the prostate and RP LN bed.  We discussed ADT and RT work in tandem to treat therapy and ADT will target systemic disease as well, including circulating tumor cells.      Recommendations were made for a healthy diet of minimally processed foods and reduction in sugar as possible.  Otherwise we noted that this is a disease he will likely live many years with while on treatment.  Plan is to continue ADT for 2 years (through 8/2023).  His next Eligard is due 2/2022.  We will restage in 3 months with CT CAP in particular to evaluate his bony lesion of concern for progression/regression that may give a clue as to whether this is a metastasis or not.  He is present with his son and family and had multiple questions answered.    -Start enzalutamide.    -PSA, CBC, CMP today for baseline labs.    -Return in 5-6 weeks with Maria Esther Coelho and labs.    -Return with Dr. Solo in 3 months with CT CAP, labs (PSA, CMP, testosterone) prior.      Patient seen and discussed with Dr. Solo.     Michael Nuno MD, PhD  Hematology/Oncology Fellow  Pager: [phone]      Attestation signed by Isaias Solo MD at 8/31/2021  7:50 AM:  Physician Attestation   IIsaias MD, saw this patient and agree with the findings and plan of care as  documented in the note.      Items personally reviewed/procedural attestation: vitals, labs, and plan  --adding Enzalutamide  --will be vigilant for exacerbation of BPAD on wayne  --avoiding abiraterone due to diabetes      Isaias Solo MD

## 2021-09-07 ENCOUNTER — TELEPHONE (OUTPATIENT)
Dept: ONCOLOGY | Facility: CLINIC | Age: 66
End: 2021-09-07

## 2021-09-07 NOTE — TELEPHONE ENCOUNTER
Free Drug Application Initiated  Medication - Xtandi  Sponsor - [employer] Support  Phone   Fax   [facility] Check: Rx checked by Jaqueline Boykin, PharmD 9/07/21 at 1427          Hiral Briggs CPhT  [facility]  Oncology Pharmacy Liaison  [email or web address]  Phone: [phone]  Fax: [phone]

## 2021-09-09 NOTE — TELEPHONE ENCOUNTER
Free Drug Application Denied  Denial Reason(s) - Over income of $[id number]  Patient notified? Yes  Additional Information          Hiral Briggs CPhT  [facility]  Oncology Pharmacy Liaison  [email or web address]  Phone: [phone]  Fax: [phone]

## 2021-09-14 DIAGNOSIS — N18.2 TYPE 2 DIABETES MELLITUS WITH STAGE 2 CHRONIC KIDNEY DISEASE, WITHOUT LONG-TERM CURRENT USE OF INSULIN (H): ICD-10-CM

## 2021-09-14 DIAGNOSIS — E11.22 TYPE 2 DIABETES MELLITUS WITH STAGE 2 CHRONIC KIDNEY DISEASE, WITHOUT LONG-TERM CURRENT USE OF INSULIN (H): ICD-10-CM

## 2021-09-14 DIAGNOSIS — E78.5 HYPERLIPIDEMIA LDL GOAL <100: ICD-10-CM

## 2021-09-16 DIAGNOSIS — E03.8 SUBCLINICAL HYPOTHYROIDISM: ICD-10-CM

## 2021-09-16 RX ORDER — EZETIMIBE 10 MG/1
TABLET ORAL
Qty: 90 TABLET | Refills: 1 | Status: SHIPPED | OUTPATIENT
Start: 2021-09-16 | End: 2022-03-16

## 2021-09-16 NOTE — TELEPHONE ENCOUNTER
Prescription approved per [facility] Refill Protocol.  Lor Cyr RN on 9/16/2021 at 10:02 AM

## 2021-09-17 NOTE — TELEPHONE ENCOUNTER
"Requested Prescriptions   Pending Prescriptions Disp Refills    levothyroxine (SYNTHROID/LEVOTHROID) 112 MCG tablet 90 tablet 0     Sig: Take 1 tablet (112 mcg) by mouth daily        Thyroid Protocol Failed - 9/17/2021  7:35 AM        Failed - Normal TSH on file in past 12 months     Recent Labs   Lab Test 11/04/20  0953   TSH 6.02*              Passed - Patient is 12 years or older        Passed - Recent (12 mo) or future (30 days) visit within the authorizing provider's specialty     Patient has had an office visit with the authorizing provider or a provider within the authorizing providers department within the previous 12 mos or has a future within next 30 days. See \"Patient Info\" tab in inbasket, or \"Choose Columns\" in Meds & Orders section of the refill encounter.              Passed - Medication is active on med list            Routing refill request to provider for review/approval because:  Labs out of range:  TSH      Loren Fung RN  [facility]                 "

## 2021-09-18 RX ORDER — LEVOTHYROXINE SODIUM 112 UG/1
112 TABLET ORAL DAILY
Qty: 30 TABLET | Refills: 0 | Status: SHIPPED | OUTPATIENT
Start: 2021-09-18 | End: 2021-10-19

## 2021-09-18 NOTE — TELEPHONE ENCOUNTER
1 month supply refill.  Please be sure to get a TSH check before meds run out.  He has a future order for it under Dr. Fajardo's name.  I am okay with it.

## 2021-09-22 ENCOUNTER — TELEPHONE (OUTPATIENT)
Dept: ONCOLOGY | Facility: CLINIC | Age: 66
End: 2021-09-22

## 2021-09-22 NOTE — TELEPHONE ENCOUNTER
Free Drug Application Initiated  Medication - Erleada  Sponsor - [employer]  Phone # [phone]   Fax #[phone]  [facility] Check:        Hiral Briggs CPhT  [facility]  Oncology Pharmacy Liaison  [email or web address]  Phone: [phone]  Fax: [phone]

## 2021-09-22 NOTE — TELEPHONE ENCOUNTER
Writer reached out to patient to discuss where we are at with access. Writer explained that we are currently working on securing [employer] instead of Xtandi. The application was sent in this morning and writer will follow up tomorrow morning to check status. Writer let patient know I would follow up with either patient or his wife (at patients request) to let them know the status/next steps. Patient stated there were no further questions.     Hiral Briggs CPhT  [facility]  Oncology Pharmacy Liaison  [email or web address]  Phone: [phone]  Fax: [phone]

## 2021-09-27 ENCOUNTER — LAB (OUTPATIENT)
Dept: LAB | Facility: CLINIC | Age: 66
End: 2021-09-27
Payer: MEDICARE

## 2021-09-27 DIAGNOSIS — E11.9 TYPE 2 DIABETES MELLITUS WITHOUT COMPLICATION, WITHOUT LONG-TERM CURRENT USE OF INSULIN (H): ICD-10-CM

## 2021-09-27 DIAGNOSIS — C61 PROSTATE CANCER (H): ICD-10-CM

## 2021-09-27 LAB
ALBUMIN SERPL-MCNC: 3.9 G/DL (ref 3.4–5)
ALP SERPL-CCNC: 85 U/L (ref 40–150)
ALT SERPL W P-5'-P-CCNC: 52 U/L (ref 0–70)
ANION GAP SERPL CALCULATED.3IONS-SCNC: 2 MMOL/L (ref 3–14)
AST SERPL W P-5'-P-CCNC: 24 U/L (ref 0–45)
BASOPHILS # BLD AUTO: 0.1 10E3/UL (ref 0–0.2)
BASOPHILS NFR BLD AUTO: 1 %
BILIRUB SERPL-MCNC: 0.9 MG/DL (ref 0.2–1.3)
BUN SERPL-MCNC: 23 MG/DL (ref 7–30)
CALCIUM SERPL-MCNC: 9.2 MG/DL (ref 8.5–10.1)
CHLORIDE BLD-SCNC: 106 MMOL/L (ref 94–109)
CO2 SERPL-SCNC: 28 MMOL/L (ref 20–32)
CREAT SERPL-MCNC: 1.23 MG/DL (ref 0.66–1.25)
EOSINOPHIL # BLD AUTO: 0.3 10E3/UL (ref 0–0.7)
EOSINOPHIL NFR BLD AUTO: 4 %
ERYTHROCYTE [DISTWIDTH] IN BLOOD BY AUTOMATED COUNT: 12.2 % (ref 10–15)
GFR SERPL CREATININE-BSD FRML MDRD: 61 ML/MIN/1.73M2
GLUCOSE BLD-MCNC: 123 MG/DL (ref 70–99)
HBA1C MFR BLD: 6.1 % (ref 0–5.6)
HCT VFR BLD AUTO: 40.6 % (ref 40–53)
HGB BLD-MCNC: 14.4 G/DL (ref 13.3–17.7)
IMM GRANULOCYTES # BLD: 0.1 10E3/UL
IMM GRANULOCYTES NFR BLD: 1 %
LYMPHOCYTES # BLD AUTO: 0.9 10E3/UL (ref 0.8–5.3)
LYMPHOCYTES NFR BLD AUTO: 13 %
MCH RBC QN AUTO: 31.4 PG (ref 26.5–33)
MCHC RBC AUTO-ENTMCNC: 35.5 G/DL (ref 31.5–36.5)
MCV RBC AUTO: 89 FL (ref 78–100)
MONOCYTES # BLD AUTO: 0.6 10E3/UL (ref 0–1.3)
MONOCYTES NFR BLD AUTO: 8 %
NEUTROPHILS # BLD AUTO: 5.3 10E3/UL (ref 1.6–8.3)
NEUTROPHILS NFR BLD AUTO: 73 %
NRBC # BLD AUTO: 0 10E3/UL
NRBC BLD AUTO-RTO: 0 /100
PLATELET # BLD AUTO: 231 10E3/UL (ref 150–450)
POTASSIUM BLD-SCNC: 4.3 MMOL/L (ref 3.4–5.3)
PROT SERPL-MCNC: 7.5 G/DL (ref 6.8–8.8)
PSA SERPL-MCNC: 8.99 UG/L (ref 0–4)
RBC # BLD AUTO: 4.58 10E6/UL (ref 4.4–5.9)
SODIUM SERPL-SCNC: 136 MMOL/L (ref 133–144)
WBC # BLD AUTO: 7.1 10E3/UL (ref 4–11)

## 2021-09-27 PROCEDURE — 85025 COMPLETE CBC W/AUTO DIFF WBC: CPT

## 2021-09-27 PROCEDURE — 83036 HEMOGLOBIN GLYCOSYLATED A1C: CPT

## 2021-09-27 PROCEDURE — 36415 COLL VENOUS BLD VENIPUNCTURE: CPT

## 2021-09-27 PROCEDURE — 80053 COMPREHEN METABOLIC PANEL: CPT

## 2021-09-27 PROCEDURE — 84153 ASSAY OF PSA TOTAL: CPT

## 2021-09-27 PROCEDURE — 84403 ASSAY OF TOTAL TESTOSTERONE: CPT

## 2021-09-27 NOTE — PROGRESS NOTES
[facility] Medical Oncology Follow Up Note       Date of visit: September 28, 2021    CC:  metastatic prostate cancer       ONCOLOGY HISTORY:   -Elevated PSA noted 2/26/21 at 12.70. Previously normal in 2017.     -4/7/21-Initial urology visit.   -7/2/21-prostate biopsy 4+3, 9/12 biopsies positive.  Ductal component noted.     -7/28/21-MR prostate-PIRADS 5 lesion, R and L sided lesions with likely    neurovascular bundle invasion. No  seminal vesicle involvement. No clear LAD,  but some indeterminate pelvic nodes.  No suspicious bone lesions.     -7/28/21-NM bone scan suspicious lesion of R sacral ala S3 level.     -8/10/21-CT A/P with multiple enlarged periaortic/iliac LN   -8/11/21-First eligard dose 45mg (Q6M dosing). Due next 2/2022.   -8/30/21-Initial medical oncology visit with Dr. Solo.  Unclear if bony lesion is  metastasis based on current imaging.  Will start enzalutamide and continue    Eligard (next due 2/2022).      8/30/21 PSA = 30.40   9/27/21 PSA = 8.99      HPI:    -Having hot flashes a couple times per day, he manages with fan, AC, or clothing adjustment  -No other SE he notices  -Denies pain   -Reports good energy levels, still doing things and functioning  -Mood stable   -Appetite good, eating and drinking well.      PMH:  HTN, HLD, bipolar disorder, DM2 on metformin     PSH:  Bilateral TKA, bilateral cataract extraction, bilateral carpal tunnel release     FAMILY HX:  No reported family history of prostate cancer     SOCIAL:  Retired, works at [employer] Reviewed in [employer] section now  Never smoker.   No EtOH  No recreational drugs.   No herbs/supplements other than on medication list.      MEDS:  Current Outpatient Medications   Medication Instructions     albuterol (PROAIR HFA) 108 (90 BASE) MCG/ACT Inhaler 1-2 puffs every 2 -4 hrs as needed     ALLEGRA-D ALLERGY & CONGESTION 180-240 MG 24 hr tablet TAKE ONE TABLET BY MOUTH EVERY DAY     blood glucose (HUGO CONTOUR) test strip Use to test  blood sugars 1 time daily or as directed.     blood glucose (NO BRAND SPECIFIED) lancets standard Use to test blood sugar one time daily or as directed.     blood glucose calibration (HUGO CONTOUR) NORMAL solution Use to calibrate blood glucose monitor as directed.     buPROPion (WELLBUTRIN XL) 300 MG 24 hr tablet TAKE ONE TABLET BY MOUTH EVERY MORNING     Coenzyme Q-10 capsule 1 capsule, DAILY     esomeprazole (NEXIUM) 40 MG DR capsule TAKE ONE CAPSULE BY MOUTH EVERY MORNING BEFORE BREAKFAST , 30-60 MINUTES BEFORE EATING     ezetimibe (ZETIA) 10 MG tablet TAKE ONE TABLET BY MOUTH ONCE DAILY     fish oil-omega-3 fatty acids 1000 MG capsule Take  by mouth. Take daily       levothyroxine (SYNTHROID/LEVOTHROID) 112 mcg, Oral, DAILY     losartan (COZAAR) 75 mg, Oral, DAILY     Magnesium 500 MG CAPS One twice a day     metFORMIN (GLUCOPHAGE) 500 MG tablet TAKE ONE TABLET BY MOUTH TWICE A DAY WITH MEALS     Misc Natural Products (OSTEO BI-FLEX ADV JOINT SHIELD) TABS Take  by mouth.     multivitamin (OCUVITE) TABS tablet 1 tablet, Oral, DAILY     STATIN NOT PRESCRIBED (INTENTIONAL) Please choose reason not prescribed, below     Vitamin D3 (CHOLECALCIFEROL) 5,000 Units, Oral     ROS-Remainder of 14 point ROS reviewed and negative except as in HPI.    PHYSICAL EXAM:  Video physical exam  General: Patient appears well in no acute distress.   Skin: No visualized rash or lesions on visualized skin  Eyes: EOMI, no erythema, sclera icterus or discharge noted  Resp: Appears to be breathing comfortably without accessory muscle usage, speaking in full sentences, no cough  MSK: Appears to have normal range of motion based on visualized movements  Neurologic: No apparent tremors, facial movements symmetric  Psych: affect bright, alert and oriented    The rest of a comprehensive physical examination is deferred due to PHE (public health emergency) video restrictions      LABS AND IMAGES:     Ref. Range 8/30/2021 11:50 9/27/2021 12:14    PSA Latest Ref Range: 0.00 - 4.00 ug/L 30.40 (H) 8.99 (H)       Prostate Biopsy 7/2/21  FINAL DIAGNOSIS:   A: Prostate, left, biopsy   - Adenocarcinoma, [place] grade 4/3 with ductal component involving 4 of    6 needle core biopsies and 25% of   submitted tissue     B: Prostate, right, biopsy   - Adenocarcinoma, [place]'s grade 4/3 with ductal component involving 5 of    6 needle core biopsies and 25% of   submitted tissue     MR prostate 7/28/21  FINDINGS:  Size: 31 grams  Hemorrhage: Absent  Peripheral zone: Heterogeneous on T2-weighted images. Suspicious  lesions as detailed below.  Transition zone: Enlarged with BPH changes. No suspicious lesions  identified.     Lesion(s) in rank order of severity (highest score- to lowest score,  then by size)      Lesion 1:  Location: Right mid gland peripheral zone at the 7-9 o'clock position  relative to the urethra. Series 5 image 50.  Size: 20 x 10 mm  T2 description: Homogeneously hypointense  T2 numerical assessment: 5  DWI description: Marked restriction diffusion  DWI numerical assessment: 5  DCE assessment: Negative    Prostate margin: Capsular abutment>15 mm with enlarged neurovascular  bundle suggesting tumor involvement  Lesion overall PI-RADS category: 5     Lesion 2:  Location: Left apex peripheral zone at the 4 o'clock position relative  to the urethra. Series 5 image 63.  Size: 16 x 11 mm  T2 description: Homogeneously hypointense  T2 numerical assessment: 5  DWI description: Marked diffusion restriction  DWI numerical assessment: 5  DCE assessment: Positive    Prostate margin: Capsular abutment 6-15 mm with capsular irregularity  and focal bulging   Lesion overall PI-RADS category: 5     Neurovascular bundles: Both neurovascular bundles are likely involved  by malignancy.  A right prostatic vein is markedly enlarged compared  to the left, similar to 8/1/2014 CT.  Seminal vesicles: No seminal vesicle involvement by malignancy.  Lymph nodes: No clear  adenopathy. Indeterminate nodes as follows: Left  pelvic 8 x 5 mm lymph node on series 5 image 27.  Bones: No suspicious lesions  Other pelvic organs: Colonic diverticulosis.                                                                         IMPRESSION:  1. Based on the most suspicious abnormality, this exam is  characterized as PIRADS 5 - Clinically significant cancer is highly  likely to be present.  The most suspicious abnormalities are located  at the right mid peripheral zone and left apical peripheral zone and  there is high suspicion of extraprostatic extension.  2. No suspicious adenopathy or evidence of pelvic metastases.    NM bone scan 7/28/21  FINDINGS:   Small area of mild uptake in the right sacrum inferiorly visible on  the posterior view. SPECT-CT demonstrates that this focal uptake  corresponds to a sclerotic lesion on CT. No other suspicious areas of  focal uptake.     Postsurgical changes of bilateral knee replacements.     Physiologic uptake by the kidneys and urinary bladder.                                                                      IMPRESSION: Single focal uptake by the right sacral ala (S3 level) and  corresponding sclerotic focus. This is highly suspicious for  metastatic focus.    CT A/P 8/10/21  FINDINGS:   LOWER CHEST: Normal.     HEPATOBILIARY: Diffuse hepatic steatosis. No significant mass. No bile  duct dilatation. No calcified gallstones.     PANCREAS: Normal.     SPLEEN: Normal.     ADRENAL GLANDS: Normal.     KIDNEYS/BLADDER: There are multiple bilateral nonobstructing  intrarenal calculi measuring up to 4 mm in largest size. Small  subcentimeter benign cortical cysts. Unremarkable bladder. No evidence  of hydronephrosis.     BOWEL: No evidence of bowel obstruction or inflammatory changes. Mild  sigmoid colon diverticulosis. No evidence of diverticulitis. Normal  appendix.     PELVIC ORGANS: Normal sized prostate gland.     ADDITIONAL FINDINGS: Multiple enlarged  retroperitoneal periaortic and  bilateral iliac chain lymph nodes are highly suspicious for  metastasis. The largest left retroperitoneal periaortic lymph node on  image 44 of series 2 measures 4.5 x 3.4 cm. 2.9 x 2.3 cm aortocaval  lymph node on image 45 of series 2. Distal left periaortic lymph node  on image 54 of series 2 measures 3.2 x 2.6 cm. Right common iliac  chain lymph node on image 65 of series 2 measures 2.3 x 2.2 cm. 1.6 cm  left common iliac chain node on image 59 of series 2.     MUSCULOSKELETAL: Small faint sites of increased bone sclerosis  involving the posterior medial right iliac on image 65 of series 2 and  image 69 of series 2 are indeterminate for possible metastasis.                                                                      IMPRESSION:   1.  Multiple enlarged retroperitoneal periaortic and bilateral iliac  chain lymph nodes are highly suspicious for metastasis.  2.  Small faint areas of increased bone sclerosis involving the right  iliac are indeterminate for possible metastasis. Consider follow-up  bone scan for further evaluation.  3.  Bilateral nonobstructing intrarenal calculi.      IMPRESSION AND PLAN:    Joel Pike is a 67 y/o M with PMH of DM2 on metformin, depression/anxiety (pt reports as bipolar d/o), HTN, and HLD with prostate cancer with possible bone metastasis--it is not clear whether the lesion in wing of his ala is a true bony metastasis or whether it is another finding/artifact.     Nonetheless, did not change the overall treatment of his disease with ADT, but may change whether he receives radiation to his bony lesion in the future or not. Given his existing DM2, enzalutamide was selected to avoid addition of prednisone. Enzalutamide was declined but Joel has been in contact with the pharmacy about approval for Apalutamide, which I encouraged him to start taking upon delivery.     He has tolerated initiation of ADT with Eligard injection well, with tolerable  hot flashes as main SE. He is happy to see his PSA decline. We reviewed his other labs, including CKD, look stable.     -Start Apalutamide once approval secured  -Eligard due next 02/2022    -Return with Dr. Solo end of November with CT CAP, labs (PSA, CMP, testosterone) prior. Evaluation for radiation consult at this time pending appearance of possible bony lesions. Would also be appropriate for local disease RT after a few months on ADT.     30 minutes spent on the date of the encounter doing chart review, review of test results, interpretation of tests, patient visit and documentation     Maria Esther Coelho CNP on 9/28/2021 at 11:26 AM

## 2021-09-28 ENCOUNTER — VIRTUAL VISIT (OUTPATIENT)
Dept: ONCOLOGY | Facility: CLINIC | Age: 66
End: 2021-09-28
Attending: INTERNAL MEDICINE
Payer: MEDICARE

## 2021-09-28 DIAGNOSIS — C61 PROSTATE CANCER (H): Primary | ICD-10-CM

## 2021-09-28 DIAGNOSIS — N18.2 CKD (CHRONIC KIDNEY DISEASE) STAGE 2, GFR 60-89 ML/MIN: ICD-10-CM

## 2021-09-28 PROCEDURE — 999N001193 HC VIDEO/TELEPHONE VISIT; NO CHARGE

## 2021-09-28 PROCEDURE — 99214 OFFICE O/P EST MOD 30 MIN: CPT | Mod: 95 | Performed by: NURSE PRACTITIONER

## 2021-09-28 NOTE — PROGRESS NOTES
Joel is a 66 year old who is being evaluated via a billable video visit.      How would you like to obtain your AVS? [email or web address]  If the video visit is dropped, the invitation should be resent by: Text to cell phone: [phone]  Will anyone else be joining your video visit? No        Video-Visit Details    Type of service:  Video Visit    Video Start Time: 11:00 AM    Video End Time:11:17 AM    Originating Location (pt. Location): Home    Distant Location (provider location):  [facility]ER [facility]     Platform used for Video Visit: [place]

## 2021-09-28 NOTE — LETTER
9/28/2021         RE: Joel Pike  [address]  [place] [address]        Dear Colleague,    Thank you for referring your patient, Joel Pike, to the [facility]. Please see a copy of my visit note below.          [facility] Medical Oncology Follow Up Note       Date of visit: September 28, 2021    CC:  metastatic prostate cancer       ONCOLOGY HISTORY:   -Elevated PSA noted 2/26/21 at 12.70. Previously normal in 2017.     -4/7/21-Initial urology visit.   -7/2/21-prostate biopsy 4+3, 9/12 biopsies positive.  Ductal component noted.     -7/28/21-MR prostate-PIRADS 5 lesion, R and L sided lesions with likely    neurovascular bundle invasion. No  seminal vesicle involvement. No clear LAD,  but some indeterminate pelvic nodes.  No suspicious bone lesions.     -7/28/21-NM bone scan suspicious lesion of R sacral ala S3 level.     -8/10/21-CT A/P with multiple enlarged periaortic/iliac LN   -8/11/21-First eligard dose 45mg (Q6M dosing). Due next 2/2022.   -8/30/21-Initial medical oncology visit with Dr. Solo.  Unclear if bony lesion is  metastasis based on current imaging.  Will start enzalutamide and continue    Eligard (next due 2/2022).      8/30/21 PSA = 30.40   9/27/21 PSA = 8.99      HPI:    -Having hot flashes a couple times per day, he manages with fan, AC, or clothing adjustment  -No other SE he notices  -Denies pain   -Reports good energy levels, still doing things and functioning  -Mood stable   -Appetite good, eating and drinking well.      PMH:  HTN, HLD, bipolar disorder, DM2 on metformin     PSH:  Bilateral TKA, bilateral cataract extraction, bilateral carpal tunnel release     FAMILY HX:  No reported family history of prostate cancer     SOCIAL:  Retired, works at [employer] in [employer] section now  Never smoker.   No EtOH  No recreational drugs.   No herbs/supplements other than on medication list.      MEDS:  Current Outpatient Medications   Medication  Instructions     albuterol (PROAIR HFA) 108 (90 BASE) MCG/ACT Inhaler 1-2 puffs every 2 -4 hrs as needed     ALLEGRA-D ALLERGY & CONGESTION 180-240 MG 24 hr tablet TAKE ONE TABLET BY MOUTH EVERY DAY     blood glucose (HUGO CONTOUR) test strip Use to test blood sugars 1 time daily or as directed.     blood glucose (NO BRAND SPECIFIED) lancets standard Use to test blood sugar one time daily or as directed.     blood glucose calibration (HUGO CONTOUR) NORMAL solution Use to calibrate blood glucose monitor as directed.     buPROPion (WELLBUTRIN XL) 300 MG 24 hr tablet TAKE ONE TABLET BY MOUTH EVERY MORNING     Coenzyme Q-10 capsule 1 capsule, DAILY     esomeprazole (NEXIUM) 40 MG DR capsule TAKE ONE CAPSULE BY MOUTH EVERY MORNING BEFORE BREAKFAST , 30-60 MINUTES BEFORE EATING     ezetimibe (ZETIA) 10 MG tablet TAKE ONE TABLET BY MOUTH ONCE DAILY     fish oil-omega-3 fatty acids 1000 MG capsule Take  by mouth. Take daily       levothyroxine (SYNTHROID/LEVOTHROID) 112 mcg, Oral, DAILY     losartan (COZAAR) 75 mg, Oral, DAILY     Magnesium 500 MG CAPS One twice a day     metFORMIN (GLUCOPHAGE) 500 MG tablet TAKE ONE TABLET BY MOUTH TWICE A DAY WITH MEALS     Misc Natural Products (OSTEO BI-FLEX ADV JOINT SHIELD) TABS Take  by mouth.     multivitamin (OCUVITE) TABS tablet 1 tablet, Oral, DAILY     STATIN NOT PRESCRIBED (INTENTIONAL) Please choose reason not prescribed, below     Vitamin D3 (CHOLECALCIFEROL) 5,000 Units, Oral     ROS-Remainder of 14 point ROS reviewed and negative except as in HPI.    PHYSICAL EXAM:  Video physical exam  General: Patient appears well in no acute distress.   Skin: No visualized rash or lesions on visualized skin  Eyes: EOMI, no erythema, sclera icterus or discharge noted  Resp: Appears to be breathing comfortably without accessory muscle usage, speaking in full sentences, no cough  MSK: Appears to have normal range of motion based on visualized movements  Neurologic: No apparent tremors,  facial movements symmetric  Psych: affect bright, alert and oriented    The rest of a comprehensive physical examination is deferred due to PHE (public health emergency) video restrictions      LABS AND IMAGES:     Ref. Range 8/30/2021 11:50 9/27/2021 12:14   PSA Latest Ref Range: 0.00 - 4.00 ug/L 30.40 (H) 8.99 (H)       Prostate Biopsy 7/2/21  FINAL DIAGNOSIS:   A: Prostate, left, biopsy   - Adenocarcinoma, [place]'s grade 4/3 with ductal component involving 4 of    6 needle core biopsies and 25% of   submitted tissue     B: Prostate, right, biopsy   - Adenocarcinoma, [place]'s grade 4/3 with ductal component involving 5 of    6 needle core biopsies and 25% of   submitted tissue     MR prostate 7/28/21  FINDINGS:  Size: 31 grams  Hemorrhage: Absent  Peripheral zone: Heterogeneous on T2-weighted images. Suspicious  lesions as detailed below.  Transition zone: Enlarged with BPH changes. No suspicious lesions  identified.     Lesion(s) in rank order of severity (highest score- to lowest score,  then by size)      Lesion 1:  Location: Right mid gland peripheral zone at the 7-9 o'clock position  relative to the urethra. Series 5 image 50.  Size: 20 x 10 mm  T2 description: Homogeneously hypointense  T2 numerical assessment: 5  DWI description: Marked restriction diffusion  DWI numerical assessment: 5  DCE assessment: Negative    Prostate margin: Capsular abutment>15 mm with enlarged neurovascular  bundle suggesting tumor involvement  Lesion overall PI-RADS category: 5     Lesion 2:  Location: Left apex peripheral zone at the 4 o'clock position relative  to the urethra. Series 5 image 63.  Size: 16 x 11 mm  T2 description: Homogeneously hypointense  T2 numerical assessment: 5  DWI description: Marked diffusion restriction  DWI numerical assessment: 5  DCE assessment: Positive    Prostate margin: Capsular abutment 6-15 mm with capsular irregularity  and focal bulging   Lesion overall PI-RADS category: 5     Neurovascular  bundles: Both neurovascular bundles are likely involved  by malignancy.  A right prostatic vein is markedly enlarged compared  to the left, similar to 8/1/2014 CT.  Seminal vesicles: No seminal vesicle involvement by malignancy.  Lymph nodes: No clear adenopathy. Indeterminate nodes as follows: Left  pelvic 8 x 5 mm lymph node on series 5 image 27.  Bones: No suspicious lesions  Other pelvic organs: Colonic diverticulosis.                                                                         IMPRESSION:  1. Based on the most suspicious abnormality, this exam is  characterized as PIRADS 5 - Clinically significant cancer is highly  likely to be present.  The most suspicious abnormalities are located  at the right mid peripheral zone and left apical peripheral zone and  there is high suspicion of extraprostatic extension.  2. No suspicious adenopathy or evidence of pelvic metastases.    NM bone scan 7/28/21  FINDINGS:   Small area of mild uptake in the right sacrum inferiorly visible on  the posterior view. SPECT-CT demonstrates that this focal uptake  corresponds to a sclerotic lesion on CT. No other suspicious areas of  focal uptake.     Postsurgical changes of bilateral knee replacements.     Physiologic uptake by the kidneys and urinary bladder.                                                                      IMPRESSION: Single focal uptake by the right sacral ala (S3 level) and  corresponding sclerotic focus. This is highly suspicious for  metastatic focus.    CT A/P 8/10/21  FINDINGS:   LOWER CHEST: Normal.     HEPATOBILIARY: Diffuse hepatic steatosis. No significant mass. No bile  duct dilatation. No calcified gallstones.     PANCREAS: Normal.     SPLEEN: Normal.     ADRENAL GLANDS: Normal.     KIDNEYS/BLADDER: There are multiple bilateral nonobstructing  intrarenal calculi measuring up to 4 mm in largest size. Small  subcentimeter benign cortical cysts. Unremarkable bladder. No evidence  of  hydronephrosis.     BOWEL: No evidence of bowel obstruction or inflammatory changes. Mild  sigmoid colon diverticulosis. No evidence of diverticulitis. Normal  appendix.     PELVIC ORGANS: Normal sized prostate gland.     ADDITIONAL FINDINGS: Multiple enlarged retroperitoneal periaortic and  bilateral iliac chain lymph nodes are highly suspicious for  metastasis. The largest left retroperitoneal periaortic lymph node on  image 44 of series 2 measures 4.5 x 3.4 cm. 2.9 x 2.3 cm aortocaval  lymph node on image 45 of series 2. Distal left periaortic lymph node  on image 54 of series 2 measures 3.2 x 2.6 cm. Right common iliac  chain lymph node on image 65 of series 2 measures 2.3 x 2.2 cm. 1.6 cm  left common iliac chain node on image 59 of series 2.     MUSCULOSKELETAL: Small faint sites of increased bone sclerosis  involving the posterior medial right iliac on image 65 of series 2 and  image 69 of series 2 are indeterminate for possible metastasis.                                                                      IMPRESSION:   1.  Multiple enlarged retroperitoneal periaortic and bilateral iliac  chain lymph nodes are highly suspicious for metastasis.  2.  Small faint areas of increased bone sclerosis involving the right  iliac are indeterminate for possible metastasis. Consider follow-up  bone scan for further evaluation.  3.  Bilateral nonobstructing intrarenal calculi.      IMPRESSION AND PLAN:    Joel Pike is a 65 y/o M with PMH of DM2 on metformin, depression/anxiety (pt reports as bipolar d/o), HTN, and HLD with prostate cancer with possible bone metastasis--it is not clear whether the lesion in wing of his ala is a true bony metastasis or whether it is another finding/artifact.     Nonetheless, did not change the overall treatment of his disease with ADT, but may change whether he receives radiation to his bony lesion in the future or not. Given his existing DM2, enzalutamide was selected to avoid addition  of prednisone. Enzalutamide was declined but Joel has been in contact with the pharmacy about approval for Apalutamide, which I encouraged him to start taking upon delivery.     He has tolerated initiation of ADT with Eligard injection well, with tolerable hot flashes as main SE. He is happy to see his PSA decline. We reviewed his other labs, including CKD, look stable.     -Start Apalutamide once approval secured  -Eligard due next 02/2022    -Return with Dr. Solo end of November with CT CAP, labs (PSA, CMP, testosterone) prior. Evaluation for radiation consult at this time pending appearance of possible bony lesions. Would also be appropriate for local disease RT after a few months on ADT.     30 minutes spent on the date of the encounter doing chart review, review of test results, interpretation of tests, patient visit and documentation     Maria Esther Coelho CNP on 9/28/2021 at 11:26 AM        Joel is a 66 year old who is being evaluated via a billable video visit.      How would you like to obtain your AVS? MyChart  If the video visit is dropped, the invitation should be resent by: Text to cell phone: [phone]  Will anyone else be joining your video visit? No        Video-Visit Details    Type of service:  Video Visit    Video Start Time: 11:00 AM    Video End Time:11:17 AM    Originating Location (pt. Location): Home    Distant Location (provider location):  [facility]     Platform used for Video Visit: Well      Again, thank you for allowing me to participate in the care of your patient.        Sincerely,        Maria Esther Coelho CNP

## 2021-09-29 DIAGNOSIS — C61 PROSTATE CANCER (H): Primary | ICD-10-CM

## 2021-09-29 DIAGNOSIS — Z96.651 STATUS POST TOTAL RIGHT KNEE REPLACEMENT: Primary | ICD-10-CM

## 2021-09-29 LAB — TESTOST SERPL-MCNC: 6 NG/DL (ref 240–950)

## 2021-09-29 RX ORDER — PROCHLORPERAZINE MALEATE 10 MG
10 TABLET ORAL EVERY 6 HOURS PRN
Qty: 30 TABLET | Refills: 2 | Status: SHIPPED | OUTPATIENT
Start: 2021-09-29 | End: 2022-03-16

## 2021-09-29 RX ORDER — CLINDAMYCIN HCL 300 MG
CAPSULE ORAL
Qty: 2 CAPSULE | Refills: 1 | Status: SHIPPED | OUTPATIENT
Start: 2021-09-29 | End: 2022-03-16

## 2021-09-29 NOTE — PROGRESS NOTES
"Changed plan to apalutamide from zytiga.      Will switch acid medication to pantoprazole or famotidine if he is still needing and pending insurance coverage.    Deleted Lupron from new tx plan as he already has exisiting plan with correct timing in [place] under \"Supportive Plan.\"     Michael Nuno MD, PhD  Hematology/Oncology Fellow  Pager: [phone]      "

## 2021-09-29 NOTE — TELEPHONE ENCOUNTER
Patient allergic to amoxicillin so clindamycin will be sent. Right total knee was in January 2021.     Per protocol okay to send.     Informed patient.     Angi MARLOW, Lead RN, BSN. . .  9/29/2021, 11:39 AM

## 2021-09-29 NOTE — TELEPHONE ENCOUNTER
Reason for Call:  Other call back    Detailed comments: Pt is having a dental procedure and needs an antibotic filled. Please send to [facility] here at [place], call when completed so he knows when to . [place] pt    Phone Number Patient can be reached at: Cell number on file:    Telephone Information:   Mobile [phone]       Best Time: any    Can we leave a detailed message on this number? YES    Call taken on 9/29/2021 at 11:13 AM by Micki Briscoe       Yes

## 2021-09-30 ENCOUNTER — TELEPHONE (OUTPATIENT)
Dept: ONCOLOGY | Facility: CLINIC | Age: 66
End: 2021-09-30

## 2021-09-30 DIAGNOSIS — C61 PROSTATE CANCER (H): ICD-10-CM

## 2021-09-30 NOTE — TELEPHONE ENCOUNTER
9/30/21 - [facility] to call [phone] opt5, opt 2 to schedule lab appt, appt notes: fasting lipid panel and TSH/T4 labs, Far[place]. For oral chemo team to start meds. -Jorden

## 2021-10-01 ENCOUNTER — PATIENT OUTREACH (OUTPATIENT)
Dept: CARE COORDINATION | Facility: CLINIC | Age: 66
End: 2021-10-01

## 2021-10-01 NOTE — PROGRESS NOTES
Oncology Distress Screening Follow-up  Clinical Social Work  [place]    Identified Concern and Score From Distress Screenin. How concerned are you about your ability to eat?   0           2. How concerned are you about unintended weight loss or your current weight?   0           3. How concerned are you about feeling depressed or very sad?   2           4. How concerned are you about feeling anxious or very scared?   2           5. Do you struggle with the loss of meaning and yesy in your life?   Somewhat           6. How concerned are you about work and home life issues that may be affected by your cancer?   0           7. How concerned are you about knowing what resources are available to help you?   10Abnormal            8. Do you currently have what you would describe as Yazdanism or spiritual struggles?              Not at all             Date of Distress Screenin21    Intervention:   Joel is a 66-year-old gentleman with a diagnosis of metastatic prostate cancer who is followed at [facility]. At time of last visit with Maria Esther Coelho, Joel scored positive on oncology distress screen. This clinician called Joel today with intention of introducing psychosocial services and support, and following up on desire for additional resources.     This clinician left detailed voicemail with social work availability and contact information.     Education Provided:   Onc SW contact information    Follow-up Required:   This clinician will await return call. SW will continue to be available as needed for ongoing psychosocial support.     Please reach out with psychosocial concern or need.     FREDO Hayes, [facility] Medical CenterSW  Phone: [phone]  [facility]: M, Thu  *every other Tue, 8am-4:30pm  [facility]: W, F, *every other Tue, 8am-4:30pm

## 2021-10-01 NOTE — PROGRESS NOTES
Oncology Distress Screening Follow-up  Clinical Social Work  [facility]    Identified Concern and Score From Distress Screenin. How concerned are you about your ability to eat?   0           2. How concerned are you about unintended weight loss or your current weight?   0           3. How concerned are you about feeling depressed or very sad?   2           4. How concerned are you about feeling anxious or very scared?   2           5. Do you struggle with the loss of meaning and yesy in your life?   Somewhat           6. How concerned are you about work and home life issues that may be affected by your cancer?   0           7. How concerned are you about knowing what resources are available to help you?   10Abnormal            8. Do you currently have what you would describe as Caodaism or spiritual struggles?              Not at all             Date of Distress Screenin21    Intervention:   Joel is a 66-year-old gentleman with a diagnosis of metastatic prostate cancer who is followed at [facility]. At time of last visit with Maria Esther Coelho, Joel scored positive on oncology distress screen. This clinician receiving return call from Joel today.     Joel reported that his greatest concern is affordability of cancer medications. MICHAEL reviewed pharmacy liaison Hiral's last note (21). Joel appreciative of Hiral's support.     MICHAEL provided education about role of oncology social worker and additional non-medication grants available. Joel reported that he is appreciative to know of oncology social work support, but will await to hear update from Hiral about drug coverage as this is greatest stressor. MICHAEL reminded Joel of open enrollment, and encouraged to discuss with his  about whether this plan continues to be best fit for medical care needs.     Education Provided:   Onc MICHAEL contact information    Follow-up Required:   1) MICHAEL to notify Hiral of Joel's desire for update  on medication support.   2) SW will continue to be available as needed for ongoing psychosocial support.     Please reach out with psychosocial concern or need.     FREDO Hayes, LICSW  Phone: [phone]  [facility]: M, Thu  *every other Tue, 8am-4:30pm  [facility], F, *every other Tue, 8am-4:30pm

## 2021-10-01 NOTE — TELEPHONE ENCOUNTER
Writer spoke with patient to update him on his J[employer] application. Patient was initially denied due to high income. Writer sent in an appeal and that is being worked on. Patient is understanding of that. He will have labs done on Tuesday which is when medication will be released. Patient also understanding of that and has no further questions at this time.     Hiral Briggs CPhT  [facility]  Oncology Pharmacy Liaison  [email or web address]  Phone: [phone]  Fax: [phone]

## 2021-10-01 NOTE — TELEPHONE ENCOUNTER
Hiral Briggs CPhT  [facility]  Oncology Pharmacy Liaison  [email or web address]  Phone: [phone]  Fax: [phone]

## 2021-10-04 ENCOUNTER — TELEPHONE (OUTPATIENT)
Dept: ONCOLOGY | Facility: CLINIC | Age: 66
End: 2021-10-04

## 2021-10-05 ENCOUNTER — LAB (OUTPATIENT)
Dept: LAB | Facility: CLINIC | Age: 66
End: 2021-10-05
Payer: MEDICARE

## 2021-10-05 ENCOUNTER — TELEPHONE (OUTPATIENT)
Dept: ONCOLOGY | Facility: CLINIC | Age: 66
End: 2021-10-05

## 2021-10-05 DIAGNOSIS — C61 PROSTATE CANCER (H): ICD-10-CM

## 2021-10-05 DIAGNOSIS — Z79.899 ENCOUNTER FOR LONG-TERM (CURRENT) USE OF MEDICATIONS: Primary | ICD-10-CM

## 2021-10-05 DIAGNOSIS — K21.00 GASTROESOPHAGEAL REFLUX DISEASE WITH ESOPHAGITIS WITHOUT HEMORRHAGE: ICD-10-CM

## 2021-10-05 LAB
ALBUMIN SERPL-MCNC: 3.7 G/DL (ref 3.4–5)
ALP SERPL-CCNC: 78 U/L (ref 40–150)
ALT SERPL W P-5'-P-CCNC: 49 U/L (ref 0–70)
ANION GAP SERPL CALCULATED.3IONS-SCNC: 4 MMOL/L (ref 3–14)
AST SERPL W P-5'-P-CCNC: 21 U/L (ref 0–45)
BASOPHILS # BLD AUTO: 0.1 10E3/UL (ref 0–0.2)
BASOPHILS NFR BLD AUTO: 1 %
BILIRUB SERPL-MCNC: 0.4 MG/DL (ref 0.2–1.3)
BUN SERPL-MCNC: 20 MG/DL (ref 7–30)
CALCIUM SERPL-MCNC: 8.7 MG/DL (ref 8.5–10.1)
CHLORIDE BLD-SCNC: 107 MMOL/L (ref 94–109)
CHOLEST SERPL-MCNC: 208 MG/DL
CO2 SERPL-SCNC: 29 MMOL/L (ref 20–32)
CREAT SERPL-MCNC: 1.02 MG/DL (ref 0.66–1.25)
EOSINOPHIL # BLD AUTO: 0.4 10E3/UL (ref 0–0.7)
EOSINOPHIL NFR BLD AUTO: 6 %
ERYTHROCYTE [DISTWIDTH] IN BLOOD BY AUTOMATED COUNT: 12.3 % (ref 10–15)
FASTING STATUS PATIENT QL REPORTED: YES
GFR SERPL CREATININE-BSD FRML MDRD: 77 ML/MIN/1.73M2
GLUCOSE BLD-MCNC: 142 MG/DL (ref 70–99)
HCT VFR BLD AUTO: 37.8 % (ref 40–53)
HDLC SERPL-MCNC: 35 MG/DL
HGB BLD-MCNC: 13.4 G/DL (ref 13.3–17.7)
IMM GRANULOCYTES # BLD: 0.1 10E3/UL
IMM GRANULOCYTES NFR BLD: 1 %
LDLC SERPL CALC-MCNC: 134 MG/DL
LYMPHOCYTES # BLD AUTO: 1.1 10E3/UL (ref 0.8–5.3)
LYMPHOCYTES NFR BLD AUTO: 17 %
MCH RBC QN AUTO: 31.7 PG (ref 26.5–33)
MCHC RBC AUTO-ENTMCNC: 35.4 G/DL (ref 31.5–36.5)
MCV RBC AUTO: 89 FL (ref 78–100)
MONOCYTES # BLD AUTO: 0.5 10E3/UL (ref 0–1.3)
MONOCYTES NFR BLD AUTO: 8 %
NEUTROPHILS # BLD AUTO: 4.5 10E3/UL (ref 1.6–8.3)
NEUTROPHILS NFR BLD AUTO: 67 %
NONHDLC SERPL-MCNC: 173 MG/DL
NRBC # BLD AUTO: 0 10E3/UL
NRBC BLD AUTO-RTO: 0 /100
PLATELET # BLD AUTO: 224 10E3/UL (ref 150–450)
POTASSIUM BLD-SCNC: 4.2 MMOL/L (ref 3.4–5.3)
PROT SERPL-MCNC: 7 G/DL (ref 6.8–8.8)
PSA SERPL-MCNC: 3.22 UG/L (ref 0–4)
RBC # BLD AUTO: 4.23 10E6/UL (ref 4.4–5.9)
SODIUM SERPL-SCNC: 140 MMOL/L (ref 133–144)
TRIGL SERPL-MCNC: 194 MG/DL
TSH SERPL DL<=0.005 MIU/L-ACNC: 3.47 MU/L (ref 0.4–4)
WBC # BLD AUTO: 6.7 10E3/UL (ref 4–11)

## 2021-10-05 PROCEDURE — 80053 COMPREHEN METABOLIC PANEL: CPT

## 2021-10-05 PROCEDURE — 85025 COMPLETE CBC W/AUTO DIFF WBC: CPT

## 2021-10-05 PROCEDURE — 84153 ASSAY OF PSA TOTAL: CPT

## 2021-10-05 PROCEDURE — 36415 COLL VENOUS BLD VENIPUNCTURE: CPT

## 2021-10-05 PROCEDURE — 84443 ASSAY THYROID STIM HORMONE: CPT

## 2021-10-05 PROCEDURE — 80061 LIPID PANEL: CPT

## 2021-10-05 RX ORDER — APALUTAMIDE 60 MG/1
240 TABLET, FILM COATED ORAL DAILY
Qty: 120 TABLET | Refills: 11 | COMMUNITY
Start: 2021-09-21 | End: 2022-02-03

## 2021-10-05 NOTE — TELEPHONE ENCOUNTER
"Oral Chemotherapy Monitoring Program    Lab Monitoring Plan  CMP/CBC monthly  TSH/ FLP Q3 months  Subjective/Objective:  Joel Pike is a 66 year old male contacted by phone for an initial visit for oral chemotherapy education.  Joel should receive delivery of Erleada from [employer] today. Patient agreed to switch esomeprazole to pantoprazole due to DDI with Erleada - will message PCP to send pantoprazole rx.     ORAL CHEMOTHERAPY 8/30/2021 10/5/2021   Assessment Type Initial Work up Left Voicemail   Diagnosis Code Prostate Cancer Prostate Cancer   Providers Dr. Edil Solo   [facility]   Drug Name Xtandi (enzalutamide) Erleada (apalutamide)   Dose 160 mg 240 mg   Current Schedule Daily Daily   Cycle Details Continuous Continuous   Any new drug interactions? No -   Is the dose as ordered appropriate for the patient? Yes -       Last PHQ-2 Score on record:   PHQ-2 ( 1999 [employer]) 2/26/2021 2/22/2020   Q1: Little interest or pleasure in doing things 1 1   Q2: Feeling down, depressed or hopeless 1 0   PHQ-2 Score 2 1   Q1: Little interest or pleasure in doing things Several days Several days   Q2: Feeling down, depressed or hopeless Several days Not at all   PHQ-2 Score 2 1       Vitals:  BP:   BP Readings from Last 1 Encounters:   08/30/21 (!) 153/73     Wt Readings from Last 1 Encounters:   08/30/21 106.1 kg (234 lb)     Estimated body surface area is 2.29 meters squared as calculated from the following:    Height as of 8/30/21: 1.778 m (5' 10\").    Weight as of 8/30/21: 106.1 kg (234 lb).    Labs:  _  Result Component Current Result Ref Range   Sodium 140 (10/5/2021) 133 - 144 mmol/L     _  Result Component Current Result Ref Range   Potassium 4.2 (10/5/2021) 3.4 - 5.3 mmol/L     _  Result Component Current Result Ref Range   Calcium 8.7 (10/5/2021) 8.5 - 10.1 mg/dL     No results found for Mag within last 30 days.     No results found for Phos within last 30 days.     _  Result " Component Current Result Ref Range   Albumin 3.7 (10/5/2021) 3.4 - 5.0 g/dL     _  Result Component Current Result Ref Range   Urea Nitrogen 20 (10/5/2021) 7 - 30 mg/dL     _  Result Component Current Result Ref Range   Creatinine 1.02 (10/5/2021) 0.66 - 1.25 mg/dL     _  Result Component Current Result Ref Range   AST 21 (10/5/2021) 0 - 45 U/L     _  Result Component Current Result Ref Range   ALT 49 (10/5/2021) 0 - 70 U/L     _  Result Component Current Result Ref Range   Bilirubin Total 0.4 (10/5/2021) 0.2 - 1.3 mg/dL     _  Result Component Current Result Ref Range   WBC Count 6.7 (10/5/2021) 4.0 - 11.0 10e3/uL     _  Result Component Current Result Ref Range   Hemoglobin 13.4 (10/5/2021) 13.3 - 17.7 g/dL     _  Result Component Current Result Ref Range   Platelet Count 224 (10/5/2021) 150 - 450 10e3/uL     No results found for ANC within last 30 days.       Assessment:  Patient is appropriate to start therapy.    Plan:  Basic chemotherapy teaching was reviewed with the patient including indication, start date of therapy, dose, administration, adverse effects, missed doses, food and drug interactions, monitoring, side effect management, office contact information, and safe handling. Written materials were provided and all questions answered.    Follow-Up:  10/12: initial assessment     Jaqueline Boykin, [place]  Hematology/Oncology Clinical Pharmacist  [facility]  [facility]  [phone]

## 2021-10-05 NOTE — TELEPHONE ENCOUNTER
Oral Chemotherapy Monitoring Program     Placed call to patient in follow up of oral chemotherapy. Left message requesting call back. No drug names were mentioned. Will update when response received.     Jaqueline Boykin, PharmD  Hematology/Oncology Clinical Pharmacist  [facility]  [facility]  [phone]

## 2021-10-12 ENCOUNTER — TELEPHONE (OUTPATIENT)
Dept: ONCOLOGY | Facility: CLINIC | Age: 66
End: 2021-10-12

## 2021-10-12 RX ORDER — PANTOPRAZOLE SODIUM 40 MG/1
40 TABLET, DELAYED RELEASE ORAL DAILY
Qty: 90 TABLET | Refills: 3 | Status: SHIPPED | OUTPATIENT
Start: 2021-10-12 | End: 2022-10-25

## 2021-10-12 NOTE — ORAL ONC MGMT
Oral Chemotherapy Monitoring Program    Subjective/Objective:  Joel Pike is a 66 year old male contacted by phone for a follow-up visit for oral chemotherapy.  Joel confirms taking Erleada 240 mg daily. He started taking this on 10/5, and states that he is tolerating it very well, with no noticeable adverse effects at this time. He did change his PPI from esomeprazole to pantoprazole because of the DDI with esomeprazole. No other recent medication changes, no missed doses of Erleada since starting.     ORAL CHEMOTHERAPY 8/30/2021 10/5/2021 10/5/2021 10/12/2021   Assessment Type Initial Work up Left Voicemail New Teach Left Voicemail   Diagnosis Code Prostate Cancer Prostate Cancer Prostate Cancer Prostate Cancer   Providers Dr. Edil Solo   [facility]   Drug Name Xtandi (enzalutamide) Erleada (apalutamide) Erleada (apalutamide) Erleada (apalutamide)   Dose 160 mg 240 mg 240 mg 240 mg   Current Schedule Daily Daily Daily Daily   Cycle Details Continuous Continuous Continuous Continuous   Start Date of Last Cycle - - - 10/5/2021   Planned next cycle start date - - 10/5/2021 11/4/2021   Doses missed in last 2 weeks - - - 0   Adherence Assessment - - - Adherent   Adverse Effects - - - No AE identified during assessment   Any new drug interactions? No - - No   Is the dose as ordered appropriate for the patient? Yes - - Yes       Last PHQ-2 Score on record:   PHQ-2 ( 1999 Pfizer) 2/26/2021 2/22/2020   Q1: Little interest or pleasure in doing things 1 1   Q2: Feeling down, depressed or hopeless 1 0   PHQ-2 Score 2 1   Q1: Little interest or pleasure in doing things Several days Several days   Q2: Feeling down, depressed or hopeless Several days Not at all   PHQ-2 Score 2 1       Vitals:  BP:   BP Readings from Last 1 Encounters:   08/30/21 (!) 153/73     Wt Readings from Last 1 Encounters:   08/30/21 106.1 kg (234 lb)     Estimated body surface area is 2.29  "meters squared as calculated from the following:    Height as of 8/30/21: 1.778 m (5' 10\").    Weight as of 8/30/21: 106.1 kg (234 lb).    Labs:  _  Result Component Current Result Ref Range   Sodium 140 (10/5/2021) 133 - 144 mmol/L     _  Result Component Current Result Ref Range   Potassium 4.2 (10/5/2021) 3.4 - 5.3 mmol/L     _  Result Component Current Result Ref Range   Calcium 8.7 (10/5/2021) 8.5 - 10.1 mg/dL     No results found for Mag within last 30 days.     No results found for Phos within last 30 days.     _  Result Component Current Result Ref Range   Albumin 3.7 (10/5/2021) 3.4 - 5.0 g/dL     _  Result Component Current Result Ref Range   Urea Nitrogen 20 (10/5/2021) 7 - 30 mg/dL     _  Result Component Current Result Ref Range   Creatinine 1.02 (10/5/2021) 0.66 - 1.25 mg/dL     _  Result Component Current Result Ref Range   AST 21 (10/5/2021) 0 - 45 U/L     _  Result Component Current Result Ref Range   ALT 49 (10/5/2021) 0 - 70 U/L     _  Result Component Current Result Ref Range   Bilirubin Total 0.4 (10/5/2021) 0.2 - 1.3 mg/dL     _  Result Component Current Result Ref Range   WBC Count 6.7 (10/5/2021) 4.0 - 11.0 10e3/uL     _  Result Component Current Result Ref Range   Hemoglobin 13.4 (10/5/2021) 13.3 - 17.7 g/dL     _  Result Component Current Result Ref Range   Platelet Count 224 (10/5/2021) 150 - 450 10e3/uL     No results found for ANC within last 30 days.         Assessment/Plan:  Joel is tolerating Erleada well. Continue current therapy.     Follow-Up:  11/26: labs  11/29: [facility] appt    Refill Due:  11/4    Valencia Casarez, AmaliaD, BCOP  Hematology/Oncology Clinical Pharmacist  [facility]  [facility]  [phone]      "

## 2021-10-12 NOTE — ORAL ONC MGMT
Oral Chemotherapy Monitoring Program     Placed call to patient in follow up of oral chemotherapy. Left message requesting call back. No drug names were mentioned. Will update when response received.     Valencia Casarez, PharmD, BCOP  Hematology/Oncology Clinical Pharmacist  [facility]  [facility]

## 2021-10-15 DIAGNOSIS — E11.22 TYPE 2 DIABETES MELLITUS WITH STAGE 2 CHRONIC KIDNEY DISEASE, WITHOUT LONG-TERM CURRENT USE OF INSULIN (H): ICD-10-CM

## 2021-10-15 DIAGNOSIS — N18.2 TYPE 2 DIABETES MELLITUS WITH STAGE 2 CHRONIC KIDNEY DISEASE, WITHOUT LONG-TERM CURRENT USE OF INSULIN (H): ICD-10-CM

## 2021-10-19 NOTE — CONFIDENTIAL NOTE
Daphne test strips Prescription approved per [facility] Refill Protocol. {Pt is due for diabetes follow-up prashanth. Last seen by Dr. Lin on 2/26/21 for a PE exam. Will forward to [facility] to assist patient in setting up an appt.   CATY Reis

## 2021-10-20 NOTE — TELEPHONE ENCOUNTER
Mansi Bradley, RN 17 hours ago (3:40 PM)     PW       Daphne test strips Prescription approved per [facility] Refill Protocol. {Pt is due for diabetes follow-up prashanth. Last seen by Dr. Lin on 2/26/21 for a PE exam. Will forward to [facility] to assist patient in setting up an appt.   CATY Reis            Unsigned   Documentation

## 2021-10-21 DIAGNOSIS — N18.2 TYPE 2 DIABETES MELLITUS WITH STAGE 2 CHRONIC KIDNEY DISEASE, WITHOUT LONG-TERM CURRENT USE OF INSULIN (H): ICD-10-CM

## 2021-10-21 DIAGNOSIS — E11.22 TYPE 2 DIABETES MELLITUS WITH STAGE 2 CHRONIC KIDNEY DISEASE, WITHOUT LONG-TERM CURRENT USE OF INSULIN (H): ICD-10-CM

## 2021-10-21 NOTE — TELEPHONE ENCOUNTER
Pharmacy needs a script for the Lancets for the One-Touch ultra Device. Please send to [facility]. Thank you    Eleni Andrade, Pharmacy Technician  [facility]  [phone]

## 2021-10-23 ENCOUNTER — HEALTH MAINTENANCE LETTER (OUTPATIENT)
Age: 66
End: 2021-10-23

## 2021-10-25 ENCOUNTER — TRANSFERRED RECORDS (OUTPATIENT)
Dept: HEALTH INFORMATION MANAGEMENT | Facility: CLINIC | Age: 66
End: 2021-10-25
Payer: MEDICARE

## 2021-10-25 LAB — RETINOPATHY: NEGATIVE

## 2021-10-25 NOTE — TELEPHONE ENCOUNTER
Pending Prescriptions:                       Disp   Refills    blood glucose (NO BRAND SPECIFIED) lancets*100 ea*3        Sig: Use to test blood sugar one time daily or as           directed.    Routing refill request to provider for review/approval because:  Drug not on the [facility] refill protocol

## 2021-10-28 ENCOUNTER — MYC MEDICAL ADVICE (OUTPATIENT)
Dept: FAMILY MEDICINE | Facility: CLINIC | Age: 66
End: 2021-10-28

## 2021-11-24 ENCOUNTER — OFFICE VISIT (OUTPATIENT)
Dept: FAMILY MEDICINE | Facility: CLINIC | Age: 66
End: 2021-11-24
Payer: MEDICARE

## 2021-11-24 VITALS
TEMPERATURE: 97.4 F | BODY MASS INDEX: 32.53 KG/M2 | OXYGEN SATURATION: 98 % | HEIGHT: 70 IN | WEIGHT: 227.2 LBS | SYSTOLIC BLOOD PRESSURE: 130 MMHG | DIASTOLIC BLOOD PRESSURE: 72 MMHG | RESPIRATION RATE: 20 BRPM | HEART RATE: 82 BPM

## 2021-11-24 DIAGNOSIS — N18.2 TYPE 2 DIABETES MELLITUS WITH STAGE 2 CHRONIC KIDNEY DISEASE, WITH LONG-TERM CURRENT USE OF INSULIN (H): Primary | ICD-10-CM

## 2021-11-24 DIAGNOSIS — C61 PROSTATE CANCER (H): ICD-10-CM

## 2021-11-24 DIAGNOSIS — E03.8 OTHER SPECIFIED HYPOTHYROIDISM: ICD-10-CM

## 2021-11-24 DIAGNOSIS — Z79.4 TYPE 2 DIABETES MELLITUS WITH STAGE 2 CHRONIC KIDNEY DISEASE, WITH LONG-TERM CURRENT USE OF INSULIN (H): Primary | ICD-10-CM

## 2021-11-24 DIAGNOSIS — E78.5 HYPERLIPIDEMIA LDL GOAL <100: ICD-10-CM

## 2021-11-24 DIAGNOSIS — E11.22 TYPE 2 DIABETES MELLITUS WITH STAGE 2 CHRONIC KIDNEY DISEASE, WITH LONG-TERM CURRENT USE OF INSULIN (H): Primary | ICD-10-CM

## 2021-11-24 PROCEDURE — 99214 OFFICE O/P EST MOD 30 MIN: CPT | Performed by: FAMILY MEDICINE

## 2021-11-24 RX ORDER — ENZALUTAMIDE 40 MG/1
CAPSULE ORAL
COMMUNITY
Start: 2021-08-30 | End: 2022-02-03

## 2021-11-24 ASSESSMENT — MIFFLIN-ST. JEOR: SCORE: 1821.82

## 2021-11-24 ASSESSMENT — PAIN SCALES - GENERAL: PAINLEVEL: NO PAIN (0)

## 2021-11-24 NOTE — PROGRESS NOTES
"  Assessment & Plan     (E11.22,  N18.2,  Z79.4) Type 2 diabetes mellitus with stage 2 chronic kidney disease, with long-term current use of insulin (H)  (primary encounter diagnosis)  Comment: DM is controlled with recent Hgb A1c of 6.1.  Tolerating Metformin well.  Discussed about the goal for fasting blood sugar and Hgb A1C.  Will continue with the Metformin.  Emphasized the importance of annual retinal exam.  Encourage daily foot care and yearly dental care.  Also emphasized importance of exercising, healthy diet and weight loss. F/U in 6 months.        (E78.5) Hyperlipidemia LDL goal <100  Comment: Cholesterol level remains to be slightly elevated.  Could not tolerate the statin, but never tried to lovastatin or Crestor.  Will continue with the Zetia.  Exercise, healthy diet and weight loss discussed and encouraged.  Been having muscle ache from the chemotherapy.  Consider to try lovastatin or Crestor in the future if his cholesterol level remains to be high.  He preferred to try it after he done with the chemotherapy which is appropriate      (E03.8) Other specified hypothyroidism  Comment: THS level was therapeutic a month ago.  Will continue with the current dose of levothyroxine.      (C61) Prostate cancer (H)  Comment: Being managed by the urologist.  Overall, tolerating the chemotherapy well.  Encouraged to work with the urologist closely.       He has no history of high blood pressure.    He is planning to get the Covid and influenza vaccination next week through his pharmacy.  Also recommend to consider getting the Pneumovax.    BMI:   Estimated body mass index is 32.6 kg/m  as calculated from the following:    Height as of this encounter: 1.778 m (5' 10\").    Weight as of this encounter: 103.1 kg (227 lb 3.2 oz).   Weight management plan: Discussed healthy diet and exercise guidelines        No follow-ups on file.    Nishi Hdz Mai, MD  [facility]    Clyde Maldonado is a 66 year " old who presents for the following health issues     HPI     Diabetes Follow-up    How often are you checking your blood sugar? Two times daily  Blood sugar testing frequency justification:  Patient modifying lifestyle changes (diet, exercise) with blood sugars  What time of day are you checking your blood sugars (select all that apply)?  Depends on how patient is feeling. Having concerns with chemo  Have you had any blood sugars above 200?  Yes   Have you had any blood sugars below 70?  No    What symptoms do you notice when your blood sugar is low?  None    What concerns do you have today about your diabetes? Blood sugar is often over 200 and Other: Changes with Chemo     Do you have any of these symptoms? (Select all that apply)  No numbness or tingling in feet.  No redness, sores or blisters on feet.  No complaints of excessive thirst.  No reports of blurry vision.  No significant changes to weight.      BP Readings from Last 2 Encounters:   11/24/21 130/72   08/30/21 (!) 153/73     Hemoglobin A1C (%)   Date Value   09/27/2021 6.1 (H)   02/26/2021 6.0 (H)   11/04/2020 5.7 (H)     LDL Cholesterol Calculated (mg/dL)   Date Value   10/05/2021 134 (H)   11/04/2020 90   02/25/2019 162 (H)         Hypertension Follow-up      Do you check your blood pressure regularly outside of the clinic? No     Are you following a low salt diet? No    Are your blood pressures ever more than 140 on the top number (systolic) OR more   than 90 on the bottom number (diastolic), for example 140/90? No      How many servings of fruits and vegetables do you eat daily?  2-3    On average, how many sweetened beverages do you drink each day (Examples: soda, juice, sweet tea, etc.  Do NOT count diet or artificially sweetened beverages)?   0    How many days per week do you exercise enough to make your heart beat faster? 3 or less    How many minutes a day do you exercise enough to make your heart beat faster? 20 - 29    How many days per week  "do you miss taking your medication? 0    Joel is here today for general follow-up.  He has diabetes, been taking the Metformin 500 mg twice a day with no side effect.  Blood sugar has been around 150s-170s.  No symptoms of low blood sugar.  No headache or dizziness.  No acute change in his vision - UTD for his eye exam.  Hemoglobin A1c couple months ago was 6.1.    He also takes Zetia for high cholesterol.  Could not tolerate statin.  Not really exercising.  He was recently started on chemotherapy for prostate cancer by the urologist, tolerating the medications well.  No other concern.    Review of Systems   Constitutional, HEENT, cardiovascular, pulmonary, gi and gu systems are negative, except as otherwise noted.      Objective    /72   Pulse 82   Temp 97.4  F (36.3  C) (Temporal)   Resp 20   Ht 1.778 m (5' 10\")   Wt 103.1 kg (227 lb 3.2 oz)   SpO2 98%   BMI 32.60 kg/m    Body mass index is 32.6 kg/m .  Physical Exam   GENERAL: healthy, alert and no distress, speaking full sentences  EYES: Eyes grossly normal to inspection, PERRL and conjunctivae and sclerae normal.  No nystagmus.  All 4 visual fields intact.  HENT: ear canals and TM's normal.  Nares are non-congested. Oropharynx is pink and moist. No tender with palpation to the sinuses.  NECK: Supple, no lymphadenopathy or thyromegaly.  RESP: lungs clear to auscultation - no rales, rhonchi or wheezes  CV: regular rate and rhythm, no murmur, no peripheral edema with strong pedal pulses bilaterally  ABDOMEN: soft, nontender, nondistended, no palpable masses or organomegaly with normal bowel sounds  MS: no gross musculoskeletal defects noted.  No focal weakness.  NEURO: Normal strength and tone, mentation intact and speech normal.  No focal neurological deficit  Diabetic foot exam: Monofilament was normal.  No calluses.  Skin dry and clean, no open wound.  Toenails look good.      No results found for any visits on 11/24/21.    Reviewed the lab " results on 10/5/21 -CBC with slightly low in hematocrit (37.8), normal CMP and TSH.  Lipid panel showed: Total cholesterol 208, triglyceride 194, , and HDL 35    Hemoglobin A1c on September 27 was 6.1

## 2021-11-26 ENCOUNTER — NURSE TRIAGE (OUTPATIENT)
Dept: ONCOLOGY | Facility: CLINIC | Age: 66
End: 2021-11-26

## 2021-11-26 ENCOUNTER — LAB (OUTPATIENT)
Dept: LAB | Facility: CLINIC | Age: 66
End: 2021-11-26
Payer: MEDICARE

## 2021-11-26 ENCOUNTER — HOSPITAL ENCOUNTER (OUTPATIENT)
Dept: CT IMAGING | Facility: CLINIC | Age: 66
Discharge: HOME | End: 2021-11-26
Attending: INTERNAL MEDICINE | Admitting: INTERNAL MEDICINE
Payer: MEDICARE

## 2021-11-26 DIAGNOSIS — C61 PROSTATE CANCER (H): ICD-10-CM

## 2021-11-26 DIAGNOSIS — Z79.899 ENCOUNTER FOR LONG-TERM (CURRENT) USE OF MEDICATIONS: ICD-10-CM

## 2021-11-26 LAB
ALBUMIN SERPL-MCNC: 3.6 G/DL (ref 3.4–5)
ALP SERPL-CCNC: 67 U/L (ref 40–150)
ALT SERPL W P-5'-P-CCNC: 33 U/L (ref 0–70)
ANION GAP SERPL CALCULATED.3IONS-SCNC: 3 MMOL/L (ref 3–14)
AST SERPL W P-5'-P-CCNC: 17 U/L (ref 0–45)
BASOPHILS # BLD AUTO: 0.1 10E3/UL (ref 0–0.2)
BASOPHILS NFR BLD AUTO: 1 %
BILIRUB SERPL-MCNC: 0.3 MG/DL (ref 0.2–1.3)
BUN SERPL-MCNC: 26 MG/DL (ref 7–30)
CALCIUM SERPL-MCNC: 8.8 MG/DL (ref 8.5–10.1)
CHLORIDE BLD-SCNC: 108 MMOL/L (ref 94–109)
CHOLEST SERPL-MCNC: 202 MG/DL
CO2 SERPL-SCNC: 30 MMOL/L (ref 20–32)
CREAT SERPL-MCNC: 1.13 MG/DL (ref 0.66–1.25)
EOSINOPHIL # BLD AUTO: 0.6 10E3/UL (ref 0–0.7)
EOSINOPHIL NFR BLD AUTO: 9 %
ERYTHROCYTE [DISTWIDTH] IN BLOOD BY AUTOMATED COUNT: 12.8 % (ref 10–15)
FASTING STATUS PATIENT QL REPORTED: YES
GFR SERPL CREATININE-BSD FRML MDRD: 68 ML/MIN/1.73M2
GLUCOSE BLD-MCNC: 143 MG/DL (ref 70–99)
HCT VFR BLD AUTO: 35.9 % (ref 40–53)
HDLC SERPL-MCNC: 35 MG/DL
HGB BLD-MCNC: 12.3 G/DL (ref 13.3–17.7)
IMM GRANULOCYTES # BLD: 0 10E3/UL
IMM GRANULOCYTES NFR BLD: 1 %
LDLC SERPL CALC-MCNC: 128 MG/DL
LYMPHOCYTES # BLD AUTO: 1.4 10E3/UL (ref 0.8–5.3)
LYMPHOCYTES NFR BLD AUTO: 21 %
MCH RBC QN AUTO: 32.1 PG (ref 26.5–33)
MCHC RBC AUTO-ENTMCNC: 34.3 G/DL (ref 31.5–36.5)
MCV RBC AUTO: 94 FL (ref 78–100)
MONOCYTES # BLD AUTO: 0.7 10E3/UL (ref 0–1.3)
MONOCYTES NFR BLD AUTO: 11 %
NEUTROPHILS # BLD AUTO: 3.7 10E3/UL (ref 1.6–8.3)
NEUTROPHILS NFR BLD AUTO: 57 %
NONHDLC SERPL-MCNC: 167 MG/DL
NRBC # BLD AUTO: 0 10E3/UL
NRBC BLD AUTO-RTO: 0 /100
PLATELET # BLD AUTO: 198 10E3/UL (ref 150–450)
POTASSIUM BLD-SCNC: 4.4 MMOL/L (ref 3.4–5.3)
PROT SERPL-MCNC: 6.8 G/DL (ref 6.8–8.8)
PSA SERPL-MCNC: 0.15 UG/L (ref 0–4)
RBC # BLD AUTO: 3.83 10E6/UL (ref 4.4–5.9)
SODIUM SERPL-SCNC: 141 MMOL/L (ref 133–144)
T4 FREE SERPL-MCNC: 0.82 NG/DL (ref 0.76–1.46)
TRIGL SERPL-MCNC: 195 MG/DL
TSH SERPL DL<=0.005 MIU/L-ACNC: 10.09 MU/L (ref 0.4–4)
WBC # BLD AUTO: 6.5 10E3/UL (ref 4–11)

## 2021-11-26 PROCEDURE — 71260 CT THORAX DX C+: CPT | Mod: MG

## 2021-11-26 PROCEDURE — 80053 COMPREHEN METABOLIC PANEL: CPT

## 2021-11-26 PROCEDURE — 84439 ASSAY OF FREE THYROXINE: CPT

## 2021-11-26 PROCEDURE — 84443 ASSAY THYROID STIM HORMONE: CPT

## 2021-11-26 PROCEDURE — 85025 COMPLETE CBC W/AUTO DIFF WBC: CPT

## 2021-11-26 PROCEDURE — 36415 COLL VENOUS BLD VENIPUNCTURE: CPT

## 2021-11-26 PROCEDURE — 250N000011 HC RX IP 250 OP 636: Performed by: RADIOLOGY

## 2021-11-26 PROCEDURE — 84153 ASSAY OF PSA TOTAL: CPT

## 2021-11-26 PROCEDURE — 250N000009 HC RX 250: Performed by: RADIOLOGY

## 2021-11-26 PROCEDURE — 80061 LIPID PANEL: CPT

## 2021-11-26 RX ORDER — IOPAMIDOL 755 MG/ML
500 INJECTION, SOLUTION INTRAVASCULAR ONCE
Status: COMPLETED | OUTPATIENT
Start: 2021-11-26 | End: 2021-11-26

## 2021-11-26 RX ADMIN — IOPAMIDOL 100 ML: 755 INJECTION, SOLUTION INTRAVENOUS at 08:10

## 2021-11-26 RX ADMIN — SODIUM CHLORIDE 60 ML: 9 INJECTION, SOLUTION INTRAVENOUS at 08:09

## 2021-11-26 NOTE — TELEPHONE ENCOUNTER
Radiology wanting to know what LAD means.  Transferred to Eleni Davenport to assist. Dr. Solo's RNCC.

## 2021-12-03 ENCOUNTER — ONCOLOGY VISIT (OUTPATIENT)
Dept: ONCOLOGY | Facility: CLINIC | Age: 66
End: 2021-12-03
Attending: INTERNAL MEDICINE
Payer: MEDICARE

## 2021-12-03 VITALS
WEIGHT: 228.6 LBS | DIASTOLIC BLOOD PRESSURE: 81 MMHG | BODY MASS INDEX: 32.8 KG/M2 | TEMPERATURE: 98.8 F | OXYGEN SATURATION: 100 % | SYSTOLIC BLOOD PRESSURE: 153 MMHG | HEART RATE: 61 BPM | RESPIRATION RATE: 18 BRPM

## 2021-12-03 DIAGNOSIS — C61 PROSTATE CANCER (H): Primary | ICD-10-CM

## 2021-12-03 PROCEDURE — 99214 OFFICE O/P EST MOD 30 MIN: CPT | Performed by: INTERNAL MEDICINE

## 2021-12-03 PROCEDURE — G0463 HOSPITAL OUTPT CLINIC VISIT: HCPCS

## 2021-12-03 ASSESSMENT — PAIN SCALES - GENERAL: PAINLEVEL: MODERATE PAIN (4)

## 2021-12-03 NOTE — PROGRESS NOTES
[facility] Medical Oncology Followup Note       Date of visit: December 3, 2021    CC:   metastatic prostate cancer     HPI:  Nervous today about diagnosis.  Wondering where his prostate cancer really is and what the treatment will be.  Energy has been low lately since starting ADT in early August.  Having hot flashes/sweats.  Breathing is OK, no chest pain.  Appetite is good.  Continuing to work in [employer] of [employer].  Here today with his wife and son.      ONCOLOGY HISTORY:   -Elevated PSA noted 2/26/21 at 12.70. Previously normal in 2017.     -4/7/21-Initial urology visit.   -7/2/21-prostate biopsy 4+3, 9/12 biopsies positive.  Ductal component noted.     -7/28/21-MR prostate-PIRADS 5 lesion, R and L sided lesions with likely    neurovascular bundle invasion. No  seminal vesicle involvement. No clear LAD,  but some indeterminate pelvic nodes.  No suspicious bone lesions.     -7/28/21-NM bone scan suspicious lesion of R sacral ala S3 level.     -8/10/21-CT A/P with multiple enlarged periaortic/iliac LN   -8/11/21-First eligard dose 45mg (Q6M dosing). Due next 2/2022.   -8/30/21-Initial medical oncology visit with Dr. Solo.  Unclear if bony lesion is metastasis based on current imaging.  Will start enzalutamide and continue    Eligard (next due 2/2022).     8/30/21 PSA =30.40 Pre Rx  9/27/21 PSA =8.99 (T=6); HgbA1C = 6.1  10/5/21 PSA =3.22  11/26/21 PSA =0.15     PMH:  HTN, HLD, bipolar disorder, DM2 on metformin     PSH:  Bilateral TKA, bilateral cataract extraction, bilateral carpal tunnel release     FAMILY HX:  No reported family history of prostate cancer     SOCIAL:  Retired, works at [employer] in [place] now  Never smoker.   No EtOH  No recreational drugs.   No herbs/supplements other than on medication list.      MEDS:  Current Outpatient Medications   Medication Instructions     albuterol (PROAIR HFA) 108 (90 BASE) MCG/ACT Inhaler 1-2 puffs every 2 -4 hrs as needed     ALLEGRA-D  ALLERGY & CONGESTION 180-240 MG 24 hr tablet TAKE ONE TABLET BY MOUTH EVERY DAY     blood glucose (HUGO CONTOUR) test strip Use to test blood sugars 1 time daily or as directed.     blood glucose (NO BRAND SPECIFIED) lancets standard Use to test blood sugar one time daily or as directed.     blood glucose calibration (HUGO CONTOUR) NORMAL solution Use to calibrate blood glucose monitor as directed.     buPROPion (WELLBUTRIN XL) 300 MG 24 hr tablet TAKE ONE TABLET BY MOUTH EVERY MORNING     Coenzyme Q-10 capsule 1 capsule, DAILY     esomeprazole (NEXIUM) 40 MG DR capsule TAKE ONE CAPSULE BY MOUTH EVERY MORNING BEFORE BREAKFAST , 30-60 MINUTES BEFORE EATING     ezetimibe (ZETIA) 10 MG tablet TAKE ONE TABLET BY MOUTH ONCE DAILY     fish oil-omega-3 fatty acids 1000 MG capsule Take  by mouth. Take daily       levothyroxine (SYNTHROID/LEVOTHROID) 112 mcg, Oral, DAILY     losartan (COZAAR) 75 mg, Oral, DAILY     Magnesium 500 MG CAPS One twice a day     metFORMIN (GLUCOPHAGE) 500 MG tablet TAKE ONE TABLET BY MOUTH TWICE A DAY WITH MEALS     Misc Natural Products (OSTEO BI-FLEX ADV JOINT SHIELD) TABS Take  by mouth.     multivitamin (OCUVITE) TABS tablet 1 tablet, Oral, DAILY     STATIN NOT PRESCRIBED (INTENTIONAL) Please choose reason not prescribed, below     Vitamin D3 (CHOLECALCIFEROL) 5,000 Units, Oral     ROS-Remainder of 14 point ROS reviewed and negative except as in HPI.    PHYSICAL EXAM:  Exam:  Constitutional: healthy, alert and no distress  Head: Normocephalic. No masses, lesions, tenderness or abnormalities  Neck: Neck supple.  Cardiovascular: negative, RRR, no m/r/g appreciated  Respiratory: CTAB, normal WOB on RA   Gastrointestinal: Abdomen soft, non-distended   : Deferred  Musculoskeletal: extremities normal- no gross deformities noted, gait normal   Skin: no suspicious lesions or rashes  Neurologic: negative, CNII-XII grossly intact, normal speech and mentation.   Psychiatric: affect normal/bright,  linear thought processes, engaged     LABS AND IMAGES:    Prostate Biopsy 7/2/21  FINAL DIAGNOSIS:   A: Prostate, left, biopsy   - Adenocarcinoma, Gardiner's grade 4/3 with ductal component involving 4 of    6 needle core biopsies and 25% of   submitted tissue     B: Prostate, right, biopsy   - Adenocarcinoma, Mary's grade 4/3 with ductal component involving 5 of    6 needle core biopsies and 25% of   submitted tissue     MR prostate 7/28/21  FINDINGS:  Size: 31 grams  Hemorrhage: Absent  Peripheral zone: Heterogeneous on T2-weighted images. Suspicious  lesions as detailed below.  Transition zone: Enlarged with BPH changes. No suspicious lesions  identified.     Lesion(s) in rank order of severity (highest score- to lowest score,  then by size)      Lesion 1:  Location: Right mid gland peripheral zone at the 7-9 o'clock position  relative to the urethra. Series 5 image 50.  Size: 20 x 10 mm  T2 description: Homogeneously hypointense  T2 numerical assessment: 5  DWI description: Marked restriction diffusion  DWI numerical assessment: 5  DCE assessment: Negative    Prostate margin: Capsular abutment>15 mm with enlarged neurovascular  bundle suggesting tumor involvement  Lesion overall PI-RADS category: 5     Lesion 2:  Location: Left apex peripheral zone at the 4 o'clock position relative  to the urethra. Series 5 image 63.  Size: 16 x 11 mm  T2 description: Homogeneously hypointense  T2 numerical assessment: 5  DWI description: Marked diffusion restriction  DWI numerical assessment: 5  DCE assessment: Positive    Prostate margin: Capsular abutment 6-15 mm with capsular irregularity  and focal bulging   Lesion overall PI-RADS category: 5     Neurovascular bundles: Both neurovascular bundles are likely involved  by malignancy.  A right prostatic vein is markedly enlarged compared  to the left, similar to 8/1/2014 CT.  Seminal vesicles: No seminal vesicle involvement by malignancy.  Lymph nodes: No clear adenopathy.  Indeterminate nodes as follows: Left  pelvic 8 x 5 mm lymph node on series 5 image 27.  Bones: No suspicious lesions  Other pelvic organs: Colonic diverticulosis.                                                                         IMPRESSION:  1. Based on the most suspicious abnormality, this exam is  characterized as PIRADS 5 - Clinically significant cancer is highly  likely to be present.  The most suspicious abnormalities are located  at the right mid peripheral zone and left apical peripheral zone and  there is high suspicion of extraprostatic extension.  2. No suspicious adenopathy or evidence of pelvic metastases.    NM bone scan 7/28/21  FINDINGS:   Small area of mild uptake in the right sacrum inferiorly visible on  the posterior view. SPECT-CT demonstrates that this focal uptake  corresponds to a sclerotic lesion on CT. No other suspicious areas of  focal uptake.     Postsurgical changes of bilateral knee replacements.     Physiologic uptake by the kidneys and urinary bladder.                                                                      IMPRESSION: Single focal uptake by the right sacral ala (S3 level) and  corresponding sclerotic focus. This is highly suspicious for  metastatic focus.    CT A/P 8/10/21  FINDINGS:   LOWER CHEST: Normal.     HEPATOBILIARY: Diffuse hepatic steatosis. No significant mass. No bile  duct dilatation. No calcified gallstones.     PANCREAS: Normal.     SPLEEN: Normal.     ADRENAL GLANDS: Normal.     KIDNEYS/BLADDER: There are multiple bilateral nonobstructing  intrarenal calculi measuring up to 4 mm in largest size. Small  subcentimeter benign cortical cysts. Unremarkable bladder. No evidence  of hydronephrosis.     BOWEL: No evidence of bowel obstruction or inflammatory changes. Mild  sigmoid colon diverticulosis. No evidence of diverticulitis. Normal  appendix.     PELVIC ORGANS: Normal sized prostate gland.     ADDITIONAL FINDINGS: Multiple enlarged retroperitoneal  periaortic and  bilateral iliac chain lymph nodes are highly suspicious for  metastasis. The largest left retroperitoneal periaortic lymph node on  image 44 of series 2 measures 4.5 x 3.4 cm. 2.9 x 2.3 cm aortocaval  lymph node on image 45 of series 2. Distal left periaortic lymph node  on image 54 of series 2 measures 3.2 x 2.6 cm. Right common iliac  chain lymph node on image 65 of series 2 measures 2.3 x 2.2 cm. 1.6 cm  left common iliac chain node on image 59 of series 2.     MUSCULOSKELETAL: Small faint sites of increased bone sclerosis  involving the posterior medial right iliac on image 65 of series 2 and  image 69 of series 2 are indeterminate for possible metastasis.                                                                       11/26/21                                             IMPRESSION:  1.  Significantly decreased retroperitoneal lymphadenopathy since the  prior study dated 8/10/2021 indicates good response to treatment. The  remaining enlarged retroperitoneal lymph node appears to have a  necrotic center.  2.  Minimal pulmonary nodularity was likely present previously.  3.  No other evidence for lymphadenopathy or metastasis is identified.  Specifically no evidence for left sacral metastasis is seen.          IMPRESSION AND PLAN:  Joel Pike is a 67 y/o M with PMH of DM2 on metformin, depression/anxiety (pt reports as bipolar d/o), HTN, and HLD presenting for initial consultation for prostate cancer with possible bone metastasis.  We had a lengthy discussion at his initial and went over his pathology and staging scan results.  We discussed that it is not clear whether the lesion in wing of his ala is a true bony metastasis or whether it is another finding/artifact.  We discussed that this does not change the overall treatment of his disease with ADT, but may change whether he receives radiation to his bony lesion in the future or not.  The role of ADT and the addition of enzalutamide were  discussed today.  Given his existing DM2, we will attempt to avoid additional prednisone use by choosing enzalutamide.  There is a small, but non-zero risk of seizure with enzalutamide along with bupropion, which we will monitor him for.  We will refer to radiation oncology for evaluation/opinion on radiation of his possible bony lesion in addition to radiation of the prostate and RP LN bed.  We discussed ADT and RT work in tandem to treat therapy and ADT will target systemic disease as well, including circulating tumor cells.      Recommendations were made for a healthy diet of minimally processed foods and reduction in sugar as possible.  Otherwise we noted that this is a disease he will likely live many years with while on treatment.  Plan is to continue ADT for 2 years (through 8/2023).  His next Fito is due 2/2022.  We will restage in 4 months with CT Deanne is present with his son and family and had multiple questions answered.    -Start enzalutamide.    -PSA, CBC, CMP today for baseline labs.    -Return in 4 months with repeat scans.   -Re-consider RT at that time   -BP checked today borderline, preHTN, referred to primary care for Rx. On Losartan

## 2021-12-03 NOTE — LETTER
12/3/2021         RE: Joel Pike  [address]  [facility] [address]        Dear Colleague,    Thank you for referring your patient, Joel Pike, to the [facility]. Please see a copy of my visit note below.      [facility] Medical Oncology Followup Note       Date of visit: December 3, 2021    CC:   metastatic prostate cancer     HPI:  Nervous today about diagnosis.  Wondering where his prostate cancer really is and what the treatment will be.  Energy has been low lately since starting ADT in early August.  Having hot flashes/sweats.  Breathing is OK, no chest pain.  Appetite is good.  Continuing to work in [employer].  Here today with his wife and son.      ONCOLOGY HISTORY:   -Elevated PSA noted 2/26/21 at 12.70. Previously normal in 2017.     -4/7/21-Initial urology visit.   -7/2/21-prostate biopsy 4+3, 9/12 biopsies positive.  Ductal component noted.     -7/28/21-MR prostate-PIRADS 5 lesion, R and L sided lesions with likely    neurovascular bundle invasion. No  seminal vesicle involvement. No clear LAD,  but some indeterminate pelvic nodes.  No suspicious bone lesions.     -7/28/21-NM bone scan suspicious lesion of R sacral ala S3 level.     -8/10/21-CT A/P with multiple enlarged periaortic/iliac LN   -8/11/21-First eligard dose 45mg (Q6M dosing). Due next 2/2022.   -8/30/21-Initial medical oncology visit with Dr. Solo.  Unclear if bony lesion is metastasis based on current imaging.  Will start enzalutamide and continue    Eligard (next due 2/2022).     8/30/21 PSA =30.40 Pre Rx  9/27/21 PSA =8.99 (T=6); HgbA1C = 6.1  10/5/21 PSA =3.22  11/26/21 PSA =0.15     PMH:  HTN, HLD, bipolar disorder, DM2 on metformin     PSH:  Bilateral TKA, bilateral cataract extraction, bilateral carpal tunnel release     FAMILY HX:  No reported family history of prostate cancer     SOCIAL:  Retired, works at [employer] in [employer] now  Never smoker.   No EtOH  No  recreational drugs.   No herbs/supplements other than on medication list.      MEDS:  Current Outpatient Medications   Medication Instructions     albuterol (PROAIR HFA) 108 (90 BASE) MCG/ACT Inhaler 1-2 puffs every 2 -4 hrs as needed     ALLEGRA-D ALLERGY & CONGESTION 180-240 MG 24 hr tablet TAKE ONE TABLET BY MOUTH EVERY DAY     blood glucose (HUGO CONTOUR) test strip Use to test blood sugars 1 time daily or as directed.     blood glucose (NO BRAND SPECIFIED) lancets standard Use to test blood sugar one time daily or as directed.     blood glucose calibration (HUGO CONTOUR) NORMAL solution Use to calibrate blood glucose monitor as directed.     buPROPion (WELLBUTRIN XL) 300 MG 24 hr tablet TAKE ONE TABLET BY MOUTH EVERY MORNING     Coenzyme Q-10 capsule 1 capsule, DAILY     esomeprazole (NEXIUM) 40 MG DR capsule TAKE ONE CAPSULE BY MOUTH EVERY MORNING BEFORE BREAKFAST , 30-60 MINUTES BEFORE EATING     ezetimibe (ZETIA) 10 MG tablet TAKE ONE TABLET BY MOUTH ONCE DAILY     fish oil-omega-3 fatty acids 1000 MG capsule Take  by mouth. Take daily       levothyroxine (SYNTHROID/LEVOTHROID) 112 mcg, Oral, DAILY     losartan (COZAAR) 75 mg, Oral, DAILY     Magnesium 500 MG CAPS One twice a day     metFORMIN (GLUCOPHAGE) 500 MG tablet TAKE ONE TABLET BY MOUTH TWICE A DAY WITH MEALS     Misc Natural Products (OSTEO BI-FLEX ADV JOINT SHIELD) TABS Take  by mouth.     multivitamin (OCUVITE) TABS tablet 1 tablet, Oral, DAILY     STATIN NOT PRESCRIBED (INTENTIONAL) Please choose reason not prescribed, below     Vitamin D3 (CHOLECALCIFEROL) 5,000 Units, Oral     ROS-Remainder of 14 point ROS reviewed and negative except as in HPI.    PHYSICAL EXAM:  Exam:  Constitutional: healthy, alert and no distress  Head: Normocephalic. No masses, lesions, tenderness or abnormalities  Neck: Neck supple.  Cardiovascular: negative, RRR, no m/r/g appreciated  Respiratory: CTAB, normal WOB on RA   Gastrointestinal: Abdomen soft, non-distended    : Deferred  Musculoskeletal: extremities normal- no gross deformities noted, gait normal   Skin: no suspicious lesions or rashes  Neurologic: negative, CNII-XII grossly intact, normal speech and mentation.   Psychiatric: affect normal/bright, linear thought processes, engaged     LABS AND IMAGES:    Prostate Biopsy 7/2/21  FINAL DIAGNOSIS:   A: Prostate, left, biopsy   - Adenocarcinoma, Hollis's grade 4/3 with ductal component involving 4 of    6 needle core biopsies and 25% of   submitted tissue     B: Prostate, right, biopsy   - Adenocarcinoma, Hollis's grade 4/3 with ductal component involving 5 of    6 needle core biopsies and 25% of   submitted tissue     MR prostate 7/28/21  FINDINGS:  Size: 31 grams  Hemorrhage: Absent  Peripheral zone: Heterogeneous on T2-weighted images. Suspicious  lesions as detailed below.  Transition zone: Enlarged with BPH changes. No suspicious lesions  identified.     Lesion(s) in rank order of severity (highest score- to lowest score,  then by size)      Lesion 1:  Location: Right mid gland peripheral zone at the 7-9 o'clock position  relative to the urethra. Series 5 image 50.  Size: 20 x 10 mm  T2 description: Homogeneously hypointense  T2 numerical assessment: 5  DWI description: Marked restriction diffusion  DWI numerical assessment: 5  DCE assessment: Negative    Prostate margin: Capsular abutment>15 mm with enlarged neurovascular  bundle suggesting tumor involvement  Lesion overall PI-RADS category: 5     Lesion 2:  Location: Left apex peripheral zone at the 4 o'clock position relative  to the urethra. Series 5 image 63.  Size: 16 x 11 mm  T2 description: Homogeneously hypointense  T2 numerical assessment: 5  DWI description: Marked diffusion restriction  DWI numerical assessment: 5  DCE assessment: Positive    Prostate margin: Capsular abutment 6-15 mm with capsular irregularity  and focal bulging   Lesion overall PI-RADS category: 5     Neurovascular bundles: Both  neurovascular bundles are likely involved  by malignancy.  A right prostatic vein is markedly enlarged compared  to the left, similar to 8/1/2014 CT.  Seminal vesicles: No seminal vesicle involvement by malignancy.  Lymph nodes: No clear adenopathy. Indeterminate nodes as follows: Left  pelvic 8 x 5 mm lymph node on series 5 image 27.  Bones: No suspicious lesions  Other pelvic organs: Colonic diverticulosis.                                                                         IMPRESSION:  1. Based on the most suspicious abnormality, this exam is  characterized as PIRADS 5 - Clinically significant cancer is highly  likely to be present.  The most suspicious abnormalities are located  at the right mid peripheral zone and left apical peripheral zone and  there is high suspicion of extraprostatic extension.  2. No suspicious adenopathy or evidence of pelvic metastases.    NM bone scan 7/28/21  FINDINGS:   Small area of mild uptake in the right sacrum inferiorly visible on  the posterior view. SPECT-CT demonstrates that this focal uptake  corresponds to a sclerotic lesion on CT. No other suspicious areas of  focal uptake.     Postsurgical changes of bilateral knee replacements.     Physiologic uptake by the kidneys and urinary bladder.                                                                      IMPRESSION: Single focal uptake by the right sacral ala (S3 level) and  corresponding sclerotic focus. This is highly suspicious for  metastatic focus.    CT A/P 8/10/21  FINDINGS:   LOWER CHEST: Normal.     HEPATOBILIARY: Diffuse hepatic steatosis. No significant mass. No bile  duct dilatation. No calcified gallstones.     PANCREAS: Normal.     SPLEEN: Normal.     ADRENAL GLANDS: Normal.     KIDNEYS/BLADDER: There are multiple bilateral nonobstructing  intrarenal calculi measuring up to 4 mm in largest size. Small  subcentimeter benign cortical cysts. Unremarkable bladder. No evidence  of hydronephrosis.     BOWEL:  No evidence of bowel obstruction or inflammatory changes. Mild  sigmoid colon diverticulosis. No evidence of diverticulitis. Normal  appendix.     PELVIC ORGANS: Normal sized prostate gland.     ADDITIONAL FINDINGS: Multiple enlarged retroperitoneal periaortic and  bilateral iliac chain lymph nodes are highly suspicious for  metastasis. The largest left retroperitoneal periaortic lymph node on  image 44 of series 2 measures 4.5 x 3.4 cm. 2.9 x 2.3 cm aortocaval  lymph node on image 45 of series 2. Distal left periaortic lymph node  on image 54 of series 2 measures 3.2 x 2.6 cm. Right common iliac  chain lymph node on image 65 of series 2 measures 2.3 x 2.2 cm. 1.6 cm  left common iliac chain node on image 59 of series 2.     MUSCULOSKELETAL: Small faint sites of increased bone sclerosis  involving the posterior medial right iliac on image 65 of series 2 and  image 69 of series 2 are indeterminate for possible metastasis.                                                                       11/26/21                                             IMPRESSION:  1.  Significantly decreased retroperitoneal lymphadenopathy since the  prior study dated 8/10/2021 indicates good response to treatment. The  remaining enlarged retroperitoneal lymph node appears to have a  necrotic center.  2.  Minimal pulmonary nodularity was likely present previously.  3.  No other evidence for lymphadenopathy or metastasis is identified.  Specifically no evidence for left sacral metastasis is seen.          IMPRESSION AND PLAN:  Joel Pike is a 65 y/o M with PMH of DM2 on metformin, depression/anxiety (pt reports as bipolar d/o), HTN, and HLD presenting for initial consultation for prostate cancer with possible bone metastasis.  We had a lengthy discussion at his initial and went over his pathology and staging scan results.  We discussed that it is not clear whether the lesion in wing of his ala is a true bony metastasis or whether it is  another finding/artifact.  We discussed that this does not change the overall treatment of his disease with ADT, but may change whether he receives radiation to his bony lesion in the future or not.  The role of ADT and the addition of enzalutamide were discussed today.  Given his existing DM2, we will attempt to avoid additional prednisone use by choosing enzalutamide.  There is a small, but non-zero risk of seizure with enzalutamide along with bupropion, which we will monitor him for.  We will refer to radiation oncology for evaluation/opinion on radiation of his possible bony lesion in addition to radiation of the prostate and RP LN bed.  We discussed ADT and RT work in tandem to treat therapy and ADT will target systemic disease as well, including circulating tumor cells.      Recommendations were made for a healthy diet of minimally processed foods and reduction in sugar as possible.  Otherwise we noted that this is a disease he will likely live many years with while on treatment.  Plan is to continue ADT for 2 years (through 8/2023).  His next Eligard is due 2/2022.  We will restage in 4 months with CT Deanne is present with his son and family and had multiple questions answered.    -Start enzalutamide.    -PSA, CBC, CMP today for baseline labs.    -Return in 4 months with repeat scans.   -Re-consider RT at that time   -BP checked today borderline, preHTN, referred to primary care for Rx. On Losartan      Isaias Solo MD

## 2021-12-03 NOTE — NURSING NOTE
"Oncology Rooming Note    December 3, 2021 12:37 PM   Joel Pike is a 66 year old male who presents for:    Chief Complaint   Patient presents with     Oncology Clinic Visit     Prostate cancer      Initial Vitals: BP (!) 153/81   Pulse 61   Temp 98.8  F (37.1  C) (Oral)   Resp 18   Wt 103.7 kg (228 lb 9.6 oz)   SpO2 100%   BMI 32.80 kg/m   Estimated body mass index is 32.8 kg/m  as calculated from the following:    Height as of 11/24/21: 1.778 m (5' 10\").    Weight as of this encounter: 103.7 kg (228 lb 9.6 oz). Body surface area is 2.26 meters squared.  Moderate Pain (4) Comment: Data Unavailable   No LMP for male patient.  Allergies reviewed: Yes  Medications reviewed: Yes    Medications: Medication refills not needed today.  Pharmacy name entered into EPIC:    [facility] - [facility] - [place], [place] - [facility] [facility] [place], [place] - [address] [place] [address]     Clinical concerns: Would like to go over my chart message and explain what air in his abdomen and lesions he has in his stomach as well.        Morena Chaudhry LPN            "

## 2021-12-06 ENCOUNTER — TELEPHONE (OUTPATIENT)
Dept: ONCOLOGY | Facility: CLINIC | Age: 66
End: 2021-12-06
Payer: MEDICARE

## 2021-12-06 NOTE — TELEPHONE ENCOUNTER
Nutrition Education Scheduling Outreach #1:    Call to patient to schedule. Left message with phone number to call to schedule.    Plan for 2nd outreach attempt within 1 week.    Maria Esther Garcia  [facility]  Diabetes and Nutrition Scheduling

## 2021-12-10 NOTE — TELEPHONE ENCOUNTER
Nutrition Education Scheduling Outreach #2:    Call to patient to schedule. Patient will call us back to schedule. He is at work.    Maria Esther Campos [facility]  Diabetes and Nutrition Scheduling

## 2021-12-11 ENCOUNTER — DOCUMENTATION ONLY (OUTPATIENT)
Dept: ONCOLOGY | Facility: CLINIC | Age: 66
End: 2021-12-11
Payer: MEDICARE

## 2021-12-20 ENCOUNTER — TELEPHONE (OUTPATIENT)
Dept: ONCOLOGY | Facility: CLINIC | Age: 66
End: 2021-12-20
Payer: MEDICARE

## 2021-12-20 NOTE — TELEPHONE ENCOUNTER
Free Drug Application Initiated  Medication - Erleada   Sponsor - [employer]  [facility] Check:        Hiral Briggs CPhT  [facility]  Oncology Pharmacy Liaison  [email or web address]  Phone: [phone]  Fax: [phone]

## 2021-12-20 NOTE — TELEPHONE ENCOUNTER
I've reviewed the free drug application for accuracy.  Yoselin Kowalski, PharmD, BCPS, [facility]  Oncology Clinical Pharmacy Specialist  [facility]/ [facility]  [phone]

## 2021-12-30 NOTE — TELEPHONE ENCOUNTER
Per call to [facility], application is under review and determination will be made in 24-48 hours (at first they didn't see attestation form, but then she saw it and attached it to his account).

## 2022-01-05 ENCOUNTER — TELEPHONE (OUTPATIENT)
Dept: ONCOLOGY | Facility: CLINIC | Age: 67
End: 2022-01-05
Payer: MEDICARE

## 2022-01-05 NOTE — ORAL ONC MGMT
Oral Chemotherapy Monitoring Program    Placed call to patient in follow up of apalutamide therapy.    Left message to please call back in follow up of therapy. No patient or drug names were mentioned.    Macey Canela  Pharmacy Intern  Oral Chemotherapy Monitoring Program   [facility]  [phone]

## 2022-01-07 NOTE — TELEPHONE ENCOUNTER
Oral Chemotherapy Monitoring Program    Subjective/Objective:  Joel Pike is a 66 year old male contacted by phone for a follow-up visit for oral chemotherapy. Joel confirms taking the appropriate dose of Erleada 240mg (4x 80mg tablets) daily. Denies new or worsening side effects, missed doses, and recent hospital or ED visits. Patient has not had any recent medication changes.     ORAL CHEMOTHERAPY 8/30/2021 10/5/2021 10/5/2021 10/12/2021 12/11/2021 1/5/2022 1/7/2022   Assessment Type Initial Work up Left Voicemail New Teach Left Voicemail Chart Review Left Voicemail Monthly Follow up   Diagnosis Code Prostate Cancer Prostate Cancer Prostate Cancer Prostate Cancer Prostate Cancer Prostate Cancer Prostate Cancer   Providers Dr. Edil Solo   [facility]   Drug Name Xtandi (enzalutamide) Erleada (apalutamide) Erleada (apalutamide) Erleada (apalutamide) Erleada (apalutamide) Erleada (apalutamide) Erleada (apalutamide)   Dose 160 mg 240 mg 240 mg 240 mg 240 mg 240 mg 240 mg   Current Schedule Daily Daily Daily Daily - Daily Daily   Cycle Details Continuous Continuous Continuous Continuous Continuous Continuous Continuous   Start Date of Last Cycle - - - 10/5/2021 - - -   Planned next cycle start date - - 10/5/2021 11/4/2021 - - -   Doses missed in last 2 weeks - - - 0 - - 0   Adherence Assessment - - - Adherent - - Adherent   Adverse Effects - - - No AE identified during assessment - - -   Any new drug interactions? No - - No - - No   Is the dose as ordered appropriate for the patient? Yes - - Yes - - Yes   Is the patient currently in pain? - - - - - - No   Has the patient been assessed within the past 6 months for depression? - - - - - - No   Has the patient missed any days of school, work, or other routine activity? - - - - - - No   Since the last time we talked, have you been hospitalized or used the  "emergency room? - - - - - - No       Last PHQ-2 Score on record:   PHQ-2 ( 1999 [employer]) 2/26/2021 2/22/2020   Q1: Little interest or pleasure in doing things 1 1   Q2: Feeling down, depressed or hopeless 1 0   PHQ-2 Score 2 1   PHQ-2 Total Score (12-17 Years)- Positive if 3 or more points; Administer PHQ-A if positive 2 1   Q1: Little interest or pleasure in doing things Several days Several days   Q2: Feeling down, depressed or hopeless Several days Not at all   PHQ-2 Score 2 1       Vitals:  BP:   BP Readings from Last 1 Encounters:   12/03/21 (!) 153/81     Wt Readings from Last 1 Encounters:   12/03/21 103.7 kg (228 lb 9.6 oz)     Estimated body surface area is 2.26 meters squared as calculated from the following:    Height as of 11/24/21: 1.778 m (5' 10\").    Weight as of 12/3/21: 103.7 kg (228 lb 9.6 oz).    Labs:  No results found for NA within last 30 days.     No results found for K within last 30 days.     No results found for CA within last 30 days.     No results found for Mag within last 30 days.     No results found for Phos within last 30 days.     No results found for ALBUMIN within last 30 days.     No results found for BUN within last 30 days.     No results found for CR within last 30 days.     No results found for AST within last 30 days.     No results found for ALT within last 30 days.     No results found for BILITOTAL within last 30 days.     No results found for WBC within last 30 days.     No results found for HGB within last 30 days.     No results found for PLT within last 30 days.     No results found for ANC within last 30 days.       Assessment/Plan:  Joel continues to tolerate therapy well. Continue Erleada therapy as planned. Lab monitoring requirements for Erleada are     Follow-Up:  February for monthly assessment  April for follow-up visit with Dr. Edil Roa, PharmD, BCACP  Oral Chemotherapy Monitoring Program  [facility]  [phone]   "

## 2022-01-10 ENCOUNTER — TELEPHONE (OUTPATIENT)
Dept: ONCOLOGY | Facility: CLINIC | Age: 67
End: 2022-01-10
Payer: MEDICARE

## 2022-01-10 NOTE — TELEPHONE ENCOUNTER
"Oral Chemotherapy Monitoring Program     Placed call to Joel Pike in follow up of Erleada oral chemotherapy. Spoke to pt and relayed message from Dr. Solo in regards to labs. Per Dr. Solo he would like pt to get CBC/CMP monthly, TSH/T4 q3 months, and Fasting lipids q3 months. He also notes \"Of those the TSH is the most likely to become abnormal on Apalutamide\". Pt requested IB message sent to Maria Elena to schedule appt. IB message sent. Pt to call if he does not see anything scheduled by tomorrow. Pt verbalized understanding and agrees to plan. Encouraged pt to call with any issues or concerns.      Magaly Meadows, PharmD, BCACP  Oral Chemotherapy Monitoring Program  [facility]  [phone]  January 10, 2022  "

## 2022-01-11 ENCOUNTER — PATIENT OUTREACH (OUTPATIENT)
Dept: ONCOLOGY | Facility: CLINIC | Age: 67
End: 2022-01-11
Payer: MEDICARE

## 2022-01-11 NOTE — PROGRESS NOTES
TC to pt to discuss making appt for labs and PILAR. Plan made for pt to get labs drawn at [facility] and have a video visit with an PILAR to discuss results and any side effects of concern with being apalutamide. Message sent to scheduling to make appt.

## 2022-01-12 NOTE — PROGRESS NOTES
Joel is a 66 year old who is being evaluated via a billable video visit.      How would you like to obtain your AVS? MyChart  If the video visit is dropped, the invitation should be resent by: Send to e-mail at: [email or web address]  Will anyone else be joining your video visit? Yes: Michelle - wife. How would they like to receive their invitation? Text to cell phone: in person      Video-Visit Details    Type of service:  Video Visit    Video Start Time: 9:20AM    Video End Time:9:42AM    Originating Location (pt. Location): Home    Distant Location (provider location):  [facility]     Platform used for Video Visit: [facility] Medical Oncology Followup Note       Date of visit: Jan 14, 2022    CC:   metastatic prostate cancer     INTERVAL HISTORY:  Joel presents for oncology follow-up visit via video today. Accompanied by his wife, Michelle. He has been feeling well. His appetite is good. He denies nausea or vomiting. He is tolerating Apalutamide. Normal bowel function. Reports hot flashes are about the same. He received pneumonia shot last month and had chills after. Now doing well without recurrence of chills. No fevers. He is breathing well. No CP. No skin rashes. Continues to work part time at [employer] in the [employer] dept. He denies headaches. He is occasionally dizzy. Wife reports he does not do a good job with oral hydration.     ROS is otherwise negative.     ONCOLOGY HISTORY:   -Elevated PSA noted 2/26/21 at 12.70. Previously normal in 2017.     -4/7/21-Initial urology visit.   -7/2/21-prostate biopsy 4+3, 9/12 biopsies positive.  Ductal component noted.     -7/28/21-MR prostate-PIRADS 5 lesion, R and L sided lesions with likely    neurovascular bundle invasion. No  seminal vesicle involvement. No clear LAD,  but some indeterminate pelvic nodes.  No suspicious bone lesions.     -7/28/21-NM bone scan suspicious lesion of R sacral ala S3 level.     -8/10/21-CT A/P  with multiple enlarged periaortic/iliac LN   -8/11/21-First eligard dose 45mg (Q6M dosing). Due next 2/2022.   -8/30/21-Initial medical oncology visit with Dr. Solo.  Unclear if bony lesion is metastasis based on current imaging.  Will start enzalutamide and continue    Eligard (next due 2/2022).     8/30/21 PSA =30.40 Pre Rx  9/27/21 PSA =8.99 (T=6); HgbA1C = 6.1  10/5/21 PSA =3.22  11/26/21 PSA =0.15  1/13/22 PSA =0.04      PMH:  HTN, HLD, bipolar disorder, DM2 on metformin     PSH:  Bilateral TKA, bilateral cataract extraction, bilateral carpal tunnel release     FAMILY HX:  No reported family history of prostate cancer     SOCIAL:  Retired, works at [employer] in [employer] section now  Never smoker.   No EtOH  No recreational drugs.   No herbs/supplements other than on medication list.      Video physical exam  General: Patient appears well in no acute distress.   Skin: No visualized rash or lesions on visualized skin  Resp: Appears to be breathing comfortably without accessory muscle usage, speaking in full sentences, no cough  MSK: Appears to have normal range of motion based on visualized movements  Neurologic: No apparent tremors, facial movements symmetric  Psych: affect normal, alert and oriented    The rest of a comprehensive physical examination is deferred due to PHE (public health emergency) video restrictions    LABS: Reviewed labs done 1/13/22.    Ref. Range 1/13/2022 09:47   Sodium Latest Ref Range: 133 - 144 mmol/L 135   Potassium Latest Ref Range: 3.4 - 5.3 mmol/L 4.1   Chloride Latest Ref Range: 94 - 109 mmol/L 103   Carbon Dioxide Latest Ref Range: 20 - 32 mmol/L 27   Urea Nitrogen Latest Ref Range: 7 - 30 mg/dL 26   Creatinine Latest Ref Range: 0.66 - 1.25 mg/dL 1.14   GFR Estimate Latest Ref Range: >60 mL/min/1.73m2 71   Calcium Latest Ref Range: 8.5 - 10.1 mg/dL 9.3   Anion Gap Latest Ref Range: 3 - 14 mmol/L 5   Albumin Latest Ref Range: 3.4 - 5.0 g/dL 3.9   Protein Total Latest Ref Range: 6.8 -  8.8 g/dL 7.3   Bilirubin Total Latest Ref Range: 0.2 - 1.3 mg/dL 0.5   Alkaline Phosphatase Latest Ref Range: 40 - 150 U/L 74   ALT Latest Ref Range: 0 - 70 U/L 40   AST Latest Ref Range: 0 - 45 U/L 19   Cholesterol Latest Ref Range: <200 mg/dL 247 (H)   Patient Fasting > 8hrs? Unknown No   HDL Cholesterol Latest Ref Range: >=40 mg/dL 37 (L)   LDL Cholesterol Calculated Latest Ref Range: <=100 mg/dL 169 (H)   Non HDL Cholesterol Latest Ref Range: <130 mg/dL 210 (H)   PSA Latest Ref Range: 0.00 - 4.00 ug/L 0.04   T4 Free Latest Ref Range: 0.76 - 1.46 ng/dL 0.93   Triglycerides Latest Ref Range: <150 mg/dL 206 (H)   TSH Latest Ref Range: 0.40 - 4.00 mU/L 5.87 (H)   Glucose Latest Ref Range: 70 - 99 mg/dL 142 (H)   WBC Latest Ref Range: 4.0 - 11.0 10e3/uL 6.1   Hemoglobin Latest Ref Range: 13.3 - 17.7 g/dL 13.8   Hematocrit Latest Ref Range: 40.0 - 53.0 % 39.1 (L)   Platelet Count Latest Ref Range: 150 - 450 10e3/uL 206   RBC Count Latest Ref Range: 4.40 - 5.90 10e6/uL 4.24 (L)   MCV Latest Ref Range: 78 - 100 fL 92   MCH Latest Ref Range: 26.5 - 33.0 pg 32.5   MCHC Latest Ref Range: 31.5 - 36.5 g/dL 35.3   RDW Latest Ref Range: 10.0 - 15.0 % 12.1   % Neutrophils Latest Units: % 69   % Lymphocytes Latest Units: % 14   % Monocytes Latest Units: % 11   % Eosinophils Latest Units: % 4   % Basophils Latest Units: % 1   Absolute Basophils Latest Ref Range: 0.0 - 0.2 10e3/uL 0.0   Absolute Eosinophils Latest Ref Range: 0.0 - 0.7 10e3/uL 0.2   Absolute Immature Granulocytes Latest Ref Range: <=0.4 10e3/uL 0.1   Absolute Lymphocytes Latest Ref Range: 0.8 - 5.3 10e3/uL 0.9   Absolute Monocytes Latest Ref Range: 0.0 - 1.3 10e3/uL 0.7   % Immature Granulocytes Latest Units: % 1   Absolute Neutrophils Latest Ref Range: 1.6 - 8.3 10e3/uL 4.3   Absolute NRBCs Latest Units: 10e3/uL 0.0   NRBCs per 100 WBC Latest Ref Range: <1 /100 0       IMPRESSION AND PLAN:  Joel Pike is a 66 year old M with PMH of DM2 on metformin,  depression/anxiety (pt reports as bipolar d/o), HTN, and HLD with prostate cancer with possible bone metastasis.  Previously, Dr. Solo had a lengthy discussion at his initial consult visit and went over his pathology and staging scan results.  Previously discussed that it is not clear whether the lesion in wing of his ala is a true bony metastasis or whether it is another finding/artifact.  Previously discussed that this does not change the overall treatment of his disease with ADT, but may change whether he receives radiation to his bony lesion in the future or not.  The role of ADT and the addition of enzalutamide were previously discussed.  Given his existing DM2, we will attempt to avoid additional prednisone use by choosing enzalutamide.  There is a small, but non-zero risk of seizure with enzalutamide along with bupropion, which we will monitor him for.  We will refer to radiation oncology for evaluation/opinion on radiation of his possible bony lesion in addition to radiation of the prostate and RP LN bed.  Previously discussed ADT and RT work in tandem to treat therapy and ADT will target systemic disease as well, including circulating tumor cells.      -Enzalutamide was declined by patient's insurance company.  He started apalutamide on 10/5/21. He had CT CAP done 11/26/21 which showed significantly decreased retroperitoneal lymphadenopathy since the prior study dated 8/10/2021 indicates good response to treatment. No other evidence for lymphadenopathy or metastasis is identified. Specifically no evidence for left sacral metastasis is seen. Continue current dose of Apalutamide.   -His PSA continues to trend down. Most recently 0.04 (1/13/22).   -Plan is to continue ADT for 2 years (through 8/2023).  His next Eligard is due 2/2022.    -Plan for repeat scans in early April 2022 and re-consider RT at that time.   -He has baseline HTN and is on Losartan 75mg daily, managed by his PCP. I asked him to get BP cuff  and monitor BP same time every day for the next week. I will ask oral chemo pharmacy to call patient next week to follow-up. I asked patient to call us if his BP is consistently over 140/90. He verbalized understanding.   -He has baseline HLD and currently on Zetia 10mg daily per his PCP. Lipid panel from 1/13 showed total cholesterol, triglycerides, and LDL cholesterol a little worse since it was last checked a month ago. Recommendations were made for a healthy diet of minimally processed foods and reduction in sugar as possible. Increase fruits/vegetable intake. He has a treadmill. We discussed that he should try to walk on the treadmill for 30 mins 5 days a week as tolerated. He states he is also working to get an appt with dietician. I also asked him to follow-up with PCP Dr. Lin to see if any adjustments need to be made for the Zetia based on recent cholesterol labs. He will message Dr. Lin. Pt has annual physical exam next month.   -He is on levothyroxine 112 mcg daily per PCP.   -Continue to follow-up with PCP Dr. Lin for routine health maintenance. I reminded patient to call us if any issues or problems arise in between visits.     RTC for labs, PILAR visit, and Eligard in 1 month.     50 minutes spent on the date of the encounter doing chart review, review of test results, interpretation of tests, patient visit and documentation     Liliya De PA-C  [facility]  [address] [place] [id number] [phone]

## 2022-01-13 ENCOUNTER — LAB (OUTPATIENT)
Dept: LAB | Facility: CLINIC | Age: 67
End: 2022-01-13
Payer: MEDICARE

## 2022-01-13 DIAGNOSIS — C61 PROSTATE CANCER (H): ICD-10-CM

## 2022-01-13 DIAGNOSIS — Z79.899 ENCOUNTER FOR LONG-TERM (CURRENT) USE OF MEDICATIONS: ICD-10-CM

## 2022-01-13 LAB
ALBUMIN SERPL-MCNC: 3.9 G/DL (ref 3.4–5)
ALP SERPL-CCNC: 74 U/L (ref 40–150)
ALT SERPL W P-5'-P-CCNC: 40 U/L (ref 0–70)
ANION GAP SERPL CALCULATED.3IONS-SCNC: 5 MMOL/L (ref 3–14)
AST SERPL W P-5'-P-CCNC: 19 U/L (ref 0–45)
BASOPHILS # BLD AUTO: 0 10E3/UL (ref 0–0.2)
BASOPHILS NFR BLD AUTO: 1 %
BILIRUB SERPL-MCNC: 0.5 MG/DL (ref 0.2–1.3)
BUN SERPL-MCNC: 26 MG/DL (ref 7–30)
CALCIUM SERPL-MCNC: 9.3 MG/DL (ref 8.5–10.1)
CHLORIDE BLD-SCNC: 103 MMOL/L (ref 94–109)
CHOLEST SERPL-MCNC: 247 MG/DL
CO2 SERPL-SCNC: 27 MMOL/L (ref 20–32)
CREAT SERPL-MCNC: 1.14 MG/DL (ref 0.66–1.25)
EOSINOPHIL # BLD AUTO: 0.2 10E3/UL (ref 0–0.7)
EOSINOPHIL NFR BLD AUTO: 4 %
ERYTHROCYTE [DISTWIDTH] IN BLOOD BY AUTOMATED COUNT: 12.1 % (ref 10–15)
FASTING STATUS PATIENT QL REPORTED: NO
GFR SERPL CREATININE-BSD FRML MDRD: 71 ML/MIN/1.73M2
GLUCOSE BLD-MCNC: 142 MG/DL (ref 70–99)
HCT VFR BLD AUTO: 39.1 % (ref 40–53)
HDLC SERPL-MCNC: 37 MG/DL
HGB BLD-MCNC: 13.8 G/DL (ref 13.3–17.7)
IMM GRANULOCYTES # BLD: 0.1 10E3/UL
IMM GRANULOCYTES NFR BLD: 1 %
LDLC SERPL CALC-MCNC: 169 MG/DL
LYMPHOCYTES # BLD AUTO: 0.9 10E3/UL (ref 0.8–5.3)
LYMPHOCYTES NFR BLD AUTO: 14 %
MCH RBC QN AUTO: 32.5 PG (ref 26.5–33)
MCHC RBC AUTO-ENTMCNC: 35.3 G/DL (ref 31.5–36.5)
MCV RBC AUTO: 92 FL (ref 78–100)
MONOCYTES # BLD AUTO: 0.7 10E3/UL (ref 0–1.3)
MONOCYTES NFR BLD AUTO: 11 %
NEUTROPHILS # BLD AUTO: 4.3 10E3/UL (ref 1.6–8.3)
NEUTROPHILS NFR BLD AUTO: 69 %
NONHDLC SERPL-MCNC: 210 MG/DL
NRBC # BLD AUTO: 0 10E3/UL
NRBC BLD AUTO-RTO: 0 /100
PLATELET # BLD AUTO: 206 10E3/UL (ref 150–450)
POTASSIUM BLD-SCNC: 4.1 MMOL/L (ref 3.4–5.3)
PROT SERPL-MCNC: 7.3 G/DL (ref 6.8–8.8)
PSA SERPL-MCNC: 0.04 UG/L (ref 0–4)
RBC # BLD AUTO: 4.24 10E6/UL (ref 4.4–5.9)
SODIUM SERPL-SCNC: 135 MMOL/L (ref 133–144)
T4 FREE SERPL-MCNC: 0.93 NG/DL (ref 0.76–1.46)
TRIGL SERPL-MCNC: 206 MG/DL
TSH SERPL DL<=0.005 MIU/L-ACNC: 5.87 MU/L (ref 0.4–4)
WBC # BLD AUTO: 6.1 10E3/UL (ref 4–11)

## 2022-01-13 PROCEDURE — 80061 LIPID PANEL: CPT

## 2022-01-13 PROCEDURE — 80053 COMPREHEN METABOLIC PANEL: CPT

## 2022-01-13 PROCEDURE — 85025 COMPLETE CBC W/AUTO DIFF WBC: CPT

## 2022-01-13 PROCEDURE — 84153 ASSAY OF PSA TOTAL: CPT

## 2022-01-13 PROCEDURE — 84443 ASSAY THYROID STIM HORMONE: CPT

## 2022-01-13 PROCEDURE — 84439 ASSAY OF FREE THYROXINE: CPT

## 2022-01-13 PROCEDURE — 36415 COLL VENOUS BLD VENIPUNCTURE: CPT

## 2022-01-14 ENCOUNTER — VIRTUAL VISIT (OUTPATIENT)
Dept: ONCOLOGY | Facility: CLINIC | Age: 67
End: 2022-01-14
Attending: INTERNAL MEDICINE
Payer: MEDICARE

## 2022-01-14 DIAGNOSIS — C61 PROSTATE CANCER (H): Primary | ICD-10-CM

## 2022-01-14 PROCEDURE — 99215 OFFICE O/P EST HI 40 MIN: CPT | Mod: 95 | Performed by: PHYSICIAN ASSISTANT

## 2022-01-14 PROCEDURE — G0463 HOSPITAL OUTPT CLINIC VISIT: HCPCS | Mod: PN,RTG | Performed by: PHYSICIAN ASSISTANT

## 2022-01-14 NOTE — LETTER
1/14/2022         RE: Joel Pike  [address]  [facility] [address]      Joel is a 66 year old who is being evaluated via a billable video visit.      How would you like to obtain your AVS? MyChart  If the video visit is dropped, the invitation should be resent by: Send to e-mail at: [email or web address]  Will anyone else be joining your video visit? Yes: Michelle - wife. How would they like to receive their invitation? Text to cell phone: in person          [facility] Medical Oncology Followup Note       Date of visit: Jan 14, 2022    CC:   metastatic prostate cancer     INTERVAL HISTORY:  Joel presents for oncology follow-up visit via video today. Accompanied by his wife, Michelle. He has been feeling well. His appetite is good. He denies nausea or vomiting. He is tolerating Apalutamide. Normal bowel function. Reports hot flashes are about the same. He received pneumonia shot last month and had chills after. Now doing well without recurrence of chills. No fevers. He is breathing well. No CP. No skin rashes. Continues to work part time at [employer] in the [employer] dept. He denies headaches. He is occasionally dizzy. Wife reports he does not do a good job with oral hydration.     ROS is otherwise negative.     ONCOLOGY HISTORY:   -Elevated PSA noted 2/26/21 at 12.70. Previously normal in 2017.     -4/7/21-Initial urology visit.   -7/2/21-prostate biopsy 4+3, 9/12 biopsies positive.  Ductal component noted.     -7/28/21-MR prostate-PIRADS 5 lesion, R and L sided lesions with likely    neurovascular bundle invasion. No  seminal vesicle involvement. No clear LAD,  but some indeterminate pelvic nodes.  No suspicious bone lesions.     -7/28/21-NM bone scan suspicious lesion of R sacral ala S3 level.     -8/10/21-CT A/P with multiple enlarged periaortic/iliac LN   -8/11/21-First eligard dose 45mg (Q6M dosing). Due next 2/2022.   -8/30/21-Initial medical oncology visit with Dr. Solo.  Unclear if bony lesion is  metastasis based on current imaging.  Will start enzalutamide and continue    Eligard (next due 2/2022).     8/30/21 PSA =30.40 Pre Rx  9/27/21 PSA =8.99 (T=6); HgbA1C = 6.1  10/5/21 PSA =3.22  11/26/21 PSA =0.15  1/13/22 PSA =0.04      PMH:  HTN, HLD, bipolar disorder, DM2 on metformin     PSH:  Bilateral TKA, bilateral cataract extraction, bilateral carpal tunnel release     FAMILY HX:  No reported family history of prostate cancer     SOCIAL:  Retired, works at [employer] in [place] section now  Never smoker.   No EtOH  No recreational drugs.   No herbs/supplements other than on medication list.      Video physical exam  General: Patient appears well in no acute distress.   Skin: No visualized rash or lesions on visualized skin  Resp: Appears to be breathing comfortably without accessory muscle usage, speaking in full sentences, no cough  MSK: Appears to have normal range of motion based on visualized movements  Neurologic: No apparent tremors, facial movements symmetric  Psych: affect normal, alert and oriented    The rest of a comprehensive physical examination is deferred due to PHE (public health emergency) video restrictions    LABS: Reviewed labs done 1/13/22.    Ref. Range 1/13/2022 09:47   Sodium Latest Ref Range: 133 - 144 mmol/L 135   Potassium Latest Ref Range: 3.4 - 5.3 mmol/L 4.1   Chloride Latest Ref Range: 94 - 109 mmol/L 103   Carbon Dioxide Latest Ref Range: 20 - 32 mmol/L 27   Urea Nitrogen Latest Ref Range: 7 - 30 mg/dL 26   Creatinine Latest Ref Range: 0.66 - 1.25 mg/dL 1.14   GFR Estimate Latest Ref Range: >60 mL/min/1.73m2 71   Calcium Latest Ref Range: 8.5 - 10.1 mg/dL 9.3   Anion Gap Latest Ref Range: 3 - 14 mmol/L 5   Albumin Latest Ref Range: 3.4 - 5.0 g/dL 3.9   Protein Total Latest Ref Range: 6.8 - 8.8 g/dL 7.3   Bilirubin Total Latest Ref Range: 0.2 - 1.3 mg/dL 0.5   Alkaline Phosphatase Latest Ref Range: 40 - 150 U/L 74   ALT Latest Ref Range: 0 - 70 U/L 40   AST Latest Ref Range: 0 -  45 U/L 19   Cholesterol Latest Ref Range: <200 mg/dL 247 (H)   Patient Fasting > 8hrs? Unknown No   HDL Cholesterol Latest Ref Range: >=40 mg/dL 37 (L)   LDL Cholesterol Calculated Latest Ref Range: <=100 mg/dL 169 (H)   Non HDL Cholesterol Latest Ref Range: <130 mg/dL 210 (H)   PSA Latest Ref Range: 0.00 - 4.00 ug/L 0.04   T4 Free Latest Ref Range: 0.76 - 1.46 ng/dL 0.93   Triglycerides Latest Ref Range: <150 mg/dL 206 (H)   TSH Latest Ref Range: 0.40 - 4.00 mU/L 5.87 (H)   Glucose Latest Ref Range: 70 - 99 mg/dL 142 (H)   WBC Latest Ref Range: 4.0 - 11.0 10e3/uL 6.1   Hemoglobin Latest Ref Range: 13.3 - 17.7 g/dL 13.8   Hematocrit Latest Ref Range: 40.0 - 53.0 % 39.1 (L)   Platelet Count Latest Ref Range: 150 - 450 10e3/uL 206   RBC Count Latest Ref Range: 4.40 - 5.90 10e6/uL 4.24 (L)   MCV Latest Ref Range: 78 - 100 fL 92   MCH Latest Ref Range: 26.5 - 33.0 pg 32.5   MCHC Latest Ref Range: 31.5 - 36.5 g/dL 35.3   RDW Latest Ref Range: 10.0 - 15.0 % 12.1   % Neutrophils Latest Units: % 69   % Lymphocytes Latest Units: % 14   % Monocytes Latest Units: % 11   % Eosinophils Latest Units: % 4   % Basophils Latest Units: % 1   Absolute Basophils Latest Ref Range: 0.0 - 0.2 10e3/uL 0.0   Absolute Eosinophils Latest Ref Range: 0.0 - 0.7 10e3/uL 0.2   Absolute Immature Granulocytes Latest Ref Range: <=0.4 10e3/uL 0.1   Absolute Lymphocytes Latest Ref Range: 0.8 - 5.3 10e3/uL 0.9   Absolute Monocytes Latest Ref Range: 0.0 - 1.3 10e3/uL 0.7   % Immature Granulocytes Latest Units: % 1   Absolute Neutrophils Latest Ref Range: 1.6 - 8.3 10e3/uL 4.3   Absolute NRBCs Latest Units: 10e3/uL 0.0   NRBCs per 100 WBC Latest Ref Range: <1 /100 0       IMPRESSION AND PLAN:  Joel Pike is a 66 year old M with PMH of DM2 on metformin, depression/anxiety (pt reports as bipolar d/o), HTN, and HLD with prostate cancer with possible bone metastasis.  Previously, Dr. Solo had a lengthy discussion at his initial consult visit and went over  his pathology and staging scan results.  Previously discussed that it is not clear whether the lesion in wing of his ala is a true bony metastasis or whether it is another finding/artifact.  Previously discussed that this does not change the overall treatment of his disease with ADT, but may change whether he receives radiation to his bony lesion in the future or not.  The role of ADT and the addition of enzalutamide were previously discussed.  Given his existing DM2, we will attempt to avoid additional prednisone use by choosing enzalutamide.  There is a small, but non-zero risk of seizure with enzalutamide along with bupropion, which we will monitor him for.  We will refer to radiation oncology for evaluation/opinion on radiation of his possible bony lesion in addition to radiation of the prostate and RP LN bed.  Previously discussed ADT and RT work in tandem to treat therapy and ADT will target systemic disease as well, including circulating tumor cells.      -Enzalutamide was declined by patient's insurance company.  He started apalutamide on 10/5/21. He had CT CAP done 11/26/21 which showed significantly decreased retroperitoneal lymphadenopathy since the prior study dated 8/10/2021 indicates good response to treatment. No other evidence for lymphadenopathy or metastasis is identified. Specifically no evidence for left sacral metastasis is seen. Continue current dose of Apalutamide.   -His PSA continues to trend down. Most recently 0.04 (1/13/22).   -Plan is to continue ADT for 2 years (through 8/2023).  His next Eligard is due 2/2022.    -Plan for repeat scans in early April 2022 and re-consider RT at that time.   -He has baseline HTN and is on Losartan 75mg daily, managed by his PCP. I asked him to get BP cuff and monitor BP same time every day for the next week. I will ask oral chemo pharmacy to call patient next week to follow-up. I asked patient to call us if his BP is consistently over 140/90. He  verbalized understanding.   -He has baseline HLD and currently on Zetia 10mg daily per his PCP. Lipid panel from 1/13 showed total cholesterol, triglycerides, and LDL cholesterol a little worse since it was last checked a month ago. Recommendations were made for a healthy diet of minimally processed foods and reduction in sugar as possible. Increase fruits/vegetable intake. He has a treadmill. We discussed that he should try to walk on the treadmill for 30 mins 5 days a week as tolerated. He states he is also working to get an appt with dietician. I also asked him to follow-up with PCP Dr. Lin to see if any adjustments need to be made for the Zetia based on recent cholesterol labs. He will message Dr. Lin. Pt has annual physical exam next month.   -He is on levothyroxine 112 mcg daily per PCP.   -Continue to follow-up with PCP Dr. Lin for routine health maintenance. I reminded patient to call us if any issues or problems arise in between visits.     RTC for labs, PILAR visit, and Eligard in 1 month.     50 minutes spent on the date of the encounter doing chart review, review of test results, interpretation of tests, patient visit and documentation         Liliya De PA-C

## 2022-01-20 ENCOUNTER — MYC MEDICAL ADVICE (OUTPATIENT)
Dept: FAMILY MEDICINE | Facility: CLINIC | Age: 67
End: 2022-01-20
Payer: MEDICARE

## 2022-01-20 ENCOUNTER — TELEPHONE (OUTPATIENT)
Dept: ONCOLOGY | Facility: CLINIC | Age: 67
End: 2022-01-20
Payer: MEDICARE

## 2022-01-20 NOTE — PROGRESS NOTES
CLINICAL NUTRITION SERVICES- ONCOLOGY DISTRESS SCREENING     Identified Concern and Score From Distress Screening:   Patient requested to speak with a Dietitian on the Oncology Distress Screening tool.  This patient has scored >= 6 on Nutrition question 1 and/or 2 on the Oncology Distress Screening questionaire. Nutritionist Referral recommended. See Onc Distress Screening Flowsheet    Date of Distress Screenin/14     Findings: Joel reports that his appetite is good. He eats 3 meals a day and snacks. Pt wondering if there is anything special he should be doing for his diet. Also wondering how to increase his water intake.      Intervention: Encouraged general healthy diet. Discussed ways to increase fluid intake like adding flavorings to water and drinking water with all meals and snacks.      Education Provided: as above     Follow-up Required: MARIPOSA Romero RD, LD  [phone]

## 2022-01-21 ENCOUNTER — TELEPHONE (OUTPATIENT)
Dept: ONCOLOGY | Facility: CLINIC | Age: 67
End: 2022-01-21
Payer: MEDICARE

## 2022-01-21 NOTE — ORAL ONC MGMT
Oral Chemotherapy Monitoring Program    Subjective/Objective:  Joel Pike is a 66 year old male contacted by phone for a follow-up visit for oral chemotherapy. Following up with Joel at the request of Liliya De, who was wanting us to check Joel's blood pressure. Joel tells me he has checked his BP 4 times the past week, at the same time in the afternoon:    Blood Pressure Pulse   142/78 75   154/82 68   151/81 64   140/72 59     Joel confirms taking losartan 75 mg daily. No other new medications to his medication list. No new missed doses and no real changes in his adverse effects.    ORAL CHEMOTHERAPY 10/5/2021 10/12/2021 12/11/2021 1/5/2022 1/7/2022 1/10/2022 1/21/2022   Assessment Type New Teach Left Voicemail Chart Review Left Voicemail Monthly Follow up Other Other   Diagnosis Code Prostate Cancer Prostate Cancer Prostate Cancer Prostate Cancer Prostate Cancer Prostate Cancer Prostate Cancer   Providers Dr. Edil Solo   [facility]   Drug Name Erleada (apalutamide) Erleada (apalutamide) Erleada (apalutamide) Erleada (apalutamide) Erleada (apalutamide) Erleada (apalutamide) Erleada (apalutamide)   Dose 240 mg 240 mg 240 mg 240 mg 240 mg 240 mg 240 mg   Current Schedule Daily Daily - Daily Daily Daily Daily   Cycle Details Continuous Continuous Continuous Continuous Continuous Continuous Continuous   Start Date of Last Cycle - 10/5/2021 - - - - -   Planned next cycle start date 10/5/2021 11/4/2021 - - - - -   Doses missed in last 2 weeks - 0 - - 0 - 0   Adherence Assessment - Adherent - - Adherent - Adherent   Adverse Effects - No AE identified during assessment - - - - -   Any new drug interactions? - No - - No - -   Is the dose as ordered appropriate for the patient? - Yes - - Yes - -   Is the patient currently in pain? - - - - No - -   Has the patient been assessed within the past 6 months for  "depression? - - - - No - -   Has the patient missed any days of school, work, or other routine activity? - - - - No - -   Since the last time we talked, have you been hospitalized or used the emergency room? - - - - No - -       Last PHQ-2 Score on record:   PHQ-2 ( 1999 [employer]) 2/26/2021 2/22/2020   Q1: Little interest or pleasure in doing things 1 1   Q2: Feeling down, depressed or hopeless 1 0   PHQ-2 Score 2 1   PHQ-2 Total Score (12-17 Years)- Positive if 3 or more points; Administer PHQ-A if positive 2 1   Q1: Little interest or pleasure in doing things Several days Several days   Q2: Feeling down, depressed or hopeless Several days Not at all   PHQ-2 Score 2 1       Vitals:  BP:   BP Readings from Last 1 Encounters:   12/03/21 (!) 153/81     Wt Readings from Last 1 Encounters:   12/03/21 103.7 kg (228 lb 9.6 oz)     Estimated body surface area is 2.26 meters squared as calculated from the following:    Height as of 11/24/21: 1.778 m (5' 10\").    Weight as of 12/3/21: 103.7 kg (228 lb 9.6 oz).    Labs:  _  Result Component Current Result Ref Range   Sodium 135 (1/13/2022) 133 - 144 mmol/L     _  Result Component Current Result Ref Range   Potassium 4.1 (1/13/2022) 3.4 - 5.3 mmol/L     _  Result Component Current Result Ref Range   Calcium 9.3 (1/13/2022) 8.5 - 10.1 mg/dL     No results found for Mag within last 30 days.     No results found for Phos within last 30 days.     _  Result Component Current Result Ref Range   Albumin 3.9 (1/13/2022) 3.4 - 5.0 g/dL     _  Result Component Current Result Ref Range   Urea Nitrogen 26 (1/13/2022) 7 - 30 mg/dL     _  Result Component Current Result Ref Range   Creatinine 1.14 (1/13/2022) 0.66 - 1.25 mg/dL     _  Result Component Current Result Ref Range   AST 19 (1/13/2022) 0 - 45 U/L     _  Result Component Current Result Ref Range   ALT 40 (1/13/2022) 0 - 70 U/L     _  Result Component Current Result Ref Range   Bilirubin Total 0.5 (1/13/2022) 0.2 - 1.3 mg/dL "     _  Result Component Current Result Ref Range   WBC Count 6.1 (1/13/2022) 4.0 - 11.0 10e3/uL     _  Result Component Current Result Ref Range   Hemoglobin 13.8 (1/13/2022) 13.3 - 17.7 g/dL     _  Result Component Current Result Ref Range   Platelet Count 206 (1/13/2022) 150 - 450 10e3/uL     No results found for ANC within last 30 days.     Assessment/Plan:  Will send the BP information above to Liliya De and follow-up as necessary per her recommendations as all of these were above current goal of 140/90. Patient is on losartan. Amlodipine is metabolized via CYP enzymes so would likely not the most ideal while patient is on Erleada. Beta-blocker aren't the best for hypertension and the patient is already experiencing considerable fatigue which they would likely worsen. Likely don't want a diuretic given kidney function and potential for electrolyte imbalances. If decision is to add another agent for BP may need to consider an alternative therapy such as hydralazine or clonidine.    Dougie Bradley, PharmD, BCPS, BCOP  Hematology/Oncology Clinical Pharmacist  Oral Chemotherapy Monitoring Program  [facility]  [phone]

## 2022-01-24 NOTE — TELEPHONE ENCOUNTER
Oral Chemotherapy Monitoring Program     Placed call to patient in follow up of oral chemotherapy. Per Liliya, she would like the BP to be managed by PCP. Called Joel to relay this message, and he shared with me that his previous readings are inaccurate because he found out he was using his blood pressure cuff wrong. Since he started using his cuff correctly, he has had the below readings.     BP readings:   /69  /82  /74    Joel has a PCP visit next week, he will address BP with Dr. Lin. He will also bring his cuff to the clinic to ensure accuracy of the cuff and of his technique.     Jaqueline Boykin, PharmD  Hematology/Oncology Clinical Pharmacist  [facility]  [facility]

## 2022-01-31 ENCOUNTER — OFFICE VISIT (OUTPATIENT)
Dept: FAMILY MEDICINE | Facility: CLINIC | Age: 67
End: 2022-01-31
Payer: MEDICARE

## 2022-01-31 VITALS
RESPIRATION RATE: 18 BRPM | HEART RATE: 66 BPM | OXYGEN SATURATION: 100 % | BODY MASS INDEX: 38.11 KG/M2 | HEIGHT: 71 IN | SYSTOLIC BLOOD PRESSURE: 116 MMHG | WEIGHT: 272.2 LBS | TEMPERATURE: 97.6 F | DIASTOLIC BLOOD PRESSURE: 60 MMHG

## 2022-01-31 DIAGNOSIS — Z79.4 TYPE 2 DIABETES MELLITUS WITH STAGE 2 CHRONIC KIDNEY DISEASE, WITH LONG-TERM CURRENT USE OF INSULIN (H): Primary | ICD-10-CM

## 2022-01-31 DIAGNOSIS — I10 ESSENTIAL HYPERTENSION: ICD-10-CM

## 2022-01-31 DIAGNOSIS — E03.8 OTHER SPECIFIED HYPOTHYROIDISM: ICD-10-CM

## 2022-01-31 DIAGNOSIS — N18.2 TYPE 2 DIABETES MELLITUS WITH STAGE 2 CHRONIC KIDNEY DISEASE, WITH LONG-TERM CURRENT USE OF INSULIN (H): Primary | ICD-10-CM

## 2022-01-31 DIAGNOSIS — E11.22 TYPE 2 DIABETES MELLITUS WITH STAGE 2 CHRONIC KIDNEY DISEASE, WITH LONG-TERM CURRENT USE OF INSULIN (H): Primary | ICD-10-CM

## 2022-01-31 DIAGNOSIS — E78.5 HYPERLIPIDEMIA LDL GOAL <100: ICD-10-CM

## 2022-01-31 DIAGNOSIS — F33.42 RECURRENT MAJOR DEPRESSION IN COMPLETE REMISSION (H): ICD-10-CM

## 2022-01-31 DIAGNOSIS — C61 PROSTATE CANCER (H): ICD-10-CM

## 2022-01-31 DIAGNOSIS — E66.01 MORBID OBESITY (H): ICD-10-CM

## 2022-01-31 LAB
ALBUMIN SERPL-MCNC: 3.7 G/DL (ref 3.4–5)
ALP SERPL-CCNC: 68 U/L (ref 40–150)
ALT SERPL W P-5'-P-CCNC: 34 U/L (ref 0–70)
ANION GAP SERPL CALCULATED.3IONS-SCNC: 6 MMOL/L (ref 3–14)
AST SERPL W P-5'-P-CCNC: 19 U/L (ref 0–45)
BASOPHILS # BLD AUTO: 0 10E3/UL (ref 0–0.2)
BASOPHILS NFR BLD AUTO: 1 %
BILIRUB SERPL-MCNC: 0.4 MG/DL (ref 0.2–1.3)
BUN SERPL-MCNC: 21 MG/DL (ref 7–30)
CALCIUM SERPL-MCNC: 9.1 MG/DL (ref 8.5–10.1)
CHLORIDE BLD-SCNC: 105 MMOL/L (ref 94–109)
CHOLEST SERPL-MCNC: 222 MG/DL
CO2 SERPL-SCNC: 26 MMOL/L (ref 20–32)
CREAT SERPL-MCNC: 1.06 MG/DL (ref 0.66–1.25)
CREAT UR-MCNC: 97 MG/DL
EOSINOPHIL # BLD AUTO: 0.2 10E3/UL (ref 0–0.7)
EOSINOPHIL NFR BLD AUTO: 3 %
ERYTHROCYTE [DISTWIDTH] IN BLOOD BY AUTOMATED COUNT: 12 % (ref 10–15)
FASTING STATUS PATIENT QL REPORTED: YES
GFR SERPL CREATININE-BSD FRML MDRD: 78 ML/MIN/1.73M2
GLUCOSE BLD-MCNC: 134 MG/DL (ref 70–99)
HBA1C MFR BLD: 6.3 % (ref 0–5.6)
HCT VFR BLD AUTO: 36.5 % (ref 40–53)
HDLC SERPL-MCNC: 34 MG/DL
HGB BLD-MCNC: 12.9 G/DL (ref 13.3–17.7)
IMM GRANULOCYTES # BLD: 0 10E3/UL
IMM GRANULOCYTES NFR BLD: 0 %
LDLC SERPL CALC-MCNC: 156 MG/DL
LYMPHOCYTES # BLD AUTO: 0.7 10E3/UL (ref 0.8–5.3)
LYMPHOCYTES NFR BLD AUTO: 13 %
MCH RBC QN AUTO: 32.5 PG (ref 26.5–33)
MCHC RBC AUTO-ENTMCNC: 35.3 G/DL (ref 31.5–36.5)
MCV RBC AUTO: 92 FL (ref 78–100)
MICROALBUMIN UR-MCNC: 524 MG/L
MICROALBUMIN/CREAT UR: 540.21 MG/G CR (ref 0–17)
MONOCYTES # BLD AUTO: 0.5 10E3/UL (ref 0–1.3)
MONOCYTES NFR BLD AUTO: 9 %
NEUTROPHILS # BLD AUTO: 4.1 10E3/UL (ref 1.6–8.3)
NEUTROPHILS NFR BLD AUTO: 74 %
NONHDLC SERPL-MCNC: 188 MG/DL
NRBC # BLD AUTO: 0 10E3/UL
NRBC BLD AUTO-RTO: 0 /100
PLATELET # BLD AUTO: 193 10E3/UL (ref 150–450)
POTASSIUM BLD-SCNC: 4.6 MMOL/L (ref 3.4–5.3)
PROT SERPL-MCNC: 6.8 G/DL (ref 6.8–8.8)
RBC # BLD AUTO: 3.97 10E6/UL (ref 4.4–5.9)
SODIUM SERPL-SCNC: 137 MMOL/L (ref 133–144)
T4 FREE SERPL-MCNC: 0.82 NG/DL (ref 0.76–1.46)
TRIGL SERPL-MCNC: 159 MG/DL
TSH SERPL DL<=0.005 MIU/L-ACNC: 4.39 MU/L (ref 0.4–4)
WBC # BLD AUTO: 5.5 10E3/UL (ref 4–11)

## 2022-01-31 PROCEDURE — 82043 UR ALBUMIN QUANTITATIVE: CPT | Performed by: FAMILY MEDICINE

## 2022-01-31 PROCEDURE — 80053 COMPREHEN METABOLIC PANEL: CPT | Performed by: FAMILY MEDICINE

## 2022-01-31 PROCEDURE — 36415 COLL VENOUS BLD VENIPUNCTURE: CPT | Performed by: FAMILY MEDICINE

## 2022-01-31 PROCEDURE — 80061 LIPID PANEL: CPT | Performed by: FAMILY MEDICINE

## 2022-01-31 PROCEDURE — 84443 ASSAY THYROID STIM HORMONE: CPT | Performed by: FAMILY MEDICINE

## 2022-01-31 PROCEDURE — 84439 ASSAY OF FREE THYROXINE: CPT | Performed by: FAMILY MEDICINE

## 2022-01-31 PROCEDURE — 83036 HEMOGLOBIN GLYCOSYLATED A1C: CPT | Performed by: FAMILY MEDICINE

## 2022-01-31 PROCEDURE — 99214 OFFICE O/P EST MOD 30 MIN: CPT | Performed by: FAMILY MEDICINE

## 2022-01-31 PROCEDURE — 85025 COMPLETE CBC W/AUTO DIFF WBC: CPT | Performed by: FAMILY MEDICINE

## 2022-01-31 RX ORDER — LOVASTATIN 20 MG
TABLET ORAL
Qty: 30 TABLET | Refills: 0 | Status: SHIPPED | OUTPATIENT
Start: 2022-01-31 | End: 2022-04-20

## 2022-01-31 ASSESSMENT — PATIENT HEALTH QUESTIONNAIRE - PHQ9: SUM OF ALL RESPONSES TO PHQ QUESTIONS 1-9: 7

## 2022-01-31 ASSESSMENT — PAIN SCALES - GENERAL: PAINLEVEL: NO PAIN (0)

## 2022-01-31 ASSESSMENT — MIFFLIN-ST. JEOR: SCORE: 2033.88

## 2022-01-31 NOTE — PROGRESS NOTES
Assessment & Plan     (E11.22,  N18.2,  Z79.4) Type 2 diabetes mellitus with stage 2 chronic kidney disease, with long-term current use of insulin (H)  (primary encounter diagnosis)  Comment: DM is controlled with today's Hgb A1c of 6.3.  His urine protein level remains to be elevated.  He will need to be on ARB, did not tolerate the ACE inhibitor.  We will try to get prior authorization for losartan I will order ARB. Discussed about the goal for fasting blood sugar and Hgb A1C.  Will continue with Metformin.  Emphasized the importance of exercising, healthy diet and weight management.  Recommended to get a retinal exam annually.  Encourage daily foot care and yearly dental care.  Labs as ordered.  F/U in 6 months.        Plan: Albumin Random Urine Quantitative with Creat         Ratio, Hemoglobin A1c            (E78.5) Hyperlipidemia LDL goal <100  Comment: Recent cholesterol level high with LDL of around 160, HDL was  around 35 and total cholesterol is around 230.  She is tolerating Zetia well.  Did not tolerate a statin due to muscle aches.  Comorbidity include diabetes and high blood pressure.  The goal for his LDL to be less than 100.  Will continue with the Zetia.  Recommended to try the lovastatin at 10 mg perhaps 3 times a week, advanced to daily as tolerated.  Potential side effect discussed.  No history of liver disease and denied of excessive alcohol intake.  Today's liver enzyme was normal.  Also emphasized exercising, healthy diet and weight control as mentioned above.    Plan: lovastatin (MEVACOR) 20 MG tablet            (I10) Essential hypertension  Comment: .BP is normal and stable.  Comorbidity include high cholesterol and, diabetes and microalbuminemia.  Continue with the current doses of losartan, been tolerating it well.  Did not tolerate the ACE inhibitor with dry cough.  Will try to prior authorize for the losartan or other ARB.  Healthy lifestyle modification discussed as above.  Avoid high  sugar/caffeine intake. Lab as ordered.  Follow up in 6 month, earlier as needed.      (F33.42) Recurrent major depression in complete remission (H)  Comment: Doing well no concern.  Continue with the Wellbutrin.  Follow-up as needed.      (E66.01) Morbid obesity (H)  Comment: Today's BMI was 38 with multiple comorbidities as mentioned above.  Discussed about its potential long and short term complication.  Encouraged daily aerobic exercise and discussed about healthy/portioned diet.  Healthy lifestyle modification as discussed above.  Discussed about the goal for his weight and his BMI.  TSH level was checked today and it was normal.      (C61) Prostate cancer (H)  Comment: Managed by the urologist.  He was strongly encouraged to work with his urologist closely.      (E03.8) Other specified hypothyroidism  Comment: His TSH level is slightly high but free T4 level was normal.  Will continue with levothyroxine at 112 mcg daily.  Recheck the TSH level in 3 months.         Return in about 6 months (around 7/31/2022) for Physical Exam.    Nishi Hdz Mai, MD  [facility]    Clyde Maldonado is a 66 year old who presents for the following health issues     HPI     Hyperlipidemia Follow-Up    Oncologist has some concerns with his blood pressure readings and cholesterol levels.      Are you regularly taking any medication or supplement to lower your cholesterol?   Yes- Zetia 10 mg    Are you having muscle aches or other side effects that you think could be caused by your cholesterol lowering medication?  No    Hypertension Follow-up/Losartan not covered by insurance      Do you check your blood pressure regularly outside of the clinic? Yes     Are you following a low salt diet? Yes    Are your blood pressures ever more than 140 on the top number (systolic) OR more   than 90 on the bottom number (diastolic), for example 140/90? No 128/76, 136/79, 126/79       How many servings of fruits and vegetables do  you eat daily?  2-3    On average, how many sweetened beverages do you drink each day (Examples: soda, juice, sweet tea, etc.  Do NOT count diet or artificially sweetened beverages)?   0    How many days per week do you exercise enough to make your heart beat faster? 3 or less    How many minutes a day do you exercise enough to make your heart beat faster? 20 - 29    How many days per week do you miss taking your medication? 0    Joel is here today for follow-up on DM, HTN and hyperlipidemia.  Overall he is doing well, been taking the medications as prescribed with no side effects.  He take Metformin for DM, losartan for HTN and recently started on Zetia by the oncologist for high cholesterol.  He did not tolerate the statins in the past.  His cholesterol has been high and he was recently on a medication for prostate cancer chemotherapy which may cause worsen of high cholesterol.  Tolerated the Zetia well.  Statin in the past cause muscle ache.  Not check BS or BP at home but denied of hypoglycemic or hypotensive symptoms.  His Hgb A1c has been good. No headache or dizziness. No acute visual change and his last retinal/eye exam was about couple months ago and it was normal.  No chest pain or sob.  No numbness or tingling sensation on feet or fingers.  No leg swelling, dyspnea or orthopnea.  Not exercising but has been trying to eat healthy food - avoiding high sugar/salt food.  Stated that he will [employer] is not covering for the losartan.  He had the dry cough side effect from the lisinopril.    His depression is controlled with the Wellbutrin, he has no concern about it.  Did not feel depressed or having problem with anxiety.  Denied of excessive stress.  No problems with sleeping.  No drugs or alcohol.    He has been working closely with the oncologist for his prostate cancer.  Tolerating the chemotherapy well.  No other concern.    Review of Systems   Constitutional, HEENT, cardiovascular, pulmonary, gi and gu  "systems are negative, except as otherwise noted.      Objective    /60 (BP Location: Right arm, Patient Position: Sitting, Cuff Size: Adult Large)   Pulse 66   Temp 97.6  F (36.4  C) (Temporal)   Resp 18   Ht 1.791 m (5' 10.5\")   Wt 123.5 kg (272 lb 3.2 oz)   SpO2 100%   BMI 38.50 kg/m    Body mass index is 38.5 kg/m .  Physical Exam   GENERAL: healthy, alert and no distress, speaking full sentences  EYES: Eyes grossly normal to inspection, PERRL and conjunctivae and sclerae normal.  No nystagmus.  All 4 visual fields intact.  HENT: ear canals and TM's normal.  Nares are non-congested. Oropharynx is pink and moist. No tender with palpation to the sinuses.  NECK: Supple, no lymphadenopathy or thyromegaly.  RESP: lungs clear to auscultation - no rales, rhonchi or wheezes  CV: regular rate and rhythm, no murmur, no peripheral edema with strong pedal pulses bilaterally  ABDOMEN: soft, nontender, nondistended, no palpable masses or organomegaly with normal bowel sounds  MS: no gross musculoskeletal defects noted.  No focal weakness.  NEURO: Normal strength and tone, mentation intact and speech normal.  No focal neurological deficit  Diabetic foot exam: Monofilament was normal.  No calluses.  Skin dry and clean, no open wound.  Toenails look good.      Results for orders placed or performed in visit on 01/31/22   Comprehensive metabolic panel     Status: Abnormal   Result Value Ref Range    Sodium 137 133 - 144 mmol/L    Potassium 4.6 3.4 - 5.3 mmol/L    Chloride 105 94 - 109 mmol/L    Carbon Dioxide (CO2) 26 20 - 32 mmol/L    Anion Gap 6 3 - 14 mmol/L    Urea Nitrogen 21 7 - 30 mg/dL    Creatinine 1.06 0.66 - 1.25 mg/dL    Calcium 9.1 8.5 - 10.1 mg/dL    Glucose 134 (H) 70 - 99 mg/dL    Alkaline Phosphatase 68 40 - 150 U/L    AST 19 0 - 45 U/L    ALT 34 0 - 70 U/L    Protein Total 6.8 6.8 - 8.8 g/dL    Albumin 3.7 3.4 - 5.0 g/dL    Bilirubin Total 0.4 0.2 - 1.3 mg/dL    GFR Estimate 78 >60 mL/min/1.73m2 "   TSH with free T4 reflex     Status: Abnormal   Result Value Ref Range    TSH 4.39 (H) 0.40 - 4.00 mU/L   Lipid panel reflex to direct LDL Fasting     Status: Abnormal   Result Value Ref Range    Cholesterol 222 (H) <200 mg/dL    Triglycerides 159 (H) <150 mg/dL    Direct Measure HDL 34 (L) >=40 mg/dL    LDL Cholesterol Calculated 156 (H) <=100 mg/dL    Non HDL Cholesterol 188 (H) <130 mg/dL    Patient Fasting > 8hrs? Yes     Narrative    Cholesterol  Desirable:  <200 mg/dL    Triglycerides  Normal:  Less than 150 mg/dL  Borderline High:  150-199 mg/dL  High:  200-499 mg/dL  Very High:  Greater than or equal to 500 mg/dL    Direct Measure HDL  Female:  Greater than or equal to 50 mg/dL   Male:  Greater than or equal to 40 mg/dL    LDL Cholesterol  Desirable:  <100mg/dL  Above Desirable:  100-129 mg/dL   Borderline High:  130-159 mg/dL   High:  160-189 mg/dL   Very High:  >= 190 mg/dL    Non HDL Cholesterol  Desirable:  130 mg/dL  Above Desirable:  130-159 mg/dL  Borderline High:  160-189 mg/dL  High:  190-219 mg/dL  Very High:  Greater than or equal to 220 mg/dL   Albumin Random Urine Quantitative with Creat Ratio     Status: Abnormal   Result Value Ref Range    Creatinine Urine mg/dL 97 mg/dL    Albumin Urine mg/L 524 mg/L    Albumin Urine mg/g Cr 540.21 (H) 0.00 - 17.00 mg/g Cr   Hemoglobin A1c     Status: Abnormal   Result Value Ref Range    Hemoglobin A1C 6.3 (H) 0.0 - 5.6 %   CBC with platelets and differential     Status: Abnormal   Result Value Ref Range    WBC Count 5.5 4.0 - 11.0 10e3/uL    RBC Count 3.97 (L) 4.40 - 5.90 10e6/uL    Hemoglobin 12.9 (L) 13.3 - 17.7 g/dL    Hematocrit 36.5 (L) 40.0 - 53.0 %    MCV 92 78 - 100 fL    MCH 32.5 26.5 - 33.0 pg    MCHC 35.3 31.5 - 36.5 g/dL    RDW 12.0 10.0 - 15.0 %    Platelet Count 193 150 - 450 10e3/uL    % Neutrophils 74 %    % Lymphocytes 13 %    % Monocytes 9 %    % Eosinophils 3 %    % Basophils 1 %    % Immature Granulocytes 0 %    NRBCs per 100 WBC 0 <1  /100    Absolute Neutrophils 4.1 1.6 - 8.3 10e3/uL    Absolute Lymphocytes 0.7 (L) 0.8 - 5.3 10e3/uL    Absolute Monocytes 0.5 0.0 - 1.3 10e3/uL    Absolute Eosinophils 0.2 0.0 - 0.7 10e3/uL    Absolute Basophils 0.0 0.0 - 0.2 10e3/uL    Absolute Immature Granulocytes 0.0 <=0.4 10e3/uL    Absolute NRBCs 0.0 10e3/uL   T4 free     Status: Normal   Result Value Ref Range    Free T4 0.82 0.76 - 1.46 ng/dL   CBC with platelets differential     Status: Abnormal    Narrative    The following orders were created for panel order CBC with platelets differential.  Procedure                               Abnormality         Status                     ---------                               -----------         ------                     CBC with platelets and d...[[id number]]  Abnormal            Final result                 Please view results for these tests on the individual orders.

## 2022-02-01 ENCOUNTER — VIRTUAL VISIT (OUTPATIENT)
Dept: ONCOLOGY | Facility: CLINIC | Age: 67
End: 2022-02-01
Payer: MEDICARE

## 2022-02-01 DIAGNOSIS — E11.22 TYPE 2 DIABETES MELLITUS WITH STAGE 2 CHRONIC KIDNEY DISEASE, WITH LONG-TERM CURRENT USE OF INSULIN (H): ICD-10-CM

## 2022-02-01 DIAGNOSIS — N18.2 TYPE 2 DIABETES MELLITUS WITH STAGE 2 CHRONIC KIDNEY DISEASE, WITH LONG-TERM CURRENT USE OF INSULIN (H): ICD-10-CM

## 2022-02-01 DIAGNOSIS — C61 PROSTATE CANCER (H): Primary | ICD-10-CM

## 2022-02-01 DIAGNOSIS — N18.2 CKD (CHRONIC KIDNEY DISEASE) STAGE 2, GFR 60-89 ML/MIN: ICD-10-CM

## 2022-02-01 DIAGNOSIS — Z79.4 TYPE 2 DIABETES MELLITUS WITH STAGE 2 CHRONIC KIDNEY DISEASE, WITH LONG-TERM CURRENT USE OF INSULIN (H): ICD-10-CM

## 2022-02-01 PROCEDURE — 97802 MEDICAL NUTRITION INDIV IN: CPT | Performed by: DIETITIAN, REGISTERED

## 2022-02-01 NOTE — PROGRESS NOTES
"The patient has been notified of the following:      \"We have found that certain health care needs can be provided without the need for a face to face visit.  This service lets us provide the care you need with a phone conversation.       I will have full access to your [facility] medical record during this entire phone call.   I will be taking notes for your medical record.      Since this is like an office visit, we will bill your insurance company for this service.       There are potential benefits and risks of telephone visits (e.g. limits to patient confidentiality) that differ from in-person visits.?  Confidentiality still applies for telephone services, and nobody will record the visit.  It is important to be in a quiet, private space that is free of distractions (including cell phone or other devices) during the visit.??      If during the course of the call I believe a telephone visit is not appropriate, you will not be charged for this service\"     Consent has been obtained for this service by care team member: Yes     CLINICAL NUTRITION SERVICES - ASSESSMENT NOTE    Joel Pike 66 year old referred for MNT related to prostate cancer    Time Spent: 45 minutes  Visit Type: phone  Pt accompanied by: self  Referring Physician: Edil 12/3/21  C61 (ICD-10-CM) - Prostate cancer (H)    NUTRITION HISTORY  Factors affecting nutrition intake include: none at this time  Current diet/appetite: general diet/good appetite  Treatment: ADT    Joel presents today with questions regarding optimal diet with cancer.   [employer] weight loss program - trying to stick to as much as possible.  Trying to avoid sweets, potato chips etc.     Diet Recall  Breakfast Oatmeal, coffee with cream, berries OR 3 egg omelet with peppers and onions    Lunch 1/2 cup cottage cheese, 2 hb eggs, carmeled nuts OR berry smoothies, nuts, dried apricots   Dinner Shrimp with salad, fetucinni, 4 berries OR mushroom swiss burger, fries   Snacks Cake " "and ice cream   Beverages Coffee, water, ICE water (carbonated)     ANTHROPOMETRICS  Height: 70\"  Weight: 228 lbs/103kg  BMI: 32  Weight Status:  Obesity Grade I BMI 30-34.9  IBW: 166 lbs (137%)  Weight History: wt encounter on 1/31 - error - should read 227 lbs  Wt Readings from Last 9 Encounters:   01/31/22 123.5 kg (272 lb 3.2 oz)   12/03/21 103.7 kg (228 lb 9.6 oz)   11/24/21 103.1 kg (227 lb 3.2 oz)   08/30/21 106.1 kg (234 lb)   07/07/21 105.7 kg (233 lb)   04/07/21 105.5 kg (232 lb 8 oz)   03/03/21 104.3 kg (230 lb)   02/26/21 104.3 kg (230 lb)   02/01/21 105.7 kg (233 lb)     Dosing Weight: 83kg    Medications/vitamins/minerals/herbals:   Reviewed - Fish oil, CoQ10, Osteo-biflex (Ca), MVI, Mg, Vit D3    Labs:   Labs reviewed    NUTRITION FOCUSED PHYSICAL ASSESSMENT FOR DIAGNOSING MALNUTRITION:  Not observed due to Covid 19 pandemic - no clinic contact  Consult for education only      ASSESSED NUTRITION NEEDS:  Estimated Energy Needs: 2400 kcals (30 Kcal/Kg)  Justification: maintenance  Estimated Protein Needs: 100 grams protein (1.2 g pro/Kg)  Justification: maintenance  Estimated Fluid Needs: 2400 mL   Justification: maintenance    MALNUTRITION:  % Weight Loss:  Weight loss does not meet criteria for malnutrition   % Intake:  Decreased intake does not meet criteria for malnutrition   Subcutaneous Fat Loss:  None observed  Muscle Loss:  None observed  Fluid Retention:  None noted    Malnutrition Diagnosis: Patient does not meet two of the above criteria necessary for diagnosing malnutrition    NUTRITION DIAGNOSIS:  Food and nutrition-related knowledge deficit related to nutrition needs with cancer and cancer treatment as evidenced by pt with questions regarding optimal food choices and foods to avoid.     INTERVENTIONS  Provided written & verbal education:     - Reviewed nutrition and hydration needs.   Advised pt to aim for at least 2400kcal and 100g protein daily. Reviewed calorie goals for desired wt " loss.   Suggested aiming for <2000 dung/day if wt loss is desired. Discussed rate of healthful wt loss.   Advised pt to aim for 10 cups non-caffeine containing beverages (water/electrolytes) daily.    - Reviewed Prostate Cancer, Nutrition and Lifestyle - encouraged to limit processed foods, simple sugars etc.  Encouraged Mediterranean style diet as able.   - Encouraged to track daily intake to ensure adequacy for protein and calories for weight loss/weight maintenance     Provided pt with corresponding education materials/handouts on:  Sources of Protein, Prostate Nutrition and Lifestyle, Mediterranean diet guidelines    Pt verbalize understanding of materials provided during consult.   Patient Understanding: good  Expected patient engagement: good     Goals  1.  Aim for 2000-2400kcal and 100g protein/day  2. Weight maintenance/weight loss as desired    Follow-Up Plans: Pt has RD contact information for questions.      Nataly Lopez RD, LD

## 2022-02-01 NOTE — PATIENT INSTRUCTIONS
Matt Martinez & Joel,     I have attached the resources (to Michelle's email) that I referred to during our conversation:  --Sources of protein  --Mediterranean diet  --Prostate Cancer, Nutrition and your Lifestyle    Here is the website to help with portion control:  MyPlate  U.S. Department of Agriculture  The .gov means it s official. [employer] websites often end in .gov or .mil. Before sharing sensitive information, make sure you re on a federal [employer] site.  Www.[employer].gov    Here are your nutrition goals:  --Calories: 0519-2072/day  --Protein: 100 grams/day  --Fluids: 6-8 cups non-caffiene containing beverages/day    Please reach out if you have any other question along the way!    Be well,    Nataly Lopez RD, [facility]  Oncology Services  [address] [place] [id number]  [email or web address]  [email or web address]  Office: [phone]  Fax: [phone]

## 2022-02-01 NOTE — LETTER
"    2/1/2022         RE: Joel Pike  [address]  [facility] [address]        Dear Colleague,    Thank you for referring your patient, Joel Pike, to the [facility]. Please see a copy of my visit note below.    The patient has been notified of the following:      \"We have found that certain health care needs can be provided without the need for a face to face visit.  This service lets us provide the care you need with a phone conversation.       I will have full access to your [facility] medical record during this entire phone call.   I will be taking notes for your medical record.      Since this is like an office visit, we will bill your insurance company for this service.       There are potential benefits and risks of telephone visits (e.g. limits to patient confidentiality) that differ from in-person visits.?  Confidentiality still applies for telephone services, and nobody will record the visit.  It is important to be in a quiet, private space that is free of distractions (including cell phone or other devices) during the visit.??      If during the course of the call I believe a telephone visit is not appropriate, you will not be charged for this service\"     Consent has been obtained for this service by care team member: Yes     CLINICAL NUTRITION SERVICES - ASSESSMENT NOTE    Joel Pike 66 year old referred for MNT related to prostate cancer    Time Spent: 45 minutes  Visit Type: phone  Pt accompanied by: self  Referring Physician: Edil 12/3/21  C61 (ICD-10-CM) - Prostate cancer (H)    NUTRITION HISTORY  Factors affecting nutrition intake include: none at this time  Current diet/appetite: general diet/good appetite  Treatment: ADT    Joel presents today with questions regarding optimal diet with cancer.   [employer] weight loss program - trying to stick to as much as possible.  Trying to avoid sweets, potato chips etc.     Diet Recall  Breakfast Oatmeal, " "coffee with cream, berries OR 3 egg omelet with peppers and onions    Lunch 1/2 cup cottage cheese, 2 hb eggs, carmeled nuts OR berry smoothies, nuts, dried apricots   Dinner Shrimp with salad, fetucinni, 4 berries OR mushroom swiss burger, fries   Snacks Cake and ice cream   Beverages Coffee, water, ICE water (carbonated)     ANTHROPOMETRICS  Height: 70\"  Weight: 228 lbs/103kg  BMI: 32  Weight Status:  Obesity Grade I BMI 30-34.9  IBW: 166 lbs (137%)  Weight History: wt encounter on 1/31 - error - should read 227 lbs  Wt Readings from Last 9 Encounters:   01/31/22 123.5 kg (272 lb 3.2 oz)   12/03/21 103.7 kg (228 lb 9.6 oz)   11/24/21 103.1 kg (227 lb 3.2 oz)   08/30/21 106.1 kg (234 lb)   07/07/21 105.7 kg (233 lb)   04/07/21 105.5 kg (232 lb 8 oz)   03/03/21 104.3 kg (230 lb)   02/26/21 104.3 kg (230 lb)   02/01/21 105.7 kg (233 lb)     Dosing Weight: 83kg    Medications/vitamins/minerals/herbals:   Reviewed - Fish oil, CoQ10, Osteo-biflex (Ca), MVI, Mg, Vit D3    Labs:   Labs reviewed    NUTRITION FOCUSED PHYSICAL ASSESSMENT FOR DIAGNOSING MALNUTRITION:  Not observed due to Covid 19 pandemic - no clinic contact  Consult for education only      ASSESSED NUTRITION NEEDS:  Estimated Energy Needs: 2400 kcals (30 Kcal/Kg)  Justification: maintenance  Estimated Protein Needs: 100 grams protein (1.2 g pro/Kg)  Justification: maintenance  Estimated Fluid Needs: 2400 mL   Justification: maintenance    MALNUTRITION:  % Weight Loss:  Weight loss does not meet criteria for malnutrition   % Intake:  Decreased intake does not meet criteria for malnutrition   Subcutaneous Fat Loss:  None observed  Muscle Loss:  None observed  Fluid Retention:  None noted    Malnutrition Diagnosis: Patient does not meet two of the above criteria necessary for diagnosing malnutrition    NUTRITION DIAGNOSIS:  Food and nutrition-related knowledge deficit related to nutrition needs with cancer and cancer treatment as evidenced by pt with questions " regarding optimal food choices and foods to avoid.     INTERVENTIONS  Provided written & verbal education:     - Reviewed nutrition and hydration needs.   Advised pt to aim for at least 2400kcal and 100g protein daily. Reviewed calorie goals for desired wt loss.   Suggested aiming for <2000 dung/day if wt loss is desired. Discussed rate of healthful wt loss.   Advised pt to aim for 10 cups non-caffeine containing beverages (water/electrolytes) daily.    - Reviewed Prostate Cancer, Nutrition and Lifestyle - encouraged to limit processed foods, simple sugars etc.  Encouraged Mediterranean style diet as able.   - Encouraged to track daily intake to ensure adequacy for protein and calories for weight loss/weight maintenance     Provided pt with corresponding education materials/handouts on:  Sources of Protein, Prostate Nutrition and Lifestyle, Mediterranean diet guidelines    Pt verbalize understanding of materials provided during consult.   Patient Understanding: good  Expected patient engagement: good     Goals  1.  Aim for 2000-2400kcal and 100g protein/day  2. Weight maintenance/weight loss as desired    Follow-Up Plans: Pt has RD contact information for questions.      Nataly Lopez RD, LD          Again, thank you for allowing me to participate in the care of your patient.        Sincerely,        Nataly Lopez RD

## 2022-02-02 ENCOUNTER — PATIENT OUTREACH (OUTPATIENT)
Dept: CARE COORDINATION | Facility: CLINIC | Age: 67
End: 2022-02-02
Payer: MEDICARE

## 2022-02-02 NOTE — PROGRESS NOTES
Oncology Distress Screening Follow-up  Clinical Social Work  [facility]    Identified Concern and Score From Distress Screenin. How concerned are you about knowing what resources are available to help you?  7 Abnormal      Date of Distress Screenin22      Data: Joel is a 66 year old patient with a diagnosis of prostate cancer. At time of last visit, Patient scored positive on distress screen.  called Patient today with intention of introducing them to psychosocial services and support, and following up on elevated distress.        Intervention/Education Provided: Phone call to patient about distress screening. No answer - message left. Provided contact information in order to reach writer.       Follow-up Required: Will remain available for support and await patient's return call.      FREDO Romano,LGSW  Hematology/Oncology Social Worker  Phone:[phone] Pager: [phone]

## 2022-02-03 DIAGNOSIS — C61 PROSTATE CANCER (H): Primary | ICD-10-CM

## 2022-02-03 RX ORDER — APALUTAMIDE 60 MG/1
240 TABLET, FILM COATED ORAL DAILY
Qty: 120 TABLET | Refills: 11 | COMMUNITY
Start: 2021-12-20 | End: 2022-10-31

## 2022-02-05 PROBLEM — I10 ESSENTIAL HYPERTENSION: Status: ACTIVE | Noted: 2022-02-05

## 2022-02-08 NOTE — PROGRESS NOTES
"  [facility] Medical Oncology Followup Note       Date of visit: Feb 14, 2022   Oncologist: Dr. Isaias Solo    CC:   metastatic prostate cancer     INTERVAL HISTORY:  Joel presents for oncology follow-up visit today. Accompanied by his wife, Michelle, and daughter in-law who is a nurse.    He has been feeling ok. He feels \"sluggish\" at times though not debilitating. He gets lightheaded on occasion, usually resolves with eating and drinking. He has a good appetite. No nausea or vomiting. Normal bowel function. He has a treadmill at home, but only uses it twice a week. Has occasional hot flashes. Overall, seems to be tolerating the Apalutamide without any major side effects. He denies CP or SOB. No skin rashes. No fevers or chills. Still works part time at [employer] in the [employer]. ROS is otherwise negative.     ONCOLOGY HISTORY:   -Elevated PSA noted 2/26/21 at 12.70. Previously normal in 2017.     -4/7/21-Initial urology visit.   -7/2/21-prostate biopsy 4+3, 9/12 biopsies positive.  Ductal component noted.     -7/28/21-MR prostate-PIRADS 5 lesion, R and L sided lesions with likely    neurovascular bundle invasion. No  seminal vesicle involvement. No clear LAD,  but some indeterminate pelvic nodes.  No suspicious bone lesions.     -7/28/21-NM bone scan suspicious lesion of R sacral ala S3 level.     -8/10/21-CT A/P with multiple enlarged periaortic/iliac LN   -8/11/21-First eligard dose 45mg (Q6M dosing). Due next 2/2022.   -8/30/21-Initial medical oncology visit with Dr. Solo.  Unclear if bony lesion is metastasis based on current imaging.  Enzalutamide was declined by patient's insurance company.  He was therefore started on apalutamide on 10/5/21.    Eligard (next due 2/16/2022).     8/30/21 PSA =30.40 Pre Rx  9/27/21 PSA =8.99 (T=6); HgbA1C = 6.1  10/5/21 PSA =3.22  11/26/21 PSA =0.15  1/13/22 PSA =0.04   2/14/22 PSA =0.01     PMH:  HTN, HLD, bipolar disorder, DM2 on metformin     PSH:  Bilateral " TKA, bilateral cataract extraction, bilateral carpal tunnel release     FAMILY HX:  No reported family history of prostate cancer     SOCIAL:  Retired, works at [email or web address] in [employer] section now  Never smoker.   No EtOH  No recreational drugs.   No herbs/supplements other than on medication list.      Physical Exam:   BP (!) 148/74 (BP Location: Right arm, Patient Position: Sitting, Cuff Size: Adult Large)   Pulse 62   Temp 97.2  F (36.2  C) (Tympanic)   Resp 16   Wt 105 kg (231 lb 8 oz)   SpO2 100%   BMI 32.75 kg/m     General: well appearing, no acute distress, pleasant male   HEENT: normocephalic, atraumatic, PERRLA, sclerae nonicteric  Lymph: no palpable cervical, supraclavicular or axillary lymphadenopathy   CV: regular rate and rhythm, no murmurs  Lungs: clear to auscultation bilaterally, no wheezes/rales/rhonchi  Abd: soft, positive bowel sounds, non-distended, non-tender  MSK: full range of motion in all four extremities, no peripheral edema  Neuro: alert and oriented x3, CN grossly intact   Psych: appropriate mood and affect  Skin: no rashes or lesions    LABS: Reviewed labs today.    Ref. Range 2/14/2022 12:01   Sodium Latest Ref Range: 133 - 144 mmol/L 138   Potassium Latest Ref Range: 3.4 - 5.3 mmol/L 4.5   Chloride Latest Ref Range: 94 - 109 mmol/L 106   Carbon Dioxide Latest Ref Range: 20 - 32 mmol/L 26   Urea Nitrogen Latest Ref Range: 7 - 30 mg/dL 20   Creatinine Latest Ref Range: 0.66 - 1.25 mg/dL 0.99   GFR Estimate Latest Ref Range: >60 mL/min/1.73m2 85   Calcium Latest Ref Range: 8.5 - 10.1 mg/dL 9.2   Anion Gap Latest Ref Range: 3 - 14 mmol/L 6   Albumin Latest Ref Range: 3.4 - 5.0 g/dL 3.9   Protein Total Latest Ref Range: 6.8 - 8.8 g/dL 6.9   Bilirubin Total Latest Ref Range: 0.2 - 1.3 mg/dL 0.4   Alkaline Phosphatase Latest Ref Range: 40 - 150 U/L 70   ALT Latest Ref Range: 0 - 70 U/L 33   AST Latest Ref Range: 0 - 45 U/L 23   Cholesterol Latest Ref Range: <200 mg/dL 204 (H)   Patient  Fasting > 8hrs? Unknown Yes   HDL Cholesterol Latest Ref Range: >=40 mg/dL 35 (L)   LDL Cholesterol Calculated Latest Ref Range: <=100 mg/dL 127 (H)   Non HDL Cholesterol Latest Ref Range: <130 mg/dL 169 (H)   PSA Latest Ref Range: 0.00 - 4.00 ug/L 0.01   T4 Free Latest Ref Range: 0.76 - 1.46 ng/dL 0.91   Triglycerides Latest Ref Range: <150 mg/dL 208 (H)   TSH Latest Ref Range: 0.40 - 4.00 mU/L 4.16 (H)   Glucose Latest Ref Range: 70 - 99 mg/dL 143 (H)   WBC Latest Ref Range: 4.0 - 11.0 10e3/uL 5.4   Hemoglobin Latest Ref Range: 13.3 - 17.7 g/dL 12.6 (L)   Hematocrit Latest Ref Range: 40.0 - 53.0 % 35.6 (L)   Platelet Count Latest Ref Range: 150 - 450 10e3/uL 200   RBC Count Latest Ref Range: 4.40 - 5.90 10e6/uL 3.97 (L)   MCV Latest Ref Range: 78 - 100 fL 90   MCH Latest Ref Range: 26.5 - 33.0 pg 31.7   MCHC Latest Ref Range: 31.5 - 36.5 g/dL 35.4   RDW Latest Ref Range: 10.0 - 15.0 % 11.9   % Neutrophils Latest Units: % 71   % Lymphocytes Latest Units: % 14   % Monocytes Latest Units: % 9   % Eosinophils Latest Units: % 4   % Basophils Latest Units: % 1   Absolute Basophils Latest Ref Range: 0.0 - 0.2 10e3/uL 0.0   Absolute Eosinophils Latest Ref Range: 0.0 - 0.7 10e3/uL 0.2   Absolute Immature Granulocytes Latest Ref Range: <=0.4 10e3/uL 0.1   Absolute Lymphocytes Latest Ref Range: 0.8 - 5.3 10e3/uL 0.7 (L)   Absolute Monocytes Latest Ref Range: 0.0 - 1.3 10e3/uL 0.5   % Immature Granulocytes Latest Units: % 1   Absolute Neutrophils Latest Ref Range: 1.6 - 8.3 10e3/uL 3.9   Absolute NRBCs Latest Units: 10e3/uL 0.0   NRBCs per 100 WBC Latest Ref Range: <1 /100 0       IMPRESSION AND PLAN:  Joel Pike is a 66 year old M with PMH of DM2 on metformin, depression/anxiety (pt reports as bipolar d/o), HTN, and HLD with prostate cancer with possible bone metastasis.  Previously, Dr. Solo had a lengthy discussion at his initial consult visit and went over his pathology and staging scan results.  Previously discussed  that it is not clear whether the lesion in wing of his ala is a true bony metastasis or whether it is another finding/artifact.  Previously discussed that this does not change the overall treatment of his disease with ADT, but may change whether he receives radiation to his bony lesion in the future or not.  The role of ADT and the addition of enzalutamide were previously discussed.  Given his existing DM2, we will attempt to avoid additional prednisone use by choosing enzalutamide.  There is a small, but non-zero risk of seizure with enzalutamide along with bupropion, which we will monitor him for.  We will refer to radiation oncology for evaluation/opinion on radiation of his possible bony lesion in addition to radiation of the prostate and RP LN bed.  Previously discussed ADT and RT work in tandem to treat therapy and ADT will target systemic disease as well, including circulating tumor cells.      -Enzalutamide was declined by patient's insurance company.  He started apalutamide on 10/5/21. He had CT CAP done 11/26/21 which showed significantly decreased retroperitoneal lymphadenopathy since the prior study dated 8/10/2021 indicates good response to treatment. No other evidence for lymphadenopathy or metastasis is identified. Specifically no evidence for left sacral metastasis is seen. Continue current dose of Apalutamide.   -His PSA continues to trend down. Today his PSA level is 0.01.   -Plan is to continue ADT for 2 years (through 8/2023). His next Eligard is due 2/16/2022 - scheduled.    -Plan for repeat scans and follow-up with Dr. Solo in early April 2022 and re-consider RT at that time.   -He has baseline HTN and is on Losartan 75mg daily, managed by his PCP. He recently bought a BP cuff and has been monitoring his BP at home. BP has generally been stable with SBP in 120s-130s range. I asked patient to follow-up with his PCP regarding BP management and to call us if his BP is consistently over 140/90. He  verbalized understanding.   -He has baseline HLD and currently on Zetia 10mg daily and lovastatin 10mg 3 days per week as per his PCP. Recommendations were made for a healthy diet of minimally processed foods and reduction in sugar as possible. Increase fruits/vegetable intake. He has a treadmill. We again discussed that he should try to walk on the treadmill for 30 mins 5 days a week as tolerated. He is also working to get an appt with dietician. Continue to follow-up with Dr. Lin for HLD management.   -He is on levothyroxine 112 mcg daily per PCP.   -Continue to follow-up with PCP Dr. Lin for routine health maintenance; he has annual physical scheduled on 3/16/22. I reminded patient to call us if any issues or problems arise in between visits.     RTC for labs and virtual PILAR visit in 1 month.     45 minutes spent on the date of the encounter doing chart review, review of test results, interpretation of tests, patient visit and documentation     Liliya De PA-C  [facility]  [address], [place] [address] [phone]

## 2022-02-14 ENCOUNTER — APPOINTMENT (OUTPATIENT)
Dept: LAB | Facility: CLINIC | Age: 67
End: 2022-02-14
Attending: PHYSICIAN ASSISTANT
Payer: MEDICARE

## 2022-02-14 ENCOUNTER — ONCOLOGY VISIT (OUTPATIENT)
Dept: ONCOLOGY | Facility: CLINIC | Age: 67
End: 2022-02-14
Attending: PHYSICIAN ASSISTANT
Payer: MEDICARE

## 2022-02-14 VITALS
HEART RATE: 62 BPM | RESPIRATION RATE: 16 BRPM | DIASTOLIC BLOOD PRESSURE: 74 MMHG | BODY MASS INDEX: 32.75 KG/M2 | OXYGEN SATURATION: 100 % | WEIGHT: 231.5 LBS | SYSTOLIC BLOOD PRESSURE: 148 MMHG | TEMPERATURE: 97.2 F

## 2022-02-14 DIAGNOSIS — C61 PROSTATE CANCER (H): Primary | ICD-10-CM

## 2022-02-14 LAB
ALBUMIN SERPL-MCNC: 3.9 G/DL (ref 3.4–5)
ALP SERPL-CCNC: 70 U/L (ref 40–150)
ALT SERPL W P-5'-P-CCNC: 33 U/L (ref 0–70)
ANION GAP SERPL CALCULATED.3IONS-SCNC: 6 MMOL/L (ref 3–14)
AST SERPL W P-5'-P-CCNC: 23 U/L (ref 0–45)
BASOPHILS # BLD AUTO: 0 10E3/UL (ref 0–0.2)
BASOPHILS NFR BLD AUTO: 1 %
BILIRUB SERPL-MCNC: 0.4 MG/DL (ref 0.2–1.3)
BUN SERPL-MCNC: 20 MG/DL (ref 7–30)
CALCIUM SERPL-MCNC: 9.2 MG/DL (ref 8.5–10.1)
CHLORIDE BLD-SCNC: 106 MMOL/L (ref 94–109)
CHOLEST SERPL-MCNC: 204 MG/DL
CO2 SERPL-SCNC: 26 MMOL/L (ref 20–32)
CREAT SERPL-MCNC: 0.99 MG/DL (ref 0.66–1.25)
EOSINOPHIL # BLD AUTO: 0.2 10E3/UL (ref 0–0.7)
EOSINOPHIL NFR BLD AUTO: 4 %
ERYTHROCYTE [DISTWIDTH] IN BLOOD BY AUTOMATED COUNT: 11.9 % (ref 10–15)
FASTING STATUS PATIENT QL REPORTED: YES
GFR SERPL CREATININE-BSD FRML MDRD: 85 ML/MIN/1.73M2
GLUCOSE BLD-MCNC: 143 MG/DL (ref 70–99)
HCT VFR BLD AUTO: 35.6 % (ref 40–53)
HDLC SERPL-MCNC: 35 MG/DL
HGB BLD-MCNC: 12.6 G/DL (ref 13.3–17.7)
IMM GRANULOCYTES # BLD: 0.1 10E3/UL
IMM GRANULOCYTES NFR BLD: 1 %
LDLC SERPL CALC-MCNC: 127 MG/DL
LYMPHOCYTES # BLD AUTO: 0.7 10E3/UL (ref 0.8–5.3)
LYMPHOCYTES NFR BLD AUTO: 14 %
MCH RBC QN AUTO: 31.7 PG (ref 26.5–33)
MCHC RBC AUTO-ENTMCNC: 35.4 G/DL (ref 31.5–36.5)
MCV RBC AUTO: 90 FL (ref 78–100)
MONOCYTES # BLD AUTO: 0.5 10E3/UL (ref 0–1.3)
MONOCYTES NFR BLD AUTO: 9 %
NEUTROPHILS # BLD AUTO: 3.9 10E3/UL (ref 1.6–8.3)
NEUTROPHILS NFR BLD AUTO: 71 %
NONHDLC SERPL-MCNC: 169 MG/DL
NRBC # BLD AUTO: 0 10E3/UL
NRBC BLD AUTO-RTO: 0 /100
PLATELET # BLD AUTO: 200 10E3/UL (ref 150–450)
POTASSIUM BLD-SCNC: 4.5 MMOL/L (ref 3.4–5.3)
PROT SERPL-MCNC: 6.9 G/DL (ref 6.8–8.8)
PSA SERPL-MCNC: 0.01 UG/L (ref 0–4)
RBC # BLD AUTO: 3.97 10E6/UL (ref 4.4–5.9)
SODIUM SERPL-SCNC: 138 MMOL/L (ref 133–144)
T4 FREE SERPL-MCNC: 0.91 NG/DL (ref 0.76–1.46)
TRIGL SERPL-MCNC: 208 MG/DL
TSH SERPL DL<=0.005 MIU/L-ACNC: 4.16 MU/L (ref 0.4–4)
WBC # BLD AUTO: 5.4 10E3/UL (ref 4–11)

## 2022-02-14 PROCEDURE — G0463 HOSPITAL OUTPT CLINIC VISIT: HCPCS

## 2022-02-14 PROCEDURE — 80061 LIPID PANEL: CPT | Performed by: PHYSICIAN ASSISTANT

## 2022-02-14 PROCEDURE — 99215 OFFICE O/P EST HI 40 MIN: CPT | Performed by: PHYSICIAN ASSISTANT

## 2022-02-14 PROCEDURE — 84443 ASSAY THYROID STIM HORMONE: CPT | Performed by: PHYSICIAN ASSISTANT

## 2022-02-14 PROCEDURE — 85004 AUTOMATED DIFF WBC COUNT: CPT | Performed by: PHYSICIAN ASSISTANT

## 2022-02-14 PROCEDURE — 84439 ASSAY OF FREE THYROXINE: CPT | Performed by: PHYSICIAN ASSISTANT

## 2022-02-14 PROCEDURE — 84153 ASSAY OF PSA TOTAL: CPT | Performed by: PHYSICIAN ASSISTANT

## 2022-02-14 PROCEDURE — 36415 COLL VENOUS BLD VENIPUNCTURE: CPT | Performed by: PHYSICIAN ASSISTANT

## 2022-02-14 PROCEDURE — 80053 COMPREHEN METABOLIC PANEL: CPT | Performed by: PHYSICIAN ASSISTANT

## 2022-02-14 ASSESSMENT — PAIN SCALES - GENERAL: PAINLEVEL: NO PAIN (0)

## 2022-02-14 NOTE — NURSING NOTE
Chief Complaint   Patient presents with     Blood Draw     labs drawn by venipuncture by rn in lab. vital signs taken.     Labs drawn by venipuncture by RN in lab. Vital signs taken. Pt checked in to next appointment.    Estee Villagran RN

## 2022-02-14 NOTE — NURSING NOTE
"Oncology Rooming Note    February 14, 2022 12:28 PM   Joel Pike is a 66 year old male who presents for:    Chief Complaint   Patient presents with     Blood Draw     labs drawn by venipuncture by rn in lab. vital signs taken.     Oncology Clinic Visit     prostate cancer     Initial Vitals: BP (!) 148/74 (BP Location: Right arm, Patient Position: Sitting, Cuff Size: Adult Large)   Pulse 62   Temp 97.2  F (36.2  C) (Tympanic)   Resp 16   Wt 105 kg (231 lb 8 oz)   SpO2 100%   BMI 32.75 kg/m   Estimated body mass index is 32.75 kg/m  as calculated from the following:    Height as of 1/31/22: 1.791 m (5' 10.5\").    Weight as of this encounter: 105 kg (231 lb 8 oz). Body surface area is 2.29 meters squared.  No Pain (0) Comment: Data Unavailable   No LMP for male patient.  Allergies reviewed: Yes  Medications reviewed: Yes    Medications: MEDICATION REFILLS NEEDED TODAY. Provider was notified.   LOSARTAN    Pharmacy name entered into EPIC:    [facility] - [place]  [facility] - [facility], [place] - [address] [facility] [place], [place] [facility] [address] [facility] [address] [facility]     Clinical concerns: LOSARTAN concerns about insurance not covering       Bryant Scales            "

## 2022-02-14 NOTE — LETTER
"  2/14/2022     RE: Joel Pike  [address]  [facility] [address]      [facility] Medical Oncology Followup Note       Date of visit: Feb 14, 2022   Oncologist: Dr. Isaias Solo    CC:   metastatic prostate cancer     INTERVAL HISTORY:  Joel presents for oncology follow-up visit today. Accompanied by his wife, Michelle, and daughter in-law who is a nurse.    He has been feeling ok. He feels \"sluggish\" at times though not debilitating. He gets lightheaded on occasion, usually resolves with eating and drinking. He has a good appetite. No nausea or vomiting. Normal bowel function. He has a treadmill at home, but only uses it twice a week. Has occasional hot flashes. Overall, seems to be tolerating the Apalutamide without any major side effects. He denies CP or SOB. No skin rashes. No fevers or chills. Still works part time at [employer] in the [employer]. ROS is otherwise negative.     ONCOLOGY HISTORY:   -Elevated PSA noted 2/26/21 at 12.70. Previously normal in 2017.     -4/7/21-Initial urology visit.   -7/2/21-prostate biopsy 4+3, 9/12 biopsies positive.  Ductal component noted.     -7/28/21-MR prostate-PIRADS 5 lesion, R and L sided lesions with likely    neurovascular bundle invasion. No  seminal vesicle involvement. No clear LAD,  but some indeterminate pelvic nodes.  No suspicious bone lesions.     -7/28/21-NM bone scan suspicious lesion of R sacral ala S3 level.     -8/10/21-CT A/P with multiple enlarged periaortic/iliac LN   -8/11/21-First eligard dose 45mg (Q6M dosing). Due next 2/2022.   -8/30/21-Initial medical oncology visit with Dr. Solo.  Unclear if bony lesion is metastasis based on current imaging.  Enzalutamide was declined by patient's insurance company.  He was therefore started on apalutamide on 10/5/21.    Eligard (next due 2/16/2022).     8/30/21 PSA =30.40 Pre Rx  9/27/21 PSA =8.99 (T=6); HgbA1C = 6.1  10/5/21 PSA =3.22  11/26/21 PSA =0.15  1/13/22 PSA =0.04   2/14/22 PSA " =0.01     PMH:  HTN, HLD, bipolar disorder, DM2 on metformin     PSH:  Bilateral TKA, bilateral cataract extraction, bilateral carpal tunnel release     FAMILY HX:  No reported family history of prostate cancer     SOCIAL:  Retired, works at [employer] in [employer] now  Never smoker.   No EtOH  No recreational drugs.   No herbs/supplements other than on medication list.      Physical Exam:   BP (!) 148/74 (BP Location: Right arm, Patient Position: Sitting, Cuff Size: Adult Large)   Pulse 62   Temp 97.2  F (36.2  C) (Tympanic)   Resp 16   Wt 105 kg (231 lb 8 oz)   SpO2 100%   BMI 32.75 kg/m     General: well appearing, no acute distress, pleasant male   HEENT: normocephalic, atraumatic, PERRLA, sclerae nonicteric  Lymph: no palpable cervical, supraclavicular or axillary lymphadenopathy   CV: regular rate and rhythm, no murmurs  Lungs: clear to auscultation bilaterally, no wheezes/rales/rhonchi  Abd: soft, positive bowel sounds, non-distended, non-tender  MSK: full range of motion in all four extremities, no peripheral edema  Neuro: alert and oriented x3, CN grossly intact   Psych: appropriate mood and affect  Skin: no rashes or lesions    LABS: Reviewed labs today.    Ref. Range 2/14/2022 12:01   Sodium Latest Ref Range: 133 - 144 mmol/L 138   Potassium Latest Ref Range: 3.4 - 5.3 mmol/L 4.5   Chloride Latest Ref Range: 94 - 109 mmol/L 106   Carbon Dioxide Latest Ref Range: 20 - 32 mmol/L 26   Urea Nitrogen Latest Ref Range: 7 - 30 mg/dL 20   Creatinine Latest Ref Range: 0.66 - 1.25 mg/dL 0.99   GFR Estimate Latest Ref Range: >60 mL/min/1.73m2 85   Calcium Latest Ref Range: 8.5 - 10.1 mg/dL 9.2   Anion Gap Latest Ref Range: 3 - 14 mmol/L 6   Albumin Latest Ref Range: 3.4 - 5.0 g/dL 3.9   Protein Total Latest Ref Range: 6.8 - 8.8 g/dL 6.9   Bilirubin Total Latest Ref Range: 0.2 - 1.3 mg/dL 0.4   Alkaline Phosphatase Latest Ref Range: 40 - 150 U/L 70   ALT Latest Ref Range: 0 - 70 U/L 33   AST Latest Ref  Range: 0 - 45 U/L 23   Cholesterol Latest Ref Range: <200 mg/dL 204 (H)   Patient Fasting > 8hrs? Unknown Yes   HDL Cholesterol Latest Ref Range: >=40 mg/dL 35 (L)   LDL Cholesterol Calculated Latest Ref Range: <=100 mg/dL 127 (H)   Non HDL Cholesterol Latest Ref Range: <130 mg/dL 169 (H)   PSA Latest Ref Range: 0.00 - 4.00 ug/L 0.01   T4 Free Latest Ref Range: 0.76 - 1.46 ng/dL 0.91   Triglycerides Latest Ref Range: <150 mg/dL 208 (H)   TSH Latest Ref Range: 0.40 - 4.00 mU/L 4.16 (H)   Glucose Latest Ref Range: 70 - 99 mg/dL 143 (H)   WBC Latest Ref Range: 4.0 - 11.0 10e3/uL 5.4   Hemoglobin Latest Ref Range: 13.3 - 17.7 g/dL 12.6 (L)   Hematocrit Latest Ref Range: 40.0 - 53.0 % 35.6 (L)   Platelet Count Latest Ref Range: 150 - 450 10e3/uL 200   RBC Count Latest Ref Range: 4.40 - 5.90 10e6/uL 3.97 (L)   MCV Latest Ref Range: 78 - 100 fL 90   MCH Latest Ref Range: 26.5 - 33.0 pg 31.7   MCHC Latest Ref Range: 31.5 - 36.5 g/dL 35.4   RDW Latest Ref Range: 10.0 - 15.0 % 11.9   % Neutrophils Latest Units: % 71   % Lymphocytes Latest Units: % 14   % Monocytes Latest Units: % 9   % Eosinophils Latest Units: % 4   % Basophils Latest Units: % 1   Absolute Basophils Latest Ref Range: 0.0 - 0.2 10e3/uL 0.0   Absolute Eosinophils Latest Ref Range: 0.0 - 0.7 10e3/uL 0.2   Absolute Immature Granulocytes Latest Ref Range: <=0.4 10e3/uL 0.1   Absolute Lymphocytes Latest Ref Range: 0.8 - 5.3 10e3/uL 0.7 (L)   Absolute Monocytes Latest Ref Range: 0.0 - 1.3 10e3/uL 0.5   % Immature Granulocytes Latest Units: % 1   Absolute Neutrophils Latest Ref Range: 1.6 - 8.3 10e3/uL 3.9   Absolute NRBCs Latest Units: 10e3/uL 0.0   NRBCs per 100 WBC Latest Ref Range: <1 /100 0       IMPRESSION AND PLAN:  Joel Pike is a 66 year old M with PMH of DM2 on metformin, depression/anxiety (pt reports as bipolar d/o), HTN, and HLD with prostate cancer with possible bone metastasis.  Previously, Dr. Solo had a lengthy discussion at his initial consult  visit and went over his pathology and staging scan results.  Previously discussed that it is not clear whether the lesion in wing of his ala is a true bony metastasis or whether it is another finding/artifact.  Previously discussed that this does not change the overall treatment of his disease with ADT, but may change whether he receives radiation to his bony lesion in the future or not.  The role of ADT and the addition of enzalutamide were previously discussed.  Given his existing DM2, we will attempt to avoid additional prednisone use by choosing enzalutamide.  There is a small, but non-zero risk of seizure with enzalutamide along with bupropion, which we will monitor him for.  We will refer to radiation oncology for evaluation/opinion on radiation of his possible bony lesion in addition to radiation of the prostate and RP LN bed.  Previously discussed ADT and RT work in tandem to treat therapy and ADT will target systemic disease as well, including circulating tumor cells.      -Enzalutamide was declined by patient's insurance company.  He started apalutamide on 10/5/21. He had CT CAP done 11/26/21 which showed significantly decreased retroperitoneal lymphadenopathy since the prior study dated 8/10/2021 indicates good response to treatment. No other evidence for lymphadenopathy or metastasis is identified. Specifically no evidence for left sacral metastasis is seen. Continue current dose of Apalutamide.   -His PSA continues to trend down. Today his PSA level is 0.01.   -Plan is to continue ADT for 2 years (through 8/2023). His next Eligard is due 2/16/2022 - scheduled.    -Plan for repeat scans and follow-up with Dr. Solo in early April 2022 and re-consider RT at that time.   -He has baseline HTN and is on Losartan 75mg daily, managed by his PCP. He recently bought a BP cuff and has been monitoring his BP at home. BP has generally been stable with SBP in 120s-130s range. I asked patient to follow-up with his PCP  regarding BP management and to call us if his BP is consistently over 140/90. He verbalized understanding.   -He has baseline HLD and currently on Zetia 10mg daily and lovastatin 10mg 3 days per week as per his PCP. Recommendations were made for a healthy diet of minimally processed foods and reduction in sugar as possible. Increase fruits/vegetable intake. He has a treadmill. We again discussed that he should try to walk on the treadmill for 30 mins 5 days a week as tolerated. He is also working to get an appt with dietician. Continue to follow-up with Dr. Lin for HLD management.   -He is on levothyroxine 112 mcg daily per PCP.   -Continue to follow-up with PCP Dr. Lin for routine health maintenance; he has annual physical scheduled on 3/16/22. I reminded patient to call us if any issues or problems arise in between visits.     RTC for labs and virtual PILAR visit in 1 month.     45 minutes spent on the date of the encounter doing chart review, review of test results, interpretation of tests, patient visit and documentation     Liliya De PA-C  [facility] [place] [address] [place] [address]  [phone]

## 2022-02-16 ENCOUNTER — OFFICE VISIT (OUTPATIENT)
Dept: UROLOGY | Facility: CLINIC | Age: 67
End: 2022-02-16
Payer: MEDICARE

## 2022-02-16 DIAGNOSIS — C61 PROSTATE CANCER (H): Primary | ICD-10-CM

## 2022-02-16 PROCEDURE — 96402 CHEMO HORMON ANTINEOPL SQ/IM: CPT | Performed by: UROLOGY

## 2022-02-16 NOTE — PROGRESS NOTES
The following medication was given:     MEDICATION: Eligard 45 mg  ROUTE: SQ  SITE: LLQ  DOSE: 45 mg  LOT #: [id number]  :  [place]  EXPIRATION DATE:  01/2023  NDC#: 88101-559-09    Clinic Administered Medication Documentation    Administrations This Visit     leuprolide (ELIGARD) kit 45 mg     Admin Date  02/16/2022 Action  Given Dose  45 mg Route  Subcutaneous Site  Left Lower Abdomen Administered By  Valencia Villela RN    Ordering Provider: Alexei Ott MD    Patient Supplied?: No    Comments: Given later                Eligard 45 mg      Valencia Villela RN on 2/16/2022 at 7:52 AM

## 2022-02-19 ENCOUNTER — DOCUMENTATION ONLY (OUTPATIENT)
Dept: ONCOLOGY | Facility: CLINIC | Age: 67
End: 2022-02-19
Payer: MEDICARE

## 2022-02-26 DIAGNOSIS — J31.0 CHRONIC RHINITIS: ICD-10-CM

## 2022-02-28 ENCOUNTER — MYC MEDICAL ADVICE (OUTPATIENT)
Dept: FAMILY MEDICINE | Facility: CLINIC | Age: 67
End: 2022-02-28
Payer: MEDICARE

## 2022-02-28 NOTE — TELEPHONE ENCOUNTER
Pending Prescriptions:                       Disp   Refills    ALLEGRA-D ALLERGY & CONGESTION 180-240 MG *90 tab*0        Sig: TAKE ONE TABLET BY MOUTH EVERY DAY    Routing refill request to provider for review/approval because:  Drug not on the [facility] refill protocol     Lay Santamaria RN

## 2022-02-28 NOTE — TELEPHONE ENCOUNTER
Please let patient know that I do not recommend him to take the Allegra D chronically due to its potential side effect.  I recommend to take regular Allegra daily as needed for allergies or symptoms.  It can be bought over-the-counter.  If he needs a prescription for it, please let me know.  Thank you.  Nishi Lin MD.

## 2022-03-01 RX ORDER — FEXOFENADINE HYDROCHLORIDE AND PSEUDOEPHEDRINE HYDROCHLORIDE 180; 240 MG/1; MG/1
TABLET, FILM COATED, EXTENDED RELEASE ORAL
Qty: 90 TABLET | Refills: 0 | OUTPATIENT
Start: 2022-03-01

## 2022-03-09 NOTE — PROGRESS NOTES
Joel is a 66 year old who is being evaluated via a billable video visit.      How would you like to obtain your AVS? MyChart  If the video visit is dropped, the invitation should be resent by: Send to e-mail at: [email or web address]  Will anyone else be joining your video visit? Yes: Michelle - wife. How would they like to receive their invitation? Text to cell phone: in person        Video-Visit Details    Type of service:  Video Visit    Video Start Time: 11:17AM    Video End Time:11:33AM    Originating Location (pt. Location): Home    Distant Location (provider location):  [facility] CANCER [facility]     Platform used for Video Visit: Ghassan Melton      [facility] Medical Oncology Followup Note       Date of visit: Mar 14, 2022   Oncologist: Dr. Isaias Solo    CC:   metastatic prostate cancer     INTERVAL HISTORY:  Joel presents for oncology follow-up visit today via video. Accompanied by his wife, Michelle.     He is doing pretty good. Overall tolerating the Apalutamide without major issues. He was a little tired after last Eligard about a month ago. He is still having some hot flashes, unchanged. No fevers. He is eating/drinking well. Denies nausea/vomiting. His weight recorded on 1/31 was 272 which is incorrect. His weight has been relatively stable per patient report around the 230s. Sometimes he does get swelling in his legs; this usually resolves in a few days. No skin rash. He reports a few days ago he was in recliner and felt some pressure in his chest. This resolved on its own and has not recurred. He denies CP or SOB. He will discuss with PCP at visit in 2 days. Otherwise he is doing pretty good. Normal bowel function. He admits he does not use his treadmill as much as he should. He uses the treadmill 1-2x per week.     ONCOLOGY HISTORY:   -Elevated PSA noted 2/26/21 at 12.70. Previously normal in 2017.     -4/7/21-Initial urology visit.   -7/2/21-prostate biopsy 4+3, 9/12 biopsies  positive.  Ductal component noted.     -7/28/21-MR prostate-PIRADS 5 lesion, R and L sided lesions with likely    neurovascular bundle invasion. No  seminal vesicle involvement. No clear LAD,  but some indeterminate pelvic nodes.  No suspicious bone lesions.     -7/28/21-NM bone scan suspicious lesion of R sacral ala S3 level.     -8/10/21-CT A/P with multiple enlarged periaortic/iliac LN   -8/11/21-First eligard dose 45mg (Q6M dosing). Due next 2/2022.   -8/30/21-Initial medical oncology visit with Dr. Solo.  Unclear if bony lesion is metastasis based on current imaging.  Enzalutamide was declined by patient's insurance company.  He was therefore started on apalutamide on 10/5/21.    Eligard (next due on 8/17/2022).     8/30/21 PSA =30.40 Pre Rx  9/27/21 PSA =8.99 (T=6); HgbA1C = 6.1  10/5/21 PSA =3.22  11/26/21 PSA =0.15  1/13/22 PSA =0.04   2/14/22 PSA =0.01  3/13/22 PSA =<0.01     PMH:  HTN, HLD, bipolar disorder, DM2 on metformin     PSH:  Bilateral TKA, bilateral cataract extraction, bilateral carpal tunnel release     FAMILY HX:  No reported family history of prostate cancer     SOCIAL:  Retired, works at [employer] in [employer] section now  Never smoker.   No EtOH  No recreational drugs.   No herbs/supplements other than on medication list.      Video physical exam  General: Patient appears well in no acute distress.   Skin: No visualized rash or lesions on visualized skin  Resp: Appears to be breathing comfortably without accessory muscle usage, speaking in full sentences, no cough  MSK: Appears to have normal range of motion based on visualized movements  Neurologic: No apparent tremors, facial movements symmetric  Psych: affect normal, alert and oriented    The rest of a comprehensive physical examination is deferred due to PHE (public health emergency) video restrictions    LABS: Reviewed labs from 3/13/22.      Ref. Range 3/13/2022 07:46   Sodium Latest Ref Range: 133 - 144 mmol/L 139   Potassium Latest Ref  Range: 3.4 - 5.3 mmol/L 4.2   Chloride Latest Ref Range: 94 - 109 mmol/L 106   Carbon Dioxide Latest Ref Range: 20 - 32 mmol/L 29   Urea Nitrogen Latest Ref Range: 7 - 30 mg/dL 20   Creatinine Latest Ref Range: 0.66 - 1.25 mg/dL 1.00   GFR Estimate Latest Ref Range: >60 mL/min/1.73m2 84   Calcium Latest Ref Range: 8.5 - 10.1 mg/dL 8.8   Anion Gap Latest Ref Range: 3 - 14 mmol/L 4   Albumin Latest Ref Range: 3.4 - 5.0 g/dL 3.8   Protein Total Latest Ref Range: 6.8 - 8.8 g/dL 6.7 (L)   Bilirubin Total Latest Ref Range: 0.2 - 1.3 mg/dL 0.5   Alkaline Phosphatase Latest Ref Range: 40 - 150 U/L 79   ALT Latest Ref Range: 0 - 70 U/L 31   AST Latest Ref Range: 0 - 45 U/L 15   Cholesterol Latest Ref Range: <200 mg/dL 182   Patient Fasting > 8hrs? Unknown Yes   HDL Cholesterol Latest Ref Range: >=40 mg/dL 36 (L)   LDL Cholesterol Calculated Latest Ref Range: <=100 mg/dL 102 (H)   Non HDL Cholesterol Latest Ref Range: <130 mg/dL 146 (H)   PSA Latest Ref Range: 0.00 - 4.00 ug/L <0.01   T4 Free Latest Ref Range: 0.76 - 1.46 ng/dL 0.81   Triglycerides Latest Ref Range: <150 mg/dL 220 (H)   TSH Latest Ref Range: 0.40 - 4.00 mU/L 8.20 (H)   Glucose Latest Ref Range: 70 - 99 mg/dL 136 (H)   WBC Latest Ref Range: 4.0 - 11.0 10e3/uL 5.6   Hemoglobin Latest Ref Range: 13.3 - 17.7 g/dL 12.5 (L)   Hematocrit Latest Ref Range: 40.0 - 53.0 % 35.9 (L)   Platelet Count Latest Ref Range: 150 - 450 10e3/uL 187   RBC Count Latest Ref Range: 4.40 - 5.90 10e6/uL 3.94 (L)   MCV Latest Ref Range: 78 - 100 fL 91   MCH Latest Ref Range: 26.5 - 33.0 pg 31.7   MCHC Latest Ref Range: 31.5 - 36.5 g/dL 34.8   RDW Latest Ref Range: 10.0 - 15.0 % 12.2   % Neutrophils Latest Units: % 60   % Lymphocytes Latest Units: % 20   % Monocytes Latest Units: % 12   % Eosinophils Latest Units: % 6   % Basophils Latest Units: % 1   Absolute Basophils Latest Ref Range: 0.0 - 0.2 10e3/uL 0.0   Absolute Eosinophils Latest Ref Range: 0.0 - 0.7 10e3/uL 0.4   Absolute  Immature Granulocytes Latest Ref Range: <=0.4 10e3/uL 0.0   Absolute Lymphocytes Latest Ref Range: 0.8 - 5.3 10e3/uL 1.1   Absolute Monocytes Latest Ref Range: 0.0 - 1.3 10e3/uL 0.7   % Immature Granulocytes Latest Units: % 1   Absolute Neutrophils Latest Ref Range: 1.6 - 8.3 10e3/uL 3.4   Absolute NRBCs Latest Units: 10e3/uL 0.0   NRBCs per 100 WBC Latest Ref Range: <1 /100 0       IMPRESSION AND PLAN:  Joel Pike is a 66 year old M with PMH of DM2 on metformin, depression/anxiety (pt reports as bipolar d/o), HTN, and HLD with prostate cancer with possible bone metastasis.  Previously, Dr. Solo had a lengthy discussion at his initial consult visit and went over his pathology and staging scan results.  Previously discussed that it is not clear whether the lesion in wing of his ala is a true bony metastasis or whether it is another finding/artifact.  Previously discussed that this does not change the overall treatment of his disease with ADT, but may change whether he receives radiation to his bony lesion in the future or not.  The role of ADT and the addition of enzalutamide were previously discussed.  Given his existing DM2, we will attempt to avoid additional prednisone use by choosing enzalutamide.  There is a small, but non-zero risk of seizure with enzalutamide along with bupropion, which we will monitor him for.  We will refer to radiation oncology for evaluation/opinion on radiation of his possible bony lesion in addition to radiation of the prostate and RP LN bed.  Previously discussed ADT and RT work in tandem to treat therapy and ADT will target systemic disease as well, including circulating tumor cells.      -Enzalutamide was declined by patient's insurance company.  He started apalutamide on 10/5/21. He had CT CAP done 11/26/21 which showed significantly decreased retroperitoneal lymphadenopathy since the prior study dated 8/10/2021 indicates good response to treatment. No other evidence for  lymphadenopathy or metastasis is identified. Specifically no evidence for left sacral metastasis is seen. Continue current dose of Apalutamide.   -His PSA continues to trend down. Recent PSA level from 3/13/22 is <0.01.   -Plan is to continue ADT for 2 years (through 8/2023). His next Eligard 45mg is due 8/17/2022 - scheduled.    -Plan for repeat scans and follow-up with Dr. Solo in early April 2022 and re-consider RT at that time. CT CAP scheduled 4/1 and visit with Dr. Solo on 4/4.   -He has baseline HTN and is on Losartan 75mg daily, managed by his PCP. He recently bought a BP cuff and has been monitoring his BP at home. Patient reports his BP at home has been under 140/90.  -He has baseline HLD and currently on Zetia 10mg daily and lovastatin 10mg 3 days per week as per his PCP. Recommendations were made for a healthy diet of minimally processed foods and reduction in sugar, fatty, and fried foods as possible. Increase fruits/vegetable intake. He has a treadmill. We again discussed that he should try to walk on the treadmill for 30 mins 5 days a week as tolerated. Continue to follow-up with Dr. Lin for HLD management.   -He is on levothyroxine 112 mcg daily per PCP. Recent TSH is 8.20, he will discuss with Dr. Lin at next scheduled visit.   -Continue to follow-up with PCP Dr. Lin for routine health maintenance and management of HTN, HLD, and hypothyroidism; he has annual physical scheduled on 3/16/22. I reminded patient to call us if any issues or problems arise in between visits.     RTC for CT CAP with labs on 4/1 and follow-up with Dr. Solo on 4/4.     45 minutes spent on the date of the encounter doing chart review, review of test results, interpretation of tests, patient visit and documentation     Liliya De PA-C  [facility]  [address] [place] [address] [phone]

## 2022-03-12 ASSESSMENT — ENCOUNTER SYMPTOMS
ABDOMINAL PAIN: 1
MYALGIAS: 1
JOINT SWELLING: 1
SORE THROAT: 0
CHILLS: 0
DIARRHEA: 0
NERVOUS/ANXIOUS: 0
PALPITATIONS: 0
ARTHRALGIAS: 1
HEMATOCHEZIA: 0
NAUSEA: 0
EYE PAIN: 0
HEADACHES: 0
COUGH: 1
DYSURIA: 0
HEMATURIA: 0
SHORTNESS OF BREATH: 1
PARESTHESIAS: 0
HEARTBURN: 0
FEVER: 0
FREQUENCY: 1
WEAKNESS: 1
DIZZINESS: 1
CONSTIPATION: 0

## 2022-03-12 ASSESSMENT — ACTIVITIES OF DAILY LIVING (ADL): CURRENT_FUNCTION: NO ASSISTANCE NEEDED

## 2022-03-13 ENCOUNTER — LAB (OUTPATIENT)
Dept: LAB | Facility: CLINIC | Age: 67
End: 2022-03-13
Payer: MEDICARE

## 2022-03-13 DIAGNOSIS — C61 PROSTATE CANCER (H): ICD-10-CM

## 2022-03-13 LAB
ALBUMIN SERPL-MCNC: 3.8 G/DL (ref 3.4–5)
ALP SERPL-CCNC: 79 U/L (ref 40–150)
ALT SERPL W P-5'-P-CCNC: 31 U/L (ref 0–70)
ANION GAP SERPL CALCULATED.3IONS-SCNC: 4 MMOL/L (ref 3–14)
AST SERPL W P-5'-P-CCNC: 15 U/L (ref 0–45)
BASOPHILS # BLD AUTO: 0 10E3/UL (ref 0–0.2)
BASOPHILS NFR BLD AUTO: 1 %
BILIRUB SERPL-MCNC: 0.5 MG/DL (ref 0.2–1.3)
BUN SERPL-MCNC: 20 MG/DL (ref 7–30)
CALCIUM SERPL-MCNC: 8.8 MG/DL (ref 8.5–10.1)
CHLORIDE BLD-SCNC: 106 MMOL/L (ref 94–109)
CHOLEST SERPL-MCNC: 182 MG/DL
CO2 SERPL-SCNC: 29 MMOL/L (ref 20–32)
CREAT SERPL-MCNC: 1 MG/DL (ref 0.66–1.25)
EOSINOPHIL # BLD AUTO: 0.4 10E3/UL (ref 0–0.7)
EOSINOPHIL NFR BLD AUTO: 6 %
ERYTHROCYTE [DISTWIDTH] IN BLOOD BY AUTOMATED COUNT: 12.2 % (ref 10–15)
FASTING STATUS PATIENT QL REPORTED: YES
GFR SERPL CREATININE-BSD FRML MDRD: 84 ML/MIN/1.73M2
GLUCOSE BLD-MCNC: 136 MG/DL (ref 70–99)
HCT VFR BLD AUTO: 35.9 % (ref 40–53)
HDLC SERPL-MCNC: 36 MG/DL
HGB BLD-MCNC: 12.5 G/DL (ref 13.3–17.7)
IMM GRANULOCYTES # BLD: 0 10E3/UL
IMM GRANULOCYTES NFR BLD: 1 %
LDLC SERPL CALC-MCNC: 102 MG/DL
LYMPHOCYTES # BLD AUTO: 1.1 10E3/UL (ref 0.8–5.3)
LYMPHOCYTES NFR BLD AUTO: 20 %
MCH RBC QN AUTO: 31.7 PG (ref 26.5–33)
MCHC RBC AUTO-ENTMCNC: 34.8 G/DL (ref 31.5–36.5)
MCV RBC AUTO: 91 FL (ref 78–100)
MONOCYTES # BLD AUTO: 0.7 10E3/UL (ref 0–1.3)
MONOCYTES NFR BLD AUTO: 12 %
NEUTROPHILS # BLD AUTO: 3.4 10E3/UL (ref 1.6–8.3)
NEUTROPHILS NFR BLD AUTO: 60 %
NONHDLC SERPL-MCNC: 146 MG/DL
NRBC # BLD AUTO: 0 10E3/UL
NRBC BLD AUTO-RTO: 0 /100
PLATELET # BLD AUTO: 187 10E3/UL (ref 150–450)
POTASSIUM BLD-SCNC: 4.2 MMOL/L (ref 3.4–5.3)
PROT SERPL-MCNC: 6.7 G/DL (ref 6.8–8.8)
PSA SERPL-MCNC: <0.01 UG/L (ref 0–4)
RBC # BLD AUTO: 3.94 10E6/UL (ref 4.4–5.9)
SODIUM SERPL-SCNC: 139 MMOL/L (ref 133–144)
T4 FREE SERPL-MCNC: 0.81 NG/DL (ref 0.76–1.46)
TRIGL SERPL-MCNC: 220 MG/DL
TSH SERPL DL<=0.005 MIU/L-ACNC: 8.2 MU/L (ref 0.4–4)
WBC # BLD AUTO: 5.6 10E3/UL (ref 4–11)

## 2022-03-13 PROCEDURE — 85025 COMPLETE CBC W/AUTO DIFF WBC: CPT

## 2022-03-13 PROCEDURE — 36415 COLL VENOUS BLD VENIPUNCTURE: CPT

## 2022-03-13 PROCEDURE — 84153 ASSAY OF PSA TOTAL: CPT

## 2022-03-13 PROCEDURE — 84439 ASSAY OF FREE THYROXINE: CPT

## 2022-03-13 PROCEDURE — 80053 COMPREHEN METABOLIC PANEL: CPT

## 2022-03-13 PROCEDURE — 84443 ASSAY THYROID STIM HORMONE: CPT

## 2022-03-13 PROCEDURE — 80061 LIPID PANEL: CPT

## 2022-03-14 ENCOUNTER — VIRTUAL VISIT (OUTPATIENT)
Dept: ONCOLOGY | Facility: CLINIC | Age: 67
End: 2022-03-14
Attending: PHYSICIAN ASSISTANT
Payer: MEDICARE

## 2022-03-14 DIAGNOSIS — C61 PROSTATE CANCER (H): Primary | ICD-10-CM

## 2022-03-14 PROCEDURE — G0463 HOSPITAL OUTPT CLINIC VISIT: HCPCS | Mod: PN,RTG | Performed by: PHYSICIAN ASSISTANT

## 2022-03-14 PROCEDURE — 99215 OFFICE O/P EST HI 40 MIN: CPT | Mod: 95 | Performed by: PHYSICIAN ASSISTANT

## 2022-03-14 NOTE — LETTER
3/14/2022         RE: Joel Pike  [address]  [place] [address]      Joel is a 66 year old who is being evaluated via a billable video visit.      How would you like to obtain your AVS? MyChart  If the video visit is dropped, the invitation should be resent by: Send to e-mail at: [email or web address]  Will anyone else be joining your video visit? Yes: Michelle - wife. How would they like to receive their invitation? Text to cell phone: in person        Video-Visit Details    Type of service:  Video Visit    Video Start Time: 11:17AM    Video End Time:11:33AM    Originating Location (pt. Location): Home    Distant Location (provider location):  [facility]     Platform used for Video Visit: Ghassan Melton      [facility] Medical Oncology Followup Note       Date of visit: Mar 14, 2022   Oncologist: Dr. Isaias Solo    CC:   metastatic prostate cancer     INTERVAL HISTORY:  Joel presents for oncology follow-up visit today via video. Accompanied by his wife, Michelle.     He is doing pretty good. Overall tolerating the Apalutamide without major issues. He was a little tired after last Eligard about a month ago. He is still having some hot flashes, unchanged. No fevers. He is eating/drinking well. Denies nausea/vomiting. His weight recorded on 1/31 was 272 which is incorrect. His weight has been relatively stable per patient report around the 230s. Sometimes he does get swelling in his legs; this usually resolves in a few days. No skin rash. He reports a few days ago he was in recliner and felt some pressure in his chest. This resolved on its own and has not recurred. He denies CP or SOB. He will discuss with PCP at visit in 2 days. Otherwise he is doing pretty good. Normal bowel function. He admits he does not use his treadmill as much as he should. He uses the treadmill 1-2x per week.     ONCOLOGY HISTORY:   -Elevated PSA noted 2/26/21 at 12.70. Previously normal  in 2017.     -4/7/21-Initial urology visit.   -7/2/21-prostate biopsy 4+3, 9/12 biopsies positive.  Ductal component noted.     -7/28/21-MR prostate-PIRADS 5 lesion, R and L sided lesions with likely    neurovascular bundle invasion. No  seminal vesicle involvement. No clear LAD,  but some indeterminate pelvic nodes.  No suspicious bone lesions.     -7/28/21-NM bone scan suspicious lesion of R sacral ala S3 level.     -8/10/21-CT A/P with multiple enlarged periaortic/iliac LN   -8/11/21-First eligard dose 45mg (Q6M dosing). Due next 2/2022.   -8/30/21-Initial medical oncology visit with Dr. Solo.  Unclear if bony lesion is metastasis based on current imaging.  Enzalutamide was declined by patient's insurance company.  He was therefore started on apalutamide on 10/5/21.    Eligard (next due on 8/17/2022).     8/30/21 PSA =30.40 Pre Rx  9/27/21 PSA =8.99 (T=6); HgbA1C = 6.1  10/5/21 PSA =3.22  11/26/21 PSA =0.15  1/13/22 PSA =0.04   2/14/22 PSA =0.01  3/13/22 PSA =<0.01     PMH:  HTN, HLD, bipolar disorder, DM2 on metformin     PSH:  Bilateral TKA, bilateral cataract extraction, bilateral carpal tunnel release     FAMILY HX:  No reported family history of prostate cancer     SOCIAL:  Retired, works at [employer] in [employer] section now  Never smoker.   No EtOH  No recreational drugs.   No herbs/supplements other than on medication list.      Video physical exam  General: Patient appears well in no acute distress.   Skin: No visualized rash or lesions on visualized skin  Resp: Appears to be breathing comfortably without accessory muscle usage, speaking in full sentences, no cough  MSK: Appears to have normal range of motion based on visualized movements  Neurologic: No apparent tremors, facial movements symmetric  Psych: affect normal, alert and oriented    The rest of a comprehensive physical examination is deferred due to PHE (public health emergency) video restrictions    LABS: Reviewed labs from 3/13/22.      Ref.  Range 3/13/2022 07:46   Sodium Latest Ref Range: 133 - 144 mmol/L 139   Potassium Latest Ref Range: 3.4 - 5.3 mmol/L 4.2   Chloride Latest Ref Range: 94 - 109 mmol/L 106   Carbon Dioxide Latest Ref Range: 20 - 32 mmol/L 29   Urea Nitrogen Latest Ref Range: 7 - 30 mg/dL 20   Creatinine Latest Ref Range: 0.66 - 1.25 mg/dL 1.00   GFR Estimate Latest Ref Range: >60 mL/min/1.73m2 84   Calcium Latest Ref Range: 8.5 - 10.1 mg/dL 8.8   Anion Gap Latest Ref Range: 3 - 14 mmol/L 4   Albumin Latest Ref Range: 3.4 - 5.0 g/dL 3.8   Protein Total Latest Ref Range: 6.8 - 8.8 g/dL 6.7 (L)   Bilirubin Total Latest Ref Range: 0.2 - 1.3 mg/dL 0.5   Alkaline Phosphatase Latest Ref Range: 40 - 150 U/L 79   ALT Latest Ref Range: 0 - 70 U/L 31   AST Latest Ref Range: 0 - 45 U/L 15   Cholesterol Latest Ref Range: <200 mg/dL 182   Patient Fasting > 8hrs? Unknown Yes   HDL Cholesterol Latest Ref Range: >=40 mg/dL 36 (L)   LDL Cholesterol Calculated Latest Ref Range: <=100 mg/dL 102 (H)   Non HDL Cholesterol Latest Ref Range: <130 mg/dL 146 (H)   PSA Latest Ref Range: 0.00 - 4.00 ug/L <0.01   T4 Free Latest Ref Range: 0.76 - 1.46 ng/dL 0.81   Triglycerides Latest Ref Range: <150 mg/dL 220 (H)   TSH Latest Ref Range: 0.40 - 4.00 mU/L 8.20 (H)   Glucose Latest Ref Range: 70 - 99 mg/dL 136 (H)   WBC Latest Ref Range: 4.0 - 11.0 10e3/uL 5.6   Hemoglobin Latest Ref Range: 13.3 - 17.7 g/dL 12.5 (L)   Hematocrit Latest Ref Range: 40.0 - 53.0 % 35.9 (L)   Platelet Count Latest Ref Range: 150 - 450 10e3/uL 187   RBC Count Latest Ref Range: 4.40 - 5.90 10e6/uL 3.94 (L)   MCV Latest Ref Range: 78 - 100 fL 91   MCH Latest Ref Range: 26.5 - 33.0 pg 31.7   MCHC Latest Ref Range: 31.5 - 36.5 g/dL 34.8   RDW Latest Ref Range: 10.0 - 15.0 % 12.2   % Neutrophils Latest Units: % 60   % Lymphocytes Latest Units: % 20   % Monocytes Latest Units: % 12   % Eosinophils Latest Units: % 6   % Basophils Latest Units: % 1   Absolute Basophils Latest Ref Range: 0.0 - 0.2  10e3/uL 0.0   Absolute Eosinophils Latest Ref Range: 0.0 - 0.7 10e3/uL 0.4   Absolute Immature Granulocytes Latest Ref Range: <=0.4 10e3/uL 0.0   Absolute Lymphocytes Latest Ref Range: 0.8 - 5.3 10e3/uL 1.1   Absolute Monocytes Latest Ref Range: 0.0 - 1.3 10e3/uL 0.7   % Immature Granulocytes Latest Units: % 1   Absolute Neutrophils Latest Ref Range: 1.6 - 8.3 10e3/uL 3.4   Absolute NRBCs Latest Units: 10e3/uL 0.0   NRBCs per 100 WBC Latest Ref Range: <1 /100 0       IMPRESSION AND PLAN:  Joel Pike is a 66 year old M with PMH of DM2 on metformin, depression/anxiety (pt reports as bipolar d/o), HTN, and HLD with prostate cancer with possible bone metastasis.  Previously, Dr. Solo had a lengthy discussion at his initial consult visit and went over his pathology and staging scan results.  Previously discussed that it is not clear whether the lesion in wing of his ala is a true bony metastasis or whether it is another finding/artifact.  Previously discussed that this does not change the overall treatment of his disease with ADT, but may change whether he receives radiation to his bony lesion in the future or not.  The role of ADT and the addition of enzalutamide were previously discussed.  Given his existing DM2, we will attempt to avoid additional prednisone use by choosing enzalutamide.  There is a small, but non-zero risk of seizure with enzalutamide along with bupropion, which we will monitor him for.  We will refer to radiation oncology for evaluation/opinion on radiation of his possible bony lesion in addition to radiation of the prostate and RP LN bed.  Previously discussed ADT and RT work in tandem to treat therapy and ADT will target systemic disease as well, including circulating tumor cells.      -Enzalutamide was declined by patient's insurance company.  He started apalutamide on 10/5/21. He had CT CAP done 11/26/21 which showed significantly decreased retroperitoneal lymphadenopathy since the prior study  dated 8/10/2021 indicates good response to treatment. No other evidence for lymphadenopathy or metastasis is identified. Specifically no evidence for left sacral metastasis is seen. Continue current dose of Apalutamide.   -His PSA continues to trend down. Recent PSA level from 3/13/22 is <0.01.   -Plan is to continue ADT for 2 years (through 8/2023). His next Eligard 45mg is due 8/17/2022 - scheduled.    -Plan for repeat scans and follow-up with Dr. Solo in early April 2022 and re-consider RT at that time. CT CAP scheduled 4/1 and visit with Dr. Solo on 4/4.   -He has baseline HTN and is on Losartan 75mg daily, managed by his PCP. He recently bought a BP cuff and has been monitoring his BP at home. Patient reports his BP at home has been under 140/90.  -He has baseline HLD and currently on Zetia 10mg daily and lovastatin 10mg 3 days per week as per his PCP. Recommendations were made for a healthy diet of minimally processed foods and reduction in sugar, fatty, and fried foods as possible. Increase fruits/vegetable intake. He has a treadmill. We again discussed that he should try to walk on the treadmill for 30 mins 5 days a week as tolerated. Continue to follow-up with Dr. Lin for HLD management.   -He is on levothyroxine 112 mcg daily per PCP. Recent TSH is 8.20, he will discuss with Dr. Lin at next scheduled visit.   -Continue to follow-up with PCP Dr. Lin for routine health maintenance and management of HTN, HLD, and hypothyroidism; he has annual physical scheduled on 3/16/22. I reminded patient to call us if any issues or problems arise in between visits.     RTC for CT CAP with labs on 4/1 and follow-up with Dr. Solo on 4/4.     45 minutes spent on the date of the encounter doing chart review, review of test results, interpretation of tests, patient visit and documentation         Liliya De PA-C

## 2022-03-14 NOTE — NURSING NOTE
Patient confirms medications and allergies are accurate via patients echeck in completion, and or denies any changes since last reviewed/verified.     Chandler Melton, Virtual Facilitator

## 2022-03-16 ENCOUNTER — OFFICE VISIT (OUTPATIENT)
Dept: FAMILY MEDICINE | Facility: CLINIC | Age: 67
End: 2022-03-16
Payer: MEDICARE

## 2022-03-16 VITALS
BODY MASS INDEX: 32.5 KG/M2 | WEIGHT: 227 LBS | RESPIRATION RATE: 18 BRPM | DIASTOLIC BLOOD PRESSURE: 68 MMHG | TEMPERATURE: 97.7 F | HEART RATE: 81 BPM | SYSTOLIC BLOOD PRESSURE: 112 MMHG | OXYGEN SATURATION: 99 % | HEIGHT: 70 IN

## 2022-03-16 DIAGNOSIS — E78.5 HYPERLIPIDEMIA LDL GOAL <100: ICD-10-CM

## 2022-03-16 DIAGNOSIS — C61 PROSTATE CANCER (H): ICD-10-CM

## 2022-03-16 DIAGNOSIS — E11.22 TYPE 2 DIABETES MELLITUS WITH STAGE 2 CHRONIC KIDNEY DISEASE, WITHOUT LONG-TERM CURRENT USE OF INSULIN (H): ICD-10-CM

## 2022-03-16 DIAGNOSIS — Z00.00 ENCOUNTER FOR MEDICARE ANNUAL WELLNESS EXAM: Primary | ICD-10-CM

## 2022-03-16 DIAGNOSIS — N18.2 TYPE 2 DIABETES MELLITUS WITH STAGE 2 CHRONIC KIDNEY DISEASE, WITHOUT LONG-TERM CURRENT USE OF INSULIN (H): ICD-10-CM

## 2022-03-16 DIAGNOSIS — F33.42 RECURRENT MAJOR DEPRESSION IN COMPLETE REMISSION (H): ICD-10-CM

## 2022-03-16 DIAGNOSIS — I10 ESSENTIAL HYPERTENSION: ICD-10-CM

## 2022-03-16 DIAGNOSIS — E03.8 OTHER SPECIFIED HYPOTHYROIDISM: ICD-10-CM

## 2022-03-16 DIAGNOSIS — R07.9 CHEST PAIN, UNSPECIFIED TYPE: ICD-10-CM

## 2022-03-16 DIAGNOSIS — G47.33 OSA (OBSTRUCTIVE SLEEP APNEA): ICD-10-CM

## 2022-03-16 PROCEDURE — 99207 PR FOOT EXAM NO CHARGE: CPT | Mod: 25 | Performed by: FAMILY MEDICINE

## 2022-03-16 PROCEDURE — 99207 PR ANNUAL WELLNESS VISIT, PPS, INITIAL STAT: CPT | Performed by: FAMILY MEDICINE

## 2022-03-16 PROCEDURE — 99214 OFFICE O/P EST MOD 30 MIN: CPT | Mod: 25 | Performed by: FAMILY MEDICINE

## 2022-03-16 PROCEDURE — 93000 ELECTROCARDIOGRAM COMPLETE: CPT | Performed by: FAMILY MEDICINE

## 2022-03-16 RX ORDER — LEVOTHYROXINE SODIUM 125 UG/1
125 TABLET ORAL DAILY
Qty: 90 TABLET | Refills: 0 | Status: SHIPPED | OUTPATIENT
Start: 2022-03-16 | End: 2022-07-21

## 2022-03-16 RX ORDER — LOSARTAN POTASSIUM 50 MG/1
75 TABLET ORAL DAILY
Qty: 135 TABLET | Refills: 1 | Status: ON HOLD | OUTPATIENT
Start: 2022-03-16 | End: 2022-04-12

## 2022-03-16 RX ORDER — LOSARTAN POTASSIUM 25 MG/1
TABLET ORAL
COMMUNITY
Start: 2022-02-17 | End: 2022-03-16

## 2022-03-16 RX ORDER — EZETIMIBE 10 MG/1
10 TABLET ORAL DAILY
Qty: 90 TABLET | Refills: 3 | Status: SHIPPED | OUTPATIENT
Start: 2022-03-16 | End: 2023-03-21

## 2022-03-16 RX ORDER — LOSARTAN POTASSIUM 25 MG/1
TABLET ORAL
Qty: 270 TABLET | Refills: 0 | OUTPATIENT
Start: 2022-03-16

## 2022-03-16 RX ORDER — EZETIMIBE 10 MG/1
TABLET ORAL
Qty: 90 TABLET | Refills: 1 | OUTPATIENT
Start: 2022-03-16

## 2022-03-16 RX ORDER — BUPROPION HYDROCHLORIDE 150 MG/1
300 TABLET ORAL EVERY MORNING
Qty: 90 TABLET | Refills: 1 | Status: SHIPPED | OUTPATIENT
Start: 2022-03-16 | End: 2022-07-11

## 2022-03-16 ASSESSMENT — ACTIVITIES OF DAILY LIVING (ADL): CURRENT_FUNCTION: NO ASSISTANCE NEEDED

## 2022-03-16 ASSESSMENT — ENCOUNTER SYMPTOMS
FEVER: 0
SHORTNESS OF BREATH: 1
HEARTBURN: 0
FREQUENCY: 1
HEMATOCHEZIA: 0
DIARRHEA: 0
NERVOUS/ANXIOUS: 0
WEAKNESS: 1
HEADACHES: 0
JOINT SWELLING: 1
SORE THROAT: 0
EYE PAIN: 0
DYSURIA: 0
CHILLS: 0
PARESTHESIAS: 0
ABDOMINAL PAIN: 1
COUGH: 1
MYALGIAS: 1
NAUSEA: 0
DIZZINESS: 1
HEMATURIA: 0
PALPITATIONS: 0
CONSTIPATION: 0
ARTHRALGIAS: 1

## 2022-03-16 ASSESSMENT — PAIN SCALES - GENERAL: PAINLEVEL: NO PAIN (0)

## 2022-03-16 NOTE — Clinical Note
Please let patient know that I recommend a stress echocardiogram for his chest pain; it was ordered, please help to set it up for him.

## 2022-03-16 NOTE — PATIENT INSTRUCTIONS
Patient Education   Personalized Prevention Plan  You are due for the preventive services outlined below.  Your care team is available to assist you in scheduling these services.  If you have already completed any of these items, please share that information with your care team to update in your medical record.  Health Maintenance Due   Topic Date Due     Diabetic Foot Exam  02/26/2022       Signs of Hearing Loss      Hearing much better with one ear can be a sign of hearing loss.   Hearing loss is a problem shared by many people. In fact, it is one of the most common health problems, particularly as people age. Most people age 65 and older have some hearing loss. By age 80, almost everyone does. Hearing loss often occurs slowly over the years. So you may not realize your hearing has gotten worse.  Have your hearing checked  Call your healthcare provider if you:    Have to strain to hear normal conversation    Have to watch other people s faces very carefully to follow what they re saying    Need to ask people to repeat what they ve said    Often misunderstand what people are saying    Turn the volume of the television or radio up so high that others complain    Feel that people are mumbling when they re talking to you    Find that the effort to hear leaves you feeling tired and irritated    Notice, when using the phone, that you hear better with one ear than the other  [employer] last reviewed this educational content on 1/1/2020 2000-2021 [employer]. All rights reserved. This information is not intended as a substitute for professional medical care. Always follow your healthcare professional's instructions.          Urinary Incontinence (Male)    Urinary incontinence means not being able to control the release of urine from the bladder.   Causes  Common causes of urinary incontinence in men include:    Infection    Certain medicines    Aging    Poor pelvic muscle tone    Bladder  spasms    Obesity    Trouble urinating and fully emptying the bladder (urinary retention)  Other things that can cause incontinence are:     Nervous system diseases    Diabetes    Sleep apnea    Urinary tract infections    Prostate surgery    Pelvic injury  Constipation and smoking have also been identified as risk factors.   Symptoms    Urge incontinence (overactive bladder). This is a sudden urge to urinate. It occurs even though there may not be much urine in the bladder. The need to urinate often during the night is common. It's due to bladder spasms.    Stress incontinence. This is urine leakage that you can't control. It can occur with sneezing, coughing, and other actions that put stress on the bladder.    Treatment  Treatment depends on what is causing the condition. Bladder infections are treated with antibiotics. Urinary retention is treated with a bladder catheter.   Home care  Follow these guidelines when caring for yourself at home:    Don't have any foods and drinks that may irritate the bladder. This includes:  ? Chocolate  ? Alcohol  ? Caffeine  ? Carbonated drinks  ? Acidic fruits and juices    Limit fluids to 6 to 8 cups a day.    Lose weight if you are overweight. This will reduce your symptoms.    If advised, do regular pelvic muscle-strengthening exercises such as Kegel exercises.    If needed, wear absorbent pads to catch urine. Change the pads often. This is for good hygiene and to prevent skin and bladder infections.    Bathe daily for good hygiene.    If an antibiotic was prescribed to treat a bladder infection, take it until it's finished. Keep taking it even if you are feeling better. This is to make sure your infection has cleared.    If a catheter was left in place, keep bacteria from getting into the collection bag. Don't disconnect the catheter from the collection bag.    Use a leg band to secure the catheter drainage tube, so it does not pull on the catheter. Drain the collection bag  when it becomes full. To do this, use the drain spout at the bottom of the bag. Don't disconnect the bag from the catheter.    Don't pull on or try to remove a catheter. The catheter must be removed by a healthcare provider.    If you smoke, stop. Ask your provider for help if you can't do this on your own.  Follow-up care  Follow up with your healthcare provider, or as advised.  When to get medical advice  Call your healthcare provider right away if any of these occur:    Fever over 100.4 F (38 C), or as directed by your provider    Bladder pain or fullness    Belly swelling, nausea, or vomiting    Back pain    Weakness, dizziness, or fainting    If a catheter was left in place, return if:  ? The catheter falls out  ? The catheter stops draining for 6 hours  ? Your urine gets cloudy or smells bad  [employer] last reviewed this educational content on 1/1/2020 2000-2021 [employer]. All rights reserved. This information is not intended as a substitute for professional medical care. Always follow your healthcare professional's instructions.

## 2022-03-16 NOTE — PROGRESS NOTES
"SUBJECTIVE:   Joel Pike is a 66 year old male who presents for Preventive Visit.  Patient has been advised of split billing requirements and indicates understanding: Yes  Are you in the first 12 months of your Medicare coverage?  No    Healthy Habits:     In general, how would you rate your overall health?  Good    Frequency of exercise:  2-3 days/week    Duration of exercise:  15-30 minutes    Do you usually eat at least 4 servings of fruit and vegetables a day, include whole grains    & fiber and avoid regularly eating high fat or \"junk\" foods?  Yes    Taking medications regularly:  No    Barriers to taking medications:  None    Medication side effects:  No significant flushing, Muscle aches and Lightheadedness    Ability to successfully perform activities of daily living:  No assistance needed    Home Safety:  No safety concerns identified    Hearing Impairment:  Difficulty following a conversation in a noisy restaurant or crowded room and difficulty understanding soft or whispered speech    In the past 6 months, have you been bothered by leaking of urine? Yes    In general, how would you rate your overall mental or emotional health?  Good      PHQ-2 Total Score: 2    Additional concerns today:  No    Do you feel safe in your environment? Yes    Have you ever done Advance Care Planning? (For example, a Health Directive, POLST, or a discussion with a medical provider or your loved ones about your wishes): No, advance care planning information given to patient to review.  Patient plans to discuss their wishes with loved ones or provider.       Fall risk  Fallen 2 or more times in the past year?: No  Any fall with injury in the past year?: No    Cognitive Screening   1) Repeat 3 items (Leader, Season, Table)    2) Clock draw: NORMAL  3) 3 item recall: Recalls 3 objects  Results: 3 items recalled: COGNITIVE IMPAIRMENT LESS LIKELY    Mini-CogTM Copyright S Robert. Licensed by the author for use in [facility] " Services; reprinted with permission (barbara@[email or web address]). All rights reserved.      Reviewed and updated as needed this visit by clinical staff                  Reviewed and updated as needed this visit by Provider                 Social History     Tobacco Use     Smoking status: Never Smoker     Smokeless tobacco: Never Used   Substance Use Topics     Alcohol use: No     Alcohol/week: 0.0 standard drinks     If you drink alcohol do you typically have >3 drinks per day or >7 drinks per week? Not applicable    Alcohol Use 3/12/2022   Prescreen: >3 drinks/day or >7 drinks/week? Not Applicable   Prescreen: >3 drinks/day or >7 drinks/week? -   No flowsheet data found.    Joel is here today for physical with a couple of concerns.    1.  Chest pain: Happened a week ago while he was resting on a couch.  More of a chest pressure in lower sternal area that lasted for about 5 minutes.  Went away on its own.  No associated heart palpitation, shortness of breath, or diaphoresis.  It was more of the heavy feeling sensation.  Did not radiate to the shoulder or neck.  No history of heart disease.  Did not think it was related to his anxiety.  No more since then.    2.  Depression and anxiety - was on Wellbutrin and it was working well.  Been off the medication for a while by choice.  Recently diagnosed with prostate cancer, the chemotherapy has affected his mood significantly.  His wife noticed a difference - been more anxious, irritated and impatience.  He is interested him back on the Wellbutrin.  No history of seizure disorder.  No drugs or alcohol.  No problem with sleeping.  No suicidal or homicidal ideation.    3.  His other medical conditions are overall stable and controlled.  He takes losartan for high blood pressure, Zetia and lovastatin for high cholesterol and Metformin for diabetes and they have been working well.  He also takes levothyroxine for hypothyroidism.  Not checking his blood pressure or blood sugar at home.  No  headache or dizziness.  No acute change in his vision.  No chest pain, shortness of breath, neck swelling, orthopnea or dyspnea.  No numbness or tingling sensation.  He uses CPAP nightly for sleep apnea and has been working well.  No significant weight change.  Feels rested in the morning and denied of excessive daytime fatigue/sleepiness.    4.  He has working with the urologist for his prostate cancer and tolerating the Erleada.  Still has the problem with nocturia but is tolerable.    5.  No headache, dizziness or acute visual change. No runny nose, nasal congestion, coughing, fever or chill. No CP/SOB. No N/V/D/C.  No abdominal pain.  No leg swelling or pain.  Not exercising. Social drinker but no drug use or tobacco smoking. No problem with sleeping.  Feels safe, lives with his wife.  No weight change. No other concern today    Current providers sharing in care for this patient include:   Patient Care Team:  Nishi Lin MD as PCP - General (Family Medicine)  Nishi Lin MD as Assigned PCP  Alexei Ott MD as Assigned Surgical Provider  Alden Montanez APRN CNP as Assigned Musculoskeletal Provider  Isaias Solo MD as Assigned Cancer Care Provider    The following health maintenance items are reviewed in Epic and correct as of today:  Health Maintenance Due   Topic Date Due     ADVANCE CARE PLANNING  11/09/2021     DIABETIC FOOT EXAM  02/26/2022     MEDICARE ANNUAL WELLNESS VISIT  02/26/2022     BP Readings from Last 3 Encounters:   03/16/22 112/68   02/14/22 (!) 148/74   01/31/22 116/60    Wt Readings from Last 3 Encounters:   03/16/22 103 kg (227 lb)   02/14/22 105 kg (231 lb 8 oz)   01/31/22 123.5 kg (272 lb 3.2 oz)                  Patient Active Problem List   Diagnosis     Gastroesophageal reflux disease without esophagitis     Degeneration of lumbar or lumbosacral intervertebral disc     Chronic rhinitis     Hyperlipidemia LDL goal <100     HILARIO (obstructive sleep apnea)      Tinnitus     Advanced directives, counseling/discussion     Other specified hypothyroidism     Type 2 diabetes mellitus with stage 2 chronic kidney disease (H)     CKD (chronic kidney disease) stage 2, GFR 60-89 ml/min     Obesity, Class I, BMI 30-34.9     Recurrent major depression in complete remission (H)     Microalbuminuria     S/P total knee replacement using cement, left     Primary osteoarthritis of right knee     S/P total knee replacement using cement, right     Status post total right knee replacement     Prostate cancer (H)     Morbid obesity (H)     Essential hypertension     Past Surgical History:   Procedure Laterality Date     ARTHROPLASTY KNEE Left 2/4/2020    Procedure: left total knee replacement;  Surgeon: Jason Berman DO;  Location: [facility]     ARTHROPLASTY KNEE Right 1/18/2021    Procedure: right total knee replacement;  Surgeon: Jason Berman DO;  Location: [facility]     COLONOSCOPY  02/02/09    Repeat in 5 yrs     COLONOSCOPY N/A 9/5/2014    Procedure: COMBINED COLONOSCOPY, SINGLE BIOPSY/POLYPECTOMY BY BIOPSY;  Surgeon: Denis Oakes MD;  Location: [facility]     ESOPHAGOSCOPY, GASTROSCOPY, DUODENOSCOPY (EGD), COMBINED N/A 2/20/2015    Procedure: COMBINED ESOPHAGOSCOPY, GASTROSCOPY, DUODENOSCOPY (EGD), BIOPSY SINGLE OR MULTIPLE;  Surgeon: Alfred Young MD;  Location: [facility]     HC REMV CATARACT EXTRACAP,INSERT LENS, W/O ECP  2000    Bilateral     HC REVISE MEDIAN N/CARPAL TUNNEL SURG  1991     HC TOOTH EXTRACTION W/FORCEP  1980's    [place] teeth removed     RELEASE CARPAL TUNNEL Right 6/16/2017    Procedure: RELEASE CARPAL TUNNEL;  Right carpal tunnel release;  Surgeon: Jason Berman DO;  Location: [facility]     RELEASE CARPAL TUNNEL Left 7/11/2017    Procedure: RELEASE CARPAL TUNNEL;  Left carpal tunnel release;  Surgeon: Jason Berman DO;  Location: [facility]     SOFT TISSUE SURGERY         Social History     Tobacco Use     Smoking status: Never  Smoker     Smokeless tobacco: Never Used   Substance Use Topics     Alcohol use: No     Alcohol/week: 0.0 standard drinks     Family History   Problem Relation Age of Onset     Hypertension Mother      Hypertension Father      Diabetes Father         Adult     Heart Disease Father         Hx: Bypass     Unknown/Adopted Maternal Grandmother      Cerebrovascular Disease Maternal Grandmother      No Known Problems Maternal Grandfather      No Known Problems Paternal Grandmother      No Known Problems Paternal Grandfather      Cancer Sister         Liver     Thyroid Disease Brother      No Known Problems Son      No Known Problems Sister          Current Outpatient Medications   Medication Sig Dispense Refill     alcohol swab prep pads Use to swab area of injection/pankaj as directed. 100 each 3     blood glucose (HUGO CONTOUR) test strip Use to test blood sugars 1 time daily or as directed. 100 strip 0     blood glucose (NO BRAND SPECIFIED) lancets standard Use to test blood sugar one time daily or as directed. 100 each 3     blood glucose calibration (HUGO CONTOUR) NORMAL solution Use to calibrate blood glucose monitor as directed. 1 each 3     blood glucose monitoring (NO BRAND SPECIFIED) meter device kit Use to test blood sugar 1-2 times daily or as directed. Preferred blood glucose meter OR supplies to accompany: Blood Glucose Monitor Brands: per insurance. 1 kit 0     buPROPion (WELLBUTRIN XL) 150 MG 24 hr tablet Take 2 tablets (300 mg) by mouth every morning 90 tablet 1     cholecalciferol (VITAMIN D3) 5000 UNITS TABS tablet Take 1 tablet (5,000 Units) by mouth       Coenzyme Q-10 capsule Take 1 capsule by mouth daily. 30 capsule 12     ezetimibe (ZETIA) 10 MG tablet Take 1 tablet (10 mg) by mouth daily 90 tablet 3     fish oil-omega-3 fatty acids 1000 MG capsule Take  by mouth. Take daily   180 capsule 12     levothyroxine (SYNTHROID/LEVOTHROID) 125 MCG tablet Take 1 tablet (125 mcg) by mouth daily 90 tablet 0      losartan (COZAAR) 50 MG tablet Take 1.5 tablets (75 mg) by mouth daily 135 tablet 1     lovastatin (MEVACOR) 20 MG tablet Take 1/2 tablet 3 days a week 30 tablet 0     Magnesium 500 MG CAPS One twice a day 30 capsule      metFORMIN (GLUCOPHAGE) 500 MG tablet TAKE ONE TABLET BY MOUTH TWICE A DAY WITH MEALS 180 tablet 3     Misc Natural Products (OSTEO BI-FLEX ADV JOINT SHIELD) TABS Take  by mouth.       multivitamin (OCUVITE) TABS tablet Take 1 tablet by mouth daily 30 each 0     pantoprazole (PROTONIX) 40 MG EC tablet Take 1 tablet (40 mg) by mouth daily Please stop the nexium 90 tablet 3     albuterol (PROAIR HFA) 108 (90 BASE) MCG/ACT Inhaler 1-2 puffs every 2 -4 hrs as needed (Patient not taking: Reported on 8/30/2021) 1 Inhaler 5     apalutamide (ERLEADA) 60 MG tablet Take 4 tablets (240 mg) by mouth daily 120 tablet 11     Allergies   Allergen Reactions     Dust Mites Cough     Hmg-Coa-R Inhibitors      Body aches     Penicillins Rash and Hives     Recent Labs   Lab Test 03/13/22  0746 02/14/22  1201 01/31/22  1021 10/05/21  0952 09/27/21  1214 08/10/21  0809 02/26/21  1107 01/06/21  1553 11/04/20  0953   A1C  --   --  6.3*  --  6.1*  --  6.0*  --  5.7*   * 127* 156*   < >  --   --   --   --  90   HDL 36* 35* 34*   < >  --   --   --   --  34*   TRIG 220* 208* 159*   < >  --   --   --   --  252*   ALT 31 33 34   < > 52   < >  --   --  72*   CR 1.00 0.99 1.06   < > 1.23   < >  --  1.21 1.12   GFRESTIMATED 84 85 78   < > 61   < >  --  63 69   GFRESTBLACK  --   --   --   --   --   --   --  73 80   POTASSIUM 4.2 4.5 4.6   < > 4.3   < >  --  4.1 4.6   TSH 8.20* 4.16* 4.39*   < >  --   --   --   --  6.02*    < > = values in this interval not displayed.      Pneumonia Vaccine: UTD        Review of Systems   Constitutional: Negative for chills and fever.   HENT: Negative for congestion, ear pain, hearing loss and sore throat.    Eyes: Positive for visual disturbance. Negative for pain.   Respiratory: Positive  "for cough and shortness of breath.    Cardiovascular: Positive for peripheral edema. Negative for chest pain and palpitations.   Gastrointestinal: Positive for abdominal pain. Negative for constipation, diarrhea, heartburn, hematochezia and nausea.   Genitourinary: Positive for frequency, impotence and urgency. Negative for dysuria, genital sores, hematuria and penile discharge.   Musculoskeletal: Positive for arthralgias, joint swelling and myalgias.   Skin: Negative for rash.   Neurological: Positive for dizziness and weakness. Negative for headaches and paresthesias.   Psychiatric/Behavioral: Positive for mood changes. The patient is not nervous/anxious.      Constitutional, HEENT, cardiovascular, pulmonary, GI, , musculoskeletal, neuro, skin, endocrine and psych systems are negative, except as otherwise noted.    OBJECTIVE:   /68   Pulse 81   Temp 97.7  F (36.5  C) (Temporal)   Resp 18   Ht 1.773 m (5' 9.8\")   Wt 103 kg (227 lb)   SpO2 99%   BMI 32.76 kg/m   Estimated body mass index is 32.75 kg/m  as calculated from the following:    Height as of 1/31/22: 1.791 m (5' 10.5\").    Weight as of 2/14/22: 105 kg (231 lb 8 oz).  Physical Exam   GENERAL: alert and no distress, speaking in full sentences.  EYES: Eyes grossly normal to inspection, PERRL and conjunctivae and sclerae normal.  No nystagmus.  All 4 visual fields are intact  HENT: Ear canals and TM's normal.  Nares are non-congested. Oropharynx is pink and moist. No tender with palpation to the sinuses.  NECK: no adenopathy, supple, no lymphadenopathy or thyromegaly.  No tender with palpation to the cervical spine or its paraspinous muscle.  No JV distention or carotid bruits  RESP: lungs clear to auscultation - no rales, rhonchi or wheezes  CV: regular rate and rhythm, no murmur.  ABDOMEN: soft, nondistended, nontender, no palpable masses or organomegaly with normal bowel sounds.  MS: no gross musculoskeletal defects noted, no edema.  Walk " with no limping, normal gait.  All 4 extremities are equally in strength. Ankle, knees, hips, shoulders, elbows and wrists exams normal.  Normal fine motor skills on fingers.  Back is straight, no lordosis or scoliosis.  No tender with palpation.  SKIN: no suspicious lesions or rashes  NEURO: Normal strength and tone, mentation intact and speech normal.  Cranial nerves II through XII intact, DTR +2 throughout, no focal neurological deficit.  PSYCH: mentation appears normal, affect normal/bright.  Thoughts intact, no hallucination.  No suicidal or homicidal ideation.  LYMPH: no cervical, supraclavicular or axillary adenopathy.   (male): Offered but declined.  Foot: Normal microfilament exam.  No open skin or calluses.  Toenails look normal.    Diagnostic Test Results:  Labs reviewed in Epic  No results found for any visits on 03/16/22.    EKG: Normal sinus rhythm with no ST change.  New finding of poor R wave progressions on  V1-V3, concerning of anteroseptal infarct.    ASSESSMENT / PLAN:   (Z00.00) Encounter for Medicare annual wellness exam  (primary encounter diagnosis)  Comment: Overall Joel is doing well.  UTD for immunization.  Discussed about safety issue, healthy diet, exercising, weight management, good sleeping hygiene, advanced directive care, falling prevention, and depression prevention. Recommended daily exercise for at least 30 minutes.  Emphasized on healthy diet with adequate fluid intake and resting. Feels safe.  Follow in 1 year, earlier as needed.  UTD for labs.  UTD for colon cancer screening -last colonoscopy was in 2019 with polyps, repeat in 3 to 5 years.   All of his questions were answered.      (R07.9) Chest pain, unspecified type  Comment: No history of heart disease.  Diabetes is well controlled.  Happen once that lasted for 5 minutes a week ago.  EKG today showed normal sinus rhythm, no acute ST change but there was poor R wave progression concern of anteroseptal fact infarct which  is new as compared to the EKG from couple of years ago.  Not looking acutely sick.  Will send for stress echocardiogram.    Plan: EKG 12-lead complete w/read - Clinics            (F33.42) Recurrent major depression in complete remission (H)  Comment:  Worse since the chemotherapy for the prostate cancer.  No suicidal or homicidal ideation.  No hallucination.  Declined counseling.  Restarted Wellbutrin, did well with it in the past with no side effect.  No history of seizure disorder.  Symptoms that need to be seen or call in discussed.  Follow-up as needed.    Plan: buPROPion (WELLBUTRIN XL) 150 MG 24 hr tablet            (I10) Essential hypertension  Comment: BP is normal and stable. Continue with the current doses of losartan, been tolerating it well.  Emphasized on healthy/low salt diet, daily excercise and weight loss.  Avoid high sugar/caffeine intake. Lab as ordered.  Follow up in 6 month, earlier as needed.      (E78.5) Hyperlipidemia LDL goal <100  Comment: Improving with the lovastatin and Zetia based on its last week's level.  No history of liver disease.  Last week's liver enzyme was also normal.  Denied of excessive alcohol intake.  Will continue with the Zetia and lovastatin.  Healthy lifestyle modification discussed as above.    Plan: ezetimibe (ZETIA) 10 MG tablet            (E11.22,  N18.2) Type 2 diabetes mellitus with stage 2 chronic kidney disease, without long-term current use of insulin (H)  Comment: Controlled and is doing well.  Hemoglobin A1c couple months ago was 6.3.  Tolerating the Metformin well.  Will continue with Metformin.  Healthy lifestyle modification discussed above.  Recheck hemoglobin A1c in 4 months.    Plan: ezetimibe (ZETIA) 10 MG tablet, losartan         (COZAAR) 50 MG tablet, FOOT EXAM            (E03.8) Other specified hypothyroidism  Comment: Last month's TSH was slightly high but the free T4 level was normal.  He is not symptomatic.  Will continue with the current dose  "of levothyroxine.  Recheck the THS in 3 months.      (G47.33) HILARIO (obstructive sleep apnea)  Comment: Stable and is doing well with the CPAP.  Continue with the CPAP - tolerating it well.  Weight loss encouraged.      (C61) Prostate cancer (H)  Comment: Encouraged to work with the urologist closely.      Patient has been advised of split billing requirements and indicates understanding: Yes    COUNSELING:  Reviewed preventive health counseling, as reflected in patient instructions       Regular exercise       Healthy diet/nutrition       Vision screening       Hearing screening       Dental care       Fall risk prevention       Alcohol Use        Colon cancer screening    Estimated body mass index is 32.75 kg/m  as calculated from the following:    Height as of 1/31/22: 1.791 m (5' 10.5\").    Weight as of 2/14/22: 105 kg (231 lb 8 oz).    Weight management plan: Discussed healthy diet and exercise guidelines    He reports that he has never smoked. He has never used smokeless tobacco.      Appropriate preventive services were discussed with this patient, including applicable screening as appropriate for cardiovascular disease, diabetes, osteopenia/osteoporosis, and glaucoma.  As appropriate for age/gender, discussed screening for colorectal cancer, prostate cancer, breast cancer, and cervical cancer. Checklist reviewing preventive services available has been given to the patient.    Reviewed patients plan of care and provided an AVS. The Basic Care Plan (routine screening as documented in Health Maintenance) for Joel meets the Care Plan requirement. This Care Plan has been established and reviewed with the Patient.    Counseling Resources:  ATP IV Guidelines  Pooled Cohorts Equation Calculator  Breast Cancer Risk Calculator  Breast Cancer: Medication to Reduce Risk  FRAX Risk Assessment  ICSI Preventive Guidelines  Dietary Guidelines for Americans, 2010  USDA's MyPlate  ASA Prophylaxis  Lung CA Screening    Vui Van " MD Delia  [facility]    Identified Health Risks:    The patient was provided with written information regarding signs of hearing loss.  Information on urinary incontinence and treatment options given to patient.

## 2022-03-23 ENCOUNTER — HOSPITAL ENCOUNTER (OUTPATIENT)
Dept: CARDIOLOGY | Facility: CLINIC | Age: 67
Discharge: HOME | End: 2022-03-23
Attending: FAMILY MEDICINE | Admitting: FAMILY MEDICINE
Payer: MEDICARE

## 2022-03-23 ENCOUNTER — OFFICE VISIT (OUTPATIENT)
Dept: CARDIOLOGY | Facility: CLINIC | Age: 67
End: 2022-03-23
Payer: MEDICARE

## 2022-03-23 VITALS
BODY MASS INDEX: 32.5 KG/M2 | HEIGHT: 70 IN | WEIGHT: 227 LBS | SYSTOLIC BLOOD PRESSURE: 114 MMHG | DIASTOLIC BLOOD PRESSURE: 62 MMHG | HEART RATE: 88 BPM

## 2022-03-23 DIAGNOSIS — I20.0 UNSTABLE ANGINA (H): Primary | ICD-10-CM

## 2022-03-23 DIAGNOSIS — R07.9 CHEST PAIN, UNSPECIFIED TYPE: ICD-10-CM

## 2022-03-23 DIAGNOSIS — Z11.59 ENCOUNTER FOR SCREENING FOR OTHER VIRAL DISEASES: Primary | ICD-10-CM

## 2022-03-23 DIAGNOSIS — R94.39 ABNORMAL STRESS TEST: ICD-10-CM

## 2022-03-23 PROCEDURE — 93321 DOPPLER ECHO F-UP/LMTD STD: CPT | Mod: 26 | Performed by: INTERNAL MEDICINE

## 2022-03-23 PROCEDURE — 93016 CV STRESS TEST SUPVJ ONLY: CPT | Performed by: INTERNAL MEDICINE

## 2022-03-23 PROCEDURE — 93325 DOPPLER ECHO COLOR FLOW MAPG: CPT | Mod: TC

## 2022-03-23 PROCEDURE — 255N000002 HC RX 255 OP 636: Performed by: INTERNAL MEDICINE

## 2022-03-23 PROCEDURE — 99205 OFFICE O/P NEW HI 60 MIN: CPT | Mod: 25 | Performed by: INTERNAL MEDICINE

## 2022-03-23 PROCEDURE — 93350 STRESS TTE ONLY: CPT | Mod: 26 | Performed by: INTERNAL MEDICINE

## 2022-03-23 PROCEDURE — 93325 DOPPLER ECHO COLOR FLOW MAPG: CPT | Mod: 26 | Performed by: INTERNAL MEDICINE

## 2022-03-23 PROCEDURE — 93018 CV STRESS TEST I&R ONLY: CPT | Performed by: INTERNAL MEDICINE

## 2022-03-23 RX ORDER — NITROGLYCERIN 0.4 MG/1
TABLET SUBLINGUAL
Qty: 15 TABLET | Refills: 1 | Status: SHIPPED | OUTPATIENT
Start: 2022-03-23 | End: 2024-01-25

## 2022-03-23 RX ADMIN — PERFLUTREN 5 ML: 6.52 INJECTION, SUSPENSION INTRAVENOUS at 11:41

## 2022-03-23 NOTE — LETTER
3/23/2022    Nishi Hdz Mai, MD  [address] [place] [address]    RE: Joel Pike       Dear Colleague,     I had the pleasure of seeing Joel Pike in the [facility].  CARDIOLOGY CONSULT    REASON FOR CONSULT: abnormal stress test  PRIMARY CARE PHYSICIAN:  Nishi Hdz Mai    HISTORY OF PRESENT ILLNESS:  Mr. Pike is a 67 y/o gentleman with PMH significant for hypertension, hyperlipidemia and diabetes who presents for the evaluation of abnormal stress test.  Joel was recently seen by his primary MD.  He states several days prior to that appointment he was watching TV and had sudden onset of chest heaviness which lasted about 5 minutes or so and resolved without intervention.  He denies any associated sx of n/v/diaphoresis/shortness of breath with the episode.  It has not recurred.  He does note worsening shortness of breath with activity particularly when walking on the treadmill or going up a flight of stairs.    He presented for his stress echocardiogram today which demonstrated findings consistent with LAD ischemia.  He experienced shortness of breath on the treadmill, no chest pain symptoms.           PAST MEDICAL HISTORY:  1.  Diabetes  2.  Hypertension  3.  Hyperlipidemia  4.  Hypothyroidism  5.  HILARIO  6.  Depression  8.  Hx of prostate cancer:  Currently on erleada    MEDICATIONS:  Current Outpatient Medications   Medication     albuterol (PROAIR HFA) 108 (90 BASE) MCG/ACT Inhaler     alcohol swab prep pads     apalutamide (ERLEADA) 60 MG tablet     blood glucose (HUGO CONTOUR) test strip     blood glucose (NO BRAND SPECIFIED) lancets standard     blood glucose calibration (HUGO CONTOUR) NORMAL solution     blood glucose monitoring (NO BRAND SPECIFIED) meter device kit     buPROPion (WELLBUTRIN XL) 150 MG 24 hr tablet     cholecalciferol (VITAMIN D3) 5000 UNITS TABS tablet     Coenzyme Q-10 capsule     ezetimibe (ZETIA) 10 MG tablet     fish oil-omega-3 fatty acids 1000 MG capsule      levothyroxine (SYNTHROID/LEVOTHROID) 125 MCG tablet     losartan (COZAAR) 50 MG tablet     lovastatin (MEVACOR) 20 MG tablet     Magnesium 500 MG CAPS     metFORMIN (GLUCOPHAGE) 500 MG tablet     Misc Natural Products (OSTEO BI-FLEX ADV JOINT SHIELD) TABS     multivitamin (OCUVITE) TABS tablet     pantoprazole (PROTONIX) 40 MG EC tablet     Current Facility-Administered Medications   Medication     leuprolide (ELIGARD) kit 45 mg       ALLERGIES:  Allergies   Allergen Reactions     Dust Mites Cough     Hmg-Coa-R Inhibitors      Body aches     Penicillins Rash and Hives       SOCIAL HISTORY:  Nonsmoker, no significant ETOH    FAMILY HISTORY:  Father diagnosed with CAD in his 60's.      REVIEW OF SYSTEMS:  A complete ROS was obtained and the pertinent positives are outlined in the history of present illness above.  The remainder of systems is negative.      PHYSICAL EXAM:                     Vital Signs with Ranges     0 lbs 0 oz    Constitutional: awake, alert, no distress  Eyes: PERRL, sclera nonicteric  ENT: trachea midline  Respiratory: CTAB  Cardiovascular: RRR No m/r/g, no JVD  GI: nondistended, nontender, bowel sounds present  Lymph/Hematologic: no lymphadenopathy  Skin: dry, no rash  Musculoskeletal: good muscle tone, no edema bilaterally  Neurologic: no focal deficits  Neuropsychiatric: appropriate affact    DATA:  Labs:   Dated 3/13/22 reviewed personally   mg/dL  HDL 36 mg/dL  Trig 220 mg/dL  Cr 1.0  Hgb 12.5  Plts 187        EKG: Dated 3/16/22 reviewed personally.  Normal sinus rhythm with poor R wave progression anteriorly    Exercise stress echocardiogram dated 3/23/22 images reviewed personally  ______________________________________________________________________________  Interpretation Summary  1. The patient exercised 7:31. Average functional capacity for age.  2. The patient exhibited no chest pain during exercise. Patient expereinced  shortness of breath with exercise.  3. This was an  abnormal stress EKG; up to 2 mm ST depression at peak exercise  in the inferolateral leads, resolved with rest.  4. There is mid to distal anterior, anteroseptal and apical wall hypokinesis  with stress. Left ventricular function became worse with stress.  5. Findings suggestive of LAD ischemia. Cardiology consult arranged today.        ASSESSMENT:  1.  Abnormal stress test:  Findings suggestive of LAD ischemia.  Episode of chest discomfort at rest about 10 days ago lasting 5 minutes, none since.    2.  Diabetes:  Last HgbA1C 6.3, on metformin  3.  Hypertension:  At goal  4.  Hyperlipidemia:  On lovastatin, zetia.  Has not tolerated other statins.   mg/dL.        RECOMMENDATIONS:  1. Recommend diagnostic coronary angiogram for further evaluation.  Risks/beneifts/altneratives were discussed with Joel and he is agreeable to proceeding.  He is an appropriate candidate for dual antiplatelet therapy.  2.  Start aspirin 81 mg daily.  Rx for nitroglycerin 0.4 mg SL PRN chest pain, instructions given  3.   Patient has not tolerated statins; will need to discuss PCSK9 inhibitors at follow up appointment  4.  Follow up after coronary angiogram to be arranged.      Kamala Murphy MD [facility]  March 23, 2022    Today's clinic visit entailed:  Review of the result(s) of each unique test - stress test, lab work  30 minutes spent on the date of the encounter doing chart review, history and exam, documentation and further activities per the note  Provider  Link to [facility] Help Grid     The level of medical decision making during this visit was of high complexity.      Thank you for allowing me to participate in the care of your patient.      Sincerely,     Kamala Murphy MD     [facility]  cc:   No referring provider defined for this encounter.

## 2022-03-23 NOTE — PATIENT INSTRUCTIONS
-Start aspirin 81 mg daily  -Nitroglycerin 0.4 mg SL as needed for chest pain  -Schedule coronary angiogram

## 2022-03-23 NOTE — NURSING NOTE
Coronary Angiogram    Scheduled: TBD  Location: [place]  Check-in time: TBD   Procedure time: TBD      Prep instructions were reviewed with patient in clinic. Patient had no questions.    See instructions below    If procedure is before 12 noon  NPO after midnight, the night before the procedure.     If procedure is after 12 noon   Clear liquid breakfast before 8:00 am. NPO after 8:00 am.     Patient will take 325 mg of Aspirin on the day before the procedure, and 325 mg of Aspirin on the morning of the procedure.     All other medications can be taken on the morning of the procedure (with small sips of water).     Patient will need to hold Metformin on the morning of the procedure until 48 hours after procedure and BMP is drawn.     Patient is aware that he  will receive a call from a nurse to review their BMP results and to let them know if he can resume Metformin.      Patient is aware they will need a ride home, and a person to stay with him for 24 hours after the procedure.     Patient is aware he will need COVID test 2-3 days prior to procedure.     Ludmila Gunderson RN

## 2022-03-23 NOTE — PROGRESS NOTES
CARDIOLOGY CONSULT    REASON FOR CONSULT: abnormal stress test  PRIMARY CARE PHYSICIAN:  Nishi Hdz Mai    HISTORY OF PRESENT ILLNESS:  Mr. Pike is a 67 y/o gentleman with PMH significant for hypertension, hyperlipidemia and diabetes who presents for the evaluation of abnormal stress test.  Joel was recently seen by his primary MD.  He states several days prior to that appointment he was watching TV and had sudden onset of chest heaviness which lasted about 5 minutes or so and resolved without intervention.  He denies any associated sx of n/v/diaphoresis/shortness of breath with the episode.  It has not recurred.  He does note worsening shortness of breath with activity particularly when walking on the treadmill or going up a flight of stairs.    He presented for his stress echocardiogram today which demonstrated findings consistent with LAD ischemia.  He experienced shortness of breath on the treadmill, no chest pain symptoms.           PAST MEDICAL HISTORY:  1.  Diabetes  2.  Hypertension  3.  Hyperlipidemia  4.  Hypothyroidism  5.  HILARIO  6.  Depression  8.  Hx of prostate cancer:  Currently on erleada    MEDICATIONS:  Current Outpatient Medications   Medication     albuterol (PROAIR HFA) 108 (90 BASE) MCG/ACT Inhaler     alcohol swab prep pads     apalutamide (ERLEADA) 60 MG tablet     blood glucose (HUGO CONTOUR) test strip     blood glucose (NO BRAND SPECIFIED) lancets standard     blood glucose calibration (HUGO CONTOUR) NORMAL solution     blood glucose monitoring (NO BRAND SPECIFIED) meter device kit     buPROPion (WELLBUTRIN XL) 150 MG 24 hr tablet     cholecalciferol (VITAMIN D3) 5000 UNITS TABS tablet     Coenzyme Q-10 capsule     ezetimibe (ZETIA) 10 MG tablet     fish oil-omega-3 fatty acids 1000 MG capsule     levothyroxine (SYNTHROID/LEVOTHROID) 125 MCG tablet     losartan (COZAAR) 50 MG tablet     lovastatin (MEVACOR) 20 MG tablet     Magnesium 500 MG CAPS     metFORMIN (GLUCOPHAGE) 500 MG tablet      Misc Natural Products (OSTEO BI-FLEX ADV JOINT SHIELD) TABS     multivitamin (OCUVITE) TABS tablet     pantoprazole (PROTONIX) 40 MG EC tablet     Current Facility-Administered Medications   Medication     leuprolide (ELIGARD) kit 45 mg       ALLERGIES:  Allergies   Allergen Reactions     Dust Mites Cough     Hmg-Coa-R Inhibitors      Body aches     Penicillins Rash and Hives       SOCIAL HISTORY:  Nonsmoker, no significant ETOH    FAMILY HISTORY:  Father diagnosed with CAD in his 60's.      REVIEW OF SYSTEMS:  A complete ROS was obtained and the pertinent positives are outlined in the history of present illness above.  The remainder of systems is negative.      PHYSICAL EXAM:                     Vital Signs with Ranges     0 lbs 0 oz    Constitutional: awake, alert, no distress  Eyes: PERRL, sclera nonicteric  ENT: trachea midline  Respiratory: CTAB  Cardiovascular: RRR No m/r/g, no JVD  GI: nondistended, nontender, bowel sounds present  Lymph/Hematologic: no lymphadenopathy  Skin: dry, no rash  Musculoskeletal: good muscle tone, no edema bilaterally  Neurologic: no focal deficits  Neuropsychiatric: appropriate affact    DATA:  Labs:   Dated 3/13/22 reviewed personally   mg/dL  HDL 36 mg/dL  Trig 220 mg/dL  Cr 1.0  Hgb 12.5  Plts 187        EKG: Dated 3/16/22 reviewed personally.  Normal sinus rhythm with poor R wave progression anteriorly    Exercise stress echocardiogram dated 3/23/22 images reviewed personally  ______________________________________________________________________________  Interpretation Summary  1. The patient exercised 7:31. Average functional capacity for age.  2. The patient exhibited no chest pain during exercise. Patient expereinced  shortness of breath with exercise.  3. This was an abnormal stress EKG; up to 2 mm ST depression at peak exercise  in the inferolateral leads, resolved with rest.  4. There is mid to distal anterior, anteroseptal and apical wall hypokinesis  with  stress. Left ventricular function became worse with stress.  5. Findings suggestive of LAD ischemia. Cardiology consult arranged today.        ASSESSMENT:  1.  Abnormal stress test:  Findings suggestive of LAD ischemia.  Episode of chest discomfort at rest about 10 days ago lasting 5 minutes, none since.    2.  Diabetes:  Last HgbA1C 6.3, on metformin  3.  Hypertension:  At goal  4.  Hyperlipidemia:  On lovastatin, zetia.  Has not tolerated other statins.   mg/dL.        RECOMMENDATIONS:  1. Recommend diagnostic coronary angiogram for further evaluation.  Risks/beneifts/altneratives were discussed with Joel and he is agreeable to proceeding.  He is an appropriate candidate for dual antiplatelet therapy.  2.  Start aspirin 81 mg daily.  Rx for nitroglycerin 0.4 mg SL PRN chest pain, instructions given  3.   Patient has not tolerated statins; will need to discuss PCSK9 inhibitors at follow up appointment  4.  Follow up after coronary angiogram to be arranged.      aKmala Murphy MD [facility]  March 23, 2022    Today's clinic visit entailed:  Review of the result(s) of each unique test - stress test, lab work  30 minutes spent on the date of the encounter doing chart review, history and exam, documentation and further activities per the note  Provider  Link to [facility] Help Grid     The level of medical decision making during this visit was of high complexity.

## 2022-03-24 ENCOUNTER — LAB (OUTPATIENT)
Dept: LAB | Facility: CLINIC | Age: 67
End: 2022-03-24
Payer: MEDICARE

## 2022-03-24 DIAGNOSIS — Z11.59 ENCOUNTER FOR SCREENING FOR OTHER VIRAL DISEASES: ICD-10-CM

## 2022-03-24 LAB — SARS-COV-2 RNA RESP QL NAA+PROBE: NEGATIVE

## 2022-03-24 PROCEDURE — U0003 INFECTIOUS AGENT DETECTION BY NUCLEIC ACID (DNA OR RNA); SEVERE ACUTE RESPIRATORY SYNDROME CORONAVIRUS 2 (SARS-COV-2) (CORONAVIRUS DISEASE [COVID-19]), AMPLIFIED PROBE TECHNIQUE, MAKING USE OF HIGH THROUGHPUT TECHNOLOGIES AS DESCRIBED BY CMS-2020-01-R: HCPCS

## 2022-03-24 PROCEDURE — U0005 INFEC AGEN DETEC AMPLI PROBE: HCPCS

## 2022-03-25 ENCOUNTER — TELEPHONE (OUTPATIENT)
Dept: MEDSURG UNIT | Facility: CLINIC | Age: 67
End: 2022-03-25
Payer: MEDICARE

## 2022-03-25 ENCOUNTER — TELEPHONE (OUTPATIENT)
Dept: CARDIOLOGY | Facility: CLINIC | Age: 67
End: 2022-03-25
Payer: MEDICARE

## 2022-03-25 DIAGNOSIS — R94.39 ABNORMAL STRESS TEST: Primary | ICD-10-CM

## 2022-03-25 RX ORDER — SODIUM CHLORIDE 9 MG/ML
INJECTION, SOLUTION INTRAVENOUS CONTINUOUS
Status: CANCELLED | OUTPATIENT
Start: 2022-03-25

## 2022-03-25 RX ORDER — POTASSIUM CHLORIDE 1500 MG/1
20 TABLET, EXTENDED RELEASE ORAL
Status: CANCELLED | OUTPATIENT
Start: 2022-03-25

## 2022-03-25 RX ORDER — LIDOCAINE 40 MG/G
CREAM TOPICAL
Status: CANCELLED | OUTPATIENT
Start: 2022-03-25

## 2022-03-25 RX ORDER — ASPIRIN 81 MG/1
243 TABLET, CHEWABLE ORAL ONCE
Status: CANCELLED | OUTPATIENT
Start: 2022-03-25

## 2022-03-25 RX ORDER — ASPIRIN 325 MG
325 TABLET ORAL ONCE
Status: CANCELLED | OUTPATIENT
Start: 2022-03-25 | End: 2022-03-25

## 2022-03-25 NOTE — TELEPHONE ENCOUNTER
Called patient with Pre cath instructions:     Contrast allergy: no  Anticoagulation: no   Metformin: yes, will hold will need BMP post procedure, BMP ordered  Oral DM meds: no  Insulin: no  Diuretic: no  Use of phosphodiesterase type 5 inhibitor: no  Aspirin: yes 81mg   Pt informed to be NPO at midnight  Renal issues no  Pt has transportation and 24 hours post procedure monitoring set up.   Pt aware of no driving for 24 hours post procedure.     Pt aware of arrival time and location. Pt verbalized understanding of instructions.       COVID test negative 3/24/22    RN informed patient to call if he develops fever >100F.     ----- Message from Kaci Wiley RN sent at 3/23/2022  1:09 PM CDT -----  Regarding: FW: [facility] - 3/28/22 - make sure COVID test is done, then call with instructions    ----- Message -----  From: Magaly Gallo  Sent: 3/23/2022   1:07 PM CDT  To: Mariah Leblanc Ayanna, #  Subject: [facility] 3/28/22                                    Angiogram Orders    Location: [facility]    Procedure: Left Heart Cath    Diagnosis: Abnormal stress test     Procedure Date: 3/28/22    Procedure Time: 0830    Patient Arrival Time: 0630    Ordering Cardiologist: Dr. Nelson    Performing Cardiologist: Dr. Farooq    Cardiac Assessment Completed: Yes  Date: 3/23/22  Provider: Dr Murphy    Pre-Procedure Labs completed: on admit    Post Procedure PILAR appointment scheduled: Yes  Date: 4/26/22  Provider: Lynn    Patient Diabetic on Meds/Insulin:  Yes  Patient on Coumadin/Warfarin:  No  Patient on Pradaxa/Xarelto/Eliquis:  No    Does Patient have a history of bypass:  No    If yes, when was it done:   If yes, where was it done:      COVID Test Scheduled: Yes, 3/24/22    Appointment was scheduled: Over the phone    Special instructions:

## 2022-03-25 NOTE — TELEPHONE ENCOUNTER
Chart reviewed for upcoming proc.  Orders in place.  CSE, Coivd neg 3/24/22.  Note in epic from clinic that pt was called with arrival time.  No call needed.

## 2022-03-28 ENCOUNTER — APPOINTMENT (OUTPATIENT)
Dept: CARDIOLOGY | Facility: CLINIC | Age: 67
End: 2022-03-28
Attending: SURGERY
Payer: MEDICARE

## 2022-03-28 ENCOUNTER — APPOINTMENT (OUTPATIENT)
Dept: ULTRASOUND IMAGING | Facility: CLINIC | Age: 67
End: 2022-03-28
Attending: SURGERY
Payer: MEDICARE

## 2022-03-28 ENCOUNTER — HOSPITAL ENCOUNTER (OUTPATIENT)
Facility: CLINIC | Age: 67
Discharge: HOME | End: 2022-03-28
Admitting: INTERNAL MEDICINE
Payer: MEDICARE

## 2022-03-28 ENCOUNTER — APPOINTMENT (OUTPATIENT)
Dept: GENERAL RADIOLOGY | Facility: CLINIC | Age: 67
End: 2022-03-28
Attending: SURGERY
Payer: MEDICARE

## 2022-03-28 VITALS
OXYGEN SATURATION: 95 % | RESPIRATION RATE: 16 BRPM | BODY MASS INDEX: 32.56 KG/M2 | WEIGHT: 227.4 LBS | HEIGHT: 70 IN | SYSTOLIC BLOOD PRESSURE: 128 MMHG | TEMPERATURE: 98.4 F | HEART RATE: 70 BPM | DIASTOLIC BLOOD PRESSURE: 78 MMHG

## 2022-03-28 DIAGNOSIS — R94.39 ABNORMAL STRESS TEST: ICD-10-CM

## 2022-03-28 PROBLEM — Z98.890 STATUS POST CORONARY ANGIOGRAM: Status: ACTIVE | Noted: 2022-03-28

## 2022-03-28 LAB
ABO/RH(D): NORMAL
ALBUMIN SERPL-MCNC: 4.1 G/DL (ref 3.4–5)
ALBUMIN UR-MCNC: 30 MG/DL
ALP SERPL-CCNC: 78 U/L (ref 40–150)
ALT SERPL W P-5'-P-CCNC: 35 U/L (ref 0–70)
ANION GAP SERPL CALCULATED.3IONS-SCNC: 7 MMOL/L (ref 3–14)
ANTIBODY SCREEN: NEGATIVE
APPEARANCE UR: CLEAR
AST SERPL W P-5'-P-CCNC: 20 U/L (ref 0–45)
BILIRUB DIRECT SERPL-MCNC: 0.1 MG/DL (ref 0–0.2)
BILIRUB SERPL-MCNC: 0.5 MG/DL (ref 0.2–1.3)
BILIRUB UR QL STRIP: NEGATIVE
BUN SERPL-MCNC: 22 MG/DL (ref 7–30)
CALCIUM SERPL-MCNC: 9.6 MG/DL (ref 8.5–10.1)
CHLORIDE BLD-SCNC: 103 MMOL/L (ref 94–109)
CO2 SERPL-SCNC: 26 MMOL/L (ref 20–32)
COLOR UR AUTO: ABNORMAL
CREAT SERPL-MCNC: 1.07 MG/DL (ref 0.66–1.25)
ERYTHROCYTE [DISTWIDTH] IN BLOOD BY AUTOMATED COUNT: 12.2 % (ref 10–15)
GFR SERPL CREATININE-BSD FRML MDRD: 77 ML/MIN/1.73M2
GLUCOSE BLD-MCNC: 146 MG/DL (ref 70–99)
GLUCOSE UR STRIP-MCNC: NEGATIVE MG/DL
HCT VFR BLD AUTO: 37.5 % (ref 40–53)
HGB BLD-MCNC: 13 G/DL (ref 13.3–17.7)
HGB UR QL STRIP: NEGATIVE
INR PPP: 1.02 (ref 0.85–1.15)
KETONES UR STRIP-MCNC: NEGATIVE MG/DL
LEUKOCYTE ESTERASE UR QL STRIP: NEGATIVE
LVEF ECHO: NORMAL
MCH RBC QN AUTO: 31.9 PG (ref 26.5–33)
MCHC RBC AUTO-ENTMCNC: 34.7 G/DL (ref 31.5–36.5)
MCV RBC AUTO: 92 FL (ref 78–100)
MUCOUS THREADS #/AREA URNS LPF: PRESENT /LPF
NITRATE UR QL: NEGATIVE
PH UR STRIP: 5.5 [PH] (ref 5–7)
PLATELET # BLD AUTO: 203 10E3/UL (ref 150–450)
POTASSIUM BLD-SCNC: 4.4 MMOL/L (ref 3.4–5.3)
PROT SERPL-MCNC: 7.3 G/DL (ref 6.8–8.8)
RADIOLOGIST FLAGS: ABNORMAL
RBC # BLD AUTO: 4.08 10E6/UL (ref 4.4–5.9)
RBC URINE: 1 /HPF
SODIUM SERPL-SCNC: 136 MMOL/L (ref 133–144)
SP GR UR STRIP: 1.01 (ref 1–1.03)
SPECIMEN EXPIRATION DATE: NORMAL
UROBILINOGEN UR STRIP-MCNC: NORMAL MG/DL
WBC # BLD AUTO: 5.9 10E3/UL (ref 4–11)
WBC URINE: <1 /HPF

## 2022-03-28 PROCEDURE — 81001 URINALYSIS AUTO W/SCOPE: CPT | Performed by: SURGERY

## 2022-03-28 PROCEDURE — 36415 COLL VENOUS BLD VENIPUNCTURE: CPT | Performed by: SURGERY

## 2022-03-28 PROCEDURE — 999N000208 ECHOCARDIOGRAM COMPLETE

## 2022-03-28 PROCEDURE — 250N000011 HC RX IP 250 OP 636: Performed by: INTERNAL MEDICINE

## 2022-03-28 PROCEDURE — 85014 HEMATOCRIT: CPT | Performed by: INTERNAL MEDICINE

## 2022-03-28 PROCEDURE — 999N000071 HC STATISTIC HEART CATH LAB OR EP LAB

## 2022-03-28 PROCEDURE — 93970 EXTREMITY STUDY: CPT

## 2022-03-28 PROCEDURE — 255N000002 HC RX 255 OP 636

## 2022-03-28 PROCEDURE — C1769 GUIDE WIRE: HCPCS | Performed by: INTERNAL MEDICINE

## 2022-03-28 PROCEDURE — 99152 MOD SED SAME PHYS/QHP 5/>YRS: CPT | Mod: GC | Performed by: INTERNAL MEDICINE

## 2022-03-28 PROCEDURE — 82248 BILIRUBIN DIRECT: CPT | Performed by: SURGERY

## 2022-03-28 PROCEDURE — 999N000184 HC STATISTIC TELEMETRY

## 2022-03-28 PROCEDURE — 93306 TTE W/DOPPLER COMPLETE: CPT | Mod: 26 | Performed by: INTERNAL MEDICINE

## 2022-03-28 PROCEDURE — C1894 INTRO/SHEATH, NON-LASER: HCPCS | Performed by: INTERNAL MEDICINE

## 2022-03-28 PROCEDURE — 272N000001 HC OR GENERAL SUPPLY STERILE: Performed by: INTERNAL MEDICINE

## 2022-03-28 PROCEDURE — 86901 BLOOD TYPING SEROLOGIC RH(D): CPT | Performed by: SURGERY

## 2022-03-28 PROCEDURE — 93005 ELECTROCARDIOGRAM TRACING: CPT

## 2022-03-28 PROCEDURE — 258N000003 HC RX IP 258 OP 636: Performed by: INTERNAL MEDICINE

## 2022-03-28 PROCEDURE — 36415 COLL VENOUS BLD VENIPUNCTURE: CPT | Performed by: INTERNAL MEDICINE

## 2022-03-28 PROCEDURE — 93458 L HRT ARTERY/VENTRICLE ANGIO: CPT | Mod: 26 | Performed by: INTERNAL MEDICINE

## 2022-03-28 PROCEDURE — 93880 EXTRACRANIAL BILAT STUDY: CPT

## 2022-03-28 PROCEDURE — 250N000009 HC RX 250: Performed by: INTERNAL MEDICINE

## 2022-03-28 PROCEDURE — 99152 MOD SED SAME PHYS/QHP 5/>YRS: CPT | Performed by: INTERNAL MEDICINE

## 2022-03-28 PROCEDURE — 93010 ELECTROCARDIOGRAM REPORT: CPT | Performed by: INTERNAL MEDICINE

## 2022-03-28 PROCEDURE — 71046 X-RAY EXAM CHEST 2 VIEWS: CPT

## 2022-03-28 PROCEDURE — 93454 CORONARY ARTERY ANGIO S&I: CPT | Performed by: INTERNAL MEDICINE

## 2022-03-28 PROCEDURE — 86850 RBC ANTIBODY SCREEN: CPT | Performed by: SURGERY

## 2022-03-28 PROCEDURE — C1724 CATH, TRANS ATHEREC,ROTATION: HCPCS | Performed by: INTERNAL MEDICINE

## 2022-03-28 PROCEDURE — 85610 PROTHROMBIN TIME: CPT | Performed by: INTERNAL MEDICINE

## 2022-03-28 PROCEDURE — 82310 ASSAY OF CALCIUM: CPT | Performed by: INTERNAL MEDICINE

## 2022-03-28 PROCEDURE — 36591 DRAW BLOOD OFF VENOUS DEVICE: CPT

## 2022-03-28 RX ORDER — SODIUM CHLORIDE 9 MG/ML
INJECTION, SOLUTION INTRAVENOUS CONTINUOUS
Status: DISCONTINUED | OUTPATIENT
Start: 2022-03-28 | End: 2022-03-28 | Stop reason: HOSPADM

## 2022-03-28 RX ORDER — IOPAMIDOL 755 MG/ML
INJECTION, SOLUTION INTRAVASCULAR
Status: DISCONTINUED | OUTPATIENT
Start: 2022-03-28 | End: 2022-03-28 | Stop reason: HOSPADM

## 2022-03-28 RX ORDER — ASPIRIN 325 MG
325 TABLET ORAL ONCE
Status: DISCONTINUED | OUTPATIENT
Start: 2022-03-28 | End: 2022-03-28 | Stop reason: HOSPADM

## 2022-03-28 RX ORDER — NALOXONE HYDROCHLORIDE 0.4 MG/ML
0.4 INJECTION, SOLUTION INTRAMUSCULAR; INTRAVENOUS; SUBCUTANEOUS
Status: DISCONTINUED | OUTPATIENT
Start: 2022-03-28 | End: 2022-03-28 | Stop reason: HOSPADM

## 2022-03-28 RX ORDER — HEPARIN SODIUM 1000 [USP'U]/ML
INJECTION, SOLUTION INTRAVENOUS; SUBCUTANEOUS
Status: DISCONTINUED | OUTPATIENT
Start: 2022-03-28 | End: 2022-03-28 | Stop reason: HOSPADM

## 2022-03-28 RX ORDER — FENTANYL CITRATE 50 UG/ML
INJECTION, SOLUTION INTRAMUSCULAR; INTRAVENOUS
Status: DISCONTINUED | OUTPATIENT
Start: 2022-03-28 | End: 2022-03-28 | Stop reason: HOSPADM

## 2022-03-28 RX ORDER — OXYCODONE HYDROCHLORIDE 5 MG/1
10 TABLET ORAL EVERY 4 HOURS PRN
Status: DISCONTINUED | OUTPATIENT
Start: 2022-03-28 | End: 2022-03-28 | Stop reason: HOSPADM

## 2022-03-28 RX ORDER — LIDOCAINE 40 MG/G
CREAM TOPICAL
Status: DISCONTINUED | OUTPATIENT
Start: 2022-03-28 | End: 2022-03-28 | Stop reason: HOSPADM

## 2022-03-28 RX ORDER — NALOXONE HYDROCHLORIDE 0.4 MG/ML
0.2 INJECTION, SOLUTION INTRAMUSCULAR; INTRAVENOUS; SUBCUTANEOUS
Status: DISCONTINUED | OUTPATIENT
Start: 2022-03-28 | End: 2022-03-28 | Stop reason: HOSPADM

## 2022-03-28 RX ORDER — ACETAMINOPHEN 325 MG/1
650 TABLET ORAL EVERY 4 HOURS PRN
Status: DISCONTINUED | OUTPATIENT
Start: 2022-03-28 | End: 2022-03-28 | Stop reason: HOSPADM

## 2022-03-28 RX ORDER — POTASSIUM CHLORIDE 1500 MG/1
20 TABLET, EXTENDED RELEASE ORAL
Status: DISCONTINUED | OUTPATIENT
Start: 2022-03-28 | End: 2022-03-28 | Stop reason: HOSPADM

## 2022-03-28 RX ORDER — NITROGLYCERIN 5 MG/ML
VIAL (ML) INTRAVENOUS
Status: DISCONTINUED | OUTPATIENT
Start: 2022-03-28 | End: 2022-03-28 | Stop reason: HOSPADM

## 2022-03-28 RX ORDER — OXYCODONE HYDROCHLORIDE 5 MG/1
5 TABLET ORAL EVERY 4 HOURS PRN
Status: DISCONTINUED | OUTPATIENT
Start: 2022-03-28 | End: 2022-03-28 | Stop reason: HOSPADM

## 2022-03-28 RX ORDER — ASPIRIN 81 MG/1
243 TABLET, CHEWABLE ORAL ONCE
Status: DISCONTINUED | OUTPATIENT
Start: 2022-03-28 | End: 2022-03-28 | Stop reason: HOSPADM

## 2022-03-28 RX ORDER — FENTANYL CITRATE 50 UG/ML
25 INJECTION, SOLUTION INTRAMUSCULAR; INTRAVENOUS
Status: DISCONTINUED | OUTPATIENT
Start: 2022-03-28 | End: 2022-03-28 | Stop reason: HOSPADM

## 2022-03-28 RX ORDER — ATROPINE SULFATE 0.1 MG/ML
0.5 INJECTION INTRAVENOUS
Status: DISCONTINUED | OUTPATIENT
Start: 2022-03-28 | End: 2022-03-28 | Stop reason: HOSPADM

## 2022-03-28 RX ORDER — FLUMAZENIL 0.1 MG/ML
0.2 INJECTION, SOLUTION INTRAVENOUS
Status: DISCONTINUED | OUTPATIENT
Start: 2022-03-28 | End: 2022-03-28 | Stop reason: HOSPADM

## 2022-03-28 RX ORDER — VERAPAMIL HYDROCHLORIDE 2.5 MG/ML
INJECTION, SOLUTION INTRAVENOUS
Status: DISCONTINUED | OUTPATIENT
Start: 2022-03-28 | End: 2022-03-28 | Stop reason: HOSPADM

## 2022-03-28 RX ADMIN — HUMAN ALBUMIN MICROSPHERES AND PERFLUTREN 9 ML: 10; .22 INJECTION, SOLUTION INTRAVENOUS at 13:18

## 2022-03-28 RX ADMIN — SODIUM CHLORIDE: 9 INJECTION, SOLUTION INTRAVENOUS at 07:28

## 2022-03-28 NOTE — PROGRESS NOTES
Care Suites Discharge Nursing Note    Patient Information  Name: Joel Pike  Age: 66 year old    Discharge Education:  Discharge instructions reviewed: Yes  Additional education/resources provided: [employer] info, hebiclens soap  Patient/patient representative verbalizes understanding: Yes  Patient discharging on new medications: No  Medication education completed: N/A    Discharge Plans:   Discharge location: home  Discharge ride contacted: Yes  Approximate discharge time: 1330    Discharge Criteria:  Discharge criteria met and vital signs stable: Yes    Patient Belongs:  Patient belongings returned to patient: Yes    Aurelia Beard RN

## 2022-03-28 NOTE — PROGRESS NOTES
Care Suites Admission Nursing Note    Patient Information  Name: Joel Pike  Age: 66 year old  Reason for admission: heart cath  Care Suites arrival time: 0630    Visitor Information  Name: Michelle  Informed of visitor restrictions: Yes  1 visitor allowed per patient   Visitor must screen negative for COVID symptoms   Visitor must wear a mask  Waiting rooms closed to visitors    Patient Admission/Assessment   Pre-procedure assessment complete: Yes  If abnormal assessment/labs, provider notified: N/A  NPO: Yes  Medications held per instructions/orders: Yes  Consent: deferred  If applicable, pregnancy test status: deferred  Patient oriented to room: Yes  Education/questions answered: Yes  Plan/other:     Discharge Planning  Discharge name/phone number: abril Martinez [phone]  Overnight post sedation caregiver: Michelle  Discharge location: home    Aurelia Beard RN

## 2022-03-28 NOTE — DISCHARGE INSTRUCTIONS
Cardiac Angiogram Discharge Instructions - Radial    After you go home:      Have an adult stay with you until tomorrow.    Drink extra fluids for 2 days.    You may resume your normal diet.    No smoking       For 24 hours - due to the sedation you received:    Relax and take it easy.    Do NOT make any important or legal decisions.    Do NOT drive or operate machines at home or at work.    Do NOT drink alcohol.    Care of Wrist Puncture Site:      For the first 24 hrs - check the puncture site every 1-2 hours while awake.    It is normal to have soreness at the puncture site and mild tingling in your hand for up to 3 days.    Remove the bandaid after 24 hours. If there is minor oozing, apply another bandaid and remove it after 12 hours.    You may shower tomorrow.  Do NOT take a bath, or use a hot tub or pool for at least 3 days. Do NOT scrub the site. Do not use lotion or powder near the puncture site.           Activity:        For 2 days:     do not use your hand or arm to support your weight (such as rising from a chair)     do not bend your wrist (such as lifting a garage door).    do not lift more than 5 pounds or exercise your arm (such as tennis, golf or bowling).    Do NOT do any heavy activity such as exercise, lifting, or straining.     Bleeding:      If you start bleeding from the site in your wrist, sit down and press firmly on/above the site for 10 minutes.     Once bleeding stops, keep arm still for 2 hours.     Call [facility] as soon as you can.       Call 911 right away if you have heavy bleeding or bleeding that does not stop.      Medicines:      If you are taking an antiplatelet medication such as Plavix, Brilinta or Effient, do not stop taking it until you talk to your cardiologist.        If you are on Metformin (Glucophage), do not restart it until you have blood tests (within 2 to 3 days after discharge).  After you have your blood drawn, you may restart the Metformin.     Take your  medications, including blood thinners, unless your provider tells you not to.      If you take Coumadin (Warfarin), have your INR checked by your provider in  3-5 days. Call your clinic to schedule this.    If you have stopped any medicines, check with your provider about when to restart them.    Follow Up Appointments:      Follow up with [facility] Nurse Practitioner at [facility] of patient preference in 7-10 days.    Call the clinic if:      You have a large or growing hard lump around the site.    The site is red, swollen, hot or tender.    Blood or fluid is draining from the site.    You have chills or a fever greater than 101 F (38 C).    Your arm feels numb, cool or changes color.    You have hives, a rash or unusual itching.    Any questions or concerns.          [facility] [place]:    [phone] [facility] (7 days a week)

## 2022-03-28 NOTE — Clinical Note
Hemodynamic equipment used: 5 lead ECG, [employer] With 3 Leads, Machine BP Cuff and pulse oximeter probe.

## 2022-03-28 NOTE — CONSULTS
Consult Date: 03/28/2022    CARDIOVASCULAR SURGERY CONSULTATION    REFERRING CARDIOLOGIST:  Dr. Macey Murphy.    REASON FOR CONSULTATION:  Evaluation for coronary artery bypass grafting.    HISTORY OF PRESENT ILLNESS:  Mr. Pike is a very pleasant 66-year-old type 2 diabetic gentleman with hypertension, hyperlipidemia, who about a week ago had some new chest tightness while at rest.  He had a stress echo demonstrating findings consistent with an anterior wall ischemia.  He was seen by Dr. Murphy we will order a coronary angiogram.  This was performed today as an outpatient by Dr. Farooq.  This demonstrated severe 3-vessel coronary artery disease.  Given this reason, he was referred to us for surgical evaluation for coronary bypass surgery.    PAST MEDICAL/SURGICAL HISTORY:  Includes type 2 diabetes, hypertension, hyperlipidemia, hypothyroidism, obstructive sleep apnea, depression, history of prostate cancer treated with hormonal therapy.  He has had bilateral knee replacements.    ALLERGIES:  HMG-CoAREDUCTASE INHIBITOR AND PENICILLIN.    CURRENT OUTPATIENT MEDICATIONS:  Reviewed in Epic chart.    FAMILY HISTORY:  Significant for coronary artery disease.    SOCIAL HISTORY:  Nonsmoker.  No significant alcohol use.    REVIEW OF SYSTEMS:  As per HPI.  All other 10-point review of systems are completed and were otherwise negative unless stated above.    PHYSICAL EXAMINATION:    VITAL SIGNS:  All vital signs are stable.  GENERAL:  He appears well, in no acute distress.  HEENT:  Within normal limits.  NECK:  Supple, no lymphadenopathy.  CARDIOVASCULAR:  Regular rate and rhythm, normal S1, S2.  No murmurs.  LUNGS:  Clear bilaterally.  ABDOMEN:  Soft, nontender, nondistended.  EXTREMITIES:  Negative for edema, cyanosis or clubbing.  NEUROLOGIC:  A and O x3.  No focal deficits.      The patient is right-handed and they accessed the right radial artery for the angiogram today.    PERTINENT LABORATORY STUDIES:  Coronary  angiogram performed today demonstrates severe 3-vessel coronary artery disease with 60% distal left main disease, ostial LAD disease with a 90% mid LAD disease after a large diagonal artery takeoff, 50% ostial circumflex disease that is codominant, and codominant RCA with mid 70% disease and 80% disease in the distal PDA.  Echocardiogram is pending.    IMPRESSION AND PLAN:  Mr. Pike is a very pleasant 66-year-old gentleman with type 2 diabetes and newly diagnosed severe 3-vessel coronary artery disease.  Echocardiogram is pending at this time.  My recommendation is coronary artery bypass grafting.  I went over the diagnosis in detail with the patient and the reason for recommending surgical revascularization.  I went over the risks and benefits of the operation and the potential complications associated with the surgery.  These complications include, but are not strictly limited to, infection, bleeding, stroke, postop MI, postoperative arrhythmias, pulmonary and renal complications.  I think overall he is an excellent surgical candidate and I quoted an operative mortality risk of 1% or less.  The patient understands and agrees to proceed.  The patient is planned to be discharged today and we will have our office schedule him for coronary bypass grafting with endoscopic vein harvest within the next couple of weeks.    Thank you very much for this referral.    Gely Huizar MD        D: 2022   T: 2022   MT: PERLA    Name:     YANI PIKE  MRN:      [id number]        Account:      [id number]   :      1955           Consult Date: 2022     Document: [id number]    cc:  Kamala Murphy MD

## 2022-03-28 NOTE — PROGRESS NOTES
PATIENT/VISITOR WELLNESS SCREENING    Step 1 Patient Screening    1. In the last month, have you been in contact with someone who was confirmed or suspected to have Coronavirus/COVID-19? No    2. Do you have the following symptoms?  Fever/Chills? No   Cough? No   Shortness of breath? No   New loss of taste or smell? No  Sore throat? No  Muscle or body aches? No  Headaches? No  Fatigue? No  Vomiting or diarrhea? No    Step 2 Visitor Screening    1. Name of Visitor (1 visitor per patient): nelda    2. In the last month, have you been in contact with someone who was confirmed or suspected to have Coronavirus/COVID-19? No    3. Do you have the following symptoms?  Fever/Chills? No   Cough? No   Shortness of breath? No   Skin rash? No   Loss of taste or smell? No  Sore throat? No  Runny or stuffy nose? No  Muscle or body aches? No  Headaches? No  Fatigue? No  Vomiting or diarrhea? No    If the visitor has positive symptoms, notify supervisor/manger  Per policy, the visitor will need to leave the facility     Step 3 Refer to logic grid below for actions    NO SYMPTOM(S)    ACTIONS:  1. Standard rooming process  2. Provider to assess per normal protocol  3. Implement precautions as needed and per guidelines     POSITIVE SYMPTOM(S)  If positive for ANY of the following symptoms: fever, cough, shortness of breath, rash    ACTION:  1. Continue to have the patient wear a mask   2. Room patient as soon as possible  3. Don appropriate PPE when entering room  4. Provider evaluation

## 2022-03-28 NOTE — H&P (VIEW-ONLY)
Consult Date: 03/28/2022    CARDIOVASCULAR SURGERY CONSULTATION    REFERRING CARDIOLOGIST:  Dr. Macey Murphy.    REASON FOR CONSULTATION:  Evaluation for coronary artery bypass grafting.    HISTORY OF PRESENT ILLNESS:  Mr. Pike is a very pleasant 66-year-old type 2 diabetic gentleman with hypertension, hyperlipidemia, who about a week ago had some new chest tightness while at rest.  He had a stress echo demonstrating findings consistent with an anterior wall ischemia.  He was seen by Dr. Murphy we will order a coronary angiogram.  This was performed today as an outpatient by Dr. Farooq.  This demonstrated severe 3-vessel coronary artery disease.  Given this reason, he was referred to us for surgical evaluation for coronary bypass surgery.    PAST MEDICAL/SURGICAL HISTORY:  Includes type 2 diabetes, hypertension, hyperlipidemia, hypothyroidism, obstructive sleep apnea, depression, history of prostate cancer treated with hormonal therapy.  He has had bilateral knee replacements.    ALLERGIES:  HMG-CoAREDUCTASE INHIBITOR AND PENICILLIN.    CURRENT OUTPATIENT MEDICATIONS:  Reviewed in Epic chart.    FAMILY HISTORY:  Significant for coronary artery disease.    SOCIAL HISTORY:  Nonsmoker.  No significant alcohol use.    REVIEW OF SYSTEMS:  As per HPI.  All other 10-point review of systems are completed and were otherwise negative unless stated above.    PHYSICAL EXAMINATION:    VITAL SIGNS:  All vital signs are stable.  GENERAL:  He appears well, in no acute distress.  HEENT:  Within normal limits.  NECK:  Supple, no lymphadenopathy.  CARDIOVASCULAR:  Regular rate and rhythm, normal S1, S2.  No murmurs.  LUNGS:  Clear bilaterally.  ABDOMEN:  Soft, nontender, nondistended.  EXTREMITIES:  Negative for edema, cyanosis or clubbing.  NEUROLOGIC:  A and O x3.  No focal deficits.      The patient is right-handed and they accessed the right radial artery for the angiogram today.    PERTINENT LABORATORY STUDIES:  Coronary  angiogram performed today demonstrates severe 3-vessel coronary artery disease with 60% distal left main disease, ostial LAD disease with a 90% mid LAD disease after a large diagonal artery takeoff, 50% ostial circumflex disease that is codominant, and codominant RCA with mid 70% disease and 80% disease in the distal PDA.  Echocardiogram is pending.    IMPRESSION AND PLAN:  Mr. Pike is a very pleasant 66-year-old gentleman with type 2 diabetes and newly diagnosed severe 3-vessel coronary artery disease.  Echocardiogram is pending at this time.  My recommendation is coronary artery bypass grafting.  I went over the diagnosis in detail with the patient and the reason for recommending surgical revascularization.  I went over the risks and benefits of the operation and the potential complications associated with the surgery.  These complications include, but are not strictly limited to, infection, bleeding, stroke, postop MI, postoperative arrhythmias, pulmonary and renal complications.  I think overall he is an excellent surgical candidate and I quoted an operative mortality risk of 1% or less.  The patient understands and agrees to proceed.  The patient is planned to be discharged today and we will have our office schedule him for coronary bypass grafting with endoscopic vein harvest within the next couple of weeks.    Thank you very much for this referral.    Gely Huizar MD        D: 2022   T: 2022   MT: PERLA    Name:     YANI PIKE  MRN:      [id number]        Account:      [id number]   :      1955           Consult Date: 2022     Document: [id number]    cc:  Kamala Murphy MD

## 2022-03-28 NOTE — PROGRESS NOTES
I met with patient and his family. Pre op instructions reviewed. Plan CABG as soon as schedule allows. All questions addressed.

## 2022-03-29 ENCOUNTER — PREP FOR PROCEDURE (OUTPATIENT)
Dept: CARDIOLOGY | Facility: CLINIC | Age: 67
End: 2022-03-29
Payer: MEDICARE

## 2022-03-29 ENCOUNTER — TELEPHONE (OUTPATIENT)
Dept: CARDIOLOGY | Facility: CLINIC | Age: 67
End: 2022-03-29
Payer: MEDICARE

## 2022-03-29 DIAGNOSIS — I25.10 CAD (CORONARY ARTERY DISEASE): Primary | ICD-10-CM

## 2022-03-29 NOTE — TELEPHONE ENCOUNTER
Per task, pt needs to schedule surgery with Dr. Huizar or Dr. Bae . Talked with pt and pt would like to be scheduled with Dr. Bae due to availability. Scheduled pt for 4/8. Will call if anything changes

## 2022-03-30 ENCOUNTER — LAB (OUTPATIENT)
Dept: LAB | Facility: CLINIC | Age: 67
End: 2022-03-30
Payer: MEDICARE

## 2022-03-30 ENCOUNTER — TELEPHONE (OUTPATIENT)
Dept: CARDIOLOGY | Facility: CLINIC | Age: 67
End: 2022-03-30

## 2022-03-30 DIAGNOSIS — R94.39 ABNORMAL STRESS TEST: ICD-10-CM

## 2022-03-30 LAB
ANION GAP SERPL CALCULATED.3IONS-SCNC: 6 MMOL/L (ref 3–14)
BUN SERPL-MCNC: 19 MG/DL (ref 7–30)
CALCIUM SERPL-MCNC: 9.3 MG/DL (ref 8.5–10.1)
CHLORIDE BLD-SCNC: 105 MMOL/L (ref 94–109)
CO2 SERPL-SCNC: 27 MMOL/L (ref 20–32)
CREAT SERPL-MCNC: 1.05 MG/DL (ref 0.66–1.25)
GFR SERPL CREATININE-BSD FRML MDRD: 79 ML/MIN/1.73M2
GLUCOSE BLD-MCNC: 159 MG/DL (ref 70–99)
POTASSIUM BLD-SCNC: 4.2 MMOL/L (ref 3.4–5.3)
SODIUM SERPL-SCNC: 138 MMOL/L (ref 133–144)

## 2022-03-30 PROCEDURE — 36415 COLL VENOUS BLD VENIPUNCTURE: CPT

## 2022-03-30 PROCEDURE — 80048 BASIC METABOLIC PNL TOTAL CA: CPT | Performed by: INTERNAL MEDICINE

## 2022-03-30 NOTE — TELEPHONE ENCOUNTER
BMP results noted 3/30/22:    Component      Latest Ref Rng & Units 3/30/2022   Sodium      133 - 144 mmol/L 138   Potassium      3.4 - 5.3 mmol/L 4.2   Chloride      94 - 109 mmol/L 105   Carbon Dioxide      20 - 32 mmol/L 27   Anion Gap      3 - 14 mmol/L 6   Urea Nitrogen      7 - 30 mg/dL 19   Creatinine      0.66 - 1.25 mg/dL 1.05   Calcium      8.5 - 10.1 mg/dL 9.3   Glucose      70 - 99 mg/dL 159 (H)   GFR Estimate      >60 mL/min/1.73m2 79     BMP done post cath to review if kidney function is ok to restart metformin. Kidney function normal. Contacted patient to review results and notify him that he may restart his Metformin. Patient verbalized understanding and agreed with plan of care. No further questions.

## 2022-03-31 DIAGNOSIS — Z11.59 ENCOUNTER FOR SCREENING FOR OTHER VIRAL DISEASES: Primary | ICD-10-CM

## 2022-03-31 RX ORDER — ASPIRIN 81 MG/1
81 TABLET, CHEWABLE ORAL
Status: CANCELLED | OUTPATIENT
Start: 2022-03-31

## 2022-03-31 RX ORDER — LIDOCAINE 40 MG/G
CREAM TOPICAL
Status: CANCELLED | OUTPATIENT
Start: 2022-03-31

## 2022-03-31 RX ORDER — CLINDAMYCIN PHOSPHATE 900 MG/50ML
900 INJECTION, SOLUTION INTRAVENOUS
Status: CANCELLED | OUTPATIENT
Start: 2022-03-31

## 2022-03-31 RX ORDER — FAMOTIDINE 20 MG/1
20 TABLET, FILM COATED ORAL
Status: CANCELLED | OUTPATIENT
Start: 2022-03-31

## 2022-03-31 RX ORDER — ASPIRIN 81 MG/1
162 TABLET, CHEWABLE ORAL
Status: CANCELLED | OUTPATIENT
Start: 2022-03-31

## 2022-03-31 RX ORDER — CLINDAMYCIN PHOSPHATE 900 MG/50ML
900 INJECTION, SOLUTION INTRAVENOUS SEE ADMIN INSTRUCTIONS
Status: CANCELLED | OUTPATIENT
Start: 2022-03-31

## 2022-03-31 RX ORDER — CHLORHEXIDINE GLUCONATE ORAL RINSE 1.2 MG/ML
10 SOLUTION DENTAL ONCE
Status: CANCELLED | OUTPATIENT
Start: 2022-03-31 | End: 2022-03-31

## 2022-04-01 ENCOUNTER — HOSPITAL ENCOUNTER (OUTPATIENT)
Dept: CT IMAGING | Facility: CLINIC | Age: 67
Discharge: HOME | End: 2022-04-01
Attending: INTERNAL MEDICINE | Admitting: INTERNAL MEDICINE
Payer: MEDICARE

## 2022-04-01 DIAGNOSIS — C61 PROSTATE CANCER (H): ICD-10-CM

## 2022-04-01 PROCEDURE — 74177 CT ABD & PELVIS W/CONTRAST: CPT | Mod: MG

## 2022-04-01 PROCEDURE — 250N000009 HC RX 250: Performed by: INTERNAL MEDICINE

## 2022-04-01 PROCEDURE — 250N000011 HC RX IP 250 OP 636: Performed by: INTERNAL MEDICINE

## 2022-04-01 RX ORDER — IOPAMIDOL 755 MG/ML
500 INJECTION, SOLUTION INTRAVASCULAR ONCE
Status: COMPLETED | OUTPATIENT
Start: 2022-04-01 | End: 2022-04-01

## 2022-04-01 RX ADMIN — SODIUM CHLORIDE 61 ML: 9 INJECTION, SOLUTION INTRAVENOUS at 08:42

## 2022-04-01 RX ADMIN — IOPAMIDOL 100 ML: 755 INJECTION, SOLUTION INTRAVENOUS at 08:42

## 2022-04-04 ENCOUNTER — VIRTUAL VISIT (OUTPATIENT)
Dept: ONCOLOGY | Facility: CLINIC | Age: 67
End: 2022-04-04
Attending: INTERNAL MEDICINE
Payer: MEDICARE

## 2022-04-04 DIAGNOSIS — C61 PROSTATE CANCER (H): Primary | ICD-10-CM

## 2022-04-04 PROCEDURE — 99214 OFFICE O/P EST MOD 30 MIN: CPT | Mod: 95 | Performed by: INTERNAL MEDICINE

## 2022-04-04 NOTE — NURSING NOTE
Pt is having open heart surgery on 4/8/2022.  Pt was wanting to discuss his chemo drugs.     Fidelia Leon

## 2022-04-04 NOTE — LETTER
4/4/2022         RE: Joel Pike  [address]  [facility] [address]        Dear Colleague,    Thank you for referring your patient, Joel Pike, to the [facility]. Please see a copy of my visit note below.    Joel is a 66 year old who is being evaluated via a billable video visit.      How would you like to obtain your AVS? MyChart  If the video visit is dropped, the invitation should be resent by: Text to cell phone: [phone]  Will anyone else be joining your video visit? No Pt wife will be home with pt.       25 minute video visit.     [facility]idel[facility]        [facility] Followup Note       Date of visit: April 4, 2022    CC:   metastatic prostate cancer     HPI:  Nervous today about diagnosis.  Wondering where his prostate cancer really is and what the treatment will be.  Energy has been low lately since starting ADT in early August.  Having hot flashes/sweats.  Breathing is OK, no chest pain.  Appetite is good.  Continuing to work in [employer]  Here today with his wife and son.      ONCOLOGY HISTORY:   -Elevated PSA noted 2/26/21 at 12.70. Previously normal in 2017.     -4/7/21-Initial urology visit.   -7/2/21-prostate biopsy 4+3, 9/12 biopsies positive.  Ductal component noted.     -7/28/21-MR prostate-PIRADS 5 lesion, R and L sided lesions with likely    neurovascular bundle invasion. No  seminal vesicle involvement. No clear LAD,  but some indeterminate pelvic nodes.  No suspicious bone lesions.     -7/28/21-NM bone scan suspicious lesion of R sacral ala S3 level.     -8/10/21-CT A/P with multiple enlarged periaortic/iliac LN   -8/11/21-First eligard dose 45mg (Q6M dosing). Due next 2/2022.   -8/30/21-Initial medical oncology visit with Dr. Solo.  Unclear if bony lesion is metastasis based on current imaging.  Will start enzalutamide and continue    Eligard (next due 2/2022).     8/30/21 PSA =30.40 Pre Rx  9/27/21 PSA  =8.99 (T=6); HgbA1C = 6.1  10/5/21 PSA =3.22  11/26/21 PSA =0.15    Doing well in general     Had (+) stress test and having CABG this Friday      PMH:  HTN, HLD, bipolar disorder, DM2 on metformin     PSH:  Bilateral TKA, bilateral cataract extraction, bilateral carpal tunnel release     FAMILY HX:  No reported family history of prostate cancer     SOCIAL:  Retired, works at [employer] in [place] now  Never smoker.   No EtOH  No recreational drugs.   No herbs/supplements other than on medication list.      MEDS:  Current Outpatient Medications   Medication Instructions     albuterol (PROAIR HFA) 108 (90 BASE) MCG/ACT Inhaler 1-2 puffs every 2 -4 hrs as needed     ALLEGRA-D ALLERGY & CONGESTION 180-240 MG 24 hr tablet TAKE ONE TABLET BY MOUTH EVERY DAY     blood glucose (HUGO CONTOUR) test strip Use to test blood sugars 1 time daily or as directed.     blood glucose (NO BRAND SPECIFIED) lancets standard Use to test blood sugar one time daily or as directed.     blood glucose calibration (HUGO CONTOUR) NORMAL solution Use to calibrate blood glucose monitor as directed.     buPROPion (WELLBUTRIN XL) 300 MG 24 hr tablet TAKE ONE TABLET BY MOUTH EVERY MORNING     Coenzyme Q-10 capsule 1 capsule, DAILY     esomeprazole (NEXIUM) 40 MG DR capsule TAKE ONE CAPSULE BY MOUTH EVERY MORNING BEFORE BREAKFAST , 30-60 MINUTES BEFORE EATING     ezetimibe (ZETIA) 10 MG tablet TAKE ONE TABLET BY MOUTH ONCE DAILY     fish oil-omega-3 fatty acids 1000 MG capsule Take  by mouth. Take daily       levothyroxine (SYNTHROID/LEVOTHROID) 112 mcg, Oral, DAILY     losartan (COZAAR) 75 mg, Oral, DAILY     Magnesium 500 MG CAPS One twice a day     metFORMIN (GLUCOPHAGE) 500 MG tablet TAKE ONE TABLET BY MOUTH TWICE A DAY WITH MEALS     Misc Natural Products (OSTEO BI-FLEX ADV JOINT SHIELD) TABS Take  by mouth.     multivitamin (OCUVITE) TABS tablet 1 tablet, Oral, DAILY     STATIN NOT PRESCRIBED (INTENTIONAL) Please choose reason not  prescribed, below     Vitamin D3 (CHOLECALCIFEROL) 5,000 Units, Oral     ROS-Remainder of 14 point ROS reviewed and negative except as in HPI.    PHYSICAL EXAM:  Exam:  Video visit   Appears in no distress  Cognitively appropriate     LABS AND IMAGES:    Prostate Biopsy 7/2/21  FINAL DIAGNOSIS:   A: Prostate, left, biopsy   - Adenocarcinoma, [place]'s grade 4/3 with ductal component involving 4 of    6 needle core biopsies and 25% of   submitted tissue     B: Prostate, right, biopsy   - Adenocarcinoma, [place] grade 4/3 with ductal component involving 5 of    6 needle core biopsies and 25% of   submitted tissue     MR prostate 7/28/21  FINDINGS:  Size: 31 grams  Hemorrhage: Absent  Peripheral zone: Heterogeneous on T2-weighted images. Suspicious  lesions as detailed below.  Transition zone: Enlarged with BPH changes. No suspicious lesions  identified.     Lesion(s) in rank order of severity (highest score- to lowest score,  then by size)      Lesion 1:  Location: Right mid gland peripheral zone at the 7-9 o'clock position  relative to the urethra. Series 5 image 50.  Size: 20 x 10 mm  T2 description: Homogeneously hypointense  T2 numerical assessment: 5  DWI description: Marked restriction diffusion  DWI numerical assessment: 5  DCE assessment: Negative    Prostate margin: Capsular abutment>15 mm with enlarged neurovascular  bundle suggesting tumor involvement  Lesion overall PI-RADS category: 5     Lesion 2:  Location: Left apex peripheral zone at the 4 o'clock position relative  to the urethra. Series 5 image 63.  Size: 16 x 11 mm  T2 description: Homogeneously hypointense  T2 numerical assessment: 5  DWI description: Marked diffusion restriction  DWI numerical assessment: 5  DCE assessment: Positive    Prostate margin: Capsular abutment 6-15 mm with capsular irregularity  and focal bulging   Lesion overall PI-RADS category: 5     Neurovascular bundles: Both neurovascular bundles are likely involved  by  malignancy.  A right prostatic vein is markedly enlarged compared  to the left, similar to 8/1/2014 CT.  Seminal vesicles: No seminal vesicle involvement by malignancy.  Lymph nodes: No clear adenopathy. Indeterminate nodes as follows: Left  pelvic 8 x 5 mm lymph node on series 5 image 27.  Bones: No suspicious lesions  Other pelvic organs: Colonic diverticulosis.                                                                         IMPRESSION:  1. Based on the most suspicious abnormality, this exam is  characterized as PIRADS 5 - Clinically significant cancer is highly  likely to be present.  The most suspicious abnormalities are located  at the right mid peripheral zone and left apical peripheral zone and  there is high suspicion of extraprostatic extension.  2. No suspicious adenopathy or evidence of pelvic metastases.    NM bone scan 7/28/21  FINDINGS:   Small area of mild uptake in the right sacrum inferiorly visible on  the posterior view. SPECT-CT demonstrates that this focal uptake  corresponds to a sclerotic lesion on CT. No other suspicious areas of  focal uptake.     Postsurgical changes of bilateral knee replacements.     Physiologic uptake by the kidneys and urinary bladder.                                                                      IMPRESSION: Single focal uptake by the right sacral ala (S3 level) and  corresponding sclerotic focus. This is highly suspicious for  metastatic focus.    CT A/P 8/10/21  FINDINGS:   LOWER CHEST: Normal.     HEPATOBILIARY: Diffuse hepatic steatosis. No significant mass. No bile  duct dilatation. No calcified gallstones.     PANCREAS: Normal.     SPLEEN: Normal.     ADRENAL GLANDS: Normal.     KIDNEYS/BLADDER: There are multiple bilateral nonobstructing  intrarenal calculi measuring up to 4 mm in largest size. Small  subcentimeter benign cortical cysts. Unremarkable bladder. No evidence  of hydronephrosis.     BOWEL: No evidence of bowel obstruction or  inflammatory changes. Mild  sigmoid colon diverticulosis. No evidence of diverticulitis. Normal  appendix.     PELVIC ORGANS: Normal sized prostate gland.     ADDITIONAL FINDINGS: Multiple enlarged retroperitoneal periaortic and  bilateral iliac chain lymph nodes are highly suspicious for  metastasis. The largest left retroperitoneal periaortic lymph node on  image 44 of series 2 measures 4.5 x 3.4 cm. 2.9 x 2.3 cm aortocaval  lymph node on image 45 of series 2. Distal left periaortic lymph node  on image 54 of series 2 measures 3.2 x 2.6 cm. Right common iliac  chain lymph node on image 65 of series 2 measures 2.3 x 2.2 cm. 1.6 cm  left common iliac chain node on image 59 of series 2.     MUSCULOSKELETAL: Small faint sites of increased bone sclerosis  involving the posterior medial right iliac on image 65 of series 2 and  image 69 of series 2 are indeterminate for possible metastasis.                                                                       11/26/21                                             IMPRESSION:  1.  Significantly decreased retroperitoneal lymphadenopathy since the  prior study dated 8/10/2021 indicates good response to treatment. The  remaining enlarged retroperitoneal lymph node appears to have a  necrotic center.  2.  Minimal pulmonary nodularity was likely present previously.  3.  No other evidence for lymphadenopathy or metastasis is identified.  Specifically no evidence for left sacral metastasis is seen.      Results for ROBBIEYANI SOLORZANO BELEM (MRN [id number]) as of 4/4/2022 13:06   Ref. Range 3/30/2022 10:24   Sodium Latest Ref Range: 133 - 144 mmol/L 138   Potassium Latest Ref Range: 3.4 - 5.3 mmol/L 4.2   Chloride Latest Ref Range: 94 - 109 mmol/L 105   Carbon Dioxide Latest Ref Range: 20 - 32 mmol/L 27   Urea Nitrogen Latest Ref Range: 7 - 30 mg/dL 19   Creatinine Latest Ref Range: 0.66 - 1.25 mg/dL 1.05   GFR Estimate Latest Ref Range: >60 mL/min/1.73m2 79   Calcium Latest Ref Range: 8.5 -  10.1 mg/dL 9.3   Anion Gap Latest Ref Range: 3 - 14 mmol/L 6   Glucose Latest Ref Range: 70 - 99 mg/dL 159 (H)       IMPRESSION AND PLAN:  Joel Pike is a 67 y/o M with PMH of DM2 on metformin, depression/anxiety (pt reports as bipolar d/o), HTN, and HLD presenting for initial consultation for prostate cancer with possible bone metastasis.  We had a lengthy discussion at his initial and went over his pathology and staging scan results.  We discussed that it is not clear whether the lesion in wing of his ala is a true bony metastasis or whether it is another finding/artifact.  We discussed that this does not change the overall treatment of his disease with ADT, but may change whether he receives radiation to his bony lesion in the future or not.  The role of ADT and the addition of enzalutamide were discussed today.  Given his existing DM2, we will attempt to avoid additional prednisone use by choosing apalutamide.       He is tolerating well  Due to have CABG this week.   Recommended Covid Booster.   Eligard in August      ---recommended to stop the apalutamide x 1 month starting tomorrow during the process of the CABG.   ---will avoid any DDI or anything related to the     --RTC 2 months w PILAR        Again, thank you for allowing me to participate in the care of your patient.      Sincerely,    Isaias Solo MD

## 2022-04-04 NOTE — PROGRESS NOTES
Joel is a 66 year old who is being evaluated via a billable video visit.      How would you like to obtain your AVS? MyChart  If the video visit is dropped, the invitation should be resent by: Text to cell phone: [phone]  Will anyone else be joining your video visit? No Pt wife will be home with pt.       25 minute video visit.     Fidelia SanchoOhio[facility] Grady [facility] Hospitalrobert        [facility] Followup Note       Date of visit: April 4, 2022    CC:   metastatic prostate cancer     HPI:  Nervous today about diagnosis.  Wondering where his prostate cancer really is and what the treatment will be.  Energy has been low lately since starting ADT in early August.  Having hot flashes/sweats.  Breathing is OK, no chest pain.  Appetite is good.  Continuing to work in [employer]  Here today with his wife and son.      ONCOLOGY HISTORY:   -Elevated PSA noted 2/26/21 at 12.70. Previously normal in 2017.     -4/7/21-Initial urology visit.   -7/2/21-prostate biopsy 4+3, 9/12 biopsies positive.  Ductal component noted.     -7/28/21-MR prostate-PIRADS 5 lesion, R and L sided lesions with likely    neurovascular bundle invasion. No  seminal vesicle involvement. No clear LAD,  but some indeterminate pelvic nodes.  No suspicious bone lesions.     -7/28/21-NM bone scan suspicious lesion of R sacral ala S3 level.     -8/10/21-CT A/P with multiple enlarged periaortic/iliac LN   -8/11/21-First eligard dose 45mg (Q6M dosing). Due next 2/2022.   -8/30/21-Initial medical oncology visit with Dr. Solo.  Unclear if bony lesion is metastasis based on current imaging.  Will start enzalutamide and continue    Eligard (next due 2/2022).     8/30/21 PSA =30.40 Pre Rx  9/27/21 PSA =8.99 (T=6); HgbA1C = 6.1  10/5/21 PSA =3.22  11/26/21 PSA =0.15    Doing well in general     Had (+) stress test and having CABG this Friday      PMH:  HTN, HLD, bipolar disorder, DM2 on metformin     PSH:  Bilateral TKA, bilateral cataract extraction,  bilateral carpal tunnel release     FAMILY HX:  No reported family history of prostate cancer     SOCIAL:  Retired, works at [employer] in [employer] section now  Never smoker.   No EtOH  No recreational drugs.   No herbs/supplements other than on medication list.      MEDS:  Current Outpatient Medications   Medication Instructions     albuterol (PROAIR HFA) 108 (90 BASE) MCG/ACT Inhaler 1-2 puffs every 2 -4 hrs as needed     ALLEGRA-D ALLERGY & CONGESTION 180-240 MG 24 hr tablet TAKE ONE TABLET BY MOUTH EVERY DAY     blood glucose (HUGO CONTOUR) test strip Use to test blood sugars 1 time daily or as directed.     blood glucose (NO BRAND SPECIFIED) lancets standard Use to test blood sugar one time daily or as directed.     blood glucose calibration (HUGO CONTOUR) NORMAL solution Use to calibrate blood glucose monitor as directed.     buPROPion (WELLBUTRIN XL) 300 MG 24 hr tablet TAKE ONE TABLET BY MOUTH EVERY MORNING     Coenzyme Q-10 capsule 1 capsule, DAILY     esomeprazole (NEXIUM) 40 MG DR capsule TAKE ONE CAPSULE BY MOUTH EVERY MORNING BEFORE BREAKFAST , 30-60 MINUTES BEFORE EATING     ezetimibe (ZETIA) 10 MG tablet TAKE ONE TABLET BY MOUTH ONCE DAILY     fish oil-omega-3 fatty acids 1000 MG capsule Take  by mouth. Take daily       levothyroxine (SYNTHROID/LEVOTHROID) 112 mcg, Oral, DAILY     losartan (COZAAR) 75 mg, Oral, DAILY     Magnesium 500 MG CAPS One twice a day     metFORMIN (GLUCOPHAGE) 500 MG tablet TAKE ONE TABLET BY MOUTH TWICE A DAY WITH MEALS     Misc Natural Products (OSTEO BI-FLEX ADV JOINT SHIELD) TABS Take  by mouth.     multivitamin (OCUVITE) TABS tablet 1 tablet, Oral, DAILY     STATIN NOT PRESCRIBED (INTENTIONAL) Please choose reason not prescribed, below     Vitamin D3 (CHOLECALCIFEROL) 5,000 Units, Oral     ROS-Remainder of 14 point ROS reviewed and negative except as in HPI.    PHYSICAL EXAM:  Exam:  Video visit   Appears in no distress  Cognitively appropriate     LABS AND  IMAGES:    Prostate Biopsy 7/2/21  FINAL DIAGNOSIS:   A: Prostate, left, biopsy   - Adenocarcinoma, Sparks's grade 4/3 with ductal component involving 4 of    6 needle core biopsies and 25% of   submitted tissue     B: Prostate, right, biopsy   - Adenocarcinoma, Mary's grade 4/3 with ductal component involving 5 of    6 needle core biopsies and 25% of   submitted tissue     MR prostate 7/28/21  FINDINGS:  Size: 31 grams  Hemorrhage: Absent  Peripheral zone: Heterogeneous on T2-weighted images. Suspicious  lesions as detailed below.  Transition zone: Enlarged with BPH changes. No suspicious lesions  identified.     Lesion(s) in rank order of severity (highest score- to lowest score,  then by size)      Lesion 1:  Location: Right mid gland peripheral zone at the 7-9 o'clock position  relative to the urethra. Series 5 image 50.  Size: 20 x 10 mm  T2 description: Homogeneously hypointense  T2 numerical assessment: 5  DWI description: Marked restriction diffusion  DWI numerical assessment: 5  DCE assessment: Negative    Prostate margin: Capsular abutment>15 mm with enlarged neurovascular  bundle suggesting tumor involvement  Lesion overall PI-RADS category: 5     Lesion 2:  Location: Left apex peripheral zone at the 4 o'clock position relative  to the urethra. Series 5 image 63.  Size: 16 x 11 mm  T2 description: Homogeneously hypointense  T2 numerical assessment: 5  DWI description: Marked diffusion restriction  DWI numerical assessment: 5  DCE assessment: Positive    Prostate margin: Capsular abutment 6-15 mm with capsular irregularity  and focal bulging   Lesion overall PI-RADS category: 5     Neurovascular bundles: Both neurovascular bundles are likely involved  by malignancy.  A right prostatic vein is markedly enlarged compared  to the left, similar to 8/1/2014 CT.  Seminal vesicles: No seminal vesicle involvement by malignancy.  Lymph nodes: No clear adenopathy. Indeterminate nodes as follows: Left  pelvic 8 x  5 mm lymph node on series 5 image 27.  Bones: No suspicious lesions  Other pelvic organs: Colonic diverticulosis.                                                                         IMPRESSION:  1. Based on the most suspicious abnormality, this exam is  characterized as PIRADS 5 - Clinically significant cancer is highly  likely to be present.  The most suspicious abnormalities are located  at the right mid peripheral zone and left apical peripheral zone and  there is high suspicion of extraprostatic extension.  2. No suspicious adenopathy or evidence of pelvic metastases.    NM bone scan 7/28/21  FINDINGS:   Small area of mild uptake in the right sacrum inferiorly visible on  the posterior view. SPECT-CT demonstrates that this focal uptake  corresponds to a sclerotic lesion on CT. No other suspicious areas of  focal uptake.     Postsurgical changes of bilateral knee replacements.     Physiologic uptake by the kidneys and urinary bladder.                                                                      IMPRESSION: Single focal uptake by the right sacral ala (S3 level) and  corresponding sclerotic focus. This is highly suspicious for  metastatic focus.    CT A/P 8/10/21  FINDINGS:   LOWER CHEST: Normal.     HEPATOBILIARY: Diffuse hepatic steatosis. No significant mass. No bile  duct dilatation. No calcified gallstones.     PANCREAS: Normal.     SPLEEN: Normal.     ADRENAL GLANDS: Normal.     KIDNEYS/BLADDER: There are multiple bilateral nonobstructing  intrarenal calculi measuring up to 4 mm in largest size. Small  subcentimeter benign cortical cysts. Unremarkable bladder. No evidence  of hydronephrosis.     BOWEL: No evidence of bowel obstruction or inflammatory changes. Mild  sigmoid colon diverticulosis. No evidence of diverticulitis. Normal  appendix.     PELVIC ORGANS: Normal sized prostate gland.     ADDITIONAL FINDINGS: Multiple enlarged retroperitoneal periaortic and  bilateral iliac chain lymph nodes  are highly suspicious for  metastasis. The largest left retroperitoneal periaortic lymph node on  image 44 of series 2 measures 4.5 x 3.4 cm. 2.9 x 2.3 cm aortocaval  lymph node on image 45 of series 2. Distal left periaortic lymph node  on image 54 of series 2 measures 3.2 x 2.6 cm. Right common iliac  chain lymph node on image 65 of series 2 measures 2.3 x 2.2 cm. 1.6 cm  left common iliac chain node on image 59 of series 2.     MUSCULOSKELETAL: Small faint sites of increased bone sclerosis  involving the posterior medial right iliac on image 65 of series 2 and  image 69 of series 2 are indeterminate for possible metastasis.                                                                       11/26/21                                             IMPRESSION:  1.  Significantly decreased retroperitoneal lymphadenopathy since the  prior study dated 8/10/2021 indicates good response to treatment. The  remaining enlarged retroperitoneal lymph node appears to have a  necrotic center.  2.  Minimal pulmonary nodularity was likely present previously.  3.  No other evidence for lymphadenopathy or metastasis is identified.  Specifically no evidence for left sacral metastasis is seen.      Results for JOEL PIKE (MRN [id number]) as of 4/4/2022 13:06   Ref. Range 3/30/2022 10:24   Sodium Latest Ref Range: 133 - 144 mmol/L 138   Potassium Latest Ref Range: 3.4 - 5.3 mmol/L 4.2   Chloride Latest Ref Range: 94 - 109 mmol/L 105   Carbon Dioxide Latest Ref Range: 20 - 32 mmol/L 27   Urea Nitrogen Latest Ref Range: 7 - 30 mg/dL 19   Creatinine Latest Ref Range: 0.66 - 1.25 mg/dL 1.05   GFR Estimate Latest Ref Range: >60 mL/min/1.73m2 79   Calcium Latest Ref Range: 8.5 - 10.1 mg/dL 9.3   Anion Gap Latest Ref Range: 3 - 14 mmol/L 6   Glucose Latest Ref Range: 70 - 99 mg/dL 159 (H)       IMPRESSION AND PLAN:  Joel Pike is a 65 y/o M with PMH of DM2 on metformin, depression/anxiety (pt reports as bipolar d/o), HTN, and HLD  presenting for initial consultation for prostate cancer with possible bone metastasis.  We had a lengthy discussion at his initial and went over his pathology and staging scan results.  We discussed that it is not clear whether the lesion in wing of his ala is a true bony metastasis or whether it is another finding/artifact.  We discussed that this does not change the overall treatment of his disease with ADT, but may change whether he receives radiation to his bony lesion in the future or not.  The role of ADT and the addition of enzalutamide were discussed today.  Given his existing DM2, we will attempt to avoid additional prednisone use by choosing apalutamide.       He is tolerating well  Due to have CABG this week.   Recommended Covid Booster.   Eligard in August      ---recommended to stop the apalutamide x 1 month starting tomorrow during the process of the CABG.   ---will avoid any DDI or anything related to the     --RTC 2 months w PILAR      Isaias Solo MD

## 2022-04-05 ENCOUNTER — LAB (OUTPATIENT)
Dept: LAB | Facility: CLINIC | Age: 67
End: 2022-04-05
Attending: SURGERY
Payer: MEDICARE

## 2022-04-05 ENCOUNTER — ANESTHESIA EVENT (OUTPATIENT)
Dept: SURGERY | Facility: CLINIC | Age: 67
DRG: 236 | End: 2022-04-05
Payer: MEDICARE

## 2022-04-05 DIAGNOSIS — Z11.59 ENCOUNTER FOR SCREENING FOR OTHER VIRAL DISEASES: ICD-10-CM

## 2022-04-05 PROCEDURE — U0003 INFECTIOUS AGENT DETECTION BY NUCLEIC ACID (DNA OR RNA); SEVERE ACUTE RESPIRATORY SYNDROME CORONAVIRUS 2 (SARS-COV-2) (CORONAVIRUS DISEASE [COVID-19]), AMPLIFIED PROBE TECHNIQUE, MAKING USE OF HIGH THROUGHPUT TECHNOLOGIES AS DESCRIBED BY CMS-2020-01-R: HCPCS

## 2022-04-05 PROCEDURE — U0005 INFEC AGEN DETEC AMPLI PROBE: HCPCS

## 2022-04-06 LAB — SARS-COV-2 RNA RESP QL NAA+PROBE: NEGATIVE

## 2022-04-07 LAB
ATRIAL RATE - MUSE: 60 BPM
DIASTOLIC BLOOD PRESSURE - MUSE: NORMAL MMHG
INTERPRETATION ECG - MUSE: NORMAL
P AXIS - MUSE: 67 DEGREES
PR INTERVAL - MUSE: 150 MS
QRS DURATION - MUSE: 86 MS
QT - MUSE: 424 MS
QTC - MUSE: 424 MS
R AXIS - MUSE: 49 DEGREES
SYSTOLIC BLOOD PRESSURE - MUSE: NORMAL MMHG
T AXIS - MUSE: 43 DEGREES
VENTRICULAR RATE- MUSE: 60 BPM

## 2022-04-07 NOTE — ANESTHESIA PREPROCEDURE EVALUATION
Anesthesia Pre-Procedure Evaluation    Patient: Joel Pike   MRN: [id number] : 1955        Procedure : Procedure(s):  CORONARY ARTERY BYPASS GRAFT (CABG)          Past Medical History:   Diagnosis Date     Calculus of kidney      CKD (chronic kidney disease) stage 2, GFR 60-89 ml/min 10/30/2015     Degeneration of lumbar or lumbosacral intervertebral disc      Depressive disorder      Depressive disorder, not elsewhere classified      Diabetes (H)      Diabetic eye exam (H) 12, 13     Diabetic eye exam (H) 14     Hyperlipidaemia      Hypertension      Hypothyroidism 2010     Obesity, Class I, BMI 30-34.9 10/30/2015     HILARIO (obstructive sleep apnea)      Primary osteoarthritis of left knee      Prostate cancer (H) 2021     Uncomplicated asthma       Past Surgical History:   Procedure Laterality Date     ARTHROPLASTY KNEE Left 2020    Procedure: left total knee replacement;  Surgeon: Jason Berman DO;  Location: [facility]     ARTHROPLASTY KNEE Right 2021    Procedure: right total knee replacement;  Surgeon: Jason Berman DO;  Location: [facility]     COLONOSCOPY  09    Repeat in 5 yrs     COLONOSCOPY N/A 2014    Procedure: COMBINED COLONOSCOPY, SINGLE BIOPSY/POLYPECTOMY BY BIOPSY;  Surgeon: Denis Oakes MD;  Location:  GI     CV CORONARY ANGIOGRAM N/A 3/28/2022    Procedure: Coronary Angiogram;  Surgeon: Lindy Farooq MD;  Location:  HEART CARDIAC CATH LAB     CV LEFT HEART CATH N/A 3/28/2022    Procedure: Left Heart Catheterization;  Surgeon: Lindy Farooq MD;  Location:  HEART CARDIAC CATH LAB     ESOPHAGOSCOPY, GASTROSCOPY, DUODENOSCOPY (EGD), COMBINED N/A 2015    Procedure: COMBINED ESOPHAGOSCOPY, GASTROSCOPY, DUODENOSCOPY (EGD), BIOPSY SINGLE OR MULTIPLE;  Surgeon: Alfred Young MD;  Location:  GI     HC REMV CATARACT EXTRACAP,INSERT LENS, W/O ECP      Bilateral     HC REVISE MEDIAN N/CARPAL TUNNEL SURG   1991     HC TOOTH EXTRACTION W/FORCEP  1980's    [place] teeth removed     RELEASE CARPAL TUNNEL Right 6/16/2017    Procedure: RELEASE CARPAL TUNNEL;  Right carpal tunnel release;  Surgeon: Jason Berman DO;  Location: [facility]     RELEASE CARPAL TUNNEL Left 7/11/2017    Procedure: RELEASE CARPAL TUNNEL;  Left carpal tunnel release;  Surgeon: Jason Berman DO;  Location: [facility]     SOFT TISSUE SURGERY        Allergies   Allergen Reactions     Dust Mites Cough     Hmg-Coa-R Inhibitors      Body aches     Penicillins Rash and Hives      Social History     Tobacco Use     Smoking status: Never Smoker     Smokeless tobacco: Never Used   Substance Use Topics     Alcohol use: No     Alcohol/week: 0.0 standard drinks      Wt Readings from Last 1 Encounters:   03/28/22 103.1 kg (227 lb 6.4 oz)        Anesthesia Evaluation   Pt has had prior anesthetic.     No history of anesthetic complications       ROS/MED HX  ENT/Pulmonary:     (+) sleep apnea, uses CPAP, Intermittent, asthma     Neurologic:  - neg neurologic ROS     Cardiovascular:     (+) Dyslipidemia hypertension--CAD ---Previous cardiac testing   Echo: Date: 3/28/22 Results:  Left ventricular systolic function is normal.  The visual ejection fraction is 60-65%.  The right ventricle is normal in structure, function and size.  No significant valve dysfunction.  The inferior vena cava was normal in size with preserved respiratory  variability.  There is no pericardial effusion.  Stress Test: Date: Results:  1. The patient exercised 7:31. Average functional capacity for age.  2. The patient exhibited no chest pain during exercise. Patient expereinced  shortness of breath with exercise.  3. This was an abnormal stress EKG; up to 2 mm ST depression at peak exercise  in the inferolateral leads, resolved with rest.  4. There is mid to distal anterior, anteroseptal and apical wall hypokinesis  with stress. Left ventricular function became worse with  stress.  5. Findings suggestive of LAD ischemia. Cardiology consult arranged today.  ______________________________________________________________________________  ECG Reviewed: Date: Results:    Cath: Date: Results:    Multi-vessel coronary artery disease identified angiographically.    LM: Distal 60% lesion.    LAD: Ostial 70% lesion, followed by mid 90% lesion after large diagonal takeoff. Long 40-50% lesion distal to this in the mid LAD.    LCX: Co-dominant vessel with a moderate 50% ostial lesion and 50% stenosis in the AV groove branch of the LCX.    RCA: Mid 70% lesion and distal PDA has a 80% lesion.    Left ventricular filling pressures are normal.        METS/Exercise Tolerance:     Hematologic:       Musculoskeletal:   (+) arthritis,     GI/Hepatic:     (+) GERD, Asymptomatic on medication,     Renal/Genitourinary:     (+) renal disease, type: CRI, Nephrolithiasis ,     Endo:     (+) type II DM, thyroid problem, hypothyroidism,     Psychiatric/Substance Use:       Infectious Disease:       Malignancy:   (+) Malignancy, History of Prostate.    Other:            Physical Exam    Airway        Mallampati: II   TM distance: > 3 FB   Neck ROM: full   Mouth opening: > 3 cm    Respiratory Devices and Support         Dental  no notable dental history         Cardiovascular   cardiovascular exam normal          Pulmonary   pulmonary exam normal                OUTSIDE LABS:  CBC:   Lab Results   Component Value Date    WBC 5.9 03/28/2022    WBC 5.6 03/13/2022    HGB 13.0 (L) 03/28/2022    HGB 12.5 (L) 03/13/2022    HCT 37.5 (L) 03/28/2022    HCT 35.9 (L) 03/13/2022     03/28/2022     03/13/2022     BMP:   Lab Results   Component Value Date     03/30/2022     03/28/2022    POTASSIUM 4.2 03/30/2022    POTASSIUM 4.4 03/28/2022    CHLORIDE 105 03/30/2022    CHLORIDE 103 03/28/2022    CO2 27 03/30/2022    CO2 26 03/28/2022    BUN 19 03/30/2022    BUN 22 03/28/2022    CR 1.05 03/30/2022    CR  1.07 03/28/2022     (H) 03/30/2022     (H) 03/28/2022     COAGS:   Lab Results   Component Value Date    PTT 26 12/20/2006    INR 1.02 03/28/2022     POC:   Lab Results   Component Value Date     (H) 01/19/2021     HEPATIC:   Lab Results   Component Value Date    ALBUMIN 4.1 03/28/2022    PROTTOTAL 7.3 03/28/2022    ALT 35 03/28/2022    AST 20 03/28/2022    ALKPHOS 78 03/28/2022    BILITOTAL 0.5 03/28/2022     OTHER:   Lab Results   Component Value Date    A1C 6.3 (H) 01/31/2022    AURORA 9.3 03/30/2022    TSH 8.20 (H) 03/13/2022    T4 0.81 03/13/2022    SED 6 12/20/2006       Anesthesia Plan    ASA Status:  3   NPO Status:  NPO Appropriate    Anesthesia Type: General.     - Airway: ETT   Induction: Intravenous, Propofol.   Maintenance: Balanced.   Techniques and Equipment:     - Lines/Monitors: Arterial Line, Central Line, CVP, THOMAS     Consents    Anesthesia Plan(s) and associated risks, benefits, and realistic alternatives discussed. Questions answered and patient/representative(s) expressed understanding.    - Discussed:     - Discussed with:  Patient         Postoperative Care    Pain management: Multi-modal analgesia.   PONV prophylaxis: Ondansetron (or other 5HT-3)     Comments:                Dean Thompson MD

## 2022-04-07 NOTE — PROGRESS NOTES
PTA medications updated by Medication Scribe prior to surgery via phone call with patient (last doses completed by Nurse)     Medication history sources: Patient, Surescripts and H&P  In the past week, patient estimated taking medication this percent of the time: Greater than 90%  Adherence assessment: N/A Not Observed    Significant changes made to the medication list:  Patient reports no longer taking the following meds (med scribe removed from PTA med list): Albuterol Inhaler, Vitamin D3, Magnesium      Additional medication history information:   None    Medication reconciliation completed by provider prior to medication history? No    Time spent in this activity: 50 minutes    The information provided in this note is only as accurate as the sources available at the time of update(s)    Prior to Admission medications    Medication Sig Last Dose Taking? Auth Provider   apalutamide (ERLEADA) 60 MG tablet Take 240 mg by mouth daily (4 x 60 mg = 240 mg) 4/2/2022 at am Yes Isaias Solo MD   aspirin (ASA) 81 MG EC tablet Take 1 tablet (81 mg) by mouth daily 4/7/2022 at am Yes Kamala Murphy MD   buPROPion (WELLBUTRIN XL) 150 MG 24 hr tablet Take 2 tablets (300 mg) by mouth every morning 4/7/2022 at am Yes Nishi Lin MD   Coenzyme Q-10 capsule Take 1 capsule by mouth daily. 4/2/2022 at am Yes Cayden Ramsey MD   ezetimibe (ZETIA) 10 MG tablet Take 1 tablet (10 mg) by mouth daily 4/7/2022 at pm Yes Nishi Lin MD   fish oil-omega-3 fatty acids 1000 MG capsule Take 1 g by mouth daily  4/2/2022 at am Yes Cayden Ramsey MD   levothyroxine (SYNTHROID/LEVOTHROID) 125 MCG tablet Take 1 tablet (125 mcg) by mouth daily 4/7/2022 at pm Yes Nishi Lin MD   losartan (COZAAR) 50 MG tablet Take 1.5 tablets (75 mg) by mouth daily 4/7/2022 at am Yes Nishi Lin MD   lovastatin (MEVACOR) 20 MG tablet Take 1/2 tablet 3 days a week  Patient taking differently: Take 10 mg by mouth three times a week (0.5 x 20  mg = 10 mg Monday, Wednesday, and Friday) 4/6/2022 at am Yes Nishi Lin MD   metFORMIN (GLUCOPHAGE) 500 MG tablet TAKE ONE TABLET BY MOUTH TWICE A DAY WITH MEALS 4/7/2022 at pm Yes Cayden Ramsey MD   [facility] Natural Products (OSTEO BI-FLEX ADV JOINT SHIELD) TABS Take 1 tablet by mouth daily  4/2/2022 at am Yes Cayden Ramsey MD   Multiple Vitamins-Minerals (PRESERVISION AREDS PO) Take 1 tablet by mouth 2 times daily 4/6/2022 at pm Yes Reported, Patient   nitroGLYcerin (NITROSTAT) 0.4 MG sublingual tablet For chest pain place 1 tablet under the tongue every 5 minutes for 3 doses. If symptoms persist 5 minutes after 1st dose call 911.  at prn Yes Kamala Murphy MD   pantoprazole (PROTONIX) 40 MG EC tablet Take 1 tablet (40 mg) by mouth daily Please stop the nexium 4/7/2022 at am Yes Nishi Lin MD   alcohol swab prep pads Use to swab area of injection/pankaj as directed.   Nishi Lin MD   blood glucose (HUGO CONTOUR) test strip Use to test blood sugars 1 time daily or as directed.   Nishi Lin MD   blood glucose (NO BRAND SPECIFIED) lancets standard Use to test blood sugar one time daily or as directed.   Nishi Lin MD   blood glucose calibration (HUGO CONTOUR) NORMAL solution Use to calibrate blood glucose monitor as directed.   Cayden Ramsey MD   blood glucose monitoring (NO BRAND SPECIFIED) meter device kit Use to test blood sugar 1-2 times daily or as directed. Preferred blood glucose meter OR supplies to accompany: Blood Glucose Monitor Brands: per insurance.   Nishi Lin MD     Medication history completed by:    Jason Menendez CPhT  Medication [facility]

## 2022-04-08 ENCOUNTER — APPOINTMENT (OUTPATIENT)
Dept: GENERAL RADIOLOGY | Facility: CLINIC | Age: 67
DRG: 236 | End: 2022-04-08
Attending: SURGERY
Payer: MEDICARE

## 2022-04-08 ENCOUNTER — ANESTHESIA (OUTPATIENT)
Dept: SURGERY | Facility: CLINIC | Age: 67
DRG: 236 | End: 2022-04-08
Payer: MEDICARE

## 2022-04-08 ENCOUNTER — HOSPITAL ENCOUNTER (INPATIENT)
Facility: CLINIC | Age: 67
LOS: 5 days | Discharge: HOME | DRG: 236 | End: 2022-04-13
Attending: SURGERY | Admitting: SURGERY
Payer: MEDICARE

## 2022-04-08 DIAGNOSIS — N18.2 TYPE 2 DIABETES MELLITUS WITH STAGE 2 CHRONIC KIDNEY DISEASE, WITHOUT LONG-TERM CURRENT USE OF INSULIN (H): ICD-10-CM

## 2022-04-08 DIAGNOSIS — Z95.1 S/P CABG X 4: Primary | ICD-10-CM

## 2022-04-08 DIAGNOSIS — R07.9 CHEST PAIN, UNSPECIFIED TYPE: ICD-10-CM

## 2022-04-08 DIAGNOSIS — E78.5 HYPERLIPIDEMIA LDL GOAL <100: ICD-10-CM

## 2022-04-08 DIAGNOSIS — E87.79 OTHER HYPERVOLEMIA: ICD-10-CM

## 2022-04-08 DIAGNOSIS — Z95.1 S/P CABG (CORONARY ARTERY BYPASS GRAFT): ICD-10-CM

## 2022-04-08 DIAGNOSIS — E11.22 TYPE 2 DIABETES MELLITUS WITH STAGE 2 CHRONIC KIDNEY DISEASE, WITHOUT LONG-TERM CURRENT USE OF INSULIN (H): ICD-10-CM

## 2022-04-08 DIAGNOSIS — R94.39 ABNORMAL STRESS TEST: ICD-10-CM

## 2022-04-08 PROBLEM — I25.118 ATHEROSCLEROSIS OF NATIVE CORONARY ARTERY OF NATIVE HEART WITH STABLE ANGINA PECTORIS (H): Status: ACTIVE | Noted: 2022-04-08

## 2022-04-08 LAB
ABO/RH(D): NORMAL
ALBUMIN SERPL-MCNC: 2.9 G/DL (ref 3.4–5)
ALP SERPL-CCNC: 46 U/L (ref 40–150)
ALT SERPL W P-5'-P-CCNC: 35 U/L (ref 0–70)
ANION GAP SERPL CALCULATED.3IONS-SCNC: 7 MMOL/L (ref 3–14)
ANION GAP SERPL CALCULATED.3IONS-SCNC: 7 MMOL/L (ref 3–14)
ANTIBODY SCREEN: NEGATIVE
APTT PPP: 27 SECONDS (ref 22–38)
APTT PPP: 27 SECONDS (ref 22–38)
AST SERPL W P-5'-P-CCNC: 65 U/L (ref 0–45)
BASE EXCESS BLDA CALC-SCNC: -1 MMOL/L (ref -9–1.8)
BILIRUB SERPL-MCNC: 1.2 MG/DL (ref 0.2–1.3)
BLD PROD TYP BPU: NORMAL
BLD PROD TYP BPU: NORMAL
BLOOD COMPONENT TYPE: NORMAL
BLOOD COMPONENT TYPE: NORMAL
BUN SERPL-MCNC: 21 MG/DL (ref 7–30)
BUN SERPL-MCNC: 21 MG/DL (ref 7–30)
CA-I BLD-MCNC: 4.5 MG/DL (ref 4.4–5.2)
CALCIUM SERPL-MCNC: 7.6 MG/DL (ref 8.5–10.1)
CALCIUM SERPL-MCNC: 8.1 MG/DL (ref 8.5–10.1)
CHLORIDE BLD-SCNC: 110 MMOL/L (ref 94–109)
CHLORIDE BLD-SCNC: 110 MMOL/L (ref 94–109)
CO2 SERPL-SCNC: 23 MMOL/L (ref 20–32)
CO2 SERPL-SCNC: 24 MMOL/L (ref 20–32)
CODING SYSTEM: NORMAL
CODING SYSTEM: NORMAL
CREAT SERPL-MCNC: 0.9 MG/DL (ref 0.66–1.25)
CREAT SERPL-MCNC: 0.95 MG/DL (ref 0.66–1.25)
CROSSMATCH: NORMAL
CROSSMATCH: NORMAL
ERYTHROCYTE [DISTWIDTH] IN BLOOD BY AUTOMATED COUNT: 12.2 % (ref 10–15)
ERYTHROCYTE [DISTWIDTH] IN BLOOD BY AUTOMATED COUNT: 12.3 % (ref 10–15)
FIBRINOGEN PPP-MCNC: 197 MG/DL (ref 170–490)
GFR SERPL CREATININE-BSD FRML MDRD: 89 ML/MIN/1.73M2
GFR SERPL CREATININE-BSD FRML MDRD: >90 ML/MIN/1.73M2
GLUCOSE BLD-MCNC: 150 MG/DL (ref 70–99)
GLUCOSE BLD-MCNC: 174 MG/DL (ref 70–99)
GLUCOSE BLD-MCNC: 208 MG/DL (ref 70–99)
GLUCOSE BLDC GLUCOMTR-MCNC: 134 MG/DL (ref 70–99)
GLUCOSE BLDC GLUCOMTR-MCNC: 146 MG/DL (ref 70–99)
GLUCOSE BLDC GLUCOMTR-MCNC: 147 MG/DL (ref 70–99)
GLUCOSE BLDC GLUCOMTR-MCNC: 166 MG/DL (ref 70–99)
GLUCOSE BLDC GLUCOMTR-MCNC: 166 MG/DL (ref 70–99)
GLUCOSE BLDC GLUCOMTR-MCNC: 185 MG/DL (ref 70–99)
GLUCOSE BLDC GLUCOMTR-MCNC: 193 MG/DL (ref 70–99)
GLUCOSE BLDC GLUCOMTR-MCNC: 195 MG/DL (ref 70–99)
GLUCOSE BLDC GLUCOMTR-MCNC: 219 MG/DL (ref 70–99)
HCO3 BLD-SCNC: 24 MMOL/L (ref 21–28)
HCT VFR BLD AUTO: 22.7 % (ref 40–53)
HCT VFR BLD AUTO: 28.6 % (ref 40–53)
HGB BLD-MCNC: 10 G/DL (ref 13.3–17.7)
HGB BLD-MCNC: 8.1 G/DL (ref 13.3–17.7)
HIV 1+2 AB+HIV1P24 AG SERPLBLD IA.RAPID: NON REACTIVE
HIV 1+2 AB+HIV1P24 AG SERPLBLD IA.RAPID: NON REACTIVE
HIV INTERPRETATION: NORMAL
INR PPP: 1.24 (ref 0.85–1.15)
INR PPP: 1.36 (ref 0.85–1.15)
ISSUE DATE AND TIME: NORMAL
ISSUE DATE AND TIME: NORMAL
LACTATE SERPL-SCNC: 3.2 MMOL/L (ref 0.7–2)
MAGNESIUM SERPL-MCNC: 2.1 MG/DL (ref 1.6–2.3)
MCH RBC QN AUTO: 31.9 PG (ref 26.5–33)
MCH RBC QN AUTO: 32.7 PG (ref 26.5–33)
MCHC RBC AUTO-ENTMCNC: 35 G/DL (ref 31.5–36.5)
MCHC RBC AUTO-ENTMCNC: 35.7 G/DL (ref 31.5–36.5)
MCV RBC AUTO: 91 FL (ref 78–100)
MCV RBC AUTO: 92 FL (ref 78–100)
MRSA DNA SPEC QL NAA+PROBE: NEGATIVE
O2/TOTAL GAS SETTING VFR VENT: 60 %
OXYHGB MFR BLD: 98 % (ref 92–100)
PCO2 BLD: 41 MM HG (ref 35–45)
PH BLD: 7.38 [PH] (ref 7.35–7.45)
PHOSPHATE SERPL-MCNC: 3.8 MG/DL (ref 2.5–4.5)
PLATELET # BLD AUTO: 140 10E3/UL (ref 150–450)
PLATELET # BLD AUTO: 166 10E3/UL (ref 150–450)
PO2 BLD: 154 MM HG (ref 80–105)
POTASSIUM BLD-SCNC: 4.2 MMOL/L (ref 3.4–5.3)
POTASSIUM BLD-SCNC: 4.3 MMOL/L (ref 3.4–5.3)
POTASSIUM BLD-SCNC: 4.8 MMOL/L (ref 3.4–5.3)
POTASSIUM BLD-SCNC: 4.8 MMOL/L (ref 3.4–5.3)
PROT SERPL-MCNC: 4.8 G/DL (ref 6.8–8.8)
RBC # BLD AUTO: 2.48 10E6/UL (ref 4.4–5.9)
RBC # BLD AUTO: 3.13 10E6/UL (ref 4.4–5.9)
SA TARGET DNA: NEGATIVE
SODIUM SERPL-SCNC: 140 MMOL/L (ref 133–144)
SODIUM SERPL-SCNC: 141 MMOL/L (ref 133–144)
SPECIMEN EXPIRATION DATE: NORMAL
UNIT ABO/RH: NORMAL
UNIT ABO/RH: NORMAL
UNIT NUMBER: NORMAL
UNIT NUMBER: NORMAL
UNIT STATUS: NORMAL
UNIT STATUS: NORMAL
UNIT TYPE ISBT: 600
UNIT TYPE ISBT: 600
WBC # BLD AUTO: 12.2 10E3/UL (ref 4–11)
WBC # BLD AUTO: 14.3 10E3/UL (ref 4–11)

## 2022-04-08 PROCEDURE — 87641 MR-STAPH DNA AMP PROBE: CPT | Performed by: SURGERY

## 2022-04-08 PROCEDURE — 06BQ4ZZ EXCISION OF LEFT SAPHENOUS VEIN, PERCUTANEOUS ENDOSCOPIC APPROACH: ICD-10-PCS | Performed by: SURGERY

## 2022-04-08 PROCEDURE — 85610 PROTHROMBIN TIME: CPT | Performed by: SURGERY

## 2022-04-08 PROCEDURE — 258N000001 HC RX 258: Performed by: SURGERY

## 2022-04-08 PROCEDURE — P9041 ALBUMIN (HUMAN),5%, 50ML: HCPCS

## 2022-04-08 PROCEDURE — 250N000009 HC RX 250: Performed by: ANESTHESIOLOGY

## 2022-04-08 PROCEDURE — 84132 ASSAY OF SERUM POTASSIUM: CPT | Performed by: ANESTHESIOLOGY

## 2022-04-08 PROCEDURE — 33533 CABG ARTERIAL SINGLE: CPT | Mod: GC | Performed by: SURGERY

## 2022-04-08 PROCEDURE — 999N000009 HC STATISTIC AIRWAY CARE

## 2022-04-08 PROCEDURE — 86901 BLOOD TYPING SEROLOGIC RH(D): CPT | Performed by: ANESTHESIOLOGY

## 2022-04-08 PROCEDURE — 83605 ASSAY OF LACTIC ACID: CPT | Performed by: SURGERY

## 2022-04-08 PROCEDURE — 02100Z9 BYPASS CORONARY ARTERY, ONE ARTERY FROM LEFT INTERNAL MAMMARY, OPEN APPROACH: ICD-10-PCS | Performed by: SURGERY

## 2022-04-08 PROCEDURE — 86923 COMPATIBILITY TEST ELECTRIC: CPT | Performed by: SURGERY

## 2022-04-08 PROCEDURE — 258N000003 HC RX IP 258 OP 636: Performed by: SURGERY

## 2022-04-08 PROCEDURE — 06BP4ZZ EXCISION OF RIGHT SAPHENOUS VEIN, PERCUTANEOUS ENDOSCOPIC APPROACH: ICD-10-PCS | Performed by: SURGERY

## 2022-04-08 PROCEDURE — 250N000009 HC RX 250: Performed by: SURGERY

## 2022-04-08 PROCEDURE — 250N000012 HC RX MED GY IP 250 OP 636 PS 637: Performed by: SURGERY

## 2022-04-08 PROCEDURE — 99291 CRITICAL CARE FIRST HOUR: CPT | Mod: 24 | Performed by: ANESTHESIOLOGY

## 2022-04-08 PROCEDURE — 250N000011 HC RX IP 250 OP 636: Performed by: NURSE ANESTHETIST, CERTIFIED REGISTERED

## 2022-04-08 PROCEDURE — 360N000079 HC SURGERY LEVEL 6, PER MIN: Performed by: SURGERY

## 2022-04-08 PROCEDURE — 250N000011 HC RX IP 250 OP 636

## 2022-04-08 PROCEDURE — 94002 VENT MGMT INPAT INIT DAY: CPT

## 2022-04-08 PROCEDURE — 84100 ASSAY OF PHOSPHORUS: CPT | Performed by: SURGERY

## 2022-04-08 PROCEDURE — 82330 ASSAY OF CALCIUM: CPT | Performed by: SURGERY

## 2022-04-08 PROCEDURE — 999N000065 XR CHEST PORT 1 VIEW

## 2022-04-08 PROCEDURE — 410N000006: Performed by: SURGERY

## 2022-04-08 PROCEDURE — 83735 ASSAY OF MAGNESIUM: CPT | Performed by: SURGERY

## 2022-04-08 PROCEDURE — 250N000011 HC RX IP 250 OP 636: Performed by: ANESTHESIOLOGY

## 2022-04-08 PROCEDURE — 250N000011 HC RX IP 250 OP 636: Performed by: SURGERY

## 2022-04-08 PROCEDURE — 999N000065 XR ABDOMEN PORT 1 VIEWS

## 2022-04-08 PROCEDURE — 250N000013 HC RX MED GY IP 250 OP 250 PS 637: Performed by: SURGERY

## 2022-04-08 PROCEDURE — 250N000012 HC RX MED GY IP 250 OP 636 PS 637: Performed by: NURSE ANESTHETIST, CERTIFIED REGISTERED

## 2022-04-08 PROCEDURE — 85730 THROMBOPLASTIN TIME PARTIAL: CPT | Performed by: SURGERY

## 2022-04-08 PROCEDURE — 272N000001 HC OR GENERAL SUPPLY STERILE: Performed by: SURGERY

## 2022-04-08 PROCEDURE — 84132 ASSAY OF SERUM POTASSIUM: CPT | Performed by: SURGERY

## 2022-04-08 PROCEDURE — 250N000009 HC RX 250: Performed by: NURSE ANESTHETIST, CERTIFIED REGISTERED

## 2022-04-08 PROCEDURE — P9016 RBC LEUKOCYTES REDUCED: HCPCS | Performed by: SURGERY

## 2022-04-08 PROCEDURE — 85384 FIBRINOGEN ACTIVITY: CPT | Performed by: SURGERY

## 2022-04-08 PROCEDURE — 5A1221Z PERFORMANCE OF CARDIAC OUTPUT, CONTINUOUS: ICD-10-PCS | Performed by: SURGERY

## 2022-04-08 PROCEDURE — 021209W BYPASS CORONARY ARTERY, THREE ARTERIES FROM AORTA WITH AUTOLOGOUS VENOUS TISSUE, OPEN APPROACH: ICD-10-PCS | Performed by: SURGERY

## 2022-04-08 PROCEDURE — 33519 CABG ARTERY-VEIN THREE: CPT | Mod: GC | Performed by: SURGERY

## 2022-04-08 PROCEDURE — 82947 ASSAY GLUCOSE BLOOD QUANT: CPT | Performed by: ANESTHESIOLOGY

## 2022-04-08 PROCEDURE — 258N000003 HC RX IP 258 OP 636: Performed by: ANESTHESIOLOGY

## 2022-04-08 PROCEDURE — 258N000003 HC RX IP 258 OP 636: Performed by: NURSE ANESTHETIST, CERTIFIED REGISTERED

## 2022-04-08 PROCEDURE — 85027 COMPLETE CBC AUTOMATED: CPT | Performed by: SURGERY

## 2022-04-08 PROCEDURE — C1898 LEAD, PMKR, OTHER THAN TRANS: HCPCS | Performed by: SURGERY

## 2022-04-08 PROCEDURE — 200N000001 HC R&B ICU

## 2022-04-08 PROCEDURE — 999N000141 HC STATISTIC PRE-PROCEDURE NURSING ASSESSMENT: Performed by: SURGERY

## 2022-04-08 PROCEDURE — 250N000024 HC ISOFLURANE, PER MIN: Performed by: SURGERY

## 2022-04-08 PROCEDURE — 93010 ELECTROCARDIOGRAM REPORT: CPT | Performed by: INTERNAL MEDICINE

## 2022-04-08 PROCEDURE — 33508 ENDOSCOPIC VEIN HARVEST: CPT | Mod: XU | Performed by: SURGERY

## 2022-04-08 PROCEDURE — 370N000017 HC ANESTHESIA TECHNICAL FEE, PER MIN: Performed by: SURGERY

## 2022-04-08 PROCEDURE — 999N000157 HC STATISTIC RCP TIME EA 10 MIN

## 2022-04-08 PROCEDURE — 93005 ELECTROCARDIOGRAM TRACING: CPT

## 2022-04-08 PROCEDURE — 410N000005 HC PER-PERFUSION, SH ONLY, 1ST 30 MIN: Performed by: SURGERY

## 2022-04-08 PROCEDURE — 82805 BLOOD GASES W/O2 SATURATION: CPT | Performed by: SURGERY

## 2022-04-08 PROCEDURE — 82947 ASSAY GLUCOSE BLOOD QUANT: CPT | Performed by: SURGERY

## 2022-04-08 RX ORDER — FENTANYL CITRATE 0.05 MG/ML
100 INJECTION, SOLUTION INTRAMUSCULAR; INTRAVENOUS ONCE
Status: COMPLETED | OUTPATIENT
Start: 2022-04-08 | End: 2022-04-08

## 2022-04-08 RX ORDER — EPINEPHRINE IN 0.9 % SOD CHLOR 5 MG/250ML
.01-.1 PLASTIC BAG, INJECTION (ML) INTRAVENOUS CONTINUOUS
Status: DISCONTINUED | OUTPATIENT
Start: 2022-04-08 | End: 2022-04-09

## 2022-04-08 RX ORDER — HEPARIN SODIUM 1000 [USP'U]/ML
INJECTION, SOLUTION INTRAVENOUS; SUBCUTANEOUS PRN
Status: DISCONTINUED | OUTPATIENT
Start: 2022-04-08 | End: 2022-04-08

## 2022-04-08 RX ORDER — ACETAMINOPHEN 325 MG/1
650 TABLET ORAL EVERY 4 HOURS PRN
Status: DISCONTINUED | OUTPATIENT
Start: 2022-04-11 | End: 2022-04-09

## 2022-04-08 RX ORDER — LEVOTHYROXINE SODIUM 125 UG/1
125 TABLET ORAL EVERY EVENING
Status: DISCONTINUED | OUTPATIENT
Start: 2022-04-08 | End: 2022-04-13 | Stop reason: HOSPADM

## 2022-04-08 RX ORDER — FENTANYL CITRATE 50 UG/ML
25-50 INJECTION, SOLUTION INTRAMUSCULAR; INTRAVENOUS
Status: DISCONTINUED | OUTPATIENT
Start: 2022-04-08 | End: 2022-04-10

## 2022-04-08 RX ORDER — CALCIUM GLUCONATE 20 MG/ML
2 INJECTION, SOLUTION INTRAVENOUS
Status: ACTIVE | OUTPATIENT
Start: 2022-04-08 | End: 2022-04-11

## 2022-04-08 RX ORDER — NICOTINE POLACRILEX 4 MG
15-30 LOZENGE BUCCAL
Status: DISCONTINUED | OUTPATIENT
Start: 2022-04-08 | End: 2022-04-13 | Stop reason: HOSPADM

## 2022-04-08 RX ORDER — OXYCODONE HYDROCHLORIDE 5 MG/1
10 TABLET ORAL EVERY 4 HOURS PRN
Status: DISCONTINUED | OUTPATIENT
Start: 2022-04-08 | End: 2022-04-09

## 2022-04-08 RX ORDER — CLINDAMYCIN PHOSPHATE 900 MG/50ML
900 INJECTION, SOLUTION INTRAVENOUS EVERY 8 HOURS
Status: COMPLETED | OUTPATIENT
Start: 2022-04-08 | End: 2022-04-09

## 2022-04-08 RX ORDER — PROPOFOL 10 MG/ML
INJECTION, EMULSION INTRAVENOUS PRN
Status: DISCONTINUED | OUTPATIENT
Start: 2022-04-08 | End: 2022-04-08

## 2022-04-08 RX ORDER — FAMOTIDINE 20 MG/1
20 TABLET, FILM COATED ORAL
Status: COMPLETED | OUTPATIENT
Start: 2022-04-08 | End: 2022-04-08

## 2022-04-08 RX ORDER — CLINDAMYCIN PHOSPHATE 900 MG/50ML
900 INJECTION, SOLUTION INTRAVENOUS
Status: COMPLETED | OUTPATIENT
Start: 2022-04-08 | End: 2022-04-08

## 2022-04-08 RX ORDER — ASPIRIN 81 MG/1
162 TABLET, CHEWABLE ORAL DAILY
Status: DISCONTINUED | OUTPATIENT
Start: 2022-04-09 | End: 2022-04-12

## 2022-04-08 RX ORDER — NALOXONE HYDROCHLORIDE 0.4 MG/ML
0.4 INJECTION, SOLUTION INTRAMUSCULAR; INTRAVENOUS; SUBCUTANEOUS
Status: DISCONTINUED | OUTPATIENT
Start: 2022-04-08 | End: 2022-04-13 | Stop reason: HOSPADM

## 2022-04-08 RX ORDER — GLYCOPYRROLATE 0.2 MG/ML
INJECTION, SOLUTION INTRAMUSCULAR; INTRAVENOUS PRN
Status: DISCONTINUED | OUTPATIENT
Start: 2022-04-08 | End: 2022-04-08

## 2022-04-08 RX ORDER — ASPIRIN 81 MG/1
81 TABLET, CHEWABLE ORAL
Status: COMPLETED | OUTPATIENT
Start: 2022-04-08 | End: 2022-04-08

## 2022-04-08 RX ORDER — VECURONIUM BROMIDE 1 MG/ML
INJECTION, POWDER, LYOPHILIZED, FOR SOLUTION INTRAVENOUS PRN
Status: DISCONTINUED | OUTPATIENT
Start: 2022-04-08 | End: 2022-04-08

## 2022-04-08 RX ORDER — POLYETHYLENE GLYCOL 3350 17 G/17G
17 POWDER, FOR SOLUTION ORAL DAILY
Status: DISCONTINUED | OUTPATIENT
Start: 2022-04-09 | End: 2022-04-13 | Stop reason: HOSPADM

## 2022-04-08 RX ORDER — PROPOFOL 10 MG/ML
5-75 INJECTION, EMULSION INTRAVENOUS CONTINUOUS
Status: DISCONTINUED | OUTPATIENT
Start: 2022-04-08 | End: 2022-04-09

## 2022-04-08 RX ORDER — IPRATROPIUM BROMIDE AND ALBUTEROL SULFATE 2.5; .5 MG/3ML; MG/3ML
3 SOLUTION RESPIRATORY (INHALATION) EVERY 4 HOURS PRN
Status: DISCONTINUED | OUTPATIENT
Start: 2022-04-08 | End: 2022-04-13 | Stop reason: HOSPADM

## 2022-04-08 RX ORDER — EPHEDRINE SULFATE 50 MG/ML
INJECTION, SOLUTION INTRAMUSCULAR; INTRAVENOUS; SUBCUTANEOUS PRN
Status: DISCONTINUED | OUTPATIENT
Start: 2022-04-08 | End: 2022-04-08

## 2022-04-08 RX ORDER — DEXTROSE MONOHYDRATE, SODIUM CHLORIDE, AND POTASSIUM CHLORIDE 50; 1.49; 4.5 G/1000ML; G/1000ML; G/1000ML
INJECTION, SOLUTION INTRAVENOUS CONTINUOUS
Status: DISCONTINUED | OUTPATIENT
Start: 2022-04-08 | End: 2022-04-11

## 2022-04-08 RX ORDER — HEPARIN SODIUM 5000 [USP'U]/.5ML
5000 INJECTION, SOLUTION INTRAVENOUS; SUBCUTANEOUS EVERY 8 HOURS
Status: DISCONTINUED | OUTPATIENT
Start: 2022-04-09 | End: 2022-04-13 | Stop reason: HOSPADM

## 2022-04-08 RX ORDER — NALOXONE HYDROCHLORIDE 0.4 MG/ML
0.2 INJECTION, SOLUTION INTRAMUSCULAR; INTRAVENOUS; SUBCUTANEOUS
Status: DISCONTINUED | OUTPATIENT
Start: 2022-04-08 | End: 2022-04-13 | Stop reason: HOSPADM

## 2022-04-08 RX ORDER — NITROGLYCERIN 10 MG/100ML
INJECTION INTRAVENOUS PRN
Status: DISCONTINUED | OUTPATIENT
Start: 2022-04-08 | End: 2022-04-08

## 2022-04-08 RX ORDER — ASPIRIN 81 MG/1
162 TABLET, CHEWABLE ORAL
Status: COMPLETED | OUTPATIENT
Start: 2022-04-08 | End: 2022-04-08

## 2022-04-08 RX ORDER — ALBUMIN, HUMAN INJ 5% 5 %
SOLUTION INTRAVENOUS
Status: COMPLETED
Start: 2022-04-08 | End: 2022-04-08

## 2022-04-08 RX ORDER — LIDOCAINE HYDROCHLORIDE 20 MG/ML
INJECTION, SOLUTION INFILTRATION; PERINEURAL PRN
Status: DISCONTINUED | OUTPATIENT
Start: 2022-04-08 | End: 2022-04-08

## 2022-04-08 RX ORDER — DEXMEDETOMIDINE HYDROCHLORIDE 4 UG/ML
.2-.7 INJECTION, SOLUTION INTRAVENOUS CONTINUOUS
Status: DISCONTINUED | OUTPATIENT
Start: 2022-04-08 | End: 2022-04-09

## 2022-04-08 RX ORDER — SODIUM CHLORIDE, SODIUM LACTATE, POTASSIUM CHLORIDE, CALCIUM CHLORIDE 600; 310; 30; 20 MG/100ML; MG/100ML; MG/100ML; MG/100ML
INJECTION, SOLUTION INTRAVENOUS CONTINUOUS PRN
Status: DISCONTINUED | OUTPATIENT
Start: 2022-04-08 | End: 2022-04-08

## 2022-04-08 RX ORDER — CALCIUM GLUCONATE 20 MG/ML
1 INJECTION, SOLUTION INTRAVENOUS
Status: ACTIVE | OUTPATIENT
Start: 2022-04-08 | End: 2022-04-11

## 2022-04-08 RX ORDER — DEXTROSE MONOHYDRATE 25 G/50ML
25-50 INJECTION, SOLUTION INTRAVENOUS
Status: DISCONTINUED | OUTPATIENT
Start: 2022-04-08 | End: 2022-04-13 | Stop reason: HOSPADM

## 2022-04-08 RX ORDER — PROPOFOL 10 MG/ML
INJECTION, EMULSION INTRAVENOUS CONTINUOUS PRN
Status: DISCONTINUED | OUTPATIENT
Start: 2022-04-08 | End: 2022-04-08

## 2022-04-08 RX ORDER — CHLORHEXIDINE GLUCONATE ORAL RINSE 1.2 MG/ML
15 SOLUTION DENTAL EVERY 12 HOURS
Status: CANCELLED | OUTPATIENT
Start: 2022-04-08

## 2022-04-08 RX ORDER — CLINDAMYCIN PHOSPHATE 900 MG/50ML
900 INJECTION, SOLUTION INTRAVENOUS SEE ADMIN INSTRUCTIONS
Status: DISCONTINUED | OUTPATIENT
Start: 2022-04-08 | End: 2022-04-08 | Stop reason: HOSPADM

## 2022-04-08 RX ORDER — HYDROMORPHONE HCL IN WATER/PF 6 MG/30 ML
0.4 PATIENT CONTROLLED ANALGESIA SYRINGE INTRAVENOUS
Status: DISCONTINUED | OUTPATIENT
Start: 2022-04-08 | End: 2022-04-09

## 2022-04-08 RX ORDER — ACETAMINOPHEN 325 MG/1
975 TABLET ORAL EVERY 8 HOURS
Status: DISCONTINUED | OUTPATIENT
Start: 2022-04-08 | End: 2022-04-09

## 2022-04-08 RX ORDER — PANTOPRAZOLE SODIUM 40 MG/1
40 TABLET, DELAYED RELEASE ORAL DAILY
Status: DISCONTINUED | OUTPATIENT
Start: 2022-04-08 | End: 2022-04-13 | Stop reason: HOSPADM

## 2022-04-08 RX ORDER — ONDANSETRON 2 MG/ML
4 INJECTION INTRAMUSCULAR; INTRAVENOUS EVERY 6 HOURS PRN
Status: DISCONTINUED | OUTPATIENT
Start: 2022-04-08 | End: 2022-04-13 | Stop reason: HOSPADM

## 2022-04-08 RX ORDER — SODIUM CHLORIDE, SODIUM LACTATE, POTASSIUM CHLORIDE, CALCIUM CHLORIDE 600; 310; 30; 20 MG/100ML; MG/100ML; MG/100ML; MG/100ML
INJECTION, SOLUTION INTRAVENOUS CONTINUOUS
Status: DISCONTINUED | OUTPATIENT
Start: 2022-04-08 | End: 2022-04-08 | Stop reason: HOSPADM

## 2022-04-08 RX ORDER — ONDANSETRON 4 MG/1
4 TABLET, ORALLY DISINTEGRATING ORAL EVERY 6 HOURS PRN
Status: DISCONTINUED | OUTPATIENT
Start: 2022-04-08 | End: 2022-04-13 | Stop reason: HOSPADM

## 2022-04-08 RX ORDER — PROTAMINE SULFATE 10 MG/ML
INJECTION, SOLUTION INTRAVENOUS PRN
Status: DISCONTINUED | OUTPATIENT
Start: 2022-04-08 | End: 2022-04-08

## 2022-04-08 RX ORDER — LOVASTATIN 10 MG
10 TABLET ORAL
Status: DISCONTINUED | OUTPATIENT
Start: 2022-04-08 | End: 2022-04-13 | Stop reason: HOSPADM

## 2022-04-08 RX ORDER — NITROGLYCERIN 20 MG/100ML
INJECTION INTRAVENOUS CONTINUOUS PRN
Status: DISCONTINUED | OUTPATIENT
Start: 2022-04-08 | End: 2022-04-08

## 2022-04-08 RX ORDER — MUPIROCIN 20 MG/G
0.5 OINTMENT TOPICAL 2 TIMES DAILY
Status: DISCONTINUED | OUTPATIENT
Start: 2022-04-08 | End: 2022-04-08 | Stop reason: CLARIF

## 2022-04-08 RX ORDER — BUPROPION HYDROCHLORIDE 150 MG/1
300 TABLET ORAL EVERY MORNING
Status: DISCONTINUED | OUTPATIENT
Start: 2022-04-09 | End: 2022-04-13 | Stop reason: HOSPADM

## 2022-04-08 RX ORDER — AMOXICILLIN 250 MG
1 CAPSULE ORAL 2 TIMES DAILY
Status: DISCONTINUED | OUTPATIENT
Start: 2022-04-08 | End: 2022-04-13 | Stop reason: HOSPADM

## 2022-04-08 RX ORDER — METHOCARBAMOL 500 MG/1
500 TABLET, FILM COATED ORAL EVERY 6 HOURS PRN
Status: DISCONTINUED | OUTPATIENT
Start: 2022-04-08 | End: 2022-04-09

## 2022-04-08 RX ORDER — LIDOCAINE 40 MG/G
CREAM TOPICAL
Status: DISCONTINUED | OUTPATIENT
Start: 2022-04-08 | End: 2022-04-13 | Stop reason: HOSPADM

## 2022-04-08 RX ORDER — HYDROMORPHONE HCL IN WATER/PF 6 MG/30 ML
0.2 PATIENT CONTROLLED ANALGESIA SYRINGE INTRAVENOUS
Status: DISCONTINUED | OUTPATIENT
Start: 2022-04-08 | End: 2022-04-09

## 2022-04-08 RX ORDER — OXYCODONE HYDROCHLORIDE 5 MG/1
5 TABLET ORAL EVERY 4 HOURS PRN
Status: DISCONTINUED | OUTPATIENT
Start: 2022-04-08 | End: 2022-04-09

## 2022-04-08 RX ORDER — FENTANYL CITRATE 50 UG/ML
INJECTION, SOLUTION INTRAMUSCULAR; INTRAVENOUS PRN
Status: DISCONTINUED | OUTPATIENT
Start: 2022-04-08 | End: 2022-04-08

## 2022-04-08 RX ORDER — SODIUM CHLORIDE 9 MG/ML
INJECTION, SOLUTION INTRAVENOUS CONTINUOUS PRN
Status: DISCONTINUED | OUTPATIENT
Start: 2022-04-08 | End: 2022-04-08

## 2022-04-08 RX ORDER — ALBUMIN, HUMAN INJ 5% 5 %
500 SOLUTION INTRAVENOUS
Status: COMPLETED | OUTPATIENT
Start: 2022-04-08 | End: 2022-04-08

## 2022-04-08 RX ORDER — BISACODYL 10 MG
10 SUPPOSITORY, RECTAL RECTAL DAILY PRN
Status: DISCONTINUED | OUTPATIENT
Start: 2022-04-08 | End: 2022-04-13 | Stop reason: HOSPADM

## 2022-04-08 RX ORDER — PHENYLEPHRINE HCL IN 0.9% NACL 50MG/250ML
.1-4 PLASTIC BAG, INJECTION (ML) INTRAVENOUS CONTINUOUS PRN
Status: DISCONTINUED | OUTPATIENT
Start: 2022-04-08 | End: 2022-04-10

## 2022-04-08 RX ORDER — CHLORHEXIDINE GLUCONATE ORAL RINSE 1.2 MG/ML
15 SOLUTION DENTAL EVERY 12 HOURS
Status: DISCONTINUED | OUTPATIENT
Start: 2022-04-08 | End: 2022-04-09 | Stop reason: CLARIF

## 2022-04-08 RX ORDER — NITROGLYCERIN 20 MG/100ML
10-200 INJECTION INTRAVENOUS CONTINUOUS
Status: DISCONTINUED | OUTPATIENT
Start: 2022-04-08 | End: 2022-04-09

## 2022-04-08 RX ORDER — CHLORHEXIDINE GLUCONATE ORAL RINSE 1.2 MG/ML
10 SOLUTION DENTAL ONCE
Status: COMPLETED | OUTPATIENT
Start: 2022-04-08 | End: 2022-04-08

## 2022-04-08 RX ORDER — LIDOCAINE 40 MG/G
CREAM TOPICAL
Status: DISCONTINUED | OUTPATIENT
Start: 2022-04-08 | End: 2022-04-08 | Stop reason: HOSPADM

## 2022-04-08 RX ORDER — GABAPENTIN 100 MG/1
100 CAPSULE ORAL AT BEDTIME
Status: DISCONTINUED | OUTPATIENT
Start: 2022-04-08 | End: 2022-04-09

## 2022-04-08 RX ADMIN — GABAPENTIN 100 MG: 100 CAPSULE ORAL at 22:47

## 2022-04-08 RX ADMIN — Medication 40 MG: at 17:56

## 2022-04-08 RX ADMIN — Medication 5 MG: at 11:12

## 2022-04-08 RX ADMIN — PHENYLEPHRINE HYDROCHLORIDE 100 MCG: 10 INJECTION INTRAVENOUS at 16:14

## 2022-04-08 RX ADMIN — FENTANYL CITRATE 50 MCG: 50 INJECTION, SOLUTION INTRAMUSCULAR; INTRAVENOUS at 09:41

## 2022-04-08 RX ADMIN — SODIUM CHLORIDE 1.5 UNITS/HR: 9 INJECTION, SOLUTION INTRAVENOUS at 17:55

## 2022-04-08 RX ADMIN — PROPOFOL 150 MG: 10 INJECTION, EMULSION INTRAVENOUS at 07:54

## 2022-04-08 RX ADMIN — VECURONIUM BROMIDE 2 MG: 1 INJECTION, POWDER, LYOPHILIZED, FOR SOLUTION INTRAVENOUS at 12:33

## 2022-04-08 RX ADMIN — PHENYLEPHRINE HYDROCHLORIDE 50 MCG: 10 INJECTION INTRAVENOUS at 11:34

## 2022-04-08 RX ADMIN — AMINOCAPROIC ACID 5 G/HR: 250 INJECTION, SOLUTION INTRAVENOUS at 07:50

## 2022-04-08 RX ADMIN — ALBUMIN (HUMAN) 500 ML: 12.5 INJECTION, SOLUTION INTRAVENOUS at 17:50

## 2022-04-08 RX ADMIN — SODIUM CHLORIDE, POTASSIUM CHLORIDE, SODIUM LACTATE AND CALCIUM CHLORIDE: 600; 310; 30; 20 INJECTION, SOLUTION INTRAVENOUS at 07:45

## 2022-04-08 RX ADMIN — FENTANYL CITRATE 50 MCG: 50 INJECTION, SOLUTION INTRAMUSCULAR; INTRAVENOUS at 07:57

## 2022-04-08 RX ADMIN — PHENYLEPHRINE HYDROCHLORIDE 50 MCG: 10 INJECTION INTRAVENOUS at 09:47

## 2022-04-08 RX ADMIN — SUGAMMADEX 200 MG: 100 INJECTION, SOLUTION INTRAVENOUS at 17:12

## 2022-04-08 RX ADMIN — NITROGLYCERIN 20 MCG: 10 INJECTION INTRAVENOUS at 11:21

## 2022-04-08 RX ADMIN — SENNOSIDES AND DOCUSATE SODIUM 1 TABLET: 50; 8.6 TABLET ORAL at 22:47

## 2022-04-08 RX ADMIN — PHENYLEPHRINE HYDROCHLORIDE 100 MCG: 10 INJECTION INTRAVENOUS at 13:10

## 2022-04-08 RX ADMIN — SODIUM CHLORIDE: 9 INJECTION, SOLUTION INTRAVENOUS at 10:31

## 2022-04-08 RX ADMIN — PROPOFOL 30 MCG/KG/MIN: 10 INJECTION, EMULSION INTRAVENOUS at 19:00

## 2022-04-08 RX ADMIN — EPINEPHRINE 0.03 MCG/KG/MIN: 1 INJECTION INTRAMUSCULAR; INTRAVENOUS; SUBCUTANEOUS at 15:05

## 2022-04-08 RX ADMIN — VECURONIUM BROMIDE 2 MG: 1 INJECTION, POWDER, LYOPHILIZED, FOR SOLUTION INTRAVENOUS at 11:47

## 2022-04-08 RX ADMIN — PHENYLEPHRINE HYDROCHLORIDE 100 MCG: 10 INJECTION INTRAVENOUS at 10:21

## 2022-04-08 RX ADMIN — Medication 5 MG: at 08:15

## 2022-04-08 RX ADMIN — PHENYLEPHRINE HYDROCHLORIDE 50 MCG: 10 INJECTION INTRAVENOUS at 11:39

## 2022-04-08 RX ADMIN — SODIUM CHLORIDE, POTASSIUM CHLORIDE, SODIUM LACTATE AND CALCIUM CHLORIDE: 600; 310; 30; 20 INJECTION, SOLUTION INTRAVENOUS at 07:16

## 2022-04-08 RX ADMIN — VECURONIUM BROMIDE 3 MG: 1 INJECTION, POWDER, LYOPHILIZED, FOR SOLUTION INTRAVENOUS at 13:25

## 2022-04-08 RX ADMIN — MIDAZOLAM HYDROCHLORIDE 3 MG: 1 INJECTION, SOLUTION INTRAMUSCULAR; INTRAVENOUS at 08:56

## 2022-04-08 RX ADMIN — MIDAZOLAM HYDROCHLORIDE 3 MG: 1 INJECTION, SOLUTION INTRAMUSCULAR; INTRAVENOUS at 07:08

## 2022-04-08 RX ADMIN — MIDAZOLAM HYDROCHLORIDE 3 MG: 1 INJECTION, SOLUTION INTRAMUSCULAR; INTRAVENOUS at 14:58

## 2022-04-08 RX ADMIN — HEPARIN SODIUM 42000 UNITS: 1000 INJECTION INTRAVENOUS; SUBCUTANEOUS at 11:10

## 2022-04-08 RX ADMIN — SODIUM CHLORIDE: 9 INJECTION, SOLUTION INTRAVENOUS at 14:06

## 2022-04-08 RX ADMIN — PHENYLEPHRINE HYDROCHLORIDE 50 MCG: 10 INJECTION INTRAVENOUS at 13:01

## 2022-04-08 RX ADMIN — CHLORHEXIDINE GLUCONATE 10 ML: 1.2 SOLUTION ORAL at 06:07

## 2022-04-08 RX ADMIN — FENTANYL CITRATE 100 MCG: 50 INJECTION, SOLUTION INTRAMUSCULAR; INTRAVENOUS at 09:39

## 2022-04-08 RX ADMIN — FENTANYL CITRATE 50 MCG: 50 INJECTION, SOLUTION INTRAMUSCULAR; INTRAVENOUS at 07:09

## 2022-04-08 RX ADMIN — VECURONIUM BROMIDE 1 MG: 1 INJECTION, POWDER, LYOPHILIZED, FOR SOLUTION INTRAVENOUS at 11:19

## 2022-04-08 RX ADMIN — PHENYLEPHRINE HYDROCHLORIDE 50 MCG: 10 INJECTION INTRAVENOUS at 08:03

## 2022-04-08 RX ADMIN — CHLORHEXIDINE GLUCONATE 15 ML: 1.2 SOLUTION ORAL at 21:04

## 2022-04-08 RX ADMIN — SODIUM CHLORIDE: 9 INJECTION, SOLUTION INTRAVENOUS at 07:45

## 2022-04-08 RX ADMIN — VECURONIUM BROMIDE 1 MG: 1 INJECTION, POWDER, LYOPHILIZED, FOR SOLUTION INTRAVENOUS at 08:59

## 2022-04-08 RX ADMIN — PROPOFOL 50 MCG/KG/MIN: 10 INJECTION, EMULSION INTRAVENOUS at 16:06

## 2022-04-08 RX ADMIN — ASPIRIN 81 MG 162 MG: 81 TABLET ORAL at 06:07

## 2022-04-08 RX ADMIN — DEXMEDETOMIDINE HYDROCHLORIDE 0.2 MCG/KG/HR: 400 INJECTION INTRAVENOUS at 19:49

## 2022-04-08 RX ADMIN — NITROGLYCERIN 10 MCG/KG/MIN: 20 INJECTION INTRAVENOUS at 11:20

## 2022-04-08 RX ADMIN — FENTANYL CITRATE 100 MCG: 50 INJECTION, SOLUTION INTRAMUSCULAR; INTRAVENOUS at 08:43

## 2022-04-08 RX ADMIN — PHENYLEPHRINE HYDROCHLORIDE 50 MCG: 10 INJECTION INTRAVENOUS at 11:45

## 2022-04-08 RX ADMIN — HYDROMORPHONE HYDROCHLORIDE 0.2 MG: 0.2 INJECTION, SOLUTION INTRAMUSCULAR; INTRAVENOUS; SUBCUTANEOUS at 21:59

## 2022-04-08 RX ADMIN — PHENYLEPHRINE HYDROCHLORIDE 0.5 MCG/KG/MIN: 10 INJECTION INTRAVENOUS at 10:33

## 2022-04-08 RX ADMIN — CLINDAMYCIN IN 5 PERCENT DEXTROSE 900 MG: 18 INJECTION, SOLUTION INTRAVENOUS at 21:04

## 2022-04-08 RX ADMIN — FENTANYL CITRATE 100 MCG: 50 INJECTION, SOLUTION INTRAMUSCULAR; INTRAVENOUS at 10:12

## 2022-04-08 RX ADMIN — VECURONIUM BROMIDE 2 MG: 1 INJECTION, POWDER, LYOPHILIZED, FOR SOLUTION INTRAVENOUS at 10:25

## 2022-04-08 RX ADMIN — CLINDAMYCIN PHOSPHATE 900 MG: 900 INJECTION, SOLUTION INTRAVENOUS at 07:18

## 2022-04-08 RX ADMIN — NITROGLYCERIN 10 MCG/MIN: 20 INJECTION INTRAVENOUS at 18:20

## 2022-04-08 RX ADMIN — ROCURONIUM BROMIDE 50 MG: 50 INJECTION, SOLUTION INTRAVENOUS at 07:55

## 2022-04-08 RX ADMIN — METHOCARBAMOL 500 MG: 500 TABLET, FILM COATED ORAL at 17:54

## 2022-04-08 RX ADMIN — PROPOFOL 50 MG: 10 INJECTION, EMULSION INTRAVENOUS at 07:59

## 2022-04-08 RX ADMIN — NITROGLYCERIN 20 MCG: 10 INJECTION INTRAVENOUS at 12:12

## 2022-04-08 RX ADMIN — GLYCOPYRROLATE 0.1 MG: 0.2 INJECTION, SOLUTION INTRAMUSCULAR; INTRAVENOUS at 10:37

## 2022-04-08 RX ADMIN — FENTANYL CITRATE 150 MCG: 50 INJECTION, SOLUTION INTRAMUSCULAR; INTRAVENOUS at 07:54

## 2022-04-08 RX ADMIN — FENTANYL CITRATE 100 MCG: 50 INJECTION, SOLUTION INTRAMUSCULAR; INTRAVENOUS at 08:50

## 2022-04-08 RX ADMIN — FENTANYL CITRATE 50 MCG: 50 INJECTION, SOLUTION INTRAMUSCULAR; INTRAVENOUS at 11:15

## 2022-04-08 RX ADMIN — PHENYLEPHRINE HYDROCHLORIDE 100 MCG: 10 INJECTION INTRAVENOUS at 12:09

## 2022-04-08 RX ADMIN — VECURONIUM BROMIDE 2 MG: 1 INJECTION, POWDER, LYOPHILIZED, FOR SOLUTION INTRAVENOUS at 13:27

## 2022-04-08 RX ADMIN — GLYCOPYRROLATE 0.1 MG: 0.2 INJECTION, SOLUTION INTRAMUSCULAR; INTRAVENOUS at 09:30

## 2022-04-08 RX ADMIN — PHENYLEPHRINE HYDROCHLORIDE 100 MCG: 10 INJECTION INTRAVENOUS at 11:10

## 2022-04-08 RX ADMIN — VECURONIUM BROMIDE 3 MG: 1 INJECTION, POWDER, LYOPHILIZED, FOR SOLUTION INTRAVENOUS at 08:23

## 2022-04-08 RX ADMIN — PHENYLEPHRINE HYDROCHLORIDE 50 MCG: 10 INJECTION INTRAVENOUS at 09:55

## 2022-04-08 RX ADMIN — PHENYLEPHRINE HYDROCHLORIDE 50 MCG: 10 INJECTION INTRAVENOUS at 12:38

## 2022-04-08 RX ADMIN — NITROGLYCERIN 20 MCG: 10 INJECTION INTRAVENOUS at 11:18

## 2022-04-08 RX ADMIN — FENTANYL CITRATE 100 MCG: 50 INJECTION, SOLUTION INTRAMUSCULAR; INTRAVENOUS at 11:13

## 2022-04-08 RX ADMIN — VECURONIUM BROMIDE 3 MG: 1 INJECTION, POWDER, LYOPHILIZED, FOR SOLUTION INTRAVENOUS at 15:32

## 2022-04-08 RX ADMIN — NITROGLYCERIN 20 MCG: 10 INJECTION INTRAVENOUS at 13:14

## 2022-04-08 RX ADMIN — PHENYLEPHRINE HYDROCHLORIDE 50 MCG: 10 INJECTION INTRAVENOUS at 08:16

## 2022-04-08 RX ADMIN — PROTAMINE SULFATE 250 MG: 10 INJECTION, SOLUTION INTRAVENOUS at 15:16

## 2022-04-08 RX ADMIN — GLYCOPYRROLATE 0.1 MG: 0.2 INJECTION, SOLUTION INTRAMUSCULAR; INTRAVENOUS at 08:04

## 2022-04-08 RX ADMIN — NITROGLYCERIN 20 MCG: 10 INJECTION INTRAVENOUS at 11:16

## 2022-04-08 RX ADMIN — NITROGLYCERIN 10 MCG: 10 INJECTION INTRAVENOUS at 15:30

## 2022-04-08 RX ADMIN — OXYCODONE HYDROCHLORIDE 5 MG: 5 TABLET ORAL at 17:54

## 2022-04-08 RX ADMIN — CLINDAMYCIN PHOSPHATE 900 MG: 900 INJECTION, SOLUTION INTRAVENOUS at 13:18

## 2022-04-08 RX ADMIN — METOPROLOL TARTRATE 12.5 MG: 25 TABLET, FILM COATED ORAL at 06:53

## 2022-04-08 RX ADMIN — HUMAN INSULIN 1 UNITS/HR: 100 INJECTION, SOLUTION SUBCUTANEOUS at 09:14

## 2022-04-08 RX ADMIN — VECURONIUM BROMIDE 2 MG: 1 INJECTION, POWDER, LYOPHILIZED, FOR SOLUTION INTRAVENOUS at 09:32

## 2022-04-08 RX ADMIN — FENTANYL CITRATE 100 MCG: 50 INJECTION, SOLUTION INTRAMUSCULAR; INTRAVENOUS at 08:58

## 2022-04-08 RX ADMIN — FENTANYL CITRATE 100 MCG: 50 INJECTION, SOLUTION INTRAMUSCULAR; INTRAVENOUS at 16:22

## 2022-04-08 RX ADMIN — PHENYLEPHRINE HYDROCHLORIDE 100 MCG: 10 INJECTION INTRAVENOUS at 10:27

## 2022-04-08 RX ADMIN — METHOCARBAMOL 500 MG: 500 TABLET, FILM COATED ORAL at 23:55

## 2022-04-08 RX ADMIN — MIDAZOLAM HYDROCHLORIDE 2 MG: 1 INJECTION, SOLUTION INTRAMUSCULAR; INTRAVENOUS at 11:17

## 2022-04-08 RX ADMIN — Medication 5 MG: at 08:03

## 2022-04-08 RX ADMIN — HYDROMORPHONE HYDROCHLORIDE 0.2 MG: 0.2 INJECTION, SOLUTION INTRAMUSCULAR; INTRAVENOUS; SUBCUTANEOUS at 22:19

## 2022-04-08 RX ADMIN — OXYCODONE HYDROCHLORIDE 10 MG: 5 TABLET ORAL at 22:46

## 2022-04-08 RX ADMIN — NITROGLYCERIN 20 MCG: 10 INJECTION INTRAVENOUS at 13:13

## 2022-04-08 RX ADMIN — LEVOTHYROXINE SODIUM 125 MCG: 125 TABLET ORAL at 22:47

## 2022-04-08 RX ADMIN — SODIUM CHLORIDE, POTASSIUM CHLORIDE, SODIUM LACTATE AND CALCIUM CHLORIDE 250 ML: 600; 310; 30; 20 INJECTION, SOLUTION INTRAVENOUS at 22:01

## 2022-04-08 RX ADMIN — PHENYLEPHRINE HYDROCHLORIDE 50 MCG: 10 INJECTION INTRAVENOUS at 12:30

## 2022-04-08 RX ADMIN — PHENYLEPHRINE HYDROCHLORIDE 100 MCG: 10 INJECTION INTRAVENOUS at 11:31

## 2022-04-08 RX ADMIN — ALBUMIN HUMAN 500 ML: 50 SOLUTION INTRAVENOUS at 17:50

## 2022-04-08 RX ADMIN — INSULIN HUMAN 1 UNITS: 100 INJECTION, SOLUTION PARENTERAL at 11:26

## 2022-04-08 RX ADMIN — ACETAMINOPHEN 975 MG: 325 TABLET ORAL at 17:54

## 2022-04-08 RX ADMIN — POTASSIUM CHLORIDE, DEXTROSE MONOHYDRATE AND SODIUM CHLORIDE: 150; 5; 450 INJECTION, SOLUTION INTRAVENOUS at 18:01

## 2022-04-08 RX ADMIN — NITROGLYCERIN 20 MCG: 10 INJECTION INTRAVENOUS at 12:10

## 2022-04-08 RX ADMIN — FAMOTIDINE 20 MG: 20 TABLET ORAL at 06:07

## 2022-04-08 RX ADMIN — INSULIN HUMAN 2 UNITS: 100 INJECTION, SOLUTION PARENTERAL at 13:52

## 2022-04-08 ASSESSMENT — ACTIVITIES OF DAILY LIVING (ADL)
ADLS_ACUITY_SCORE: 3
ADLS_ACUITY_SCORE: 12
ADLS_ACUITY_SCORE: 3

## 2022-04-08 NOTE — INTERVAL H&P NOTE
"I have reviewed the surgical (or preoperative) H&P that is linked to this encounter, and examined the patient. There are no significant changes    Clinical Conditions Present on Arrival:  Clinically Significant Risk Factors Present on Admission                  # Platelet Defect: home medication list includes an antiplatelet medication   # Obesity: Estimated body mass index is 32.71 kg/m  as calculated from the following:    Height as of this encounter: 1.778 m (5' 10\").    Weight as of this encounter: 103.4 kg (228 lb).       "

## 2022-04-08 NOTE — ANESTHESIA CARE TRANSFER NOTE
Patient: Joel Pike    Procedure: Procedure(s):  CORONARY ARTERY BYPASS GRAFT x 4 (LIMA - LAD; SV - OM; SV - PDA; SV - DIAGONAL) WITH ENDOSCOPIC SAPHENOUS VEIN HARVEST ON BILATERAL LOWER EXTREMITY, AND ON CARDIOPULMONARY PUMP OXYGENATOR  (INTRAOPERATIVE TRANSESOPHAGEAL ECHOCARDIOGRAM BY ANESTHESIOLOGIST)       Diagnosis: CAD (coronary artery disease) [I25.10]  Diagnosis Additional Information: No value filed.    Anesthesia Type:   General     Note:    Oropharynx: ventilatory support and endotracheal tube in place      Level of Supplemental Oxygen (L/min / FiO2): 15  Independent Airway: airway patency not satisfactory and stable  Dentition: dentition unchanged  Vital Signs Stable: post-procedure vital signs reviewed and stable  Report to RN Given: handoff report given  Patient transferred to: ICU  Comments: Patient connected to SpO2, EKG, and arterial blood pressure transport monitors and accompanied by CRNA, circulating RN, surgeon (fellow) to ICU room. Patient ventilated by CRNA with ambu via ETT with O2 at 15 liters per minute during transport.     On arrival to ICU, endotracheal tube position unchanged, equal, bilateral breath sounds auscultated in ICU room, patient placed on ICU ventilator by respiratory therapist, teeth and oral mucosa intact and unchanged at handoff of care. At anesthesia handoff of care, clinical monitors and alarms on and functioning, report on patient's clinical status given to ICU RN, report on patient's clinical status given to intensivist, ICU staff questions answered.  ICU Handoff: Call for PAUSE to initiate/utilize ICU HANDOFF, Identified Patient, Identified Responsible Provider, Reviewed the Pertinent Medical History, Discussed Surgical Course, Reviewed Intra-OP Anesthesia Management and Issues during Anesthesia, Set Expectations for Post Procedure Period and Allowed Opportunity for Questions and Acknowledgement of Understanding      Vitals:  Vitals Value Taken Time   /70  04/08/22 1720   Temp     Pulse 88 04/08/22 1730   Resp 14 04/08/22 1730   SpO2 100 % 04/08/22 1730   Vitals shown include unvalidated device data.    Electronically Signed By: Ema Barnhart  April 8, 2022  5:31 PM

## 2022-04-08 NOTE — OP NOTE
OPERATIVE DATE: 4/8/2022    PRE-OPERATIVE DIAGNOSIS:  1) Severe multi-vessel coronary artery disease  2) Chronic kidney disease  3) Prostate cancer  4) Hypertension  5) Hyperlipidemia  6) Hypothyroidism  7) Obstructive sleep apnea  8) Asthma  9) Osteoarthritis  10) Varicose veins  11) Diabetes    POST-OPERATIVE DIAGNOSIS:  1) Severe multi-vessel coronary artery disease  2) Chronic kidney disease  3) Prostate cancer  4) Hypertension  5) Hyperlipidemia  6) Hypothyroidism  7) Obstructive sleep apnea  8) Asthma  9) Osteoarthritis  10) Varicose veins  11) Diabetes      PROCEDURE:  1) Coronary artery bypass grafting x 4.   - Left internal mammary artery to left anterior descending artery   - Reversed saphenous vein graft to right posterior descending artery   - Reversed saphenous vein graft to obtuse marginal artery   - Reversed saphenous vein graft to diagonal artery   2) Endoscopic vein harvest left and right lower extremity  3) Transesophageal echocardiogram    SURGEON: Karson Bae MD    ASSISTANT: Valencia Mcgovern PAC; Fidelia Corley PAC  FELLOW: Leslie Walsh MD    ANESTHESIA: GETA    ESTIMATED BLOOD LOSS: 1000cc    OPERATIVE FINDINGS:  1) Ejection fraction: 50%  2) Left internal mammary artery 2.5mm and excellent flow.  3) Proximal saphenous vein was varicose and not able to use. The very distal portion was calcified and stenotic. The mid portion was suitable for two grafts. The right lower leg great saphenous vein was adequate quality for one graft. Overall graft quality was average but usable.  4) Left anterior descending artery 2mm and free of disease at anastomosis.  5) Right posterior descending artery 1.75mm and free of disease at anastomosis.  6) Obtuse marginal artery 2mm and free of disease at anastomosis.  7) Diagonal artery 2mm and free of disease at anastomosis.    CARDIOPULMONARY BYPASS TIME: 132 minutes    AORTIC CROSS CLAMP TIME: 104 minutes    INDICATIONS:  Mr. YANI ARIZMENDI is a 66 year old  male admitted with chest pain at rest found to have severe coronary artery disease. His stress ECHO was positive followed by angio showing multi vessel disease. We were asked to evaluate for surgical revascularization. Risks and benefits of the operation were explained to the patient and their family including, but not limited to, bleeding, infection, stroke and even death. They understood these risks and agreed to proceed electively.    OPERATIVE REPORT:  The patient was transferred to the operating room and positioned supine on the OR table. General anesthesia was initiated by the anesthesia team. Endotracheal intubation and central venous access was performed by anesthesia. The patient's neck, chest, abdomen and bilateral lower extremities were clipped, prepped and draped in sterile fashion. A pre-procedure time-out was performed confirming the correct patient, correct site and correct procedure.    Simultaneous median sternotomy and left and right lower extremity skin incisions were made. Endoscopic vein harvest of the left and right greater saphenous vein was performed. The vein was ligated at the proximal and distal ends, harvested from the leg, cannulated in reverse orientation, and flushed with heparinized saline. Branches of the vein were doubly clipped with metal clips. The vein was then stored in heparinized saline.    Median sternotomy was made with reciprocating saw. The bone was made hemostatic with cautery and bone wax. A mammary retractor was placed. The left pleural space was opened.  The left internal mammary artery was mobilized. Branches of the artery were clipped with metal clips and divided with cautery.      The patient was given 300 mg/kg IV heparin. The mammary pedicle was clamped with a tonsil clamp. The mammary artery was divided with metzenbaum scissors. There was excellent pulsatile flow from the mammary artery. This was controlled with a bulldog clamp. The distal aspect was tied with  0-silk tie and double clipped. The proximal end was spatulated in preparation for anastomosis and flushed with papavarine.      Pledgeted 3-0 ethibond pursestring was made in the ascending aorta. A EOPA arterial cannula was placed here and connected to the bypass circuit. A 2-0 ethibond pursestring was made in the right atrial appendage. A dual stage venous cannula was placed here and connected to the cardiopulmonary bypass circuit. A 4-0 prolene pursestring was made in the right atrium. A stab incision was made here. A retrograde cardioplegia catheter was placed and connected to the cardioplegia circuit.  Next the aorto-pulmonary window was developed.  A 4-0 prolene pursestring was made in the ascending aorta. A DLP root vent / antegrade cardioplegia catheter was placed here and connected to the cardioplegia circuit.    Cardiopulmonary bypass was initiated with good flows. Flow on the circuit was decreased. A large aortic cross clamp was applied. Flow on the circuit was resumed. 1000cc of antegrade cold blood cardioplegia was delivered with good diastolic arrest. An additional 300cc of retrograde cold blood cardioplegia was used to test the retrograde.    We focused our attention on the distal bypass anastomoses next.    The following bypasses were constructed using reversed saphenous vein in end-to-side fashion with running 7-0 prolene:  1) Reversed saphenous vein graft to posterior descending artery.  2) Reversed saphenous vein graft to obtuse marginal artery .  3) Reversed saphenous vein graft to diagonal artery.  The anastomoses were tested by flushing with cold blood cardioplegia to ensure hemostasis. An additional dose of retrograde cold blood cardioplegia was delivered between completion of each anastomosis.    We focused our attention on the proximal vein graft anastomoses next. The vein grafts were sized to fit to the ascending aorta. 3 additional aortotomies were made with 11-blade knife. The aortotomies  were extended with 4mm punch. The vein grafts were anastomosed to the ascending aorta in end-to-side fashion using running 6-0 prolene.    Next a rent was made in the left pericardium. The left internal mammary artery was then anastomosed to the left anterior descending artery in end-to-side fashion using running 7-0 prolene.  The mammary pedicle clamp was removed. The anastomosis was hemostatic. The clamp was replaced. The pedicle was tacked to the epicardium using 6-0 prolene.    A retrograde hot shot of warm blood cardiplegia was delivered. The clamp on the mammary pedicle was removed. A bulldog clamp was placed on the proximal vein grafts. After completion of the hot shot, flow on the bypass circuit was decreased and the aortic cross clamp was removed. Flow on the bypass circuit was resumed. The proximal vein grafts were de-aired using an insulin needle. The bulldog clamp on the vein grafts was removed. The retrograde cardioplegia catheter was removed. The pursestring was tied. The site was over-sewn with a 4-0 prolene.  The distal anastomoses were inspected and hemostatic. Ventricular and atrial pacing wires were placed and delivered through the anterior abdominal wall and secured to the skin with 0-ethibond stitches. The patient had sinus rhythm and was paced at 80 bpm.      The left pleural space was suctioned dry. The lungs were ventilated bilaterally. THOMAS showed no intra-cardiac air. The DLP root vent / antegrade cardioplegia catheter was removed. Intravenous calcium was administered.  Flow on the bypass circuit was weaned to off. The patient was stable on low dose epinephrine. The venous cannula was removed.  Pursestring at this site was tied. This was over-sewed with 4-0 prolene. The remaining pump blood volume was returned via the arterial cannula. A test dose of protamine was administered. The arterial cannula was removed. The pursestrings at this site were tied. This was oversewn with pledgeted 4-0  prolene.     The sternal retractor was removed. Two 32F straight argyle mediastinal chest tubes were placed and one 28F left pleural chest tube was placed. These were delivered through the anterior abdominal wall and secured to the skin with 0-ethibond stitches.      The wound was made hemostatic with cautery. The subcutaneous tissue, sternal edges, thymic fat, pericardial edges and all anastomotic and cannulation sites were inspected and hemostatic.    The wound was irrigated with warm antibiotic saline. The sternum was reapproximated with sternal wires.    The wound was made hemostatic then closed in layers using Stratafix sutures. The subcutaneous tissue was closed with 2-0 Stratafix stitches. The skin was closed with 4-0 Stratafix.  Dermabond skin glue was applied. A sterile dressing was applied.      The patient was then transferred from the operating bed to an ICU bed and transferred to the ICU in critical, but stable, condition.    All needle, sponge and instrument counts were correct at the end of the case.    Karson Bae MD  Cardiothoracic Surgery  Pager: [phone]

## 2022-04-08 NOTE — ANESTHESIA PROCEDURE NOTES
Arterial Line Procedure Note    Pre-Procedure   Staff -        Anesthesiologist:  Dean Thompson MD       Performed By: anesthesiologist       Location: pre-op       Pre-Anesthestic Checklist: patient identified, IV checked, risks and benefits discussed, informed consent, monitors and equipment checked and pre-op evaluation  Timeout:       Correct Patient: Yes        Correct Procedure: Yes        Correct Site: Yes        Correct Position: Yes   Line Placement:   This line was placed Pre Induction starting at 4/8/2022 6:40 AM and ending at 4/8/2022 6:50 AM  Procedure   Procedure: arterial line       Laterality: left       Insertion Site: radial.  Sterile Prep        All elements of maximal sterile barrier technique followed       Skin prep: Chloraprep  Insertion/Injection        Technique: Seldinger Technique and Marin's test completed        Catheter Type/Size: 20 gauge, 12 cm  Narrative         Secured by: other       Tegaderm dressing used.       Complications: None apparent,        Arterial waveform: Yes        IBP within 10% of NIBP: Yes
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
## 2022-04-08 NOTE — ANESTHESIA POSTPROCEDURE EVALUATION
Patient: Joel Pike    Procedure: Procedure(s):  CORONARY ARTERY BYPASS GRAFT x 4 (LIMA - LAD; SV - OM; SV - PDA; SV - DIAGONAL) WITH ENDOSCOPIC SAPHENOUS VEIN HARVEST ON BILATERAL LOWER EXTREMITY, AND ON CARDIOPULMONARY PUMP OXYGENATOR  (INTRAOPERATIVE TRANSESOPHAGEAL ECHOCARDIOGRAM BY ANESTHESIOLOGIST)       Anesthesia Type:  General    Note:  Disposition: ICU            ICU Sign Out: Anesthesiologist/ICU physician sign out WAS performed   Postop Pain Control: Uneventful            Sign Out: Well controlled pain   PONV: No   Neuro/Psych:             Sign Out: PLANNED postop sedation   Airway/Respiratory:             Sign Out: AIRWAY IN SITU/Resp. Support               Airway in situ/Resp. Support: ETT                 Reason: Planned Pre-op   CV/Hemodynamics: Uneventful            Sign Out: Acceptable CV status; No obvious hypovolemia; No obvious fluid overload   Other NRE: NONE   DID A NON-ROUTINE EVENT OCCUR? No           Last vitals:  Vitals Value Taken Time   /70 04/08/22 1720   Temp     Pulse 87 04/08/22 1728   Resp 0 04/08/22 1728   SpO2 100 % 04/08/22 1728   Vitals shown include unvalidated device data.    Electronically Signed By: Dean Thompson MD  April 8, 2022  5:29 PM

## 2022-04-08 NOTE — ANESTHESIA PROCEDURE NOTES
Airway       Patient location during procedure: OR ([facility] - Operating Room or Procedural Area)       Procedure Start/Stop Times: 4/8/2022 7:56 AM  Staff -        Anesthesiologist:  Dean Thompson MD       CRNA: Alexei Cerda APRN CRNA       Performed By: CRNAIndications and Patient Condition       Indications for airway management: cheyanne-procedural       Induction type:intravenous       Mask difficulty assessment: 2 - vent by mask + OA or adjuvant +/- NMBA    Final Airway Details       Final airway type: endotracheal airway       Successful airway: ETT - single  Endotracheal Airway Details        ETT size (mm): 8.0       Cuffed: yes       Cuff volume (mL): 10       Successful intubation technique: video laryngoscopy       VL Blade Size: Haro 4       Grade View of Cords: 1       Adjucts: stylet       Position: Right       Measured from: lips       Secured at (cm): 22       Bite block used: None    Post intubation assessment        Number of attempts at approach: 1       Number of other approaches attempted: 0       Secured with: pink tape       Ease of procedure: easy       Dentition: Intact and Unchanged

## 2022-04-08 NOTE — ANESTHESIA PROCEDURE NOTES
Central Line/PA Catheter Placement    Pre-Procedure   Staff -        Anesthesiologist:  Dean Thompson MD       Performed By: anesthesiologist       Location: pre-op       Pre-Anesthestic Checklist: patient identified, IV checked, site marked, risks and benefits discussed, informed consent, monitors and equipment checked, pre-op evaluation and at physician/surgeon's request  Timeout:       Correct Patient: Yes        Correct Procedure: Yes        Correct Site: Yes        Correct Position: Yes        Correct Laterality: Yes   Line Placement:   This line was placed Pre Induction starting at 4/8/2022 7:00 AM and ending at 4/8/2022 7:15 AM    Procedure   Procedure: central line       Laterality: right       Insertion Site: internal jugular.       Patient Position: Trendelenburg  Sterile Prep        All elements of maximal sterile barrier technique followed       Patient Prep/Sterile Barriers: draped, hand hygiene, gloves , hat , mask , draped, gown, sterile gel and probe cover       Skin prep: Chloraprep  Insertion/Injection        Local skin infiltrated with 5 mL of 1% lidocaine.        Technique: ultrasound guided and Seldinger Technique        1. Ultrasound was used to evaluate the access site.       2. Vein evaluated via ultrasound for patency/adequacy.       3. Using real-time ultrasound the needle/catheter was observed entering the artery/vein.       4. Permanent image was captured and entered into the patient's record.       5. The visualized structures were anatomically normal.       6. There were no apparent abnormal pathologic findings.       Type: Introducer  Narrative         Secured by: suture       Tegaderm and Biopatch dressing used.       Complications: None apparent,        blood aspirated from all lumens,        All lumens flushed: Yes       Verification method: Ultrasound, X-ray and Placement to be verified post-op

## 2022-04-08 NOTE — BRIEF OP NOTE
[facility]    Brief Operative Note    Pre-operative diagnosis: CAD (coronary artery disease) [I25.10]  Post-operative diagnosis Same as pre-operative diagnosis    Procedure: Procedure(s):  CORONARY ARTERY BYPASS GRAFT x 4 (LIMA - LAD; SV - OM; SV - PDA; SV - DIAGONAL) WITH ENDOSCOPIC SAPHENOUS VEIN HARVEST ON BILATERAL LOWER EXTREMITY, AND ON CARDIOPULMONARY PUMP OXYGENATOR  (INTRAOPERATIVE TRANSESOPHAGEAL ECHOCARDIOGRAM BY ANESTHESIOLOGIST)  Surgeon: Surgeon(s) and Role:     * Karson Bae MD - Primary     * Valencia Mcgovern PA-C - Assisting     * Fidelia Corley PA-C - Assisting  Anesthesia: General   Estimated Blood Loss: 490 ml    Drains: 2 mediastinal, 1 L pleural  Specimens:   ID Type Source Tests Collected by Time Destination   A : STAT LABS (POST CARDIOPULMONARY BYPASS) Blood Line, arterial CBC WITH PLATELETS, BASIC METABOLIC PANEL, FIBRINOGEN ACTIVITY, PARTIAL THROMBOPLASTIN TIME, INR Ema Barnhart 4/8/2022  3:45 PM      Findings:   dictated.  Complications: None.  Implants: * No implants in log *

## 2022-04-09 ENCOUNTER — APPOINTMENT (OUTPATIENT)
Dept: GENERAL RADIOLOGY | Facility: CLINIC | Age: 67
DRG: 236 | End: 2022-04-09
Attending: SURGERY
Payer: MEDICARE

## 2022-04-09 ENCOUNTER — APPOINTMENT (OUTPATIENT)
Dept: PHYSICAL THERAPY | Facility: CLINIC | Age: 67
DRG: 236 | End: 2022-04-09
Attending: SURGERY
Payer: MEDICARE

## 2022-04-09 LAB
ALBUMIN SERPL-MCNC: 3.3 G/DL (ref 3.4–5)
ALP SERPL-CCNC: 46 U/L (ref 40–150)
ALT SERPL W P-5'-P-CCNC: 36 U/L (ref 0–70)
ANION GAP SERPL CALCULATED.3IONS-SCNC: 7 MMOL/L (ref 3–14)
AST SERPL W P-5'-P-CCNC: 49 U/L (ref 0–45)
BILIRUB SERPL-MCNC: 0.5 MG/DL (ref 0.2–1.3)
BUN SERPL-MCNC: 23 MG/DL (ref 7–30)
CA-I BLD-MCNC: 4.5 MG/DL (ref 4.4–5.2)
CALCIUM SERPL-MCNC: 8.1 MG/DL (ref 8.5–10.1)
CHLORIDE BLD-SCNC: 109 MMOL/L (ref 94–109)
CO2 SERPL-SCNC: 23 MMOL/L (ref 20–32)
CREAT SERPL-MCNC: 1.1 MG/DL (ref 0.66–1.25)
ERYTHROCYTE [DISTWIDTH] IN BLOOD BY AUTOMATED COUNT: 12.6 % (ref 10–15)
GFR SERPL CREATININE-BSD FRML MDRD: 74 ML/MIN/1.73M2
GLUCOSE BLD-MCNC: 181 MG/DL (ref 70–99)
GLUCOSE BLDC GLUCOMTR-MCNC: 124 MG/DL (ref 70–99)
GLUCOSE BLDC GLUCOMTR-MCNC: 149 MG/DL (ref 70–99)
GLUCOSE BLDC GLUCOMTR-MCNC: 149 MG/DL (ref 70–99)
GLUCOSE BLDC GLUCOMTR-MCNC: 153 MG/DL (ref 70–99)
GLUCOSE BLDC GLUCOMTR-MCNC: 160 MG/DL (ref 70–99)
GLUCOSE BLDC GLUCOMTR-MCNC: 167 MG/DL (ref 70–99)
GLUCOSE BLDC GLUCOMTR-MCNC: 169 MG/DL (ref 70–99)
GLUCOSE BLDC GLUCOMTR-MCNC: 176 MG/DL (ref 70–99)
GLUCOSE BLDC GLUCOMTR-MCNC: 213 MG/DL (ref 70–99)
GLUCOSE BLDC GLUCOMTR-MCNC: 241 MG/DL (ref 70–99)
HCT VFR BLD AUTO: 25.5 % (ref 40–53)
HGB BLD-MCNC: 9 G/DL (ref 13.3–17.7)
MAGNESIUM SERPL-MCNC: 2.1 MG/DL (ref 1.6–2.3)
MCH RBC QN AUTO: 32.3 PG (ref 26.5–33)
MCHC RBC AUTO-ENTMCNC: 35.3 G/DL (ref 31.5–36.5)
MCV RBC AUTO: 91 FL (ref 78–100)
PHOSPHATE SERPL-MCNC: 3.9 MG/DL (ref 2.5–4.5)
PLATELET # BLD AUTO: 188 10E3/UL (ref 150–450)
POTASSIUM BLD-SCNC: 4.2 MMOL/L (ref 3.4–5.3)
PROT SERPL-MCNC: 5.3 G/DL (ref 6.8–8.8)
RBC # BLD AUTO: 2.79 10E6/UL (ref 4.4–5.9)
SODIUM SERPL-SCNC: 139 MMOL/L (ref 133–144)
WBC # BLD AUTO: 14.1 10E3/UL (ref 4–11)

## 2022-04-09 PROCEDURE — 250N000011 HC RX IP 250 OP 636: Performed by: SURGERY

## 2022-04-09 PROCEDURE — 82330 ASSAY OF CALCIUM: CPT | Performed by: SURGERY

## 2022-04-09 PROCEDURE — 83735 ASSAY OF MAGNESIUM: CPT | Performed by: SURGERY

## 2022-04-09 PROCEDURE — 250N000012 HC RX MED GY IP 250 OP 636 PS 637: Performed by: PHYSICIAN ASSISTANT

## 2022-04-09 PROCEDURE — 80053 COMPREHEN METABOLIC PANEL: CPT | Performed by: SURGERY

## 2022-04-09 PROCEDURE — 200N000001 HC R&B ICU

## 2022-04-09 PROCEDURE — 250N000013 HC RX MED GY IP 250 OP 250 PS 637: Performed by: SURGERY

## 2022-04-09 PROCEDURE — 97162 PT EVAL MOD COMPLEX 30 MIN: CPT | Mod: GP

## 2022-04-09 PROCEDURE — 85027 COMPLETE CBC AUTOMATED: CPT | Performed by: SURGERY

## 2022-04-09 PROCEDURE — 999N000065 XR CHEST PORT 1 VIEW

## 2022-04-09 PROCEDURE — 97530 THERAPEUTIC ACTIVITIES: CPT | Mod: GP

## 2022-04-09 PROCEDURE — 258N000003 HC RX IP 258 OP 636: Performed by: SURGERY

## 2022-04-09 PROCEDURE — 93005 ELECTROCARDIOGRAM TRACING: CPT

## 2022-04-09 PROCEDURE — 250N000012 HC RX MED GY IP 250 OP 636 PS 637: Performed by: SURGERY

## 2022-04-09 PROCEDURE — 84100 ASSAY OF PHOSPHORUS: CPT | Performed by: SURGERY

## 2022-04-09 PROCEDURE — 93010 ELECTROCARDIOGRAM REPORT: CPT | Performed by: INTERNAL MEDICINE

## 2022-04-09 PROCEDURE — 250N000013 HC RX MED GY IP 250 OP 250 PS 637: Performed by: PHYSICIAN ASSISTANT

## 2022-04-09 RX ORDER — HYDROCODONE BITARTRATE AND ACETAMINOPHEN 5; 325 MG/1; MG/1
1-2 TABLET ORAL EVERY 4 HOURS PRN
Status: DISCONTINUED | OUTPATIENT
Start: 2022-04-09 | End: 2022-04-13 | Stop reason: HOSPADM

## 2022-04-09 RX ORDER — NICOTINE POLACRILEX 4 MG
15-30 LOZENGE BUCCAL
Status: DISCONTINUED | OUTPATIENT
Start: 2022-04-09 | End: 2022-04-09

## 2022-04-09 RX ORDER — DEXTROSE MONOHYDRATE 25 G/50ML
25-50 INJECTION, SOLUTION INTRAVENOUS
Status: DISCONTINUED | OUTPATIENT
Start: 2022-04-09 | End: 2022-04-09

## 2022-04-09 RX ORDER — GABAPENTIN 300 MG/1
300 CAPSULE ORAL 2 TIMES DAILY
Status: DISCONTINUED | OUTPATIENT
Start: 2022-04-09 | End: 2022-04-13 | Stop reason: HOSPADM

## 2022-04-09 RX ORDER — ACETAMINOPHEN 325 MG/1
650 TABLET ORAL EVERY 6 HOURS PRN
Status: DISCONTINUED | OUTPATIENT
Start: 2022-04-09 | End: 2022-04-13 | Stop reason: HOSPADM

## 2022-04-09 RX ORDER — OXYCODONE HYDROCHLORIDE 5 MG/1
5-10 TABLET ORAL EVERY 4 HOURS PRN
Status: DISCONTINUED | OUTPATIENT
Start: 2022-04-09 | End: 2022-04-13 | Stop reason: HOSPADM

## 2022-04-09 RX ORDER — METOPROLOL TARTRATE 25 MG/1
25 TABLET, FILM COATED ORAL 2 TIMES DAILY
Status: DISCONTINUED | OUTPATIENT
Start: 2022-04-09 | End: 2022-04-10

## 2022-04-09 RX ORDER — LIDOCAINE 4 G/G
1 PATCH TOPICAL
Status: DISCONTINUED | OUTPATIENT
Start: 2022-04-09 | End: 2022-04-13 | Stop reason: HOSPADM

## 2022-04-09 RX ORDER — METHOCARBAMOL 750 MG/1
750 TABLET, FILM COATED ORAL EVERY 6 HOURS PRN
Status: DISCONTINUED | OUTPATIENT
Start: 2022-04-09 | End: 2022-04-12

## 2022-04-09 RX ADMIN — INSULIN ASPART 5 UNITS: 100 INJECTION, SOLUTION INTRAVENOUS; SUBCUTANEOUS at 19:00

## 2022-04-09 RX ADMIN — PANTOPRAZOLE SODIUM 40 MG: 40 TABLET, DELAYED RELEASE ORAL at 08:24

## 2022-04-09 RX ADMIN — GABAPENTIN 300 MG: 300 CAPSULE ORAL at 20:36

## 2022-04-09 RX ADMIN — ASPIRIN 81 MG 162 MG: 81 TABLET ORAL at 08:24

## 2022-04-09 RX ADMIN — HYDROCODONE BITARTRATE AND ACETAMINOPHEN 2 TABLET: 5; 325 TABLET ORAL at 22:52

## 2022-04-09 RX ADMIN — HYDROMORPHONE HYDROCHLORIDE 0.2 MG: 0.2 INJECTION, SOLUTION INTRAMUSCULAR; INTRAVENOUS; SUBCUTANEOUS at 01:06

## 2022-04-09 RX ADMIN — HEPARIN SODIUM 5000 UNITS: 5000 INJECTION, SOLUTION INTRAVENOUS; SUBCUTANEOUS at 13:19

## 2022-04-09 RX ADMIN — HYDROMORPHONE HYDROCHLORIDE 0.2 MG: 0.2 INJECTION, SOLUTION INTRAMUSCULAR; INTRAVENOUS; SUBCUTANEOUS at 00:29

## 2022-04-09 RX ADMIN — LEVOTHYROXINE SODIUM 125 MCG: 125 TABLET ORAL at 20:36

## 2022-04-09 RX ADMIN — CLINDAMYCIN IN 5 PERCENT DEXTROSE 900 MG: 18 INJECTION, SOLUTION INTRAVENOUS at 05:13

## 2022-04-09 RX ADMIN — BUPROPION HYDROCHLORIDE 300 MG: 300 TABLET, EXTENDED RELEASE ORAL at 08:24

## 2022-04-09 RX ADMIN — HYDROCODONE BITARTRATE AND ACETAMINOPHEN 1 TABLET: 5; 325 TABLET ORAL at 11:12

## 2022-04-09 RX ADMIN — OXYCODONE HYDROCHLORIDE 10 MG: 5 TABLET ORAL at 03:50

## 2022-04-09 RX ADMIN — HYDROCODONE BITARTRATE AND ACETAMINOPHEN 1 TABLET: 5; 325 TABLET ORAL at 13:19

## 2022-04-09 RX ADMIN — ACETAMINOPHEN 975 MG: 325 TABLET ORAL at 01:09

## 2022-04-09 RX ADMIN — LIDOCAINE 1 PATCH: 560 PATCH PERCUTANEOUS; TOPICAL; TRANSDERMAL at 12:00

## 2022-04-09 RX ADMIN — SENNOSIDES AND DOCUSATE SODIUM 1 TABLET: 50; 8.6 TABLET ORAL at 08:24

## 2022-04-09 RX ADMIN — METOPROLOL TARTRATE 25 MG: 25 TABLET, FILM COATED ORAL at 12:00

## 2022-04-09 RX ADMIN — SENNOSIDES AND DOCUSATE SODIUM 1 TABLET: 50; 8.6 TABLET ORAL at 20:36

## 2022-04-09 RX ADMIN — CLINDAMYCIN IN 5 PERCENT DEXTROSE 900 MG: 18 INJECTION, SOLUTION INTRAVENOUS at 13:19

## 2022-04-09 RX ADMIN — METHOCARBAMOL 750 MG: 750 TABLET ORAL at 18:28

## 2022-04-09 RX ADMIN — METHOCARBAMOL 500 MG: 500 TABLET, FILM COATED ORAL at 06:05

## 2022-04-09 RX ADMIN — OXYCODONE HYDROCHLORIDE 10 MG: 5 TABLET ORAL at 08:25

## 2022-04-09 RX ADMIN — HEPARIN SODIUM 5000 UNITS: 5000 INJECTION, SOLUTION INTRAVENOUS; SUBCUTANEOUS at 21:39

## 2022-04-09 RX ADMIN — HYDROCODONE BITARTRATE AND ACETAMINOPHEN 2 TABLET: 5; 325 TABLET ORAL at 17:32

## 2022-04-09 RX ADMIN — METHOCARBAMOL 750 MG: 750 TABLET ORAL at 12:00

## 2022-04-09 RX ADMIN — SODIUM CHLORIDE 3 UNITS/HR: 9 INJECTION, SOLUTION INTRAVENOUS at 02:41

## 2022-04-09 ASSESSMENT — ACTIVITIES OF DAILY LIVING (ADL)
ADLS_ACUITY_SCORE: 5
ADLS_ACUITY_SCORE: 8
ADLS_ACUITY_SCORE: 5
ADLS_ACUITY_SCORE: 5
ADLS_ACUITY_SCORE: 8
ADLS_ACUITY_SCORE: 5
ADLS_ACUITY_SCORE: 5
ADLS_ACUITY_SCORE: 3
ADLS_ACUITY_SCORE: 12
ADLS_ACUITY_SCORE: 5
ADLS_ACUITY_SCORE: 12
ADLS_ACUITY_SCORE: 5
ADLS_ACUITY_SCORE: 8
ADLS_ACUITY_SCORE: 5
ADLS_ACUITY_SCORE: 5
ADLS_ACUITY_SCORE: 8
ADLS_ACUITY_SCORE: 5
ADLS_ACUITY_SCORE: 5
ADLS_ACUITY_SCORE: 8
ADLS_ACUITY_SCORE: 8

## 2022-04-09 NOTE — PROGRESS NOTES
Pt briefly in junctional rhythm (HR 50s). Hypotensive (70s/40s), however alert & oriented, didn't feel dizzy/nauseated.   Pacer turned on to 80bpm. BP recovered. After 20 min, rhythm rechecked. Back in SR (approx. 60bpm).   Valencia BAL with CTS notified x2. Order received to hold BB.   Will continue to monitor.

## 2022-04-09 NOTE — PROGRESS NOTES
[facility]  Cardiovascular and Thoracic Surgery Daily Note          Assessment and Plan:   POD#1 s/p Coronary artery bypass grafting x 4. Left internal mammary artery to left anterior descending artery, Reversed saphenous vein graft to right posterior descending artery, Reversed saphenous vein graft to obtuse marginal artery, Reversed saphenous vein graft to diagonal artery   Endoscopic vein harvest left and right lower extremity, Transesophageal echocardiogram by Dr. Karson Bae  -CVS: HR: 90s, SBP: 110s/70s, ASA, BB, when able, statin, sub q heparin, ace wraps to legs as poor vasculature noted in OR  -Resp: extubated per protocol, sating well on 2L NC< encourage IS  -Neuro: grossly intact, pain remains an issue, will adjust meds  -Renal: crea: 1.10, up 6 kg above pre-op weight, diuretics later this afternoon  -GI:  Continue bowel regimen  -:  [place], limited mobility, accurate IS & Os  -Endo: transition to sliding scale insulin, pre-op A1C 6.3  -FEN: replace lytes per protocol, Na: 139, K: 4.2, Orders Placed This Encounter      Advance Diet as Tolerated: Clear Liquid Diet  -ID: WBC: 14.1, Temp (24hrs), Av.8  F (38.2  C), Min:100.6  F (38.1  C), Max:100.9  F (38.3  C), completing cheyanne-op abx  -Heme: Hgb: 9.0, plt: 188, acute blood loss anemia, thrombocytopenia   -Proph: PCDs, ASA, BB when able, statin, sub q heparin  -Dispo: ICU-->St [address], initiate therapies, encourage IS    Seen and discussed with Dr. Russell          Interval History:   Sitting up in chair, pain still an issue, breathing stable, tolerating cie chips         Medications:       acetaminophen  975 mg Oral Q8H     aspirin  162 mg Oral or NG Tube Daily     buPROPion  300 mg Oral QAM     clindamycin  900 mg Intravenous Q8H     gabapentin  100 mg Oral At Bedtime     heparin ANTICOAGULANT  5,000 Units Subcutaneous Q8H     levothyroxine  125 mcg Oral QPM     lovastatin  10 mg Oral Once per day on      pantoprazole  40  "mg Oral or NG Tube Daily    Or     pantoprazole  40 mg Oral Daily     polyethylene glycol  17 g Oral Daily     senna-docusate  1 tablet Oral BID     sodium chloride (PF)  3 mL Intracatheter Q8H     @[facility]-@          Physical Exam:   Vitals were reviewed  Blood pressure 113/72, pulse 93, temperature (!) 100.6  F (38.1  C), temperature source Oral, resp. rate 21, height 1.778 m (5' 10\"), weight 109.1 kg (240 lb 8.4 oz), SpO2 97 %.  Rhythm: NSR    Lungs: course    Cardiovascular: s1/s2, no m/r/g    Abdomen: s/nt/nd    Extremeties: trace LE edema    Incision: cdi    CT: to suction    Weight:   Vitals:    04/08/22 0618 04/09/22 0600   Weight: 103.4 kg (228 lb) 109.1 kg (240 lb 8.4 oz)            Data:   Labs:   Lab Results   Component Value Date    WBC 14.1 04/09/2022    WBC 8.5 01/06/2021     Lab Results   Component Value Date    RBC 2.79 04/09/2022    RBC 4.60 01/06/2021     Lab Results   Component Value Date    HGB 9.0 04/09/2022    HGB 14.0 01/19/2021     Lab Results   Component Value Date    HCT 25.5 04/09/2022    HCT 42.8 01/06/2021     No components found for: MCT  Lab Results   Component Value Date    MCV 91 04/09/2022    MCV 93 01/06/2021     Lab Results   Component Value Date    MCH 32.3 04/09/2022    MCH 32.0 01/06/2021     Lab Results   Component Value Date    MCHC 35.3 04/09/2022    MCHC 34.3 01/06/2021     Lab Results   Component Value Date    RDW 12.6 04/09/2022    RDW 12.4 01/06/2021     Lab Results   Component Value Date     04/09/2022     01/06/2021       Last Basic Metabolic Panel:  Lab Results   Component Value Date     04/09/2022     01/06/2021      Lab Results   Component Value Date    POTASSIUM 4.2 04/09/2022    POTASSIUM 4.1 01/06/2021     Lab Results   Component Value Date    CHLORIDE 109 04/09/2022    CHLORIDE 106 01/06/2021     Lab Results   Component Value Date    AURORA 8.1 04/09/2022    AURORA 9.6 01/06/2021     Lab Results   Component Value Date    CO2 23 04/09/2022    " CO2 30 01/06/2021     Lab Results   Component Value Date    BUN 23 04/09/2022    BUN 21 01/06/2021     Lab Results   Component Value Date    CR 1.10 04/09/2022    CR 1.21 01/06/2021     Lab Results   Component Value Date     04/09/2022     04/09/2022     01/19/2021       CXR: IMPRESSION: Interval extubation. Right IJ central venous catheter tip overlies mid SVC. Status post sternotomy. Mediastinal drain and left basilar chest tube in place. Tiny left apical pneumothorax measuring about 5 mm. Stable cardiomediastinal contours   status post CABG. Bibasilar atelectasis left greater than right.    Valencia Mcgovern PA-C  Pager #: [phone]

## 2022-04-09 NOTE — PROGRESS NOTES
CV surgery fellow stopped by, would like legs to stay wrapped until the morning.     Noted airleak in chest tubes, discussed with Dr. Bae, keep patient on wall suction, no new orders, continue to monitor.

## 2022-04-09 NOTE — PROGRESS NOTES
Date: 7/20/2021  Admission Diagnosis: Status post op  Pulmonary History: None  Home Nebulizer/ MDI Use: None  Home Oxygen Use: None  Acuity Level (from RT Assessment flow sheet): 4     Aerosol Therapy Initiated: N/A        Pulmonary Hygiene Initiated: AerobiKa/IS        Volume Expansion Therapy Initiated: IS; EzPAP BID        Current Oxygen Requirement: 2L  Current SpO2: 96%     Re-evaluation date: 4/12/2022     See 'RT Assessments' flow sheet for patient assessment scoring and Acuity Level Details.

## 2022-04-09 NOTE — PROGRESS NOTES
04/09/22 1410   Quick Adds   Type of Visit Initial PT Evaluation   Living Environment   People in Home spouse   Current Living Arrangements house   Living Environment Comments pt lives in single story home 2-3 stairs to enter than all needs met on main level    Self-Care   Usual Activity Tolerance good   Current Activity Tolerance poor   Activity/Exercise/Self-Care Comment Pt IND at baseline, is retired, works at [employer] [place] 2 days/week    General Information   Onset of Illness/Injury or Date of Surgery 04/08/22   Referring Physician Leslie Walsh MD   Pertinent History of Current Problem (include personal factors and/or comorbidities that impact the POC) POD#1 s/p Coronary artery bypass grafting x 4. Left internal mammary artery to left anterior descending artery, Reversed saphenous vein graft to right posterior descending artery, Reversed saphenous vein graft to obtuse marginal artery, Reversed saphenous vein graft to diagonal artery    Existing Precautions/Restrictions fall;sternal   Cognition   Orientation Status (Cognition) oriented x 3   Pain Assessment   Patient Currently in Pain Yes, see Vital Sign flowsheet  (Pt received pain meds prior to session )   Integumentary/Edema   Integumentary/Edema Comments See nursing notes   Posture    Posture Forward head position   Range of Motion (ROM)   ROM Comment BLE WFL    Strength (Manual Muscle Testing)   Strength Comments Pt demonstrates gross functional weakness, limited tolreance for OOB mobility    Bed Mobility   Comment, (Bed Mobility) Max A x 2 within precautions   Transfers   Comment, (Transfers) STS min A x 2 with FWW, sternal precautions maintained.    Gait/Stairs (Locomotion)   Comment, (Gait/Stairs) Pt declining attempt   Balance   Balance Comments IMpaired    Clinical Impression   Criteria for Skilled Therapeutic Intervention Yes, treatment indicated   PT Diagnosis (PT) impaired IND with mobility from baseline   Influenced by  the following impairments pain, weakness, activity tolerance, balance   Functional limitations due to impairments see above   Clinical Presentation (PT Evaluation Complexity) Evolving/Changing   Clinical Presentation Rationale clnical judgment, level of assist    Clinical Decision Making (Complexity) moderate complexity   Planned Therapy Interventions (PT) bed mobility training;balance training;gait training;home exercise program;patient/family education;strengthening;stair training;transfer training   Risk & Benefits of therapy have been explained evaluation/treatment results reviewed;care plan/treatment goals reviewed;risks/benefits reviewed;patient   PT Discharge Planning   PT Discharge Recommendation (DC Rec) home with assist;home with outpatient cardiac rehab   PT Rationale for DC Rec Predict with further IP therapy pt will progress to be safe to DC to home with OP CR. Currently A x 2    Total Evaluation Time   Total Evaluation Time (Minutes) 15   Physical Therapy Goals   PT Frequency Daily   PT Predicated Duration/Target Date for Goal Attainment 04/14/22   PT Goals Bed Mobility;Transfers;Gait;Stairs;Aerobic Activity   PT: Bed Mobility Supervision/stand-by assist;Supine to/from sit;Rolling;Within precautions   PT: Transfers Sit to/from stand;Bed to/from chair;Modified independent;Assistive device;Within precautions   PT: Gait Modified independent;Greater than 200 feet;Rolling walker   PT: Stairs 3 stairs;Supervision/stand-by assist;Assistive device   PT: Perform aerobic activity with stable cardiovascular response 10 minutes;continuous activity;treadmill;ambulation

## 2022-04-09 NOTE — PLAN OF CARE
"Goal Outcome Evaluation:    Vitals: /65   Pulse 90   Temp (!) 100.6  F (38.1  C) (Oral)   Resp 20   Ht 1.778 m (5' 10\")   Wt 109.1 kg (240 lb 8.4 oz)   SpO2 98%   BMI 34.51 kg/m      Neuro: alert, oriented x4, LONG. Denies n/t.       CV:  SR, sinus arrhythmia at times. HR in 80-90s. When helping pt back to bed, HR went down to 55, sinus bradycardia/junctional rhythm. Once pt back in bed, HR gradually increased, rhythm was junctional rhythm, and sinus arrhythmia. Turned pacemaker on temporarily, pt HR gradually increased to 70-80s to SR. AV wires then placed on standby. Pt asymptomatic during this episode.  Phenylephrine weaned off at 0500.    Respiratory: 3L NC. Diminished LS.  Extubated at 2130. IS to 1000.    GI/: Denies nausea, hypoactive BS. Carmona with adequate UOP.     Skin: Sternum cleansed, intact with dermabond. Chest tube sites cleansed, dressing changed. Bilateral lower extremity incisions covered, spoke with CV surgery fellow, keep legs wrapped until rounds.     Activity: up with assist of 2. Up to chair at 0130.     Pain: PRN Oxycodone, Robaxin, and Dilaudid given.     IV: 3L CVC with introducer in RIJ, L arterial line (whip), L PIV.     Diet: clear liquids.        *See EPIC flowsheet for full nursing assessment findings         "

## 2022-04-09 NOTE — PROGRESS NOTES
Gave report to CATY Tafoya at 2300. Pt extubated at 2133, pass swallow test, taking pills effectively, and working to control pain.

## 2022-04-09 NOTE — CONSULTS
CARDIAC SURGERY NUTRITION CONSULT    Received standing order to assess and educate patient.    Will follow and complete assessment once patient is transferred to medical unit.    Patient will receive nutrition education during the Outpatient Cardiac Rehab Program (nutrition classes/dietitian counseling).

## 2022-04-10 ENCOUNTER — APPOINTMENT (OUTPATIENT)
Dept: PHYSICAL THERAPY | Facility: CLINIC | Age: 67
DRG: 236 | End: 2022-04-10
Attending: SURGERY
Payer: MEDICARE

## 2022-04-10 LAB
ALBUMIN SERPL-MCNC: 2.7 G/DL (ref 3.4–5)
ALP SERPL-CCNC: 47 U/L (ref 40–150)
ALT SERPL W P-5'-P-CCNC: 27 U/L (ref 0–70)
ANION GAP SERPL CALCULATED.3IONS-SCNC: 6 MMOL/L (ref 3–14)
AST SERPL W P-5'-P-CCNC: 29 U/L (ref 0–45)
BILIRUB SERPL-MCNC: 0.5 MG/DL (ref 0.2–1.3)
BUN SERPL-MCNC: 21 MG/DL (ref 7–30)
CA-I BLD-MCNC: 4.6 MG/DL (ref 4.4–5.2)
CALCIUM SERPL-MCNC: 8.3 MG/DL (ref 8.5–10.1)
CHLORIDE BLD-SCNC: 105 MMOL/L (ref 94–109)
CO2 SERPL-SCNC: 25 MMOL/L (ref 20–32)
CREAT SERPL-MCNC: 1.06 MG/DL (ref 0.66–1.25)
ERYTHROCYTE [DISTWIDTH] IN BLOOD BY AUTOMATED COUNT: 12.8 % (ref 10–15)
ERYTHROCYTE [DISTWIDTH] IN BLOOD BY AUTOMATED COUNT: 12.9 % (ref 10–15)
GFR SERPL CREATININE-BSD FRML MDRD: 78 ML/MIN/1.73M2
GLUCOSE BLD-MCNC: 181 MG/DL (ref 70–99)
GLUCOSE BLDC GLUCOMTR-MCNC: 175 MG/DL (ref 70–99)
GLUCOSE BLDC GLUCOMTR-MCNC: 198 MG/DL (ref 70–99)
GLUCOSE BLDC GLUCOMTR-MCNC: 207 MG/DL (ref 70–99)
GLUCOSE BLDC GLUCOMTR-MCNC: 250 MG/DL (ref 70–99)
HCT VFR BLD AUTO: 21.5 % (ref 40–53)
HCT VFR BLD AUTO: 23 % (ref 40–53)
HCV RNA SERPL NAA+PROBE-ACNC: NOT DETECTED IU/ML
HGB BLD-MCNC: 7.4 G/DL (ref 13.3–17.7)
HGB BLD-MCNC: 8.1 G/DL (ref 13.3–17.7)
MAGNESIUM SERPL-MCNC: 1.8 MG/DL (ref 1.6–2.3)
MCH RBC QN AUTO: 31.6 PG (ref 26.5–33)
MCH RBC QN AUTO: 33.8 PG (ref 26.5–33)
MCHC RBC AUTO-ENTMCNC: 34.4 G/DL (ref 31.5–36.5)
MCHC RBC AUTO-ENTMCNC: 35.2 G/DL (ref 31.5–36.5)
MCV RBC AUTO: 92 FL (ref 78–100)
MCV RBC AUTO: 96 FL (ref 78–100)
PHOSPHATE SERPL-MCNC: 3.3 MG/DL (ref 2.5–4.5)
PLATELET # BLD AUTO: 130 10E3/UL (ref 150–450)
PLATELET # BLD AUTO: 164 10E3/UL (ref 150–450)
POTASSIUM BLD-SCNC: 4 MMOL/L (ref 3.4–5.3)
PROT SERPL-MCNC: 5.4 G/DL (ref 6.8–8.8)
RBC # BLD AUTO: 2.34 10E6/UL (ref 4.4–5.9)
RBC # BLD AUTO: 2.4 10E6/UL (ref 4.4–5.9)
SODIUM SERPL-SCNC: 136 MMOL/L (ref 133–144)
WBC # BLD AUTO: 8.3 10E3/UL (ref 4–11)
WBC # BLD AUTO: 8.5 10E3/UL (ref 4–11)

## 2022-04-10 PROCEDURE — 80053 COMPREHEN METABOLIC PANEL: CPT | Performed by: SURGERY

## 2022-04-10 PROCEDURE — 83735 ASSAY OF MAGNESIUM: CPT | Performed by: SURGERY

## 2022-04-10 PROCEDURE — 250N000013 HC RX MED GY IP 250 OP 250 PS 637: Performed by: SURGERY

## 2022-04-10 PROCEDURE — 36415 COLL VENOUS BLD VENIPUNCTURE: CPT | Performed by: PHYSICIAN ASSISTANT

## 2022-04-10 PROCEDURE — 85027 COMPLETE CBC AUTOMATED: CPT | Performed by: PHYSICIAN ASSISTANT

## 2022-04-10 PROCEDURE — 97530 THERAPEUTIC ACTIVITIES: CPT | Mod: GP

## 2022-04-10 PROCEDURE — 250N000013 HC RX MED GY IP 250 OP 250 PS 637: Performed by: PHYSICIAN ASSISTANT

## 2022-04-10 PROCEDURE — 93005 ELECTROCARDIOGRAM TRACING: CPT

## 2022-04-10 PROCEDURE — 93010 ELECTROCARDIOGRAM REPORT: CPT | Performed by: INTERNAL MEDICINE

## 2022-04-10 PROCEDURE — 250N000011 HC RX IP 250 OP 636: Performed by: PHYSICIAN ASSISTANT

## 2022-04-10 PROCEDURE — 36415 COLL VENOUS BLD VENIPUNCTURE: CPT | Performed by: SURGERY

## 2022-04-10 PROCEDURE — 120N000001 HC R&B MED SURG/OB

## 2022-04-10 PROCEDURE — 82330 ASSAY OF CALCIUM: CPT | Performed by: SURGERY

## 2022-04-10 PROCEDURE — 84100 ASSAY OF PHOSPHORUS: CPT | Performed by: SURGERY

## 2022-04-10 PROCEDURE — 250N000011 HC RX IP 250 OP 636: Performed by: SURGERY

## 2022-04-10 PROCEDURE — 85027 COMPLETE CBC AUTOMATED: CPT | Performed by: SURGERY

## 2022-04-10 RX ORDER — FUROSEMIDE 10 MG/ML
40 INJECTION INTRAMUSCULAR; INTRAVENOUS 2 TIMES DAILY
Status: DISCONTINUED | OUTPATIENT
Start: 2022-04-10 | End: 2022-04-12

## 2022-04-10 RX ORDER — MAGNESIUM OXIDE 400 MG/1
400 TABLET ORAL 2 TIMES DAILY
Status: COMPLETED | OUTPATIENT
Start: 2022-04-10 | End: 2022-04-12

## 2022-04-10 RX ADMIN — GABAPENTIN 300 MG: 300 CAPSULE ORAL at 09:35

## 2022-04-10 RX ADMIN — GABAPENTIN 300 MG: 300 CAPSULE ORAL at 21:20

## 2022-04-10 RX ADMIN — HYDROCODONE BITARTRATE AND ACETAMINOPHEN 2 TABLET: 5; 325 TABLET ORAL at 04:22

## 2022-04-10 RX ADMIN — OXYCODONE HYDROCHLORIDE 10 MG: 5 TABLET ORAL at 01:16

## 2022-04-10 RX ADMIN — METHOCARBAMOL 750 MG: 750 TABLET ORAL at 09:36

## 2022-04-10 RX ADMIN — METHOCARBAMOL 750 MG: 750 TABLET ORAL at 00:43

## 2022-04-10 RX ADMIN — PANTOPRAZOLE SODIUM 40 MG: 40 TABLET, DELAYED RELEASE ORAL at 09:35

## 2022-04-10 RX ADMIN — HEPARIN SODIUM 5000 UNITS: 5000 INJECTION, SOLUTION INTRAVENOUS; SUBCUTANEOUS at 21:21

## 2022-04-10 RX ADMIN — SENNOSIDES AND DOCUSATE SODIUM 1 TABLET: 50; 8.6 TABLET ORAL at 21:20

## 2022-04-10 RX ADMIN — FUROSEMIDE 40 MG: 10 INJECTION, SOLUTION INTRAVENOUS at 21:20

## 2022-04-10 RX ADMIN — INSULIN ASPART 2 UNITS: 100 INJECTION, SOLUTION INTRAVENOUS; SUBCUTANEOUS at 13:02

## 2022-04-10 RX ADMIN — BUPROPION HYDROCHLORIDE 300 MG: 300 TABLET, EXTENDED RELEASE ORAL at 09:35

## 2022-04-10 RX ADMIN — ACETAMINOPHEN 650 MG: 325 TABLET ORAL at 01:16

## 2022-04-10 RX ADMIN — INSULIN ASPART 3 UNITS: 100 INJECTION, SOLUTION INTRAVENOUS; SUBCUTANEOUS at 09:52

## 2022-04-10 RX ADMIN — LEVOTHYROXINE SODIUM 125 MCG: 125 TABLET ORAL at 21:20

## 2022-04-10 RX ADMIN — LIDOCAINE 1 PATCH: 560 PATCH PERCUTANEOUS; TOPICAL; TRANSDERMAL at 09:35

## 2022-04-10 RX ADMIN — METHOCARBAMOL 750 MG: 750 TABLET ORAL at 17:53

## 2022-04-10 RX ADMIN — ACETAMINOPHEN 650 MG: 325 TABLET ORAL at 17:53

## 2022-04-10 RX ADMIN — Medication 400 MG: at 21:20

## 2022-04-10 RX ADMIN — SENNOSIDES AND DOCUSATE SODIUM 1 TABLET: 50; 8.6 TABLET ORAL at 09:35

## 2022-04-10 RX ADMIN — HEPARIN SODIUM 5000 UNITS: 5000 INJECTION, SOLUTION INTRAVENOUS; SUBCUTANEOUS at 06:54

## 2022-04-10 RX ADMIN — HEPARIN SODIUM 5000 UNITS: 5000 INJECTION, SOLUTION INTRAVENOUS; SUBCUTANEOUS at 15:43

## 2022-04-10 RX ADMIN — ASPIRIN 81 MG 162 MG: 81 TABLET ORAL at 09:36

## 2022-04-10 RX ADMIN — HYDROCODONE BITARTRATE AND ACETAMINOPHEN 2 TABLET: 5; 325 TABLET ORAL at 12:54

## 2022-04-10 RX ADMIN — FUROSEMIDE 40 MG: 10 INJECTION, SOLUTION INTRAVENOUS at 09:36

## 2022-04-10 RX ADMIN — INSULIN ASPART 5 UNITS: 100 INJECTION, SOLUTION INTRAVENOUS; SUBCUTANEOUS at 17:54

## 2022-04-10 ASSESSMENT — ACTIVITIES OF DAILY LIVING (ADL)
ADLS_ACUITY_SCORE: 12
ADLS_ACUITY_SCORE: 12
ADLS_ACUITY_SCORE: 7
ADLS_ACUITY_SCORE: 9
ADLS_ACUITY_SCORE: 12
ADLS_ACUITY_SCORE: 7
ADLS_ACUITY_SCORE: 7
ADLS_ACUITY_SCORE: 10
ADLS_ACUITY_SCORE: 9
ADLS_ACUITY_SCORE: 12
ADLS_ACUITY_SCORE: 10
ADLS_ACUITY_SCORE: 9
ADLS_ACUITY_SCORE: 7
ADLS_ACUITY_SCORE: 9
ADLS_ACUITY_SCORE: 10
ADLS_ACUITY_SCORE: 9
ADLS_ACUITY_SCORE: 7
ADLS_ACUITY_SCORE: 9
ADLS_ACUITY_SCORE: 7
ADLS_ACUITY_SCORE: 9
ADLS_ACUITY_SCORE: 12
ADLS_ACUITY_SCORE: 12

## 2022-04-10 NOTE — PROGRESS NOTES
[facility]  Cardiovascular and Thoracic Surgery Daily Note          Assessment and Plan:   POD#2 s/p Coronary artery bypass grafting x 4. Left internal mammary artery to left anterior descending artery, Reversed saphenous vein graft to right posterior descending artery, Reversed saphenous vein graft to obtuse marginal artery, Reversed saphenous vein graft to diagonal artery   Endoscopic vein harvest left and right lower extremity, Transesophageal echocardiogram by Dr. Karson Bae    -CVS: HR: 90s, junctional rhythm yesterday, BB stopped, SBP: 130s/70s, ASA, statin, sub q heparin, ace wraps to legs as poor vasculature noted in OR    -Resp: extubated per protocol, sating well on RA<2L NC< encourage IS    -Neuro: grossly intact, pain remains an issue, will adjust meds    -Renal: crea: 1.06<1.10, up 6 kg above pre-op weight, diuretics bid    -GI:  Continue bowel regimen    -: Remove Carmona this afternoon    -Endo: transition to sliding scale insulin, pre-op A1C 6.3    -FEN: replace lytes per protocol, Na: 136, K: 4.0, Orders Placed This Encounter      Advance Diet as Tolerated: Clear Liquid Diet    -ID: WBC: 8.5<14.1, Temp (24hrs), Av.5  F (37.5  C), Min:99  F (37.2  C), Max:100  F (37.8  C), completed cheyanne-op abx    -Heme: Hgb: 7.4<9.0, plt: 130<188, acute blood loss anemia, likely dilutional, thrombocytopenia     -Proph: PCDs, ASA, BB when able, statin, sub q heparin    -Dispo: ICU-->St [address], continue therapies, encourage IS     Seen and discussed with Dr. Russell          Interval History:   Lying in bed, having excruciating pain despite pain meds, will remove this am after standing, breathing stable, tolerating diet, heart rhythm stable          Medications:       aspirin  162 mg Oral or NG Tube Daily     buPROPion  300 mg Oral QAM     furosemide  40 mg Intravenous BID     gabapentin  300 mg Oral BID     heparin ANTICOAGULANT  5,000 Units Subcutaneous Q8H     insulin aspart  1-10 Units  "Subcutaneous TID AC     insulin aspart  1-7 Units Subcutaneous At Bedtime     levothyroxine  125 mcg Oral QPM     lidocaine  1 patch Transdermal Q24H     lidocaine   Transdermal Q8H     lovastatin  10 mg Oral Once per day on Mon Wed Fri     pantoprazole  40 mg Oral or NG Tube Daily    Or     pantoprazole  40 mg Oral Daily     polyethylene glycol  17 g Oral Daily     senna-docusate  1 tablet Oral BID     sodium chloride (PF)  3 mL Intracatheter Q8H     @MEDRN-@          Physical Exam:   Vitals were reviewed  Blood pressure (!) 143/71, pulse 92, temperature 99  F (37.2  C), temperature source Oral, resp. rate 21, height 1.778 m (5' 10\"), weight 109.1 kg (240 lb 8.4 oz), SpO2 92 %.  Rhythm: NSR    Lungs: course    Cardiovascular: s1/s2, no m/r/g    Abdomen: s/nt/nd    Extremeties: no LE edema    Incision: cdi    CT: na    Weight:   Vitals:    04/08/22 0618 04/09/22 0600   Weight: 103.4 kg (228 lb) 109.1 kg (240 lb 8.4 oz)            Data:   Labs:   Lab Results   Component Value Date    WBC 8.5 04/10/2022    WBC 8.5 01/06/2021     Lab Results   Component Value Date    RBC 2.34 04/10/2022    RBC 4.60 01/06/2021     Lab Results   Component Value Date    HGB 7.4 04/10/2022    HGB 14.0 01/19/2021     Lab Results   Component Value Date    HCT 21.5 04/10/2022    HCT 42.8 01/06/2021     No components found for: MCT  Lab Results   Component Value Date    MCV 92 04/10/2022    MCV 93 01/06/2021     Lab Results   Component Value Date    MCH 31.6 04/10/2022    MCH 32.0 01/06/2021     Lab Results   Component Value Date    MCHC 34.4 04/10/2022    MCHC 34.3 01/06/2021     Lab Results   Component Value Date    RDW 12.9 04/10/2022    RDW 12.4 01/06/2021     Lab Results   Component Value Date     04/10/2022     01/06/2021       Last Basic Metabolic Panel:  Lab Results   Component Value Date     04/10/2022     01/06/2021      Lab Results   Component Value Date    POTASSIUM 4.0 04/10/2022    POTASSIUM 4.1 " 01/06/2021     Lab Results   Component Value Date    CHLORIDE 105 04/10/2022    CHLORIDE 106 01/06/2021     Lab Results   Component Value Date    AURORA 8.3 04/10/2022    AURORA 9.6 01/06/2021     Lab Results   Component Value Date    CO2 25 04/10/2022    CO2 30 01/06/2021     Lab Results   Component Value Date    BUN 21 04/10/2022    BUN 21 01/06/2021     Lab Results   Component Value Date    CR 1.06 04/10/2022    CR 1.21 01/06/2021     Lab Results   Component Value Date     04/10/2022     01/19/2021       Valencia Mcgovern PA-C  Pager #: [phone]

## 2022-04-10 NOTE — PROGRESS NOTES
Episode of junctional rhythm, see previous note. Now in SR.   Lines pulled, BP WNL. O2 weaned off. IS to 1000.   Appetite improving throughout day, adequate uop, passing gas.   Pain better controlled this PM. Up to chair x2 today 3 hr each time.     Family at bedside and updated by RN.     Plan to transfer tomorrow.

## 2022-04-10 NOTE — PLAN OF CARE
Neuro- A/O x4, neuors in tact, moves all extremities.     Most Recent Vitals- Temp: 99  F (37.2  C) Temp src: Oral BP: 133/72 Pulse: 92   Resp: 16 SpO2: 91 % O2 Device: None (Room air) Oxygen Delivery: 2 LPM    Tele/Cardiac- SR. No junctional rhythms noted this shift. BB on hold.     Resp- room air. Briefly on 2L NC to help w/ chest pain.     Activity- 2 assist to dangle and assist to chair.     Pain- C/o chest pain at incision site w/ Deep inspirations. Robaxin, oxy and norco given for pain control.      Drips- No infusions     Skin- Incision to midsternal chest-CDI. Incision to BLE-UTV d/t wraps. Scattered bruises.     GI/- Carmona in place, voiding adequately. No BM this shift.     Plan- [place] [facility] [place] and proper pain control.     Misc- EKG completed for C/o chest pain. VSS, HR stable.     SHWETA BARON, RN

## 2022-04-10 NOTE — PROGRESS NOTES
Alert & oriented x4. Pain better controlled today, see PRNs. CTs and pacer wires removed today which helped with pain relief.   SR. SBP 130s-150s. Palpable pulses.   RA, LS dim in bases. Able to get IS to 1250, tolerated fairly well. Encouraging more frequent self-administration.   Appetite improving. Passing gas but no stool. Carmona removed, voiding into urinal.   Up to chair x2. Assist x1 w/ walker.     Wife and son at bedside for visit. Updated by RN.     [place] [facility] [place], [place]phone, and sweatpants.

## 2022-04-10 NOTE — PLAN OF CARE
Txr from ICU, POD #2 CABG x4  A&Ox4. VSS on RA. Ax1 with GB & walker. Pain to chest incision, tolerating well. Regular diet. Continent, up to bathroom or urinal. +Flatus, -BM. BG checks. Mg replacement ordered. PIV SL. Tele NS/ST. [place] sites to BLE, intact. LLE groin site excoriation, open to air. Bruised and pale. Sternal incision WDL. Old CT site WDL. Generalized edema +1, +2 LLE and hands. CMS intact, pulses palpable. Discharge pending improvement. PT/OT/Card Rehab/ CV surgery following.

## 2022-04-11 ENCOUNTER — APPOINTMENT (OUTPATIENT)
Dept: PHYSICAL THERAPY | Facility: CLINIC | Age: 67
DRG: 236 | End: 2022-04-11
Attending: SURGERY
Payer: MEDICARE

## 2022-04-11 ENCOUNTER — APPOINTMENT (OUTPATIENT)
Dept: GENERAL RADIOLOGY | Facility: CLINIC | Age: 67
DRG: 236 | End: 2022-04-11
Attending: PHYSICIAN ASSISTANT
Payer: MEDICARE

## 2022-04-11 LAB
ALBUMIN SERPL-MCNC: 2.9 G/DL (ref 3.4–5)
ALP SERPL-CCNC: 51 U/L (ref 40–150)
ALT SERPL W P-5'-P-CCNC: 25 U/L (ref 0–70)
ANION GAP SERPL CALCULATED.3IONS-SCNC: 5 MMOL/L (ref 3–14)
ANION GAP SERPL CALCULATED.3IONS-SCNC: 9 MMOL/L (ref 3–14)
AST SERPL W P-5'-P-CCNC: 19 U/L (ref 0–45)
BILIRUB SERPL-MCNC: 0.4 MG/DL (ref 0.2–1.3)
BUN SERPL-MCNC: 28 MG/DL (ref 7–30)
BUN SERPL-MCNC: 34 MG/DL (ref 7–30)
CA-I BLD-MCNC: 4.6 MG/DL (ref 4.4–5.2)
CALCIUM SERPL-MCNC: 8.7 MG/DL (ref 8.5–10.1)
CALCIUM SERPL-MCNC: 8.8 MG/DL (ref 8.5–10.1)
CHLORIDE BLD-SCNC: 101 MMOL/L (ref 94–109)
CHLORIDE BLD-SCNC: 105 MMOL/L (ref 94–109)
CO2 SERPL-SCNC: 23 MMOL/L (ref 20–32)
CO2 SERPL-SCNC: 29 MMOL/L (ref 20–32)
CREAT SERPL-MCNC: 1.12 MG/DL (ref 0.66–1.25)
CREAT SERPL-MCNC: 1.13 MG/DL (ref 0.66–1.25)
ERYTHROCYTE [DISTWIDTH] IN BLOOD BY AUTOMATED COUNT: 13 % (ref 10–15)
GFR SERPL CREATININE-BSD FRML MDRD: 72 ML/MIN/1.73M2
GFR SERPL CREATININE-BSD FRML MDRD: 73 ML/MIN/1.73M2
GLUCOSE BLD-MCNC: 184 MG/DL (ref 70–99)
GLUCOSE BLD-MCNC: 308 MG/DL (ref 70–99)
GLUCOSE BLDC GLUCOMTR-MCNC: 156 MG/DL (ref 70–99)
GLUCOSE BLDC GLUCOMTR-MCNC: 165 MG/DL (ref 70–99)
GLUCOSE BLDC GLUCOMTR-MCNC: 174 MG/DL (ref 70–99)
GLUCOSE BLDC GLUCOMTR-MCNC: 190 MG/DL (ref 70–99)
GLUCOSE BLDC GLUCOMTR-MCNC: 240 MG/DL (ref 70–99)
HBV SURFACE AG SERPL QL IA: NONREACTIVE
HCT VFR BLD AUTO: 22.1 % (ref 40–53)
HGB BLD-MCNC: 7.5 G/DL (ref 13.3–17.7)
HIV 1+2 AB+HIV1 P24 AG SERPL QL IA: NONREACTIVE
MAGNESIUM SERPL-MCNC: 2.1 MG/DL (ref 1.6–2.3)
MCH RBC QN AUTO: 31.8 PG (ref 26.5–33)
MCHC RBC AUTO-ENTMCNC: 33.9 G/DL (ref 31.5–36.5)
MCV RBC AUTO: 94 FL (ref 78–100)
PHOSPHATE SERPL-MCNC: 3 MG/DL (ref 2.5–4.5)
PLATELET # BLD AUTO: 159 10E3/UL (ref 150–450)
POTASSIUM BLD-SCNC: 3.4 MMOL/L (ref 3.4–5.3)
POTASSIUM BLD-SCNC: 3.8 MMOL/L (ref 3.4–5.3)
PROT SERPL-MCNC: 6 G/DL (ref 6.8–8.8)
RBC # BLD AUTO: 2.36 10E6/UL (ref 4.4–5.9)
SODIUM SERPL-SCNC: 133 MMOL/L (ref 133–144)
SODIUM SERPL-SCNC: 139 MMOL/L (ref 133–144)
WBC # BLD AUTO: 8.1 10E3/UL (ref 4–11)

## 2022-04-11 PROCEDURE — 250N000011 HC RX IP 250 OP 636: Performed by: PHYSICIAN ASSISTANT

## 2022-04-11 PROCEDURE — 250N000013 HC RX MED GY IP 250 OP 250 PS 637: Performed by: PHYSICIAN ASSISTANT

## 2022-04-11 PROCEDURE — 93010 ELECTROCARDIOGRAM REPORT: CPT | Performed by: INTERNAL MEDICINE

## 2022-04-11 PROCEDURE — 84100 ASSAY OF PHOSPHORUS: CPT | Performed by: PHYSICIAN ASSISTANT

## 2022-04-11 PROCEDURE — 97530 THERAPEUTIC ACTIVITIES: CPT | Mod: GP | Performed by: PHYSICAL THERAPIST

## 2022-04-11 PROCEDURE — 83735 ASSAY OF MAGNESIUM: CPT | Performed by: PHYSICIAN ASSISTANT

## 2022-04-11 PROCEDURE — 80053 COMPREHEN METABOLIC PANEL: CPT | Performed by: PHYSICIAN ASSISTANT

## 2022-04-11 PROCEDURE — 82330 ASSAY OF CALCIUM: CPT | Performed by: PHYSICIAN ASSISTANT

## 2022-04-11 PROCEDURE — 71046 X-RAY EXAM CHEST 2 VIEWS: CPT

## 2022-04-11 PROCEDURE — 85014 HEMATOCRIT: CPT | Performed by: PHYSICIAN ASSISTANT

## 2022-04-11 PROCEDURE — 97110 THERAPEUTIC EXERCISES: CPT | Mod: GP | Performed by: PHYSICAL THERAPIST

## 2022-04-11 PROCEDURE — 36415 COLL VENOUS BLD VENIPUNCTURE: CPT | Performed by: PHYSICIAN ASSISTANT

## 2022-04-11 PROCEDURE — 250N000013 HC RX MED GY IP 250 OP 250 PS 637: Performed by: SURGERY

## 2022-04-11 PROCEDURE — 120N000001 HC R&B MED SURG/OB

## 2022-04-11 PROCEDURE — 93005 ELECTROCARDIOGRAM TRACING: CPT

## 2022-04-11 RX ORDER — POTASSIUM CHLORIDE 1500 MG/1
40 TABLET, EXTENDED RELEASE ORAL ONCE
Status: COMPLETED | OUTPATIENT
Start: 2022-04-11 | End: 2022-04-11

## 2022-04-11 RX ORDER — POTASSIUM CHLORIDE 1500 MG/1
20 TABLET, EXTENDED RELEASE ORAL ONCE
Status: COMPLETED | OUTPATIENT
Start: 2022-04-11 | End: 2022-04-11

## 2022-04-11 RX ORDER — LOSARTAN POTASSIUM 50 MG/1
50 TABLET ORAL DAILY
Status: DISCONTINUED | OUTPATIENT
Start: 2022-04-12 | End: 2022-04-13 | Stop reason: HOSPADM

## 2022-04-11 RX ORDER — LOSARTAN POTASSIUM 25 MG/1
25 TABLET ORAL DAILY
Status: DISCONTINUED | OUTPATIENT
Start: 2022-04-11 | End: 2022-04-11

## 2022-04-11 RX ORDER — MAGNESIUM CARB/ALUMINUM HYDROX 105-160MG
148 TABLET,CHEWABLE ORAL ONCE
Status: DISCONTINUED | OUTPATIENT
Start: 2022-04-11 | End: 2022-04-13 | Stop reason: HOSPADM

## 2022-04-11 RX ORDER — LOSARTAN POTASSIUM 25 MG/1
25 TABLET ORAL ONCE
Status: COMPLETED | OUTPATIENT
Start: 2022-04-11 | End: 2022-04-11

## 2022-04-11 RX ADMIN — BUPROPION HYDROCHLORIDE 300 MG: 300 TABLET, EXTENDED RELEASE ORAL at 09:00

## 2022-04-11 RX ADMIN — LOVASTATIN 10 MG: 10 TABLET ORAL at 11:56

## 2022-04-11 RX ADMIN — HEPARIN SODIUM 5000 UNITS: 5000 INJECTION, SOLUTION INTRAVENOUS; SUBCUTANEOUS at 21:51

## 2022-04-11 RX ADMIN — INSULIN ASPART 1 UNITS: 100 INJECTION, SOLUTION INTRAVENOUS; SUBCUTANEOUS at 09:28

## 2022-04-11 RX ADMIN — GABAPENTIN 300 MG: 300 CAPSULE ORAL at 09:01

## 2022-04-11 RX ADMIN — Medication 400 MG: at 20:25

## 2022-04-11 RX ADMIN — HEPARIN SODIUM 5000 UNITS: 5000 INJECTION, SOLUTION INTRAVENOUS; SUBCUTANEOUS at 05:00

## 2022-04-11 RX ADMIN — LEVOTHYROXINE SODIUM 125 MCG: 125 TABLET ORAL at 20:25

## 2022-04-11 RX ADMIN — SENNOSIDES AND DOCUSATE SODIUM 1 TABLET: 50; 8.6 TABLET ORAL at 09:01

## 2022-04-11 RX ADMIN — METFORMIN HYDROCHLORIDE 500 MG: 500 TABLET ORAL at 16:56

## 2022-04-11 RX ADMIN — ACETAMINOPHEN 650 MG: 325 TABLET ORAL at 05:00

## 2022-04-11 RX ADMIN — FUROSEMIDE 40 MG: 10 INJECTION, SOLUTION INTRAVENOUS at 09:00

## 2022-04-11 RX ADMIN — GABAPENTIN 300 MG: 300 CAPSULE ORAL at 20:27

## 2022-04-11 RX ADMIN — LOSARTAN POTASSIUM 25 MG: 25 TABLET, FILM COATED ORAL at 16:55

## 2022-04-11 RX ADMIN — LOSARTAN POTASSIUM 25 MG: 25 TABLET, FILM COATED ORAL at 11:56

## 2022-04-11 RX ADMIN — FUROSEMIDE 40 MG: 10 INJECTION, SOLUTION INTRAVENOUS at 20:25

## 2022-04-11 RX ADMIN — SENNOSIDES AND DOCUSATE SODIUM 1 TABLET: 50; 8.6 TABLET ORAL at 20:25

## 2022-04-11 RX ADMIN — LIDOCAINE 1 PATCH: 560 PATCH PERCUTANEOUS; TOPICAL; TRANSDERMAL at 09:01

## 2022-04-11 RX ADMIN — ACETAMINOPHEN 650 MG: 325 TABLET ORAL at 13:52

## 2022-04-11 RX ADMIN — INSULIN ASPART 3 UNITS: 100 INJECTION, SOLUTION INTRAVENOUS; SUBCUTANEOUS at 12:30

## 2022-04-11 RX ADMIN — HEPARIN SODIUM 5000 UNITS: 5000 INJECTION, SOLUTION INTRAVENOUS; SUBCUTANEOUS at 13:48

## 2022-04-11 RX ADMIN — Medication 400 MG: at 09:00

## 2022-04-11 RX ADMIN — POTASSIUM CHLORIDE 40 MEQ: 1500 TABLET, EXTENDED RELEASE ORAL at 09:00

## 2022-04-11 RX ADMIN — PANTOPRAZOLE SODIUM 40 MG: 40 TABLET, DELAYED RELEASE ORAL at 09:01

## 2022-04-11 RX ADMIN — POLYETHYLENE GLYCOL 3350 17 G: 17 POWDER, FOR SOLUTION ORAL at 09:02

## 2022-04-11 RX ADMIN — POTASSIUM CHLORIDE 20 MEQ: 1500 TABLET, EXTENDED RELEASE ORAL at 16:55

## 2022-04-11 RX ADMIN — ASPIRIN 81 MG 162 MG: 81 TABLET ORAL at 09:01

## 2022-04-11 RX ADMIN — INSULIN ASPART 5 UNITS: 100 INJECTION, SOLUTION INTRAVENOUS; SUBCUTANEOUS at 16:59

## 2022-04-11 ASSESSMENT — ACTIVITIES OF DAILY LIVING (ADL)
ADLS_ACUITY_SCORE: 7

## 2022-04-11 NOTE — PLAN OF CARE
Pt is A&Ox4, VSS ex HTN at times, on RA. Sternal incision closed with liquid bandage, ROBERTO CARLOS/CDI. [place] sites on BLE closed with liquid bandage, ROBERTO CARLOS/CDI bruising present. Mild amount of bruising on upper thighs. Dsg to old chest tube site CDI. LS dim, BS+ hypo, flatus+, BM-, voiding adequately. +1/2 edema in BLE. CMS intact. Pt is up SBA with walker, regular diet-denies N/V. Tele: NSR. PRN tylenol for pain management.

## 2022-04-11 NOTE — CONSULTS
"NUTRITION ASSESSMENT      REASON FOR ASSESSMENT:  Cardiac Surgery Nutrition Consult    CURRENT DIET / INTAKE:  Regular    Patient states his appetite is beginning to improve this morning. Ate 100% of an omelet and potatoes for breakfast. Aiming to order at least 2 meals/day. Discussed importance of protein on all meal trays to promote post op CABG healing. Offered protein shake which patient was appreciative of.     ANTHROPOMETRICS:   Ht: 5' 10\"  Wt: 103.4 kg (admit wt on 4/8)  BMI: 32.7  IBW: 75.4 kg   Weight Status: Obesity Grade I BMI 30-34.9  %IBW: 137%    MALNUTRITION:  Patient does not meet two of the following criteria necessary for diagnosing malnutrition:  significant weight loss, reduced intake, subcutaneous fat loss, muscle loss or fluid retention. Nutrition Focused Physical Assessment (NFPA) not appropriate at this time.    NUTRITION DIAGNOSIS:   Inadequate oral intake related to decreased appetite post op as evidenced by patient report of PO intake down from baseline though beginning to improve     INTERVENTIONS:    Nutrition Prescription:  Strawberry Ensure daily with lunch meal (will send at 11 am today per his request)  Encourage meals BID at minimum + 1 supplement daily - aim for at least one protein on each meal tray     Implementation:  Nutrition Education (Content):  Discussed the importance of adequate nutrition post-op for healing and energy, emphasizing protein foods, and encouraged patient to order a protein food at each meal.    Goals:  Patient to consume ~75% at meals in the next 3 - 5 days    Follow Up/Monitoring (InPatient):  Food and Fluid intake -  Monitor for adequacy    Follow Up/Monitoring (OutPatient):  Patient will participate in out-patient cardiac rehab and attend nutrition classes during the program    Monik Leach RD, LD    "

## 2022-04-11 NOTE — PROGRESS NOTES
[facility]  Cardiovascular and Thoracic Surgery Daily Note          Assessment and Plan:   POD#3 s/p Coronary artery bypass grafting x 4. Left internal mammary artery to left anterior descending artery, Reversed saphenous vein graft to right posterior descending artery, Reversed saphenous vein graft to obtuse marginal artery, Reversed saphenous vein graft to diagonal artery   Endoscopic vein harvest left and right lower extremity, Transesophageal echocardiogram by Dr. Karson Bae     -CVS: HR: 90-100s, junctional rhythm pod#1, ekg this am, will eval if BB can be started SBP: 150s/70s, ASA, statin, sub q heparin     -Resp: extubated per protocol, sating well on RA<2L NC< encourage IS     -Neuro: grossly intact, pain remains an issue, will adjust meds     -Renal: crea: 1.12<1.06<1.10, up 2 kg above pre-op weight, diuretics bid     -GI:  Continue bowel regimen     -: voiding own     -Endo: transition to sliding scale insulin, pre-op A1C 6.3     -FEN: replace lytes per protocol, Na: 139, K: 3.4, Orders Placed This Encounter      Regular Diet Adult    -ID: WBC: 8.1<8.5<14.1, Temp (24hrs), Av  F (37.2  C), Min:98.3  F (36.8  C), Max:99.7  F (37.6  C), completed cheyanne-op abx     -Heme: Hgb: 7.5<7.4<9.0, plt: 159<130<188, acute blood loss anemia, likely dilutional, thrombocytopenia      -Proph: PCDs, ASA, BB when able, statin, sub q heparin     -Dispo: St [address], continue therapies, encourage IS     Seen and discussed with Dr. Bae          Interval History:   Sitting up in chair, breathing stable, tolerating diet, working with rehab, pain controlled         Medications:       aspirin  162 mg Oral or NG Tube Daily     buPROPion  300 mg Oral QAM     furosemide  40 mg Intravenous BID     gabapentin  300 mg Oral BID     heparin ANTICOAGULANT  5,000 Units Subcutaneous Q8H     insulin aspart  1-10 Units Subcutaneous TID AC     insulin aspart  1-7 Units Subcutaneous At Bedtime     levothyroxine  125 mcg  "Oral QPM     lidocaine  1 patch Transdermal Q24H     lidocaine   Transdermal Q8H     losartan  25 mg Oral Daily     lovastatin  10 mg Oral Once per day on Mon Wed Fri     magnesium oxide  400 mg Oral BID     pantoprazole  40 mg Oral Daily     polyethylene glycol  17 g Oral Daily     senna-docusate  1 tablet Oral BID     sodium chloride (PF)  3 mL Intracatheter Q8H     @[facility] HospitalRN-@          Physical Exam:   Vitals were reviewed  Blood pressure (!) 152/81, pulse 109, temperature 98.7  F (37.1  C), temperature source Oral, resp. rate 20, height 1.778 m (5' 10\"), weight 105.4 kg (232 lb 4.8 oz), SpO2 97 %.  Rhythm: NSR    Lungs: course    Cardiovascular: s1/s2, no m/r/g    Abdomen: s/nt/nd    Extremeties: no LE edema    Incision: cdi    CT: na    Weight:   Vitals:    04/08/22 0618 04/09/22 0600 04/10/22 1514   Weight: 103.4 kg (228 lb) 109.1 kg (240 lb 8.4 oz) 105.4 kg (232 lb 4.8 oz)            Data:   Labs:   Lab Results   Component Value Date    WBC 8.1 04/11/2022    WBC 8.5 01/06/2021     Lab Results   Component Value Date    RBC 2.36 04/11/2022    RBC 4.60 01/06/2021     Lab Results   Component Value Date    HGB 7.5 04/11/2022    HGB 14.0 01/19/2021     Lab Results   Component Value Date    HCT 22.1 04/11/2022    HCT 42.8 01/06/2021     No components found for: MCT  Lab Results   Component Value Date    MCV 94 04/11/2022    MCV 93 01/06/2021     Lab Results   Component Value Date    MCH 31.8 04/11/2022    MCH 32.0 01/06/2021     Lab Results   Component Value Date    MCHC 33.9 04/11/2022    MCHC 34.3 01/06/2021     Lab Results   Component Value Date    RDW 13.0 04/11/2022    RDW 12.4 01/06/2021     Lab Results   Component Value Date     04/11/2022     01/06/2021       Last Basic Metabolic Panel:  Lab Results   Component Value Date     04/11/2022     01/06/2021      Lab Results   Component Value Date    POTASSIUM 3.4 04/11/2022    POTASSIUM 4.1 01/06/2021     Lab Results   Component Value Date "    CHLORIDE 105 04/11/2022    CHLORIDE 106 01/06/2021     Lab Results   Component Value Date    AURORA 8.8 04/11/2022    AURORA 9.6 01/06/2021     Lab Results   Component Value Date    CO2 29 04/11/2022    CO2 30 01/06/2021     Lab Results   Component Value Date    BUN 28 04/11/2022    BUN 21 01/06/2021     Lab Results   Component Value Date    CR 1.12 04/11/2022    CR 1.21 01/06/2021     Lab Results   Component Value Date     04/11/2022     04/11/2022     01/19/2021       Seen and discussed with Dr. Catalino Mcgovern, PAJereC  Pager #: [phone]

## 2022-04-11 NOTE — PLAN OF CARE
Pt. Alert and oriented x4. Vital signs stable on RA. Assist of 1, SBA with gait belt and walker. Tolerating regular diet. Lung sounds clear throughout. Bowel sounds active, passing latus. BM 4/11/2022, adequate urine output. Skin - midline sternal incision: ROBERTO CARLOS with liquid bandage, CT site dressing: clean/dry/intact, harvest sites on BLE: liquid bandages, bruising around each site from procedure. Pain managed with scheduled tylenol Denies nausea. Tele sinus tach low 100's   Lasix for BLE edema    K+ replaced x2 last level at 3.8 - next redraw in AM 4/12/22

## 2022-04-12 ENCOUNTER — APPOINTMENT (OUTPATIENT)
Dept: GENERAL RADIOLOGY | Facility: CLINIC | Age: 67
DRG: 236 | End: 2022-04-12
Attending: PHYSICIAN ASSISTANT
Payer: MEDICARE

## 2022-04-12 ENCOUNTER — APPOINTMENT (OUTPATIENT)
Dept: PHYSICAL THERAPY | Facility: CLINIC | Age: 67
DRG: 236 | End: 2022-04-12
Attending: SURGERY
Payer: MEDICARE

## 2022-04-12 LAB
ALBUMIN SERPL-MCNC: 2.8 G/DL (ref 3.4–5)
ALP SERPL-CCNC: 59 U/L (ref 40–150)
ALT SERPL W P-5'-P-CCNC: 32 U/L (ref 0–70)
ANION GAP SERPL CALCULATED.3IONS-SCNC: 6 MMOL/L (ref 3–14)
AST SERPL W P-5'-P-CCNC: 22 U/L (ref 0–45)
BILIRUB SERPL-MCNC: 0.7 MG/DL (ref 0.2–1.3)
BUN SERPL-MCNC: 34 MG/DL (ref 7–30)
CA-I BLD-MCNC: 4.6 MG/DL (ref 4.4–5.2)
CALCIUM SERPL-MCNC: 8.8 MG/DL (ref 8.5–10.1)
CHLORIDE BLD-SCNC: 105 MMOL/L (ref 94–109)
CO2 SERPL-SCNC: 27 MMOL/L (ref 20–32)
CREAT SERPL-MCNC: 1.24 MG/DL (ref 0.66–1.25)
ERYTHROCYTE [DISTWIDTH] IN BLOOD BY AUTOMATED COUNT: 12.9 % (ref 10–15)
GFR SERPL CREATININE-BSD FRML MDRD: 65 ML/MIN/1.73M2
GLUCOSE BLD-MCNC: 171 MG/DL (ref 70–99)
GLUCOSE BLDC GLUCOMTR-MCNC: 152 MG/DL (ref 70–99)
GLUCOSE BLDC GLUCOMTR-MCNC: 160 MG/DL (ref 70–99)
GLUCOSE BLDC GLUCOMTR-MCNC: 177 MG/DL (ref 70–99)
GLUCOSE BLDC GLUCOMTR-MCNC: 185 MG/DL (ref 70–99)
GLUCOSE BLDC GLUCOMTR-MCNC: 207 MG/DL (ref 70–99)
HCT VFR BLD AUTO: 23.9 % (ref 40–53)
HGB BLD-MCNC: 8 G/DL (ref 13.3–17.7)
MAGNESIUM SERPL-MCNC: 2.2 MG/DL (ref 1.6–2.3)
MCH RBC QN AUTO: 31.6 PG (ref 26.5–33)
MCHC RBC AUTO-ENTMCNC: 33.5 G/DL (ref 31.5–36.5)
MCV RBC AUTO: 95 FL (ref 78–100)
PHOSPHATE SERPL-MCNC: 3.6 MG/DL (ref 2.5–4.5)
PLATELET # BLD AUTO: 202 10E3/UL (ref 150–450)
POTASSIUM BLD-SCNC: 3.9 MMOL/L (ref 3.4–5.3)
PROT SERPL-MCNC: 6.1 G/DL (ref 6.8–8.8)
RBC # BLD AUTO: 2.53 10E6/UL (ref 4.4–5.9)
SODIUM SERPL-SCNC: 138 MMOL/L (ref 133–144)
WBC # BLD AUTO: 7.7 10E3/UL (ref 4–11)

## 2022-04-12 PROCEDURE — 250N000011 HC RX IP 250 OP 636: Performed by: PHYSICIAN ASSISTANT

## 2022-04-12 PROCEDURE — 120N000001 HC R&B MED SURG/OB

## 2022-04-12 PROCEDURE — 85027 COMPLETE CBC AUTOMATED: CPT | Performed by: PHYSICIAN ASSISTANT

## 2022-04-12 PROCEDURE — 97530 THERAPEUTIC ACTIVITIES: CPT | Mod: GP | Performed by: PHYSICAL THERAPIST

## 2022-04-12 PROCEDURE — 84100 ASSAY OF PHOSPHORUS: CPT | Performed by: PHYSICIAN ASSISTANT

## 2022-04-12 PROCEDURE — 250N000013 HC RX MED GY IP 250 OP 250 PS 637: Performed by: PHYSICIAN ASSISTANT

## 2022-04-12 PROCEDURE — 97110 THERAPEUTIC EXERCISES: CPT | Mod: GP | Performed by: PHYSICAL THERAPIST

## 2022-04-12 PROCEDURE — 80053 COMPREHEN METABOLIC PANEL: CPT | Performed by: PHYSICIAN ASSISTANT

## 2022-04-12 PROCEDURE — 250N000013 HC RX MED GY IP 250 OP 250 PS 637: Performed by: SURGERY

## 2022-04-12 PROCEDURE — 71045 X-RAY EXAM CHEST 1 VIEW: CPT

## 2022-04-12 PROCEDURE — 82330 ASSAY OF CALCIUM: CPT | Performed by: PHYSICIAN ASSISTANT

## 2022-04-12 PROCEDURE — 83735 ASSAY OF MAGNESIUM: CPT | Performed by: PHYSICIAN ASSISTANT

## 2022-04-12 PROCEDURE — 36415 COLL VENOUS BLD VENIPUNCTURE: CPT | Performed by: PHYSICIAN ASSISTANT

## 2022-04-12 RX ORDER — FUROSEMIDE 40 MG
40 TABLET ORAL
Status: DISCONTINUED | OUTPATIENT
Start: 2022-04-12 | End: 2022-04-13

## 2022-04-12 RX ORDER — METOPROLOL TARTRATE 25 MG/1
25 TABLET, FILM COATED ORAL 2 TIMES DAILY
Status: DISCONTINUED | OUTPATIENT
Start: 2022-04-12 | End: 2022-04-13 | Stop reason: HOSPADM

## 2022-04-12 RX ORDER — METHOCARBAMOL 500 MG/1
500 TABLET, FILM COATED ORAL EVERY 6 HOURS PRN
Status: DISCONTINUED | OUTPATIENT
Start: 2022-04-12 | End: 2022-04-13 | Stop reason: HOSPADM

## 2022-04-12 RX ORDER — CONTAINER,EMPTY
EACH MISCELLANEOUS
Qty: 1 EACH | Refills: 0 | Status: SHIPPED | OUTPATIENT
Start: 2022-04-12

## 2022-04-12 RX ORDER — CEPHALEXIN 500 MG/1
500 CAPSULE ORAL EVERY 8 HOURS
Qty: 15 CAPSULE | Refills: 0 | Status: SHIPPED | OUTPATIENT
Start: 2022-04-12 | End: 2022-04-17

## 2022-04-12 RX ORDER — ASPIRIN 81 MG/1
162 TABLET, CHEWABLE ORAL DAILY
Status: DISCONTINUED | OUTPATIENT
Start: 2022-04-12 | End: 2022-04-13 | Stop reason: HOSPADM

## 2022-04-12 RX ORDER — METOPROLOL TARTRATE 25 MG/1
12.5 TABLET, FILM COATED ORAL 2 TIMES DAILY
Qty: 60 TABLET | Refills: 0 | Status: SHIPPED | OUTPATIENT
Start: 2022-04-12 | End: 2022-04-13

## 2022-04-12 RX ORDER — METHOCARBAMOL 750 MG/1
750 TABLET, FILM COATED ORAL EVERY 6 HOURS PRN
Qty: 45 TABLET | Refills: 0 | Status: SHIPPED | OUTPATIENT
Start: 2022-04-12 | End: 2022-04-13

## 2022-04-12 RX ORDER — ACETAMINOPHEN 325 MG/1
650 TABLET ORAL EVERY 6 HOURS PRN
Qty: 100 TABLET | Refills: 0 | Status: SHIPPED | OUTPATIENT
Start: 2022-04-12 | End: 2023-03-20

## 2022-04-12 RX ORDER — FUROSEMIDE 40 MG
40 TABLET ORAL 2 TIMES DAILY
Qty: 6 TABLET | Refills: 0 | Status: SHIPPED | OUTPATIENT
Start: 2022-04-12 | End: 2022-04-13

## 2022-04-12 RX ORDER — AMOXICILLIN 250 MG
1-2 CAPSULE ORAL 2 TIMES DAILY PRN
Qty: 30 TABLET | Refills: 0 | Status: SHIPPED | OUTPATIENT
Start: 2022-04-12 | End: 2023-03-20

## 2022-04-12 RX ORDER — BLOOD PRESSURE TEST KIT
KIT MISCELLANEOUS
Qty: 100 EACH | Refills: 0 | Status: SHIPPED | OUTPATIENT
Start: 2022-04-12

## 2022-04-12 RX ORDER — POTASSIUM CHLORIDE 1500 MG/1
20 TABLET, EXTENDED RELEASE ORAL 2 TIMES DAILY
Qty: 6 TABLET | Refills: 0 | Status: SHIPPED | OUTPATIENT
Start: 2022-04-12 | End: 2022-04-13

## 2022-04-12 RX ORDER — POTASSIUM CHLORIDE 1500 MG/1
20 TABLET, EXTENDED RELEASE ORAL 2 TIMES DAILY
Status: DISCONTINUED | OUTPATIENT
Start: 2022-04-12 | End: 2022-04-13 | Stop reason: HOSPADM

## 2022-04-12 RX ORDER — OXYCODONE HYDROCHLORIDE 5 MG/1
5 TABLET ORAL EVERY 6 HOURS PRN
Qty: 10 TABLET | Refills: 0 | Status: SHIPPED | OUTPATIENT
Start: 2022-04-12 | End: 2023-03-20

## 2022-04-12 RX ORDER — LOSARTAN POTASSIUM 50 MG/1
50 TABLET ORAL DAILY
Qty: 135 TABLET | Refills: 1
Start: 2022-04-12 | End: 2022-10-14

## 2022-04-12 RX ORDER — CEPHALEXIN 500 MG/1
500 CAPSULE ORAL EVERY 8 HOURS SCHEDULED
Status: DISCONTINUED | OUTPATIENT
Start: 2022-04-12 | End: 2022-04-13 | Stop reason: HOSPADM

## 2022-04-12 RX ADMIN — METHOCARBAMOL 750 MG: 750 TABLET ORAL at 08:43

## 2022-04-12 RX ADMIN — METFORMIN HYDROCHLORIDE 1000 MG: 500 TABLET ORAL at 17:50

## 2022-04-12 RX ADMIN — HEPARIN SODIUM 5000 UNITS: 5000 INJECTION, SOLUTION INTRAVENOUS; SUBCUTANEOUS at 05:44

## 2022-04-12 RX ADMIN — METFORMIN HYDROCHLORIDE 500 MG: 500 TABLET ORAL at 08:37

## 2022-04-12 RX ADMIN — ACETAMINOPHEN 650 MG: 325 TABLET ORAL at 12:42

## 2022-04-12 RX ADMIN — FUROSEMIDE 40 MG: 40 TABLET ORAL at 16:21

## 2022-04-12 RX ADMIN — INSULIN ASPART 2 UNITS: 100 INJECTION, SOLUTION INTRAVENOUS; SUBCUTANEOUS at 08:50

## 2022-04-12 RX ADMIN — LIDOCAINE 1 PATCH: 560 PATCH PERCUTANEOUS; TOPICAL; TRANSDERMAL at 08:37

## 2022-04-12 RX ADMIN — SENNOSIDES AND DOCUSATE SODIUM 1 TABLET: 50; 8.6 TABLET ORAL at 08:37

## 2022-04-12 RX ADMIN — ASPIRIN 81 MG CHEWABLE TABLET 162 MG: 81 TABLET CHEWABLE at 08:43

## 2022-04-12 RX ADMIN — GABAPENTIN 300 MG: 300 CAPSULE ORAL at 08:37

## 2022-04-12 RX ADMIN — GABAPENTIN 300 MG: 300 CAPSULE ORAL at 21:05

## 2022-04-12 RX ADMIN — FUROSEMIDE 40 MG: 40 TABLET ORAL at 08:43

## 2022-04-12 RX ADMIN — PANTOPRAZOLE SODIUM 40 MG: 40 TABLET, DELAYED RELEASE ORAL at 08:37

## 2022-04-12 RX ADMIN — METOPROLOL TARTRATE 25 MG: 25 TABLET, FILM COATED ORAL at 21:05

## 2022-04-12 RX ADMIN — METHOCARBAMOL 750 MG: 750 TABLET ORAL at 02:24

## 2022-04-12 RX ADMIN — LEVOTHYROXINE SODIUM 125 MCG: 125 TABLET ORAL at 21:05

## 2022-04-12 RX ADMIN — HEPARIN SODIUM 5000 UNITS: 5000 INJECTION, SOLUTION INTRAVENOUS; SUBCUTANEOUS at 21:04

## 2022-04-12 RX ADMIN — CEPHALEXIN 500 MG: 500 CAPSULE ORAL at 21:05

## 2022-04-12 RX ADMIN — POTASSIUM CHLORIDE 20 MEQ: 1500 TABLET, EXTENDED RELEASE ORAL at 21:05

## 2022-04-12 RX ADMIN — Medication 400 MG: at 08:38

## 2022-04-12 RX ADMIN — CEPHALEXIN 500 MG: 500 CAPSULE ORAL at 08:58

## 2022-04-12 RX ADMIN — METOPROLOL TARTRATE 12.5 MG: 25 TABLET, FILM COATED ORAL at 08:43

## 2022-04-12 RX ADMIN — POLYETHYLENE GLYCOL 3350 17 G: 17 POWDER, FOR SOLUTION ORAL at 08:37

## 2022-04-12 RX ADMIN — METFORMIN HYDROCHLORIDE 500 MG: 500 TABLET ORAL at 09:29

## 2022-04-12 RX ADMIN — HEPARIN SODIUM 5000 UNITS: 5000 INJECTION, SOLUTION INTRAVENOUS; SUBCUTANEOUS at 14:30

## 2022-04-12 RX ADMIN — LOSARTAN POTASSIUM 50 MG: 50 TABLET, FILM COATED ORAL at 08:38

## 2022-04-12 RX ADMIN — POTASSIUM CHLORIDE 20 MEQ: 1500 TABLET, EXTENDED RELEASE ORAL at 08:58

## 2022-04-12 RX ADMIN — BUPROPION HYDROCHLORIDE 300 MG: 300 TABLET, EXTENDED RELEASE ORAL at 08:37

## 2022-04-12 RX ADMIN — CEPHALEXIN 500 MG: 500 CAPSULE ORAL at 14:30

## 2022-04-12 ASSESSMENT — ACTIVITIES OF DAILY LIVING (ADL)
ADLS_ACUITY_SCORE: 8
ADLS_ACUITY_SCORE: 10
ADLS_ACUITY_SCORE: 7
ADLS_ACUITY_SCORE: 10
ADLS_ACUITY_SCORE: 7
ADLS_ACUITY_SCORE: 7
ADLS_ACUITY_SCORE: 10
ADLS_ACUITY_SCORE: 7
ADLS_ACUITY_SCORE: 10
ADLS_ACUITY_SCORE: 8
ADLS_ACUITY_SCORE: 10
ADLS_ACUITY_SCORE: 7
ADLS_ACUITY_SCORE: 7
ADLS_ACUITY_SCORE: 8
ADLS_ACUITY_SCORE: 10
ADLS_ACUITY_SCORE: 7
ADLS_ACUITY_SCORE: 7
ADLS_ACUITY_SCORE: 10
ADLS_ACUITY_SCORE: 7
ADLS_ACUITY_SCORE: 8
ADLS_ACUITY_SCORE: 10
ADLS_ACUITY_SCORE: 8

## 2022-04-12 NOTE — PLAN OF CARE
DATE & TIME: 4/12/2022 8050-3146    Cognitive Concerns/ Orientation : A&O x4   BEHAVIOR & AGGRESSION TOOL COLOR: green   CIWA SCORE: na    ABNL VS/O2: vss, on RA, lungs clear.   MOBILITY: Ax1 with GB/WK. In chair for meals. Ambulated in unit with staff 3-4 x this shift.   PAIN MANAGMENT: pain well controlled with tylenol and Robaxin  DIET: regular, good appetite.   BOWEL/BLADDER: continent   ABNL LAB/BG: /152. All electrolytes WDL. Recheck in am.   DRAIN/DEVICES: PIV SL.   TELEMETRY RHYTHM: NSR   SKIN: Sternal incision and CT sites, graft sites CDL. 2+ BLE edema with bruises.   TESTS/PROCEDURES:   D/C DATE: in am   Discharge Barriers: felt dizzy, light headed and hot flash when walking with PT this am, discharge on hold, until possible in am.

## 2022-04-12 NOTE — PLAN OF CARE
Assumed care [id number]. A/Ox4, appeared to sleep well overnight. Using urinal independently. PRN robaxin given for mild pain. Using home CPAP for sleep, remains on room air. Tele SR/ST. BG check was 160. Wt down 3 lbs from yesterday.

## 2022-04-12 NOTE — PROGRESS NOTES
Cardiovascular Surgery Progress Note  2022         Assessment and Plan:     POD#4 s/p Coronary artery bypass grafting x 4. Left internal mammary artery to left anterior descending artery, Reversed saphenous vein graft to right posterior descending artery, Reversed saphenous vein graft to obtuse marginal artery, Reversed saphenous vein graft to diagonal artery   Endoscopic vein harvest left and right lower extremity, Transesophageal echocardiogram by Dr. Karson Bae     -CVS: HR: 90-100s, junctional rhythm pod#1, ekg this am, will eval if BB can be started SBP: 150s/70s, ASA, statin, sub q heparin     -Resp: extubated per protocol, sating well on RA<2L NC< encourage IS     -Neuro: grossly intact, pain remains an issue, will adjust meds     -Renal: crea: 1.24, is now - 2 lbs from pre-op weight, diuretics PO bid     -GI:  Continue bowel regimen     -: voiding without issues     -Endo: transition to sliding scale insulin, pre-op A1C 6.3.  Metformin 1000 mg PO BID     -FEN: replace lytes per protocol, Na: 139, K: 3.4, Orders Placed This Encounter      Regular Diet Adult     -ID: WBC: 7.7, Temp (24hrs), Av  F (37.2  C), Min:98.3  F (36.8  C), Max:99.7  F (37.6  C), completed cheyanne-op abx     -Heme: Hgb: 8.0, plt: WNL, acute blood loss anemia, likely dilutional, thrombocytopenia resolved     -Proph: PCDs, ASA, BB, statin, sub q heparin     -Dispo: [facility], continue therapies, encourage IS  - Discharge  if tolerating therapy sessions.     Discussed with Dr Bae through both written and verbal communication.      Lonnie Kc PA-C  Cardiothoracic Surgery  Pager [phone]    11:39 AM   2022          Interval History:     No overnight events.  Had an episode of hot flash and lightheadedness with Rehab this AM, felt syncopal but did not faint, felt like he lost all his energy at once. Had been given robaxin this AM and started metoprolol, was neg fluid negative 2 L yesterday.    States pain  "is well managed on current regimen. Slept well overnight.  Tolerating diet, is passing flatus, + BM. No nausea or vomiting.  Breathing well without complaints, still with productive cough.   Working with therapies and ambulating in halls without assistance.   Denies chest pain, palpitations, dizziness, syncopal symptoms, fevers, chills, myalgias, or sternal popping/clicking.         Physical Exam:   Blood pressure 125/70, pulse 90, temperature 99.2  F (37.3  C), temperature source Oral, resp. rate 16, height 1.778 m (5' 10\"), weight 102.1 kg (225 lb), SpO2 97 %.  Vitals:    04/10/22 1514 04/11/22 1100 04/12/22 0211   Weight: 105.4 kg (232 lb 4.8 oz) 103.7 kg (228 lb 11.2 oz) 102.1 kg (225 lb)      Weight; - 3 lbs since admit and trending down.   24 hr Fluid status; net loss 2 L. UOP 3 L    Gen: A&Ox4, NAD  Neuro: no focal deficits   CV: RRR, normal S1 S2, no murmurs, rubs or gallops. Productive cough.  Pulm: CTA, no wheezing or rhonchi, normal breathing on RA  Abd: nondistended, normal BS, soft, nontender  Ext: moderate peripheral edema, 1+ pitting. Left thigh with extensive bruising along SV harvest site.  Incision: mild pink irritation of sternal incision. Overall clean, dry, intact, sternum stable.   Tubes/drain sites: dressing clean and dry         Data:    Imaging:  reviewed recent imaging, no acute concerns    Labs:  [facility]  Recent Labs   Lab 04/12/22  0803 04/12/22  0540 04/12/22  0217 04/11/22  2110 04/11/22  1659 04/11/22  1405 04/11/22  0728 04/11/22  0535 04/10/22  0951 04/10/22  0538   NA  --  138  --   --   --  133  --  139  --  136   POTASSIUM  --  3.9  --   --   --  3.8  --  3.4  --  4.0   CHLORIDE  --  105  --   --   --  101  --  105  --  105   AURORA  --  8.8  --   --   --  8.7  --  8.8  --  8.3*   CO2  --  27  --   --   --  23  --  29  --  25   BUN  --  34*  --   --   --  34*  --  28  --  21   CR  --  1.24  --   --   --  1.13  --  1.12  --  1.06   * 171* 160* 165*   < > 308*   < > 184*   < > 181* "    < > = values in this interval not displayed.     CBC  Recent Labs   Lab 04/12/22  0540 04/11/22  0535 04/10/22  1627 04/10/22  0538   WBC 7.7 8.1 8.3 8.5   RBC 2.53* 2.36* 2.40* 2.34*   HGB 8.0* 7.5* 8.1* 7.4*   HCT 23.9* 22.1* 23.0* 21.5*   MCV 95 94 96 92   MCH 31.6 31.8 33.8* 31.6   MCHC 33.5 33.9 35.2 34.4   RDW 12.9 13.0 12.8 12.9    159 164 130*     INR  Recent Labs   Lab 04/08/22  1726 04/08/22  1545   INR 1.24* 1.36*      Hepatic Panel  Recent Labs   Lab 04/12/22  0540 04/11/22  0535 04/10/22  0538 04/09/22  0357   AST 22 19 29 49*   ALT 32 25 27 36   ALKPHOS 59 51 47 46   BILITOTAL 0.7 0.4 0.5 0.5   ALBUMIN 2.8* 2.9* 2.7* 3.3*     GLUCOSE:   Recent Labs   Lab 04/12/22  0803 04/12/22  0540 04/12/22  0217 04/11/22  2110 04/11/22  1659 04/11/22  1405   * 171* 160* 165* 240* 308*

## 2022-04-12 NOTE — PROGRESS NOTES
Pt watched heart videos with his wife and his sister today.    Showered this evening, CT dressing removed.    Had large BM before giving mag citrate so that was held.

## 2022-04-12 NOTE — DISCHARGE INSTRUCTIONS
AFTER YOU GO HOME FROM YOUR HEART SURGERY  (4 vessel coronary artery bypass 4/8/22 with Dr Karson Bae)    You had a sternotomy, avoid lifting anything greater than ten pounds for 8 weeks after surgery and then less than 20 pounds for an additional 4 weeks. Do not reach backwards or use arms to push out of chair. Do not let people pull on your arms to assist with standing. Avoid twisting or reaching too far across your body.  Avoid strenuous activities such as bowling, vacuuming, raking, shoveling, golf or tennis for 12 weeks after your surgery. It is okay to resume sex if you feel comfortable in doing so. You may have to try different positions with your partner.  Splint your chest incision by hugging a pillow or bringing your arms across your chest when coughing or sneezing. Please try to sleep on your back for the first 4-6 weeks to avoid extra stress on your sternum (breastbone) while it is healing.     No driving for 4 weeks after surgery or while on pain medication.     Shower or wash your incisions twice daily with soap and water (or as instructed), pat dry. Keep wound clean and dry, showers are okay after discharge, but don't let spray hit directly on incision. No baths or swimming for 1 month. Cover chest tube sites with gauze until they stop draining, then leave open to air. It is not abnormal for chest tube sites to drain yellowish/clear fluid for up to 2-3 weeks after surgery.   Watch for signs of infection: increased redness, tenderness, warmth or any drainage from sternum incision.  Also a temperature > 100.5 F or chills. Call your surgeon or primary care provider's office immediately. Remove any skin glue left on incisions after 10-14 days. This will not affect your incision and can speed up healing.    Exercise is very important in your recovery. Please follow the guidelines set up for you in your cardiac rehab classes at the hospital. If outpatient cardiac rehab was ordered for you, we  highly recommend you participate. If you have problems arranging your cardiac rehab, please call [phone] for all locations, with the exception of [place], please call [phone] and [facility] [place], please call [phone].    Avoid sitting for prolonged periods of time, try to walk every hour during the day. If you have a leg incision, elevate your leg often when you are not walking.    Check your weight when you get home from the hospital and continue to check it daily through your recovery for at least a month. If you notice a weight gain of 2-3 pounds in a week, notify your primary care physician, cardiologist or surgeon.    Bowel activity may be slow after surgery. If necessary, you may take an over the counter laxative such as Milk of Magnesia or Miralax. You may have stool softeners prescribed (docusate sodium, Senokot). We recommend using stool softeners while using narcotics for pain (oxycodone/percocet, hydrocodone/vicodin, hydromorphone/dilaudid).      Wean OFF of narcotics (oxycodone, dilaudid, hydrocodone) as soon as possible. You should continue taking acetaminophen as long as you have any surgical pain as the first choice for pain control and add narcotics as necessary for pain to be tolerable.      DO NOT SMOKE.  IF YOU NEED HELP QUITTING, PLEASE TALK WITH YOUR CARDIOLOGIST OR PRIMARY DOCTOR.    REGARDING PRESCRIPTION REFILLS.  If you need a refill on your pain medication contact us to discuss your pain and a possible one time refill.   All other medications will be adjusted, discontinued and re-filled by your primary care physician and/or your cardiologist as they were prior to your surgery. We have given you enough for one to three month with possibly one refill.    POST-OPERATIVE CLINIC VISITS  You have a follow up visit with [facility] Advance Care Practitioners on 4/27/22 at the [facility].  You will then return to the care of your primary provider and your cardiologist. Future  medication refills should come from your PCP or Cardiologist.   You should see your primary care provider in 2-4 weeks after discharge.   It is important to see your cardiologist about 4-6 weeks after discharge.    If you do not hear from a  in 7 days, please call [phone] (choose option 1) and request to be seen with a general cardiologist or someone that you have seen in the past.   If there is a need to return to see CT Surgery, please call our  Alaina Mendoza at [phone].     SURGICAL QUESTIONS  Please call Susi Smith with surgical recovery and medication questions, their phone numbers are listed below.  They will assist you with your needs and contact other surgery care team members as indicated.    On weekends or after hours, please call [phone] and ask the  to page the Cardiothoracic Surgery fellow on call.      Thank you,    Your Cardiothoracic Surgery Team  Susi Smith RN Care Coordinator-  [phone]

## 2022-04-13 ENCOUNTER — APPOINTMENT (OUTPATIENT)
Dept: PHYSICAL THERAPY | Facility: CLINIC | Age: 67
DRG: 236 | End: 2022-04-13
Attending: SURGERY
Payer: MEDICARE

## 2022-04-13 VITALS
SYSTOLIC BLOOD PRESSURE: 124 MMHG | HEART RATE: 82 BPM | TEMPERATURE: 97.3 F | OXYGEN SATURATION: 94 % | BODY MASS INDEX: 31.9 KG/M2 | DIASTOLIC BLOOD PRESSURE: 63 MMHG | WEIGHT: 222.8 LBS | RESPIRATION RATE: 18 BRPM | HEIGHT: 70 IN

## 2022-04-13 PROBLEM — D62 ANEMIA DUE TO BLOOD LOSS, ACUTE: Status: ACTIVE | Noted: 2022-04-13

## 2022-04-13 PROBLEM — Z95.1 S/P CABG (CORONARY ARTERY BYPASS GRAFT): Status: ACTIVE | Noted: 2022-04-13

## 2022-04-13 PROBLEM — E87.70 FLUID OVERLOAD: Status: ACTIVE | Noted: 2022-04-13

## 2022-04-13 LAB
ALBUMIN SERPL-MCNC: 3.1 G/DL (ref 3.4–5)
ALP SERPL-CCNC: 69 U/L (ref 40–150)
ALT SERPL W P-5'-P-CCNC: 43 U/L (ref 0–70)
ANION GAP SERPL CALCULATED.3IONS-SCNC: 7 MMOL/L (ref 3–14)
AST SERPL W P-5'-P-CCNC: 24 U/L (ref 0–45)
ATRIAL RATE - MUSE: 102 BPM
ATRIAL RATE - MUSE: 77 BPM
ATRIAL RATE - MUSE: 91 BPM
BASE EXCESS BLDA CALC-SCNC: -1 MMOL/L (ref -9.6–2)
BASE EXCESS BLDA CALC-SCNC: -1.5 MMOL/L (ref -9.6–2)
BASE EXCESS BLDA CALC-SCNC: -2.2 MMOL/L (ref -9.6–2)
BASE EXCESS BLDA CALC-SCNC: -2.6 MMOL/L (ref -9.6–2)
BASE EXCESS BLDA CALC-SCNC: -3.3 MMOL/L (ref -9.6–2)
BASE EXCESS BLDA CALC-SCNC: -4.1 MMOL/L (ref -9.6–2)
BASE EXCESS BLDA CALC-SCNC: -4.2 MMOL/L (ref -9.6–2)
BASE EXCESS BLDV CALC-SCNC: -2 MMOL/L (ref -8.1–1.9)
BILIRUB SERPL-MCNC: 0.5 MG/DL (ref 0.2–1.3)
BUN SERPL-MCNC: 41 MG/DL (ref 7–30)
CA-I BLD-MCNC: 4.1 MG/DL (ref 4.4–5.2)
CA-I BLD-MCNC: 4.1 MG/DL (ref 4.4–5.2)
CA-I BLD-MCNC: 4.3 MG/DL (ref 4.4–5.2)
CA-I BLD-MCNC: 4.7 MG/DL (ref 4.4–5.2)
CA-I BLD-MCNC: 4.8 MG/DL (ref 4.4–5.2)
CALCIUM SERPL-MCNC: 9.4 MG/DL (ref 8.5–10.1)
CHLORIDE BLD-SCNC: 104 MMOL/L (ref 94–109)
CO2 SERPL-SCNC: 26 MMOL/L (ref 20–32)
CREAT SERPL-MCNC: 1.25 MG/DL (ref 0.66–1.25)
DIASTOLIC BLOOD PRESSURE - MUSE: NORMAL MMHG
ERYTHROCYTE [DISTWIDTH] IN BLOOD BY AUTOMATED COUNT: 12.9 % (ref 10–15)
GFR SERPL CREATININE-BSD FRML MDRD: 64 ML/MIN/1.73M2
GLUCOSE BLD-MCNC: 134 MG/DL (ref 70–99)
GLUCOSE BLD-MCNC: 149 MG/DL (ref 70–99)
GLUCOSE BLD-MCNC: 155 MG/DL (ref 70–99)
GLUCOSE BLD-MCNC: 157 MG/DL (ref 70–99)
GLUCOSE BLD-MCNC: 162 MG/DL (ref 70–99)
GLUCOSE BLD-MCNC: 174 MG/DL (ref 70–99)
GLUCOSE BLD-MCNC: 175 MG/DL (ref 70–99)
GLUCOSE BLD-MCNC: 176 MG/DL (ref 70–99)
GLUCOSE BLD-MCNC: 180 MG/DL (ref 70–99)
GLUCOSE BLDC GLUCOMTR-MCNC: 149 MG/DL (ref 70–99)
GLUCOSE BLDC GLUCOMTR-MCNC: 150 MG/DL (ref 70–99)
GLUCOSE BLDC GLUCOMTR-MCNC: 219 MG/DL (ref 70–99)
HCO3 BLDA-SCNC: 23 MMOL/L (ref 21–28)
HCO3 BLDA-SCNC: 24 MMOL/L (ref 21–28)
HCO3 BLDA-SCNC: 25 MMOL/L (ref 21–28)
HCO3 BLDV-SCNC: 25 MMOL/L (ref 21–28)
HCT VFR BLD AUTO: 25.4 % (ref 40–53)
HGB BLD-MCNC: 11.3 G/DL (ref 13.3–17.7)
HGB BLD-MCNC: 11.4 G/DL (ref 13.3–17.7)
HGB BLD-MCNC: 7.3 G/DL (ref 13.3–17.7)
HGB BLD-MCNC: 7.6 G/DL (ref 13.3–17.7)
HGB BLD-MCNC: 8.1 G/DL (ref 13.3–17.7)
HGB BLD-MCNC: 8.5 G/DL (ref 13.3–17.7)
HGB BLD-MCNC: 9 G/DL (ref 13.3–17.7)
INTERPRETATION ECG - MUSE: NORMAL
LACTATE BLD-SCNC: 2.3 MMOL/L
LACTATE BLD-SCNC: 2.6 MMOL/L
LACTATE BLD-SCNC: 2.8 MMOL/L
LACTATE BLD-SCNC: 2.8 MMOL/L
LACTATE BLD-SCNC: 3 MMOL/L
LACTATE BLD-SCNC: 3.3 MMOL/L
LACTATE BLD-SCNC: 3.7 MMOL/L
LACTATE BLD-SCNC: 4.3 MMOL/L
MAGNESIUM SERPL-MCNC: 2.1 MG/DL (ref 1.6–2.3)
MCH RBC QN AUTO: 31.8 PG (ref 26.5–33)
MCHC RBC AUTO-ENTMCNC: 33.5 G/DL (ref 31.5–36.5)
MCV RBC AUTO: 95 FL (ref 78–100)
O2/TOTAL GAS SETTING VFR VENT: 100 %
O2/TOTAL GAS SETTING VFR VENT: 80 %
O2/TOTAL GAS SETTING VFR VENT: 90 %
P AXIS - MUSE: 49 DEGREES
P AXIS - MUSE: 57 DEGREES
P AXIS - MUSE: 78 DEGREES
PCO2 BLDA: 40 MM HG (ref 35–45)
PCO2 BLDA: 40 MM HG (ref 35–45)
PCO2 BLDA: 42 MM HG (ref 35–45)
PCO2 BLDA: 44 MM HG (ref 35–45)
PCO2 BLDA: 48 MM HG (ref 35–45)
PCO2 BLDA: 49 MM HG (ref 35–45)
PCO2 BLDA: 55 MM HG (ref 35–45)
PCO2 BLDV: 53 MM HG (ref 40–50)
PH BLDA: 7.23 [PH] (ref 7.35–7.45)
PH BLDA: 7.27 [PH] (ref 7.35–7.45)
PH BLDA: 7.3 [PH] (ref 7.35–7.45)
PH BLDA: 7.35 [PH] (ref 7.35–7.45)
PH BLDA: 7.36 [PH] (ref 7.35–7.45)
PH BLDA: 7.37 [PH] (ref 7.35–7.45)
PH BLDA: 7.38 [PH] (ref 7.35–7.45)
PH BLDV: 7.28 [PH] (ref 7.32–7.43)
PHOSPHATE SERPL-MCNC: 4.3 MG/DL (ref 2.5–4.5)
PLATELET # BLD AUTO: 239 10E3/UL (ref 150–450)
PO2 BLDA: 215 MM HG (ref 80–105)
PO2 BLDA: 276 MM HG (ref 80–105)
PO2 BLDA: 294 MM HG (ref 80–105)
PO2 BLDA: 347 MM HG (ref 80–105)
PO2 BLDA: 387 MM HG (ref 80–105)
PO2 BLDA: 402 MM HG (ref 80–105)
PO2 BLDA: 455 MM HG (ref 80–105)
PO2 BLDV: 39 MM HG (ref 25–47)
POTASSIUM BLD-SCNC: 3.9 MMOL/L (ref 3.4–5.3)
POTASSIUM BLD-SCNC: 4.3 MMOL/L (ref 3.5–5)
POTASSIUM BLD-SCNC: 4.4 MMOL/L (ref 3.5–5)
POTASSIUM BLD-SCNC: 4.5 MMOL/L (ref 3.5–5)
POTASSIUM BLD-SCNC: 4.7 MMOL/L (ref 3.5–5)
POTASSIUM BLD-SCNC: 5 MMOL/L (ref 3.5–5)
POTASSIUM BLD-SCNC: 5.1 MMOL/L (ref 3.5–5)
POTASSIUM BLD-SCNC: 5.1 MMOL/L (ref 3.5–5)
POTASSIUM BLD-SCNC: 5.5 MMOL/L (ref 3.5–5)
PR INTERVAL - MUSE: 128 MS
PR INTERVAL - MUSE: 166 MS
PR INTERVAL - MUSE: 192 MS
PROT SERPL-MCNC: 6.6 G/DL (ref 6.8–8.8)
QRS DURATION - MUSE: 80 MS
QRS DURATION - MUSE: 80 MS
QRS DURATION - MUSE: 86 MS
QT - MUSE: 372 MS
QT - MUSE: 376 MS
QT - MUSE: 376 MS
QTC - MUSE: 425 MS
QTC - MUSE: 462 MS
QTC - MUSE: 484 MS
R AXIS - MUSE: 12 DEGREES
R AXIS - MUSE: 36 DEGREES
R AXIS - MUSE: 67 DEGREES
RBC # BLD AUTO: 2.67 10E6/UL (ref 4.4–5.9)
SODIUM BLD-SCNC: 138 MMOL/L (ref 133–144)
SODIUM BLD-SCNC: 139 MMOL/L (ref 133–144)
SODIUM BLD-SCNC: 139 MMOL/L (ref 133–144)
SODIUM BLD-SCNC: 141 MMOL/L (ref 133–144)
SODIUM SERPL-SCNC: 137 MMOL/L (ref 133–144)
SYSTOLIC BLOOD PRESSURE - MUSE: NORMAL MMHG
T AXIS - MUSE: 11 DEGREES
T AXIS - MUSE: 21 DEGREES
T AXIS - MUSE: 35 DEGREES
VENTRICULAR RATE- MUSE: 102 BPM
VENTRICULAR RATE- MUSE: 77 BPM
VENTRICULAR RATE- MUSE: 91 BPM
WBC # BLD AUTO: 9.3 10E3/UL (ref 4–11)

## 2022-04-13 PROCEDURE — 250N000011 HC RX IP 250 OP 636: Performed by: PHYSICIAN ASSISTANT

## 2022-04-13 PROCEDURE — 82330 ASSAY OF CALCIUM: CPT | Performed by: PHYSICIAN ASSISTANT

## 2022-04-13 PROCEDURE — 84100 ASSAY OF PHOSPHORUS: CPT | Performed by: PHYSICIAN ASSISTANT

## 2022-04-13 PROCEDURE — 36415 COLL VENOUS BLD VENIPUNCTURE: CPT | Performed by: PHYSICIAN ASSISTANT

## 2022-04-13 PROCEDURE — 83735 ASSAY OF MAGNESIUM: CPT | Performed by: PHYSICIAN ASSISTANT

## 2022-04-13 PROCEDURE — 85027 COMPLETE CBC AUTOMATED: CPT | Performed by: PHYSICIAN ASSISTANT

## 2022-04-13 PROCEDURE — 97530 THERAPEUTIC ACTIVITIES: CPT | Mod: GP

## 2022-04-13 PROCEDURE — 80053 COMPREHEN METABOLIC PANEL: CPT | Performed by: PHYSICIAN ASSISTANT

## 2022-04-13 PROCEDURE — 250N000013 HC RX MED GY IP 250 OP 250 PS 637: Performed by: PHYSICIAN ASSISTANT

## 2022-04-13 PROCEDURE — 97110 THERAPEUTIC EXERCISES: CPT | Mod: GP

## 2022-04-13 RX ORDER — METHOCARBAMOL 500 MG/1
500 TABLET, FILM COATED ORAL EVERY 6 HOURS PRN
Qty: 40 TABLET | Refills: 0 | Status: SHIPPED | OUTPATIENT
Start: 2022-04-13 | End: 2023-03-20

## 2022-04-13 RX ORDER — METOPROLOL TARTRATE 25 MG/1
25 TABLET, FILM COATED ORAL 2 TIMES DAILY
Qty: 60 TABLET | Refills: 3 | Status: SHIPPED | OUTPATIENT
Start: 2022-04-13 | End: 2022-05-18

## 2022-04-13 RX ADMIN — HEPARIN SODIUM 5000 UNITS: 5000 INJECTION, SOLUTION INTRAVENOUS; SUBCUTANEOUS at 14:39

## 2022-04-13 RX ADMIN — GABAPENTIN 300 MG: 300 CAPSULE ORAL at 08:00

## 2022-04-13 RX ADMIN — POLYETHYLENE GLYCOL 3350 17 G: 17 POWDER, FOR SOLUTION ORAL at 08:00

## 2022-04-13 RX ADMIN — SENNOSIDES AND DOCUSATE SODIUM 1 TABLET: 50; 8.6 TABLET ORAL at 08:00

## 2022-04-13 RX ADMIN — BUPROPION HYDROCHLORIDE 300 MG: 300 TABLET, EXTENDED RELEASE ORAL at 08:00

## 2022-04-13 RX ADMIN — ACETAMINOPHEN 650 MG: 325 TABLET ORAL at 14:38

## 2022-04-13 RX ADMIN — LOSARTAN POTASSIUM 50 MG: 50 TABLET, FILM COATED ORAL at 08:00

## 2022-04-13 RX ADMIN — HEPARIN SODIUM 5000 UNITS: 5000 INJECTION, SOLUTION INTRAVENOUS; SUBCUTANEOUS at 05:46

## 2022-04-13 RX ADMIN — ACETAMINOPHEN 650 MG: 325 TABLET ORAL at 07:55

## 2022-04-13 RX ADMIN — CEPHALEXIN 500 MG: 500 CAPSULE ORAL at 14:39

## 2022-04-13 RX ADMIN — ASPIRIN 81 MG CHEWABLE TABLET 162 MG: 81 TABLET CHEWABLE at 08:00

## 2022-04-13 RX ADMIN — METFORMIN HYDROCHLORIDE 1000 MG: 500 TABLET ORAL at 08:00

## 2022-04-13 RX ADMIN — ACETAMINOPHEN 650 MG: 325 TABLET ORAL at 01:41

## 2022-04-13 RX ADMIN — METOPROLOL TARTRATE 25 MG: 25 TABLET, FILM COATED ORAL at 08:00

## 2022-04-13 RX ADMIN — POTASSIUM CHLORIDE 20 MEQ: 1500 TABLET, EXTENDED RELEASE ORAL at 08:00

## 2022-04-13 RX ADMIN — CEPHALEXIN 500 MG: 500 CAPSULE ORAL at 05:47

## 2022-04-13 RX ADMIN — LIDOCAINE 1 PATCH: 560 PATCH PERCUTANEOUS; TOPICAL; TRANSDERMAL at 07:56

## 2022-04-13 RX ADMIN — LOVASTATIN 10 MG: 10 TABLET ORAL at 08:07

## 2022-04-13 RX ADMIN — FUROSEMIDE 40 MG: 40 TABLET ORAL at 07:56

## 2022-04-13 RX ADMIN — PANTOPRAZOLE SODIUM 40 MG: 40 TABLET, DELAYED RELEASE ORAL at 08:00

## 2022-04-13 ASSESSMENT — ACTIVITIES OF DAILY LIVING (ADL)
ADLS_ACUITY_SCORE: 8
DEPENDENT_IADLS:: INDEPENDENT
ADLS_ACUITY_SCORE: 8

## 2022-04-13 NOTE — PROGRESS NOTES
CV Surgery    S: No acute events overnight. Saturating well on room air. Pain controlled. Tolerating diet. +BM    O: B/P: 150/76, T: 98.4, P: 105, R: 18  General: NAD  Heart: RRR normal s1 and s2   Lungs: diminished bases   Abd: soft NTND  Sternum: CDI  Extremities: 1+ lower extremity edema and bruising at [facility] sites    Lab Results   Component Value Date    WBC 9.3 04/13/2022    WBC 8.5 01/06/2021     Lab Results   Component Value Date    RBC 2.67 04/13/2022    RBC 4.60 01/06/2021     Lab Results   Component Value Date    HGB 8.5 04/13/2022    HGB 14.0 01/19/2021     Lab Results   Component Value Date    HCT 25.4 04/13/2022    HCT 42.8 01/06/2021     No components found for: MCT  Lab Results   Component Value Date    MCV 95 04/13/2022    MCV 93 01/06/2021     Lab Results   Component Value Date    MCH 31.8 04/13/2022    MCH 32.0 01/06/2021     Lab Results   Component Value Date    MCHC 33.5 04/13/2022    MCHC 34.3 01/06/2021     Lab Results   Component Value Date    RDW 12.9 04/13/2022    RDW 12.4 01/06/2021     Lab Results   Component Value Date     04/13/2022     01/06/2021       Last Basic Metabolic Panel:  Lab Results   Component Value Date     04/13/2022     01/06/2021      Lab Results   Component Value Date    POTASSIUM 3.9 04/13/2022    POTASSIUM 4.1 01/06/2021     Lab Results   Component Value Date    CHLORIDE 104 04/13/2022    CHLORIDE 106 01/06/2021     Lab Results   Component Value Date    AURORA 9.4 04/13/2022    AURORA 9.6 01/06/2021     Lab Results   Component Value Date    CO2 26 04/13/2022    CO2 30 01/06/2021     Lab Results   Component Value Date    BUN 41 04/13/2022    BUN 21 01/06/2021     Lab Results   Component Value Date    CR 1.25 04/13/2022    CR 1.21 01/06/2021     Lab Results   Component Value Date     04/13/2022     04/13/2022     01/19/2021       A/P: POD#5 s/p Coronary artery bypass grafting x 4. Left internal mammary artery to left anterior  descending artery, Reversed saphenous vein graft to right posterior descending artery, Reversed saphenous vein graft to obtuse marginal artery, Reversed saphenous vein graft to diagonal artery   Endoscopic vein harvest left and right lower extremity, Transesophageal echocardiogram by Dr. Karson Bae    Stable for discharge to home today    Fidelia Corley PA-C  Pager [phone]

## 2022-04-13 NOTE — DISCHARGE SUMMARY
Discharge Summary    Joel Pike MRN# [id number]   YOB: 1955 Age: 66 year old     Date of Admission:  4/8/2022  Date of Discharge:  4/13/2022  4:28 PM  Admitting Physician:  Karson Bae MD  Discharge Physician:  Karson Bae MD  Discharging Service:  Cardiovascular and Thoracic Surgery     Home clinic:   [address] [place] [address] , [facility] [address]      Primary Phone: [phone] Primary Fax: [phone]       Primary Provider: Nishi Lin          Admission Diagnoses:   CAD (coronary artery disease) [I25.10]  Atherosclerosis of native coronary artery of native heart with stable angina pectoris (H) [I25.118]          Discharge Diagnosis:   Patient Active Problem List   Diagnosis     Gastroesophageal reflux disease without esophagitis     Degeneration of lumbar or lumbosacral intervertebral disc     Chronic rhinitis     Hyperlipidemia LDL goal <100     HILARIO (obstructive sleep apnea)     Tinnitus     Advanced directives, counseling/discussion     Other specified hypothyroidism     Type 2 diabetes mellitus with stage 2 chronic kidney disease (H)     CKD (chronic kidney disease) stage 2, GFR 60-89 ml/min     Obesity, Class I, BMI 30-34.9     Recurrent major depression in complete remission (H)     Microalbuminuria     S/P total knee replacement using cement, left     Primary osteoarthritis of right knee     S/P total knee replacement using cement, right     Status post total right knee replacement     Prostate cancer (H)     Morbid obesity (H)     Essential hypertension     Status post coronary angiogram     Atherosclerosis of native coronary artery of native heart with stable angina pectoris (H)     S/P CABG (coronary artery bypass graft)     Fluid overload     Anemia due to blood loss, acute                Discharge Disposition:   Discharged to home           Condition on Discharge:   Discharge condition: Stable   Discharge vitals: Blood pressure 124/63, pulse 82, temperature 97.3  F  "(36.3  C), temperature source Oral, resp. rate 18, height 1.778 m (5' 10\"), weight 101.1 kg (222 lb 12.8 oz), SpO2 94 %.     Code status on discharge: Full Code           Procedures:   On 4/8/22, Joel Pike underwent successful Coronary artery bypass grafting x 4. Left internal mammary artery to left anterior descending artery, Reversed saphenous vein graft to right posterior descending artery, Reversed saphenous vein graft to obtuse marginal artery, Reversed saphenous vein graft to diagonal artery. Endoscopic vein harvest left and right lower extremity, Transesophageal echocardiogram by Dr. Karson Bae          Medications Prior to Admission:     Facility-Administered Medications Prior to Admission   Medication Dose Route Frequency Provider Last Rate Last Admin     leuprolide (ELIGARD) kit 45 mg  45 mg Subcutaneous Q6 Months Alexei Ott MD   45 mg at 02/16/22 0746     Medications Prior to Admission   Medication Sig Dispense Refill Last Dose     apalutamide (ERLEADA) 60 MG tablet Take 240 mg by mouth daily (4 x 60 mg = 240 mg) 120 tablet 11 4/2/2022 at am     buPROPion (WELLBUTRIN XL) 150 MG 24 hr tablet Take 2 tablets (300 mg) by mouth every morning 90 tablet 1 4/7/2022 at am     Coenzyme Q-10 capsule Take 1 capsule by mouth daily. 30 capsule 12 4/2/2022 at am     ezetimibe (ZETIA) 10 MG tablet Take 1 tablet (10 mg) by mouth daily 90 tablet 3 4/7/2022 at pm     fish oil-omega-3 fatty acids 1000 MG capsule Take 1 g by mouth daily  180 capsule 12 4/2/2022 at am     levothyroxine (SYNTHROID/LEVOTHROID) 125 MCG tablet Take 1 tablet (125 mcg) by mouth daily 90 tablet 0 4/7/2022 at pm     lovastatin (MEVACOR) 20 MG tablet Take 1/2 tablet 3 days a week (Patient taking differently: Take 10 mg by mouth three times a week (0.5 x 20 mg = 10 mg Monday, Wednesday, and Friday)) 30 tablet 0 4/6/2022 at am     [facility] (OSTEO BI-FLEX ADV JOINT SHIELD) TABS Take 1 tablet by mouth daily    4/2/2022 at am "     Multiple Vitamins-Minerals (PRESERVISION AREDS PO) Take 1 tablet by mouth 2 times daily   4/6/2022 at pm     nitroGLYcerin (NITROSTAT) 0.4 MG sublingual tablet For chest pain place 1 tablet under the tongue every 5 minutes for 3 doses. If symptoms persist 5 minutes after 1st dose call 911. 15 tablet 1  at prn     pantoprazole (PROTONIX) 40 MG EC tablet Take 1 tablet (40 mg) by mouth daily Please stop the nexium 90 tablet 3 4/7/2022 at am     alcohol swab prep pads Use to swab area of injection/pankaj as directed. 100 each 3      blood glucose (HUGO CONTOUR) test strip Use to test blood sugars 1 time daily or as directed. 100 strip 0      blood glucose (NO BRAND SPECIFIED) lancets standard Use to test blood sugar one time daily or as directed. 100 each 3      blood glucose calibration (HUGO CONTOUR) NORMAL solution Use to calibrate blood glucose monitor as directed. 1 each 3      [DISCONTINUED] aspirin (ASA) 81 MG EC tablet Take 1 tablet (81 mg) by mouth daily   4/7/2022 at am     [DISCONTINUED] blood glucose monitoring (NO BRAND SPECIFIED) meter device kit Use to test blood sugar 1-2 times daily or as directed. Preferred blood glucose meter OR supplies to accompany: Blood Glucose Monitor Brands: per insurance. 1 kit 0      [DISCONTINUED] losartan (COZAAR) 50 MG tablet Take 1.5 tablets (75 mg) by mouth daily 135 tablet 1 4/7/2022 at am     [DISCONTINUED] metFORMIN (GLUCOPHAGE) 500 MG tablet TAKE ONE TABLET BY MOUTH TWICE A DAY WITH MEALS 180 tablet 3 4/7/2022 at pm             Discharge Medications:     Current Discharge Medication List      START taking these medications    Details   acetaminophen (TYLENOL) 325 MG tablet Take 2 tablets (650 mg) by mouth every 6 hours as needed for mild pain or fever  Qty: 100 tablet, Refills: 0    Associated Diagnoses: S/P CABG x 4      !! Alcohol Swabs PADS Use to swab the area of the injection or pankaj as directed Per insurance coverage  Qty: 100 each, Refills: 0     Associated Diagnoses: S/P CABG x 4      cephALEXin (KEFLEX) 500 MG capsule Take 1 capsule (500 mg) by mouth in the morning and 1 capsule (500 mg) at noon and 1 capsule (500 mg) in the evening. Do all this for 5 days.  Qty: 15 capsule, Refills: 0    Associated Diagnoses: S/P CABG x 4      methocarbamol (ROBAXIN) 500 MG tablet Take 1 tablet (500 mg) by mouth every 6 hours as needed for muscle spasms  Qty: 40 tablet, Refills: 0    Associated Diagnoses: S/P CABG (coronary artery bypass graft)      metoprolol tartrate (LOPRESSOR) 25 MG tablet Take 1 tablet (25 mg) by mouth 2 times daily  Qty: 60 tablet, Refills: 3    Associated Diagnoses: S/P CABG (coronary artery bypass graft)      oxyCODONE (ROXICODONE) 5 MG tablet Take 1 tablet (5 mg) by mouth every 6 hours as needed for moderate to severe pain  Qty: 10 tablet, Refills: 0    Associated Diagnoses: S/P CABG x 4      potassium chloride ER (KLOR-CON M) 20 MEQ CR tablet Take 1 tablet (20 mEq) by mouth 2 times daily for 3 days while taking lasix  Qty: 6 tablet, Refills: 0    Associated Diagnoses: S/P CABG x 4      senna-docusate (SENOKOT-S/PERICOLACE) 8.6-50 MG tablet Take 1-2 tablets by mouth 2 times daily as needed for constipation  Qty: 30 tablet, Refills: 0    Associated Diagnoses: S/P CABG x 4      Sharps Container MISC Use as directed to dispose of needles, lancets and other sharps Per Insurance coverage  Qty: 1 each, Refills: 0    Associated Diagnoses: S/P CABG x 4       !! - Potential duplicate medications found. Please discuss with provider.      CONTINUE these medications which have CHANGED    Details   aspirin (ASA) 81 MG EC tablet Take 2 tablets (162 mg) by mouth in the morning.  Qty: 60 tablet, Refills: 0    Associated Diagnoses: Abnormal stress test; Chest pain, unspecified type      !! blood glucose monitoring (NO BRAND SPECIFIED) meter device kit Use as directed Per insurance coverage  Qty: 1 kit, Refills: 0    Associated Diagnoses: S/P CABG x 4       losartan (COZAAR) 50 MG tablet Take 1 tablet (50 mg) by mouth in the morning.  Qty: 135 tablet, Refills: 1    Associated Diagnoses: Type 2 diabetes mellitus with stage 2 chronic kidney disease, without long-term current use of insulin (H)      metFORMIN (GLUCOPHAGE) 1000 MG tablet Take 1 tablet (1,000 mg) by mouth 2 times daily (with meals)    Associated Diagnoses: S/P CABG x 4       !! - Potential duplicate medications found. Please discuss with provider.      CONTINUE these medications which have NOT CHANGED    Details   apalutamide (ERLEADA) 60 MG tablet Take 240 mg by mouth daily (4 x 60 mg = 240 mg)  Qty: 120 tablet, Refills: 11    Comments: Free drug approved through 12/31/2022.  Associated Diagnoses: Prostate cancer (H)      !! blood glucose (NO BRAND SPECIFIED) lancets standard To use to test glucose level in the blood Use to test blood sugar 1 times daily as directed. To accompany glucose monitor brands per insurance coverage.  Qty: 100 each, Refills: 0    Associated Diagnoses: S/P CABG x 4      !! blood glucose (NO BRAND SPECIFIED) test strip To use to test glucose level in the blood Use to test blood sugar 1 times daily as directed. To accompany glucose monitor brands per insurance coverage.  Qty: 100 strip, Refills: 0    Associated Diagnoses: S/P CABG x 4      !! blood glucose monitoring (NO BRAND SPECIFIED) meter device kit Use to test blood sugar 1 times daily or as directed. Preferred blood glucose meter OR supplies to accompany: Blood Glucose Monitor Brands: per insurance.  Qty: 1 kit, Refills: 0    Associated Diagnoses: Type 2 diabetes mellitus with stage 2 chronic kidney disease, without long-term current use of insulin (H)      buPROPion (WELLBUTRIN XL) 150 MG 24 hr tablet Take 2 tablets (300 mg) by mouth every morning  Qty: 90 tablet, Refills: 1    Associated Diagnoses: Recurrent major depression in complete remission (H)      Coenzyme Q-10 capsule Take 1 capsule by mouth daily.  Qty: 30  capsule, Refills: 12      ezetimibe (ZETIA) 10 MG tablet Take 1 tablet (10 mg) by mouth daily  Qty: 90 tablet, Refills: 3    Associated Diagnoses: Type 2 diabetes mellitus with stage 2 chronic kidney disease, without long-term current use of insulin (H); Hyperlipidemia LDL goal <100      fish oil-omega-3 fatty acids 1000 MG capsule Take 1 g by mouth daily   Qty: 180 capsule, Refills: 12      levothyroxine (SYNTHROID/LEVOTHROID) 125 MCG tablet Take 1 tablet (125 mcg) by mouth daily  Qty: 90 tablet, Refills: 0      lovastatin (MEVACOR) 20 MG tablet Take 1/2 tablet 3 days a week  Qty: 30 tablet, Refills: 0    Associated Diagnoses: Hyperlipidemia LDL goal <100      Misc Natural Products (OSTEO BI-FLEX ADV JOINT SHIELD) TABS Take 1 tablet by mouth daily       Multiple Vitamins-Minerals (PRESERVISION AREDS PO) Take 1 tablet by mouth 2 times daily      nitroGLYcerin (NITROSTAT) 0.4 MG sublingual tablet For chest pain place 1 tablet under the tongue every 5 minutes for 3 doses. If symptoms persist 5 minutes after 1st dose call 911.  Qty: 15 tablet, Refills: 1    Associated Diagnoses: Abnormal stress test; Chest pain, unspecified type      pantoprazole (PROTONIX) 40 MG EC tablet Take 1 tablet (40 mg) by mouth daily Please stop the nexium  Qty: 90 tablet, Refills: 3    Associated Diagnoses: Gastroesophageal reflux disease with esophagitis without hemorrhage      !! alcohol swab prep pads Use to swab area of injection/pankaj as directed.  Qty: 100 each, Refills: 3    Associated Diagnoses: Type 2 diabetes mellitus with stage 2 chronic kidney disease, without long-term current use of insulin (H)      !! blood glucose (HUGO CONTOUR) test strip Use to test blood sugars 1 time daily or as directed.  Qty: 100 strip, Refills: 0    Comments: Pt due for diabetes check  Associated Diagnoses: Type 2 diabetes mellitus with stage 2 chronic kidney disease, without long-term current use of insulin (H)      !! blood glucose (NO BRAND  SPECIFIED) lancets standard Use to test blood sugar one time daily or as directed.  Qty: 100 each, Refills: 3    Associated Diagnoses: Type 2 diabetes mellitus with stage 2 chronic kidney disease, without long-term current use of insulin (H)      blood glucose calibration (HUGO CONTOUR) NORMAL solution Use to calibrate blood glucose monitor as directed.  Qty: 1 each, Refills: 3    Comments: Profile Rx: patient will contact pharmacy when needed  Associated Diagnoses: Type 2 diabetes mellitus with stage 2 chronic kidney disease, without long-term current use of insulin (H)       !! - Potential duplicate medications found. Please discuss with provider.                Consultations:   Nutrition, Intensivist             Brief History of Illness:     Mr. JOEL PIKE is a 66 year old male admitted with chest pain at rest found to have severe coronary artery disease. His stress ECHO was positive followed by angio showing multi vessel disease. We were asked to evaluate for surgical revascularization. Risks and benefits of the operation were explained to the patient and their family including, but not limited to, bleeding, infection, stroke and even death. They understood these risks and agreed to proceed electively.          Hospital Course:   On 4/8/22, Joel Pike underwent successful Coronary artery bypass grafting x 4. Left internal mammary artery to left anterior descending artery, Reversed saphenous vein graft to right posterior descending artery, Reversed saphenous vein graft to obtuse marginal artery, Reversed saphenous vein graft to diagonal artery. Endoscopic vein harvest left and right lower extremity, Transesophageal echocardiogram by Dr. Karson Bae    He was extubated within 24 hours of surgery per protocol and once weaned from hemodynamic stabilizing infusions was transitioned to surgical telemetry floor. He was in a junctional rhythm on POD#1 which recovered and BB was started.     He was fluid  overloaded and treated with diuretic therapies. At discharge he is below preoperative weight. No further diuretics prescribed at discharge.     He has history of diabetes and and treated with insulin infusion then transitioned to sliding scale insulin per protocol. At discharge he is resumed on Metformin and encouraged to follow up with his PCP in 1-2 weeks.    His chest tubes and temporary pacing wires were removed when appropriate and post removal CXR was stable.    He was started on cephalexin for concern for cellulitis and was prescribed a 5 day course which he will complete as an outpatient.     He has worked well with therapies and is stable for discharge to home with the help of his wife. He has CAD and will discharge on ASA, BB, statin. Follow up has been arranged with cardiac rehab, cardiac surgery and cardiology. Patient encouraged to follow up with PCP. Discharge teaching provided to patient and wife. All questions and concerns addressed.              Significant Results:   Lab Results   Component Value Date    WBC 9.3 04/13/2022    WBC 8.5 01/06/2021     Lab Results   Component Value Date    RBC 2.67 04/13/2022    RBC 4.60 01/06/2021     Lab Results   Component Value Date    HGB 8.5 04/13/2022    HGB 14.0 01/19/2021     Lab Results   Component Value Date    HCT 25.4 04/13/2022    HCT 42.8 01/06/2021     No components found for: MCT  Lab Results   Component Value Date    MCV 95 04/13/2022    MCV 93 01/06/2021     Lab Results   Component Value Date    MCH 31.8 04/13/2022    MCH 32.0 01/06/2021     Lab Results   Component Value Date    MCHC 33.5 04/13/2022    MCHC 34.3 01/06/2021     Lab Results   Component Value Date    RDW 12.9 04/13/2022    RDW 12.4 01/06/2021     Lab Results   Component Value Date     04/13/2022     01/06/2021       Last Basic Metabolic Panel:  Lab Results   Component Value Date     04/13/2022     01/06/2021      Lab Results   Component Value Date    POTASSIUM  3.9 04/13/2022    POTASSIUM 4.1 01/06/2021     Lab Results   Component Value Date    CHLORIDE 104 04/13/2022    CHLORIDE 106 01/06/2021     Lab Results   Component Value Date    AURORA 9.4 04/13/2022    AURORA 9.6 01/06/2021     Lab Results   Component Value Date    CO2 26 04/13/2022    CO2 30 01/06/2021     Lab Results   Component Value Date    BUN 41 04/13/2022    BUN 21 01/06/2021     Lab Results   Component Value Date    CR 1.25 04/13/2022    CR 1.21 01/06/2021     Lab Results   Component Value Date     04/13/2022     04/13/2022     01/19/2021                  Pending Results:   None           Discharge Instructions and Follow-Up:   Discharge diet: Regular   Discharge activity: Daily weights: Call if weight gain 2-3 lbs over 24 hours or if greater than 5 lbs in 1 week.  No lifting more than 10 lbs for 8-12 weeks.  No driving for 1 month.  Call for pain medication refill.  Call for temperature greater than 101.0  Daily shower with antibacterial soap.   Discharge follow-up: Follow-up and recommended labs and tests     *Follow up primary care provider in 7-10 days after discharge in order to review your medication, vital signs, obtain any necessary lab work your doctor may want, and to update them on your hospitalization and medical issues.   *Follow up with Valencia/Fidelia David with Dr Bae, heart surgeon, on 4/27/22 at 2pm at [facility] at [facility] [address]. If any questions or concerns call [phone].  You will see us once at this visit and then if everything is going well you will not need to see us again.  You will follow long term with your cardiologist.   *Follow up with Dr Murphy, cardiologist, on 6/22/22. This is who you will follow with long term about your heart issues. [phone].   *Please follow up with outpatient cardiac rehab on 4/20/22 at 10am, call [phone] if you need to reschedule.            Outpatient therapy: Cardiac rehab   Home Care agency:  None    Supplies and equipment: None   Lines and drains: None    Wound care: Wash incision daily with antibacterial soap   Other instructions: None

## 2022-04-13 NOTE — CONSULTS
Care Management Initial Consult    General Information  Assessment completed with: Patient,    Type of CM/SW Visit: Offer D/C Planning    Primary Care Provider verified and updated as needed: Yes   Readmission within the last 30 days: no previous admission in last 30 days      Reason for Consult: discharge planning, care coordination/care conference  Advance Care Planning: Advance Care Planning Reviewed: no concerns identified     General Information Comments: High readmission risk    Communication Assessment  Patient's communication style: spoken language (English or Bilingual)    Hearing Difficulty or Deaf: no   Wear Glasses or Blind: no    Cognitive  Cognitive/Neuro/Behavioral: WDL  Level of Consciousness: alert  Arousal Level: opens eyes spontaneously  Orientation: oriented x 4  Mood/Behavior: behavior appropriate to situation, cooperative, calm  Best Language: 0 - No aphasia  Speech: clear, spontaneous, logical    Living Environment:   People in home: spouse     Current living Arrangements: house      Able to return to prior arrangements: yes  Living Arrangement Comments: home is one level    Family/Social Support:  Care provided by: self  Provides care for: no one  Marital Status:   Wife          Description of Support System: Supportive         Current Resources:   Patient receiving home care services: No     Community Resources: None  Equipment currently used at home: none  Supplies currently used at home: None    Employment/Financial:  Employment Status: employed part-time     Employment/ Comments: Retired after 40 yrs and now working PT in the [employer]  Financial Concerns:             Lifestyle & Psychosocial Needs:  Social Determinants of Health     Tobacco Use: Low Risk      Smoking Tobacco Use: Never Smoker     Smokeless Tobacco Use: Never Used   Alcohol Use: Not on file   Financial Resource Strain: Not on file   Food Insecurity: Not on file   Transportation Needs: Not on file    Physical Activity: Not on file   Stress: Not on file   Social Connections: Not on file   Intimate Partner Violence: Not on file   Depression: Not at risk     PHQ-2 Score: 2   Housing Stability: Not on file       Functional Status:  Prior to admission patient needed assistance:   Dependent ADLs:: Independent  Dependent IADLs:: Independent  Assesssment of Functional Status: Not at  functional baseline    Mental Health Status:  Mental Health Status: No Current Concerns       Chemical Dependency Status:  Chemical Dependency Status: No Current Concerns             Values/Beliefs:  Spiritual, Cultural Beliefs, Scientologist Practices, Values that affect care:   NA             Additional Information:  Care Coordinator met with pt and explained role.  Planning discharge home today.  Pt resides in a one level home with his spouse.  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  Pt is a diabetic.  His glucometer is not functioning.  Nsg will educate pt on new glucometer before discharge.  Pt wanted more information on a HCD and this was given to him.  PCP follow-up has been scheduled on 5/4/22 at 10:25 AM.  Pt's spouse will be providing transportation home.    Lay Davis RN   Inpatient Care Management  [phone]

## 2022-04-13 NOTE — PROGRESS NOTES
A&O x4, VSS. Pain controlled with Prn tylenol. Tele:SR. Sternal incision WDL, BLE [place] and old CT sites WDL. BLE Edema +2. CMS intact. LS: clear/diminished. BS: audible +void, -bm. Up SBA w/walker.

## 2022-04-13 NOTE — PROGRESS NOTES
Discharge    Patient discharged to home via own transporaiton with wife.   Care plan note: vss, mild incisional pain, PRN tylenol twice. On RA. Lungs diminished on bases. SBA with GB/WK, following sternal precaution. Denied dizziness with ambulation. NSR. +BS, voiding well. Good appetite. Discharge info given to pt and wife. Both verbalized understanding. Pt is to follow up with PCP and cardiology as scheduled. Meds filled here and given to pt. Pt discharged in stable condition.     Listed belongings gathered and given to patient (including from security/pharmacy). Yes  Care Plan and Patient education resolved: Yes  Prescriptions if needed, hard copies sent with patient  NA  Medication Bin checked and emptied on discharge Yes  SW/care coordinator/charge RN aware of discharge: Yes

## 2022-04-13 NOTE — PROGRESS NOTES
I met with patient and his mother. Post op instructions reviewed. All questions addressed. Plan home with out patient cardiac rehabilitation. Will follow along.

## 2022-04-13 NOTE — PLAN OF CARE
Physical Therapy Discharge Summary    Reason for therapy discharge:    Discharged to home with outpatient therapy.    Progress towards therapy goal(s). See goals on Care Plan in [facility] electronic health record for goal details.  Goals partially met.  Barriers to achieving goals:   discharge from facility.    Therapy recommendation(s):    Continued therapy is recommended.  Rationale/Recommendations:  To further increase endurance and for cardiac education.

## 2022-04-13 NOTE — PROGRESS NOTES
Care Management Discharge Note    Discharge Date: 04/13/2022       Discharge Disposition: Home    Discharge Services: Outpatient Rehab, Cardiac    Discharge DME: Glucometer    Discharge Transportation: family or friend will provide    Private pay costs discussed: Not applicable    PAS Confirmation Code:  NA  Patient/family educated on Medicare website which has current facility and service quality ratings:  NA    Education Provided on the Discharge Plan: yes   Persons Notified of Discharge Plans: Pt, Nsg  Patient/Family in Agreement with the Plan: yes    Handoff Referral Completed: Yes    Additional Information:  Pt is discharging home this afternoon.  He will have OPCR.    Appointments have been scheduled.  Pt still has some questions to address with Nsg.  Pt's spouse is here.    Lay Davis, RN   Inpatient Care Management  [phone]

## 2022-04-14 ENCOUNTER — TELEPHONE (OUTPATIENT)
Dept: OTHER | Facility: CLINIC | Age: 67
End: 2022-04-14
Payer: MEDICARE

## 2022-04-14 ENCOUNTER — PATIENT OUTREACH (OUTPATIENT)
Dept: CARE COORDINATION | Facility: CLINIC | Age: 67
End: 2022-04-14
Payer: MEDICARE

## 2022-04-14 DIAGNOSIS — Z95.1 S/P CABG X 4: ICD-10-CM

## 2022-04-14 LAB
ATRIAL RATE - MUSE: 91 BPM
DIASTOLIC BLOOD PRESSURE - MUSE: NORMAL MMHG
INTERPRETATION ECG - MUSE: NORMAL
P AXIS - MUSE: 65 DEGREES
PR INTERVAL - MUSE: 150 MS
QRS DURATION - MUSE: 82 MS
QT - MUSE: 360 MS
QTC - MUSE: 442 MS
R AXIS - MUSE: 24 DEGREES
SYSTOLIC BLOOD PRESSURE - MUSE: NORMAL MMHG
T AXIS - MUSE: 21 DEGREES
VENTRICULAR RATE- MUSE: 91 BPM

## 2022-04-14 NOTE — TELEPHONE ENCOUNTER
Patient discharged 4/13. No questions or concerns. Medications reviewed. Instructed pt I will be out of office 4/15 so  call leave message if concerns. Otherwise I follow up with patient on 4/18.

## 2022-04-14 NOTE — PROGRESS NOTES
Clinic Care Coordination Contact  [facility]/Voicemail       Clinical Data: Care Coordinator Outreach  Outreach attempted x 1.  Left message on patient's voicemail with call back information and requested return call.  Plan:Care Coordinator will try to reach patient again in 1-2 business days.    Georgina SANDYN, RN, PHN, [facility]  Primary Clinic Care Coordination    [facility]  Primary Care Clinics  [email or web address]   Office: [phone]  Employed by [employer]

## 2022-04-15 ENCOUNTER — PATIENT OUTREACH (OUTPATIENT)
Dept: FAMILY MEDICINE | Facility: CLINIC | Age: 67
End: 2022-04-15
Payer: MEDICARE

## 2022-04-15 DIAGNOSIS — Z95.1 S/P CABG X 4: ICD-10-CM

## 2022-04-15 DIAGNOSIS — I25.118 ATHEROSCLEROSIS OF NATIVE CORONARY ARTERY OF NATIVE HEART WITH STABLE ANGINA PECTORIS (H): Primary | ICD-10-CM

## 2022-04-15 DIAGNOSIS — Z79.4 TYPE 2 DIABETES MELLITUS WITH STAGE 2 CHRONIC KIDNEY DISEASE, WITH LONG-TERM CURRENT USE OF INSULIN (H): ICD-10-CM

## 2022-04-15 DIAGNOSIS — E11.22 TYPE 2 DIABETES MELLITUS WITH STAGE 2 CHRONIC KIDNEY DISEASE, WITH LONG-TERM CURRENT USE OF INSULIN (H): ICD-10-CM

## 2022-04-15 DIAGNOSIS — N18.2 TYPE 2 DIABETES MELLITUS WITH STAGE 2 CHRONIC KIDNEY DISEASE, WITH LONG-TERM CURRENT USE OF INSULIN (H): ICD-10-CM

## 2022-04-15 ASSESSMENT — ACTIVITIES OF DAILY LIVING (ADL): DEPENDENT_IADLS:: INDEPENDENT

## 2022-04-15 NOTE — LETTER
[facility]  Patient Centered Plan of Care  About Me:        Patient Name:  Joel Pike    YOB: 1955  Age:         66 year old   [place] MRN:    [id number] Telephone Information:  Home Phone [phone]   Mobile [phone]       Address:  [address] [address] [facility] [address] Email address:  [email or web address]      Emergency Contact(s)    Name Relationship Lgl Grd Work Phone Home Phone Mobile Phone   1. KISHA PIKE Spouse   [phone]   2. MARIAM PIKE Son   [phone]   3. DEWAYNE PIKE Daughter-in-Law    [phone]           Primary language:  English     needed? No   [place] [employer]:  [phone] op. 1  Other communication barriers:None    Preferred Method of Communication:  Heatherhart  Current living arrangement: I live in a private home with spouse    Mobility Status/ Medical Equipment: Independent    Health Maintenance  Health Maintenance Reviewed:   Health Maintenance Due   Topic Date Due     COVID-19 Vaccine (4 - Booster for Moderna series) 02/28/2022       My Access Plan  Medical Emergency 911   Primary Clinic Line [facility] - [phone]   24 Hour Appointment Line [phone] or  [phone] (toll-free)   24 Hour Nurse Line [phone] (toll-free)   Preferred Urgent Care No data recorded   Preferred Hospital [facility]  [phone]     Preferred Pharmacy [facility] [place], [place] - [address] [place] [address]      Behavioral Health Crisis Line The National Suicide Prevention Lifeline at 1-705.997.2270 or 911     My Care Team Members  Patient Care Team       Relationship Specialty Notifications Start End    Nishi Lin MD PCP - General Family Medicine  11/2/21     Phone: [phone] Fax: [phone] [address] [place] [address]    Nishi Lin MD Assigned PCP   1/17/21     Phone: [phone] Fax:  [phone]         [address] [place] [address]    Alexei Ott MD Assigned Surgical Provider   4/11/21     Phone: [phone] Fax: [phone] [address] [address]    Alden Montanez APRN CNP Assigned Musculoskeletal Provider   8/22/21     Phone: [phone] Fax: [phone]         [address] [place] [address]    Isaias Solo MD Assigned Cancer Care Provider   9/5/21     Phone: [phone] Fax: [phone]         [address] [facility] [address]    Kamala Murphy MD MD Cardiovascular Disease  4/8/22     Phone: [phone] Pager: [phone] Fax: [phone]        [facility] HEART AT [address] [address] [facility] [address]    Sujatha Gallegos, RN Lead Care Coordinator Primary Care - CC Admissions 4/15/22     Phone: [phone]                 My Care Plans  Self Management and Treatment Plan  Goals and (Comments)   Goals        General    I will reduce risk of complications related to my heart surgery     Goal Statement: I will reduce risk of complications related to my heart heart surgery  Date Goal set: 4/15/2022  Barriers: Diabetes and other complex medical diagnoses  Strengths: motivated and engaged, supportive wife  Date to Achieve By: 8/1/2022  Patient expressed understanding of goal: yes  Action steps to achieve this goal:  1. Take my medications as prescribed  2. Eat a low cholesterol, low sodium diet, diabetic diet  3. Cardiac Rehab as recommended  4  Attend recommended follow up appts  5. Monitor for sob, chest pain, nausea, fever, or any signs of bleeding.  5. I will use my nitroglycerin if I have any chest pain and call 911 if taken x three.             Action Plans on File:   Depression    Advance Care Plans/Directives Type:   No data recorded    My Medical and Care Information  Problem List   Patient Active Problem List   Diagnosis     Gastroesophageal reflux disease without esophagitis     Degeneration of lumbar  or lumbosacral intervertebral disc     Chronic rhinitis     Hyperlipidemia LDL goal <100     HILARIO (obstructive sleep apnea)     Tinnitus     Advanced directives, counseling/discussion     Other specified hypothyroidism     Type 2 diabetes mellitus with stage 2 chronic kidney disease (H)     CKD (chronic kidney disease) stage 2, GFR 60-89 ml/min     Obesity, Class I, BMI 30-34.9     Recurrent major depression in complete remission (H)     Microalbuminuria     S/P total knee replacement using cement, left     Primary osteoarthritis of right knee     S/P total knee replacement using cement, right     Status post total right knee replacement     Prostate cancer (H)     Morbid obesity (H)     Essential hypertension     Status post coronary angiogram     Atherosclerosis of native coronary artery of native heart with stable angina pectoris (H)     S/P CABG (coronary artery bypass graft)     Fluid overload     Anemia due to blood loss, acute      Current Medications and Allergies:  See printed Medication Report.    Care Coordination Start Date: 4/15/2022   Frequency of Care Coordination: weekly   Form Last Updated: 04/15/2022

## 2022-04-15 NOTE — LETTER
[facility]  Patient Centered Plan of Care  About Me:        Patient Name:  Joel Pike    YOB: 1955  Age:         66 year old   [place] MRN:    [id number] Telephone Information:  Home Phone [phone]   Mobile [phone]       Address:  [address] [address]  [facility] [address] Email address:  [email or web address]      Emergency Contact(s)    Name Relationship Lgl Grd Work Phone Home Phone Mobile Phone   1. KISHA PIKE Spouse   [phone]   2. MARIAM PIKE Son   [phone]   3. DEWAYNE PIKE Daughter-in-Law    [phone]           Primary language:  English     needed? No   [place] [employer]:  [phone] op. 1  Other communication barriers:None    Preferred Method of Communication:  Heatherhart  Current living arrangement: I live in a private home with spouse    Mobility Status/ Medical Equipment: Independent    Health Maintenance  Health Maintenance Reviewed:   Health Maintenance Due   Topic Date Due     COVID-19 Vaccine (4 - Booster for Moderna series) 02/28/2022       My Access Plan  Medical Emergency 911   Primary Clinic Line [facility] - [phone]   24 Hour Appointment Line [phone] or  [phone]) (toll-free)   24 Hour Nurse Line [phone] (toll-free)   Preferred Urgent Care No data recorded   Preferred [facility]  [phone]     Preferred Pharmacy    Behavioral Health Crisis Line The National Suicide Prevention Lifeline at 1-801.730.9525 or 911       My Care Team Members  Patient Care Team       Relationship Specialty Notifications Start End    Nishi Lin MD PCP - General Family Medicine  11/2/21     Phone: [phone] Fax: [phone] [address] [place] [address]    Nishi Lin MD Assigned PCP   1/17/21     Phone: [phone] Fax: [phone] [address] [place] [address]    [place]  Alexei Morales MD Assigned Surgical Provider   4/11/21     Phone: [phone] Fax: [phone] [address] [place] [address]    Alden Montanez APRN CNP Assigned Musculoskeletal Provider   8/22/21     Phone: [phone] Fax: [phone] [address] [facility] [address] [place] [address]    Isaias Solo MD Assigned Cancer Care Provider   9/5/21     Phone: [phone] Fax: [phone] [address] [facility] [address]    Kamala Murphy MD MD Cardiovascular Disease  4/8/22     Phone: [phone] Pager: [phone] Fax: [phone]        [facility] HEART AT [place] [address] [facility] [address] [address] [facility] [address]    Sujatha Gallegos, RN Lead Care Coordinator Primary Care -  Admissions 4/15/22     Phone: [phone]                 My Care Plans  Self Management and Treatment Plan  Goals and (Comments)   Goals        General    I will reduce risk of complications related to my heart surgery     Notes - Note edited  4/15/2022 10:22 AM by Sujatha Gallegos, RN    Goal Statement: I will reduce risk of complications related to my heart heart surgery  Date Goal set: 4/15/2022  Barriers: Diabetes and other complex medical diagnoses  Strengths: motivated and engaged, supportive wife  Date to Achieve By: 8/1/2022  Patient expressed understanding of goal: yes  Action steps to achieve this goal:  1. Take my medications as prescribed  2. Eat a low cholesterol, low sodium diet, diabetic diet  3. Cardiac Rehab as recommended  4  Attend recommended follow up appts  5. Monitor for sob, chest pain, nausea, fever, or any signs of bleeding.  5. I will use my nitroglycerin if I have any chest pain and call 911 if taken x three.                   Action Plans on File:            Depression          Advance Care Plans/Directives Type:   No data recorded    My Medical and Care Information  Problem List   Patient Active Problem List   Diagnosis     Gastroesophageal reflux disease without esophagitis      Degeneration of lumbar or lumbosacral intervertebral disc     Chronic rhinitis     Hyperlipidemia LDL goal <100     HILARIO (obstructive sleep apnea)     Tinnitus     Advanced directives, counseling/discussion     Other specified hypothyroidism     Type 2 diabetes mellitus with stage 2 chronic kidney disease (H)     CKD (chronic kidney disease) stage 2, GFR 60-89 ml/min     Obesity, Class I, BMI 30-34.9     Recurrent major depression in complete remission (H)     Microalbuminuria     S/P total knee replacement using cement, left     Primary osteoarthritis of right knee     S/P total knee replacement using cement, right     Status post total right knee replacement     Prostate cancer (H)     Morbid obesity (H)     Essential hypertension     Status post coronary angiogram     Atherosclerosis of native coronary artery of native heart with stable angina pectoris (H)     S/P CABG (coronary artery bypass graft)     Fluid overload     Anemia due to blood loss, acute      Current Medications and Allergies:  See printed Medication Report.    Care Coordination Start Date: 4/15/2022   Frequency of Care Coordination: weekly     Form Last Updated: 04/15/2022

## 2022-04-15 NOTE — LETTER
[facility] CARE COORDINATION  [address]   [address]  Tel. [phone] / Fax [phone]  April 15, 2022    Joel Pike  [address]  [facility] [address]      Dear Joel,    I am a clinic care coordinator who works with Nishi Hdz Mai, MD at [facility]. I wanted to thank you for spending the time to talk with me.  Below is a description of clinic care coordination and how I can further assist you.      The clinic care coordination team is made up of a registered nurse,  and community health worker who understand the health care system. The goal of clinic care coordination is to help you manage your health and improve access to the health care system in the most efficient manner. The team can assist you in meeting your health care goals by providing education, coordinating services, strengthening the communication among your providers and supporting you with any resource needs.    Please feel free to contact me at  with any questions or concerns. We are focused on providing you with the highest-quality healthcare experience possible and that all starts with you.     Sincerely,     Georgina BECERRA, RN, PHN, [facility]  Primary Clinic Care Coordination    [facility]  Primary Care Clinics  [email or web address]   Office: [phone]  Employed by [employer]          Enclosed: I have enclosed a copy of the Patient Centered Plan of Care. This has helpful information and goals that we have talked about. Please keep this in an easy to access place to use as needed.

## 2022-04-15 NOTE — TELEPHONE ENCOUNTER
Patient is also scheduled for hospital follow-up but not until May 5 any possibility of moving his hospital follow-up appointment sooner?

## 2022-04-15 NOTE — PROGRESS NOTES
Clinic Care Coordination Contact    Clinic Care Coordination Contact  OUTREACH with Post Discharge Assessment    Referral Information:  Referral Source: IP Handoff     Primary Diagnosis: Cardiovascular - Atherosclerosis of native coronary artery of native heart with stable angina pectoris (H)  S/P CABG (coronary artery bypass graft)    Chief Complaint   Patient presents with     Clinic Care Coordination - Post Hospital     RNCC Assessment-Hospital Discharge     Universal Utilization:   Clinic Utilization  Difficulty keeping appointments:: No  Compliance Concerns: No  No-Show Concerns: No  No PCP office visit in Past Year: No  Utilization    Hospital Admissions  2             ED Visits  0             No Show Count (past year)  0                Current as of: 4/15/2022  4:13 AM            Clinical Concerns:  Current Medical Concerns: Called and spoke with patient and patient's wife introduced self and role.  Patient recently had a four-vessel bypass done at [facility].  Patient states he is not experience any chest pain just a little discomfort, no shortness of breath or nausea.  He has not used any nitro since being home.  He has been eating and drinking okay, encouraged use of stool softener daily and constipation.  Patient states his blood sugars have been a little elevated at 169.  Patient also states his blood pressure has been around 135/79 with a pulse of 100.  Patient would like some information on diet and discussed nutrition referral for atient centered diet plan.  Patient will be starting cardiac rehab next week and has a cardiology follow-up April 27.  Patient does have a PCP follow-up in May will try to get him in sooner for follow-up.  Patient's overall goal is to recover from his heart surgery.  Current Behavioral Concerns: No current concerns  Education Provided to patient: Continue to monitor for chest pain, shortness of breath nausea, bleeding at incision site or fever.  Pain  Pain  "(GOAL):: No  Health Maintenance Reviewed:    Clinical Pathway: None    Admission:    Admission Date: 04/08/22   Reason for Admission: Abnormal stress test, chest pain  Discharge:   Discharge Date: 04/14/22  Discharge Diagnosis: Type 2 diabetes mellitus with stage 2 chronic kidney disease (H)    CKD (chronic kidney disease) stage 2, GFR 60-89 ml/min    Atherosclerosis of native coronary artery of native heart with stable angina pectoris (H)    S/P CABG (coronary artery bypass graft)    Fluid overload    Anemia due to blood loss, acute    Enrollment  Primary Care Care Coordination Status: Enrolled  Clinical Pathway Name: None  Outreach Frequency: weekly    Discharge Assessment  How are you doing now that you are home?: \"I am doing good, just a little chest discomfort, no pain\" patient states he has not had to use any nitro since being home.  He has been taking Tylenol for pain and occasionally will take an oxycodone.  How are your symptoms? (Red Flag symptoms escalate to triage hotline per guidelines): Improved  Do you feel your condition is stable enough to be safe at home until your provider visit?: Yes  Does the patient have their discharge instructions? : Yes  Does the patient have questions regarding their discharge instructions? : No  Were you started on any new medications or were there changes to any of your previous medications? : Yes  Does the patient have all of their medications?: Yes  Do you have questions regarding any of your medications? : No  Do you have all of your needed medical supplies or equipment (DME)?  (i.e. oxygen tank, CPAP, cane, etc.): Yes  Discharge follow-up appointment scheduled within 14 calendar days? : Yes  Discharge Follow Up Appointment Date: 04/27/22  Discharge Follow Up Appointment Scheduled with?: Specialty Care Provider         Post-op (Clinicians Only)  Did the patient have surgery or a procedure: Yes  Incision: closed  Drainage: No  Bleeding: none  Fever: No  Chills: " No  Redness: No  Warmth: No  Swelling: No  Incision site pain: No  Eating & Drinking: eating and drinking without complaints/concerns  PO Intake: other (diabetic diet)  Bowel Function: normal  Urinary Status: voiding without complaint/concerns    Medication Management:  Medication review status: Medications reviewed.  Changes noted per patient report.  Patient will need a refill of his Metformin will send to PCP for refill.    Functional Status:  Dependent ADLs:: Independent  Dependent IADLs:: Independent  Bed or wheelchair confined:: No  Mobility Status: Independent  Fallen 2 or more times in the past year?: No  Any fall with injury in the past year?: No    Living Situation:  Current living arrangement:: I live in a private home with spouse  Type of residence:: Private home - no stairs    Lifestyle & Psychosocial Needs:    Social Determinants of Health     Tobacco Use: Low Risk      Smoking Tobacco Use: Never Smoker     Smokeless Tobacco Use: Never Used   Alcohol Use: Not on file   Financial Resource Strain: Not on file   Food Insecurity: Not on file   Transportation Needs: Not on file   Physical Activity: Not on file   Stress: Not on file   Social Connections: Not on file   Intimate Partner Violence: Not on file   Depression: Not at risk     PHQ-2 Score: 2   Housing Stability: Not on file     Diet:: Diabetic diet  Inadequate nutrition (GOAL):: No  Tube Feeding: No  Inadequate activity/exercise (GOAL):: No  Significant changes in sleep pattern (GOAL): No  Transportation means:: Regular car     Mental health DX:: Yes  Mental health DX how managed:: Medication  Mental health management concern (GOAL):: No  Informal Support system:: Spouse      Resources and Interventions:  Current Resources:      Community Resources: Cardiac Rehab  Supplies Currently Used at Home: Diabetic Supplies  Equipment Currently Used at Home: glucometer  Employment Status: employed part-time     Advance Care Plan/Directive  Advanced Care  Plans/Directives on file:: No  Advanced Care Plan/Directive Status: Not Applicable    Referrals Placed: (Nutritionist)     Goals:      Goals       I will reduce risk of complications related to my heart surgery      Goal Statement: I will reduce risk of complications related to my heart heart surgery  Date Goal set: 4/15/2022  Barriers: Diabetes and other complex medical diagnoses  Strengths: motivated and engaged, supportive wife  Date to Achieve By: 8/1/2022  Patient expressed understanding of goal: yes  Action steps to achieve this goal:  1. Take my medications as prescribed  2. Eat a low cholesterol, low sodium diet, diabetic diet  3. Cardiac Rehab as recommended  4  Attend recommended follow up appts  5. Monitor for sob, chest pain, nausea, fever, or any signs of bleeding.  5. I will use my nitroglycerin if I have any chest pain and call 911 if taken x three.          Patient/Caregiver understanding: Patient and wife verbalized understanding of follow-up care.    Outreach Frequency: weekly  Future Appointments              In 5 days PH CR 2 [facility] Cardiac and Pulmonary Rehabilitation [facility]    In 1 week [facility] CARDIOTHORACIC SURGERY, [place] [facility]    In 2 weeks Nishi Lin MD [facility]    In 2 months Kamala Murphy MD [facility]    In 4 months PH UROLOGY RN [facility]          Plan: RNCC will send referral to nutritionist  RTC for follow-up in 1 week.    Georgina BECERRA, RN, PHN, [facility]  Primary Clinic Care Coordination    [facility]  Primary Care Clinics  [email or web address]   Office: [phone]  Employed by [employer]

## 2022-04-18 ENCOUNTER — TELEPHONE (OUTPATIENT)
Dept: OTHER | Facility: CLINIC | Age: 67
End: 2022-04-18
Payer: MEDICARE

## 2022-04-18 NOTE — TELEPHONE ENCOUNTER
48 hour post op call    ACTIVITY  How are you doing with activity? I am up and moving  Are you continuing to use your IS 3-4 times a day and do you have any shortness of breath. C-pap is acting up. IS going pretty good. I do get short of breath with activity. IS 1750 ml    Are you weighing yourself daily and has it been stable?. Loosing weight , down 4 lbs.     PAIN  How is your pain, what are you doing for your pain? Not much, mikie of a pressure, discomfort. Leg is tender. I am taking Tylenol     Are you still doing sternal precautions? Yes   Do you hear any clicking when you are moving or taking a deep breath? No      INCISION:   Very good    ASSISTANCE  Do you have someone at home to help you with your daily activities and transportation needs? My wife      MEDICATIONS  I would like to review your discharge medications and answer any questions you may have.   Reviewed      Are you on a blood thinner/Coumadin  No     Who is managing your Coumadin dosing/INR? No      FOLLOW UP       Scheduled for cardiac rehab? 4/20  Scheduled to see our PA or Surgeon? 4/27  Scheduled to see your cardiologist ? Dr Nelson   Scheduled to see PILAR/Core Na  Scheduled to see your primary care physician? Dr Lin 5/4  Do you have our contact information? Yes

## 2022-04-20 ENCOUNTER — OFFICE VISIT (OUTPATIENT)
Dept: FAMILY MEDICINE | Facility: CLINIC | Age: 67
End: 2022-04-20
Payer: MEDICARE

## 2022-04-20 ENCOUNTER — HOSPITAL ENCOUNTER (OUTPATIENT)
Dept: CARDIAC REHAB | Facility: CLINIC | Age: 67
Discharge: HOME | End: 2022-04-20
Attending: SURGERY
Payer: MEDICARE

## 2022-04-20 VITALS
HEART RATE: 90 BPM | TEMPERATURE: 98.4 F | OXYGEN SATURATION: 100 % | SYSTOLIC BLOOD PRESSURE: 102 MMHG | RESPIRATION RATE: 22 BRPM | DIASTOLIC BLOOD PRESSURE: 68 MMHG

## 2022-04-20 DIAGNOSIS — Z95.1 S/P CABG (CORONARY ARTERY BYPASS GRAFT): ICD-10-CM

## 2022-04-20 DIAGNOSIS — I10 ESSENTIAL HYPERTENSION: ICD-10-CM

## 2022-04-20 DIAGNOSIS — E11.22 TYPE 2 DIABETES MELLITUS WITH STAGE 2 CHRONIC KIDNEY DISEASE, WITHOUT LONG-TERM CURRENT USE OF INSULIN (H): ICD-10-CM

## 2022-04-20 DIAGNOSIS — Z95.1 S/P CABG X 4: ICD-10-CM

## 2022-04-20 DIAGNOSIS — Z09 HOSPITAL DISCHARGE FOLLOW-UP: Primary | ICD-10-CM

## 2022-04-20 DIAGNOSIS — E03.9 HYPOTHYROIDISM, UNSPECIFIED TYPE: ICD-10-CM

## 2022-04-20 DIAGNOSIS — I25.118 ATHEROSCLEROSIS OF NATIVE CORONARY ARTERY OF NATIVE HEART WITH STABLE ANGINA PECTORIS (H): ICD-10-CM

## 2022-04-20 DIAGNOSIS — E78.5 HYPERLIPIDEMIA LDL GOAL <100: ICD-10-CM

## 2022-04-20 DIAGNOSIS — N18.2 TYPE 2 DIABETES MELLITUS WITH STAGE 2 CHRONIC KIDNEY DISEASE, WITHOUT LONG-TERM CURRENT USE OF INSULIN (H): ICD-10-CM

## 2022-04-20 DIAGNOSIS — D62 ANEMIA DUE TO BLOOD LOSS, ACUTE: ICD-10-CM

## 2022-04-20 PROBLEM — E66.01 MORBID OBESITY (H): Status: RESOLVED | Noted: 2022-01-31 | Resolved: 2022-04-20

## 2022-04-20 LAB
ANION GAP SERPL CALCULATED.3IONS-SCNC: 11 MMOL/L (ref 3–14)
BUN SERPL-MCNC: 34 MG/DL (ref 7–30)
CALCIUM SERPL-MCNC: 9.5 MG/DL (ref 8.5–10.1)
CHLORIDE BLD-SCNC: 103 MMOL/L (ref 94–109)
CO2 SERPL-SCNC: 22 MMOL/L (ref 20–32)
CREAT SERPL-MCNC: 1.01 MG/DL (ref 0.66–1.25)
ERYTHROCYTE [DISTWIDTH] IN BLOOD BY AUTOMATED COUNT: 14.2 % (ref 10–15)
GFR SERPL CREATININE-BSD FRML MDRD: 83 ML/MIN/1.73M2
GLUCOSE BLD-MCNC: 143 MG/DL (ref 70–99)
HCT VFR BLD AUTO: 31.7 % (ref 40–53)
HGB BLD-MCNC: 10.5 G/DL (ref 13.3–17.7)
MCH RBC QN AUTO: 31.9 PG (ref 26.5–33)
MCHC RBC AUTO-ENTMCNC: 33.1 G/DL (ref 31.5–36.5)
MCV RBC AUTO: 96 FL (ref 78–100)
PLATELET # BLD AUTO: 562 10E3/UL (ref 150–450)
POTASSIUM BLD-SCNC: 4.3 MMOL/L (ref 3.4–5.3)
RBC # BLD AUTO: 3.29 10E6/UL (ref 4.4–5.9)
SODIUM SERPL-SCNC: 136 MMOL/L (ref 133–144)
T4 FREE SERPL-MCNC: 1.14 NG/DL (ref 0.76–1.46)
TSH SERPL DL<=0.005 MIU/L-ACNC: 4.61 MU/L (ref 0.4–4)
WBC # BLD AUTO: 14.6 10E3/UL (ref 4–11)

## 2022-04-20 PROCEDURE — 36415 COLL VENOUS BLD VENIPUNCTURE: CPT | Performed by: FAMILY MEDICINE

## 2022-04-20 PROCEDURE — 84443 ASSAY THYROID STIM HORMONE: CPT | Performed by: FAMILY MEDICINE

## 2022-04-20 PROCEDURE — 99495 TRANSJ CARE MGMT MOD F2F 14D: CPT | Performed by: FAMILY MEDICINE

## 2022-04-20 PROCEDURE — 80048 BASIC METABOLIC PNL TOTAL CA: CPT | Performed by: FAMILY MEDICINE

## 2022-04-20 PROCEDURE — 84439 ASSAY OF FREE THYROXINE: CPT | Performed by: FAMILY MEDICINE

## 2022-04-20 PROCEDURE — 85027 COMPLETE CBC AUTOMATED: CPT | Performed by: FAMILY MEDICINE

## 2022-04-20 PROCEDURE — 93798 PHYS/QHP OP CAR RHAB W/ECG: CPT

## 2022-04-20 RX ORDER — ROSUVASTATIN CALCIUM 5 MG/1
5 TABLET, COATED ORAL DAILY
Qty: 30 TABLET | Refills: 1 | Status: SHIPPED | OUTPATIENT
Start: 2022-04-20 | End: 2022-05-18

## 2022-04-20 ASSESSMENT — PAIN SCALES - GENERAL: PAINLEVEL: MODERATE PAIN (4)

## 2022-04-20 NOTE — PROGRESS NOTES
Assessment & Plan       ICD-10-CM    1. Hospital discharge follow-up  Z09    2. Atherosclerosis of native coronary artery of native heart with stable angina pectoris (H)  I25.118 rosuvastatin (CRESTOR) 5 MG tablet     Basic metabolic panel  (Ca, Cl, CO2, Creat, Gluc, K, Na, BUN)     CANCELED: Basic metabolic panel  (Ca, Cl, CO2, Creat, Gluc, K, Na, BUN)   3. S/P CABG (coronary artery.  The graft)  Z95.1    4. Anemia due to blood loss, acute  D62 CBC with platelets     CBC with platelets   5. Hyperlipidemia LDL goal <100  E78.5    6. Type 2 diabetes mellitus with stage 2 chronic kidney disease, without long-term current use of insulin (H)  E11.22 Basic metabolic panel  (Ca, Cl, CO2, Creat, Gluc, K, Na, BUN)    N18.2 CANCELED: Basic metabolic panel  (Ca, Cl, CO2, Creat, Gluc, K, Na, BUN)   7. Essential hypertension  I10 Basic metabolic panel  (Ca, Cl, CO2, Creat, Gluc, K, Na, BUN)     CANCELED: Basic metabolic panel  (Ca, Cl, CO2, Creat, Gluc, K, Na, BUN)   8. Hypothyroidism, unspecified type  E03.9 TSH with free T4 reflex     TSH with free T4 reflex     T4 free     I reviewed the medical record from [facility] in great details.  He had an uneventful cardiac bypass and the wound is healing as expected, no sign of infection.  Tolerating medications well.  No chest pain or shortness of breath.  Continue with the wound care.  Pain is tolerable, no longer taking the pain meds.  Follow-up with the cardiologist and cardiac rehab.  Symptoms that need to be seen or call in discussed.  Will continue with the beta-blocker, statin, ARB, and aspirin.    His blood pressure today is on the lower side, but is assymptomatic.  Will keep an eye on it closely.  Consider lowering the losartan dose if he becomes symptomatic for hypotension or if blood pressure keep dropping.  He will need to be on beta-blocker for his cardiac conditions.  I encouraged to monitor the blood pressure at home closely, call in if it is less than  100/50.  Also encouraged to call in if develop symptoms of hypotension which was discussed.    He did not tolerate the Lipitor in the past.  Been taking the lovastatin 3 times a week and it has been working okay.  His last cholesterol showed LDL level still high.  The goal for his LDL is to be less than 70.  Tolerating the Zetia well.  No history of liver disease and his recent liver enzymes were normal.  Will continue with the Zetia.  Stop the lovastatin and have him try the Crestor low-dose, taper up slowly.  Potential side effect of the Crestor discussed.    He was anemic due to blood loss from the surgery.  He looked quite pale.  Recheck the CBC today and his hemoglobin has went up from 8.5 about 11 days ago to 10.5 today.  Will continue to monitor.  He is not on iron supplement, I do not feel that he need to be on iron supplement today.    His diabetes has been controlled, the last hemoglobin A1c about 2 months ago was 6.3.  Been tolerating metformin well.  Will continue with metformin for now.  Consider to switch to or adding Jardiance in the future due to his potential cardiac benefit.    I discussed about depression associated with CAD.  He feels his depression anxiety are now controlled with the Wellbutrin.  Declined counseling.  Encouraged to let me know if he feels his depression and anxiety are getting worse.    Assessment and plan discussed with patient and his wife in details.  All of their questions were answered.  Again, follow-up with the cardiologist as scheduled.  Also start cardiac rehab as scheduled.  Follow-up with me in 3 months, earlier if any concerns or question.      Return in about 3 months (around 7/20/2022) for folow up CAD.    Nishi Hdz Mai, MD  [facility]    Clyde Maldonado is a 66 year old who presents for the following health issues     HPI     Post Discharge Outreach 4/15/2022   Admission Date 4/8/2022   Reason for Admission Abnormal stress test, chest pain  "  Discharge Date 4/14/2022   Discharge Diagnosis Type 2 diabetes mellitus with stage 2 chronic kidney disease (H)    CKD (chronic kidney disease) stage 2, GFR 60-89 ml/min    Atherosclerosis of native coronary artery of native heart with stable angina pectoris (H)    S/P CABG (coronary artery bypass graft)    Fluid overload    Anemia due to blood loss, acute   How are you doing now that you are home? \"I am doing good, just a little chest discomfort, no pain\" patient states he has not had to use any nitro since being home.  He has been taking Tylenol for pain and occasionally will take an oxycodone.   How are your symptoms? (Red Flag symptoms escalate to triage hotline per guidelines) Improved   Do you feel your condition is stable enough to be safe at home until your provider visit? Yes   Does the patient have their discharge instructions?  Yes   Does the patient have questions regarding their discharge instructions?  No   Were you started on any new medications or were there changes to any of your previous medications?  Yes   Does the patient have all of their medications? Yes   Do you have questions regarding any of your medications?  No   Do you have all of your needed medical supplies or equipment (DME)?  (i.e. oxygen tank, CPAP, cane, etc.) Yes   Discharge follow-up appointment scheduled within 14 calendar days?  Yes   Discharge Follow Up Appointment Date 4/27/2022   Discharge Follow Up Appointment Scheduled with? Specialty Care Provider     Hospital Follow-up Visit:    Hospital/Nursing Home/ Rehab Facility: [facility]  Date of Admission: 4/8/22  Date of Discharge: 4/14/22  Reason(s) for Admission: CABGx4      Was your hospitalization related to COVID-19? No   Problems taking medications regularly:  None  Medication changes since discharge: None  Problems adhering to non-medication therapy:  None    Summary of hospitalization:  [facility] discharge summary " reviewed  Diagnostic Tests/Treatments reviewed.  Follow up needed: CBC, cardiology  Other Healthcare Providers Involved in Patient s Care:         Specialist appointment - Cardiology  Update since discharge: improved. Post Discharge Medication Reconciliation: discharge medications reconciled, continue medications without change.  Plan of care communicated with patient and family          Joel is here today with his wife for hospital follow-up.  He was admitted to [facility] on 4/8/22 for CAD.  He was having chest pain at rest, had an abnormal stress echocardiogram and nuclear stresswhich showed severe coronary artery disease.  He was then admitted to the hospital on 4/8/22 where he had a successful coronary artery bypass grafting x4 with no complication (left internal mammary artery to the left anterior descending artery, reverse saphenous vein graft to the right posterior descending artery, reverse saphenous vein graft to obtuse marginal artery and reverse saphenous vein graft to diagonal artery).  He also had endoscopic vein harvest left and right lower extremity as well as THOMAS.  He has uneventful recovery from the surgery -was extubated and transferred to the surgical telemetry floor from the ICU within 24 hours of surgery.  He developed fluid overload and was treated successfully with diuretics.  His weight at the time of discharge was below preoperative weight therefore he was not discharged home with diuretics.  He was treated with insulin infusion during hospitalization for his DM but then transition to sliding scale and eventually resuming on metformin at the time of discharge.  He was discharged on 4/14//22 and has been doing well.  Discharged home with Keflex due to concern of the wound infection.  He was also started on the Robaxin, metoprolol, oxycodone, potassium chloride, and Senokot.  He was instructed continue with aspirin, losartan, metformin, Erleada, Wellbutrin, Zetia, lovastatin,  Protonix and Synthroid.    Stated he been doing well since discharge from the hospital, no specific concerns today.  Feeling stronger each day.  No headache or dizziness.  No chest pain or shortness of breath.  Pain is controlled, has not used the oxycodone.  No shortness of breath, orthopnea or dyspnea.  No nausea, vomiting, diarrhea or constipation.  No leg swelling, orthopnea or dyspnea.  She has a has an appointment with a cardiologist in about a week.  He also scheduled for cardiac rehab.  Denied of being depressed.  Been taking the medications prescribed with no side effect.  Has no other concerns today.  No concern from his wife.      Review of Systems   Constitutional, HEENT, cardiovascular, pulmonary, gi and gu systems are negative, except as otherwise noted.      Objective    /68   Pulse 90   Temp 98.4  F (36.9  C) (Temporal)   Resp 22   SpO2 100%   There is no height or weight on file to calculate BMI.  Physical Exam   GENERAL: alert and no distress, pale looking but speaking in full sentences.  HENT: ear canals and TM's normal.  Nares are non-congested. Oropharynx is pink and moist. No tender with palpation to the sinuses.   NECK: no adenopathy, no asymmetry.  Supple, no lymphadenopathy.  RESP: lungs clear to auscultation - no rales, rhonchi or wheezes.  Good respiratory effort throughout.  CV: regular rate and rhythm, normal S1 S2, no S3 or S4, no murmur, click or rub, no peripheral edema and peripheral pulses strong  ABDOMEN: soft, nontender, nondistended, normal bowel sound.  MS: no gross musculoskeletal defects noted, no edema.  No focal weakness.  SKIN: The incision on his chest along the sternum and lower abdomen are healing as expected, no redness or drainage.  No signs of active infection.  NEURO: Normal strength and tone, mentation intact and speech normal.  Cranial nerves II to XII intact, no focal neurological deficit.  PSYCH: mentation appears normal, affect normal/bright.  Thoughts  are intact, no suicidal/homicidal ideation.  No hallucination.    Results for orders placed or performed in visit on 04/20/22   Basic metabolic panel     Status: Abnormal   Result Value Ref Range    Sodium 136 133 - 144 mmol/L    Potassium 4.3 3.4 - 5.3 mmol/L    Chloride 103 94 - 109 mmol/L    Carbon Dioxide (CO2) 22 20 - 32 mmol/L    Anion Gap 11 3 - 14 mmol/L    Urea Nitrogen 34 (H) 7 - 30 mg/dL    Creatinine 1.01 0.66 - 1.25 mg/dL    Calcium 9.5 8.5 - 10.1 mg/dL    Glucose 143 (H) 70 - 99 mg/dL    GFR Estimate 83 >60 mL/min/1.73m2   CBC with platelets     Status: Abnormal   Result Value Ref Range    WBC Count 14.6 (H) 4.0 - 11.0 10e3/uL    RBC Count 3.29 (L) 4.40 - 5.90 10e6/uL    Hemoglobin 10.5 (L) 13.3 - 17.7 g/dL    Hematocrit 31.7 (L) 40.0 - 53.0 %    MCV 96 78 - 100 fL    MCH 31.9 26.5 - 33.0 pg    MCHC 33.1 31.5 - 36.5 g/dL    RDW 14.2 10.0 - 15.0 %    Platelet Count 562 (H) 150 - 450 10e3/uL   TSH with free T4 reflex     Status: Abnormal   Result Value Ref Range    TSH 4.61 (H) 0.40 - 4.00 mU/L   T4 free     Status: Normal   Result Value Ref Range    Free T4 1.14 0.76 - 1.46 ng/dL

## 2022-04-20 NOTE — TELEPHONE ENCOUNTER
Monitor. Requested Prescriptions   Pending Prescriptions Disp Refills     ALLEGRA-D ALLERGY & CONGESTION 180-240 MG 24 hr tablet [Pharmacy Med Name: ALLEGRA-D ALLERGY & -240 TB24] 90 tablet 0     Sig: TAKE ONE TABLET BY MOUTH EVERY DAY     Last Written Prescription Date:  05/24/2019  Last Fill Quantity: 90,   # refills: 0  Last Office Visit: 11/04/2020  Future Office visit:    Next 5 appointments (look out 90 days)    Dec 02, 2020  9:00 AM  Return Visit with Alexei Vasquez PA-C  [facility] ([facility] [address] [place] [id number] [phone]           Routing refill request to provider for review/approval because:  Drug not on the [facility], [facility] or [facility] refill protocol or controlled substance    Temitope Fritz MA

## 2022-04-25 ENCOUNTER — HOSPITAL ENCOUNTER (OUTPATIENT)
Dept: CARDIAC REHAB | Facility: CLINIC | Age: 67
Discharge: HOME | End: 2022-04-25
Attending: SURGERY
Payer: MEDICARE

## 2022-04-25 PROCEDURE — 93798 PHYS/QHP OP CAR RHAB W/ECG: CPT | Performed by: REHABILITATION PRACTITIONER

## 2022-04-26 ENCOUNTER — HOSPITAL ENCOUNTER (EMERGENCY)
Facility: CLINIC | Age: 67
Discharge: HOME | End: 2022-04-26
Attending: EMERGENCY MEDICINE | Admitting: EMERGENCY MEDICINE
Payer: MEDICARE

## 2022-04-26 ENCOUNTER — TELEPHONE (OUTPATIENT)
Dept: FAMILY MEDICINE | Facility: CLINIC | Age: 67
End: 2022-04-26

## 2022-04-26 ENCOUNTER — TELEPHONE (OUTPATIENT)
Dept: OTHER | Facility: CLINIC | Age: 67
End: 2022-04-26

## 2022-04-26 VITALS
TEMPERATURE: 98.5 F | BODY MASS INDEX: 31.57 KG/M2 | DIASTOLIC BLOOD PRESSURE: 77 MMHG | OXYGEN SATURATION: 99 % | RESPIRATION RATE: 20 BRPM | HEART RATE: 93 BPM | WEIGHT: 220 LBS | SYSTOLIC BLOOD PRESSURE: 117 MMHG

## 2022-04-26 DIAGNOSIS — I48.92 ATRIAL FLUTTER, UNSPECIFIED TYPE (H): ICD-10-CM

## 2022-04-26 LAB
ANION GAP SERPL CALCULATED.3IONS-SCNC: 12 MMOL/L (ref 3–14)
BASOPHILS # BLD AUTO: 0.1 10E3/UL (ref 0–0.2)
BASOPHILS NFR BLD AUTO: 1 %
BUN SERPL-MCNC: 23 MG/DL (ref 7–30)
CALCIUM SERPL-MCNC: 9.2 MG/DL (ref 8.5–10.1)
CHLORIDE BLD-SCNC: 100 MMOL/L (ref 94–109)
CO2 SERPL-SCNC: 22 MMOL/L (ref 20–32)
CREAT SERPL-MCNC: 0.85 MG/DL (ref 0.66–1.25)
EOSINOPHIL # BLD AUTO: 0.3 10E3/UL (ref 0–0.7)
EOSINOPHIL NFR BLD AUTO: 3 %
ERYTHROCYTE [DISTWIDTH] IN BLOOD BY AUTOMATED COUNT: 13.7 % (ref 10–15)
GFR SERPL CREATININE-BSD FRML MDRD: >90 ML/MIN/1.73M2
GLUCOSE BLD-MCNC: 200 MG/DL (ref 70–99)
HCT VFR BLD AUTO: 33.2 % (ref 40–53)
HGB BLD-MCNC: 11.4 G/DL (ref 13.3–17.7)
IMM GRANULOCYTES # BLD: 0.1 10E3/UL
IMM GRANULOCYTES NFR BLD: 1 %
LYMPHOCYTES # BLD AUTO: 0.7 10E3/UL (ref 0.8–5.3)
LYMPHOCYTES NFR BLD AUTO: 7 %
MCH RBC QN AUTO: 32.2 PG (ref 26.5–33)
MCHC RBC AUTO-ENTMCNC: 34.3 G/DL (ref 31.5–36.5)
MCV RBC AUTO: 94 FL (ref 78–100)
MONOCYTES # BLD AUTO: 0.7 10E3/UL (ref 0–1.3)
MONOCYTES NFR BLD AUTO: 7 %
NEUTROPHILS # BLD AUTO: 8.2 10E3/UL (ref 1.6–8.3)
NEUTROPHILS NFR BLD AUTO: 81 %
NRBC # BLD AUTO: 0 10E3/UL
NRBC BLD AUTO-RTO: 0 /100
PLATELET # BLD AUTO: 445 10E3/UL (ref 150–450)
POTASSIUM BLD-SCNC: 4.3 MMOL/L (ref 3.4–5.3)
RBC # BLD AUTO: 3.54 10E6/UL (ref 4.4–5.9)
SODIUM SERPL-SCNC: 134 MMOL/L (ref 133–144)
WBC # BLD AUTO: 10 10E3/UL (ref 4–11)

## 2022-04-26 PROCEDURE — 99153 MOD SED SAME PHYS/QHP EA: CPT | Performed by: EMERGENCY MEDICINE

## 2022-04-26 PROCEDURE — 92960 CARDIOVERSION ELECTRIC EXT: CPT | Performed by: EMERGENCY MEDICINE

## 2022-04-26 PROCEDURE — 99291 CRITICAL CARE FIRST HOUR: CPT | Mod: 25 | Performed by: EMERGENCY MEDICINE

## 2022-04-26 PROCEDURE — 999N000157 HC STATISTIC RCP TIME EA 10 MIN

## 2022-04-26 PROCEDURE — 99285 EMERGENCY DEPT VISIT HI MDM: CPT | Mod: 25 | Performed by: EMERGENCY MEDICINE

## 2022-04-26 PROCEDURE — 80048 BASIC METABOLIC PNL TOTAL CA: CPT | Performed by: EMERGENCY MEDICINE

## 2022-04-26 PROCEDURE — 99152 MOD SED SAME PHYS/QHP 5/>YRS: CPT | Performed by: EMERGENCY MEDICINE

## 2022-04-26 PROCEDURE — 250N000009 HC RX 250: Performed by: EMERGENCY MEDICINE

## 2022-04-26 PROCEDURE — 85025 COMPLETE CBC W/AUTO DIFF WBC: CPT | Performed by: EMERGENCY MEDICINE

## 2022-04-26 PROCEDURE — 36415 COLL VENOUS BLD VENIPUNCTURE: CPT | Performed by: EMERGENCY MEDICINE

## 2022-04-26 PROCEDURE — 93005 ELECTROCARDIOGRAM TRACING: CPT | Mod: 59 | Performed by: EMERGENCY MEDICINE

## 2022-04-26 PROCEDURE — 93010 ELECTROCARDIOGRAM REPORT: CPT | Mod: 59 | Performed by: EMERGENCY MEDICINE

## 2022-04-26 RX ORDER — ETOMIDATE 2 MG/ML
0.3 INJECTION INTRAVENOUS ONCE
Status: COMPLETED | OUTPATIENT
Start: 2022-04-26 | End: 2022-04-26

## 2022-04-26 RX ADMIN — ETOMIDATE 30 MG: 2 INJECTION, SOLUTION INTRAVENOUS at 15:32

## 2022-04-26 NOTE — ED TRIAGE NOTES
Pt is having high heart rates and dizziness, he was advised to come in, he had triple bi pass the 8th of this month and also is being treated for prostate cancer     Triage Assessment     Row Name 04/26/22 1122       Triage Assessment (Adult)    Airway WDL WDL       Respiratory WDL    Respiratory WDL WDL       Skin Circulation/Temperature WDL    Skin Circulation/Temperature WDL circulation    Skin Circulation pallor       Cardiac WDL    Cardiac WDL rhythm    Cardiac Rhythm tachycardic       Peripheral/Neurovascular WDL    Peripheral Neurovascular WDL --  none in at triage       Cognitive/Neuro/Behavioral WDL    Cognitive/Neuro/Behavioral WDL WDL

## 2022-04-26 NOTE — TELEPHONE ENCOUNTER
Reason for Call:  Form, our goal is to have forms completed with 72 hours, however, some forms may require a visit or additional information.    Type of letter, form or note:  disability    Who is the form from?: Patient    Where did the form come from: form was faxed in    What clinic location was the form placed at?: [facility]     Where the form was placed: Given to physician    What number is listed as a contact on the form?:        Additional comments:     Call taken on 4/26/2022 at 10:42 AM by Jazlyn Munoz

## 2022-04-26 NOTE — DISCHARGE INSTRUCTIONS
Return to the emergency department if you develop new or worsening symptoms.  Take the xarelto as directed.  You are at increased risk of bleeding because of this but it should help prevent a stroke.  Please decrease your dosing of aspirin to 1 baby aspirin a day.  Return to the ER if new or worsening symptoms.  Follow-up with cardiology tomorrow as planned.

## 2022-04-26 NOTE — ED PROVIDER NOTES
History     Chief Complaint   Patient presents with     Tachycardia     Dizziness     HPI  Joel Pike is a 66 year old male who presents to the emergency department secondary to tachycardia and dizziness.  He has a history of quadruple bypass surgery on the eighth of this month and is being treated for prostate cancer.  This tachycardia and dizziness started this morning.  He said he felt something at 4 AM and rolled over on his side.  When he woke up he noticed that his heart rate was quite high.  He felt dizzy when standing up and better when laying down.  He has never had atrial fibrillation or atrial flutter in the past.  He is on 2 baby aspirin a day but not on any blood thinners otherwise.  He does not really have any chest pain.  He feels as though he might be dehydrated.  He does have chronic kidney disease.    Allergies:  Allergies   Allergen Reactions     Dust Mites Cough     Hmg-Coa-R Inhibitors      Body aches     Other Environmental Allergy      Allergic to sunlight ever since 20s.      Penicillins Hives and Rash       Problem List:    Patient Active Problem List    Diagnosis Date Noted     S/P CABG (coronary artery bypass graft) 04/13/2022     Priority: Medium     Fluid overload 04/13/2022     Priority: Medium     Anemia due to blood loss, acute 04/13/2022     Priority: Medium     Atherosclerosis of native coronary artery of native heart with stable angina pectoris (H) 04/08/2022     Priority: Medium     Status post coronary angiogram 03/28/2022     Priority: Medium     Essential hypertension 02/05/2022     Priority: Medium     Prostate cancer (H) 07/07/2021     Priority: Medium     Status post total right knee replacement 02/01/2021     Priority: Medium     S/P total knee replacement using cement, right 01/18/2021     Priority: Medium     Primary osteoarthritis of right knee 08/27/2020     Priority: Medium     Added automatically from request for surgery [id number]       S/P total knee replacement  using cement, left 02/04/2020     Priority: Medium     Microalbuminuria 02/26/2019     Priority: Medium     Recurrent major depression in complete remission (H) 11/08/2017     Priority: Medium     CKD (chronic kidney disease) stage 2, GFR 60-89 ml/min 10/30/2015     Priority: Medium     Obesity, Class I, BMI 30-34.9 10/30/2015     Priority: Medium     Type 2 diabetes mellitus with stage 2 chronic kidney disease (H) 04/19/2013     Priority: Medium     Hypothyroidism, unspecified type 03/02/2012     Priority: Medium     Advanced directives, counseling/discussion 08/19/2011     Priority: Medium     Advance Directive Problem List Overview:   Name Relationship Phone    Primary Health Care Agent            Alternative Health Care Agent          Discussed advance care planning with patient; information given to patient to review.//Macey Calvert/AISHA(AAMA)  8/19/2011 and again 11/9/16         Tinnitus 07/22/2011     Priority: Medium     right ear, began after accident, immediately       Hyperlipidemia LDL goal <100 01/28/2011     Priority: Medium     HILARIO (obstructive sleep apnea) 01/28/2011     Priority: Medium     Chronic rhinitis 01/15/2010     Priority: Medium     Degeneration of lumbar or lumbosacral intervertebral disc 12/19/2005     Priority: Medium     Gastroesophageal reflux disease without esophagitis 05/14/2003     Priority: Medium        Past Medical History:    Past Medical History:   Diagnosis Date     Atherosclerosis of native coronary artery of native heart with stable angina pectoris (H) 4/8/2022     Calculus of kidney      CKD (chronic kidney disease) stage 2, GFR 60-89 ml/min 10/30/2015     Degeneration of lumbar or lumbosacral intervertebral disc      Depressive disorder      Depressive disorder, not elsewhere classified      Diabetes (H)      Diabetic eye exam (H) 02/06/12, 11/1/13     Diabetic eye exam (H) 12/17/14     Hyperlipidaemia      Hypertension      Hypothyroidism 1/17/2010     Obesity, Class I,  BMI 30-34.9 10/30/2015     HILARIO (obstructive sleep apnea)      Primary osteoarthritis of left knee      Prostate cancer (H) 7/7/2021     Uncomplicated asthma        Past Surgical History:    Past Surgical History:   Procedure Laterality Date     ARTHROPLASTY KNEE Left 2/4/2020    Procedure: left total knee replacement;  Surgeon: Jason Berman DO;  Location: [facility]     ARTHROPLASTY KNEE Right 1/18/2021    Procedure: right total knee replacement;  Surgeon: Jason Berman DO;  Location:  OR     BYPASS GRAFT ARTERY CORONARY N/A 4/8/2022    Procedure: CORONARY ARTERY BYPASS GRAFT x 4 (LIMA - LAD; SV - OM; SV - PDA; SV - DIAGONAL) WITH ENDOSCOPIC SAPHENOUS VEIN HARVEST ON BILATERAL LOWER EXTREMITY, AND ON CARDIOPULMONARY PUMP OXYGENATOR  (INTRAOPERATIVE TRANSESOPHAGEAL ECHOCARDIOGRAM BY ANESTHESIOLOGIST);  Surgeon: Karson Bae MD;  Location:  OR     COLONOSCOPY  02/02/09    Repeat in 5 yrs     COLONOSCOPY N/A 9/5/2014    Procedure: COMBINED COLONOSCOPY, SINGLE BIOPSY/POLYPECTOMY BY BIOPSY;  Surgeon: Denis Oakes MD;  Location:  GI     CV CORONARY ANGIOGRAM N/A 3/28/2022    Procedure: Coronary Angiogram;  Surgeon: Lindy Farooq MD;  Location:  HEART CARDIAC CATH LAB     CV LEFT HEART CATH N/A 3/28/2022    Procedure: Left Heart Catheterization;  Surgeon: Lindy Farooq MD;  Location:  HEART CARDIAC CATH LAB     ESOPHAGOSCOPY, GASTROSCOPY, DUODENOSCOPY (EGD), COMBINED N/A 2/20/2015    Procedure: COMBINED ESOPHAGOSCOPY, GASTROSCOPY, DUODENOSCOPY (EGD), BIOPSY SINGLE OR MULTIPLE;  Surgeon: Alfred Young MD;  Location:  GI     HC REMV CATARACT EXTRACAP,INSERT LENS, W/O ECP  2000    Bilateral     HC REVISE MEDIAN N/CARPAL TUNNEL SURG  1991     HC TOOTH EXTRACTION W/FORCEP  1980's    [place] teeth removed     RELEASE CARPAL TUNNEL Right 6/16/2017    Procedure: RELEASE CARPAL TUNNEL;  Right carpal tunnel release;  Surgeon: Jason Berman DO;  Location:   OR     RELEASE CARPAL TUNNEL Left 7/11/2017    Procedure: RELEASE CARPAL TUNNEL;  Left carpal tunnel release;  Surgeon: Jason Berman DO;  Location: [facility]     SOFT TISSUE SURGERY         Family History:    Family History   Problem Relation Age of Onset     Cerebrovascular Disease Mother      Hypertension Mother      Hypertension Father      Diabetes Father         Adult     Heart Disease Father         Hx: Bypass     Unknown/Adopted Maternal Grandmother      Cerebrovascular Disease Maternal Grandmother      No Known Problems Maternal Grandfather      No Known Problems Paternal Grandmother      No Known Problems Paternal Grandfather      Thyroid Disease Brother      Cancer Sister         Liver     No Known Problems Sister      No Known Problems Son        Social History:  Marital Status:   [2]  Social History     Tobacco Use     Smoking status: Never Smoker     Smokeless tobacco: Never Used   Vaping Use     Vaping Use: Never used   Substance Use Topics     Alcohol use: No     Alcohol/week: 0.0 standard drinks     Drug use: No        Medications:    acetaminophen (TYLENOL) 325 MG tablet  apalutamide (ERLEADA) 60 MG tablet  aspirin (ASA) 81 MG EC tablet  blood glucose (HUGO CONTOUR) test strip  blood glucose (NO BRAND SPECIFIED) lancets standard  blood glucose (NO BRAND SPECIFIED) lancets standard  buPROPion (WELLBUTRIN XL) 150 MG 24 hr tablet  Coenzyme Q-10 capsule  ezetimibe (ZETIA) 10 MG tablet  fish oil-omega-3 fatty acids 1000 MG capsule  levothyroxine (SYNTHROID/LEVOTHROID) 125 MCG tablet  losartan (COZAAR) 50 MG tablet  metFORMIN (GLUCOPHAGE) 1000 MG tablet  methocarbamol (ROBAXIN) 500 MG tablet  metoprolol tartrate (LOPRESSOR) 25 MG tablet  Misc Natural Products (OSTEO BI-FLEX ADV JOINT SHIELD) TABS  Multiple Vitamins-Minerals (PRESERVISION AREDS PO)  nitroGLYcerin (NITROSTAT) 0.4 MG sublingual tablet  oxyCODONE (ROXICODONE) 5 MG tablet  pantoprazole (PROTONIX) 40 MG EC tablet  rivaroxaban  ANTICOAGULANT (XARELTO ANTICOAGULANT) 20 MG TABS tablet  rosuvastatin (CRESTOR) 5 MG tablet  senna-docusate (SENOKOT-S/PERICOLACE) 8.6-50 MG tablet  alcohol swab prep pads  Alcohol Swabs PADS  blood glucose (NO BRAND SPECIFIED) test strip  blood glucose calibration (HUGO CONTOUR) NORMAL solution  blood glucose monitoring (NO BRAND SPECIFIED) meter device kit  blood glucose monitoring (NO BRAND SPECIFIED) meter device kit  [employer] MISC          Review of Systems   All other systems reviewed and are negative.      Physical Exam   BP: 112/80  Pulse: (!) 146  Temp: 98.1  F (36.7  C)  Resp: 18  Weight: 99.8 kg (220 lb)  SpO2: 100 %      Physical Exam  Constitutional:       General: He is not in acute distress.     Appearance: Normal appearance. He is well-developed. He is not diaphoretic.   HENT:      Head: Normocephalic and atraumatic.      Right Ear: External ear normal.      Left Ear: External ear normal.      Nose: Nose normal. No congestion.      Mouth/Throat:      Mouth: Mucous membranes are moist.   Eyes:      General: No scleral icterus.     Extraocular Movements: Extraocular movements intact.      Pupils: Pupils are equal, round, and reactive to light.   Cardiovascular:      Rate and Rhythm: Tachycardia present.      Heart sounds: No murmur heard.    No friction rub. No gallop.   Pulmonary:      Effort: Pulmonary effort is normal.      Breath sounds: Normal breath sounds.   Musculoskeletal:         General: No swelling, deformity or signs of injury. Normal range of motion.      Cervical back: Normal range of motion and neck supple.      Right lower leg: No edema.      Left lower leg: No edema.   Skin:     General: Skin is warm and dry.      Findings: No rash.   Neurological:      Mental Status: He is alert and oriented to person, place, and time.         ED [facility]    -Cardioversion External    Date/Time: 4/26/2022 7:52 PM  Performed by: Zach  Thai Sanabria MD  Authorized by: Thai Serrano MD     Risks, benefits and alternatives discussed.    ED EVALUATION:      I have performed an Emergency Department Evaluation including taking a history and physical examination, this evaluation will be documented in the electronic medical record for this ED encounter.      ASA Class: Class 2- mild systemic disease, no acute problems, no functional limitations    Mallampati: Grade 1- soft palate, uvula, tonsillar pillars, and posterior pharyngeal wall visible    UNIVERSAL PROTOCOL   Site Marked: No  Prior Images Obtained and Reviewed:  No  Required items: Required blood products, implants, devices and special equipment available    Patient identity confirmed:  Verbally with patient and arm band  Patient was reevaluated immediately before administering moderate or deep sedation or anesthesia  Confirmation Checklist:  Patient's identity using two indicators  Time out: Immediately prior to the procedure a time out was called    Universal Protocol: the Joint Commission Universal Protocol was followed    Preparation: Patient was prepped and draped in usual sterile fashion      SEDATION  Patient Sedated: Yes    Sedation Type:  Moderate (conscious) sedation  Sedation:  Etomidate  Vital signs: Vital signs monitored during sedation      PRE-PROCEDURE DETAILS:     Cardioversion basis:  Emergent    Rhythm:  Atrial flutter  Attempt one:     Cardioversion mode:  Synchronous    Shock (Joules):  200    Shock outcome:  Conversion to normal sinus rhythm  Post-procedure details:     Patient status:  Alert      PROCEDURE    Patient Tolerance:  Patient tolerated the procedure well with no immediate complications  Length of time physician/provider present for 1:1 monitoring during sedation: 20                EKG Interpretation:      Interpreted by Tahi Serrano MD  Time reviewed: 1137  Symptoms at time of EKG: dizziness   Rhythm: atrial flutter  Rate: Tachycardia  Axis:  normal  Ectopy: none  Conduction: normal  ST Segments/ T Waves: No ST-T wave changes  Q Waves: none  Comparison to prior: changed from sinus rhythm    Clinical Impression: atrial flutter    Repeat EKG after cardioversion:       EKG Interpretation:      Interpreted by Thai Serrano MD  Time [id number]  Symptoms at time of EKG: post cardioversion  Rhythm: sinus  Rate: normal  Axis: NORMAL  Ectopy: none  Conduction: normal  ST Segments/ T Waves: No ST-T wave changes  Q Waves: none  Comparison to prior: converted     Clinical Impression: normal sinus rhythm        Critical Care time:  was 30 minutes for this patient excluding procedures.         Results for orders placed or performed during the hospital encounter of 04/26/22 (from the past 24 hour(s))   CBC with platelets differential    Narrative    The following orders were created for panel order CBC with platelets differential.  Procedure                               Abnormality         Status                     ---------                               -----------         ------                     CBC with platelets and d...[[id number]]  Abnormal            Final result                 Please view results for these tests on the individual orders.   Basic metabolic panel   Result Value Ref Range    Sodium 134 133 - 144 mmol/L    Potassium 4.3 3.4 - 5.3 mmol/L    Chloride 100 94 - 109 mmol/L    Carbon Dioxide (CO2) 22 20 - 32 mmol/L    Anion Gap 12 3 - 14 mmol/L    Urea Nitrogen 23 7 - 30 mg/dL    Creatinine 0.85 0.66 - 1.25 mg/dL    Calcium 9.2 8.5 - 10.1 mg/dL    Glucose 200 (H) 70 - 99 mg/dL    GFR Estimate >90 >60 mL/min/1.73m2   CBC with platelets and differential   Result Value Ref Range    WBC Count 10.0 4.0 - 11.0 10e3/uL    RBC Count 3.54 (L) 4.40 - 5.90 10e6/uL    Hemoglobin 11.4 (L) 13.3 - 17.7 g/dL    Hematocrit 33.2 (L) 40.0 - 53.0 %    MCV 94 78 - 100 fL    MCH 32.2 26.5 - 33.0 pg    MCHC 34.3 31.5 - 36.5 g/dL    RDW 13.7 10.0 - 15.0 %    Platelet  Count 445 150 - 450 10e3/uL    % Neutrophils 81 %    % Lymphocytes 7 %    % Monocytes 7 %    % Eosinophils 3 %    % Basophils 1 %    % Immature Granulocytes 1 %    NRBCs per 100 WBC 0 <1 /100    Absolute Neutrophils 8.2 1.6 - 8.3 10e3/uL    Absolute Lymphocytes 0.7 (L) 0.8 - 5.3 10e3/uL    Absolute Monocytes 0.7 0.0 - 1.3 10e3/uL    Absolute Eosinophils 0.3 0.0 - 0.7 10e3/uL    Absolute Basophils 0.1 0.0 - 0.2 10e3/uL    Absolute Immature Granulocytes 0.1 <=0.4 10e3/uL    Absolute NRBCs 0.0 10e3/uL   -Cardioversion External    Narrative    Thai Serrano MD     4/26/2022  7:58 PM  [facility]    -Cardioversion External    Date/Time: 4/26/2022 7:52 PM  Performed by: Thai Serrano MD  Authorized by: Thai Serrano MD     Risks, benefits and alternatives discussed.    ED EVALUATION:      I have performed an Emergency Department Evaluation including taking a   history and physical examination, this evaluation will be documented in   the electronic medical record for this ED encounter.      ASA Class: Class 2- mild systemic disease, no acute problems, no   functional limitations    Mallampati: Grade 1- soft palate, uvula, tonsillar pillars, and   posterior pharyngeal wall visible    UNIVERSAL PROTOCOL   Site Marked: No  Prior Images Obtained and Reviewed:  No  Required items: Required blood products, implants, devices and special   equipment available    Patient identity confirmed:  Verbally with patient and arm band  Patient was reevaluated immediately before administering moderate or deep   sedation or anesthesia  Confirmation Checklist:  Patient's identity using two indicators  Time out: Immediately prior to the procedure a time out was called    Universal Protocol: the Joint Commission Universal Protocol was followed    Preparation: Patient was prepped and draped in usual sterile fashion      SEDATION  Patient Sedated: Yes    Sedation Type:  Moderate (conscious)  sedation  Sedation:  Etomidate  Vital signs: Vital signs monitored during sedation      PRE-PROCEDURE DETAILS:     Cardioversion basis:  Emergent    Rhythm:  Atrial flutter  Attempt one:     Cardioversion mode:  Synchronous    Shock (Joules):  200    Shock outcome:  Conversion to normal sinus rhythm  Post-procedure details:     Patient status:  Alert      PROCEDURE    Patient Tolerance:  Patient tolerated the procedure well with no immediate   complications  Length of time physician/provider present for 1:1 monitoring during   sedation: 20       Medications   etomidate (AMIDATE) injection 30 mg (30 mg Intravenous Given 4/26/22 8949)       Assessments & Plan (with Medical Decision Making)  66-year-old male with atrial flutter.  Onset was this morning.  He had coronary artery bypass surgery on the eighth.  He is currently being treated with chemotherapy for prostate cancer.  He is not anticoagulated.  Time of onset was definitely known to be this morning.  His CHADS2 score would be in favor of anticoagulation should he have chronic atrial flutter/atrial fibrillation.  Echocardiogram on 3/28 was normal.  Blood pressure was borderline low therefore I did not bolus him with diltiazem.  Discussed with cardiology on-call, Dr. Lockett given his fairly recent coronary artery bypass grafting.  He recommended electrical cardioversion and post cardioversion anticoagulation with a NOAC.  Cardioversion was performed as above without any complications.  The patient tolerated procedure well.  He was converted to normal sinus rhythm.  He was started on Xarelto.  Initially I prescribed apixaban but his insurance did not cover it so I prescribed Xarelto.  The diagnosis, treatment options, risks and follow-up discussed with a competent patient who agrees with the plan.  He has appointment tomorrow with his cardiac surgeon.     I have reviewed the nursing notes.    I have reviewed the findings, diagnosis, plan and need for follow up with  the patient.      Discharge Medication List as of 4/26/2022  4:31 PM      START taking these medications    Details   apixaban ANTICOAGULANT (ELIQUIS) 5 MG tablet Take 1 tablet (5 mg) by mouth 2 times daily, Disp-60 tablet, R-0, E-Prescribe             Final diagnoses:   Atrial flutter, unspecified type (H)       4/26/2022   [facility] EMERGENCY DEPT     Thai Serrano MD  04/26/22 1958       Thai Serrano MD  04/26/22 2019

## 2022-04-26 NOTE — TELEPHONE ENCOUNTER
Patient's wife called stating his am BP is 92/70, , Took am Metoprolol, /75, . Patent weak and dizzy. No A-fib noted post op. Instructed pt to go ED in [place].

## 2022-04-27 ENCOUNTER — OFFICE VISIT (OUTPATIENT)
Dept: CARDIOLOGY | Facility: CLINIC | Age: 67
End: 2022-04-27
Payer: MEDICARE

## 2022-04-27 VITALS
OXYGEN SATURATION: 97 % | DIASTOLIC BLOOD PRESSURE: 56 MMHG | BODY MASS INDEX: 30.58 KG/M2 | WEIGHT: 213.6 LBS | SYSTOLIC BLOOD PRESSURE: 96 MMHG | HEART RATE: 95 BPM | HEIGHT: 70 IN

## 2022-04-27 DIAGNOSIS — Z95.1 S/P CABG (CORONARY ARTERY BYPASS GRAFT): Primary | ICD-10-CM

## 2022-04-27 PROCEDURE — 99024 POSTOP FOLLOW-UP VISIT: CPT | Performed by: PHYSICIAN ASSISTANT

## 2022-04-27 NOTE — PROGRESS NOTES
CARDIOTHORACIC SURGERY FOLLOW-UP VISIT     Joel Pike   1955   [id number]      Reason for visit: Post-Op CABG with Dr. Bae on 04/08/2022    HPI:   Joel Pike is a 66 year old year old male seen in clinic for follow-up appointment after surgery.   Patient has past medical history of DMT2, hypertension, dyslipidemia, and prostate cancer, who was recently found to have multivessel CAD after seeking evaluation for exertional chest pain. He presented 04/08 for CABG 4 (left internal mammary artery to left anterior descending artery, reversed saphenous vein graft to right posterior descending artery, reversed saphenous vein graft to obtuse marginal artery, reversed saphenous vein graft to diagonal artery) with Dr. Bae.     Hospital course was uneventful. Patient was discharged to home on 04/13/22.    Patient reported feeling well up until yesterday when he became lightheaded. His wife checked his vitals and found his BP was stable, but HR was in 140s. They called our office and were advised to seek evaluation in their local ER in [place]. He was found to be in atrial flutter with RVR and underwent DCCV in the ER and was started on Xarelto.  Otherwise has been doing well, strength and stamina slowly improving. Patient reports rare clicking of sternum with certain movements. Reports incisions are well healing, no erythema tenderness, or drainage. Denies fevers or chills. No chest pain, SOB, LE edema, orthopnea, or PND.      PAST MEDICAL HISTORY:  Past Medical History:   Diagnosis Date     Atherosclerosis of native coronary artery of native heart with stable angina pectoris (H) 4/8/2022     Calculus of kidney      CKD (chronic kidney disease) stage 2, GFR 60-89 ml/min 10/30/2015     Degeneration of lumbar or lumbosacral intervertebral disc      Depressive disorder      Depressive disorder, not elsewhere classified      Diabetes (H)      Diabetic eye exam (H) 02/06/12, 11/1/13     Diabetic eye exam (H)  12/17/14     Hyperlipidaemia      Hypertension      Hypothyroidism 1/17/2010     Obesity, Class I, BMI 30-34.9 10/30/2015     HILARIO (obstructive sleep apnea)      Primary osteoarthritis of left knee      Prostate cancer (H) 7/7/2021     Uncomplicated asthma        PAST SURGICAL HISTORY:  Past Surgical History:   Procedure Laterality Date     ARTHROPLASTY KNEE Left 2/4/2020    Procedure: left total knee replacement;  Surgeon: Jason Berman DO;  Location: [facility]     ARTHROPLASTY KNEE Right 1/18/2021    Procedure: right total knee replacement;  Surgeon: Jason Berman DO;  Location: [facility] OR     BYPASS GRAFT ARTERY CORONARY N/A 4/8/2022    Procedure: CORONARY ARTERY BYPASS GRAFT x 4 (LIMA - LAD; SV - OM; SV - PDA; SV - DIAGONAL) WITH ENDOSCOPIC SAPHENOUS VEIN HARVEST ON BILATERAL LOWER EXTREMITY, AND ON CARDIOPULMONARY PUMP OXYGENATOR  (INTRAOPERATIVE TRANSESOPHAGEAL ECHOCARDIOGRAM BY ANESTHESIOLOGIST);  Surgeon: Karson Bae MD;  Location:  OR     COLONOSCOPY  02/02/09    Repeat in 5 yrs     COLONOSCOPY N/A 9/5/2014    Procedure: COMBINED COLONOSCOPY, SINGLE BIOPSY/POLYPECTOMY BY BIOPSY;  Surgeon: Denis Oakes MD;  Location:  GI     CV CORONARY ANGIOGRAM N/A 3/28/2022    Procedure: Coronary Angiogram;  Surgeon: Lindy Farooq MD;  Location:  HEART CARDIAC CATH LAB     CV LEFT HEART CATH N/A 3/28/2022    Procedure: Left Heart Catheterization;  Surgeon: Lindy Farooq MD;  Location:  HEART CARDIAC CATH LAB     ESOPHAGOSCOPY, GASTROSCOPY, DUODENOSCOPY (EGD), COMBINED N/A 2/20/2015    Procedure: COMBINED ESOPHAGOSCOPY, GASTROSCOPY, DUODENOSCOPY (EGD), BIOPSY SINGLE OR MULTIPLE;  Surgeon: Alfred Young MD;  Location:  GI     HC REMV CATARACT EXTRACAP,INSERT LENS, W/O ECP  2000    Bilateral     HC REVISE MEDIAN N/CARPAL TUNNEL SURG  1991     HC TOOTH EXTRACTION W/FORCEP  1980's    [employer]modore teeth removed     RELEASE CARPAL TUNNEL Right 6/16/2017    Procedure: RELEASE  CARPAL TUNNEL;  Right carpal tunnel release;  Surgeon: Jason Berman DO;  Location: [facility] OR     RELEASE CARPAL TUNNEL Left 7/11/2017    Procedure: RELEASE CARPAL TUNNEL;  Left carpal tunnel release;  Surgeon: Jason Berman DO;  Location: [facility] OR     SOFT TISSUE SURGERY         CURRENT MEDICATIONS:   Current Outpatient Medications   Medication     acetaminophen (TYLENOL) 325 MG tablet     alcohol swab prep pads     Alcohol Swabs PADS     apalutamide (ERLEADA) 60 MG tablet     aspirin (ASA) 81 MG EC tablet     blood glucose (HUGO CONTOUR) test strip     blood glucose (NO BRAND SPECIFIED) lancets standard     blood glucose (NO BRAND SPECIFIED) lancets standard     blood glucose (NO BRAND SPECIFIED) test strip     blood glucose calibration (HUGO CONTOUR) NORMAL solution     blood glucose monitoring (NO BRAND SPECIFIED) meter device kit     blood glucose monitoring (NO BRAND SPECIFIED) meter device kit     buPROPion (WELLBUTRIN XL) 150 MG 24 hr tablet     Coenzyme Q-10 capsule     ezetimibe (ZETIA) 10 MG tablet     fish oil-omega-3 fatty acids 1000 MG capsule     levothyroxine (SYNTHROID/LEVOTHROID) 125 MCG tablet     losartan (COZAAR) 50 MG tablet     metFORMIN (GLUCOPHAGE) 1000 MG tablet     methocarbamol (ROBAXIN) 500 MG tablet     metoprolol tartrate (LOPRESSOR) 25 MG tablet     Misc Natural Products (OSTEO BI-FLEX ADV JOINT SHIELD) TABS     Multiple Vitamins-Minerals (PRESERVISION AREDS PO)     nitroGLYcerin (NITROSTAT) 0.4 MG sublingual tablet     oxyCODONE (ROXICODONE) 5 MG tablet     pantoprazole (PROTONIX) 40 MG EC tablet     rivaroxaban ANTICOAGULANT (XARELTO ANTICOAGULANT) 20 MG TABS tablet     rosuvastatin (CRESTOR) 5 MG tablet     senna-docusate (SENOKOT-S/PERICOLACE) 8.6-50 MG tablet     Sharps Container MISC     Current Facility-Administered Medications   Medication     leuprolide (ELIGARD) kit 45 mg       ALLERGIES:      Allergies   Allergen Reactions     Dust Mites Cough      Hmg-Coa-R Inhibitors      Body aches     Other Environmental Allergy      Allergic to sunlight ever since 20s.      Penicillins Hives and Rash         ROS:  Review of symptoms otherwise negative unless commented about in HPI.     LABS:  Last Basic Metabolic Panel:  Lab Results   Component Value Date     04/26/2022     01/06/2021      Lab Results   Component Value Date    POTASSIUM 4.3 04/26/2022    POTASSIUM 4.1 01/06/2021     Lab Results   Component Value Date    CHLORIDE 100 04/26/2022    CHLORIDE 106 01/06/2021     Lab Results   Component Value Date    AURORA 9.2 04/26/2022    AURORA 9.6 01/06/2021     Lab Results   Component Value Date    CO2 22 04/26/2022    CO2 30 01/06/2021     Lab Results   Component Value Date    BUN 23 04/26/2022    BUN 21 01/06/2021     Lab Results   Component Value Date    CR 0.85 04/26/2022    CR 1.21 01/06/2021     Lab Results   Component Value Date     04/26/2022     01/19/2021       Last CBC:   Lab Results   Component Value Date    WBC 10.0 04/26/2022    WBC 8.5 01/06/2021     Lab Results   Component Value Date    RBC 3.54 04/26/2022    RBC 4.60 01/06/2021     Lab Results   Component Value Date    HGB 11.4 04/26/2022    HGB 14.0 01/19/2021     Lab Results   Component Value Date    HCT 33.2 04/26/2022    HCT 42.8 01/06/2021     No components found for: MCT  Lab Results   Component Value Date    MCV 94 04/26/2022    MCV 93 01/06/2021     Lab Results   Component Value Date    MCH 32.2 04/26/2022    MCH 32.0 01/06/2021     Lab Results   Component Value Date    MCHC 34.3 04/26/2022    MCHC 34.3 01/06/2021     Lab Results   Component Value Date    RDW 13.7 04/26/2022    RDW 12.4 01/06/2021     Lab Results   Component Value Date     04/26/2022     01/06/2021       INR:  Lab Results   Component Value Date    INR 1.24 04/08/2022    INR 1.36 04/08/2022    INR 1.02 03/28/2022    INR 0.96 12/20/2006         IMAGING:  None    PHYSICAL EXAM:   BP 96/56   Pulse 95  "  Ht 1.773 m (5' 9.8\")   Wt 96.9 kg (213 lb 9.6 oz)   SpO2 97%   BMI 30.82 kg/m    General: alert and oriented x 3, pleasant, no acute distress, normal mood and affect  Neuro: no focal deficits   CV: S1 S2, no murmurs, rubs or gallops, regular rate and rhythm, no peripheral edema  Pulm: bilateral breath sounds, clear to auscultation, easy work of breathing  Incision: incisions clean dry and intact without erythema, swelling or drainage. Sternum stable    PROCEDURES: None       ASSESSMENT/PLAN:  Joel Pike is a 66 year old year old male status post CABG who returns to clinic for postop visit.     1. Surgically doing well overall.  Incisions are healing well with no signs of infection. Increasing activity and strength overall. Reports rare clicking in sternum with certain movements, but sternum stable on physical exam. Sternal precautions reviewed.   2. Hemodynamics are stable. No medication changes were needed today. He was started on Xarelto in the ER yesterday, was able to get 30 day free Rx, but notes copay after that will be $400/month. Will call pharmacy to see if apixaban covered, otherwise may need to transition to warfarin. Has cardiology follow-up with Dr. Murphy on 06/22, will defer duration of anticoagulation to her.    3. Outpatient Cardiac Rehab until completed.   4. Continue sternal precautions for 12 weeks from surgery date.   5. No driving for 4 weeks from surgery date.       Tamar James PA-C  Cardiothoracic Surgery  Pager [phone]    CC  [facility]     "

## 2022-04-28 ENCOUNTER — PATIENT OUTREACH (OUTPATIENT)
Dept: CARE COORDINATION | Facility: CLINIC | Age: 67
End: 2022-04-28
Payer: MEDICARE

## 2022-04-28 ENCOUNTER — TELEPHONE (OUTPATIENT)
Dept: CARDIOLOGY | Facility: CLINIC | Age: 67
End: 2022-04-28
Payer: MEDICARE

## 2022-04-28 NOTE — PROGRESS NOTES
Clinic Care Coordination Contact  [facility]-ED follow up       Clinical Data: Care Coordinator Outreach  Outreach attempted x 1.  No answer no machine  Care Coordinator will try to reach patient again in 1-2 business days.      Georgina SANDYN, RN, PHN, CCM  Primary Clinic Care Coordination    [facility]  Primary Care Clinics  [email or web address]   Office: [phone]  Employed by [employer]

## 2022-04-28 NOTE — TELEPHONE ENCOUNTER
CV Surgery    Left patient a voicemail. Discussed with [facility] pharmacy about Rx coverage. Was able to get 30 days of Xarelto for free. Asked that he would call his insurance to see if Eliquis/apixaban would be covered otherwise will need to switch to coumadin/warfarin.     Valencia Mcgovern PA-C

## 2022-04-29 ENCOUNTER — ANTICOAGULATION THERAPY VISIT (OUTPATIENT)
Dept: ANTICOAGULATION | Facility: CLINIC | Age: 67
End: 2022-04-29

## 2022-04-29 ENCOUNTER — TELEPHONE (OUTPATIENT)
Dept: FAMILY MEDICINE | Facility: CLINIC | Age: 67
End: 2022-04-29
Payer: MEDICARE

## 2022-04-29 ENCOUNTER — PATIENT OUTREACH (OUTPATIENT)
Dept: FAMILY MEDICINE | Facility: CLINIC | Age: 67
End: 2022-04-29
Payer: MEDICARE

## 2022-04-29 ENCOUNTER — TELEPHONE (OUTPATIENT)
Dept: CARDIOLOGY | Facility: CLINIC | Age: 67
End: 2022-04-29
Payer: MEDICARE

## 2022-04-29 DIAGNOSIS — I48.92 ATRIAL FLUTTER, UNSPECIFIED TYPE (H): Primary | ICD-10-CM

## 2022-04-29 RX ORDER — WARFARIN SODIUM 5 MG/1
TABLET ORAL
Qty: 30 TABLET | Refills: 0 | Status: SHIPPED | OUTPATIENT
Start: 2022-04-29 | End: 2022-06-10

## 2022-04-29 ASSESSMENT — ACTIVITIES OF DAILY LIVING (ADL): DEPENDENT_IADLS:: INDEPENDENT

## 2022-04-29 NOTE — TELEPHONE ENCOUNTER
tamar burger with [facility] Cardiology calling to inform PCP that pt will be transitioning to Warfarin from Xarelto as his insurance will not cover medication. They will be putting in order for Coumadin Clinic but not managing the medication. Any questions  message Tamar Burger via in basket

## 2022-04-29 NOTE — PROGRESS NOTES
Clinic Care Coordination Contact    Clinic Care Coordination Contact  OUTREACH    Referral Information:  Referral Source: IP Handoff    Primary Diagnosis: Cardiovascular - other    Chief Complaint   Patient presents with     Clinic Care Coordination - Post Hospital     RNCC      [place] Utilization:   Clinic Utilization  Difficulty keeping appointments:: No  Compliance Concerns: No  No-Show Concerns: No  No PCP office visit in Past Year: No  Utilization    Hospital Admissions  2             ED Visits  1             No Show Count (past year)  0                Current as of: 4/29/2022  1:34 AM            Clinical Concerns:  Current Medical Concerns: Called and spoke with patient, states he is doing much better.  He was in the emergency room for A. fib they converted him and he has not had any further irregular or rapid heartbeats.  He has been taking all his medications as prescribed without difficulty.  Patient states he is scheduled to go back to cardiac rehab to continue that program.  His weight today is 212 pounds and doing well.  No current concerns.  Current Behavioral Concerns: No current concerns  Education Provided to patient: Continue to monitor weights take medications as prescribed  Pain  Pain (GOAL):: No  Health Maintenance Reviewed:    Clinical Pathway: None    Medication Management:  Medication review status: Medications reviewed and no changes reported per patient.             Functional Status:  Dependent ADLs:: Independent  Dependent IADLs:: Independent  Bed or wheelchair confined:: No  Mobility Status: Independent  Fallen 2 or more times in the past year?: No  Any fall with injury in the past year?: No    Living Situation:  Current living arrangement:: I live in a private home with spouse  Type of residence:: Private home - no stairs    Lifestyle & Psychosocial Needs:    Social Determinants of Health     Tobacco Use: Low Risk      Smoking Tobacco Use: Never Smoker     Smokeless Tobacco Use: Never  Used   Alcohol Use: Not on file   Financial Resource Strain: Not on file   Food Insecurity: Not on file   Transportation Needs: Not on file   Physical Activity: Not on file   Stress: Not on file   Social Connections: Not on file   Intimate Partner Violence: Not on file   Depression: Not at risk     PHQ-2 Score: 2   Housing Stability: Not on file     Diet:: Diabetic diet  Inadequate nutrition (GOAL):: No  Tube Feeding: No  Inadequate activity/exercise (GOAL):: No  Significant changes in sleep pattern (GOAL): No  Transportation means:: Regular car     Mental health DX:: Yes  Mental health DX how managed:: Medication  Mental health management concern (GOAL):: No  Informal Support system:: Spouse      Resources and Interventions:  Current Resources:      Community Resources: Cardiac Rehab  Supplies Currently Used at Home: Diabetic Supplies  Equipment Currently Used at Home: glucometer  Employment Status: employed part-time     Advance Care Plan/Directive  Advanced Care Plans/Directives on file:: No  Advanced Care Plan/Directive Status: Not Applicable    Referrals Placed:      Goals:       General    I will reduce risk of complications related to my heart surgery     Goal Statement: I will reduce risk of complications related to my heart heart surgery  Date Goal set: 4/15/2022  Barriers: Diabetes and other complex medical diagnoses  Strengths: motivated and engaged, supportive wife  Date to Achieve By: 8/1/2022  Patient expressed understanding of goal: yes  Action steps to achieve this goal:  1. Take my medications as prescribed  2. Eat a low cholesterol, low sodium diet, diabetic diet  3. Cardiac Rehab as recommended  4  Attend recommended follow up appts  5. Monitor for sob, chest pain, nausea, fever, or any signs of bleeding.  5. I will use my nitroglycerin if I have any chest pain and call 911 if taken x three.          Patient/Caregiver understanding: Pt will follow up in clinic as recommended    Outreach Frequency:  2 weeks  Future Appointments              In 3 days PH CR 1 [facility]    In 5 days PH CR 1 [facility]    In 1 week PH CR 1 [facility]    In 1 week Susanna Vital [facility] Diabetes [facility]    In 1 week PH CR 1 [facility]    In 2 weeks PH CR 1 [facility]    In 2 weeks PH CR 1 [facility]    In 3 weeks PH CR 1 [facility]    In 3 weeks PH CR 1 [facility]    In 1 month PH CR 1 [facility]    In 1 month PH CR 1 [facility]    In 1 month PH CR 1 [facility]    In 1 month Kamala Murphy MD [facility]    In 1 month Nik Andino PA-C [facility] [place]    In 1 month [facility]    In 3 months PH UROLOGY RN [facility]          Plan: RNCC will follow up in 3 weeks.       Georgina SANDYN, RN, PHN, [facility]  Primary Clinic Care Coordination    [facility]  Primary Care Clinics  [email or web address]   Office: [phone]  Employed by [employer]

## 2022-04-29 NOTE — PROGRESS NOTES
[facility] received INR referral from cardiology.   I spoke with pt who said that he still has a month left of Xeralto. Coumadin Edu booklet and Vicky K sheet has been mailed to pt to review.   I have sent a staff message to follow up with pt in 3 weeks to discuss education more and to see when he will be done with Xeralto.   INR standing order signed.   5 mg Coumadin sent to pharmacy, but advised pt to store in different place and not take until he speaks with [facility] again.     I spoke with [facility], who states that the baseline INRs that were done on 4/8 should have no bearing on needing to start him on a smaller dose. Also, apalutamide is listed in med list, which may mean he may require more Coumadin, but at this time, OK to start him on  5 mg when the time comes.     Maria Esther Whitehead RN

## 2022-04-29 NOTE — TELEPHONE ENCOUNTER
Patient is s/p CABG 04/08 with Dr. Bae, discharged to home 04/13/2022.    Presented to ER at [facility] 04/26 with new onset tachycardia with associated dizziness. Found to be in atrial flutter with RVR and underwent DCCV in ER. Was discharged wit new Rx for Xarelto.    Patient had postop CV surgery follow-up 04/27. Reported he was given one month free of Xarelto, but after that would not be covered. Test Rx for Eliquis sent, not covered either. Patient agreeable to transition to warfarin. Referal placed to INR clinic, contacted patient's primary care clinic to update.     Tamar James PA-C  Cardiothoracic Surgery  Pager [phone]

## 2022-05-02 ENCOUNTER — HOSPITAL ENCOUNTER (OUTPATIENT)
Dept: CARDIAC REHAB | Facility: CLINIC | Age: 67
Discharge: HOME | End: 2022-05-02
Attending: SURGERY
Payer: MEDICARE

## 2022-05-02 PROCEDURE — 93798 PHYS/QHP OP CAR RHAB W/ECG: CPT | Performed by: REHABILITATION PRACTITIONER

## 2022-05-04 ENCOUNTER — HOSPITAL ENCOUNTER (EMERGENCY)
Facility: CLINIC | Age: 67
Discharge: HOME | End: 2022-05-04
Attending: EMERGENCY MEDICINE | Admitting: EMERGENCY MEDICINE
Payer: MEDICARE

## 2022-05-04 ENCOUNTER — HOSPITAL ENCOUNTER (OUTPATIENT)
Dept: CARDIAC REHAB | Facility: CLINIC | Age: 67
Discharge: HOME | End: 2022-05-04
Attending: SURGERY
Payer: MEDICARE

## 2022-05-04 ENCOUNTER — TELEPHONE (OUTPATIENT)
Dept: CARDIOLOGY | Facility: CLINIC | Age: 67
End: 2022-05-04

## 2022-05-04 VITALS
BODY MASS INDEX: 30.06 KG/M2 | WEIGHT: 210 LBS | DIASTOLIC BLOOD PRESSURE: 89 MMHG | RESPIRATION RATE: 17 BRPM | OXYGEN SATURATION: 97 % | TEMPERATURE: 98.6 F | SYSTOLIC BLOOD PRESSURE: 154 MMHG | HEART RATE: 96 BPM | HEIGHT: 70 IN

## 2022-05-04 DIAGNOSIS — I48.0 PAROXYSMAL ATRIAL FIBRILLATION (H): ICD-10-CM

## 2022-05-04 DIAGNOSIS — Z95.1 S/P CABG (CORONARY ARTERY BYPASS GRAFT): ICD-10-CM

## 2022-05-04 PROCEDURE — 99283 EMERGENCY DEPT VISIT LOW MDM: CPT | Performed by: EMERGENCY MEDICINE

## 2022-05-04 PROCEDURE — G0463 HOSPITAL OUTPT CLINIC VISIT: HCPCS | Performed by: REHABILITATION PRACTITIONER

## 2022-05-04 PROCEDURE — 93005 ELECTROCARDIOGRAM TRACING: CPT | Performed by: EMERGENCY MEDICINE

## 2022-05-04 PROCEDURE — 93010 ELECTROCARDIOGRAM REPORT: CPT | Performed by: EMERGENCY MEDICINE

## 2022-05-04 PROCEDURE — 99284 EMERGENCY DEPT VISIT MOD MDM: CPT | Mod: 25 | Performed by: EMERGENCY MEDICINE

## 2022-05-04 RX ORDER — AMIODARONE HYDROCHLORIDE 200 MG/1
TABLET ORAL
Qty: 56 TABLET | Refills: 0 | Status: SHIPPED | OUTPATIENT
Start: 2022-05-04 | End: 2022-09-28

## 2022-05-04 NOTE — TELEPHONE ENCOUNTER
Received a call from Cardiac Rehab in [facility] stating that patient is tachycardic; patient HR was 102 sitting, 135 when he stood up; patient feels lightheaded; unable to determine if patient in A-flutter due to increased heart reate. Patient was in the ED on 4/26 with similar symptoms. This nurse instructed the patient Cardiac Rehab to have patient go to the ER to get evaluated, they verbalized understanding and agreed to the plan.

## 2022-05-04 NOTE — DISCHARGE INSTRUCTIONS
Continue with current medications    Start amiodarone: 400 mg twice daily for 1 week then 200 mg twice daily for 1 week then 200 mg daily    I spoke with cardiology and they will be contacting you to facilitate a clinic appointment in the next 7 to 10 days.

## 2022-05-04 NOTE — TELEPHONE ENCOUNTER
Malorie called and said to all Stefani Gaytan at [facility] [phone] to get the forms.

## 2022-05-04 NOTE — ED TRIAGE NOTES
Pt presents from cardiac rehab with reports of being in rapid A fib at 120's. Pt is upper 90's to 109 in triage.      Triage Assessment     Row Name 05/04/22 1331       Triage Assessment (Adult)    Airway WDL WDL       Respiratory WDL    Respiratory WDL WDL       Cardiac WDL    Cardiac WDL X;rhythm    Cardiac Rhythm tachycardic

## 2022-05-04 NOTE — ED PROVIDER NOTES
History     Chief Complaint   Patient presents with     Atrial Fib     HPI     Joel Pike is a 66 year old male who presents from cardiac rehab with atrial fibrillation.    -The patient underwent four-vessel bypass surgery at [facility] on April 8.  Uncomplicated.  -Presented to the ED at [facility] on 4/26 for A. fib with RVR.  Electrically cardioverted successfully back to sinus rhythm.  Started anticoagulation with a prescription for Xarelto  -Anticoagulation decisions complicated by insurance coverage.  Currently has access to a 30-day supply of Xarelto (not covered by insurance) plans to transition to Eliquis for 30 days through free samples(not covered by insurance) that if necessary switch to warfarin.  -On beta blocker therapy metoprolol 25 mg twice daily.    Today came into the rehab feeling lightheaded.  Noted to have atrial fibrillation rate 122 bpm.  Patient up on the treadmill heart rate 1/12/1937.  Tracings confirmed atrial fib flutter.  Sent to the ED.    Presents the ED no longer feeling lightheaded.  Has had no dyspnea or chest discomfort.      Allergies:  Allergies   Allergen Reactions     Dust Mites Cough     Hmg-Coa-R Inhibitors      Body aches     Other Environmental Allergy      Allergic to sunlight ever since 20s.      Penicillins Hives and Rash       Problem List:    Patient Active Problem List    Diagnosis Date Noted     Atrial flutter, unspecified type (H) 04/29/2022     Priority: Medium     S/P CABG (coronary artery bypass graft) 04/13/2022     Priority: Medium     Fluid overload 04/13/2022     Priority: Medium     Anemia due to blood loss, acute 04/13/2022     Priority: Medium     Atherosclerosis of native coronary artery of native heart with stable angina pectoris (H) 04/08/2022     Priority: Medium     Status post coronary angiogram 03/28/2022     Priority: Medium     Essential hypertension 02/05/2022     Priority: Medium     Prostate cancer (H) 07/07/2021      Priority: Medium     Status post total right knee replacement 02/01/2021     Priority: Medium     S/P total knee replacement using cement, right 01/18/2021     Priority: Medium     Primary osteoarthritis of right knee 08/27/2020     Priority: Medium     Added automatically from request for surgery [id number]       S/P total knee replacement using cement, left 02/04/2020     Priority: Medium     Microalbuminuria 02/26/2019     Priority: Medium     Recurrent major depression in complete remission (H) 11/08/2017     Priority: Medium     CKD (chronic kidney disease) stage 2, GFR 60-89 ml/min 10/30/2015     Priority: Medium     Obesity, Class I, BMI 30-34.9 10/30/2015     Priority: Medium     Type 2 diabetes mellitus with stage 2 chronic kidney disease (H) 04/19/2013     Priority: Medium     Hypothyroidism, unspecified type 03/02/2012     Priority: Medium     Advanced directives, counseling/discussion 08/19/2011     Priority: Medium     Advance Directive Problem List Overview:   Name Relationship Phone    Primary Health Care Agent            Alternative Health Care Agent          Discussed advance care planning with patient; information given to patient to review.//Macey Calvert/AISAH(AAMA)  8/19/2011 and again 11/9/16         Tinnitus 07/22/2011     Priority: Medium     right ear, began after accident, immediately       Hyperlipidemia LDL goal <100 01/28/2011     Priority: Medium     HILARIO (obstructive sleep apnea) 01/28/2011     Priority: Medium     Chronic rhinitis 01/15/2010     Priority: Medium     Degeneration of lumbar or lumbosacral intervertebral disc 12/19/2005     Priority: Medium     Gastroesophageal reflux disease without esophagitis 05/14/2003     Priority: Medium        Past Medical History:    Past Medical History:   Diagnosis Date     Atherosclerosis of native coronary artery of native heart with stable angina pectoris (H) 4/8/2022     Calculus of kidney      CKD (chronic kidney disease) stage 2, GFR 60-89  ml/min 10/30/2015     Degeneration of lumbar or lumbosacral intervertebral disc      Depressive disorder      Depressive disorder, not elsewhere classified      Diabetes (H)      Diabetic eye exam (H) 02/06/12, 11/1/13     Diabetic eye exam (H) 12/17/14     Hyperlipidaemia      Hypertension      Hypothyroidism 1/17/2010     Obesity, Class I, BMI 30-34.9 10/30/2015     HILARIO (obstructive sleep apnea)      Primary osteoarthritis of left knee      Prostate cancer (H) 7/7/2021     Uncomplicated asthma        Past Surgical History:    Past Surgical History:   Procedure Laterality Date     ARTHROPLASTY KNEE Left 2/4/2020    Procedure: left total knee replacement;  Surgeon: Jason Berman DO;  Location: [facility] OR     ARTHROPLASTY KNEE Right 1/18/2021    Procedure: right total knee replacement;  Surgeon: Jason Berman DO;  Location:  OR     BYPASS GRAFT ARTERY CORONARY N/A 4/8/2022    Procedure: CORONARY ARTERY BYPASS GRAFT x 4 (LIMA - LAD; SV - OM; SV - PDA; SV - DIAGONAL) WITH ENDOSCOPIC SAPHENOUS VEIN HARVEST ON BILATERAL LOWER EXTREMITY, AND ON CARDIOPULMONARY PUMP OXYGENATOR  (INTRAOPERATIVE TRANSESOPHAGEAL ECHOCARDIOGRAM BY ANESTHESIOLOGIST);  Surgeon: Karson Bae MD;  Location:  OR     COLONOSCOPY  02/02/09    Repeat in 5 yrs     COLONOSCOPY N/A 9/5/2014    Procedure: COMBINED COLONOSCOPY, SINGLE BIOPSY/POLYPECTOMY BY BIOPSY;  Surgeon: Denis Oakes MD;  Location:  GI     CV CORONARY ANGIOGRAM N/A 3/28/2022    Procedure: Coronary Angiogram;  Surgeon: Lindy Farooq MD;  Location:  HEART CARDIAC CATH LAB     CV LEFT HEART CATH N/A 3/28/2022    Procedure: Left Heart Catheterization;  Surgeon: Lindy Farooq MD;  Location:  HEART CARDIAC CATH LAB     ESOPHAGOSCOPY, GASTROSCOPY, DUODENOSCOPY (EGD), COMBINED N/A 2/20/2015    Procedure: COMBINED ESOPHAGOSCOPY, GASTROSCOPY, DUODENOSCOPY (EGD), BIOPSY SINGLE OR MULTIPLE;  Surgeon: Alfred Young MD;  Location:  GI      HC REMV CATARACT EXTRACAP,INSERT LENS, W/O ECP  2000    Bilateral     HC REVISE MEDIAN N/CARPAL TUNNEL SURG  1991     HC TOOTH EXTRACTION W/FORCEP  1980's    Pine teeth removed     RELEASE CARPAL TUNNEL Right 6/16/2017    Procedure: RELEASE CARPAL TUNNEL;  Right carpal tunnel release;  Surgeon: Jason Berman DO;  Location: [facility] OR     RELEASE CARPAL TUNNEL Left 7/11/2017    Procedure: RELEASE CARPAL TUNNEL;  Left carpal tunnel release;  Surgeon: Jason Berman DO;  Location: [facility] OR     SOFT TISSUE SURGERY         Family History:    Family History   Problem Relation Age of Onset     Cerebrovascular Disease Mother      Hypertension Mother      Hypertension Father      Diabetes Father         Adult     Heart Disease Father         Hx: Bypass     Unknown/Adopted Maternal Grandmother      Cerebrovascular Disease Maternal Grandmother      No Known Problems Maternal Grandfather      No Known Problems Paternal Grandmother      No Known Problems Paternal Grandfather      Thyroid Disease Brother      Cancer Sister         Liver     No Known Problems Sister      No Known Problems Son        Social History:  Marital Status:   [2]  Social History     Tobacco Use     Smoking status: Never Smoker     Smokeless tobacco: Never Used   Vaping Use     Vaping Use: Never used   Substance Use Topics     Alcohol use: No     Alcohol/week: 0.0 standard drinks     Drug use: No        Medications:    acetaminophen (TYLENOL) 325 MG tablet  apalutamide (ERLEADA) 60 MG tablet  blood glucose (HUGO CONTOUR) test strip  buPROPion (WELLBUTRIN XL) 150 MG 24 hr tablet  Coenzyme Q-10 capsule  ezetimibe (ZETIA) 10 MG tablet  fish oil-omega-3 fatty acids 1000 MG capsule  levothyroxine (SYNTHROID/LEVOTHROID) 125 MCG tablet  losartan (COZAAR) 50 MG tablet  metFORMIN (GLUCOPHAGE) 1000 MG tablet  methocarbamol (ROBAXIN) 500 MG tablet  metoprolol tartrate (LOPRESSOR) 25 MG tablet  Misc Natural Products (OSTEO BI-FLEX ADV JOINT  "SHIELD) TABS  Multiple Vitamins-Minerals (PRESERVISION AREDS PO)  oxyCODONE (ROXICODONE) 5 MG tablet  pantoprazole (PROTONIX) 40 MG EC tablet  rivaroxaban ANTICOAGULANT (XARELTO ANTICOAGULANT) 20 MG TABS tablet  rosuvastatin (CRESTOR) 5 MG tablet  senna-docusate (SENOKOT-S/PERICOLACE) 8.6-50 MG tablet  alcohol swab prep pads  Alcohol Swabs PADS  aspirin (ASA) 81 MG EC tablet  blood glucose (NO BRAND SPECIFIED) lancets standard  blood glucose (NO BRAND SPECIFIED) lancets standard  blood glucose (NO BRAND SPECIFIED) test strip  blood glucose calibration (HUGO CONTOUR) NORMAL solution  blood glucose monitoring (NO BRAND SPECIFIED) meter device kit  blood glucose monitoring (NO BRAND SPECIFIED) meter device kit  nitroGLYcerin (NITROSTAT) 0.4 MG sublingual tablet  Sharps Container MISC  warfarin ANTICOAGULANT (COUMADIN) 5 MG tablet          Review of Systems   All other systems reviewed and are negative.      Physical Exam   BP: (!) 150/80  Pulse: 101  Temp: 98.6  F (37  C)  Resp: 18  Height: 177.8 cm (5' 10\")  Weight: 95.3 kg (210 lb)  SpO2: 98 %      Physical Exam  Vitals and nursing note reviewed.   Constitutional:       Appearance: He is not ill-appearing.   HENT:      Head: Normocephalic.      Nose: Nose normal.   Eyes:      Conjunctiva/sclera: Conjunctivae normal.   Cardiovascular:      Rate and Rhythm: Normal rate and regular rhythm.      Pulses: Normal pulses.      Heart sounds: Normal heart sounds. No murmur heard.    No friction rub.   Pulmonary:      Effort: Pulmonary effort is normal.   Abdominal:      General: Abdomen is flat. Bowel sounds are normal.   Skin:     General: Skin is warm.      Capillary Refill: Capillary refill takes less than 2 seconds.      Coloration: Jaundice: .ekg.      Findings: No rash.   Neurological:      General: No focal deficit present.      Mental Status: He is alert and oriented to person, place, and time.   Psychiatric:         Mood and Affect: Mood normal.         Behavior: " Behavior normal.         ED Course                 Procedures         EKG:  Interpretation by Thai Campbell DO.   Indication: Sinus rhythm.  Rate 100 203 bpm.  Low voltage throughout the precordial leads V1 through V3.  Does have RSR prime in V1 only which could be incomplete right bundle branch block versus nondiagnostic.  Is nonspecific diffuse T wave changes but no signs for an acute ischemia.  There is no ectopy and the axis is normal.  Impressions: Abnormal EKG               No results found for this or any previous visit (from the past 24 hour(s)).    Medications - No data to display    Assessments & Plan (with Medical Decision Making)  Joel is 66 years of age.  CABG x4 on April 8.  Presented to the ED on April 26 and identified to be in A. fib with RVR.  Anticoagulated and opposed surgical setting he was treated with electrocardioversion successfully.  Today he was in cardiac rehab and presented with complaints of some mild lightheadedness.  Noted to be in A. fib with a rate of 122-139.  Sent to the ED.  By the time the patient arrived to ED he had converted to sinus rhythm.  He has had some challenges with anticoagulation.  He was started on Xarelto given 30-day supply free but his insurance would not cover it any further.  He then received a future dose of Eliquis with additional 30-day free supply but his insurance will not cover that.  After completing the 60 days on the above medications and then plans to switch over to warfarin/Coumadin clinic.  Plan: Call placed to cardiology to discuss additional treatment options.  He is on metoprolol 25 mg twice daily.  Would like to consider amiodarone.  2:34 PM  Cardiology recommended amiodarone at 40 mg twice daily x1 week then 200 mg twice daily x1 week then 20 mg daily.  Prescription sent to patient's pharmacy.  Cardiology plans to reach out to the patient and schedule appointment in the next 1 to 2 weeks.     I have reviewed the nursing notes.    I have  reviewed the findings, diagnosis, plan and need for follow up with the patient.      New Prescriptions    No medications on file       Final diagnoses:   Paroxysmal atrial fibrillation (H)   S/P CABG (coronary artery bypass graft)       5/4/2022   [facility] EMERGENCY DEPT     Thai Campbell,   05/04/22 [id number]

## 2022-05-06 ENCOUNTER — PATIENT OUTREACH (OUTPATIENT)
Dept: NURSING | Facility: CLINIC | Age: 67
End: 2022-05-06
Payer: MEDICARE

## 2022-05-06 ASSESSMENT — ACTIVITIES OF DAILY LIVING (ADL): DEPENDENT_IADLS:: INDEPENDENT

## 2022-05-06 NOTE — PROGRESS NOTES
Clinic Care Coordination Contact    Follow Up Progress Note      Assessment: The RN CC nurse care coordinator contacted the patient by phone for a follow up call.  The patient had a feeling of lightheadedness during the cardiac rehab, which they determined to be A fib.  The patient was sent to the ED where he was found to have converted on his own to sinus rhythm.  The patient was placed on a prescription of Amiodarone upon discharge.  The RN CC answered all of the patient's questions to the best of her ability and to the satisfaction of the patient.      Care Gaps:    Health Maintenance Due   Topic Date Due     DTAP/TDAP/TD IMMUNIZATION (1 - Tdap) 04/12/2016     COVID-19 Vaccine (4 - Booster for Moderna series) 02/28/2022       not assessed on the follow up call    Goals addressed this encounter:    Goals Addressed                 This Visit's Progress      I will reduce risk of complications related to my heart surgery   50%     Goal Statement: I will reduce risk of complications related to my heart heart surgery  Date Goal set: 4/15/2022  Barriers: Diabetes and other complex medical diagnoses  Strengths: motivated and engaged, supportive wife  Date to Achieve By: 8/1/2022  Patient expressed understanding of goal: yes  Action steps to achieve this goal:  1. Take my medications as prescribed  2. Eat a low cholesterol, low sodium diet, diabetic diet  3. Cardiac Rehab as recommended  4  Attend recommended follow up appts  5. Monitor for sob, chest pain, nausea, fever, or any signs of bleeding.  5. I will use my nitroglycerin if I have any chest pain and call 911 if taken x three.                  Intervention/Education provided during outreach: The RN CC spoke with the patient regarding medications and possible interactions.     Outreach Frequency: 2 weeks        Plan:   1.  The patient will take all medications as prescribed by the providers.  2.  The patient will monitor for any side effects from the medications  and report to the providers.  3.  The patient will continue to go to cardiac rehab.    RN CC Nurse Care Coordinator will follow up in 3 weeks.          Ronni Rich MSN, RN, PHN, CCM   Primary Care Clinical RN Care Coordinator  [facility]  5/6/2022   9:34 AM  [email or web address]  Office: [phone]

## 2022-05-09 ENCOUNTER — TELEPHONE (OUTPATIENT)
Dept: ONCOLOGY | Facility: CLINIC | Age: 67
End: 2022-05-09
Payer: MEDICARE

## 2022-05-09 ENCOUNTER — HOSPITAL ENCOUNTER (OUTPATIENT)
Dept: CARDIAC REHAB | Facility: CLINIC | Age: 67
Discharge: HOME | End: 2022-05-09
Attending: SURGERY
Payer: MEDICARE

## 2022-05-09 PROCEDURE — 93798 PHYS/QHP OP CAR RHAB W/ECG: CPT

## 2022-05-09 NOTE — ORAL ONC MGMT
Subjective/Objective:  Joel Pike is a 66 year old male contacted by phone for a follow-up visit for oral chemotherapy.  Pt reports he resumed Erleada therapy on 5/5/22 after holding due to CABG surgery.   Pt reports tolerating the medication well. Pt denies side effects since resuming, with the exception of edema. I advised that he discuss that with his cardiology team at next follow up. He also recently started Amiodarone for A.Fib- there is a DDI with Erleada that can result in decreased Amiodarone concentrations/effect. Would recommend to monitor.     ORAL CHEMOTHERAPY 1/5/2022 1/7/2022 1/10/2022 1/21/2022 1/24/2022 2/19/2022 5/9/2022   Assessment Type Left Voicemail Monthly Follow up Other Other Other Chart Review Other;Monthly Follow up   Diagnosis Code Prostate Cancer Prostate Cancer Prostate Cancer Prostate Cancer Prostate Cancer Prostate Cancer Prostate Cancer   Providers Dr Edil Solo   [facility]   Drug Name Erleada (apalutamide) Erleada (apalutamide) Erleada (apalutamide) Erleada (apalutamide) Erleada (apalutamide) Erleada (apalutamide) Erleada (apalutamide)   Dose 240 mg 240 mg 240 mg 240 mg 240 mg 240 mg 240 mg   Current Schedule Daily Daily Daily Daily Daily Daily Daily   Cycle Details Continuous Continuous Continuous Continuous Continuous Continuous Continuous   Start Date of Last Cycle - - - - - - 5/5/2022   Planned next cycle start date - - - - - - -   Doses missed in last 2 weeks - 0 - 0 - - 0   Adherence Assessment - Adherent - Adherent - - Adherent   Adverse Effects - - - - - - Edema   Any new drug interactions? - No - - - - Yes   Pharmacist Intervention? - - - - - - No   Is the dose as ordered appropriate for the patient? - Yes - - - - Yes   Is the patient currently in pain? - No - - - - -   Has the patient been assessed within the past 6 months for depression? - No - - - - -   Has the  "patient missed any days of school, work, or other routine activity? - No - - - - -   Since the last time we talked, have you been hospitalized or used the emergency room? - No - - - - -       Vitals:  BP:   BP Readings from Last 1 Encounters:   05/04/22 (!) 154/89     Wt Readings from Last 1 Encounters:   05/04/22 95.3 kg (210 lb)     Estimated body surface area is 2.17 meters squared as calculated from the following:    Height as of 5/4/22: 1.778 m (5' 10\").    Weight as of 5/4/22: 95.3 kg (210 lb).    Labs:  _  Result Component Current Result Ref Range   Sodium 134 (4/26/2022) 133 - 144 mmol/L     Result Component Current Result Ref Range   Potassium 4.3 (4/26/2022) 3.4 - 5.3 mmol/L     Result Component Current Result Ref Range   Calcium 9.2 (4/26/2022) 8.5 - 10.1 mg/dL     Result Component Current Result Ref Range   Magnesium 2.1 (4/13/2022) 1.6 - 2.3 mg/dL     Result Component Current Result Ref Range   Phosphorus 4.3 (4/13/2022) 2.5 - 4.5 mg/dL     Result Component Current Result Ref Range   Albumin 3.1 (L) (4/13/2022) 3.4 - 5.0 g/dL     Result Component Current Result Ref Range   Urea Nitrogen 23 (4/26/2022) 7 - 30 mg/dL     Result Component Current Result Ref Range   Creatinine 0.85 (4/26/2022) 0.66 - 1.25 mg/dL     Result Component Current Result Ref Range   AST 24 (4/13/2022) 0 - 45 U/L     Result Component Current Result Ref Range   ALT 43 (4/13/2022) 0 - 70 U/L     Result Component Current Result Ref Range   Bilirubin Total 0.5 (4/13/2022) 0.2 - 1.3 mg/dL     Result Component Current Result Ref Range   WBC Count 10.0 (4/26/2022) 4.0 - 11.0 10e3/uL     Result Component Current Result Ref Range   Hemoglobin 11.4 (L) (4/26/2022) 13.3 - 17.7 g/dL     Result Component Current Result Ref Range   Platelet Count 445 (4/26/2022) 150 - 450 10e3/uL         Assessment/Plan:  Pt is currently tolerating therapy well. Plan to continue Erleada as prescribed. Pt verbalized understanding and agrees to plan. Encouraged pt " to call with any issues or concerns.    Follow-Up:  Nothing scheduled.    Elisa Cole, PharmD  Oral Chemotherapy Monitoring Program  [facility]  [phone]  May 9, 2022

## 2022-05-10 ENCOUNTER — ALLIED HEALTH/NURSE VISIT (OUTPATIENT)
Dept: EDUCATION SERVICES | Facility: CLINIC | Age: 67
End: 2022-05-10
Payer: MEDICARE

## 2022-05-10 ENCOUNTER — TELEPHONE (OUTPATIENT)
Dept: EDUCATION SERVICES | Facility: CLINIC | Age: 67
End: 2022-05-10

## 2022-05-10 DIAGNOSIS — I25.118 ATHEROSCLEROSIS OF NATIVE CORONARY ARTERY OF NATIVE HEART WITH STABLE ANGINA PECTORIS (H): ICD-10-CM

## 2022-05-10 DIAGNOSIS — N18.2 TYPE 2 DIABETES MELLITUS WITH STAGE 2 CHRONIC KIDNEY DISEASE, WITH LONG-TERM CURRENT USE OF INSULIN (H): ICD-10-CM

## 2022-05-10 DIAGNOSIS — E11.22 TYPE 2 DIABETES MELLITUS WITH STAGE 2 CHRONIC KIDNEY DISEASE, WITH LONG-TERM CURRENT USE OF INSULIN (H): ICD-10-CM

## 2022-05-10 DIAGNOSIS — Z79.4 TYPE 2 DIABETES MELLITUS WITH STAGE 2 CHRONIC KIDNEY DISEASE, WITH LONG-TERM CURRENT USE OF INSULIN (H): ICD-10-CM

## 2022-05-10 DIAGNOSIS — R73.03 PRE-DIABETES: Primary | ICD-10-CM

## 2022-05-10 PROCEDURE — G0108 DIAB MANAGE TRN  PER INDIV: HCPCS

## 2022-05-10 NOTE — PATIENT INSTRUCTIONS
1) 1 carbohydrate serving = 15 grams   Include 3-4 servings of carbohydrates with meals and 1-2 with snacks    2) Continue with beverages that are unsweetened    3) Use the My plate to balance your meals and serving sizes    4) Regular meals and snacks      Gill Vital RDN, LD  Outpatient Diabetes Education  Adult Diabetes Education Triage [phone]   Diabetes Scheduling [phone]

## 2022-05-10 NOTE — LETTER
5/10/2022         RE: Joel Pike  [address]  [facility] [address]        Dear Colleague,    Thank you for referring your patient, Joel Pike, to the [facility] [place]. Please see a copy of my visit note below.    Diabetes Self-Management Education & Support    Presents for: Individual review    Type of Visit: In Person    How would patient like to obtain AVS? Printed copy    ASSESSMENT:    Joel and his wife Michelle are in clinic to review management of diabetes.  They have been making changes related to food choices/diet including limiting sweets, fats, sodium and using portion control.  Joel has had success with wt loss over the past month, he was 228 lbs and is down to 110 lbs.  His goal is to be below 200 lbs.  Is testing BG, was seeing some higher numbers after heart surgery and the following recovery.  Has been seeing some improvement with this.  Is in cardiac rehab, activity is somewhat limited due to recent heart surgery and A-fib.    We reviewed usual diet and discussed benefits of fiber, lean protein and non-starchy vegetables with meals.  Reviewed food sources of carbohydrates, serving sizes, the benefits of a regular meal pattern and the My plate.  Joel and Michelle were receptive to new information and verbalized understanding.     Patient's most recent   Lab Results   Component Value Date    A1C 6.3 01/31/2022    A1C 6.0 02/26/2021    is meeting goal of <7.0    Diabetes knowledge and skills assessment:   Patient is knowledgeable in diabetes management concepts related to: Healthy Eating and Taking Medication    Continue education with the following diabetes management concepts: Healthy Eating, Being Active, Monitoring, Reducing Risks and Healthy Coping    Based on learning assessment above, most appropriate setting for further diabetes education would be: Individual setting.    INTERVENTIONS:    Education provided today on:  AADE Self-Care  Behaviors:  Diabetes Pathophysiology  Healthy Eating: carbohydrate counting, consistency in amount, composition, and timing of food intake, portion control, plate planning method and label reading  Being Active: relationship to blood glucose and precautions to take  Monitoring: log and interpret results and individual blood glucose targets  Taking Medication: when to take medications  Reducing Risks: A1C - goals, relating to blood glucose levels, how often to check    Opportunities for ongoing education and support in diabetes-self management were discussed. Pt verbalized understanding of concepts discussed and recommendations provided today.       Education Materials Provided:  Carbohydrate Counting, My Plate Planner and Portion sizes, resources for people with diabetes        PLAN    1) 1 carbohydrate serving = 15 grams   Include 3-4 servings of carbohydrates with meals and 1-2 with snacks    2) Continue with beverages that are unsweetened    3) Use the My plate to balance your meals and serving sizes    4) Regular meals and snacks      Topics to cover at upcoming visits: Monitoring, Reducing Risks and Healthy Coping  Follow-up: To be scheduled by patient as needed or after A1C recheck    See Goals Section for co-developed, patient-stated behavior change goals.  AVS provided to patient today.          SUBJECTIVE / OBJECTIVE:  Presents for: Individual review  Accompanied by: Self, Spouse  Diabetes education in the past 24mo: Yes  Diabetes type: Type 2  Disease course: Stable  How confident are you filling out medical forms by yourself:: Quite a bit  Diabetes management related comments/concerns: with cancer and heart what should I be eating  Difficulty affording diabetes medication?: No  Difficulty affording diabetes testing supplies?: No  Cultural Influences/Ethnic Background:  Not  or       Diabetes Symptoms & Complications:  Fatigue: Yes  Neuropathy: Sometimes  Polydipsia: Yes  Polyphagia:  "Sometimes  Polyuria: Yes  Visual change: Sometimes  Slow healing wounds: No  Symptom course: Stable  Weight trend: Decreasing  Autonomic neuropathy: No  CVA: No  Heart disease: Yes  Nephropathy: No  Peripheral neuropathy: No  Peripheral Vascular Disease: Yes  Retinopathy: No  Sexual dysfunction: Yes    Patient Problem List and Family Medical History reviewed for relevant medical history, current medical status, and diabetes risk factors.    Vitals:  There were no vitals taken for this visit.  Estimated body mass index is 30.13 kg/m  as calculated from the following:    Height as of 5/4/22: 1.778 m (5' 10\").    Weight as of 5/4/22: 95.3 kg (210 lb).   Last 3 BP:   BP Readings from Last 3 Encounters:   05/04/22 (!) 154/89   04/27/22 96/56   04/26/22 117/77       History   Smoking Status     Never Smoker   Smokeless Tobacco     Never Used       Labs:  Lab Results   Component Value Date    A1C 6.3 01/31/2022    A1C 6.0 02/26/2021     Lab Results   Component Value Date     04/26/2022     01/19/2021     Lab Results   Component Value Date     03/13/2022    LDL 90 11/04/2020     HDL Cholesterol   Date Value Ref Range Status   11/04/2020 34 (L) >39 mg/dL Final     Direct Measure HDL   Date Value Ref Range Status   03/13/2022 36 (L) >=40 mg/dL Final   ]  GFR Estimate   Date Value Ref Range Status   04/26/2022 >90 >60 mL/min/1.73m2 Final     Comment:     Effective December 21, 2021 eGFRcr in adults is calculated using the 2021 CKD-EPI creatinine equation which includes age and gender (Bisi william al., NEJM, DOI: 10.1056/KTXXte7541715)   01/06/2021 63 >60 mL/min/[1.73_m2] Final     Comment:     Non  GFR Calc  Starting 12/18/2018, serum creatinine based estimated GFR (eGFR) will be   calculated using the Chronic Kidney Disease Epidemiology Collaboration   (CKD-EPI) equation.       GFR, ESTIMATED POCT   Date Value Ref Range Status   08/10/2021 57 (L) >60 mL/min/1.73m2 Final     GFR Estimate If " Black   Date Value Ref Range Status   2021 73 >60 mL/min/[1.73_m2] Final     Comment:      GFR Calc  Starting 2018, serum creatinine based estimated GFR (eGFR) will be   calculated using the Chronic Kidney Disease Epidemiology Collaboration   (CKD-EPI) equation.       Lab Results   Component Value Date    CR 0.85 2022    CR 1.21 2021     No results found for: MICROALBUMIN    Healthy Eating:  Healthy Eating Assessed Today: Yes  Cultural/Confucianist diet restrictions?: No  Meal planning/habits: Avoiding sweets, Carb counting, Heart healthy, Low salt, Smaller portions, Plate planning method, Frequent snacking  How many times a week on average do you eat food made away from home (restaurant/take-out)?: 1  Meals include: Breakfast, Lunch, Dinner, Morning Snack, Afternoon Snack, Evening Snack  Breakfast: Coffee, oatmeal (applesauce and cinnamon) OR 2 egg omelet (onion, peppers, mushroom, spinach) with strawberries OR multi-grain english muffin (grape jelly)  Lunch: 1/2 burrito and chilly with coffee, cheesecake (special occation)  Dinner: shrimp, cottage cheese, egg OR spaghetti with bread sticks  Snacks: cheerios/honey bunches of oats- plain wtih milk skim, 1/2 apple with peanut butter  Other: smoothie- fruits with spinach and a small proteindrink  Beverages: Water, Tea, Coffee, Milk  Has patient met with a dietitian in the past?: Yes    Being Active:  Being Active Assessed Today: Yes  Exercise:: Yes (cardiac rehab)  How intense was your typical exercise? : Light (like stretching or slow walking)  Barrier to exercise: Physical limitation    Monitoring:  Monitoring Assessed Today: Yes  Did patient bring glucose meter to appointment? : No  Blood Glucose Meter: Accu-chek  Times checking blood sugar at home (number): 2  Times checking blood sugar at home (per): Day  Blood glucose trend: Fluctuating    Glucose data:  Twice daily  Fasting B mg/dL  Evening B-165 mg/dL   Did not  go above 200 mg/dL during heart procedure      Taking Medications:  Diabetes Medication(s)     Biguanides       metFORMIN (GLUCOPHAGE) 1000 MG tablet    Take 1 tablet (1,000 mg) by mouth 2 times daily (with meals)          Taking Medication Assessed Today: Yes  Current Treatments: Diet, Oral Medication (taken by mouth)  Problems taking diabetes medications regularly?: No  Diabetes medication side effects?: No    Problem Solving:  Problem Solving Assessed Today: No  Is the patient at risk for hypoglycemia?: No  Is the patient at risk for DKA?: No        Reducing Risks:  CAD Risks: Diabetes Mellitus, Dyslipidemia, Family history, Hypertension, Obesity  Sees dentist every 6 months?: No  Feet checked by healthcare provider in the last year?: Yes    Healthy Coping:  Informal Support system:: Spouse  Stage of change: ACTION (Actively working towards change)  Patient Activation Measure Survey Score:  ASPEN Score (Last Two) 1/28/2011   ASPEN Raw Score 39   Activation Score 56.4   ASPEN Level 3             Gill Vital RDN, LD  Outpatient Diabetes Education  Adult Diabetes Education Triage [phone]    Time Spent: 60 minutes  Encounter Type: Individual        Any diabetes medication dose changes were made via the Certified Diabetes Care & Education Protocol in collaboration with the patient's referring provider. A copy of this encounter was shared with the provider.

## 2022-05-10 NOTE — PROGRESS NOTES
Diabetes Self-Management Education & Support    Presents for: Individual review    Type of Visit: In Person    How would patient like to obtain AVS? Printed copy    ASSESSMENT:    Joel and his wife Michelle are in clinic to review management of diabetes.  They have been making changes related to food choices/diet including limiting sweets, fats, sodium and using portion control.  Joel has had success with wt loss over the past month, he was 228 lbs and is down to 110 lbs.  His goal is to be below 200 lbs.  Is testing BG, was seeing some higher numbers after heart surgery and the following recovery.  Has been seeing some improvement with this.  Is in cardiac rehab, activity is somewhat limited due to recent heart surgery and A-fib.    We reviewed usual diet and discussed benefits of fiber, lean protein and non-starchy vegetables with meals.  Reviewed food sources of carbohydrates, serving sizes, the benefits of a regular meal pattern and the My plate.  Joel and Michelle were receptive to new information and verbalized understanding.     Patient's most recent   Lab Results   Component Value Date    A1C 6.3 01/31/2022    A1C 6.0 02/26/2021    is meeting goal of <7.0    Diabetes knowledge and skills assessment:   Patient is knowledgeable in diabetes management concepts related to: Healthy Eating and Taking Medication    Continue education with the following diabetes management concepts: Healthy Eating, Being Active, Monitoring, Reducing Risks and Healthy Coping    Based on learning assessment above, most appropriate setting for further diabetes education would be: Individual setting.    INTERVENTIONS:    Education provided today on:  AADE Self-Care Behaviors:  Diabetes Pathophysiology  Healthy Eating: carbohydrate counting, consistency in amount, composition, and timing of food intake, portion control, plate planning method and label reading  Being Active: relationship to blood glucose and precautions to take  Monitoring:  log and interpret results and individual blood glucose targets  Taking Medication: when to take medications  Reducing Risks: A1C - goals, relating to blood glucose levels, how often to check    Opportunities for ongoing education and support in diabetes-self management were discussed. Pt verbalized understanding of concepts discussed and recommendations provided today.       Education Materials Provided:  Carbohydrate Counting, My Plate Planner and Portion sizes, resources for people with diabetes        PLAN    1) 1 carbohydrate serving = 15 grams   Include 3-4 servings of carbohydrates with meals and 1-2 with snacks    2) Continue with beverages that are unsweetened    3) Use the My plate to balance your meals and serving sizes    4) Regular meals and snacks      Topics to cover at upcoming visits: Monitoring, Reducing Risks and Healthy Coping  Follow-up: To be scheduled by patient as needed or after A1C recheck    See Goals Section for co-developed, patient-stated behavior change goals.  AVS provided to patient today.          SUBJECTIVE / OBJECTIVE:  Presents for: Individual review  Accompanied by: Self, Spouse  Diabetes education in the past 24mo: Yes  Diabetes type: Type 2  Disease course: Stable  How confident are you filling out medical forms by yourself:: Quite a bit  Diabetes management related comments/concerns: with cancer and heart what should I be eating  Difficulty affording diabetes medication?: No  Difficulty affording diabetes testing supplies?: No  Cultural Influences/Ethnic Background:  Not  or       Diabetes Symptoms & Complications:  Fatigue: Yes  Neuropathy: Sometimes  Polydipsia: Yes  Polyphagia: Sometimes  Polyuria: Yes  Visual change: Sometimes  Slow healing wounds: No  Symptom course: Stable  Weight trend: Decreasing  Autonomic neuropathy: No  CVA: No  Heart disease: Yes  Nephropathy: No  Peripheral neuropathy: No  Peripheral Vascular Disease: Yes  Retinopathy: No  Sexual  "dysfunction: Yes    Patient Problem List and Family Medical History reviewed for relevant medical history, current medical status, and diabetes risk factors.    Vitals:  There were no vitals taken for this visit.  Estimated body mass index is 30.13 kg/m  as calculated from the following:    Height as of 5/4/22: 1.778 m (5' 10\").    Weight as of 5/4/22: 95.3 kg (210 lb).   Last 3 BP:   BP Readings from Last 3 Encounters:   05/04/22 (!) 154/89   04/27/22 96/56   04/26/22 117/77       History   Smoking Status     Never Smoker   Smokeless Tobacco     Never Used       Labs:  Lab Results   Component Value Date    A1C 6.3 01/31/2022    A1C 6.0 02/26/2021     Lab Results   Component Value Date     04/26/2022     01/19/2021     Lab Results   Component Value Date     03/13/2022    LDL 90 11/04/2020     HDL Cholesterol   Date Value Ref Range Status   11/04/2020 34 (L) >39 mg/dL Final     Direct Measure HDL   Date Value Ref Range Status   03/13/2022 36 (L) >=40 mg/dL Final   ]  GFR Estimate   Date Value Ref Range Status   04/26/2022 >90 >60 mL/min/1.73m2 Final     Comment:     Effective December 21, 2021 eGFRcr in adults is calculated using the 2021 CKD-EPI creatinine equation which includes age and gender (Bisi william al., NEJ, DOI: 10.1056/LSXKbt7428778)   01/06/2021 63 >60 mL/min/[1.73_m2] Final     Comment:     Non  GFR Calc  Starting 12/18/2018, serum creatinine based estimated GFR (eGFR) will be   calculated using the Chronic Kidney Disease Epidemiology Collaboration   (CKD-EPI) equation.       GFR, ESTIMATED POCT   Date Value Ref Range Status   08/10/2021 57 (L) >60 mL/min/1.73m2 Final     GFR Estimate If Black   Date Value Ref Range Status   01/06/2021 73 >60 mL/min/[1.73_m2] Final     Comment:      GFR Calc  Starting 12/18/2018, serum creatinine based estimated GFR (eGFR) will be   calculated using the Chronic Kidney Disease Epidemiology Collaboration   (CKD-EPI) " equation.       Lab Results   Component Value Date    CR 0.85 2022    CR 1.21 2021     No results found for: MICROALBUMIN    Healthy Eating:  Healthy Eating Assessed Today: Yes  Cultural/Denominational diet restrictions?: No  Meal planning/habits: Avoiding sweets, Carb counting, Heart healthy, Low salt, Smaller portions, Plate planning method, Frequent snacking  How many times a week on average do you eat food made away from home (restaurant/take-out)?: 1  Meals include: Breakfast, Lunch, Dinner, Morning Snack, Afternoon Snack, Evening Snack  Breakfast: Coffee, oatmeal (applesauce and cinnamon) OR 2 egg omelet (onion, peppers, mushroom, spinach) with strawberries OR multi-grain english muffin (grape jelly)  Lunch: 1/2 burrito and chilly with coffee, cheesecake (special occation)  Dinner: shrimp, cottage cheese, egg OR spaghetti with bread sticks  Snacks: cheerios/honey bunches of oats- plain wtih milk skim, 1/2 apple with peanut butter  Other: smoothie- fruits with spinach and a small proteindrink  Beverages: Water, Tea, Coffee, Milk  Has patient met with a dietitian in the past?: Yes    Being Active:  Being Active Assessed Today: Yes  Exercise:: Yes (cardiac rehab)  How intense was your typical exercise? : Light (like stretching or slow walking)  Barrier to exercise: Physical limitation    Monitoring:  Monitoring Assessed Today: Yes  Did patient bring glucose meter to appointment? : No  Blood Glucose Meter: Accu-chek  Times checking blood sugar at home (number): 2  Times checking blood sugar at home (per): Day  Blood glucose trend: Fluctuating    Glucose data:  Twice daily  Fasting B mg/dL  Evening B-165 mg/dL   Did not go above 200 mg/dL during heart procedure      Taking Medications:  Diabetes Medication(s)     Biguanides       metFORMIN (GLUCOPHAGE) 1000 MG tablet    Take 1 tablet (1,000 mg) by mouth 2 times daily (with meals)          Taking Medication Assessed Today: Yes  Current Treatments:  Diet, Oral Medication (taken by mouth)  Problems taking diabetes medications regularly?: No  Diabetes medication side effects?: No    Problem Solving:  Problem Solving Assessed Today: No  Is the patient at risk for hypoglycemia?: No  Is the patient at risk for DKA?: No        Reducing Risks:  CAD Risks: Diabetes Mellitus, Dyslipidemia, Family history, Hypertension, Obesity  Sees dentist every 6 months?: No  Feet checked by healthcare provider in the last year?: Yes    Healthy Coping:  Informal Support system:: Spouse  Stage of change: ACTION (Actively working towards change)  Patient Activation Measure Survey Score:  ASPEN Score (Last Two) 1/28/2011   ASPEN Raw Score 39   Activation Score 56.4   ASPEN Level 3             Gill Vital RDN, LD  Outpatient Diabetes Education  Adult Diabetes Education Triage [phone]    Time Spent: 60 minutes  Encounter Type: Individual        Any diabetes medication dose changes were made via the Certified Diabetes Care & Education Protocol in collaboration with the patient's referring provider. A copy of this encounter was shared with the provider.

## 2022-05-11 ENCOUNTER — HOSPITAL ENCOUNTER (OUTPATIENT)
Dept: CARDIAC REHAB | Facility: CLINIC | Age: 67
Discharge: HOME | End: 2022-05-11
Attending: SURGERY
Payer: MEDICARE

## 2022-05-11 PROCEDURE — 93798 PHYS/QHP OP CAR RHAB W/ECG: CPT

## 2022-05-16 ENCOUNTER — HOSPITAL ENCOUNTER (OUTPATIENT)
Dept: CARDIAC REHAB | Facility: CLINIC | Age: 67
Discharge: HOME | End: 2022-05-16
Attending: SURGERY
Payer: MEDICARE

## 2022-05-16 PROCEDURE — 93798 PHYS/QHP OP CAR RHAB W/ECG: CPT | Performed by: REHABILITATION PRACTITIONER

## 2022-05-18 ENCOUNTER — OFFICE VISIT (OUTPATIENT)
Dept: CARDIOLOGY | Facility: CLINIC | Age: 67
End: 2022-05-18
Payer: MEDICARE

## 2022-05-18 ENCOUNTER — HOSPITAL ENCOUNTER (OUTPATIENT)
Dept: CARDIOLOGY | Facility: CLINIC | Age: 67
Discharge: HOME | End: 2022-05-18
Attending: NURSE PRACTITIONER
Payer: MEDICARE

## 2022-05-18 ENCOUNTER — TELEPHONE (OUTPATIENT)
Dept: FAMILY MEDICINE | Facility: CLINIC | Age: 67
End: 2022-05-18

## 2022-05-18 ENCOUNTER — HOSPITAL ENCOUNTER (OUTPATIENT)
Dept: CARDIAC REHAB | Facility: CLINIC | Age: 67
Discharge: HOME | End: 2022-05-18
Attending: SURGERY
Payer: MEDICARE

## 2022-05-18 VITALS
HEART RATE: 97 BPM | DIASTOLIC BLOOD PRESSURE: 68 MMHG | HEIGHT: 70 IN | WEIGHT: 214 LBS | BODY MASS INDEX: 30.64 KG/M2 | SYSTOLIC BLOOD PRESSURE: 110 MMHG | OXYGEN SATURATION: 98 %

## 2022-05-18 DIAGNOSIS — I25.10 CORONARY ARTERY DISEASE INVOLVING NATIVE CORONARY ARTERY OF NATIVE HEART WITHOUT ANGINA PECTORIS: ICD-10-CM

## 2022-05-18 DIAGNOSIS — I48.92 ATRIAL FLUTTER, UNSPECIFIED TYPE (H): Primary | ICD-10-CM

## 2022-05-18 DIAGNOSIS — E78.5 HYPERLIPIDEMIA LDL GOAL <70: ICD-10-CM

## 2022-05-18 DIAGNOSIS — I48.92 ATRIAL FLUTTER, UNSPECIFIED TYPE (H): ICD-10-CM

## 2022-05-18 DIAGNOSIS — Z95.1 S/P CABG (CORONARY ARTERY BYPASS GRAFT): ICD-10-CM

## 2022-05-18 DIAGNOSIS — I25.118 ATHEROSCLEROSIS OF NATIVE CORONARY ARTERY OF NATIVE HEART WITH STABLE ANGINA PECTORIS (H): ICD-10-CM

## 2022-05-18 PROCEDURE — 93010 ELECTROCARDIOGRAM REPORT: CPT | Performed by: NURSE PRACTITIONER

## 2022-05-18 PROCEDURE — 93242 EXT ECG>48HR<7D RECORDING: CPT

## 2022-05-18 PROCEDURE — 93797 PHYS/QHP OP CAR RHAB WO ECG: CPT | Mod: XU

## 2022-05-18 PROCEDURE — 99214 OFFICE O/P EST MOD 30 MIN: CPT | Performed by: NURSE PRACTITIONER

## 2022-05-18 PROCEDURE — 93798 PHYS/QHP OP CAR RHAB W/ECG: CPT

## 2022-05-18 PROCEDURE — 93005 ELECTROCARDIOGRAM TRACING: CPT | Mod: XU | Performed by: REHABILITATION PRACTITIONER

## 2022-05-18 RX ORDER — ROSUVASTATIN CALCIUM 5 MG/1
5 TABLET, COATED ORAL DAILY
Qty: 90 TABLET | Refills: 3 | Status: SHIPPED | OUTPATIENT
Start: 2022-05-18 | End: 2023-03-20

## 2022-05-18 RX ORDER — METOPROLOL TARTRATE 25 MG/1
37.5 TABLET, FILM COATED ORAL 2 TIMES DAILY
Qty: 270 TABLET | Refills: 3 | Status: SHIPPED | OUTPATIENT
Start: 2022-05-18 | End: 2023-03-20

## 2022-05-18 NOTE — TELEPHONE ENCOUNTER
Reason for Call:  Form, our goal is to have forms completed with 72 hours, however, some forms may require a visit or additional information.    Type of letter, form or note:  employer    Who is the form from?: Patient    Where did the form come from: form was faxed in    What clinic location was the form placed at?: [facility]     Where the form was placed: Given to physician    What number is listed as a contact on the form?:        Additional comments: Please fax when done.    Call taken on 5/18/2022 at 10:03 AM by Jazlyn Munoz
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
## 2022-05-18 NOTE — PROGRESS NOTES
Left side tenderness back in middle shoulder blades erratic HR up 100.     Transition to Warfarin.
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
## 2022-05-18 NOTE — PATIENT INSTRUCTIONS
TODAY'S RECOMMENDATIONS:    Zio Patch for 1 week prior to following up with Dr. Murphy  Increase metoprolol to 37.5 mg twice a day  Continue all  other medications without changes.  Please follow up with Dr. Murphy in June as scheduled.    If you have questions or concerns please call clinic at [phone].    Please call [phone] for scheduling.      It was a pleasure seeing you today!

## 2022-05-20 NOTE — PROGRESS NOTES
I spoke with pt who said that he will be finishing up Xeralto on Wed 5/25, and will not be taking Eliquis.   He will start Jantoven on Thursday 5/26. He is taking 200 mg amiodarone daily, so consulted with [facility], Ann Marie, who advised 2.5 mg Mon, Wed, Fri and 5 mg ROW. Pt informed.   INR order signed.   Pt did receive Coumadin education book and Vicky K sheet. Questions answered.   Maria Esther Whitehead RN

## 2022-05-20 NOTE — TELEPHONE ENCOUNTER
Routing refill request to provider for review/approval because:  Labs not current:  Creatinine Clearance     Alison Sun RN

## 2022-05-21 NOTE — TELEPHONE ENCOUNTER
Please check, look like he was taking Coumadin.  He should not be taken this med if he takes Coumadin.

## 2022-05-23 ENCOUNTER — HOSPITAL ENCOUNTER (OUTPATIENT)
Dept: CARDIAC REHAB | Facility: CLINIC | Age: 67
Discharge: HOME | End: 2022-05-23
Attending: SURGERY
Payer: MEDICARE

## 2022-05-23 PROCEDURE — 93798 PHYS/QHP OP CAR RHAB W/ECG: CPT | Performed by: REHABILITATION PRACTITIONER

## 2022-05-25 ENCOUNTER — HOSPITAL ENCOUNTER (OUTPATIENT)
Dept: CARDIAC REHAB | Facility: CLINIC | Age: 67
Discharge: HOME | End: 2022-05-25
Attending: SURGERY
Payer: MEDICARE

## 2022-05-25 PROCEDURE — 93798 PHYS/QHP OP CAR RHAB W/ECG: CPT

## 2022-05-31 ENCOUNTER — LAB (OUTPATIENT)
Dept: LAB | Facility: CLINIC | Age: 67
End: 2022-05-31
Payer: MEDICARE

## 2022-05-31 ENCOUNTER — ANTICOAGULATION THERAPY VISIT (OUTPATIENT)
Dept: ANTICOAGULATION | Facility: CLINIC | Age: 67
End: 2022-05-31

## 2022-05-31 DIAGNOSIS — I48.92 ATRIAL FLUTTER, UNSPECIFIED TYPE (H): ICD-10-CM

## 2022-05-31 DIAGNOSIS — I48.92 ATRIAL FLUTTER, UNSPECIFIED TYPE (H): Primary | ICD-10-CM

## 2022-05-31 LAB — INR BLD: 1.3 (ref 0.9–1.1)

## 2022-05-31 PROCEDURE — 36416 COLLJ CAPILLARY BLOOD SPEC: CPT

## 2022-05-31 PROCEDURE — 85610 PROTHROMBIN TIME: CPT

## 2022-05-31 NOTE — PROGRESS NOTES
ANTICOAGULATION MANAGEMENT     Joel Pike 66 year old male is on warfarin with subtherapeutic INR result. (Goal INR 2.0-3.0)    Recent labs: (last 7 days)     05/31/22  1110   INR 1.3*       ASSESSMENT       Source(s): Chart Review and Patient/Caregiver Call       Warfarin doses taken: Warfarin taken as instructed    Diet: No new diet changes identified    New illness, injury, or hospitalization: No    Medication/supplement changes: None noted    Signs or symptoms of bleeding or clotting: No    Previous INR: na    Additional findings: New start on day 6 of warfarin       PLAN     Recommended plan for no diet, medication or health factor changes affecting INR     Dosing Instructions: Increase your warfarin dose (27.3% change) with next INR in 3 days       Summary  As of 5/31/2022    Full warfarin instructions:  5 mg every day   Next INR check:  6/3/2022             Telephone call with Joel who verbalizes understanding and agrees to plan and who agrees to plan and repeated back plan correctly    Lab visit scheduled    Education provided: None required    Plan made with [facility] Pharmacist Ann Marie Haq, RN  Anticoagulation Clinic  5/31/2022    _______________________________________________________________________     Anticoagulation Episode Summary     Current INR goal:  2.0-3.0   TTR:  --   Target end date:  7/26/2022   Send INR reminders to:  JOANN MERIDA    Indications    Atrial flutter  unspecified type (H) [I48.92]           Comments:           Anticoagulation Care Providers     Provider Role Specialty Phone number    Tamar James PA-C Referring Cardiovascular & Thoracic Surgery [phone]

## 2022-06-01 ENCOUNTER — TELEPHONE (OUTPATIENT)
Dept: ONCOLOGY | Facility: CLINIC | Age: 67
End: 2022-06-01
Payer: MEDICARE

## 2022-06-01 ENCOUNTER — HOSPITAL ENCOUNTER (OUTPATIENT)
Dept: CARDIAC REHAB | Facility: CLINIC | Age: 67
Discharge: HOME | End: 2022-06-01
Attending: SURGERY
Payer: MEDICARE

## 2022-06-01 PROCEDURE — 93798 PHYS/QHP OP CAR RHAB W/ECG: CPT

## 2022-06-01 NOTE — ORAL ONC MGMT
Oral Chemotherapy Monitoring Program     Placed call to patient in follow up of oral chemotherapy. Follow-up on routine labs for Joel on his Erleada. I noted he had an INR appt on 6/3 at 10:40 am, so I added some info into the appt notes for that day. I also spoke to Joel to let him know he will be needing to get add'l labs that day. I sent an IB to our scheduling team to see if any additional appts will be needed for that day (currently only listed as a lab INR appt). Will plan to follow-up with Joel on Friday.    Dougie Bradley, PharmD, BCPS, BCOP  Hematology/Oncology Clinical Pharmacist  [facility]  [phone]

## 2022-06-02 ENCOUNTER — PATIENT OUTREACH (OUTPATIENT)
Dept: NURSING | Facility: CLINIC | Age: 67
End: 2022-06-02
Payer: MEDICARE

## 2022-06-02 ASSESSMENT — ACTIVITIES OF DAILY LIVING (ADL): DEPENDENT_IADLS:: INDEPENDENT

## 2022-06-02 NOTE — PROGRESS NOTES
Clinic Care Coordination Contact    Follow Up Progress Note    5/4/2022 ED visit Paroxysmal atrial fibrillation    Hospital admission 4/8-4/13/2022  On 4/8/22, Joel Pike underwent successful Coronary artery bypass grafting x 4. Left internal mammary artery to left anterior descending artery, Reversed saphenous vein graft to right posterior descending artery, Reversed saphenous vein graft to obtuse marginal artery, Reversed saphenous vein graft to diagonal artery. Endoscopic vein harvest left and right lower extremity, Transesophageal echocardiogram by Dr. Karson Bae       Assessment:   Patient reports he continues Cardiac Rehabilitation twice a week  Patient states he was increased to Amiodarone full tab in the last week due to recent Afib symptoms and no further lightheaded spells     Care Gaps:    Health Maintenance Due   Topic Date Due     COVID-19 Vaccine (4 - Booster for Moderna series) 02/28/2022       Postponed to next RN CC outreach      Goals addressed this encounter:    Goals Addressed                 This Visit's Progress      I will reduce risk of complications related to my heart surgery   70%     Goal Statement: I will reduce risk of complications related to my heart heart surgery  Date Goal set: 4/15/2022  Barriers: Diabetes and other complex medical diagnoses  Strengths: motivated and engaged, supportive wife  Date to Achieve By: 8/1/2022  Patient expressed understanding of goal: yes  Action steps to achieve this goal:  1. Take my medications as prescribed  2. Eat a low cholesterol, low sodium diet, diabetic diet  3. Cardiac Rehab as recommended  4  Attend recommended follow up appts  5. Monitor for sob, chest pain, nausea, fever, or any signs of bleeding.  5. I will use my nitroglycerin if I have any chest pain and call 911 if taken x three.                  Intervention/Education provided during outreach: Call with symptoms of chest pain ,fever ,SOB or need to take Nitroglycerine       Outreach Frequency: 2 weeks        Plan:   Patient requests a follow up  call in 1 week to discuss how he is doing on the full tablet of Amiodarone  Patient will continue Cardiac Rehabilitation twice a week   [facility]   Ana Romero RN, Care Coordinator   [facility]'s   E-mail [email or web address]   [phone]

## 2022-06-03 ENCOUNTER — ANTICOAGULATION THERAPY VISIT (OUTPATIENT)
Dept: ANTICOAGULATION | Facility: CLINIC | Age: 67
End: 2022-06-03

## 2022-06-03 ENCOUNTER — LAB (OUTPATIENT)
Dept: LAB | Facility: CLINIC | Age: 67
End: 2022-06-03
Payer: MEDICARE

## 2022-06-03 ENCOUNTER — TELEPHONE (OUTPATIENT)
Dept: ONCOLOGY | Facility: CLINIC | Age: 67
End: 2022-06-03

## 2022-06-03 DIAGNOSIS — C61 PROSTATE CANCER (H): ICD-10-CM

## 2022-06-03 DIAGNOSIS — I48.92 ATRIAL FLUTTER, UNSPECIFIED TYPE (H): Primary | ICD-10-CM

## 2022-06-03 DIAGNOSIS — I48.92 ATRIAL FLUTTER, UNSPECIFIED TYPE (H): ICD-10-CM

## 2022-06-03 LAB
ALBUMIN SERPL-MCNC: 3.9 G/DL (ref 3.4–5)
ALP SERPL-CCNC: 78 U/L (ref 40–150)
ALT SERPL W P-5'-P-CCNC: 30 U/L (ref 0–70)
ANION GAP SERPL CALCULATED.3IONS-SCNC: 6 MMOL/L (ref 3–14)
AST SERPL W P-5'-P-CCNC: 19 U/L (ref 0–45)
BASOPHILS # BLD AUTO: 0.1 10E3/UL (ref 0–0.2)
BASOPHILS NFR BLD AUTO: 1 %
BILIRUB SERPL-MCNC: 0.5 MG/DL (ref 0.2–1.3)
BUN SERPL-MCNC: 22 MG/DL (ref 7–30)
CALCIUM SERPL-MCNC: 9.1 MG/DL (ref 8.5–10.1)
CHLORIDE BLD-SCNC: 106 MMOL/L (ref 94–109)
CHOLEST SERPL-MCNC: 158 MG/DL
CO2 SERPL-SCNC: 26 MMOL/L (ref 20–32)
CREAT SERPL-MCNC: 1.06 MG/DL (ref 0.66–1.25)
EOSINOPHIL # BLD AUTO: 0.3 10E3/UL (ref 0–0.7)
EOSINOPHIL NFR BLD AUTO: 4 %
ERYTHROCYTE [DISTWIDTH] IN BLOOD BY AUTOMATED COUNT: 12.7 % (ref 10–15)
FASTING STATUS PATIENT QL REPORTED: YES
GFR SERPL CREATININE-BSD FRML MDRD: 78 ML/MIN/1.73M2
GLUCOSE BLD-MCNC: 128 MG/DL (ref 70–99)
HCT VFR BLD AUTO: 37.1 % (ref 40–53)
HDLC SERPL-MCNC: 35 MG/DL
HGB BLD-MCNC: 12.6 G/DL (ref 13.3–17.7)
IMM GRANULOCYTES # BLD: 0 10E3/UL
IMM GRANULOCYTES NFR BLD: 1 %
INR BLD: 1 (ref 0.9–1.1)
LDLC SERPL CALC-MCNC: 92 MG/DL
LYMPHOCYTES # BLD AUTO: 0.9 10E3/UL (ref 0.8–5.3)
LYMPHOCYTES NFR BLD AUTO: 15 %
MCH RBC QN AUTO: 31.6 PG (ref 26.5–33)
MCHC RBC AUTO-ENTMCNC: 34 G/DL (ref 31.5–36.5)
MCV RBC AUTO: 93 FL (ref 78–100)
MONOCYTES # BLD AUTO: 0.6 10E3/UL (ref 0–1.3)
MONOCYTES NFR BLD AUTO: 9 %
NEUTROPHILS # BLD AUTO: 4.2 10E3/UL (ref 1.6–8.3)
NEUTROPHILS NFR BLD AUTO: 70 %
NONHDLC SERPL-MCNC: 123 MG/DL
NRBC # BLD AUTO: 0 10E3/UL
NRBC BLD AUTO-RTO: 0 /100
PLATELET # BLD AUTO: 237 10E3/UL (ref 150–450)
POTASSIUM BLD-SCNC: 4.2 MMOL/L (ref 3.4–5.3)
PROT SERPL-MCNC: 6.8 G/DL (ref 6.8–8.8)
PSA SERPL-MCNC: <0.01 UG/L (ref 0–4)
RBC # BLD AUTO: 3.99 10E6/UL (ref 4.4–5.9)
SODIUM SERPL-SCNC: 138 MMOL/L (ref 133–144)
TRIGL SERPL-MCNC: 156 MG/DL
WBC # BLD AUTO: 6 10E3/UL (ref 4–11)

## 2022-06-03 PROCEDURE — 85025 COMPLETE CBC W/AUTO DIFF WBC: CPT

## 2022-06-03 PROCEDURE — 36415 COLL VENOUS BLD VENIPUNCTURE: CPT

## 2022-06-03 PROCEDURE — 80053 COMPREHEN METABOLIC PANEL: CPT

## 2022-06-03 PROCEDURE — 80061 LIPID PANEL: CPT

## 2022-06-03 PROCEDURE — 84153 ASSAY OF PSA TOTAL: CPT

## 2022-06-03 PROCEDURE — 85610 PROTHROMBIN TIME: CPT

## 2022-06-03 NOTE — PROGRESS NOTES
Chart reviewed with [facility] RN at time of encounter.    Advise more aggressive dose increase ~20% due to dropping INR.    Ann Marie Adrian, PharmD BCACP  Anticoagulation Clinical Pharmacist

## 2022-06-03 NOTE — ORAL ONC MGMT
Subjective/Objective:  Joel Pike is a 66 year old male contacted by phone for a follow-up visit for oral chemotherapy.  Pt confirms they are taking Erleada 4 tablets (240 mg) daily. Pt reports 1 missed doses in past 2 weeks. Pt reports tolerating the medication well. Joel does report he can't sleep well and feels tired due to this sometimes. Joel recently change from Xarelto to Warfarin- he gets INR checks in [place] and is being followed by  Anticoagulation clinic.      ORAL CHEMOTHERAPY 1/10/2022 1/21/2022 1/24/2022 2/19/2022 5/9/2022 6/1/2022 6/3/2022   Assessment Type Other Other Other Chart Review Other;Monthly Follow up Lab Monitoring Lab Monitoring;Monthly Follow up   Diagnosis Code Prostate Cancer Prostate Cancer Prostate Cancer Prostate Cancer Prostate Cancer Prostate Cancer Prostate Cancer   Providers Dr Edil Solo   [facility]   Drug Name Erleada (apalutamide) Erleada (apalutamide) Erleada (apalutamide) Erleada (apalutamide) Erleada (apalutamide) Erleada (apalutamide) Erleada (apalutamide)   Dose 240 mg 240 mg 240 mg 240 mg 240 mg - 240 mg   Current Schedule Daily Daily Daily Daily Daily - Daily   Cycle Details Continuous Continuous Continuous Continuous Continuous - Continuous   Start Date of Last Cycle - - - - 5/5/2022 - -   Planned next cycle start date - - - - - - -   Doses missed in last 2 weeks - 0 - - 0 - 1   Adherence Assessment - Adherent - - Adherent - Adherent   Adverse Effects - - - - Edema - No AE identified during assessment   Any new drug interactions? - - - - Yes - No   Pharmacist Intervention? - - - - No - -   Is the dose as ordered appropriate for the patient? - - - - Yes - -   Is the patient currently in pain? - - - - - - -   Has the patient been assessed within the past 6 months for depression? - - - - - - -   Has the patient missed any days of school, work, or other  "routine activity? - - - - - - -   Since the last time we talked, have you been hospitalized or used the emergency room? - - - - - - No       Vitals:  BP:   BP Readings from Last 1 Encounters:   05/18/22 110/68     Wt Readings from Last 1 Encounters:   05/18/22 97.1 kg (214 lb)     Estimated body surface area is 2.19 meters squared as calculated from the following:    Height as of 5/18/22: 1.778 m (5' 10\").    Weight as of 5/18/22: 97.1 kg (214 lb).    Labs:  _  Result Component Current Result Ref Range   Sodium 138 (6/3/2022) 133 - 144 mmol/L     Result Component Current Result Ref Range   Potassium 4.2 (6/3/2022) 3.4 - 5.3 mmol/L     Result Component Current Result Ref Range   Calcium 9.1 (6/3/2022) 8.5 - 10.1 mg/dL     Result Component Current Result Ref Range   Albumin 3.9 (6/3/2022) 3.4 - 5.0 g/dL     Result Component Current Result Ref Range   Urea Nitrogen 22 (6/3/2022) 7 - 30 mg/dL     Result Component Current Result Ref Range   Creatinine 1.06 (6/3/2022) 0.66 - 1.25 mg/dL     Result Component Current Result Ref Range   AST 19 (6/3/2022) 0 - 45 U/L     Result Component Current Result Ref Range   ALT 30 (6/3/2022) 0 - 70 U/L     Result Component Current Result Ref Range   Bilirubin Total 0.5 (6/3/2022) 0.2 - 1.3 mg/dL     Result Component Current Result Ref Range   WBC Count 6.0 (6/3/2022) 4.0 - 11.0 10e3/uL     Result Component Current Result Ref Range   Hemoglobin 12.6 (L) (6/3/2022) 13.3 - 17.7 g/dL     Result Component Current Result Ref Range   Platelet Count 237 (6/3/2022) 150 - 450 10e3/uL     Assessment/Plan:  Pt is currently tolerating therapy well. Plan to continue Erleada as prescribed. Pt verbalized understanding and agrees to plan. Encouraged pt to call with any issues or concerns.    Follow-Up:  No appts scheduled, was due to return to clinic with PILAR visit 2 months from 4/4 appt per Dr Solo note. IB sent to Dr Solo and scheduling.    Elisa Cole, PharmD  Oral Chemotherapy Monitoring " Program  [facility]  [phone]  Rosa 3, 2022

## 2022-06-03 NOTE — PROGRESS NOTES
ANTICOAGULATION MANAGEMENT     Joel Pike 66 year old male is on warfarin with subtherapeutic INR result. (Goal INR 2.0-3.0)    Recent labs: (last 7 days)     06/03/22  1043   INR 1.0       ASSESSMENT       Source(s): Chart Review and Patient/Caregiver Call       Warfarin doses taken: Warfarin taken as instructed    Diet: No new diet changes identified    New illness, injury, or hospitalization: No    Medication/supplement changes: None noted    Signs or symptoms of bleeding or clotting: No    Previous INR: Subtherapeutic    Additional findings: New start on day 8 of warfarin       PLAN     Recommended plan for no diet, medication or health factor changes affecting INR     Dosing Instructions: Increase your warfarin dose (~20% change) with next INR in 3 days       Summary  As of 6/3/2022    Full warfarin instructions:  7.5 mg every Sun, Tue, Fri; 5 mg all other days   Next INR check:  6/6/2022             Telephone call with Joel who verbalizes understanding and agrees to plan    Lab visit scheduled    Education provided: Please call back if any changes to your diet, medications or how you've been taking warfarin, Goal range and significance of current result and Monitoring for clotting signs and symptoms    Plan made with [facility] Pharmacist Ann Marie Whitehead, RN  Anticoagulation Clinic  6/3/2022    _______________________________________________________________________     Anticoagulation Episode Summary     Current INR goal:  2.0-3.0   TTR:  0.0 % (2 d)   Target end date:  7/26/2022   Send INR reminders to:  VANESSA MAGNOLIA    Indications    Atrial flutter  unspecified type (H) [I48.92]           Comments:           Anticoagulation Care Providers     Provider Role Specialty Phone number    Tamar James PA-C Referring Cardiovascular & Thoracic Surgery [phone]

## 2022-06-06 ENCOUNTER — LAB (OUTPATIENT)
Dept: LAB | Facility: CLINIC | Age: 67
End: 2022-06-06
Payer: MEDICARE

## 2022-06-06 ENCOUNTER — ANTICOAGULATION THERAPY VISIT (OUTPATIENT)
Dept: ANTICOAGULATION | Facility: CLINIC | Age: 67
End: 2022-06-06

## 2022-06-06 ENCOUNTER — HOSPITAL ENCOUNTER (OUTPATIENT)
Dept: CARDIAC REHAB | Facility: CLINIC | Age: 67
Discharge: HOME | End: 2022-06-06
Attending: SURGERY
Payer: MEDICARE

## 2022-06-06 DIAGNOSIS — I48.92 ATRIAL FLUTTER, UNSPECIFIED TYPE (H): Primary | ICD-10-CM

## 2022-06-06 DIAGNOSIS — I48.92 ATRIAL FLUTTER, UNSPECIFIED TYPE (H): ICD-10-CM

## 2022-06-06 LAB — INR BLD: 2.4 (ref 0.9–1.1)

## 2022-06-06 PROCEDURE — 36416 COLLJ CAPILLARY BLOOD SPEC: CPT

## 2022-06-06 PROCEDURE — 93798 PHYS/QHP OP CAR RHAB W/ECG: CPT

## 2022-06-06 PROCEDURE — 85610 PROTHROMBIN TIME: CPT

## 2022-06-06 NOTE — PROGRESS NOTES
ANTICOAGULATION MANAGEMENT     Joel Pike 66 year old male is on warfarin with therapeutic INR result. (Goal INR 2.0-3.0)    Recent labs: (last 7 days)     06/06/22  1252   INR 2.4*       ASSESSMENT       Source(s): Chart Review and Patient/Caregiver Call       Warfarin doses taken: Warfarin taken as instructed    Diet: No new diet changes identified    New illness, injury, or hospitalization: No    Medication/supplement changes: None noted    Signs or symptoms of bleeding or clotting: No    Previous INR: Subtherapeutic    Additional findings: New start on day 12  of warfarin may had a rapid rise recheck inr in 3 days       PLAN     Recommended plan for no diet, medication or health factor changes affecting INR     Dosing Instructions: continue your current warfarin dose with next INR in 3 days       Summary  As of 6/6/2022    Full warfarin instructions:  7.5 mg every Sun, Tue, Fri; 5 mg all other days   Next INR check:               Telephone call with Joel who verbalizes understanding and agrees to plan    Lab visit scheduled    Education provided: Please call back if any changes to your diet, medications or how you've been taking warfarin    Plan made per [facility] anticoagulation protocol    Angi Alexandre, RN  Anticoagulation Clinic  6/6/2022    _______________________________________________________________________     Anticoagulation Episode Summary     Current INR goal:  2.0-3.0   TTR:  14.5 % (6 d)   Target end date:  7/26/2022   Send INR reminders to:  JOANN MERIDA    Indications    Atrial flutter  unspecified type (H) [I48.92]           Comments:           Anticoagulation Care Providers     Provider Role Specialty Phone number    Jacob, Tamar VANEGAS PA-C Referring Cardiovascular & Thoracic Surgery [phone]

## 2022-06-08 ENCOUNTER — HOSPITAL ENCOUNTER (OUTPATIENT)
Dept: CARDIAC REHAB | Facility: CLINIC | Age: 67
Discharge: HOME | End: 2022-06-08
Attending: SURGERY
Payer: MEDICARE

## 2022-06-08 PROCEDURE — 93798 PHYS/QHP OP CAR RHAB W/ECG: CPT

## 2022-06-09 ENCOUNTER — LAB (OUTPATIENT)
Dept: LAB | Facility: CLINIC | Age: 67
End: 2022-06-09
Payer: MEDICARE

## 2022-06-09 ENCOUNTER — ANTICOAGULATION THERAPY VISIT (OUTPATIENT)
Dept: ANTICOAGULATION | Facility: CLINIC | Age: 67
End: 2022-06-09

## 2022-06-09 DIAGNOSIS — I48.92 ATRIAL FLUTTER, UNSPECIFIED TYPE (H): ICD-10-CM

## 2022-06-09 DIAGNOSIS — I48.92 ATRIAL FLUTTER, UNSPECIFIED TYPE (H): Primary | ICD-10-CM

## 2022-06-09 LAB — INR BLD: 1.9 (ref 0.9–1.1)

## 2022-06-09 PROCEDURE — 36416 COLLJ CAPILLARY BLOOD SPEC: CPT

## 2022-06-09 PROCEDURE — 85610 PROTHROMBIN TIME: CPT

## 2022-06-09 NOTE — PROGRESS NOTES
ANTICOAGULATION MANAGEMENT     Joel Pike 66 year old male is on warfarin with therapeutic INR result. (Goal INR 2.0-3.0)    Recent labs: (last 7 days)     06/09/22  1059   INR 1.9*       ASSESSMENT       Source(s): Chart Review and Patient/Caregiver Call       Warfarin doses taken: Warfarin taken as instructed    Diet: No new diet changes identified    New illness, injury, or hospitalization: No    Medication/supplement changes: None noted    Signs or symptoms of bleeding or clotting: No    Previous INR: Therapeutic last visit; previously outside of goal range    Additional findings: None       PLAN     Recommended plan for no diet, medication or health factor changes affecting INR     Dosing Instructions: Increase your warfarin dose (5.9% change) with next INR in 4 days       Summary  As of 6/9/2022    Full warfarin instructions:  5 mg every Mon, Wed, Fri; 7.5 mg all other days   Next INR check:  6/13/2022             Telephone call with Joel who verbalizes understanding and agrees to plan    Lab visit scheduled    Education provided: None required    Plan made per [facility] anticoagulation protocol    Nicole Haq, RN  Anticoagulation Clinic  6/9/2022    _______________________________________________________________________     Anticoagulation Episode Summary     Current INR goal:  2.0-3.0   TTR:  35.8 % (1.1 wk)   Target end date:  7/26/2022   Send INR reminders to:  JOANN MERIDA    Indications    Atrial flutter  unspecified type (H) [I48.92]           Comments:           Anticoagulation Care Providers     Provider Role Specialty Phone number    Tamar James PA-C Referring Cardiovascular & Thoracic Surgery [phone]

## 2022-06-10 DIAGNOSIS — I48.92 ATRIAL FLUTTER, UNSPECIFIED TYPE (H): ICD-10-CM

## 2022-06-10 RX ORDER — WARFARIN SODIUM 5 MG/1
TABLET ORAL
Qty: 110 TABLET | Refills: 1 | Status: SHIPPED | OUTPATIENT
Start: 2022-06-10 | End: 2022-06-22

## 2022-06-10 NOTE — TELEPHONE ENCOUNTER
ANTICOAGULATION MANAGEMENT:  Medication Refill    Anticoagulation Summary  As of 6/9/2022    Warfarin maintenance plan:  5 mg (5 mg x 1) every Mon, Wed, Fri; 7.5 mg (5 mg x 1.5) all other days   Next INR check:  6/13/2022   Target end date:  7/26/2022    Indications    Atrial flutter  unspecified type (H) [I48.92]             Anticoagulation Care Providers     Provider Role Specialty Phone number    Tamar James PA-C Referring Cardiovascular & Thoracic Surgery [phone]          Visit with referring provider/group within last year: Yes    [facility] referral signed within last year: Yes    Joel meets all criteria for refill (current [facility] referral, office visit with referring provider/group in last year, lab monitoring up to date or not exceeding 2 weeks overdue). Rx instructions and quantity supplied updated to match patient's current dosing plan. Warfarin 90 day supply with 1 refill granted per [facility] protocol     Maria Esther Whitehead RN  Anticoagulation Clinic

## 2022-06-13 ENCOUNTER — ANTICOAGULATION THERAPY VISIT (OUTPATIENT)
Dept: ANTICOAGULATION | Facility: CLINIC | Age: 67
End: 2022-06-13

## 2022-06-13 ENCOUNTER — HOSPITAL ENCOUNTER (OUTPATIENT)
Dept: CARDIAC REHAB | Facility: CLINIC | Age: 67
Discharge: HOME | End: 2022-06-13
Attending: SURGERY
Payer: MEDICARE

## 2022-06-13 ENCOUNTER — LAB (OUTPATIENT)
Dept: LAB | Facility: CLINIC | Age: 67
End: 2022-06-13
Payer: MEDICARE

## 2022-06-13 DIAGNOSIS — I48.92 ATRIAL FLUTTER, UNSPECIFIED TYPE (H): ICD-10-CM

## 2022-06-13 DIAGNOSIS — I48.92 ATRIAL FLUTTER, UNSPECIFIED TYPE (H): Primary | ICD-10-CM

## 2022-06-13 LAB — INR BLD: 1.4 (ref 0.9–1.1)

## 2022-06-13 PROCEDURE — 36416 COLLJ CAPILLARY BLOOD SPEC: CPT

## 2022-06-13 PROCEDURE — 85610 PROTHROMBIN TIME: CPT

## 2022-06-13 PROCEDURE — 93798 PHYS/QHP OP CAR RHAB W/ECG: CPT | Performed by: REHABILITATION PRACTITIONER

## 2022-06-13 NOTE — PROGRESS NOTES
ANTICOAGULATION MANAGEMENT     Joel Pike 66 year old male is on warfarin with subtherapeutic INR result. (Goal INR 2.0-3.0)    Recent labs: (last 7 days)     06/13/22  1247   INR 1.4*       ASSESSMENT       Source(s): Chart Review    Previous INR was Subtherapeutic    Medication, diet, health changes since last INR chart reviewed; none identified    I left a detailed voicemail with the orders reflected in flowsheet. I have also requested a call back if there have been any missed doses, concerns, illness, fever, or if there have been any changes in medications, activity level, or diet       PLAN     Recommended plan for no diet, medication or health factor changes affecting INR     Dosing Instructions: Increase your warfarin dose (11.1% change) with next INR in 4 days       Summary  As of 6/13/2022    Full warfarin instructions:  5 mg every Fri; 7.5 mg all other days   Next INR check:  6/17/2022             Detailed voice message left for Joel with dosing instructions and follow up date.     Contact [phone]  to schedule and with any changes, questions or concerns.     Education provided: Please call back if any changes to your diet, medications or how you've been taking warfarin, Goal range and significance of current result and Monitoring for clotting signs and symptoms    Plan made per [facility] anticoagulation protocol    Maria Esther Whitehead, RN  Anticoagulation Clinic  6/13/2022    _______________________________________________________________________     Anticoagulation Episode Summary     Current INR goal:  2.0-3.0   TTR:  24.6 % (1.9 wk)   Target end date:  7/26/2022   Send INR reminders to:  JOANN MERIDA    Indications    Atrial flutter  unspecified type (H) [I48.92]           Comments:           Anticoagulation Care Providers     Provider Role Specialty Phone number    Tamar James PA-C Referring Cardiovascular & Thoracic Surgery [phone]

## 2022-06-15 ENCOUNTER — HOSPITAL ENCOUNTER (OUTPATIENT)
Dept: CARDIAC REHAB | Facility: CLINIC | Age: 67
Discharge: HOME | End: 2022-06-15
Attending: SURGERY
Payer: MEDICARE

## 2022-06-15 PROCEDURE — 93798 PHYS/QHP OP CAR RHAB W/ECG: CPT

## 2022-06-16 ENCOUNTER — LAB (OUTPATIENT)
Dept: LAB | Facility: CLINIC | Age: 67
End: 2022-06-16
Payer: MEDICARE

## 2022-06-16 ENCOUNTER — PATIENT OUTREACH (OUTPATIENT)
Dept: NURSING | Facility: CLINIC | Age: 67
End: 2022-06-16

## 2022-06-16 ENCOUNTER — ANTICOAGULATION THERAPY VISIT (OUTPATIENT)
Dept: ANTICOAGULATION | Facility: CLINIC | Age: 67
End: 2022-06-16

## 2022-06-16 DIAGNOSIS — I48.92 ATRIAL FLUTTER, UNSPECIFIED TYPE (H): Primary | ICD-10-CM

## 2022-06-16 DIAGNOSIS — I48.92 ATRIAL FLUTTER, UNSPECIFIED TYPE (H): ICD-10-CM

## 2022-06-16 LAB — INR BLD: 4.3 (ref 0.9–1.1)

## 2022-06-16 PROCEDURE — 85610 PROTHROMBIN TIME: CPT

## 2022-06-16 PROCEDURE — 36416 COLLJ CAPILLARY BLOOD SPEC: CPT

## 2022-06-16 ASSESSMENT — ACTIVITIES OF DAILY LIVING (ADL): DEPENDENT_IADLS:: INDEPENDENT

## 2022-06-16 NOTE — PROGRESS NOTES
ANTICOAGULATION MANAGEMENT     Joel Pike 66 year old male is on warfarin with supratherapeutic INR result. (Goal INR 2.0-3.0)    Recent labs: (last 7 days)     06/16/22  1057   INR 4.3*       ASSESSMENT       Source(s): Chart Review and Patient/Caregiver Call       Warfarin doses taken: Warfarin taken as instructed    Diet: No new diet changes identified    New illness, injury, or hospitalization: No    Medication/supplement changes: Pt's 1 month of amiodorone finished at the beginning of june. INR may be up due to the amiodorone finally catching up in his system.    Signs or symptoms of bleeding or clotting: No    Previous INR: Subtherapeutic    Additional findings: None       PLAN     Recommended plan for ongoing change(s) affecting INR     Dosing Instructions: Hold and 5 mg Fri, sat, sun with next INR in 4 days       Summary  As of 6/16/2022    Full warfarin instructions:  6/16: Hold; 6/17: 5 mg; 6/19: 5 mg; Otherwise 5 mg every Tue, Sat; 7.5 mg all other days   Next INR check:  6/20/2022             Telephone call with Joel who verbalizes understanding and agrees to plan and who agrees to plan and repeated back plan correctly    Lab visit scheduled    Education provided: Goal range and significance of current result and Monitoring for bleeding signs and symptoms    Plan made per [facility] anticoagulation protocol    Nicole Haq RN  Anticoagulation Clinic  6/16/2022    _______________________________________________________________________     Anticoagulation Episode Summary     Current INR goal:  2.0-3.0   TTR:  26.4 % (2.1 wk)   Target end date:  7/26/2022   Send INR reminders to:  JOANN EMRIDA    Indications    Atrial flutter  unspecified type (H) [I48.92]           Comments:           Anticoagulation Care Providers     Provider Role Specialty Phone number    Tamar James PA-C Referring Cardiovascular & Thoracic Surgery [phone]

## 2022-06-16 NOTE — PROGRESS NOTES
Clinic Care Coordination Contact    Follow Up Progress Note    5/4/2022 ED visit Paroxysmal atrial fibrillation    Hospital admission 4/8-4/13/2022  On 4/8/22, Joel Pike underwent successful Coronary artery bypass grafting x 4.      Assessment: Patient report he might have a little more lightheadedness from full tablet of Amiodarone  Denies Afib symptoms ...Patient will discuss further at Cardiology appointment 6/20/2022  Patient extended CR due to he feels he still needed to improve his strength  Just doesn't have the stamina     Care Gaps:    Health Maintenance Due   Topic Date Due     COVID-19 Vaccine (4 - Booster for Moderna series) 02/28/2022       Will discuss at next provider visit     Goals addressed this encounter:    Goals Addressed                 This Visit's Progress      I will reduce risk of complications related to my heart surgery   80%     Goal Statement: I will reduce risk of complications related to my heart  surgery  Date Goal set: 4/15/2022  Barriers: Diabetes and other complex medical diagnoses  Strengths: motivated and engaged, supportive wife  Date to Achieve By: 8/1/2022  Patient expressed understanding of goal: yes  Action steps to achieve this goal:  1. Take my medications as prescribed  2. Eat a low cholesterol, low sodium diet, diabetic diet  3. Cardiac Rehab as recommended  4  Attend recommended follow up appts  5. Monitor for sob, chest pain, nausea, fever, or any signs of bleeding.  5. I will use my nitroglycerin if I have any chest pain and call 911 if taken x three.                  Intervention/Education provided during outreach: Call Cardiologist with questions or concerns          Outreach Frequency: 2 weeks        Plan:   Patient will continue Cardiac Rehabilitation twice a week  Patient will keep Cardiology visit 6/20/2022  Care Coordinator will follow up in 1-2 weeks   [facility]   Aan Romero RN, Care Coordinator   [facility]  [facility]   E-mail [email or web address]   [phone]

## 2022-06-17 ENCOUNTER — LAB (OUTPATIENT)
Dept: LAB | Facility: CLINIC | Age: 67
End: 2022-06-17
Payer: MEDICARE

## 2022-06-17 DIAGNOSIS — C61 PROSTATE CANCER (H): ICD-10-CM

## 2022-06-17 LAB
ALBUMIN SERPL-MCNC: 4 G/DL (ref 3.4–5)
ALP SERPL-CCNC: 73 U/L (ref 40–150)
ALT SERPL W P-5'-P-CCNC: 27 U/L (ref 0–70)
ANION GAP SERPL CALCULATED.3IONS-SCNC: 5 MMOL/L (ref 3–14)
AST SERPL W P-5'-P-CCNC: 19 U/L (ref 0–45)
BASOPHILS # BLD AUTO: 0 10E3/UL (ref 0–0.2)
BASOPHILS NFR BLD AUTO: 1 %
BILIRUB SERPL-MCNC: 0.4 MG/DL (ref 0.2–1.3)
BUN SERPL-MCNC: 22 MG/DL (ref 7–30)
CALCIUM SERPL-MCNC: 8.7 MG/DL (ref 8.5–10.1)
CHLORIDE BLD-SCNC: 106 MMOL/L (ref 94–109)
CHOLEST SERPL-MCNC: 159 MG/DL
CO2 SERPL-SCNC: 28 MMOL/L (ref 20–32)
CREAT SERPL-MCNC: 1.02 MG/DL (ref 0.66–1.25)
EOSINOPHIL # BLD AUTO: 0.2 10E3/UL (ref 0–0.7)
EOSINOPHIL NFR BLD AUTO: 4 %
ERYTHROCYTE [DISTWIDTH] IN BLOOD BY AUTOMATED COUNT: 12.9 % (ref 10–15)
FASTING STATUS PATIENT QL REPORTED: YES
GFR SERPL CREATININE-BSD FRML MDRD: 82 ML/MIN/1.73M2
GLUCOSE BLD-MCNC: 122 MG/DL (ref 70–99)
HCT VFR BLD AUTO: 35.4 % (ref 40–53)
HDLC SERPL-MCNC: 39 MG/DL
HGB BLD-MCNC: 12.2 G/DL (ref 13.3–17.7)
IMM GRANULOCYTES # BLD: 0 10E3/UL
IMM GRANULOCYTES NFR BLD: 1 %
LDLC SERPL CALC-MCNC: 80 MG/DL
LYMPHOCYTES # BLD AUTO: 0.9 10E3/UL (ref 0.8–5.3)
LYMPHOCYTES NFR BLD AUTO: 14 %
MCH RBC QN AUTO: 31.2 PG (ref 26.5–33)
MCHC RBC AUTO-ENTMCNC: 34.5 G/DL (ref 31.5–36.5)
MCV RBC AUTO: 91 FL (ref 78–100)
MONOCYTES # BLD AUTO: 0.6 10E3/UL (ref 0–1.3)
MONOCYTES NFR BLD AUTO: 10 %
NEUTROPHILS # BLD AUTO: 4.3 10E3/UL (ref 1.6–8.3)
NEUTROPHILS NFR BLD AUTO: 70 %
NONHDLC SERPL-MCNC: 120 MG/DL
NRBC # BLD AUTO: 0 10E3/UL
NRBC BLD AUTO-RTO: 0 /100
PLATELET # BLD AUTO: 204 10E3/UL (ref 150–450)
POTASSIUM BLD-SCNC: 4.5 MMOL/L (ref 3.4–5.3)
PROT SERPL-MCNC: 6.9 G/DL (ref 6.8–8.8)
PSA SERPL-MCNC: <0.01 UG/L (ref 0–4)
RBC # BLD AUTO: 3.91 10E6/UL (ref 4.4–5.9)
SODIUM SERPL-SCNC: 139 MMOL/L (ref 133–144)
TRIGL SERPL-MCNC: 198 MG/DL
WBC # BLD AUTO: 6 10E3/UL (ref 4–11)

## 2022-06-17 PROCEDURE — 84153 ASSAY OF PSA TOTAL: CPT

## 2022-06-17 PROCEDURE — 85025 COMPLETE CBC W/AUTO DIFF WBC: CPT

## 2022-06-17 PROCEDURE — 80061 LIPID PANEL: CPT

## 2022-06-17 PROCEDURE — 36415 COLL VENOUS BLD VENIPUNCTURE: CPT

## 2022-06-17 PROCEDURE — 80053 COMPREHEN METABOLIC PANEL: CPT

## 2022-06-20 ENCOUNTER — LAB (OUTPATIENT)
Dept: LAB | Facility: CLINIC | Age: 67
End: 2022-06-20
Payer: MEDICARE

## 2022-06-20 ENCOUNTER — ANTICOAGULATION THERAPY VISIT (OUTPATIENT)
Dept: ANTICOAGULATION | Facility: CLINIC | Age: 67
End: 2022-06-20

## 2022-06-20 ENCOUNTER — HOSPITAL ENCOUNTER (OUTPATIENT)
Dept: CARDIAC REHAB | Facility: CLINIC | Age: 67
Discharge: HOME | End: 2022-06-20
Attending: SURGERY
Payer: MEDICARE

## 2022-06-20 ENCOUNTER — VIRTUAL VISIT (OUTPATIENT)
Dept: ONCOLOGY | Facility: CLINIC | Age: 67
End: 2022-06-20
Attending: PHYSICIAN ASSISTANT
Payer: MEDICARE

## 2022-06-20 DIAGNOSIS — I48.92 ATRIAL FLUTTER, UNSPECIFIED TYPE (H): ICD-10-CM

## 2022-06-20 DIAGNOSIS — I48.92 ATRIAL FLUTTER, UNSPECIFIED TYPE (H): Primary | ICD-10-CM

## 2022-06-20 DIAGNOSIS — C61 PROSTATE CANCER (H): Primary | ICD-10-CM

## 2022-06-20 LAB — INR BLD: 3 (ref 0.9–1.1)

## 2022-06-20 PROCEDURE — G0463 HOSPITAL OUTPT CLINIC VISIT: HCPCS | Mod: PN,RTG | Performed by: PHYSICIAN ASSISTANT

## 2022-06-20 PROCEDURE — 93798 PHYS/QHP OP CAR RHAB W/ECG: CPT | Performed by: REHABILITATION PRACTITIONER

## 2022-06-20 PROCEDURE — 99215 OFFICE O/P EST HI 40 MIN: CPT | Mod: 95 | Performed by: PHYSICIAN ASSISTANT

## 2022-06-20 PROCEDURE — 85610 PROTHROMBIN TIME: CPT

## 2022-06-20 PROCEDURE — 36416 COLLJ CAPILLARY BLOOD SPEC: CPT

## 2022-06-20 NOTE — LETTER
6/20/2022         RE: Joel Pike  [address]  [facility] [address]            [facility] Medical Oncology Followup Note  Oncologist: Dr. Isaias Solo     Date of Visit: Jun 20, 2022      CC:   metastatic prostate cancer, follow-up visit     ONCOLOGY HISTORY:   -Elevated PSA noted 2/26/21 at 12.70. Previously normal in 2017.     -4/7/21-Initial urology visit.   -7/2/21-prostate biopsy 4+3, 9/12 biopsies positive.  Ductal component noted.     -7/28/21-MR prostate-PIRADS 5 lesion, R and L sided lesions with likely    neurovascular bundle invasion. No  seminal vesicle involvement. No clear LAD,  but some indeterminate pelvic nodes.  No suspicious bone lesions.     -7/28/21-NM bone scan suspicious lesion of R sacral ala S3 level.     -8/10/21-CT A/P with multiple enlarged periaortic/iliac LN   -8/11/21-First eligard dose 45mg (Q6M dosing). Due next 2/2022.   -8/30/21-Initial medical oncology visit with Dr. Solo.  Unclear if bony lesion is metastasis based on current imaging.  Will start enzalutamide and continue    Eligard (next due 2/2022).     8/30/21 PSA =30.40 Pre Rx  9/27/21 PSA =8.99 (T=6); HgbA1C = 6.1  10/5/21 PSA =3.22  11/26/21 PSA =0.15  1/13/22          PSA =0.04   2/14/22          PSA =0.01  3/13/22          PSA =<0.01  6/3/22  PSA=<0.01  6/17/22 PSA=<0.01    INTERVAL HISTORY:    Joel presents for oncology follow-up visit via video today. He is accompanied by his wife, Michelle. He underwent CABG on 4/8/22. He stopped the apalutamide x1 month from April to May in preparation for CABG. Restarted on 5/9/22 and continues to tolerate well.     He had atrial fibrillation/atrial flutter shortly after CABG and has been on different blood thinners due to insurance. He is now on coumadin and his INR is managed closely by the coumadin clinic. He is doing cardiac rehab and following up with cardiology closely. He feels his HR is better controlled now.  He denies CP or SOB. Appetite is good. No  nausea/vomiting. BP has been normal and well controlled per patient report. Gets hot flashes on occasion. No fevers or chills or concerns for infection. Moving bowels regularly. He otherwise offers no acute complaints today and his ROS is negative.     PMH:  HTN, HLD, bipolar disorder, DM2 on metformin     PSH:  Bilateral TKA, bilateral cataract extraction, bilateral carpal tunnel release     FAMILY HX:  No reported family history of prostate cancer     SOCIAL:  Retired, works at [employer] in [employer] section now  Never smoker.   No EtOH  No recreational drugs.   No herbs/supplements other than on medication list.      VIDEO EXAM:   General: Patient appears well in no acute distress.   Skin: No visualized rash or lesions on visualized skin  Resp: Appears to be breathing comfortably without accessory muscle usage, speaking in full sentences, no cough  MSK: Appears to have normal range of motion based on visualized movements  Neurologic: No apparent tremors, facial movements symmetric  Psych: affect normal, alert and oriented    The rest of a comprehensive physical examination is deferred due to PHE (public health emergency) video restrictions    LABS: Reviewed labs from 6/17/22.   Latest Reference Range & Units 06/17/22 11:46   Sodium 133 - 144 mmol/L 139   Potassium 3.4 - 5.3 mmol/L 4.5   Chloride 94 - 109 mmol/L 106   Carbon Dioxide 20 - 32 mmol/L 28   Urea Nitrogen 7 - 30 mg/dL 22   Creatinine 0.66 - 1.25 mg/dL 1.02   GFR Estimate >60 mL/min/1.73m2 82 [1]   Calcium 8.5 - 10.1 mg/dL 8.7   Anion Gap 3 - 14 mmol/L 5   Albumin 3.4 - 5.0 g/dL 4.0   Protein Total 6.8 - 8.8 g/dL 6.9   Bilirubin Total 0.2 - 1.3 mg/dL 0.4   Alkaline Phosphatase 40 - 150 U/L 73   ALT 0 - 70 U/L 27   AST 0 - 45 U/L 19   Cholesterol <200 mg/dL 159   Patient Fasting > 8hrs?  Yes   HDL Cholesterol >=40 mg/dL 39 (L)   LDL Cholesterol Calculated <=100 mg/dL 80   Non HDL Cholesterol <130 mg/dL 120   PSA 0.00 - 4.00 ug/L <0.01   Triglycerides <150  mg/dL 198 (H)   Glucose 70 - 99 mg/dL 122 (H)   WBC 4.0 - 11.0 10e3/uL 6.0   Hemoglobin 13.3 - 17.7 g/dL 12.2 (L)   Hematocrit 40.0 - 53.0 % 35.4 (L)   Platelet Count 150 - 450 10e3/uL 204   RBC Count 4.40 - 5.90 10e6/uL 3.91 (L)   MCV 78 - 100 fL 91   MCH 26.5 - 33.0 pg 31.2   MCHC 31.5 - 36.5 g/dL 34.5   RDW 10.0 - 15.0 % 12.9   % Neutrophils % 70   % Lymphocytes % 14   % Monocytes % 10   % Eosinophils % 4   % Basophils % 1   Absolute Basophils 0.0 - 0.2 10e3/uL 0.0   Absolute Eosinophils 0.0 - 0.7 10e3/uL 0.2   Absolute Immature Granulocytes <=0.4 10e3/uL 0.0   Absolute Lymphocytes 0.8 - 5.3 10e3/uL 0.9   Absolute Monocytes 0.0 - 1.3 10e3/uL 0.6   % Immature Granulocytes % 1   Absolute Neutrophils 1.6 - 8.3 10e3/uL 4.3   Absolute NRBCs 10e3/uL 0.0   NRBCs per 100 WBC <1 /100 0     Reviewed CT CAP from 4/1/22 which showed:  IMPRESSION:  1.  Mildly enlarged retroperitoneal lymph nodes inferior to left renal  vein appear to have slightly decreased in size since most recent prior  study. They have certainly not increased in size and there are no new  enlarged lymph nodes.  2.  Stable tiny nodules bilateral lungs likely represent chronic  granulomatous disease.  3.  No other evidence for metastasis is seen.    IMPRESSION AND PLAN:  Joel Pike is a 66 year old M with PMH of DM2 on metformin, depression/anxiety (pt reports as bipolar d/o), HTN, HLD, and prostate cancer with possible bone metastasis.  Previously, Dr. Solo had a lengthy discussion at his initial and went over his pathology and staging scan results.  They previously discussed that it is not clear whether the lesion in wing of his ala is a true bony metastasis or whether it is another finding/artifact.  They previously discussed that this does not change the overall treatment of his disease with ADT, but may change whether he receives radiation to his bony lesion in the future or not.  The role of ADT and the addition of enzalutamide were previously  discussed.  Given his existing DM2, we will attempt to avoid additional prednisone use by choosing enzalutamide.  However, Enzalutamide was declined by patient's insurance company.      -He started apalutamide on 10/5/21. He had CT CAP done 11/26/21 which showed significantly decreased retroperitoneal lymphadenopathy since the prior study dated 8/10/2021 indicates good response to treatment. No other evidence for lymphadenopathy or metastasis is identified. Specifically no evidence for left sacral metastasis is seen. Continue current dose of Apalutamide.   -Plan is to continue ADT for 2 years (through 8/2023). His next Eligard 45mg is due 8/17/2022 - scheduled.    -He had CT CAP on 4/1 which showed retroperitoneal lymph node inferior to the left renal vein appear to have slightly decreased in size and no other e/o metastasis is seen.  -He was recommended to stop the apalutamide x1 month while he was undergoing CABG. He underwent CABG on 4/8/22. He resumed apalutamide on 5/9/22 and is tolerating well. His PSA from 6/17/22 remains <0.01. Discussed with Dr. Solo, no need to re-image at this time as long as PSA remains <0.01.   -He was recommended to get Covid booster.     -He has baseline HTN and is on Losartan 75mg daily, managed by his PCP. He is monitoring his BP closely at home and reports it has been normal; under 140/90.  -He has baseline HLD and currently on Zetia 10mg daily and Rosuvastatin 5mg daily as per his PCP. Continue to follow-up with Dr. Lin for HLD management.   -He is on levothyroxine 125 mcg daily per PCP.   -Continue to follow-up with PCP Dr. Lin for routine health maintenance and management of HTN, HLD, and hypothyroidism. I reminded patient to call us if any issues or problems arise in between visits.     # Atrial fibrillation/atrial flutter  - Continue to follow-up with cardiology; next appt on 6/22/22  - Doing cardiac rehab  - Currently on warfarin, INR/dosing managed by warfarin clinic   -  Metop 37.5mg BID as per cardiology     Plan of care discussed with Dr. Solo.    50 minutes spent on the date of the encounter doing chart review, review of test results, interpretation of tests, patient visit, documentation and discussion with other provider(s)       Liliya De PA-C  [facility]  [address] [place] [id number] [phone]

## 2022-06-20 NOTE — PROGRESS NOTES
ANTICOAGULATION MANAGEMENT     Joel Pike 66 year old male is on warfarin with therapeutic INR result. (Goal INR 2.0-3.0)    Recent labs: (last 7 days)     06/20/22  1250   INR 3.0*       ASSESSMENT       Source(s): Chart Review and Patient/Caregiver Call       Warfarin doses taken: Warfarin taken as instructed    Diet: No new diet changes identified    New illness, injury, or hospitalization: No    Medication/supplement changes: None noted    Signs or symptoms of bleeding or clotting: No    Previous INR: Supratherapeutic    Additional findings: Amiodorone d/c'd 6/4       PLAN     Recommended plan for ongoing change(s) affecting INR     Dosing Instructions: continue your current warfarin dose with next INR in 3 days       Summary  As of 6/20/2022    Full warfarin instructions:  5 mg every Tue, Sat; 7.5 mg all other days   Next INR check:  6/23/2022             Telephone call with  Spouse Michelle who verbalizes understanding and agrees to plan and who agrees to plan and repeated back plan correctly    Lab visit scheduled    Education provided: Please call back if any changes to your diet, medications or how you've been taking warfarin, Goal range and significance of current result, Importance of therapeutic range and Importance of following up at instructed interval    Plan made per [facility] anticoagulation protocol    Kathi Oakes, RN  Anticoagulation Clinic  6/20/2022    _______________________________________________________________________     Anticoagulation Episode Summary     Current INR goal:  2.0-3.0   TTR:  21.1 % (2.9 wk)   Target end date:  7/26/2022   Send INR reminders to:  JOANN MERIDA    Indications    Atrial flutter  unspecified type (H) [I48.92]           Comments:           Anticoagulation Care Providers     Provider Role Specialty Phone number    Tamar James PA-C Referring Cardiovascular & Thoracic Surgery [phone]

## 2022-06-20 NOTE — NURSING NOTE
Patient states he does not have pain but has a bit of discomfort in his chest when coughing or when reaching for items.     Patient reported blood pressure reading 129/81/62.    Fidelia Leon

## 2022-06-20 NOTE — PROGRESS NOTES
Joel is a 66 year old who is being evaluated via a billable video visit.      How would you like to obtain your AVS? [employer]  If the video visit is dropped, the invitation should be resent by: Text to cell phone: [phone]  Will anyone else be joining your video visit? No        Video-Visit Details    Video Start Time: 11:06am    Type of service:  Video Visit    Video End Time:11:22am    Originating Location (pt. Location): Home    Distant Location (provider location):  [facility]     Platform used for Video Visit: [facility]  Oncologist: Dr. Isaias Solo     Date of Visit: Jun 20, 2022      CC:   metastatic prostate cancer, follow-up visit     ONCOLOGY HISTORY:   -Elevated PSA noted 2/26/21 at 12.70. Previously normal in 2017.     -4/7/21-Initial urology visit.   -7/2/21-prostate biopsy 4+3, 9/12 biopsies positive.  Ductal component noted.     -7/28/21-MR prostate-PIRADS 5 lesion, R and L sided lesions with likely    neurovascular bundle invasion. No  seminal vesicle involvement. No clear LAD,  but some indeterminate pelvic nodes.  No suspicious bone lesions.     -7/28/21-NM bone scan suspicious lesion of R sacral ala S3 level.     -8/10/21-CT A/P with multiple enlarged periaortic/iliac LN   -8/11/21-First eligard dose 45mg (Q6M dosing). Due next 2/2022.   -8/30/21-Initial medical oncology visit with Dr. Solo.  Unclear if bony lesion is metastasis based on current imaging.  Will start enzalutamide and continue    Eligard (next due 2/2022).     8/30/21 PSA =30.40 Pre Rx  9/27/21 PSA =8.99 (T=6); HgbA1C = 6.1  10/5/21 PSA =3.22  11/26/21 PSA =0.15  1/13/22          PSA =0.04   2/14/22          PSA =0.01  3/13/22          PSA =<0.01  6/3/22  PSA=<0.01  6/17/22 PSA=<0.01    INTERVAL HISTORY:    Joel presents for oncology follow-up visit via video today. He is accompanied by his wife, Michelle. He underwent CABG on  4/8/22. He stopped the apalutamide x1 month from April to May in preparation for CABG. Restarted on 5/9/22 and continues to tolerate well.     He had atrial fibrillation/atrial flutter shortly after CABG and has been on different blood thinners due to insurance. He is now on coumadin and his INR is managed closely by the coumadin clinic. He is doing cardiac rehab and following up with cardiology closely. He feels his HR is better controlled now.  He denies CP or SOB. Appetite is good. No nausea/vomiting. BP has been normal and well controlled per patient report. Gets hot flashes on occasion. No fevers or chills or concerns for infection. Moving bowels regularly. He otherwise offers no acute complaints today and his ROS is negative.     PMH:  HTN, HLD, bipolar disorder, DM2 on metformin     PSH:  Bilateral TKA, bilateral cataract extraction, bilateral carpal tunnel release     FAMILY HX:  No reported family history of prostate cancer     SOCIAL:  Retired, works at [employer] in [place] section now  Never smoker.   No EtOH  No recreational drugs.   No herbs/supplements other than on medication list.      VIDEO EXAM:   General: Patient appears well in no acute distress.   Skin: No visualized rash or lesions on visualized skin  Resp: Appears to be breathing comfortably without accessory muscle usage, speaking in full sentences, no cough  MSK: Appears to have normal range of motion based on visualized movements  Neurologic: No apparent tremors, facial movements symmetric  Psych: affect normal, alert and oriented    The rest of a comprehensive physical examination is deferred due to PHE (public health emergency) video restrictions    LABS: Reviewed labs from 6/17/22.   Latest Reference Range & Units 06/17/22 11:46   Sodium 133 - 144 mmol/L 139   Potassium 3.4 - 5.3 mmol/L 4.5   Chloride 94 - 109 mmol/L 106   Carbon Dioxide 20 - 32 mmol/L 28   Urea Nitrogen 7 - 30 mg/dL 22   Creatinine 0.66 - 1.25 mg/dL 1.02   GFR Estimate  >60 mL/min/1.73m2 82 [1]   Calcium 8.5 - 10.1 mg/dL 8.7   Anion Gap 3 - 14 mmol/L 5   Albumin 3.4 - 5.0 g/dL 4.0   Protein Total 6.8 - 8.8 g/dL 6.9   Bilirubin Total 0.2 - 1.3 mg/dL 0.4   Alkaline Phosphatase 40 - 150 U/L 73   ALT 0 - 70 U/L 27   AST 0 - 45 U/L 19   Cholesterol <200 mg/dL 159   Patient Fasting > 8hrs?  Yes   HDL Cholesterol >=40 mg/dL 39 (L)   LDL Cholesterol Calculated <=100 mg/dL 80   Non HDL Cholesterol <130 mg/dL 120   PSA 0.00 - 4.00 ug/L <0.01   Triglycerides <150 mg/dL 198 (H)   Glucose 70 - 99 mg/dL 122 (H)   WBC 4.0 - 11.0 10e3/uL 6.0   Hemoglobin 13.3 - 17.7 g/dL 12.2 (L)   Hematocrit 40.0 - 53.0 % 35.4 (L)   Platelet Count 150 - 450 10e3/uL 204   RBC Count 4.40 - 5.90 10e6/uL 3.91 (L)   MCV 78 - 100 fL 91   MCH 26.5 - 33.0 pg 31.2   MCHC 31.5 - 36.5 g/dL 34.5   RDW 10.0 - 15.0 % 12.9   % Neutrophils % 70   % Lymphocytes % 14   % Monocytes % 10   % Eosinophils % 4   % Basophils % 1   Absolute Basophils 0.0 - 0.2 10e3/uL 0.0   Absolute Eosinophils 0.0 - 0.7 10e3/uL 0.2   Absolute Immature Granulocytes <=0.4 10e3/uL 0.0   Absolute Lymphocytes 0.8 - 5.3 10e3/uL 0.9   Absolute Monocytes 0.0 - 1.3 10e3/uL 0.6   % Immature Granulocytes % 1   Absolute Neutrophils 1.6 - 8.3 10e3/uL 4.3   Absolute NRBCs 10e3/uL 0.0   NRBCs per 100 WBC <1 /100 0     Reviewed CT CAP from 4/1/22 which showed:  IMPRESSION:  1.  Mildly enlarged retroperitoneal lymph nodes inferior to left renal  vein appear to have slightly decreased in size since most recent prior  study. They have certainly not increased in size and there are no new  enlarged lymph nodes.  2.  Stable tiny nodules bilateral lungs likely represent chronic  granulomatous disease.  3.  No other evidence for metastasis is seen.    IMPRESSION AND PLAN:  Joel Pike is a 66 year old M with PMH of DM2 on metformin, depression/anxiety (pt reports as bipolar d/o), HTN, HLD, and prostate cancer with possible bone metastasis.  Previously, Dr. Solo had a lengthy  discussion at his initial and went over his pathology and staging scan results.  They previously discussed that it is not clear whether the lesion in wing of his ala is a true bony metastasis or whether it is another finding/artifact.  They previously discussed that this does not change the overall treatment of his disease with ADT, but may change whether he receives radiation to his bony lesion in the future or not.  The role of ADT and the addition of enzalutamide were previously discussed.  Given his existing DM2, we will attempt to avoid additional prednisone use by choosing enzalutamide.  However, Enzalutamide was declined by patient's insurance company.      -He started apalutamide on 10/5/21. He had CT CAP done 11/26/21 which showed significantly decreased retroperitoneal lymphadenopathy since the prior study dated 8/10/2021 indicates good response to treatment. No other evidence for lymphadenopathy or metastasis is identified. Specifically no evidence for left sacral metastasis is seen. Continue current dose of Apalutamide.   -Plan is to continue ADT for 2 years (through 8/2023). His next Eligard 45mg is due 8/17/2022 - scheduled.    -He had CT CAP on 4/1 which showed retroperitoneal lymph node inferior to the left renal vein appear to have slightly decreased in size and no other e/o metastasis is seen.  -He was recommended to stop the apalutamide x1 month while he was undergoing CABG. He underwent CABG on 4/8/22. He resumed apalutamide on 5/9/22 and is tolerating well. His PSA from 6/17/22 remains <0.01. Discussed with Dr. Solo, no need to re-image at this time as long as PSA remains <0.01.   -He was recommended to get Covid booster.     -He has baseline HTN and is on Losartan 75mg daily, managed by his PCP. He is monitoring his BP closely at home and reports it has been normal; under 140/90.  -He has baseline HLD and currently on Zetia 10mg daily and Rosuvastatin 5mg daily as per his PCP. Continue to  follow-up with Dr. Lin for HLD management.   -He is on levothyroxine 125 mcg daily per PCP.   -Continue to follow-up with PCP Dr. Lin for routine health maintenance and management of HTN, HLD, and hypothyroidism. I reminded patient to call us if any issues or problems arise in between visits.     # Atrial fibrillation/atrial flutter  - Continue to follow-up with cardiology; next appt on 6/22/22  - Doing cardiac rehab  - Currently on warfarin, INR/dosing managed by warfarin clinic   - Metop 37.5mg BID as per cardiology     Plan of care discussed with Dr. Solo.    50 minutes spent on the date of the encounter doing chart review, review of test results, interpretation of tests, patient visit, documentation and discussion with other provider(s)     Liliya De PA-C  [facility] [address] [address] [phone]

## 2022-06-21 ASSESSMENT — SLEEP AND FATIGUE QUESTIONNAIRES
HOW LIKELY ARE YOU TO NOD OFF OR FALL ASLEEP WHILE LYING DOWN TO REST IN THE AFTERNOON WHEN CIRCUMSTANCES PERMIT: MODERATE CHANCE OF DOZING
HOW LIKELY ARE YOU TO NOD OFF OR FALL ASLEEP WHILE SITTING QUIETLY AFTER LUNCH WITHOUT ALCOHOL: SLIGHT CHANCE OF DOZING
HOW LIKELY ARE YOU TO NOD OFF OR FALL ASLEEP IN A CAR, WHILE STOPPED FOR A FEW MINUTES IN TRAFFIC: WOULD NEVER DOZE
HOW LIKELY ARE YOU TO NOD OFF OR FALL ASLEEP WHILE SITTING AND TALKING TO SOMEONE: SLIGHT CHANCE OF DOZING
HOW LIKELY ARE YOU TO NOD OFF OR FALL ASLEEP WHEN YOU ARE A PASSENGER IN A CAR FOR AN HOUR WITHOUT A BREAK: SLIGHT CHANCE OF DOZING
HOW LIKELY ARE YOU TO NOD OFF OR FALL ASLEEP WHILE SITTING INACTIVE IN A PUBLIC PLACE: SLIGHT CHANCE OF DOZING
HOW LIKELY ARE YOU TO NOD OFF OR FALL ASLEEP WHILE WATCHING TV: MODERATE CHANCE OF DOZING
HOW LIKELY ARE YOU TO NOD OFF OR FALL ASLEEP WHILE SITTING AND READING: MODERATE CHANCE OF DOZING

## 2022-06-21 NOTE — PROGRESS NOTES
CARDIOLOGY CLINIC VISIT  DATE OF SERVICE:  June 22, 2022    PRIMARY CARE PHYSICIAN:  Nishi Hdz Mai    HISTORY OF PRESENT ILLNESS:  Mr. Joel Pike, a pleasant 66 year old patient who has a past medical history significant for type 2 diabetes, hypertension, dyslipidemia, prostate cancer and recent diagnosis of multivessel coronary artery disease status post CABG x4 on 4/8/2022 with Dr. Bae. He was last seen by Magaly Romo CNP in 5/2022 and presents today for follow up.       Earlier this year he had an abnormal stress test suggestive of LAD territory ischemia and subsequently went on to have a coronary angiogram that revealed multivessel coronary disease.  He was referred to CV surgery and underwent a CABG x4 on 4/8/2022 (LIMA to LAD, SVG to RPDA, SVG to OM, SVG to diagonal).  The Hospital course was uneventful and he was discharged home on 4/13/2022.       He presented to the emergency department at [facility] on 4/26/2022 with elevated heart rates and was found to be in atrial flutter with RVR.  He underwent cardioversion and was started on Xarelto. Unfortunately he represented on 5/4/2022 after he was found to be in atrial fibrillation and cardiac rehab and underwent a second cardioversion successful and was then loaded with p.o. amiodarone. He was prescribed amiodarone 400 mg twice daily x1 week then 200 mg twice daily x1 week then 200 mg daily.     In follow up today, Joel reports feeling well.  He denies any exertional chest pain, chest discomfort or shortness of breath.  He has not had further episodes of heart palpitations or light-headedness.  He has completed a 30 day course of amiodarone, continues on warfarin.  A ZIO patch monitor was performed which demnstrated no atrial arrhythmias.       PAST MEDICAL HISTORY:  Past Medical History:   Diagnosis Date     Atherosclerosis of native coronary artery of native heart with stable angina pectoris (H) 4/8/2022     Calculus of kidney       CKD (chronic kidney disease) stage 2, GFR 60-89 ml/min 10/30/2015     Degeneration of lumbar or lumbosacral intervertebral disc      Depressive disorder      Depressive disorder, not elsewhere classified      Diabetes (H)      Diabetic eye exam (H) 02/06/12, 11/1/13     Diabetic eye exam (H) 12/17/14     Hyperlipidaemia      Hypertension      Hypothyroidism 1/17/2010     Obesity, Class I, BMI 30-34.9 10/30/2015     HILARIO (obstructive sleep apnea)      Primary osteoarthritis of left knee      Prostate cancer (H) 7/7/2021     Uncomplicated asthma        MEDICATIONS:  Current Outpatient Medications   Medication     acetaminophen (TYLENOL) 325 MG tablet     alcohol swab prep pads     Alcohol Swabs PADS     amiodarone (PACERONE) 200 MG tablet     apalutamide (ERLEADA) 60 MG tablet     aspirin (ASA) 81 MG EC tablet     blood glucose (HUGO CONTOUR) test strip     blood glucose (NO BRAND SPECIFIED) lancets standard     blood glucose (NO BRAND SPECIFIED) lancets standard     blood glucose (NO BRAND SPECIFIED) test strip     blood glucose calibration (HUGO CONTOUR) NORMAL solution     blood glucose monitoring (NO BRAND SPECIFIED) meter device kit     blood glucose monitoring (NO BRAND SPECIFIED) meter device kit     buPROPion (WELLBUTRIN XL) 150 MG 24 hr tablet     Coenzyme Q-10 capsule     ezetimibe (ZETIA) 10 MG tablet     fish oil-omega-3 fatty acids 1000 MG capsule     levothyroxine (SYNTHROID/LEVOTHROID) 125 MCG tablet     losartan (COZAAR) 50 MG tablet     metFORMIN (GLUCOPHAGE) 1000 MG tablet     methocarbamol (ROBAXIN) 500 MG tablet     metoprolol tartrate (LOPRESSOR) 25 MG tablet     Misc Natural Products (OSTEO BI-FLEX ADV JOINT SHIELD) TABS     Multiple Vitamins-Minerals (PRESERVISION AREDS PO)     nitroGLYcerin (NITROSTAT) 0.4 MG sublingual tablet     oxyCODONE (ROXICODONE) 5 MG tablet     pantoprazole (PROTONIX) 40 MG EC tablet     rivaroxaban ANTICOAGULANT (XARELTO ANTICOAGULANT) 20 MG TABS tablet      rosuvastatin (CRESTOR) 5 MG tablet     senna-docusate (SENOKOT-S/PERICOLACE) 8.6-50 MG tablet     Sharps Container MISC     warfarin ANTICOAGULANT (JANTOVEN ANTICOAGULANT) 5 MG tablet     Current Facility-Administered Medications   Medication     leuprolide (ELIGARD) kit 45 mg       ALLERGIES:  Allergies   Allergen Reactions     Dust Mites Cough     Hmg-Coa-R Inhibitors      Body aches     Other Environmental Allergy      Allergic to sunlight ever since 20s.      Penicillins Hives and Rash       SOCIAL HISTORY:  I have reviewed this patient's social history and updated it with pertinent information if needed. Joel Pike  reports that he has never smoked. He has never used smokeless tobacco. He reports that he does not drink alcohol and does not use drugs.    FAMILY HISTORY:  I have reviewed this patient's family history and updated it with pertinent information if needed.   Family History   Problem Relation Age of Onset     Cerebrovascular Disease Mother      Hypertension Mother      Hypertension Father      Diabetes Father         Adult     Heart Disease Father         Hx: Bypass     Unknown/Adopted Maternal Grandmother      Cerebrovascular Disease Maternal Grandmother      No Known Problems Maternal Grandfather      No Known Problems Paternal Grandmother      No Known Problems Paternal Grandfather      Thyroid Disease Brother      Cancer Sister         Liver     No Known Problems Sister      No Known Problems Son        REVIEW OF SYSTEMS:  A complete ROS was obtained and the pertinent positives are outlined in the history of present illness above.  The remainder of systems is negative.      PHYSICAL EXAM:                     Vital Signs with Ranges     0 lbs 0 oz    Constitutional: awake, alert, no distress  Eyes: PERRL, sclera nonicteric  ENT: trachea midline  Respiratory: CTAB  Cardiovascular: RRR on m/r/g, no JVD  GI: nondistended, nontender, bowel sounds present  Lymph/Hematologic: no lymphadenopathy  Skin:  dry, no rash  Musculoskeletal: good muscle tone, strength 5/5 in upper and lower extremities  Neurologic: no focal deficits  Neuropsychiatric: appropriate affact    DATA:  7 day ZIO patch monitor:  Performed 5/18/22 reviewed personally.  No recurrent afib/flutter    ASSESSMENT:       1. Post operative trial fibrillation/atrial flutter    Initially presented to the ED on 4/26/2022 and found to be in atrial flutter with RVR and was successfully cardioverted and placed on anticoagulation    Represented on 5/4/2022 with similar symptoms and noted to be in atrial fibrillation and underwent a successful cardioversion again and was placed on 30 days of amiodarone (now complete)    No recurrence of afib/flutter by 7 day zio patch monitor    2. CAD    S/p CABG x 4 with LIMA to LAD, SVG to RPDA, SVG to OM, SVG to diagonal by Dr. Bae on 4/8/2022    LVEF 60 to 65%     3. Hypertension    Well-controlled     4. Dyslipidemia, LDL goal less than 70    Currently on rosuvastatin 5 mg daily as previously unable to tolerate higher dose of statin and ezetimibe 10 mg daily.    LDL 80 mg/dL by lipid panel 6/17/22       RECOMMENDATIONS:  1. No further episodes of post operative afib/flutter.  Completed 30 day course of amiodarone, no further afib/flutter detected on recent zio patch monitor.  Okay to stop anticoagulation as afib/flutter triggered in postop period.  If recurrent afib/flutter develops, he should be on long term anticoagulation  2.  Continue ASA, statin, zetia, metoprolol  3.  Follow up with Magaly Romo CNP in 6 months or before than as needed.      Kamala Murphy MD [facility]  June 22, 2022

## 2022-06-22 ENCOUNTER — OFFICE VISIT (OUTPATIENT)
Dept: CARDIOLOGY | Facility: CLINIC | Age: 67
End: 2022-06-22
Payer: MEDICARE

## 2022-06-22 ENCOUNTER — HOSPITAL ENCOUNTER (OUTPATIENT)
Dept: CARDIAC REHAB | Facility: CLINIC | Age: 67
Discharge: HOME | End: 2022-06-22
Attending: SURGERY
Payer: MEDICARE

## 2022-06-22 VITALS
DIASTOLIC BLOOD PRESSURE: 68 MMHG | WEIGHT: 216 LBS | BODY MASS INDEX: 30.92 KG/M2 | HEIGHT: 70 IN | OXYGEN SATURATION: 98 % | HEART RATE: 74 BPM | SYSTOLIC BLOOD PRESSURE: 106 MMHG

## 2022-06-22 DIAGNOSIS — I25.810 CORONARY ARTERY DISEASE INVOLVING AUTOLOGOUS ARTERY CORONARY BYPASS GRAFT WITHOUT ANGINA PECTORIS: Primary | ICD-10-CM

## 2022-06-22 PROCEDURE — 93798 PHYS/QHP OP CAR RHAB W/ECG: CPT

## 2022-06-22 PROCEDURE — 99214 OFFICE O/P EST MOD 30 MIN: CPT | Performed by: INTERNAL MEDICINE

## 2022-06-22 NOTE — LETTER
6/22/2022    Nishi Hdz Mai, MD  [address] [place] [address]    RE: Joel Pike       Dear Colleague,     I had the pleasure of seeing Joel Pike in the [facility].  CARDIOLOGY CLINIC VISIT  DATE OF SERVICE:  June 22, 2022    PRIMARY CARE PHYSICIAN:  Nishi Hdz Mai    HISTORY OF PRESENT ILLNESS:  Mr. Joel Pike, a pleasant 66 year old patient who has a past medical history significant for type 2 diabetes, hypertension, dyslipidemia, prostate cancer and recent diagnosis of multivessel coronary artery disease status post CABG x4 on 4/8/2022 with Dr. Bae. He was last seen by Magaly Romo CNP in 5/2022 and presents today for follow up.       Earlier this year he had an abnormal stress test suggestive of LAD territory ischemia and subsequently went on to have a coronary angiogram that revealed multivessel coronary disease.  He was referred to CV surgery and underwent a CABG x4 on 4/8/2022 (LIMA to LAD, SVG to RPDA, SVG to OM, SVG to diagonal).  The Hospital course was uneventful and he was discharged home on 4/13/2022.       He presented to the emergency department at [facility] on 4/26/2022 with elevated heart rates and was found to be in atrial flutter with RVR.  He underwent cardioversion and was started on Xarelto. Unfortunately he represented on 5/4/2022 after he was found to be in atrial fibrillation and cardiac rehab and underwent a second cardioversion successful and was then loaded with p.o. amiodarone. He was prescribed amiodarone 400 mg twice daily x1 week then 200 mg twice daily x1 week then 200 mg daily.     In follow up today, Joel reports feeling well.  He denies any exertional chest pain, chest discomfort or shortness of breath.  He has not had further episodes of heart palpitations or light-headedness.  He has completed a 30 day course of amiodarone, continues on warfarin.  A ZIO patch monitor was performed which demnstrated no atrial arrhythmias.        PAST MEDICAL HISTORY:  Past Medical History:   Diagnosis Date     Atherosclerosis of native coronary artery of native heart with stable angina pectoris (H) 4/8/2022     Calculus of kidney      CKD (chronic kidney disease) stage 2, GFR 60-89 ml/min 10/30/2015     Degeneration of lumbar or lumbosacral intervertebral disc      Depressive disorder      Depressive disorder, not elsewhere classified      Diabetes (H)      Diabetic eye exam (H) 02/06/12, 11/1/13     Diabetic eye exam (H) 12/17/14     Hyperlipidaemia      Hypertension      Hypothyroidism 1/17/2010     Obesity, Class I, BMI 30-34.9 10/30/2015     HILARIO (obstructive sleep apnea)      Primary osteoarthritis of left knee      Prostate cancer (H) 7/7/2021     Uncomplicated asthma        MEDICATIONS:  Current Outpatient Medications   Medication     acetaminophen (TYLENOL) 325 MG tablet     alcohol swab prep pads     Alcohol Swabs PADS     amiodarone (PACERONE) 200 MG tablet     apalutamide (ERLEADA) 60 MG tablet     aspirin (ASA) 81 MG EC tablet     blood glucose (HUGO CONTOUR) test strip     blood glucose (NO BRAND SPECIFIED) lancets standard     blood glucose (NO BRAND SPECIFIED) lancets standard     blood glucose (NO BRAND SPECIFIED) test strip     blood glucose calibration (HUGO CONTOUR) NORMAL solution     blood glucose monitoring (NO BRAND SPECIFIED) meter device kit     blood glucose monitoring (NO BRAND SPECIFIED) meter device kit     buPROPion (WELLBUTRIN XL) 150 MG 24 hr tablet     Coenzyme Q-10 capsule     ezetimibe (ZETIA) 10 MG tablet     fish oil-omega-3 fatty acids 1000 MG capsule     levothyroxine (SYNTHROID/LEVOTHROID) 125 MCG tablet     losartan (COZAAR) 50 MG tablet     metFORMIN (GLUCOPHAGE) 1000 MG tablet     methocarbamol (ROBAXIN) 500 MG tablet     metoprolol tartrate (LOPRESSOR) 25 MG tablet     Misc Natural Products (OSTEO BI-FLEX ADV JOINT SHIELD) TABS     Multiple Vitamins-Minerals (PRESERVISION AREDS PO)     nitroGLYcerin  (NITROSTAT) 0.4 MG sublingual tablet     oxyCODONE (ROXICODONE) 5 MG tablet     pantoprazole (PROTONIX) 40 MG EC tablet     rivaroxaban ANTICOAGULANT (XARELTO ANTICOAGULANT) 20 MG TABS tablet     rosuvastatin (CRESTOR) 5 MG tablet     senna-docusate (SENOKOT-S/PERICOLACE) 8.6-50 MG tablet     Sharps Container MISC     warfarin ANTICOAGULANT (JANTOVEN ANTICOAGULANT) 5 MG tablet     Current Facility-Administered Medications   Medication     leuprolide (ELIGARD) kit 45 mg       ALLERGIES:  Allergies   Allergen Reactions     Dust Mites Cough     Hmg-Coa-R Inhibitors      Body aches     Other Environmental Allergy      Allergic to sunlight ever since 20s.      Penicillins Hives and Rash       SOCIAL HISTORY:  I have reviewed this patient's social history and updated it with pertinent information if needed. Joel Pike  reports that he has never smoked. He has never used smokeless tobacco. He reports that he does not drink alcohol and does not use drugs.    FAMILY HISTORY:  I have reviewed this patient's family history and updated it with pertinent information if needed.   Family History   Problem Relation Age of Onset     Cerebrovascular Disease Mother      Hypertension Mother      Hypertension Father      Diabetes Father         Adult     Heart Disease Father         Hx: Bypass     Unknown/Adopted Maternal Grandmother      Cerebrovascular Disease Maternal Grandmother      No Known Problems Maternal Grandfather      No Known Problems Paternal Grandmother      No Known Problems Paternal Grandfather      Thyroid Disease Brother      Cancer Sister         Liver     No Known Problems Sister      No Known Problems Son        REVIEW OF SYSTEMS:  A complete ROS was obtained and the pertinent positives are outlined in the history of present illness above.  The remainder of systems is negative.      PHYSICAL EXAM:                     Vital Signs with Ranges     0 lbs 0 oz    Constitutional: awake, alert, no distress  Eyes:  PERRL, sclera nonicteric  ENT: trachea midline  Respiratory: CTAB  Cardiovascular: RRR on m/r/g, no JVD  GI: nondistended, nontender, bowel sounds present  Lymph/Hematologic: no lymphadenopathy  Skin: dry, no rash  Musculoskeletal: good muscle tone, strength 5/5 in upper and lower extremities  Neurologic: no focal deficits  Neuropsychiatric: appropriate affact    DATA:  7 day ZIO patch monitor:  Performed 5/18/22 reviewed personally.  No recurrent afib/flutter    ASSESSMENT:       1. Post operative trial fibrillation/atrial flutter    Initially presented to the ED on 4/26/2022 and found to be in atrial flutter with RVR and was successfully cardioverted and placed on anticoagulation    Represented on 5/4/2022 with similar symptoms and noted to be in atrial fibrillation and underwent a successful cardioversion again and was placed on 30 days of amiodarone (now complete)    No recurrence of afib/flutter by 7 day zio patch monitor    2. CAD    S/p CABG x 4 with LIMA to LAD, SVG to RPDA, SVG to OM, SVG to diagonal by Dr. Bae on 4/8/2022    LVEF 60 to 65%     3. Hypertension    Well-controlled     4. Dyslipidemia, LDL goal less than 70    Currently on rosuvastatin 5 mg daily as previously unable to tolerate higher dose of statin and ezetimibe 10 mg daily.    LDL 80 mg/dL by lipid panel 6/17/22       RECOMMENDATIONS:  1. No further episodes of post operative afib/flutter.  Completed 30 day course of amiodarone, no further afib/flutter detected on recent zio patch monitor.  Okay to stop anticoagulation as afib/flutter triggered in postop period.  If recurrent afib/flutter develops, he should be on long term anticoagulation  2.  Continue ASA, statin, zetia, metoprolol  3.  Follow up with Magaly Romo CNP in 6 months or before than as needed.      Kamala Murphy MD [facility]  June 22, 2022    Thank you for allowing me to participate in the care of your patient.      Sincerely,     Kamala Murphy,  MD     [facility]  cc:   Referred Self

## 2022-06-23 ENCOUNTER — ANTICOAGULATION THERAPY VISIT (OUTPATIENT)
Dept: ANTICOAGULATION | Facility: CLINIC | Age: 67
End: 2022-06-23

## 2022-06-23 ENCOUNTER — LAB (OUTPATIENT)
Dept: LAB | Facility: CLINIC | Age: 67
End: 2022-06-23
Payer: COMMERCIAL

## 2022-06-23 ENCOUNTER — TELEPHONE (OUTPATIENT)
Dept: CARDIOLOGY | Facility: CLINIC | Age: 67
End: 2022-06-23

## 2022-06-23 DIAGNOSIS — I48.92 ATRIAL FLUTTER, UNSPECIFIED TYPE (H): Primary | ICD-10-CM

## 2022-06-23 DIAGNOSIS — I48.92 ATRIAL FLUTTER, UNSPECIFIED TYPE (H): ICD-10-CM

## 2022-06-23 LAB — INR BLD: 3.4 (ref 0.9–1.1)

## 2022-06-23 PROCEDURE — 85610 PROTHROMBIN TIME: CPT

## 2022-06-23 PROCEDURE — 36416 COLLJ CAPILLARY BLOOD SPEC: CPT

## 2022-06-23 NOTE — PROGRESS NOTES
See note below    ANTICOAGULATION  MANAGEMENT    Joel Pike is being discharged from the [facility] (ACC).    Reason for discharge: warfarin therapy completed    Anticoagulation episode resolved, ACC referral closed and Standing order discontinued    If patient needs warfarin management in the future, please send a new referral    Maria Esther Whitehead, RN          Maria Esther Whitehead, RN               RECOMMENDATIONS:  1. No further episodes of post operative afib/flutter.  Completed 30 day course of amiodarone, no further afib/flutter detected on recent zio patch monitor.  Okay to stop anticoagulation as afib/flutter triggered in postop period.  If recurrent afib/flutter develops, he should be on long term anticoagulation  2.  Continue ASA, statin, zetia, metoprolol  3.  Follow up with Magaly Romo CNP in 6 months or before than as needed.       Kamala Murphy MD [facility]  June 22, 2022

## 2022-06-23 NOTE — TELEPHONE ENCOUNTER
Reason for Call:  Form, our goal is to have forms completed with 72 hours, however, some forms may require a visit or additional information.    Type of letter, form or note:  FMLA    Who is the form from?: Patient    Where did the form come from: Patient or family brought in       What clinic location was the form placed at?: [facility]    Where the form was placed: Gave to [employer]Folder         Additional comments: please call Joel when this has been completed, he would like a copy, and faxed to [phone] and then send a copy to scanning . Thank You Elisha      Call taken on 6/23/2022 at 11:07 AM by Elisha Burnett

## 2022-06-23 NOTE — TELEPHONE ENCOUNTER
Spoke with patient. He is okay with the forms being signed on 7/6/22 when Dr. Murphy is back at [place]. Will call patient once the forms are complete.

## 2022-06-24 ENCOUNTER — OFFICE VISIT (OUTPATIENT)
Dept: SLEEP MEDICINE | Facility: CLINIC | Age: 67
End: 2022-06-24
Payer: MEDICARE

## 2022-06-24 VITALS
BODY MASS INDEX: 31.32 KG/M2 | SYSTOLIC BLOOD PRESSURE: 92 MMHG | WEIGHT: 218.8 LBS | HEART RATE: 65 BPM | DIASTOLIC BLOOD PRESSURE: 61 MMHG | HEIGHT: 70 IN | OXYGEN SATURATION: 99 %

## 2022-06-24 DIAGNOSIS — G47.33 OSA (OBSTRUCTIVE SLEEP APNEA): Primary | ICD-10-CM

## 2022-06-24 PROCEDURE — 99213 OFFICE O/P EST LOW 20 MIN: CPT | Performed by: PHYSICIAN ASSISTANT

## 2022-06-24 NOTE — PROGRESS NOTES
"Does Joel have a CPAP/Bipap?  Yes     Type of mask: nasal pillows     [facility]: [facility] [phone]    [email or web address]     Trion Sleep Scale: 10      Obstructive Sleep Apnea - PAP Follow-Up Visit:    Chief Complaint   Patient presents with     CPAP Follow Up       Joel Pike comes in today for follow-up of their moderate sleep apnea, managed with CPAP.     No specialty comments available.    Overall, he rates the experience with PAP as 7 (0 poor, 10 great). The mask is not comfortable.  The mask is uncomfortable because of \"leaking, difficult to side or stomach sleep. \".  The mask is leaking at his cheeks.  The mask is leaking 7 nights per week.  He is snoring with the mask on. He is not having gasp arousals.  He is not having significant oral/nasal dryness. The pressure is comfortable.     His PAP interface is Nasal Mask.    Bedtime is typically 10-12. Usually it takes about 60-90 minutes to fall asleep with the mask on. Wake time is typically 7-8:30.  Patient is using PAP therapy 8 hours per night. The patient is usually getting 8 hours of sleep per night.    He does and does  not feel rested in the morning.    Trion Sleepiness Scale: 10/24      No data recorded    ResMed    Auto-PAP 8 - 14 cmH2O 30 day usage data:    90% of days with > 4 hours of use. 3/30 days with no use.   Average use 8 hours 3 minutes per day.   95%ile Leak 29.9 L/min.   CPAP 95% pressure 9.8 cm.   AHI 2.4 events per hour.             Past medical/surgical history, family history, social history, medications and allergies were reviewed.      Problem List:  Patient Active Problem List    Diagnosis Date Noted     Atrial flutter, unspecified type (H) 04/29/2022     Priority: Medium     S/P CABG (coronary artery bypass graft) 04/13/2022     Priority: Medium     Fluid overload 04/13/2022     Priority: Medium     Anemia due to blood loss, acute 04/13/2022     Priority: Medium     " Atherosclerosis of native coronary artery of native heart with stable angina pectoris (H) 04/08/2022     Priority: Medium     Status post coronary angiogram 03/28/2022     Priority: Medium     Essential hypertension 02/05/2022     Priority: Medium     Prostate cancer (H) 07/07/2021     Priority: Medium     Status post total right knee replacement 02/01/2021     Priority: Medium     S/P total knee replacement using cement, right 01/18/2021     Priority: Medium     Primary osteoarthritis of right knee 08/27/2020     Priority: Medium     Added automatically from request for surgery [id number]       S/P total knee replacement using cement, left 02/04/2020     Priority: Medium     Microalbuminuria 02/26/2019     Priority: Medium     Recurrent major depression in complete remission (H) 11/08/2017     Priority: Medium     CKD (chronic kidney disease) stage 2, GFR 60-89 ml/min 10/30/2015     Priority: Medium     Obesity, Class I, BMI 30-34.9 10/30/2015     Priority: Medium     Type 2 diabetes mellitus with stage 2 chronic kidney disease (H) 04/19/2013     Priority: Medium     Hypothyroidism, unspecified type 03/02/2012     Priority: Medium     Advanced directives, counseling/discussion 08/19/2011     Priority: Medium     Advance Directive Problem List Overview:   Name Relationship Phone    Primary Health Care Agent            Alternative Health Care Agent          Discussed advance care planning with patient; information given to patient to review.//Macey Calvert/AISHA(AAMA)  8/19/2011 and again 11/9/16         Tinnitus 07/22/2011     Priority: Medium     right ear, began after accident, immediately       Hyperlipidemia LDL goal <100 01/28/2011     Priority: Medium     HILARIO (obstructive sleep apnea) 01/28/2011     Priority: Medium     Chronic rhinitis 01/15/2010     Priority: Medium     Degeneration of lumbar or lumbosacral intervertebral disc 12/19/2005     Priority: Medium     Gastroesophageal reflux disease without esophagitis  "05/14/2003     Priority: Medium        BP 92/61   Pulse 65   Ht 1.778 m (5' 10\")   Wt 99.2 kg (218 lb 12.8 oz)   SpO2 99%   BMI 31.39 kg/m      Impression/Plan:     Moderate Sleep apnea. He is Tolerating PAP fair, apneas are well controlled. Due for new cpap machine and supplies which are ordered. . Daytime symptoms are stable.       Joel Pike will follow up in about 1 year(s).     Fifteen minutes spent with patient, all of which were spent face-to-face counseling, consulting, coordinating plan of care.      CC:  Nishi Lin,     "

## 2022-06-27 ENCOUNTER — HOSPITAL ENCOUNTER (OUTPATIENT)
Dept: CARDIAC REHAB | Facility: CLINIC | Age: 67
Discharge: HOME | End: 2022-06-27
Attending: SURGERY
Payer: MEDICARE

## 2022-06-27 DIAGNOSIS — Z95.1 S/P CABG X 4: ICD-10-CM

## 2022-06-27 PROCEDURE — 93798 PHYS/QHP OP CAR RHAB W/ECG: CPT | Performed by: REHABILITATION PRACTITIONER

## 2022-06-29 ENCOUNTER — HOSPITAL ENCOUNTER (OUTPATIENT)
Dept: CARDIAC REHAB | Facility: CLINIC | Age: 67
Discharge: HOME | End: 2022-06-29
Attending: SURGERY
Payer: MEDICARE

## 2022-06-29 PROCEDURE — 93798 PHYS/QHP OP CAR RHAB W/ECG: CPT | Performed by: REHABILITATION PRACTITIONER

## 2022-07-06 ENCOUNTER — MYC MEDICAL ADVICE (OUTPATIENT)
Dept: CARDIOLOGY | Facility: CLINIC | Age: 67
End: 2022-07-06

## 2022-07-06 NOTE — TELEPHONE ENCOUNTER
Forms have been completed, faxed to [employer], and copy mailed to patient. Patient notified the forms have been complete via path intelligence message.

## 2022-07-08 ENCOUNTER — PATIENT OUTREACH (OUTPATIENT)
Dept: CARE COORDINATION | Facility: CLINIC | Age: 67
End: 2022-07-08

## 2022-07-08 NOTE — PROGRESS NOTES
Clinic Care Coordination Contact  [facility]/Voicemail       Clinical Data: Care Coordinator Outreach  Outreach attempted x 1.  Left message on patient's voicemail with call back information and requested return call.  Plan: Care Coordinator will try to reach patient again in 3-5 business days.    Adia Corley RN, Casual Care Coordinator  [facility] Care Coordination  [phone]

## 2022-07-10 DIAGNOSIS — N18.2 TYPE 2 DIABETES MELLITUS WITH STAGE 2 CHRONIC KIDNEY DISEASE, WITHOUT LONG-TERM CURRENT USE OF INSULIN (H): Primary | ICD-10-CM

## 2022-07-10 DIAGNOSIS — E11.22 TYPE 2 DIABETES MELLITUS WITH STAGE 2 CHRONIC KIDNEY DISEASE, WITHOUT LONG-TERM CURRENT USE OF INSULIN (H): Primary | ICD-10-CM

## 2022-07-12 RX ORDER — BLOOD SUGAR DIAGNOSTIC
STRIP MISCELLANEOUS
Qty: 100 STRIP | Refills: 1 | Status: SHIPPED | OUTPATIENT
Start: 2022-07-12

## 2022-07-14 ENCOUNTER — PATIENT OUTREACH (OUTPATIENT)
Dept: NURSING | Facility: CLINIC | Age: 67
End: 2022-07-14

## 2022-07-14 DIAGNOSIS — N18.2 TYPE 2 DIABETES MELLITUS WITH STAGE 2 CHRONIC KIDNEY DISEASE, WITHOUT LONG-TERM CURRENT USE OF INSULIN (H): ICD-10-CM

## 2022-07-14 DIAGNOSIS — E11.22 TYPE 2 DIABETES MELLITUS WITH STAGE 2 CHRONIC KIDNEY DISEASE, WITHOUT LONG-TERM CURRENT USE OF INSULIN (H): ICD-10-CM

## 2022-07-14 ASSESSMENT — ACTIVITIES OF DAILY LIVING (ADL): DEPENDENT_IADLS:: INDEPENDENT

## 2022-07-14 NOTE — LETTER
[facility] COORDIN[facility]  [address] [place] [address]    July 14, 2022    Joel Pike  [id number]  [facility] [address]    Dear Joel,  Your Care Team congratulates you on your journey to maintain wellness. This document will help guide you on your journey to maintain a healthy lifestyle.  You can use this to help you overcome any barriers you may encounter.  If you should have any questions or concerns, you can contact the members of your Care Team or contact your Primary Care Clinic for assistance.     Health Maintenance  Health Maintenance Reviewed: Not assessed    My Access Plan  Medical Emergency 911   Primary Clinic Line [facility] - [phone]   24 Hour Appointment Line [phone] or  [phone] ([phone]) (toll-free)   24 Hour Nurse Line [phone] (toll-free)   Preferred Urgent Care     Preferred Hospital [facility]  [phone]   Preferred Pharmacy [facility] [address]      Behavioral Health Crisis Line The National Suicide Prevention Lifeline at 1-326.741.9253 or 911     My Care Team Members  Patient Care Team       Relationship Specialty Notifications Start End    Nishi Lin MD PCP - General Family Medicine  11/2/21     Phone: [phone] Fax: [phone]         [address] [place] [address]    Nishi Lin MD Assigned PCP   1/17/21     Phone: [phone] Fax: [phone]         [address] [place] [address]    Alexei Ott MD Assigned Surgical Provider   4/11/21     Phone: [phone] Fax: [phone] [address] [address]    Isaias Solo MD Assigned Cancer Care Provider   9/5/21     Phone: [phone] Fax: [phone] [address] [facility] [address]    Kamala Murphy MD MD Cardiovascular Disease  4/8/22     Phone: [phone] Pager: [phone] Fax: [phone]        [facility] [address] [place] [address]    Magaly Romo, APRN CNP Assigned Heart and Vascular Provider   5/28/22     Phone: [phone] Fax: [phone]         [address] [place] [address]    Alden Montanez, APRN CNP Assigned Musculoskeletal Provider   6/18/22     Phone: [phone] Fax: [phone]         [address] [place] [address]               Goals       COMPLETED: I will reduce risk of complications related to my heart surgery      Goal Statement: I will reduce risk of complications related to my heart  surgery  Date Goal set: 4/15/2022  Barriers: Diabetes and other complex medical diagnoses  Strengths: motivated and engaged, supportive wife  Date to Achieve By: 8/1/2022  Patient expressed understanding of goal: yes  Action steps to achieve this goal:  1. Take my medications as prescribed  2. Eat a low cholesterol, low sodium diet, diabetic diet  3. Cardiac Rehab as recommended  4  Attend recommended follow up appts  5. Monitor for sob, chest pain, nausea, fever, or any signs of bleeding.  5. I will use my nitroglycerin if I have any chest pain and call 911 if taken x three.                   Advance Care Plans/Directives Type:      We notice that you do not have an Advance Directive on file. Upon completion of your Health Care Directive, please bring a copy with you to your next office visit.    It has been your Clinic Care Team's pleasure to work with you on your goals.    Regards,  [facility]   Ana Romero RN, Care Coordinator   [facility]'s   E-mail [email or web address]   [phone]    Please call your new nurse care coordinator in the future if needed   Call at [phone]

## 2022-07-14 NOTE — PROGRESS NOTES
Clinic Care Coordination Contact    Follow Up Progress Note    5/4/2022 ED visit Paroxysmal atrial fibrillation    Hospital admission 4/8-4/13/2022  On 4/8/22, Joel Pike underwent successful Coronary artery bypass grafting x 4.      Assessment:   Patient finished Cardiac Rehabilitation and continues home PT exercises to increase his strength  Patient is going back to work at the end of this month     Care Gaps:    Health Maintenance Due   Topic Date Due     COVID-19 Vaccine (4 - Booster for Moderna series) 02/28/2022       Patient will discuss at next PCP visit     Goals addressed this encounter:    Goals Addressed                 This Visit's Progress      COMPLETED: I will reduce risk of complications related to my heart surgery   100%     Goal Statement: I will reduce risk of complications related to my heart  surgery  Date Goal set: 4/15/2022  Barriers: Diabetes and other complex medical diagnoses  Strengths: motivated and engaged, supportive wife  Date to Achieve By: 8/1/2022  Patient expressed understanding of goal: yes  Action steps to achieve this goal:  1. Take my medications as prescribed  2. Eat a low cholesterol, low sodium diet, diabetic diet  3. Cardiac Rehab as recommended  4  Attend recommended follow up appts  5. Monitor for sob, chest pain, nausea, fever, or any signs of bleeding.  5. I will use my nitroglycerin if I have any chest pain and call 911 if taken x three.                  Intervention/Education provided during outreach: Kamilla Hill RN CC is available in the future for any needs             Plan:   No unmet needs, no further care coordination needed at this time   Graduation letter sent via My Chart     [facility]   Ana Romero RN, Care Coordinator   [facility]'s   E-mail [email or web address]   [phone]

## 2022-07-14 NOTE — TELEPHONE ENCOUNTER
Pending Prescriptions:                       Disp   Refills    blood glucose (NO BRAND SPECIFIED) lancets*100 ea*3        Sig: Use to test blood sugar one time daily or as           directed.      Routing refill request to provider for review/approval because:  Drug not on the G refill protocol     Requested Prescriptions   Pending Prescriptions Disp Refills    blood glucose (NO BRAND SPECIFIED) lancets standard 100 each 3     Sig: Use to test blood sugar one time daily or as directed.        There is no refill protocol information for this order           Ever Coronado RN

## 2022-07-18 RX ORDER — LANCETS
EACH MISCELLANEOUS
Qty: 100 EACH | Refills: 0 | OUTPATIENT
Start: 2022-07-18

## 2022-07-19 ENCOUNTER — TELEPHONE (OUTPATIENT)
Dept: ONCOLOGY | Facility: CLINIC | Age: 67
End: 2022-07-19

## 2022-07-19 DIAGNOSIS — E03.4 HYPOTHYROIDISM DUE TO ACQUIRED ATROPHY OF THYROID: ICD-10-CM

## 2022-07-19 DIAGNOSIS — E03.9 HYPOTHYROIDISM, UNSPECIFIED TYPE: Primary | ICD-10-CM

## 2022-07-19 DIAGNOSIS — C61 PROSTATE CANCER (H): Primary | ICD-10-CM

## 2022-07-19 NOTE — TELEPHONE ENCOUNTER
Oral Chemotherapy Monitoring Program    Subjective/Objective:  Joel Pike is a 67 year old male contacted by phone for a follow-up visit for oral chemotherapy. Joel confirmed that he is taking Erleada 240 mg (4 tabs) daily with no missed doses. Joel denies starting any new medications since we spoke with him last. He has been doing well overall. Has occasional constipation in which he uses OTC Dulcolax when needed. He continues to have hot flashes for which he states are not new.     Joel is due to get monthly labs drawn; he would like sheduling to reach out to him to set these up. Will send an IB to scheduling now.     ORAL CHEMOTHERAPY 1/21/2022 1/24/2022 2/19/2022 5/9/2022 6/1/2022 6/3/2022 7/19/2022   Assessment Type Other Other Chart Review Other;Monthly Follow up Lab Monitoring Lab Monitoring;Monthly Follow up Monthly Follow up   Diagnosis Code Prostate Cancer Prostate Cancer Prostate Cancer Prostate Cancer Prostate Cancer Prostate Cancer Prostate Cancer   Providers Dr Edil Solo   [facility]   Drug Name Erleada (apalutamide) Erleada (apalutamide) Erleada (apalutamide) Erleada (apalutamide) Erleada (apalutamide) Erleada (apalutamide) Erleada (apalutamide)   Dose 240 mg 240 mg 240 mg 240 mg - 240 mg 240 mg   Current Schedule Daily Daily Daily Daily - Daily -   Cycle Details Continuous Continuous Continuous Continuous - Continuous Continuous   Start Date of Last Cycle - - - 5/5/2022 - - -   Planned next cycle start date - - - - - - -   Doses missed in last 2 weeks 0 - - 0 - 1 0   Adherence Assessment Adherent - - Adherent - Adherent Adherent   Adverse Effects - - - Edema - No AE identified during assessment -   Any new drug interactions? - - - Yes - No -   Pharmacist Intervention? - - - No - - -   Is the dose as ordered appropriate for the patient? - - - Yes - - -   Is the patient currently in pain?  "- - - - - - -   Has the patient been assessed within the past 6 months for depression? - - - - - - -   Has the patient missed any days of school, work, or other routine activity? - - - - - - -   Since the last time we talked, have you been hospitalized or used the emergency room? - - - - - No -       Last PHQ-2 Score on record:   PHQ-2 ( 1999 [employer]) 3/12/2022 2/26/2021   Q1: Little interest or pleasure in doing things 1 1   Q2: Feeling down, depressed or hopeless 1 1   PHQ-2 Score 2 2   PHQ-2 Total Score (12-17 Years)- Positive if 3 or more points; Administer PHQ-A if positive - 2   Q1: Little interest or pleasure in doing things Several days Several days   Q2: Feeling down, depressed or hopeless Several days Several days   PHQ-2 Score 2 2       Vitals:  BP:   BP Readings from Last 1 Encounters:   06/24/22 92/61     Wt Readings from Last 1 Encounters:   06/24/22 99.2 kg (218 lb 12.8 oz)     Estimated body surface area is 2.21 meters squared as calculated from the following:    Height as of 6/24/22: 1.778 m (5' 10\").    Weight as of 6/24/22: 99.2 kg (218 lb 12.8 oz).    Labs:  No results found for NA within last 30 days.     No results found for K within last 30 days.     No results found for CA within last 30 days.     No results found for Mag within last 30 days.     No results found for Phos within last 30 days.     No results found for ALBUMIN within last 30 days.     No results found for BUN within last 30 days.     No results found for CR within last 30 days.     No results found for AST within last 30 days.     No results found for ALT within last 30 days.     No results found for BILITOTAL within last 30 days.     No results found for WBC within last 30 days.     No results found for HGB within last 30 days.     No results found for PLT within last 30 days.     No results found for ANC within last 30 days.     No results found for ANC within last 30 days.          Assessment/Plan:  Joel is tolerating " therapy well with slight constipation in which he uses OTC dulcolax; should continue as planned.     Follow-Up:  1-2 days to check for labs.    Fawn Pedro  Pharmacy Intern  [facility]  [phone]

## 2022-07-20 NOTE — TELEPHONE ENCOUNTER
Pending Prescriptions:                       Disp   Refills    levothyroxine (SYNTHROID/LEVOTHROID) 125 M*90 tab*0        Sig: TAKE ONE TABLET BY MOUTH ONCE DAILY    Routing refill request to provider for review/approval because:  Labs out of range:  TSH    Lay Santamaria RN

## 2022-07-21 ENCOUNTER — MYC MEDICAL ADVICE (OUTPATIENT)
Dept: FAMILY MEDICINE | Facility: CLINIC | Age: 67
End: 2022-07-21

## 2022-07-21 RX ORDER — LEVOTHYROXINE SODIUM 125 UG/1
TABLET ORAL
Qty: 90 TABLET | Refills: 0 | Status: SHIPPED | OUTPATIENT
Start: 2022-07-21 | End: 2022-10-25

## 2022-07-21 NOTE — TELEPHONE ENCOUNTER
Patient informed via mychart to schedule, 7/26 reminder if not read to send letter.   Temitope Fritz MA

## 2022-07-21 NOTE — TELEPHONE ENCOUNTER
Please advise him that he needs a thyroid blood test again within 3 months before any more refills.  Please help him set up a lab appointment.  The order has been placed.

## 2022-07-26 ENCOUNTER — DOCUMENTATION ONLY (OUTPATIENT)
Dept: ONCOLOGY | Facility: CLINIC | Age: 67
End: 2022-07-26

## 2022-07-26 ENCOUNTER — LAB (OUTPATIENT)
Dept: LAB | Facility: CLINIC | Age: 67
End: 2022-07-26
Payer: MEDICARE

## 2022-07-26 DIAGNOSIS — E03.4 HYPOTHYROIDISM DUE TO ACQUIRED ATROPHY OF THYROID: ICD-10-CM

## 2022-07-26 DIAGNOSIS — C61 PROSTATE CANCER (H): ICD-10-CM

## 2022-07-26 LAB
ALBUMIN SERPL-MCNC: 3.7 G/DL (ref 3.4–5)
ALP SERPL-CCNC: 70 U/L (ref 40–150)
ALT SERPL W P-5'-P-CCNC: 23 U/L (ref 0–70)
ANION GAP SERPL CALCULATED.3IONS-SCNC: 4 MMOL/L (ref 3–14)
AST SERPL W P-5'-P-CCNC: 14 U/L (ref 0–45)
BASOPHILS # BLD AUTO: 0.1 10E3/UL (ref 0–0.2)
BASOPHILS NFR BLD AUTO: 1 %
BILIRUB SERPL-MCNC: 0.4 MG/DL (ref 0.2–1.3)
BUN SERPL-MCNC: 20 MG/DL (ref 7–30)
CALCIUM SERPL-MCNC: 9.3 MG/DL (ref 8.5–10.1)
CHLORIDE BLD-SCNC: 105 MMOL/L (ref 94–109)
CO2 SERPL-SCNC: 27 MMOL/L (ref 20–32)
CREAT SERPL-MCNC: 1.12 MG/DL (ref 0.66–1.25)
EOSINOPHIL # BLD AUTO: 0.3 10E3/UL (ref 0–0.7)
EOSINOPHIL NFR BLD AUTO: 5 %
ERYTHROCYTE [DISTWIDTH] IN BLOOD BY AUTOMATED COUNT: 13 % (ref 10–15)
GFR SERPL CREATININE-BSD FRML MDRD: 72 ML/MIN/1.73M2
GLUCOSE BLD-MCNC: 142 MG/DL (ref 70–99)
HCT VFR BLD AUTO: 35.9 % (ref 40–53)
HGB BLD-MCNC: 12.4 G/DL (ref 13.3–17.7)
IMM GRANULOCYTES # BLD: 0 10E3/UL
IMM GRANULOCYTES NFR BLD: 1 %
LYMPHOCYTES # BLD AUTO: 1 10E3/UL (ref 0.8–5.3)
LYMPHOCYTES NFR BLD AUTO: 17 %
MCH RBC QN AUTO: 31.2 PG (ref 26.5–33)
MCHC RBC AUTO-ENTMCNC: 34.5 G/DL (ref 31.5–36.5)
MCV RBC AUTO: 90 FL (ref 78–100)
MONOCYTES # BLD AUTO: 0.5 10E3/UL (ref 0–1.3)
MONOCYTES NFR BLD AUTO: 9 %
NEUTROPHILS # BLD AUTO: 4 10E3/UL (ref 1.6–8.3)
NEUTROPHILS NFR BLD AUTO: 67 %
NRBC # BLD AUTO: 0 10E3/UL
NRBC BLD AUTO-RTO: 0 /100
PLATELET # BLD AUTO: 195 10E3/UL (ref 150–450)
POTASSIUM BLD-SCNC: 4.4 MMOL/L (ref 3.4–5.3)
PROT SERPL-MCNC: 6.9 G/DL (ref 6.8–8.8)
PSA SERPL-MCNC: <0.01 UG/L (ref 0–4)
RBC # BLD AUTO: 3.97 10E6/UL (ref 4.4–5.9)
SODIUM SERPL-SCNC: 136 MMOL/L (ref 133–144)
T4 FREE SERPL-MCNC: 0.9 NG/DL (ref 0.76–1.46)
TSH SERPL DL<=0.005 MIU/L-ACNC: 4.61 MU/L (ref 0.4–4)
WBC # BLD AUTO: 5.9 10E3/UL (ref 4–11)

## 2022-07-26 PROCEDURE — 84439 ASSAY OF FREE THYROXINE: CPT

## 2022-07-26 PROCEDURE — 80053 COMPREHEN METABOLIC PANEL: CPT

## 2022-07-26 PROCEDURE — 84403 ASSAY OF TOTAL TESTOSTERONE: CPT

## 2022-07-26 PROCEDURE — 85025 COMPLETE CBC W/AUTO DIFF WBC: CPT

## 2022-07-26 PROCEDURE — 84443 ASSAY THYROID STIM HORMONE: CPT

## 2022-07-26 PROCEDURE — 84153 ASSAY OF PSA TOTAL: CPT

## 2022-07-26 PROCEDURE — 36415 COLL VENOUS BLD VENIPUNCTURE: CPT

## 2022-07-26 NOTE — PROGRESS NOTES
Oral Chemotherapy Monitoring Program  Lab Follow Up    Reviewed lab results from 7/26.    ORAL CHEMOTHERAPY 1/24/2022 2/19/2022 5/9/2022 6/1/2022 6/3/2022 7/19/2022 7/26/2022   Assessment Type Other Chart Review Other;Monthly Follow up Lab Monitoring Lab Monitoring;Monthly Follow up Monthly Follow up Lab Monitoring   Diagnosis Code Prostate Cancer Prostate Cancer Prostate Cancer Prostate Cancer Prostate Cancer Prostate Cancer Prostate Cancer   Providers Dr Edil Solo   [facility]   Drug Name Erleada (apalutamide) Erleada (apalutamide) Erleada (apalutamide) Erleada (apalutamide) Erleada (apalutamide) Erleada (apalutamide) Erleada (apalutamide)   Dose 240 mg 240 mg 240 mg - 240 mg 240 mg 240 mg   Current Schedule Daily Daily Daily - Daily - -   Cycle Details Continuous Continuous Continuous - Continuous Continuous Continuous   Start Date of Last Cycle - - 5/5/2022 - - - -   Planned next cycle start date - - - - - - -   Doses missed in last 2 weeks - - 0 - 1 0 -   Adherence Assessment - - Adherent - Adherent Adherent -   Adverse Effects - - Edema - No AE identified during assessment - -   Any new drug interactions? - - Yes - No - -   Pharmacist Intervention? - - No - - - -   Is the dose as ordered appropriate for the patient? - - Yes - - - -   Is the patient currently in pain? - - - - - - -   Has the patient been assessed within the past 6 months for depression? - - - - - - -   Has the patient missed any days of school, work, or other routine activity? - - - - - - -   Since the last time we talked, have you been hospitalized or used the emergency room? - - - - No - -       Labs:  _  Result Component Current Result Ref Range   Sodium 136 (7/26/2022) 133 - 144 mmol/L     _  Result Component Current Result Ref Range   Potassium 4.4 (7/26/2022) 3.4 - 5.3 mmol/L     _  Result Component Current Result Ref Range    Calcium 9.3 (7/26/2022) 8.5 - 10.1 mg/dL     No results found for Mag within last 30 days.     No results found for Phos within last 30 days.     _  Result Component Current Result Ref Range   Albumin 3.7 (7/26/2022) 3.4 - 5.0 g/dL     _  Result Component Current Result Ref Range   Urea Nitrogen 20 (7/26/2022) 7 - 30 mg/dL     _  Result Component Current Result Ref Range   Creatinine 1.12 (7/26/2022) 0.66 - 1.25 mg/dL     _  Result Component Current Result Ref Range   AST 14 (7/26/2022) 0 - 45 U/L     _  Result Component Current Result Ref Range   ALT 23 (7/26/2022) 0 - 70 U/L     _  Result Component Current Result Ref Range   Bilirubin Total 0.4 (7/26/2022) 0.2 - 1.3 mg/dL     _  Result Component Current Result Ref Range   WBC Count 5.9 (7/26/2022) 4.0 - 11.0 10e3/uL     _  Result Component Current Result Ref Range   Hemoglobin 12.4 (L) (7/26/2022) 13.3 - 17.7 g/dL     _  Result Component Current Result Ref Range   Platelet Count 195 (7/26/2022) 150 - 450 10e3/uL     No results found for ANC within last 30 days.       Assessment & Plan:  Results show no concerning abnormalities.  Plan to continue Erleada as prescribed. [email or web address] message sent to patient regarding results and plan.       Follow-Up:  8/15 Labs + Dr. Edil Meadows, PharmD, BCACP  Oral Chemotherapy Monitoring Program  [facility]  [phone]  July 26, 2022

## 2022-07-28 LAB — TESTOST SERPL-MCNC: 6 NG/DL (ref 240–950)

## 2022-08-08 NOTE — TELEPHONE ENCOUNTER
[facility] sent forms back for Dr. Murphy. Tried reaching [facility] via phone but was on hold for a long time. Wanting to let [facility] know Dr. Murphy is not back here in office until 8/17/22 and questioning what more information they need. Did send a fax back to Sam Mccormick at [facility] to either send fax back or call with more specific instructions son what else they need.

## 2022-08-09 NOTE — TELEPHONE ENCOUNTER
Was able to update [employer] forms with the help of some information from the patient. Refaxed updated forms to [employer].

## 2022-08-15 ENCOUNTER — ONCOLOGY VISIT (OUTPATIENT)
Dept: ONCOLOGY | Facility: CLINIC | Age: 67
End: 2022-08-15
Attending: INTERNAL MEDICINE
Payer: MEDICARE

## 2022-08-15 ENCOUNTER — APPOINTMENT (OUTPATIENT)
Dept: LAB | Facility: CLINIC | Age: 67
End: 2022-08-15
Attending: INTERNAL MEDICINE
Payer: MEDICARE

## 2022-08-15 VITALS
SYSTOLIC BLOOD PRESSURE: 135 MMHG | TEMPERATURE: 98.2 F | DIASTOLIC BLOOD PRESSURE: 68 MMHG | WEIGHT: 224 LBS | BODY MASS INDEX: 32.14 KG/M2 | OXYGEN SATURATION: 99 % | RESPIRATION RATE: 16 BRPM | HEART RATE: 64 BPM

## 2022-08-15 DIAGNOSIS — C61 PROSTATE CANCER (H): ICD-10-CM

## 2022-08-15 LAB
ALBUMIN SERPL-MCNC: 4 G/DL (ref 3.4–5)
ALP SERPL-CCNC: 73 U/L (ref 40–150)
ALT SERPL W P-5'-P-CCNC: 29 U/L (ref 0–70)
ANION GAP SERPL CALCULATED.3IONS-SCNC: 7 MMOL/L (ref 3–14)
AST SERPL W P-5'-P-CCNC: 19 U/L (ref 0–45)
BASOPHILS # BLD AUTO: 0.1 10E3/UL (ref 0–0.2)
BASOPHILS NFR BLD AUTO: 1 %
BILIRUB SERPL-MCNC: 0.4 MG/DL (ref 0.2–1.3)
BUN SERPL-MCNC: 21 MG/DL (ref 7–30)
CALCIUM SERPL-MCNC: 9.5 MG/DL (ref 8.5–10.1)
CHLORIDE BLD-SCNC: 105 MMOL/L (ref 94–109)
CO2 SERPL-SCNC: 27 MMOL/L (ref 20–32)
CREAT SERPL-MCNC: 1.03 MG/DL (ref 0.66–1.25)
EOSINOPHIL # BLD AUTO: 0.3 10E3/UL (ref 0–0.7)
EOSINOPHIL NFR BLD AUTO: 4 %
ERYTHROCYTE [DISTWIDTH] IN BLOOD BY AUTOMATED COUNT: 13 % (ref 10–15)
GFR SERPL CREATININE-BSD FRML MDRD: 80 ML/MIN/1.73M2
GLUCOSE BLD-MCNC: 140 MG/DL (ref 70–99)
HCT VFR BLD AUTO: 34.9 % (ref 40–53)
HGB BLD-MCNC: 11.9 G/DL (ref 13.3–17.7)
IMM GRANULOCYTES # BLD: 0.1 10E3/UL
IMM GRANULOCYTES NFR BLD: 1 %
LYMPHOCYTES # BLD AUTO: 0.9 10E3/UL (ref 0.8–5.3)
LYMPHOCYTES NFR BLD AUTO: 14 %
MCH RBC QN AUTO: 30.5 PG (ref 26.5–33)
MCHC RBC AUTO-ENTMCNC: 34.1 G/DL (ref 31.5–36.5)
MCV RBC AUTO: 90 FL (ref 78–100)
MONOCYTES # BLD AUTO: 0.6 10E3/UL (ref 0–1.3)
MONOCYTES NFR BLD AUTO: 9 %
NEUTROPHILS # BLD AUTO: 4.5 10E3/UL (ref 1.6–8.3)
NEUTROPHILS NFR BLD AUTO: 71 %
NRBC # BLD AUTO: 0 10E3/UL
NRBC BLD AUTO-RTO: 0 /100
PLATELET # BLD AUTO: 232 10E3/UL (ref 150–450)
POTASSIUM BLD-SCNC: 4.4 MMOL/L (ref 3.4–5.3)
PROT SERPL-MCNC: 7.1 G/DL (ref 6.8–8.8)
PSA SERPL-MCNC: <0.01 UG/L (ref 0–4)
RBC # BLD AUTO: 3.9 10E6/UL (ref 4.4–5.9)
SODIUM SERPL-SCNC: 139 MMOL/L (ref 133–144)
WBC # BLD AUTO: 6.4 10E3/UL (ref 4–11)

## 2022-08-15 PROCEDURE — 36415 COLL VENOUS BLD VENIPUNCTURE: CPT | Performed by: INTERNAL MEDICINE

## 2022-08-15 PROCEDURE — 84153 ASSAY OF PSA TOTAL: CPT | Performed by: INTERNAL MEDICINE

## 2022-08-15 PROCEDURE — 82040 ASSAY OF SERUM ALBUMIN: CPT | Performed by: INTERNAL MEDICINE

## 2022-08-15 PROCEDURE — 80053 COMPREHEN METABOLIC PANEL: CPT | Performed by: INTERNAL MEDICINE

## 2022-08-15 PROCEDURE — G0463 HOSPITAL OUTPT CLINIC VISIT: HCPCS

## 2022-08-15 PROCEDURE — 85004 AUTOMATED DIFF WBC COUNT: CPT | Performed by: INTERNAL MEDICINE

## 2022-08-15 PROCEDURE — 99214 OFFICE O/P EST MOD 30 MIN: CPT | Performed by: INTERNAL MEDICINE

## 2022-08-15 ASSESSMENT — PAIN SCALES - GENERAL: PAINLEVEL: NO PAIN (0)

## 2022-08-15 NOTE — NURSING NOTE
Chief Complaint   Patient presents with     Blood Draw     Vitals taken, venipuncture, labs drawn, checked into next appt     /68 (BP Location: Left arm, Patient Position: Sitting, Cuff Size: Adult Regular)   Pulse 64   Temp 98.2  F (36.8  C) (Oral)   Resp 16   Wt 101.6 kg (224 lb)   SpO2 99%   BMI 32.14 kg/m    Richard Benavidez RN on 8/15/2022 at 9:17 AM

## 2022-08-15 NOTE — LETTER
8/15/2022         RE: Joel Pike  [address]  [facility] [address]        Dear Colleague,    Thank you for referring your patient, Joel Pike, to the [facility]. Please see a copy of my visit note below.        [facility] Medical Oncology Followup Note       Date of visit: August 15, 2022    CC:   metastatic prostate cancer     HPI:  Nervous today about diagnosis.  Wondering where his prostate cancer really is and what the treatment will be.  Energy has been low lately since starting ADT in early August.  Having hot flashes/sweats.  Breathing is OK, no chest pain.  Appetite is good.  Continuing to work in [email or web address]  Here today with his wife and son.      ONCOLOGY HISTORY:   -Elevated PSA noted 2/26/21 at 12.70. Previously normal in 2017.     -4/7/21-Initial urology visit.   -7/2/21-prostate biopsy 4+3, 9/12 biopsies positive.  Ductal component noted.     -7/28/21-MR prostate-PIRADS 5 lesion, R and L sided lesions with likely    neurovascular bundle invasion. No  seminal vesicle involvement. No clear LAD,  but some indeterminate pelvic nodes.  No suspicious bone lesions.     -7/28/21-NM bone scan suspicious lesion of R sacral ala S3 level.     -8/10/21-CT A/P with multiple enlarged periaortic/iliac LN   -8/11/21-First eligard dose 45mg (Q6M dosing). Due next 2/2022.   -8/30/21-Initial medical oncology visit with Dr. Solo.  Unclear if bony lesion is metastasis based on current imaging.  Will start enzalutamide and continue    Eligard (next due 2/2022).     8/30/21 PSA =30.40 Pre Rx  9/27/21 PSA =8.99 (T=6); HgbA1C = 6.1  10/5/21 PSA =3.22  11/26/21 PSA =0.15  7/26/22 PSA <0.01    Doing well in general. Fatigue, bloating.        PMH:  HTN, HLD, bipolar disorder, DM2 on metformin     PSH:  Bilateral TKA, bilateral cataract extraction, bilateral carpal tunnel release     FAMILY HX:  No reported family history of prostate cancer     SOCIAL:  Retired,  works at [employer] in [employer] section now  Never smoker.   No EtOH  No recreational drugs.   No herbs/supplements other than on medication list.      MEDS:  Current Outpatient Medications   Medication Instructions     albuterol (PROAIR HFA) 108 (90 BASE) MCG/ACT Inhaler 1-2 puffs every 2 -4 hrs as needed     ALLEGRA-D ALLERGY & CONGESTION 180-240 MG 24 hr tablet TAKE ONE TABLET BY MOUTH EVERY DAY     blood glucose (HUGO CONTOUR) test strip Use to test blood sugars 1 time daily or as directed.     blood glucose (NO BRAND SPECIFIED) lancets standard Use to test blood sugar one time daily or as directed.     blood glucose calibration (HUGO CONTOUR) NORMAL solution Use to calibrate blood glucose monitor as directed.     buPROPion (WELLBUTRIN XL) 300 MG 24 hr tablet TAKE ONE TABLET BY MOUTH EVERY MORNING     Coenzyme Q-10 capsule 1 capsule, DAILY     esomeprazole (NEXIUM) 40 MG DR capsule TAKE ONE CAPSULE BY MOUTH EVERY MORNING BEFORE BREAKFAST , 30-60 MINUTES BEFORE EATING     ezetimibe (ZETIA) 10 MG tablet TAKE ONE TABLET BY MOUTH ONCE DAILY     fish oil-omega-3 fatty acids 1000 MG capsule Take  by mouth. Take daily       levothyroxine (SYNTHROID/LEVOTHROID) 112 mcg, Oral, DAILY     losartan (COZAAR) 75 mg, Oral, DAILY     Magnesium 500 MG CAPS One twice a day     metFORMIN (GLUCOPHAGE) 500 MG tablet TAKE ONE TABLET BY MOUTH TWICE A DAY WITH MEALS     Misc Natural Products (OSTEO BI-FLEX ADV JOINT SHIELD) TABS Take  by mouth.     multivitamin (OCUVITE) TABS tablet 1 tablet, Oral, DAILY     STATIN NOT PRESCRIBED (INTENTIONAL) Please choose reason not prescribed, below     Vitamin D3 (CHOLECALCIFEROL) 5,000 Units, Oral     ROS-Remainder of 14 point ROS reviewed and negative except as in HPI.    PHYSICAL EXAM:  Exam:  Video visit   Appears in no distress  Cognitively appropriate     LABS AND IMAGES:    Prostate Biopsy 7/2/21  FINAL DIAGNOSIS:   A: Prostate, left, biopsy   - Adenocarcinoma, [place]'s grade 4/3 with ductal  component involving 4 of    6 needle core biopsies and 25% of   submitted tissue     B: Prostate, right, biopsy   - Adenocarcinoma, [place]'s grade 4/3 with ductal component involving 5 of    6 needle core biopsies and 25% of   submitted tissue     MR prostate 7/28/21  FINDINGS:  Size: 31 grams  Hemorrhage: Absent  Peripheral zone: Heterogeneous on T2-weighted images. Suspicious  lesions as detailed below.  Transition zone: Enlarged with BPH changes. No suspicious lesions  identified.     Lesion(s) in rank order of severity (highest score- to lowest score,  then by size)      Lesion 1:  Location: Right mid gland peripheral zone at the 7-9 o'clock position  relative to the urethra. Series 5 image 50.  Size: 20 x 10 mm  T2 description: Homogeneously hypointense  T2 numerical assessment: 5  DWI description: Marked restriction diffusion  DWI numerical assessment: 5  DCE assessment: Negative    Prostate margin: Capsular abutment>15 mm with enlarged neurovascular  bundle suggesting tumor involvement  Lesion overall PI-RADS category: 5     Lesion 2:  Location: Left apex peripheral zone at the 4 o'clock position relative  to the urethra. Series 5 image 63.  Size: 16 x 11 mm  T2 description: Homogeneously hypointense  T2 numerical assessment: 5  DWI description: Marked diffusion restriction  DWI numerical assessment: 5  DCE assessment: Positive    Prostate margin: Capsular abutment 6-15 mm with capsular irregularity  and focal bulging   Lesion overall PI-RADS category: 5     Neurovascular bundles: Both neurovascular bundles are likely involved  by malignancy.  A right prostatic vein is markedly enlarged compared  to the left, similar to 8/1/2014 CT.  Seminal vesicles: No seminal vesicle involvement by malignancy.  Lymph nodes: No clear adenopathy. Indeterminate nodes as follows: Left  pelvic 8 x 5 mm lymph node on series 5 image 27.  Bones: No suspicious lesions  Other pelvic organs: Colonic diverticulosis.                                                                          IMPRESSION:  1. Based on the most suspicious abnormality, this exam is  characterized as PIRADS 5 - Clinically significant cancer is highly  likely to be present.  The most suspicious abnormalities are located  at the right mid peripheral zone and left apical peripheral zone and  there is high suspicion of extraprostatic extension.  2. No suspicious adenopathy or evidence of pelvic metastases.    NM bone scan 7/28/21  FINDINGS:   Small area of mild uptake in the right sacrum inferiorly visible on  the posterior view. SPECT-CT demonstrates that this focal uptake  corresponds to a sclerotic lesion on CT. No other suspicious areas of  focal uptake.     Postsurgical changes of bilateral knee replacements.     Physiologic uptake by the kidneys and urinary bladder.                                                                      IMPRESSION: Single focal uptake by the right sacral ala (S3 level) and  corresponding sclerotic focus. This is highly suspicious for  metastatic focus.    CT A/P 8/10/21  FINDINGS:   LOWER CHEST: Normal.     HEPATOBILIARY: Diffuse hepatic steatosis. No significant mass. No bile  duct dilatation. No calcified gallstones.     PANCREAS: Normal.     SPLEEN: Normal.     ADRENAL GLANDS: Normal.     KIDNEYS/BLADDER: There are multiple bilateral nonobstructing  intrarenal calculi measuring up to 4 mm in largest size. Small  subcentimeter benign cortical cysts. Unremarkable bladder. No evidence  of hydronephrosis.     BOWEL: No evidence of bowel obstruction or inflammatory changes. Mild  sigmoid colon diverticulosis. No evidence of diverticulitis. Normal  appendix.     PELVIC ORGANS: Normal sized prostate gland.     ADDITIONAL FINDINGS: Multiple enlarged retroperitoneal periaortic and  bilateral iliac chain lymph nodes are highly suspicious for  metastasis. The largest left retroperitoneal periaortic lymph node on  image 44 of series 2 measures 4.5  x 3.4 cm. 2.9 x 2.3 cm aortocaval  lymph node on image 45 of series 2. Distal left periaortic lymph node  on image 54 of series 2 measures 3.2 x 2.6 cm. Right common iliac  chain lymph node on image 65 of series 2 measures 2.3 x 2.2 cm. 1.6 cm  left common iliac chain node on image 59 of series 2.     MUSCULOSKELETAL: Small faint sites of increased bone sclerosis  involving the posterior medial right iliac on image 65 of series 2 and  image 69 of series 2 are indeterminate for possible metastasis.                                                                       11/26/21                                             IMPRESSION:  1.  Significantly decreased retroperitoneal lymphadenopathy since the  prior study dated 8/10/2021 indicates good response to treatment. The  remaining enlarged retroperitoneal lymph node appears to have a  necrotic center.  2.  Minimal pulmonary nodularity was likely present previously.  3.  No other evidence for lymphadenopathy or metastasis is identified.  Specifically no evidence for left sacral metastasis is seen.     Latest Reference Range & Units 08/15/22 09:13   Sodium 133 - 144 mmol/L 139   Potassium 3.4 - 5.3 mmol/L 4.4   Chloride 94 - 109 mmol/L 105   Carbon Dioxide 20 - 32 mmol/L 27   Urea Nitrogen 7 - 30 mg/dL 21   Creatinine 0.66 - 1.25 mg/dL 1.03   GFR Estimate >60 mL/min/1.73m2 80   Calcium 8.5 - 10.1 mg/dL 9.5   Anion Gap 3 - 14 mmol/L 7   Albumin 3.4 - 5.0 g/dL 4.0   Protein Total 6.8 - 8.8 g/dL 7.1   Alkaline Phosphatase 40 - 150 U/L 73   ALT 0 - 70 U/L 29   AST 0 - 45 U/L 19   Bilirubin Total 0.2 - 1.3 mg/dL 0.4   Glucose 70 - 99 mg/dL 140 (H)   WBC 4.0 - 11.0 10e3/uL 6.4   Hemoglobin 13.3 - 17.7 g/dL 11.9 (L)   Hematocrit 40.0 - 53.0 % 34.9 (L)   Platelet Count 150 - 450 10e3/uL 232   RBC Count 4.40 - 5.90 10e6/uL 3.90 (L)   MCV 78 - 100 fL 90   (H): Data is abnormally high  (L): Data is abnormally low        IMPRESSION AND PLAN:  Joel Pike is a 68 y/o M with PMH of  DM2 on metformin, depression/anxiety (pt reports as bipolar d/o), HTN, and HLD presenting for initial consultation for prostate cancer with possible bone metastasis.  We had a lengthy discussion at his initial and went over his pathology and staging scan results.  We discussed that it is not clear whether the lesion in wing of his ala is a true bony metastasis or whether it is another finding/artifact.  We discussed that this does not change the overall treatment of his disease with ADT, but may change whether he receives radiation to his bony lesion in the future or not.  The role of ADT and the addition of enzalutamide were discussed today.  Given his existing DM2, we will attempt to avoid additional prednisone use by choosing apalutamide.       --Continue Eligard  --Continue Apalutamide  --Consider RT to the primary for oligometastatic disease based on the STAMPEDE study results. Referral made to radiation oncology.   --RTC w Me in 6 months;          Again, thank you for allowing me to participate in the care of your patient.      Sincerely,    Isaias Solo MD

## 2022-08-15 NOTE — NURSING NOTE
"Oncology Rooming Note    August 15, 2022 9:31 AM   Joel Pike is a 67 year old male who presents for:    Chief Complaint   Patient presents with     Blood Draw     Vitals taken, venipuncture, labs drawn, checked into next appt     Oncology Clinic Visit     Rtn for prostate cancer     Initial Vitals: /68 (BP Location: Left arm, Patient Position: Sitting, Cuff Size: Adult Regular)   Pulse 64   Temp 98.2  F (36.8  C) (Oral)   Resp 16   Wt 101.6 kg (224 lb)   SpO2 99%   BMI 32.14 kg/m   Estimated body mass index is 32.14 kg/m  as calculated from the following:    Height as of 6/24/22: 1.778 m (5' 10\").    Weight as of this encounter: 101.6 kg (224 lb). Body surface area is 2.24 meters squared.  No Pain (0) Comment: Data Unavailable   No LMP for male patient.  Allergies reviewed: Yes  Medications reviewed: Yes    Medications: Medication refills not needed today.  Pharmacy name entered into [facility]:    [facility] [place], [place] - [address] [place] [address] DR  [facility] - [place], [place] - [address] [place] [address]    Clinical concerns: Pt would like to discuss retaining water and bloating. Also experiencing fatigue. MD was notified.      Sherin Taylor, EMT            "

## 2022-08-15 NOTE — PROGRESS NOTES
[facility] Medical Oncology Followup Note       Date of visit: August 15, 2022    CC:   metastatic prostate cancer     HPI:  Nervous today about diagnosis.  Wondering where his prostate cancer really is and what the treatment will be.  Energy has been low lately since starting ADT in early August.  Having hot flashes/sweats.  Breathing is OK, no chest pain.  Appetite is good.  Continuing to work in [email or web address].  Here today with his wife and son.      ONCOLOGY HISTORY:   -Elevated PSA noted 2/26/21 at 12.70. Previously normal in 2017.     -4/7/21-Initial urology visit.   -7/2/21-prostate biopsy 4+3, 9/12 biopsies positive.  Ductal component noted.     -7/28/21-MR prostate-PIRADS 5 lesion, R and L sided lesions with likely    neurovascular bundle invasion. No  seminal vesicle involvement. No clear LAD,  but some indeterminate pelvic nodes.  No suspicious bone lesions.     -7/28/21-NM bone scan suspicious lesion of R sacral ala S3 level.     -8/10/21-CT A/P with multiple enlarged periaortic/iliac LN   -8/11/21-First eligard dose 45mg (Q6M dosing). Due next 2/2022.   -8/30/21-Initial medical oncology visit with Dr. Solo.  Unclear if bony lesion is metastasis based on current imaging.  Will start enzalutamide and continue    Eligard (next due 2/2022).     8/30/21 PSA =30.40 Pre Rx  9/27/21 PSA =8.99 (T=6); HgbA1C = 6.1  10/5/21 PSA =3.22  11/26/21 PSA =0.15  7/26/22 PSA <0.01    Doing well in general. Fatigue, bloating.        PMH:  HTN, HLD, bipolar disorder, DM2 on metformin     PSH:  Bilateral TKA, bilateral cataract extraction, bilateral carpal tunnel release     FAMILY HX:  No reported family history of prostate cancer     SOCIAL:  Retired, works at [email or web address] in [employer] now  Never smoker.   No EtOH  No recreational drugs.   No herbs/supplements other than on medication list.      MEDS:  Current Outpatient Medications   Medication Instructions     albuterol (PROAIR HFA) 108 (90 BASE)  MCG/ACT Inhaler 1-2 puffs every 2 -4 hrs as needed     ALLEGRA-D ALLERGY & CONGESTION 180-240 MG 24 hr tablet TAKE ONE TABLET BY MOUTH EVERY DAY     blood glucose (HUGO CONTOUR) test strip Use to test blood sugars 1 time daily or as directed.     blood glucose (NO BRAND SPECIFIED) lancets standard Use to test blood sugar one time daily or as directed.     blood glucose calibration (HUGO CONTOUR) NORMAL solution Use to calibrate blood glucose monitor as directed.     buPROPion (WELLBUTRIN XL) 300 MG 24 hr tablet TAKE ONE TABLET BY MOUTH EVERY MORNING     Coenzyme Q-10 capsule 1 capsule, DAILY     esomeprazole (NEXIUM) 40 MG DR capsule TAKE ONE CAPSULE BY MOUTH EVERY MORNING BEFORE BREAKFAST , 30-60 MINUTES BEFORE EATING     ezetimibe (ZETIA) 10 MG tablet TAKE ONE TABLET BY MOUTH ONCE DAILY     fish oil-omega-3 fatty acids 1000 MG capsule Take  by mouth. Take daily       levothyroxine (SYNTHROID/LEVOTHROID) 112 mcg, Oral, DAILY     losartan (COZAAR) 75 mg, Oral, DAILY     Magnesium 500 MG CAPS One twice a day     metFORMIN (GLUCOPHAGE) 500 MG tablet TAKE ONE TABLET BY MOUTH TWICE A DAY WITH MEALS     Misc Natural Products (OSTEO BI-FLEX ADV JOINT SHIELD) TABS Take  by mouth.     multivitamin (OCUVITE) TABS tablet 1 tablet, Oral, DAILY     STATIN NOT PRESCRIBED (INTENTIONAL) Please choose reason not prescribed, below     Vitamin D3 (CHOLECALCIFEROL) 5,000 Units, Oral     ROS-Remainder of 14 point ROS reviewed and negative except as in HPI.    PHYSICAL EXAM:  Exam:  Video visit   Appears in no distress  Cognitively appropriate     LABS AND IMAGES:    Prostate Biopsy 7/2/21  FINAL DIAGNOSIS:   A: Prostate, left, biopsy   - Adenocarcinoma, [place] grade 4/3 with ductal component involving 4 of    6 needle core biopsies and 25% of   submitted tissue     B: Prostate, right, biopsy   - Adenocarcinoma, [place] grade 4/3 with ductal component involving 5 of    6 needle core biopsies and 25% of   submitted tissue     MR  prostate 7/28/21  FINDINGS:  Size: 31 grams  Hemorrhage: Absent  Peripheral zone: Heterogeneous on T2-weighted images. Suspicious  lesions as detailed below.  Transition zone: Enlarged with BPH changes. No suspicious lesions  identified.     Lesion(s) in rank order of severity (highest score- to lowest score,  then by size)      Lesion 1:  Location: Right mid gland peripheral zone at the 7-9 o'clock position  relative to the urethra. Series 5 image 50.  Size: 20 x 10 mm  T2 description: Homogeneously hypointense  T2 numerical assessment: 5  DWI description: Marked restriction diffusion  DWI numerical assessment: 5  DCE assessment: Negative    Prostate margin: Capsular abutment>15 mm with enlarged neurovascular  bundle suggesting tumor involvement  Lesion overall PI-RADS category: 5     Lesion 2:  Location: Left apex peripheral zone at the 4 o'clock position relative  to the urethra. Series 5 image 63.  Size: 16 x 11 mm  T2 description: Homogeneously hypointense  T2 numerical assessment: 5  DWI description: Marked diffusion restriction  DWI numerical assessment: 5  DCE assessment: Positive    Prostate margin: Capsular abutment 6-15 mm with capsular irregularity  and focal bulging   Lesion overall PI-RADS category: 5     Neurovascular bundles: Both neurovascular bundles are likely involved  by malignancy.  A right prostatic vein is markedly enlarged compared  to the left, similar to 8/1/2014 CT.  Seminal vesicles: No seminal vesicle involvement by malignancy.  Lymph nodes: No clear adenopathy. Indeterminate nodes as follows: Left  pelvic 8 x 5 mm lymph node on series 5 image 27.  Bones: No suspicious lesions  Other pelvic organs: Colonic diverticulosis.                                                                         IMPRESSION:  1. Based on the most suspicious abnormality, this exam is  characterized as PIRADS 5 - Clinically significant cancer is highly  likely to be present.  The most suspicious abnormalities  are located  at the right mid peripheral zone and left apical peripheral zone and  there is high suspicion of extraprostatic extension.  2. No suspicious adenopathy or evidence of pelvic metastases.    NM bone scan 7/28/21  FINDINGS:   Small area of mild uptake in the right sacrum inferiorly visible on  the posterior view. SPECT-CT demonstrates that this focal uptake  corresponds to a sclerotic lesion on CT. No other suspicious areas of  focal uptake.     Postsurgical changes of bilateral knee replacements.     Physiologic uptake by the kidneys and urinary bladder.                                                                      IMPRESSION: Single focal uptake by the right sacral ala (S3 level) and  corresponding sclerotic focus. This is highly suspicious for  metastatic focus.    CT A/P 8/10/21  FINDINGS:   LOWER CHEST: Normal.     HEPATOBILIARY: Diffuse hepatic steatosis. No significant mass. No bile  duct dilatation. No calcified gallstones.     PANCREAS: Normal.     SPLEEN: Normal.     ADRENAL GLANDS: Normal.     KIDNEYS/BLADDER: There are multiple bilateral nonobstructing  intrarenal calculi measuring up to 4 mm in largest size. Small  subcentimeter benign cortical cysts. Unremarkable bladder. No evidence  of hydronephrosis.     BOWEL: No evidence of bowel obstruction or inflammatory changes. Mild  sigmoid colon diverticulosis. No evidence of diverticulitis. Normal  appendix.     PELVIC ORGANS: Normal sized prostate gland.     ADDITIONAL FINDINGS: Multiple enlarged retroperitoneal periaortic and  bilateral iliac chain lymph nodes are highly suspicious for  metastasis. The largest left retroperitoneal periaortic lymph node on  image 44 of series 2 measures 4.5 x 3.4 cm. 2.9 x 2.3 cm aortocaval  lymph node on image 45 of series 2. Distal left periaortic lymph node  on image 54 of series 2 measures 3.2 x 2.6 cm. Right common iliac  chain lymph node on image 65 of series 2 measures 2.3 x 2.2 cm. 1.6 cm  left  common iliac chain node on image 59 of series 2.     MUSCULOSKELETAL: Small faint sites of increased bone sclerosis  involving the posterior medial right iliac on image 65 of series 2 and  image 69 of series 2 are indeterminate for possible metastasis.                                                                       11/26/21                                             IMPRESSION:  1.  Significantly decreased retroperitoneal lymphadenopathy since the  prior study dated 8/10/2021 indicates good response to treatment. The  remaining enlarged retroperitoneal lymph node appears to have a  necrotic center.  2.  Minimal pulmonary nodularity was likely present previously.  3.  No other evidence for lymphadenopathy or metastasis is identified.  Specifically no evidence for left sacral metastasis is seen.     Latest Reference Range & Units 08/15/22 09:13   Sodium 133 - 144 mmol/L 139   Potassium 3.4 - 5.3 mmol/L 4.4   Chloride 94 - 109 mmol/L 105   Carbon Dioxide 20 - 32 mmol/L 27   Urea Nitrogen 7 - 30 mg/dL 21   Creatinine 0.66 - 1.25 mg/dL 1.03   GFR Estimate >60 mL/min/1.73m2 80   Calcium 8.5 - 10.1 mg/dL 9.5   Anion Gap 3 - 14 mmol/L 7   Albumin 3.4 - 5.0 g/dL 4.0   Protein Total 6.8 - 8.8 g/dL 7.1   Alkaline Phosphatase 40 - 150 U/L 73   ALT 0 - 70 U/L 29   AST 0 - 45 U/L 19   Bilirubin Total 0.2 - 1.3 mg/dL 0.4   Glucose 70 - 99 mg/dL 140 (H)   WBC 4.0 - 11.0 10e3/uL 6.4   Hemoglobin 13.3 - 17.7 g/dL 11.9 (L)   Hematocrit 40.0 - 53.0 % 34.9 (L)   Platelet Count 150 - 450 10e3/uL 232   RBC Count 4.40 - 5.90 10e6/uL 3.90 (L)   MCV 78 - 100 fL 90   (H): Data is abnormally high  (L): Data is abnormally low        IMPRESSION AND PLAN:  Joel Pike is a 66 y/o M with PMH of DM2 on metformin, depression/anxiety (pt reports as bipolar d/o), HTN, and HLD presenting for initial consultation for prostate cancer with possible bone metastasis.  We had a lengthy discussion at his initial and went over his pathology and staging  scan results.  We discussed that it is not clear whether the lesion in wing of his ala is a true bony metastasis or whether it is another finding/artifact.  We discussed that this does not change the overall treatment of his disease with ADT, but may change whether he receives radiation to his bony lesion in the future or not.  The role of ADT and the addition of enzalutamide were discussed today.  Given his existing DM2, we will attempt to avoid additional prednisone use by choosing apalutamide.       --Continue Eligard  --Continue Apalutamide  --Consider RT to the primary for oligometastatic disease based on the STAMPEDE study results. Referral made to radiation oncology.   --RTC w Me in 6 months;    Isaias Solo MD

## 2022-08-17 ENCOUNTER — OFFICE VISIT (OUTPATIENT)
Dept: UROLOGY | Facility: CLINIC | Age: 67
End: 2022-08-17

## 2022-08-17 VITALS
WEIGHT: 225 LBS | OXYGEN SATURATION: 99 % | HEART RATE: 60 BPM | BODY MASS INDEX: 32.28 KG/M2 | RESPIRATION RATE: 20 BRPM | SYSTOLIC BLOOD PRESSURE: 121 MMHG | DIASTOLIC BLOOD PRESSURE: 72 MMHG

## 2022-08-17 DIAGNOSIS — C61 PROSTATE CANCER (H): Primary | ICD-10-CM

## 2022-08-17 PROCEDURE — 96402 CHEMO HORMON ANTINEOPL SQ/IM: CPT | Performed by: UROLOGY

## 2022-08-17 PROCEDURE — 99213 OFFICE O/P EST LOW 20 MIN: CPT | Mod: 25 | Performed by: UROLOGY
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
## 2022-08-17 NOTE — PROGRESS NOTES
The following medication was given:     MEDICATION: Eligard 45 mg  ROUTE: SQ  SITE: RUQ - Abd.  DOSE: 45 mg  LOT #: [id number]  :  Juan F  EXPIRATION DATE:  09/23  NDC#: 41724-136-69   Clinic Administered Medication Documentation          Injectable Medication Documentation    Patient was given Eligard. Prior to medication administration, verified patients identity using patient s name and date of birth. Please see MAR and medication order for additional information. Patient instructed to report any adverse reaction to staff immediately .      Was entire vial of medication used? Yes  Vial/Syringe: Single dose vial  Expiration Date:  09/23  Was this medication supplied by the patient? No     Dianelys Nuno RN on 8/17/2022 at 12:10 PM

## 2022-08-24 ENCOUNTER — TELEPHONE (OUTPATIENT)
Dept: ONCOLOGY | Facility: CLINIC | Age: 67
End: 2022-08-24

## 2022-08-24 NOTE — TELEPHONE ENCOUNTER
"Oral Chemotherapy Monitoring Program    Primary Oncologist: Dr Isaias Solo  Primary Oncology Clinic: [facility]  Cancer Diagnosis: Prostate cancer    Therapy History:  Erleada 240mg PO daily    Subjective/Objective:  Joel Pike is a 67 year old male contacted by phone for a follow-up visit for oral chemotherapy.  Patient reports that the Erleada treatment is going OK.  He has minimal edema (he wears compression stockings).  He also reports significant hot flashes \"24/7\".  Is interested in a possible remedy, if approved by Dr Solo.      ORAL CHEMOTHERAPY 5/9/2022 6/1/2022 6/3/2022 7/19/2022 7/26/2022 8/24/2022 8/24/2022   Assessment Type Other;Monthly Follow up Lab Monitoring Lab Monitoring;Monthly Follow up Monthly Follow up Lab Monitoring Left Voicemail Monthly Follow up   Diagnosis Code Prostate Cancer Prostate Cancer Prostate Cancer Prostate Cancer Prostate Cancer Prostate Cancer Prostate Cancer   Providers Dr Edil Solo   [facility]   Drug Name Erleada (apalutamide) Erleada (apalutamide) Erleada (apalutamide) Erleada (apalutamide) Erleada (apalutamide) Erleada (apalutamide) Erleada (apalutamide)   Dose 240 mg - 240 mg 240 mg 240 mg 240 mg 240 mg   Current Schedule Daily - Daily - - - Daily   Cycle Details Continuous - Continuous Continuous Continuous Continuous Continuous   Start Date of Last Cycle 5/5/2022 - - - - - -   Planned next cycle start date - - - - - - -   Doses missed in last 2 weeks 0 - 1 0 - - 0   Adherence Assessment Adherent - Adherent Adherent - - Adherent   Adverse Effects Edema - No AE identified during assessment - - - Other (See Note for Details)   Other (See Note for Details) - - - - - - hot flashes   Pharmacist intervention(other) - - - - - - Yes   Intervention(s) - - - - - - Rx medication recommendation   Any new drug interactions? Yes - No - - - -   Pharmacist " "Intervention? No - - - - - -   Is the dose as ordered appropriate for the patient? Yes - - - - - -   Is the patient currently in pain? - - - - - - -   Has the patient been assessed within the past 6 months for depression? - - - - - - -   Has the patient missed any days of school, work, or other routine activity? - - - - - - -   Since the last time we talked, have you been hospitalized or used the emergency room? - - No - - - -       Vitals:  BP:   BP Readings from Last 1 Encounters:   08/17/22 121/72     Wt Readings from Last 1 Encounters:   08/17/22 102.1 kg (225 lb)     Estimated body surface area is 2.25 meters squared as calculated from the following:    Height as of 6/24/22: 1.778 m (5' 10\").    Weight as of 8/17/22: 102.1 kg (225 lb).    Labs:  _  Result Component Current Result Ref Range   Sodium 139 (8/15/2022) 133 - 144 mmol/L     _  Result Component Current Result Ref Range   Potassium 4.4 (8/15/2022) 3.4 - 5.3 mmol/L     _  Result Component Current Result Ref Range   Calcium 9.5 (8/15/2022) 8.5 - 10.1 mg/dL     No results found for Mag within last 30 days.     No results found for Phos within last 30 days.     _  Result Component Current Result Ref Range   Albumin 4.0 (8/15/2022) 3.4 - 5.0 g/dL     _  Result Component Current Result Ref Range   Urea Nitrogen 21 (8/15/2022) 7 - 30 mg/dL     _  Result Component Current Result Ref Range   Creatinine 1.03 (8/15/2022) 0.66 - 1.25 mg/dL       _  Result Component Current Result Ref Range   AST 19 (8/15/2022) 0 - 45 U/L     _  Result Component Current Result Ref Range   ALT 29 (8/15/2022) 0 - 70 U/L     _  Result Component Current Result Ref Range   Bilirubin Total 0.4 (8/15/2022) 0.2 - 1.3 mg/dL       _  Result Component Current Result Ref Range   WBC Count 6.4 (8/15/2022) 4.0 - 11.0 10e3/uL     _  Result Component Current Result Ref Range   Hemoglobin 11.9 (L) (8/15/2022) 13.3 - 17.7 g/dL     _  Result Component Current Result Ref Range   Platelet Count 232 " (8/15/2022) 150 - 450 10e3/uL     No results found for ANC within last 30 days.       Assessment:  Patient is tolerating Erleada fairly well, except for hot flashes.  For treatment of hot flashes, would recommend gabapentin 300mg at bedtime, increasing to 900mg at bedtime, if needed and if tolerated.  Paroxetine and citalopram would be contraindicated due to drug-drug interactions.    Plan:  Sent message to Dr Solo regarding recommendation for gabapentin.   No other changes at this time.    Yoselin Kowalski, PharmD, BCPS, BCOP  Oncology Clinical Pharmacy Specialist  [facility]  [phone]

## 2022-08-24 NOTE — TELEPHONE ENCOUNTER
Oral Chemotherapy Monitoring Program    Placed call to patient in follow up of apalutamide therapy.    Left message to please call back in follow up of therapy. No patient or drug names were mentioned.    Luis Gallegos, Pharmacy Intern  Oral Chemotherapy Monitoring Program  [phone]

## 2022-08-25 ENCOUNTER — OFFICE VISIT (OUTPATIENT)
Dept: RADIATION THERAPY | Facility: OUTPATIENT CENTER | Age: 67
End: 2022-08-25
Payer: MEDICARE

## 2022-08-25 VITALS
DIASTOLIC BLOOD PRESSURE: 72 MMHG | RESPIRATION RATE: 18 BRPM | WEIGHT: 223 LBS | SYSTOLIC BLOOD PRESSURE: 120 MMHG | OXYGEN SATURATION: 98 % | BODY MASS INDEX: 32 KG/M2 | HEART RATE: 50 BPM

## 2022-08-25 DIAGNOSIS — C61 PROSTATE CANCER (H): ICD-10-CM

## 2022-08-25 RX ORDER — TAMSULOSIN HYDROCHLORIDE 0.4 MG/1
0.4 CAPSULE ORAL DAILY
Qty: 30 CAPSULE | Refills: 3 | Status: SHIPPED | OUTPATIENT
Start: 2022-08-25 | End: 2022-12-20

## 2022-08-25 ASSESSMENT — PAIN SCALES - GENERAL: PAINLEVEL: NO PAIN (0)

## 2022-08-25 NOTE — NURSING NOTE
"REASON FOR APPOINTMENT   Type of Cancer: metastatic prostate cancer  Location:   Date of Symptom Onset: diagnosed 7/2021 - has been on eligard and pill therapy. Now referred to discuss radiation to prostate    TREATMENT TO-DATE FOR THIS CANCER  Surgery ? no   Chemotherapy ? Yes, Dr. Solo managing eligard and enzalutamide  Other Treatments for this Cancer ? discussion today about radiation therapy    PERSONAL HISTORY OF CANCER   Previous Cancer ? no   Prior Radiation ? no   Prior Chemotherapy ? no   Prior Hormonal Therapy ? no     RECENT IMAGING STUDIES  C/A/P 4/1/22    REFERRALS NEEDED  None at this time    VITALS  /72   Pulse 50   Resp 18   Wt 101.2 kg (223 lb)   SpO2 98%   BMI 32.00 kg/m      PACEMAKER/IMPLANTED CARDIAC DEVICE no    PAIN  Denies    PSYCHOSOCIAL  Marital Status:   Patient lives in [place] with wife Michelle.  Number of children: 1  Working status: retired. Works part time at [employer]  Do you feel safe in your home? Yes    REVIEW OF SYSTEMS  Skin: negative  Eyes: negative  Ears/Nose/Throat: tinnitus  Respiratory: No shortness of breath, dyspnea on exertion, cough, or hemoptysis  Cardiovascular: negative  Gastrointestinal: negative  Genitourinary: nocturia x 4, frequency and urgency  Musculoskeletal: chronic back pain, arthritis and joint pain  Neurologic: negative  Psychiatric: depression stable  Hematologic/Lymphatic/Immunologic: fatigue, night sweats/chills  Endocrine: thyroid disorder    Radiation Oncology Patient Teaching    Current Concern: \"what happens to the prostate during radiation?\"    Person involved with teaching: Patient and Wife, Michelle  Patient asked Questions: Yes  Patient was cooperative: Yes  Patient was receptive (willing to accept information given): Yes    Education Assessment  Comprehension ability: Medium  Knowledge level: Medium  Factors affecting teaching: None    Education Materials Given  Radiation Therapy and You  Radiation to the " pelvis    Educational Topics Discussed  Side effects, Medications, Activity, Nutrition, Adjustment to illness and When to call MD/RN    Response To Teaching  More review necessary      Do you have an advanced directive or living will? no  Are you DNR/DNI? no

## 2022-08-25 NOTE — LETTER
2022         RE: Joel Pike  [address]  [facility] [address]        Dear Colleague,    Thank you for referring your patient, Joel Pike, to the [facility]. Please see a copy of my visit note below.       Department of Radiation Oncology  Radiation Therapy Center  [facility]  [address], [address]  [place] [address]  [phone]       Consultation Note    Name: Joel Pike MRN: [id number]   : 1955   Date of Service: 2022  Referring: Dr. Solo     Reason for consultation: Metastatic prostate cancer, [place] score 4+3 equal 7, PSA equals 30.4, [id number] (para-aortic nodes, ? R sacral osseous lesion).  Evaluate role for radiation therapy.    History of Present Illness   Mr. Pike is a 67 year old male a diagnosis of metastatic prostate cancer, Mary score 4+3 equal 7, PSA equals 30.4, [id number] (para-aortic nodes, ? R sacral osseous lesion).  Evaluate role for radiation therapy.    The patient was noted to have an elevated PSA on 2021 with a value of 12.7.  Repeat PSA on 2021 was 30.4.  Prostate biopsy on 2021 demonstrated prostate adenocarcinoma, [place] score 4+3 equal 7.  MRI of the prostate on 2021 demonstrated PI-RADS 5 lesion with likely extracapsular extension.  There were noted to have indeterminate pelvic lymph nodes at the time.  Bone scan 2021 demonstrated a indeterminate lesion of the right sacral ala at the level of S3.  CT scan of the abdomen pelvis on 8/10/2021 demonstrated multiple large para-aortic and pelvic lymph nodes.  Patient was seen by medical oncology team (Dr. Solo).  The patient was started on systemic treatment with Eligard and enzalutamide.  PSA has demonstrated biochemical response.  Most recent PSA on 8/15/2022 remained undetectable.  Patient was referred to our clinic to discuss neck steps in management and discussion of possible local treatment with radiation  therapy.    Patient is overall doing fairly well.  Does have moderate urinary obstructive symptoms.  No prior radiation.  No pacemaker.  No history of inflammatory bowel disease.  Tolerating systemic treatment well.    Past Medical History:   Past Medical History:   Diagnosis Date     Atherosclerosis of native coronary artery of native heart with stable angina pectoris (H) 4/8/2022     Calculus of kidney      CKD (chronic kidney disease) stage 2, GFR 60-89 ml/min 10/30/2015     Degeneration of lumbar or lumbosacral intervertebral disc      Depressive disorder      Depressive disorder, not elsewhere classified      Diabetes (H)      Diabetic eye exam (H) 02/06/12, 11/1/13     Diabetic eye exam (H) 12/17/14     Hyperlipidaemia      Hypertension      Hypothyroidism 1/17/2010     Obesity, Class I, BMI 30-34.9 10/30/2015     HILARIO (obstructive sleep apnea)      Primary osteoarthritis of left knee      Prostate cancer (H) 7/7/2021     Uncomplicated asthma        Past Surgical History:   Past Surgical History:   Procedure Laterality Date     ARTHROPLASTY KNEE Left 2/4/2020    Procedure: left total knee replacement;  Surgeon: Jason Berman DO;  Location: [facility]     ARTHROPLASTY KNEE Right 1/18/2021    Procedure: right total knee replacement;  Surgeon: Jason Berman DO;  Location: [facility]     BYPASS GRAFT ARTERY CORONARY N/A 4/8/2022    Procedure: CORONARY ARTERY BYPASS GRAFT x 4 (LIMA - LAD; SV - OM; SV - PDA; SV - DIAGONAL) WITH ENDOSCOPIC SAPHENOUS VEIN HARVEST ON BILATERAL LOWER EXTREMITY, AND ON CARDIOPULMONARY PUMP OXYGENATOR  (INTRAOPERATIVE TRANSESOPHAGEAL ECHOCARDIOGRAM BY ANESTHESIOLOGIST);  Surgeon: Karson Bae MD;  Location: [facility] OR     COLONOSCOPY  02/02/09    Repeat in 5 yrs     COLONOSCOPY N/A 9/5/2014    Procedure: COMBINED COLONOSCOPY, SINGLE BIOPSY/POLYPECTOMY BY BIOPSY;  Surgeon: Denis Oakes MD;  Location:  GI     CV CORONARY ANGIOGRAM N/A 3/28/2022    Procedure:  Coronary Angiogram;  Surgeon: Lindy Farooq MD;  Location:  HEART CARDIAC CATH LAB     CV LEFT HEART CATH N/A 3/28/2022    Procedure: Left Heart Catheterization;  Surgeon: Lindy Farooq MD;  Location:  HEART CARDIAC CATH LAB     ESOPHAGOSCOPY, GASTROSCOPY, DUODENOSCOPY (EGD), COMBINED N/A 2/20/2015    Procedure: COMBINED ESOPHAGOSCOPY, GASTROSCOPY, DUODENOSCOPY (EGD), BIOPSY SINGLE OR MULTIPLE;  Surgeon: Alfred Young MD;  Location:  GI     HC REMV CATARACT EXTRACAP,INSERT LENS, W/O ECP  2000    Bilateral     HC REVISE MEDIAN N/CARPAL TUNNEL SURG  1991     HC TOOTH EXTRACTION W/FORCEP  1980's    [place] teeth removed     RELEASE CARPAL TUNNEL Right 6/16/2017    Procedure: RELEASE CARPAL TUNNEL;  Right carpal tunnel release;  Surgeon: Jason Berman DO;  [facility]: [facility]     RELEASE CARPAL TUNNEL Left 7/11/2017    Procedure: RELEASE CARPAL TUNNEL;  Left carpal tunnel release;  Surgeon: Jason Berman DO;  Location: [facility] OR     SOFT TISSUE SURGERY         Chemotherapy History:  Per HPI    Radiation History:  None    Pregnant: Not Applicable  Implanted Cardiac Devices: No    Medications:  Current Outpatient Medications   Medication     ACCU-CHEK GUIDE test strip     acetaminophen (TYLENOL) 325 MG tablet     alcohol swab prep pads     Alcohol Swabs PADS     amiodarone (PACERONE) 200 MG tablet     apalutamide (ERLEADA) 60 MG tablet     blood glucose (HUGO CONTOUR) test strip     blood glucose (NO BRAND SPECIFIED) lancets standard     blood glucose (NO BRAND SPECIFIED) lancets standard     blood glucose (NO BRAND SPECIFIED) test strip     blood glucose calibration (HUGO CONTOUR) NORMAL solution     blood glucose monitoring (NO BRAND SPECIFIED) meter device kit     blood glucose monitoring (NO BRAND SPECIFIED) meter device kit     buPROPion (WELLBUTRIN XL) 150 MG 24 hr tablet     Coenzyme Q-10 capsule     ezetimibe (ZETIA) 10 MG tablet     fish oil-omega-3 fatty acids 1000 MG capsule      levothyroxine (SYNTHROID/LEVOTHROID) 125 MCG tablet     losartan (COZAAR) 50 MG tablet     metFORMIN (GLUCOPHAGE) 1000 MG tablet     methocarbamol (ROBAXIN) 500 MG tablet     metoprolol tartrate (LOPRESSOR) 25 MG tablet     Misc Natural Products (OSTEO BI-FLEX ADV JOINT SHIELD) TABS     Multiple Vitamins-Minerals (PRESERVISION AREDS PO)     nitroGLYcerin (NITROSTAT) 0.4 MG sublingual tablet     ONETOUCH ULTRA test strip     oxyCODONE (ROXICODONE) 5 MG tablet     pantoprazole (PROTONIX) 40 MG EC tablet     rosuvastatin (CRESTOR) 5 MG tablet     senna-docusate (SENOKOT-S/PERICOLACE) 8.6-50 MG tablet     Sharps Container MISC     Current Facility-Administered Medications   Medication     leuprolide (ELIGARD) kit 45 mg     leuprolide (ELIGARD) kit 45 mg         Allergies:     Allergies   Allergen Reactions     Dust Mites Cough     Hmg-Coa-R Inhibitors      Body aches     Other Environmental Allergy      Allergic to sunlight ever since 20s.      Penicillins Hives and Rash           Family History:  Family History   Problem Relation Age of Onset     Cerebrovascular Disease Mother      Hypertension Mother      Hypertension Father      Diabetes Father         Adult     Heart Disease Father         Hx: Bypass     Unknown/Adopted Maternal Grandmother      Cerebrovascular Disease Maternal Grandmother      No Known Problems Maternal Grandfather      No Known Problems Paternal Grandmother      No Known Problems Paternal Grandfather      Thyroid Disease Brother      Cancer Sister         Liver     No Known Problems Sister      No Known Problems Son        Review of Systems   A 10-point review of systems was performed. Pertinent findings are noted in the HPI.    Physical Exam   ECOG Status: 1    Vitals:  There were no vitals taken for this visit.    Gen: Alert, in NAD  Head: NC/AT  Eyes: PERRL, EOMI, sclera anicteric  Ears: No external auricular lesions  Nose/sinus: No rhinorrhea or epistaxis  Oral cavity/oropharynx: MMM, no  visible oral cavity lesions, FOM and BOT are soft to palpation  Neck: Full ROM, supple, no palpable adenopathy  Pulm: No wheezing, stridor or respiratory distress  CV: Extremities are warm and well-perfused, no cyanosis, no pedal edema  Abdominal: Normal bowel sounds, soft, nontender, no masses  Musculoskeletal: Normal bulk and tone  Skin: Normal color and turgor  Neuro: A/Ox3, CN II-XII intact, normal gait    Imaging/Path/Labs   Imagin21  MRI prostate  IMPRESSION:  1. Based on the most suspicious abnormality, this exam is  characterized as PIRADS 5 - Clinically significant cancer is highly  likely to be present.  The most suspicious abnormalities are located  at the right mid peripheral zone and left apical peripheral zone and  there is high suspicion of extraprostatic extension.  2. No suspicious adenopathy or evidence of pelvic metastases.    21  Bone scan  IMPRESSION: Single focal uptake by the right sacral ala (S3 level) and  corresponding sclerotic focus. This is highly suspicious for  metastatic focus.    8/10/21  CTCAP  IMPRESSION:   1.  Multiple enlarged retroperitoneal periaortic and bilateral iliac  chain lymph nodes are highly suspicious for metastasis.  2.  Small faint areas of increased bone sclerosis involving the right  iliac are indeterminate for possible metastasis. Consider follow-up  bone scan for further evaluation.  3.  Bilateral nonobstructing intrarenal calculi.       Path:   21  SPECIMEN(S):   A: Left prostate tissue   B: Right prostate tissue     FINAL DIAGNOSIS:   A: Prostate, left, biopsy   - Adenocarcinoma, Raymond's grade 4/3 with ductal component involving 4 of    6 needle core biopsies and 25% of   submitted tissue     B: Prostate, right, biopsy   - Adenocarcinoma, Raymond's grade 4/3 with ductal component involving 5 of    6 needle core biopsies and 25% of   submitted tissue     Labs: .  21  PSA = 30.4  PSA   Date Value Ref Range Status   2021 12.70 (H)  0 - 4 ug/L Final     Comment:     Assay Method:  Chemiluminescence using Siemens Vista analyzer   11/08/2017 2.98 0 - 4 ug/L Final     Comment:     Assay Method:  Chemiluminescence using Siemens Vista analyzer   02/08/2013 1.23 0 - 4 ug/L Final   01/15/2010 0.71 0 - 4 ug/L Final   12/21/2007 0.99 0 - 4 ug/L Final     PSA Tumor Marker   Date Value Ref Range Status   08/15/2022 <0.01 0.00 - 4.00 ug/L Final   07/26/2022 <0.01 0.00 - 4.00 ug/L Final   06/17/2022 <0.01 0.00 - 4.00 ug/L Final   06/03/2022 <0.01 0.00 - 4.00 ug/L Final   03/13/2022 <0.01 0.00 - 4.00 ug/L Final         Assessment    Mr. Pike is a 67 year old male with a diagnosis of metastatic prostate cancer, Mary score 4+3 equal 7, PSA equals 30.4, [id number] (para-aortic nodes, ? R sacral osseous lesion).  Evaluate role for radiation therapy.      Plan   1.  I discussed the natural history of metastatic prostate cancer.    2.  I discussed that the bony lesion in the right sacrum is indeterminant.  However he does have several nonregional lymph nodes in the para-aortic region that clasify his disease as M1.  He has started on systemic treatment with biochemical response.    3.  I discussed consideration of local prostate directed radiation therapy per STAMPEDE trial.  I discussed that in patients with low burden disease (1-3 Bone met, non visceral met, non-regional nodes) prostate directed therapy suggested to improve biochemical response and potentially survival.  I would tentatively plan to treat to 55 Agrawal in 20 fractions.    4.  I discussed side effects in great detail with the patient.  He wishes to proceed with prostate directed radiation therapy.  We will plan to schedule patient for CT simulation with plan to begin treatment soon thereafter.    Isaías Gomez MD  Department of Radiation Oncology  [facility]

## 2022-08-25 NOTE — PROGRESS NOTES
Department of Radiation Oncology  Radiation Therapy Center  [facility]  [address], [address]  [place] [address]  [phone]       Consultation Note    Name: Joel Pike MRN: [id number]   : 1955   Date of Service: 2022  Referring: Dr. Solo     Reason for consultation: Metastatic prostate cancer, Mary score 4+3 equal 7, PSA equals 30.4, [id number] (para-aortic nodes, ? R sacral osseous lesion).  Evaluate role for radiation therapy.    History of Present Illness   Mr. Pike is a 67 year old male a diagnosis of metastatic prostate cancer, [place] score 4+3 equal 7, PSA equals 30.4, [id number] (para-aortic nodes, ? R sacral osseous lesion).  Evaluate role for radiation therapy.    The patient was noted to have an elevated PSA on 2021 with a value of 12.7.  Repeat PSA on 2021 was 30.4.  Prostate biopsy on 2021 demonstrated prostate adenocarcinoma, [place] score 4+3 equal 7.  MRI of the prostate on 2021 demonstrated PI-RADS 5 lesion with likely extracapsular extension.  There were noted to have indeterminate pelvic lymph nodes at the time.  Bone scan 2021 demonstrated a indeterminate lesion of the right sacral ala at the level of S3.  CT scan of the abdomen pelvis on 8/10/2021 demonstrated multiple large para-aortic and pelvic lymph nodes.  Patient was seen by medical oncology team (Dr. Solo).  The patient was started on systemic treatment with Eligard and enzalutamide.  PSA has demonstrated biochemical response.  Most recent PSA on 8/15/2022 remained undetectable.  Patient was referred to our clinic to discuss neck steps in management and discussion of possible local treatment with radiation therapy.    Patient is overall doing fairly well.  Does have moderate urinary obstructive symptoms.  No prior radiation.  No pacemaker.  No history of inflammatory bowel disease.  Tolerating systemic treatment well.    Past Medical History:   Past  Medical History:   Diagnosis Date     Atherosclerosis of native coronary artery of native heart with stable angina pectoris (H) 4/8/2022     Calculus of kidney      CKD (chronic kidney disease) stage 2, GFR 60-89 ml/min 10/30/2015     Degeneration of lumbar or lumbosacral intervertebral disc      Depressive disorder      Depressive disorder, not elsewhere classified      Diabetes (H)      Diabetic eye exam (H) 02/06/12, 11/1/13     Diabetic eye exam (H) 12/17/14     Hyperlipidaemia      Hypertension      Hypothyroidism 1/17/2010     Obesity, Class I, BMI 30-34.9 10/30/2015     HILARIO (obstructive sleep apnea)      Primary osteoarthritis of left knee      Prostate cancer (H) 7/7/2021     Uncomplicated asthma        Past Surgical History:   Past Surgical History:   Procedure Laterality Date     ARTHROPLASTY KNEE Left 2/4/2020    Procedure: left total knee replacement;  Surgeon: Jason Berman DO;  Location: [facility] OR     ARTHROPLASTY KNEE Right 1/18/2021    Procedure: right total knee replacement;  Surgeon: Jason Berman DO;  Location:  OR     BYPASS GRAFT ARTERY CORONARY N/A 4/8/2022    Procedure: CORONARY ARTERY BYPASS GRAFT x 4 (LIMA - LAD; SV - OM; SV - PDA; SV - DIAGONAL) WITH ENDOSCOPIC SAPHENOUS VEIN HARVEST ON BILATERAL LOWER EXTREMITY, AND ON CARDIOPULMONARY PUMP OXYGENATOR  (INTRAOPERATIVE TRANSESOPHAGEAL ECHOCARDIOGRAM BY ANESTHESIOLOGIST);  Surgeon: Karson Bae MD;  Location:  OR     COLONOSCOPY  02/02/09    Repeat in 5 yrs     COLONOSCOPY N/A 9/5/2014    Procedure: COMBINED COLONOSCOPY, SINGLE BIOPSY/POLYPECTOMY BY BIOPSY;  Surgeon: Denis Oakes MD;  Location:  GI     CV CORONARY ANGIOGRAM N/A 3/28/2022    Procedure: Coronary Angiogram;  Surgeon: Lindy Farooq MD;  Location:  HEART CARDIAC CATH LAB     CV LEFT HEART CATH N/A 3/28/2022    Procedure: Left Heart Catheterization;  Surgeon: Lindy Farooq MD;  Location:  HEART CARDIAC CATH LAB      ESOPHAGOSCOPY, GASTROSCOPY, DUODENOSCOPY (EGD), COMBINED N/A 2/20/2015    Procedure: COMBINED ESOPHAGOSCOPY, GASTROSCOPY, DUODENOSCOPY (EGD), BIOPSY SINGLE OR MULTIPLE;  Surgeon: Alfred Young MD;  Location: [facility] GI     HC REMV CATARACT EXTRACAP,INSERT LENS, W/O ECP  2000    Bilateral     HC REVISE MEDIAN N/CARPAL TUNNEL SURG  1991     HC TOOTH EXTRACTION W/FORCEP  1980's    [place] teeth removed     RELEASE CARPAL TUNNEL Right 6/16/2017    Procedure: RELEASE CARPAL TUNNEL;  Right carpal tunnel release;  Surgeon: Jason Berman DO;  Location: [facility]     RELEASE CARPAL TUNNEL Left 7/11/2017    Procedure: RELEASE CARPAL TUNNEL;  Left carpal tunnel release;  Surgeon: Jason Berman DO;  Location: [facility] OR     SOFT TISSUE SURGERY         Chemotherapy History:  Per HPI    Radiation History:  None    Pregnant: Not Applicable  Implanted Cardiac Devices: No    Medications:  Current Outpatient Medications   Medication     ACCU-CHEK GUIDE test strip     acetaminophen (TYLENOL) 325 MG tablet     alcohol swab prep pads     Alcohol Swabs PADS     amiodarone (PACERONE) 200 MG tablet     apalutamide (ERLEADA) 60 MG tablet     blood glucose (HUGO CONTOUR) test strip     blood glucose (NO BRAND SPECIFIED) lancets standard     blood glucose (NO BRAND SPECIFIED) lancets standard     blood glucose (NO BRAND SPECIFIED) test strip     blood glucose calibration (HUGO CONTOUR) NORMAL solution     blood glucose monitoring (NO BRAND SPECIFIED) meter device kit     blood glucose monitoring (NO BRAND SPECIFIED) meter device kit     buPROPion (WELLBUTRIN XL) 150 MG 24 hr tablet     Coenzyme Q-10 capsule     ezetimibe (ZETIA) 10 MG tablet     fish oil-omega-3 fatty acids 1000 MG capsule     levothyroxine (SYNTHROID/LEVOTHROID) 125 MCG tablet     losartan (COZAAR) 50 MG tablet     metFORMIN (GLUCOPHAGE) 1000 MG tablet     methocarbamol (ROBAXIN) 500 MG tablet     metoprolol tartrate (LOPRESSOR) 25 MG tablet     Misc Natural  Products (OSTEO BI-FLEX ADV JOINT SHIELD) TABS     Multiple Vitamins-Minerals (PRESERVISION AREDS PO)     nitroGLYcerin (NITROSTAT) 0.4 MG sublingual tablet     ONETOUCH ULTRA test strip     oxyCODONE (ROXICODONE) 5 MG tablet     pantoprazole (PROTONIX) 40 MG EC tablet     rosuvastatin (CRESTOR) 5 MG tablet     senna-docusate (SENOKOT-S/PERICOLACE) 8.6-50 MG tablet     Sharps Container MISC     Current Facility-Administered Medications   Medication     leuprolide (ELIGARD) kit 45 mg     leuprolide (ELIGARD) kit 45 mg         Allergies:     Allergies   Allergen Reactions     Dust Mites Cough     Hmg-Coa-R Inhibitors      Body aches     Other Environmental Allergy      Allergic to sunlight ever since 20s.      Penicillins Hives and Rash           Family History:  Family History   Problem Relation Age of Onset     Cerebrovascular Disease Mother      Hypertension Mother      Hypertension Father      Diabetes Father         Adult     Heart Disease Father         Hx: Bypass     Unknown/Adopted Maternal Grandmother      Cerebrovascular Disease Maternal Grandmother      No Known Problems Maternal Grandfather      No Known Problems Paternal Grandmother      No Known Problems Paternal Grandfather      Thyroid Disease Brother      Cancer Sister         Liver     No Known Problems Sister      No Known Problems Son        Review of Systems   A 10-point review of systems was performed. Pertinent findings are noted in the HPI.    Physical Exam   ECOG Status: 1    Vitals:  There were no vitals taken for this visit.    Gen: Alert, in NAD  Head: NC/AT  Eyes: PERRL, EOMI, sclera anicteric  Ears: No external auricular lesions  Nose/sinus: No rhinorrhea or epistaxis  Oral cavity/oropharynx: MMM, no visible oral cavity lesions, FOM and BOT are soft to palpation  Neck: Full ROM, supple, no palpable adenopathy  Pulm: No wheezing, stridor or respiratory distress  CV: Extremities are warm and well-perfused, no cyanosis, no pedal  edema  Abdominal: Normal bowel sounds, soft, nontender, no masses  Musculoskeletal: Normal bulk and tone  Skin: Normal color and turgor  Neuro: A/Ox3, CN II-XII intact, normal gait    Imaging/Path/Labs   Imagin21  MRI prostate  IMPRESSION:  1. Based on the most suspicious abnormality, this exam is  characterized as PIRADS 5 - Clinically significant cancer is highly  likely to be present.  The most suspicious abnormalities are located  at the right mid peripheral zone and left apical peripheral zone and  there is high suspicion of extraprostatic extension.  2. No suspicious adenopathy or evidence of pelvic metastases.    21  Bone scan  IMPRESSION: Single focal uptake by the right sacral ala (S3 level) and  corresponding sclerotic focus. This is highly suspicious for  metastatic focus.    8/10/21  CTCAP  IMPRESSION:   1.  Multiple enlarged retroperitoneal periaortic and bilateral iliac  chain lymph nodes are highly suspicious for metastasis.  2.  Small faint areas of increased bone sclerosis involving the right  iliac are indeterminate for possible metastasis. Consider follow-up  bone scan for further evaluation.  3.  Bilateral nonobstructing intrarenal calculi.       Path:   21  SPECIMEN(S):   A: Left prostate tissue   B: Right prostate tissue     FINAL DIAGNOSIS:   A: Prostate, left, biopsy   - Adenocarcinoma, [place]'s grade 4/3 with ductal component involving 4 of    6 needle core biopsies and 25% of   submitted tissue     B: Prostate, right, biopsy   - Adenocarcinoma, Mary's grade 4/3 with ductal component involving 5 of    6 needle core biopsies and 25% of   submitted tissue     Labs: .  21  PSA = 30.4  PSA   Date Value Ref Range Status   2021 12.70 (H) 0 - 4 ug/L Final     Comment:     Assay Method:  Chemiluminescence using Siemens Vista analyzer   2017 2.98 0 - 4 ug/L Final     Comment:     Assay Method:  Chemiluminescence using Siemens Vista analyzer   2013 1.23 0  - 4 ug/L Final   01/15/2010 0.71 0 - 4 ug/L Final   12/21/2007 0.99 0 - 4 ug/L Final     PSA Tumor Marker   Date Value Ref Range Status   08/15/2022 <0.01 0.00 - 4.00 ug/L Final   07/26/2022 <0.01 0.00 - 4.00 ug/L Final   06/17/2022 <0.01 0.00 - 4.00 ug/L Final   06/03/2022 <0.01 0.00 - 4.00 ug/L Final   03/13/2022 <0.01 0.00 - 4.00 ug/L Final         Assessment    Mr. Pike is a 67 year old male with a diagnosis of metastatic prostate cancer, Mary score 4+3 equal 7, PSA equals 30.4, [id number] (para-aortic nodes, ? R sacral osseous lesion).  Evaluate role for radiation therapy.      Plan   1.  I discussed the natural history of metastatic prostate cancer.    2.  I discussed that the bony lesion in the right sacrum is indeterminant.  However he does have several nonregional lymph nodes in the para-aortic region that clasify his disease as M1.  He has started on systemic treatment with biochemical response.    3.  I discussed consideration of local prostate directed radiation therapy per STAMPEDE trial.  I discussed that in patients with low burden disease (1-3 Bone met, non visceral met, non-regional nodes) prostate directed therapy suggested to improve biochemical response and potentially survival.  I would tentatively plan to treat to 55 Agrawal in 20 fractions.    4.  I discussed side effects in great detail with the patient.  He wishes to proceed with prostate directed radiation therapy.  We will plan to schedule patient for CT simulation with plan to begin treatment soon thereafter.    Isaías Gomez MD  Department of Radiation Oncology  [facility]

## 2022-08-29 ENCOUNTER — OFFICE VISIT (OUTPATIENT)
Dept: RADIATION THERAPY | Facility: OUTPATIENT CENTER | Age: 67
End: 2022-08-29
Payer: MEDICARE

## 2022-08-29 DIAGNOSIS — C61 PROSTATE CANCER (H): Primary | ICD-10-CM

## 2022-08-29 NOTE — LETTER
8/29/2022         RE: Joel Pike  [address] [facility] [address]        Dear Colleague,    Thank you for referring your patient, Joel Pike, to the [facility] TH[facility]APY [facility]ENTER. Please see a copy of my visit note below.    The patient underwent CT simulation.    Isaías Gomez M.D.  Department of Radiation Oncology  [facility]         Again, thank you for allowing me to participate in the care of your patient.        Sincerely,        Isaías Gomez MD

## 2022-08-29 NOTE — PROGRESS NOTES
The patient underwent CT simulation.    Isaías Gomez M.D.  Department of Radiation Oncology  [facility]

## 2022-08-30 ASSESSMENT — SLEEP AND FATIGUE QUESTIONNAIRES
HOW LIKELY ARE YOU TO NOD OFF OR FALL ASLEEP WHILE LYING DOWN TO REST IN THE AFTERNOON WHEN CIRCUMSTANCES PERMIT: MODERATE CHANCE OF DOZING
HOW LIKELY ARE YOU TO NOD OFF OR FALL ASLEEP WHILE SITTING QUIETLY AFTER LUNCH WITHOUT ALCOHOL: SLIGHT CHANCE OF DOZING
HOW LIKELY ARE YOU TO NOD OFF OR FALL ASLEEP WHILE SITTING INACTIVE IN A PUBLIC PLACE: SLIGHT CHANCE OF DOZING
HOW LIKELY ARE YOU TO NOD OFF OR FALL ASLEEP WHILE SITTING AND READING: SLIGHT CHANCE OF DOZING
HOW LIKELY ARE YOU TO NOD OFF OR FALL ASLEEP WHILE SITTING AND TALKING TO SOMEONE: WOULD NEVER DOZE
HOW LIKELY ARE YOU TO NOD OFF OR FALL ASLEEP WHILE WATCHING TV: SLIGHT CHANCE OF DOZING
HOW LIKELY ARE YOU TO NOD OFF OR FALL ASLEEP IN A CAR, WHILE STOPPED FOR A FEW MINUTES IN TRAFFIC: WOULD NEVER DOZE
HOW LIKELY ARE YOU TO NOD OFF OR FALL ASLEEP WHEN YOU ARE A PASSENGER IN A CAR FOR AN HOUR WITHOUT A BREAK: WOULD NEVER DOZE

## 2022-09-06 ENCOUNTER — OFFICE VISIT (OUTPATIENT)
Dept: SLEEP MEDICINE | Facility: CLINIC | Age: 67
End: 2022-09-06
Payer: MEDICARE

## 2022-09-06 VITALS
OXYGEN SATURATION: 99 % | BODY MASS INDEX: 32.49 KG/M2 | DIASTOLIC BLOOD PRESSURE: 60 MMHG | SYSTOLIC BLOOD PRESSURE: 124 MMHG | WEIGHT: 226.4 LBS | HEART RATE: 61 BPM

## 2022-09-06 DIAGNOSIS — G47.33 OSA (OBSTRUCTIVE SLEEP APNEA): Primary | ICD-10-CM

## 2022-09-06 PROCEDURE — 99213 OFFICE O/P EST LOW 20 MIN: CPT | Performed by: PHYSICIAN ASSISTANT

## 2022-09-06 NOTE — PROGRESS NOTES
Does Joel have a CPAP/Bipap?  Yes     Type of mask: nasal pillows    [facility]: [facility] [phone]    [email or web address]     [place] Sleep Scale: 6    Obstructive Sleep Apnea - PAP Follow-Up Visit:    Chief Complaint   Patient presents with     CPAP Follow Up       Joel Pike comes in today for follow-up of their moderate sleep apnea, managed with CPAP.     No specialty comments available.    Overall, he rates the experience with PAP as 9 (0 poor, 10 great). The mask is comfortable. The mask is leaking, around his eyes.  He is snoring with the mask on. He is not having gasp arousals.  He is not having significant oral/nasal dryness. The pressure is comfortable.    His PAP interface is Nasal Pillows.    Bedtime is typically 10-11. Usually it takes about 30-60 minutes to fall asleep with the mask on. Wake time is typically 8-9.  Patient is using PAP therapy 8 hours per night. The patient is usually getting 8 hours of sleep per night.    He does feel rested in the morning.    [place] Sleepiness Scale: 6/24      No data recorded    [employer]Med   CPAP  cmH2O 30 day usage data:  93% of days with > 4 hours of use. 2/30 days with no use.   Average use 462 minutes per day.   95%ile Leak 11.67 L/min.   AHI 0.87 events per hour.     Auto-PAP 8.0 - 14.0 cmH2O 30 day usage data:    93% of days with > 4 hours of use. 2/30 days with no use.   Average use 462 minutes per day.   95%ile Leak 11.67 L/min.   CPAP 95% pressure 9.7 cm.   AHI 0.87 events per hour.         Past medical/surgical history, family history, social history, medications and allergies were reviewed.      Problem List:  Patient Active Problem List    Diagnosis Date Noted     Atrial flutter, unspecified type (H) 04/29/2022     Priority: Medium     S/P CABG (coronary artery bypass graft) 04/13/2022     Priority: Medium     Fluid overload 04/13/2022     Priority: Medium     Anemia due to blood loss, acute 04/13/2022      Priority: Medium     Atherosclerosis of native coronary artery of native heart with stable angina pectoris (H) 04/08/2022     Priority: Medium     Status post coronary angiogram 03/28/2022     Priority: Medium     Essential hypertension 02/05/2022     Priority: Medium     Prostate cancer (H) 07/07/2021     Priority: Medium     Status post total right knee replacement 02/01/2021     Priority: Medium     S/P total knee replacement using cement, right 01/18/2021     Priority: Medium     Primary osteoarthritis of right knee 08/27/2020     Priority: Medium     Added automatically from request for surgery [id number]       S/P total knee replacement using cement, left 02/04/2020     Priority: Medium     Microalbuminuria 02/26/2019     Priority: Medium     Recurrent major depression in complete remission (H) 11/08/2017     Priority: Medium     CKD (chronic kidney disease) stage 2, GFR 60-89 ml/min 10/30/2015     Priority: Medium     Obesity, Class I, BMI 30-34.9 10/30/2015     Priority: Medium     Type 2 diabetes mellitus with stage 2 chronic kidney disease (H) 04/19/2013     Priority: Medium     Hypothyroidism, unspecified type 03/02/2012     Priority: Medium     Advanced directives, counseling/discussion 08/19/2011     Priority: Medium     Advance Directive Problem List Overview:   Name Relationship Phone    Primary Health Care Agent            Alternative Health Care Agent          Discussed advance care planning with patient; information given to patient to review.//Macey Calvert/AISHA(AAMA)  8/19/2011 and again 11/9/16         Tinnitus 07/22/2011     Priority: Medium     right ear, began after accident, immediately       Hyperlipidemia LDL goal <100 01/28/2011     Priority: Medium     HILARIO (obstructive sleep apnea) 01/28/2011     Priority: Medium     Chronic rhinitis 01/15/2010     Priority: Medium     Degeneration of lumbar or lumbosacral intervertebral disc 12/19/2005     Priority: Medium     Gastroesophageal reflux disease  without esophagitis 05/14/2003     Priority: Medium        /60   Pulse 61   Wt 102.7 kg (226 lb 6.4 oz)   SpO2 99%   BMI 32.49 kg/m      Impression/Plan:     Moderate Sleep apnea. He is Tolerating PAP well, apnea is well controlled. He is having some leak, but pillows aree due for replacement. . Daytime symptoms are stable.   Supplies re ordered.     Joel BELEM Pike will follow up in about 1 year(s).     Fifteen minutes spent with patient, all of which were spent face-to-face counseling, consulting, coordinating plan of care.      CC:  Nishi Lin,

## 2022-09-08 ENCOUNTER — APPOINTMENT (OUTPATIENT)
Dept: RADIATION THERAPY | Facility: OUTPATIENT CENTER | Age: 67
End: 2022-09-08
Payer: MEDICARE

## 2022-09-14 ENCOUNTER — OFFICE VISIT (OUTPATIENT)
Dept: RADIATION THERAPY | Facility: OUTPATIENT CENTER | Age: 67
End: 2022-09-14
Payer: MEDICARE

## 2022-09-14 VITALS — BODY MASS INDEX: 32.14 KG/M2 | WEIGHT: 224 LBS

## 2022-09-14 DIAGNOSIS — C61 PROSTATE CANCER (H): Primary | ICD-10-CM

## 2022-09-14 NOTE — PROGRESS NOTES
[facility]  SPECIALIZING IN BREAKTHROUGHS  Radiation Oncology    On Treatment Visit Note      Joel Pike      Date: 2022   MRN: [id number]   : 1955  Diagnosis: Prostate cancer, metastatic [id number]      Reason for Visit:  On Radiation Treatment Visit     Treatment Summary to Date  Treatment Site: prostate only Current Dose: 1375/5500 cGy Fractions:       Chemotherapy  Chemo concurrent with radx?: Yes  Oncologist: Dr. Isaias Solo  Drug Name/Frequency 1: oral Xtandi    Subjective:   Doing well. No acute complaints. Energy is good. No GI bother. No new  bother. Remains on flomax daily.    Nursing ROS:   Nutrition Alteration  Diet Type: Patient's Preference  Skin  Skin Reaction: 0 - No changes        Cardiovascular  Respiratory effort: 1 - Normal - without distress  Gastrointestinal  Diarrhea: 0 - None  Genitourinary  Urinary Status: 1 - Increase in frequency or nocturia up to 2x normal  Dysuria: 0 - None   Note: nocturia every 2 hrs - reviewed to try flomax once every evening.     Pain Assessment  0-10 Pain Scale: 0      Objective:   Wt 101.6 kg (224 lb)   BMI 32.14 kg/m    NAD    Labs:  CBC RESULTS: Recent Labs   Lab Test 08/15/22  0913   WBC 6.4   RBC 3.90*   HGB 11.9*   HCT 34.9*   MCV 90   MCH 30.5   MCHC 34.1   RDW 13.0        ELECTROLYTES:  Recent Labs   Lab Test 08/15/22  0913      POTASSIUM 4.4   CHLORIDE 105   AURORA 9.5   CO2 27   BUN 21   CR 1.03   *       Assessment:  Mr. Pike is a 67 year old male with a diagnosis of metastatic prostate cancer, Mary score 4+3 = 7, PSA= 30.4, [id number] (para-aortic nodes, ? R sacral osseous lesion).  He is undergoing prostate directed RT.    Tolerating radiation therapy well.  All questions and concerns addressed.    Plan:   1. Continue current therapy.        Mosaiq chart and setup information reviewed  Ports checked    Medication Review  Med list reviewed with patient?: Yes    Educational Topic Discussed  Education  Instructions: reviewed what to expect during radiation      Isaías Gomez MD

## 2022-09-14 NOTE — LETTER
2022         RE: Joel Pike  [address]  [facility] [address]        Dear Colleague,    Thank you for referring your patient, Joel Pike, to the [facility]. Please see a copy of my visit note below.    [facility]  SPECIALIZING IN BREAKTHROUGHS  Radiation Oncology    On Treatment Visit Note      Joel Pike      Date: 2022   MRN: [id number]   : 1955  Diagnosis: Prostate cancer, metastatic [id number]      Reason for Visit:  On Radiation Treatment Visit     Treatment Summary to Date  Treatment Site: prostate only Current Dose: 1375/5500 cGy Fractions:       Chemotherapy  Chemo concurrent with radx?: Yes  Oncologist: Dr. Isaias Solo  Drug Name/Frequency 1: oral Xtandi    Subjective:   Doing well. No acute complaints. Energy is good. No GI bother. No new  bother. Remains on flomax daily.    Nursing ROS:   Nutrition Alteration  Diet Type: Patient's Preference  Skin  Skin Reaction: 0 - No changes        Cardiovascular  Respiratory effort: 1 - Normal - without distress  Gastrointestinal  Diarrhea: 0 - None  Genitourinary  Urinary Status: 1 - Increase in frequency or nocturia up to 2x normal  Dysuria: 0 - None   Note: nocturia every 2 hrs - reviewed to try flomax once every evening.     Pain Assessment  0-10 Pain Scale: 0      Objective:   Wt 101.6 kg (224 lb)   BMI 32.14 kg/m    NAD    Labs:  CBC RESULTS: Recent Labs   Lab Test 08/15/22  0913   WBC 6.4   RBC 3.90*   HGB 11.9*   HCT 34.9*   MCV 90   MCH 30.5   MCHC 34.1   RDW 13.0        ELECTROLYTES:  Recent Labs   Lab Test 08/15/22  0913      POTASSIUM 4.4   CHLORIDE 105   AURORA 9.5   CO2 27   BUN 21   CR 1.03   *       Assessment:  Mr. Pike is a 67 year old male with a diagnosis of metastatic prostate cancer, [place] score 4+3 = 7, PSA= 30.4, [id number] (para-aortic nodes, ? R sacral osseous lesion).  He is undergoing prostate directed RT.    Tolerating radiation therapy well.  All  questions and concerns addressed.    Plan:   1. Continue current therapy.        Mosaiq chart and setup information reviewed  Ports checked    Medication Review  Med list reviewed with patient?: Yes    Educational Topic Discussed  Education Instructions: reviewed what to expect during radiation      Isaías Gomez MD

## 2022-09-20 ENCOUNTER — LAB (OUTPATIENT)
Dept: LAB | Facility: CLINIC | Age: 67
End: 2022-09-20
Payer: MEDICARE

## 2022-09-20 ENCOUNTER — IMMUNIZATION (OUTPATIENT)
Dept: FAMILY MEDICINE | Facility: CLINIC | Age: 67
End: 2022-09-20

## 2022-09-20 LAB
ALBUMIN SERPL-MCNC: 4.1 G/DL (ref 3.4–5)
ALP SERPL-CCNC: 80 U/L (ref 40–150)
ALT SERPL W P-5'-P-CCNC: 23 U/L (ref 0–70)
ANION GAP SERPL CALCULATED.3IONS-SCNC: 5 MMOL/L (ref 3–14)
AST SERPL W P-5'-P-CCNC: 15 U/L (ref 0–45)
BASOPHILS # BLD AUTO: 0 10E3/UL (ref 0–0.2)
BASOPHILS NFR BLD AUTO: 1 %
BILIRUB SERPL-MCNC: 0.5 MG/DL (ref 0.2–1.3)
BUN SERPL-MCNC: 22 MG/DL (ref 7–30)
CALCIUM SERPL-MCNC: 9.3 MG/DL (ref 8.5–10.1)
CHLORIDE BLD-SCNC: 107 MMOL/L (ref 94–109)
CHOLEST SERPL-MCNC: 151 MG/DL
CO2 SERPL-SCNC: 26 MMOL/L (ref 20–32)
CREAT SERPL-MCNC: 1.15 MG/DL (ref 0.66–1.25)
EOSINOPHIL # BLD AUTO: 0.3 10E3/UL (ref 0–0.7)
EOSINOPHIL NFR BLD AUTO: 4 %
ERYTHROCYTE [DISTWIDTH] IN BLOOD BY AUTOMATED COUNT: 12.7 % (ref 10–15)
FASTING STATUS PATIENT QL REPORTED: YES
GFR SERPL CREATININE-BSD FRML MDRD: 70 ML/MIN/1.73M2
GLUCOSE BLD-MCNC: 131 MG/DL (ref 70–99)
HCT VFR BLD AUTO: 36.2 % (ref 40–53)
HDLC SERPL-MCNC: 37 MG/DL
HGB BLD-MCNC: 12.7 G/DL (ref 13.3–17.7)
IMM GRANULOCYTES # BLD: 0 10E3/UL
IMM GRANULOCYTES NFR BLD: 1 %
LDLC SERPL CALC-MCNC: 77 MG/DL
LYMPHOCYTES # BLD AUTO: 0.9 10E3/UL (ref 0.8–5.3)
LYMPHOCYTES NFR BLD AUTO: 16 %
MCH RBC QN AUTO: 31.9 PG (ref 26.5–33)
MCHC RBC AUTO-ENTMCNC: 35.1 G/DL (ref 31.5–36.5)
MCV RBC AUTO: 91 FL (ref 78–100)
MONOCYTES # BLD AUTO: 0.5 10E3/UL (ref 0–1.3)
MONOCYTES NFR BLD AUTO: 10 %
NEUTROPHILS # BLD AUTO: 3.9 10E3/UL (ref 1.6–8.3)
NEUTROPHILS NFR BLD AUTO: 68 %
NONHDLC SERPL-MCNC: 114 MG/DL
NRBC # BLD AUTO: 0 10E3/UL
NRBC BLD AUTO-RTO: 0 /100
PLATELET # BLD AUTO: 202 10E3/UL (ref 150–450)
POTASSIUM BLD-SCNC: 4.6 MMOL/L (ref 3.4–5.3)
PROT SERPL-MCNC: 7.1 G/DL (ref 6.8–8.8)
RBC # BLD AUTO: 3.98 10E6/UL (ref 4.4–5.9)
SODIUM SERPL-SCNC: 138 MMOL/L (ref 133–144)
TRIGL SERPL-MCNC: 183 MG/DL
WBC # BLD AUTO: 5.7 10E3/UL (ref 4–11)

## 2022-09-20 PROCEDURE — 80061 LIPID PANEL: CPT

## 2022-09-20 PROCEDURE — 36415 COLL VENOUS BLD VENIPUNCTURE: CPT

## 2022-09-20 PROCEDURE — 85025 COMPLETE CBC W/AUTO DIFF WBC: CPT

## 2022-09-20 PROCEDURE — 91313 COVID-19,PF,MODERNA BIVALENT: CPT

## 2022-09-20 PROCEDURE — 0134A COVID-19,PF,MODERNA BIVALENT: CPT

## 2022-09-20 PROCEDURE — 80053 COMPREHEN METABOLIC PANEL: CPT

## 2022-09-21 ENCOUNTER — OFFICE VISIT (OUTPATIENT)
Dept: RADIATION THERAPY | Facility: OUTPATIENT CENTER | Age: 67
End: 2022-09-21
Payer: MEDICARE

## 2022-09-21 VITALS
BODY MASS INDEX: 31.85 KG/M2 | HEART RATE: 71 BPM | SYSTOLIC BLOOD PRESSURE: 128 MMHG | WEIGHT: 222 LBS | RESPIRATION RATE: 18 BRPM | DIASTOLIC BLOOD PRESSURE: 65 MMHG | OXYGEN SATURATION: 99 %

## 2022-09-21 DIAGNOSIS — C61 PROSTATE CANCER (H): Primary | ICD-10-CM

## 2022-09-21 NOTE — PROGRESS NOTES
[facility]  SPECIALIZING IN BREAKTHROUGHS  Radiation Oncology    On Treatment Visit Note      Joel Pike      Date: 2022   MRN: [id number]   : 1955  Diagnosis: Prostate cancer, metastatic [id number]      Reason for Visit:  On Radiation Treatment Visit     Treatment Summary to Date  Treatment Site: prostate only Current Dose: 2750/5500 cGy Fractions: 10/20      Chemotherapy  Chemo concurrent with radx?: Yes  Oncologist: Dr. Isaias Solo  Drug Name/Frequency 1: oral Xtandi    Subjective:   Doing well. No acute complaints. Energy is good. Mild GI bother. No new  bother. Remains on flomax daily.    Nursing ROS:   Nutrition Alteration  Diet Type: Patient's Preference  Skin  Skin Reaction: 0 - No changes        Cardiovascular  Respiratory effort: 1 - Normal - without distress  Gastrointestinal  Diarrhea: 0 - None  Genitourinary  Urinary Status: 1 - Increase in frequency or nocturia up to 2x normal  Dysuria: 0 - None   Note: nocturia every 2 hrs - reviewed to try flomax once every evening.     Pain Assessment  0-10 Pain Scale: 0      Objective:   /65   Pulse 71   Resp 18   Wt 100.7 kg (222 lb)   SpO2 99%   BMI 31.85 kg/m    NAD    Labs:  CBC RESULTS: Recent Labs   Lab Test 08/15/22  0913   WBC 6.4   RBC 3.90*   HGB 11.9*   HCT 34.9*   MCV 90   MCH 30.5   MCHC 34.1   RDW 13.0        ELECTROLYTES:  Recent Labs   Lab Test 08/15/22  0913      POTASSIUM 4.4   CHLORIDE 105   AURORA 9.5   CO2 27   BUN 21   CR 1.03   *       Assessment:  Mr. Pike is a 67 year old male with a diagnosis of metastatic prostate cancer, [place] score 4+3 = 7, PSA= 30.4, [id number] (para-aortic nodes, ? R sacral osseous lesion).  He is undergoing prostate directed RT.    Tolerating radiation therapy well.  All questions and concerns addressed.    Plan:   1. Continue current therapy.    2. Continue flomax.   3. GI bother. Low fiber diet. Imodium PRN.      Mosaiq chart and setup information  reviewed  Ports checked    Medication Review  Med list reviewed with patient?: Yes    Educational Topic Discussed  Education Instructions: reviewed what to expect during radiation      Isaías Gomez MD

## 2022-09-21 NOTE — LETTER
2022         RE: Joel Pike  [address]  [facility] [address]        Dear Colleague,    Thank you for referring your patient, Joel Pike, to the [facility]. Please see a copy of my visit note below.    [facility]  SPECIALIZING IN BREAKTHROUGHS  Radiation Oncology    On Treatment Visit Note      Joel Pike      Date: 2022   MRN: [id number]   : 1955  Diagnosis: Prostate cancer, metastatic [id number]      Reason for Visit:  On Radiation Treatment Visit     Treatment Summary to Date  Treatment Site: prostate only Current Dose: 2750/5500 cGy Fractions: 10/20      Chemotherapy  Chemo concurrent with radx?: Yes  Oncologist: Dr. Isaias Solo  Drug Name/Frequency 1: oral Xtandi    Subjective:   Doing well. No acute complaints. Energy is good. Mild GI bother. No new  bother. Remains on flomax daily.    Nursing ROS:   Nutrition Alteration  Diet Type: Patient's Preference  Skin  Skin Reaction: 0 - No changes        Cardiovascular  Respiratory effort: 1 - Normal - without distress  Gastrointestinal  Diarrhea: 0 - None  Genitourinary  Urinary Status: 1 - Increase in frequency or nocturia up to 2x normal  Dysuria: 0 - None   Note: nocturia every 2 hrs - reviewed to try flomax once every evening.     Pain Assessment  0-10 Pain Scale: 0      Objective:   /65   Pulse 71   Resp 18   Wt 100.7 kg (222 lb)   SpO2 99%   BMI 31.85 kg/m    NAD    Labs:  CBC RESULTS: Recent Labs   Lab Test 08/15/22  0913   WBC 6.4   RBC 3.90*   HGB 11.9*   HCT 34.9*   MCV 90   MCH 30.5   MCHC 34.1   RDW 13.0        ELECTROLYTES:  Recent Labs   Lab Test 08/15/22  0913      POTASSIUM 4.4   CHLORIDE 105   AURORA 9.5   CO2 27   BUN 21   CR 1.03   *       Assessment:  Mr. Pike is a 67 year old male with a diagnosis of metastatic prostate cancer, Mary score 4+3 = 7, PSA= 30.4, [id number] (para-aortic nodes, ? R sacral osseous lesion).  He is undergoing prostate directed  RT.    Tolerating radiation therapy well.  All questions and concerns addressed.    Plan:   1. Continue current therapy.    2. Continue flomax.   3. GI bother. Low fiber diet. Imodium PRN.      Mosaiq chart and setup information reviewed  Ports checked    Medication Review  Med list reviewed with patient?: Yes    Educational Topic Discussed  Education Instructions: reviewed what to expect during radiation      Isaías Gomez MD

## 2022-09-23 ENCOUNTER — TELEPHONE (OUTPATIENT)
Dept: ONCOLOGY | Facility: CLINIC | Age: 67
End: 2022-09-23

## 2022-09-23 DIAGNOSIS — C61 PROSTATE CANCER (H): Primary | ICD-10-CM

## 2022-09-23 RX ORDER — GABAPENTIN 300 MG/1
300 CAPSULE ORAL AT BEDTIME
Qty: 30 CAPSULE | Refills: 0 | Status: SHIPPED | OUTPATIENT
Start: 2022-09-23 | End: 2022-11-07

## 2022-09-23 NOTE — ORAL ONC MGMT
Oral Chemotherapy Monitoring Program     Placed call to patient in follow up of oral chemotherapy. Per inbasket communication with MARIBEL Weaver, Rx for gabapentin for hot flashes sent to [facility]. Patient informed of new Rx and given counseling on new medication (gabapentin). Full assessment on with apalutamide deferred at this time as patient was in the car driving to his radiation appointment. Per Liliya's request, will call patient in 1-2 weeks to see how he is doing.      Soledad Portillo, PharmD, [facility]  Oral Chemotherapy Monitoring Program  [facility]  [phone]  September 23, 2022

## 2022-09-28 ENCOUNTER — OFFICE VISIT (OUTPATIENT)
Dept: CARDIOLOGY | Facility: CLINIC | Age: 67
End: 2022-09-28
Payer: MEDICARE

## 2022-09-28 ENCOUNTER — OFFICE VISIT (OUTPATIENT)
Dept: RADIATION THERAPY | Facility: OUTPATIENT CENTER | Age: 67
End: 2022-09-28
Payer: MEDICARE

## 2022-09-28 VITALS — WEIGHT: 220 LBS | BODY MASS INDEX: 31.57 KG/M2

## 2022-09-28 VITALS
DIASTOLIC BLOOD PRESSURE: 72 MMHG | WEIGHT: 224 LBS | RESPIRATION RATE: 18 BRPM | OXYGEN SATURATION: 96 % | BODY MASS INDEX: 32.14 KG/M2 | SYSTOLIC BLOOD PRESSURE: 124 MMHG | HEART RATE: 68 BPM

## 2022-09-28 DIAGNOSIS — C61 PROSTATE CANCER (H): Primary | ICD-10-CM

## 2022-09-28 DIAGNOSIS — Z95.1 S/P CABG (CORONARY ARTERY BYPASS GRAFT): Primary | ICD-10-CM

## 2022-09-28 DIAGNOSIS — E78.5 HYPERLIPIDEMIA LDL GOAL <70: ICD-10-CM

## 2022-09-28 DIAGNOSIS — I25.810 CORONARY ARTERY DISEASE INVOLVING AUTOLOGOUS ARTERY CORONARY BYPASS GRAFT WITHOUT ANGINA PECTORIS: ICD-10-CM

## 2022-09-28 PROCEDURE — 99214 OFFICE O/P EST MOD 30 MIN: CPT | Performed by: NURSE PRACTITIONER

## 2022-09-28 ASSESSMENT — PAIN SCALES - GENERAL: PAINLEVEL: NO PAIN (0)
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
## 2022-09-28 NOTE — LETTER
2022         RE: Joel Pike  [address]  [facility] [address]        Dear Colleague,    Thank you for referring your patient, Joel Pike, to the [facility]. Please see a copy of my visit note below.    [facility]  SPECIALIZING IN BREAKTHROUGHS  Radiation Oncology    On Treatment Visit Note      Joel Pike      Date: 2022   MRN: [id number]   : 1955  Diagnosis: Prostate cancer, metastatic [id number]      Reason for Visit:  On Radiation Treatment Visit     Treatment Summary to Date  Treatment Site: prostate only Current Dose: 4125/5500 cGy Fractions: 15/20      Chemotherapy  Chemo concurrent with radx?: Yes  Oncologist: Dr. Isaias Solo  Drug Name/Frequency 1: oral Xtandi    Subjective:   Doing well. No acute complaints. Energy is good. Mild GI bother. No new  bother. Remains on flomax daily.    Nursing ROS:   Nutrition Alteration  Diet Type: Patient's Preference  Skin  Skin Reaction: 0 - No changes        Cardiovascular  Respiratory effort: 1 - Normal - without distress  Gastrointestinal  Diarrhea: 0 - None  Genitourinary  Urinary Status: 1 - Increase in frequency or nocturia up to 2x normal  Dysuria: 0 - None   Note: nocturia every 2 hrs - reviewed to try flomax once every evening.     Pain Assessment  0-10 Pain Scale: 0      Objective:   Wt 99.8 kg (220 lb)   BMI 31.57 kg/m    NAD    Labs:  CBC RESULTS: Recent Labs   Lab Test 08/15/22  0913   WBC 6.4   RBC 3.90*   HGB 11.9*   HCT 34.9*   MCV 90   MCH 30.5   MCHC 34.1   RDW 13.0        ELECTROLYTES:  Recent Labs   Lab Test 08/15/22  0913      POTASSIUM 4.4   CHLORIDE 105   AURORA 9.5   CO2 27   BUN 21   CR 1.03   *       Assessment:  Mr. Pike is a 67 year old male with a diagnosis of metastatic prostate cancer, Lagrange score 4+3 = 7, PSA= 30.4, [id number] (para-aortic nodes, ? R sacral osseous lesion).  He is undergoing prostate directed RT.    Tolerating radiation therapy well.  All  questions and concerns addressed.    Plan:   1. Continue current therapy.    2. Continue flomax.   3. GI bother. Low fiber diet. Imodium PRN.      Mosaiq chart and setup information reviewed  Ports checked    Medication Review  Med list reviewed with patient?: Yes    Educational Topic Discussed  Education Instructions: reviewed what to expect during radiation      Isaías Gomez MD

## 2022-09-28 NOTE — PATIENT INSTRUCTIONS
TODAY'S RECOMMENDATIONS:    Restart baby aspirin  Continue all other medications without changes.  Please follow up with Dr. Murphy in 1 year.    If you have questions or concerns please call clinic at [phone].    Please call [phone] for scheduling.      It was a pleasure seeing you today!

## 2022-09-28 NOTE — PROGRESS NOTES
[facility]  SPECIALIZING IN BREAKTHROUGHS  Radiation Oncology    On Treatment Visit Note      Joel Pike      Date: 2022   MRN: [id number]   : 1955  Diagnosis: Prostate cancer, metastatic [id number]      Reason for Visit:  On Radiation Treatment Visit     Treatment Summary to Date  Treatment Site: prostate only Current Dose: 4125/5500 cGy Fractions: 15/20      Chemotherapy  Chemo concurrent with radx?: Yes  Oncologist: Dr. Isaias Solo  Drug Name/Frequency 1: oral Xtandi    Subjective:   Doing well. No acute complaints. Energy is good. Mild GI bother. No new  bother. Remains on flomax daily.    Nursing ROS:   Nutrition Alteration  Diet Type: Patient's Preference  Skin  Skin Reaction: 0 - No changes        Cardiovascular  Respiratory effort: 1 - Normal - without distress  Gastrointestinal  Diarrhea: 0 - None  Genitourinary  Urinary Status: 1 - Increase in frequency or nocturia up to 2x normal  Dysuria: 0 - None   Note: nocturia every 2 hrs - reviewed to try flomax once every evening.     Pain Assessment  0-10 Pain Scale: 0      Objective:   Wt 99.8 kg (220 lb)   BMI 31.57 kg/m    NAD    Labs:  CBC RESULTS: Recent Labs   Lab Test 08/15/22  0913   WBC 6.4   RBC 3.90*   HGB 11.9*   HCT 34.9*   MCV 90   MCH 30.5   MCHC 34.1   RDW 13.0        ELECTROLYTES:  Recent Labs   Lab Test 08/15/22  0913      POTASSIUM 4.4   CHLORIDE 105   AURORA 9.5   CO2 27   BUN 21   CR 1.03   *       Assessment:  Mr. Pike is a 67 year old male with a diagnosis of metastatic prostate cancer, [place] score 4+3 = 7, PSA= 30.4, [id number] (para-aortic nodes, ? R sacral osseous lesion).  He is undergoing prostate directed RT.    Tolerating radiation therapy well.  All questions and concerns addressed.    Plan:   1. Continue current therapy.    2. Continue flomax.   3. GI bother. Low fiber diet. Imodium PRN.      Mosaiq chart and setup information reviewed  Ports checked    Medication Review  Med list reviewed  with patient?: Yes    Educational Topic Discussed  Education Instructions: reviewed what to expect during radiation      Isaías Gomez MD

## 2022-09-30 ENCOUNTER — TELEPHONE (OUTPATIENT)
Dept: PHARMACY | Facility: CLINIC | Age: 67
End: 2022-09-30

## 2022-09-30 NOTE — ORAL ONC MGMT
Oral Chemotherapy Monitoring Program     Placed call to patient in follow up of oral chemotherapy. Called to see if he started on gabapentin and how it was working for his hot flashes.  Left message requesting call back. No drug names were mentioned. Will update when response received.     Benjamin Landrum, PharmD, MS  Hematology/Oncology Clinical Pharmacist  [facility]

## 2022-10-02 DIAGNOSIS — F33.42 RECURRENT MAJOR DEPRESSION IN COMPLETE REMISSION (H): ICD-10-CM

## 2022-10-03 NOTE — TELEPHONE ENCOUNTER
"Requested Prescriptions   Pending Prescriptions Disp Refills    buPROPion (WELLBUTRIN XL) 150 MG 24 hr tablet [Pharmacy Med Name: BUPROPION HCL ER (XL) 150MG TB24] 90 tablet 0     Sig: TAKE 2 TABLETS (300 MG) BY MOUTH EVERY MORNING        SSRIs Protocol Failed - 10/2/2022 12:31 PM        Failed - PHQ-9 score less than 5 in past 6 months     Please review last PHQ-9 score.           Passed - Medication is Bupropion     If the medication is Bupropion (Wellbutrin), and the patient is taking for smoking cessation; OK to refill.            Passed - Medication is active on med list        Passed - Patient is age 18 or older        Passed - Recent (6 mo) or future (30 days) visit within the authorizing provider's specialty     Patient had office visit in the last 6 months or has a visit in the next 30 days with authorizing provider or within the authorizing provider's specialty.  See \"Patient Info\" tab in inbasket, or \"Choose Columns\" in Meds & Orders section of the refill encounter.                    VIKA ShoreN, RN          "

## 2022-10-04 ENCOUNTER — TELEPHONE (OUTPATIENT)
Dept: ONCOLOGY | Facility: CLINIC | Age: 67
End: 2022-10-04

## 2022-10-04 NOTE — TELEPHONE ENCOUNTER
Oral Chemotherapy Monitoring Program     Placed call to patient in follow up of oral chemotherapy. Called Joel to see if he was able to get started on gabapentin and if it has been helpful for his hot flashes. He did start gabapentin 300 mg daily at bedtime about 4-5 days ago. He has found this very helpful for his hot flashes so far and he has not noticed any side effects.    He reports his Erleada continues to go okay, he confirms taking 4 tablets daily. Denies any missed doses. [facility]. As far as medication changes, he recently switched off his blood thinner and started on baby aspirin. No other recent changes, no DDIs.    Jaqueline Boykin, PharmD  Hematology/Oncology Clinical Pharmacist  [facility]  [facility]  [phone]

## 2022-10-04 NOTE — TELEPHONE ENCOUNTER
Oral Chemotherapy Monitoring Program     Placed call to patient in follow up of oral chemotherapy. Left message requesting call back. No drug names were mentioned. Will update when response received.     Jaqueline Boykin, PharmD  Hematology/Oncology Clinical Pharmacist  [facility]  [facility]

## 2022-10-05 ENCOUNTER — OFFICE VISIT (OUTPATIENT)
Dept: RADIATION THERAPY | Facility: OUTPATIENT CENTER | Age: 67
End: 2022-10-05
Payer: MEDICARE

## 2022-10-05 VITALS
OXYGEN SATURATION: 100 % | BODY MASS INDEX: 32.86 KG/M2 | HEART RATE: 65 BPM | WEIGHT: 229 LBS | RESPIRATION RATE: 18 BRPM | DIASTOLIC BLOOD PRESSURE: 87 MMHG | SYSTOLIC BLOOD PRESSURE: 138 MMHG

## 2022-10-05 DIAGNOSIS — C61 PROSTATE CANCER (H): Primary | ICD-10-CM

## 2022-10-05 ASSESSMENT — PAIN SCALES - GENERAL: PAINLEVEL: NO PAIN (0)

## 2022-10-05 NOTE — PROGRESS NOTES
[facility]  SPECIALIZING IN BREAKTHROUGHS  Radiation Oncology    On Treatment Visit Note      Joel Pike      Date: 10/5/2022   MRN: [id number]   : 1955  Diagnosis: Prostate cancer, metastatic [id number]      Reason for Visit:  On Radiation Treatment Visit     Treatment Summary to Date  Treatment Site: prostate only Current Dose: 5500/5500 cGy Fractions: 20/20      Chemotherapy  Chemo concurrent with radx?: Yes  Oncologist: Dr. Isaias Solo  Drug Name/Frequency 1: oral Xtandi    Subjective:   Doing well. No acute complaints. Energy is good. Mild GI bother. No new  bother. Remains on flomax daily.    Nursing ROS:   Nutrition Alteration  Diet Type: Patient's Preference  Skin  Skin Reaction: 0 - No changes        Cardiovascular  Respiratory effort: 1 - Normal - without distress  Gastrointestinal  Diarrhea: 0 - None  Genitourinary  Urinary Status: 1 - Increase in frequency or nocturia up to 2x normal  Dysuria: 0 - None   Note: nocturia every 2 hrs - reviewed to try flomax once every evening.     Pain Assessment  0-10 Pain Scale: 0      Objective:   /87   Pulse 65   Resp 18   Wt 103.9 kg (229 lb)   SpO2 100%   BMI 32.86 kg/m    NAD    Labs:  CBC RESULTS: Recent Labs   Lab Test 08/15/22  0913   WBC 6.4   RBC 3.90*   HGB 11.9*   HCT 34.9*   MCV 90   MCH 30.5   MCHC 34.1   RDW 13.0        ELECTROLYTES:  Recent Labs   Lab Test 08/15/22  0913      POTASSIUM 4.4   CHLORIDE 105   AURORA 9.5   CO2 27   BUN 21   CR 1.03   *       Assessment:  Mr. Pike is a 67 year old male with a diagnosis of metastatic prostate cancer, Mary score 4+3 = 7, PSA= 30.4, [id number] (para-aortic nodes, ? R sacral osseous lesion).  He is undergoing prostate directed RT.    Tolerating radiation therapy well.  All questions and concerns addressed.    Plan:   1. Continue current therapy.  EOT today. RTC in 1 month, will see PA.  2. Continue flomax.   3. GI bother. Low fiber diet. Imodium PRN.      Mosaiq  chart and setup information reviewed  Ports checked    Medication Review  Med list reviewed with patient?: Yes    Educational Topic Discussed  Education Instructions: reviewed what to expect during radiation      Isaías Gomez MD

## 2022-10-05 NOTE — LETTER
10/5/2022         RE: Joel Pike  [address]  [facility] [address]        Dear Colleague,    Thank you for referring your patient, Joel Pike, to the [facility]. Please see a copy of my visit note below.    [facility]  SPECIALIZING IN BREAKTHROUGHS  Radiation Oncology    On Treatment Visit Note      Joel Pike      Date: 10/5/2022   MRN: [id number]   : 1955  Diagnosis: Prostate cancer, metastatic [id number]      Reason for Visit:  On Radiation Treatment Visit     Treatment Summary to Date  Treatment Site: prostate only Current Dose: 5500/5500 cGy Fractions:       Chemotherapy  Chemo concurrent with radx?: Yes  Oncologist: Dr. Isaias Solo  Drug Name/Frequency 1: oral Xtandi    Subjective:   Doing well. No acute complaints. Energy is good. Mild GI bother. No new  bother. Remains on flomax daily.    Nursing ROS:   Nutrition Alteration  Diet Type: Patient's Preference  Skin  Skin Reaction: 0 - No changes        Cardiovascular  Respiratory effort: 1 - Normal - without distress  Gastrointestinal  Diarrhea: 0 - None  Genitourinary  Urinary Status: 1 - Increase in frequency or nocturia up to 2x normal  Dysuria: 0 - None   Note: nocturia every 2 hrs - reviewed to try flomax once every evening.     Pain Assessment  0-10 Pain Scale: 0      Objective:   /87   Pulse 65   Resp 18   Wt 103.9 kg (229 lb)   SpO2 100%   BMI 32.86 kg/m    NAD    Labs:  CBC RESULTS: Recent Labs   Lab Test 08/15/22  0913   WBC 6.4   RBC 3.90*   HGB 11.9*   HCT 34.9*   MCV 90   MCH 30.5   MCHC 34.1   RDW 13.0        ELECTROLYTES:  Recent Labs   Lab Test 08/15/22  0913      POTASSIUM 4.4   CHLORIDE 105   AURORA 9.5   CO2 27   BUN 21   CR 1.03   *       Assessment:  Mr. Pike is a 67 year old male with a diagnosis of metastatic prostate cancer, Mary score 4+3 = 7, PSA= 30.4, [id number] (para-aortic nodes, ? R sacral osseous lesion).  He is undergoing prostate directed  RT.    Tolerating radiation therapy well.  All questions and concerns addressed.    Plan:   1. Continue current therapy.  EOT today. RTC in 1 month, will see PA.  2. Continue flomax.   3. GI bother. Low fiber diet. Imodium PRN.      Mosaiq chart and setup information reviewed  Ports checked    Medication Review  Med list reviewed with patient?: Yes    Educational Topic Discussed  Education Instructions: reviewed what to expect during radiation      Isaías Gomez MD          Again, thank you for allowing me to participate in the care of your patient.        Sincerely,        Isaías Gomez MD

## 2022-10-06 RX ORDER — BUPROPION HYDROCHLORIDE 150 MG/1
300 TABLET ORAL EVERY MORNING
Qty: 90 TABLET | Refills: 0 | Status: SHIPPED | OUTPATIENT
Start: 2022-10-06 | End: 2022-11-22

## 2022-10-06 NOTE — TELEPHONE ENCOUNTER
No more refills without an appointment.  He sees Dr. Lin-please set him up with Dr. Lin sometime in the next 3 months.

## 2022-10-09 ENCOUNTER — HEALTH MAINTENANCE LETTER (OUTPATIENT)
Age: 67
End: 2022-10-09

## 2022-10-14 ENCOUNTER — LAB (OUTPATIENT)
Dept: LAB | Facility: CLINIC | Age: 67
End: 2022-10-14
Payer: MEDICARE

## 2022-10-14 DIAGNOSIS — E11.22 TYPE 2 DIABETES MELLITUS WITH STAGE 2 CHRONIC KIDNEY DISEASE (H): Primary | ICD-10-CM

## 2022-10-14 DIAGNOSIS — N18.2 TYPE 2 DIABETES MELLITUS WITH STAGE 2 CHRONIC KIDNEY DISEASE (H): Primary | ICD-10-CM

## 2022-10-14 LAB
HBA1C MFR BLD: 5.9 % (ref 0–5.6)
HOLD SPECIMEN: NORMAL

## 2022-10-14 PROCEDURE — 36415 COLL VENOUS BLD VENIPUNCTURE: CPT

## 2022-10-14 PROCEDURE — 83036 HEMOGLOBIN GLYCOSYLATED A1C: CPT

## 2022-10-23 DIAGNOSIS — K21.00 GASTROESOPHAGEAL REFLUX DISEASE WITH ESOPHAGITIS WITHOUT HEMORRHAGE: ICD-10-CM

## 2022-10-25 ENCOUNTER — TELEPHONE (OUTPATIENT)
Dept: ONCOLOGY | Facility: CLINIC | Age: 67
End: 2022-10-25

## 2022-10-25 DIAGNOSIS — C61 PROSTATE CANCER (H): ICD-10-CM

## 2022-10-25 RX ORDER — PANTOPRAZOLE SODIUM 40 MG/1
40 TABLET, DELAYED RELEASE ORAL DAILY
Qty: 90 TABLET | Refills: 1 | Status: SHIPPED | OUTPATIENT
Start: 2022-10-25 | End: 2023-03-20

## 2022-10-25 NOTE — TELEPHONE ENCOUNTER
PA Initiation    Medication: Azael BAL  Insurance Company: [employer] - Phone [phone] Fax [phone]  Pharmacy Filling the Rx:    Filling Pharmacy Phone:    Filling Pharmacy Fax:    Start Date: 10/25/2022    [id number]    Thank you,    America Hannon  Oncology Pharmacy Liaison II  [email or web address]  Phone: [phone]  Fax: [phone]

## 2022-10-31 ENCOUNTER — TELEPHONE (OUTPATIENT)
Dept: ONCOLOGY | Facility: CLINIC | Age: 67
End: 2022-10-31

## 2022-10-31 DIAGNOSIS — C61 PROSTATE CANCER (H): Primary | ICD-10-CM

## 2022-10-31 NOTE — ORAL ONC MGMT
Oral Chemotherapy Monitoring Program     Received a call from patient in follow up of Erleada Refill. He said he has 3 days left of Erleada supply on hand and that the pharmacy with the free drug plan needs a new prescription. America Hannon will therefore check with them on this and get back to Joel and the team to let us all know next steps on access for Erleada. Joel expressed understanding and agreement with this plan and thanked me for the call.    Thai Gruber PharmD  [facility]  [phone]  October 31, 2022

## 2022-11-01 ENCOUNTER — DOCUMENTATION ONLY (OUTPATIENT)
Dept: RADIATION THERAPY | Facility: OUTPATIENT CENTER | Age: 67
End: 2022-11-01

## 2022-11-01 NOTE — PROGRESS NOTES
Radiotherapy Treatment Summary              PATIENT: Joel Pike  MEDICAL RECORD NO: [id number]   : 1955    Radiation Oncologist: Isaías Gomez MD  Medical Oncologist: Isaias Solo MD    DIAGNOSIS: Metastatic prostate cancer  PATHOLOGY: Prostate adenocarcinoma, Mary score 4+3 equal 7.                                   STAGE: [id number] (para-aortic nodes, ? R sacral osseous lesion).   CONCURRENT SYSTEMIC THERAPY:   Yes, oral Xtandi    ONCOLOGIC HISTORY:          The patient was noted to have an elevated PSA on 2021 with a value of 12.7.  Repeat PSA on 2021 was 30.4.  Prostate biopsy on 2021 demonstrated prostate adenocarcinoma, Mary score 4+3 equal 7.  MRI of the prostate on 2021 demonstrated PI-RADS 5 lesion with likely extracapsular extension.  There were noted to have indeterminate pelvic lymph nodes at the time.  Bone scan 2021 demonstrated a indeterminate lesion of the right sacral ala at the level of S3.  CT scan of the abdomen pelvis on 8/10/2021 demonstrated multiple large para-aortic and pelvic lymph nodes.  Patient was seen by medical oncology team (Dr. Solo).  The patient was started on systemic treatment with Eligard and enzalutamide.  PSA has demonstrated biochemical response.  Most recent PSA on 8/15/2022 remained undetectable.  Patient was referred to our clinic to discuss neck steps in management and discussion of possible local treatment with radiation therapy. He completed radiation therapy on 10/5/22    SITE OF TREATMENT: Prostate    DATES  OF TREATMENT: 22 to 10/5/22    TOTAL DOSE OF TREATMENT: 5,500 cGy    DOSE PER FRACTION OF TREATMENT: 275 cGy x 20 fractions       COMMENT/TOXICITY: None                   PAIN MANAGEMENT: None                        FOLLOW UP PLAN: One month    CC  Patient Care Team:  Nishi Lin MD as PCP - General (Family Medicine)  Nishi Lin MD as Assigned PCP  Alexei Ott MD as Assigned Surgical  Provider  Kamala Murphy MD as MD (Cardiovascular Disease)  Nik Andino PA-C as Assigned Sleep Provider  Isaías Gomez MD as Assigned Cancer Care Provider  Magaly Romo APRN CNP as Assigned Heart and Vascular Provider       MALLY Guerrero  Department of Radiation Oncology  [facility]

## 2022-11-07 DIAGNOSIS — I25.118 ATHEROSCLEROSIS OF NATIVE CORONARY ARTERY OF NATIVE HEART WITH STABLE ANGINA PECTORIS (H): ICD-10-CM

## 2022-11-07 DIAGNOSIS — N18.2 TYPE 2 DIABETES MELLITUS WITH STAGE 2 CHRONIC KIDNEY DISEASE, WITH LONG-TERM CURRENT USE OF INSULIN (H): ICD-10-CM

## 2022-11-07 DIAGNOSIS — Z79.4 TYPE 2 DIABETES MELLITUS WITH STAGE 2 CHRONIC KIDNEY DISEASE, WITH LONG-TERM CURRENT USE OF INSULIN (H): ICD-10-CM

## 2022-11-07 DIAGNOSIS — E11.22 TYPE 2 DIABETES MELLITUS WITH STAGE 2 CHRONIC KIDNEY DISEASE, WITH LONG-TERM CURRENT USE OF INSULIN (H): ICD-10-CM

## 2022-11-07 RX ORDER — GABAPENTIN 300 MG/1
CAPSULE ORAL
Qty: 30 CAPSULE | Refills: 0 | Status: SHIPPED | OUTPATIENT
Start: 2022-11-07 | End: 2022-12-22

## 2022-11-10 ENCOUNTER — TRANSFERRED RECORDS (OUTPATIENT)
Dept: HEALTH INFORMATION MANAGEMENT | Facility: CLINIC | Age: 67
End: 2022-11-10

## 2022-11-10 LAB — RETINOPATHY: NEGATIVE

## 2022-11-11 ENCOUNTER — LAB (OUTPATIENT)
Dept: LAB | Facility: CLINIC | Age: 67
End: 2022-11-11
Payer: MEDICARE

## 2022-11-11 ENCOUNTER — PATIENT OUTREACH (OUTPATIENT)
Dept: ONCOLOGY | Facility: CLINIC | Age: 67
End: 2022-11-11

## 2022-11-11 DIAGNOSIS — E11.22 TYPE 2 DIABETES MELLITUS WITH STAGE 2 CHRONIC KIDNEY DISEASE (H): Primary | ICD-10-CM

## 2022-11-11 DIAGNOSIS — C61 PROSTATE CANCER (H): Primary | ICD-10-CM

## 2022-11-11 DIAGNOSIS — C61 PROSTATE CANCER (H): ICD-10-CM

## 2022-11-11 DIAGNOSIS — I48.92 ATRIAL FLUTTER, UNSPECIFIED TYPE (H): ICD-10-CM

## 2022-11-11 DIAGNOSIS — Z11.59 ENCOUNTER FOR SCREENING FOR OTHER VIRAL DISEASES: ICD-10-CM

## 2022-11-11 DIAGNOSIS — N18.2 TYPE 2 DIABETES MELLITUS WITH STAGE 2 CHRONIC KIDNEY DISEASE (H): Primary | ICD-10-CM

## 2022-11-11 DIAGNOSIS — R94.39 ABNORMAL STRESS TEST: ICD-10-CM

## 2022-11-11 DIAGNOSIS — E03.4 HYPOTHYROIDISM DUE TO ACQUIRED ATROPHY OF THYROID: ICD-10-CM

## 2022-11-11 LAB
ALBUMIN SERPL-MCNC: 4.1 G/DL (ref 3.4–5)
ALP SERPL-CCNC: 73 U/L (ref 40–150)
ALT SERPL W P-5'-P-CCNC: 20 U/L (ref 0–70)
ANION GAP SERPL CALCULATED.3IONS-SCNC: 4 MMOL/L (ref 3–14)
AST SERPL W P-5'-P-CCNC: 19 U/L (ref 0–45)
BASOPHILS # BLD AUTO: 0 10E3/UL (ref 0–0.2)
BASOPHILS NFR BLD AUTO: 1 %
BILIRUB SERPL-MCNC: 0.3 MG/DL (ref 0.2–1.3)
BUN SERPL-MCNC: 29 MG/DL (ref 7–30)
CALCIUM SERPL-MCNC: 9.1 MG/DL (ref 8.5–10.1)
CHLORIDE BLD-SCNC: 108 MMOL/L (ref 94–109)
CO2 SERPL-SCNC: 29 MMOL/L (ref 20–32)
CREAT SERPL-MCNC: 1.11 MG/DL (ref 0.66–1.25)
EOSINOPHIL # BLD AUTO: 0.3 10E3/UL (ref 0–0.7)
EOSINOPHIL NFR BLD AUTO: 5 %
ERYTHROCYTE [DISTWIDTH] IN BLOOD BY AUTOMATED COUNT: 12.7 % (ref 10–15)
GFR SERPL CREATININE-BSD FRML MDRD: 73 ML/MIN/1.73M2
GLUCOSE BLD-MCNC: 143 MG/DL (ref 70–99)
HCT VFR BLD AUTO: 36 % (ref 40–53)
HGB BLD-MCNC: 12.4 G/DL (ref 13.3–17.7)
HOLD SPECIMEN: NORMAL
IMM GRANULOCYTES # BLD: 0 10E3/UL
IMM GRANULOCYTES NFR BLD: 1 %
LYMPHOCYTES # BLD AUTO: 1 10E3/UL (ref 0.8–5.3)
LYMPHOCYTES NFR BLD AUTO: 17 %
MCH RBC QN AUTO: 32.9 PG (ref 26.5–33)
MCHC RBC AUTO-ENTMCNC: 34.4 G/DL (ref 31.5–36.5)
MCV RBC AUTO: 96 FL (ref 78–100)
MONOCYTES # BLD AUTO: 0.6 10E3/UL (ref 0–1.3)
MONOCYTES NFR BLD AUTO: 11 %
NEUTROPHILS # BLD AUTO: 3.8 10E3/UL (ref 1.6–8.3)
NEUTROPHILS NFR BLD AUTO: 65 %
NRBC # BLD AUTO: 0 10E3/UL
NRBC BLD AUTO-RTO: 0 /100
PLATELET # BLD AUTO: 225 10E3/UL (ref 150–450)
POTASSIUM BLD-SCNC: 4.6 MMOL/L (ref 3.4–5.3)
PROT SERPL-MCNC: 6.9 G/DL (ref 6.8–8.8)
PSA SERPL-MCNC: <0.01 UG/L (ref 0–4)
RBC # BLD AUTO: 3.77 10E6/UL (ref 4.4–5.9)
SODIUM SERPL-SCNC: 141 MMOL/L (ref 133–144)
WBC # BLD AUTO: 5.7 10E3/UL (ref 4–11)

## 2022-11-11 PROCEDURE — 84153 ASSAY OF PSA TOTAL: CPT | Performed by: NURSE PRACTITIONER

## 2022-11-11 PROCEDURE — 85025 COMPLETE CBC W/AUTO DIFF WBC: CPT | Performed by: NURSE PRACTITIONER

## 2022-11-11 PROCEDURE — 36415 COLL VENOUS BLD VENIPUNCTURE: CPT

## 2022-11-11 PROCEDURE — 80053 COMPREHEN METABOLIC PANEL: CPT | Performed by: NURSE PRACTITIONER

## 2022-11-13 DIAGNOSIS — C61 PROSTATE CANCER (H): Primary | ICD-10-CM

## 2022-11-14 ENCOUNTER — VIRTUAL VISIT (OUTPATIENT)
Dept: ONCOLOGY | Facility: CLINIC | Age: 67
End: 2022-11-14
Attending: INTERNAL MEDICINE
Payer: MEDICARE

## 2022-11-14 VITALS — BODY MASS INDEX: 31.78 KG/M2 | WEIGHT: 222 LBS | HEIGHT: 70 IN

## 2022-11-14 DIAGNOSIS — C61 PROSTATE CANCER (H): Primary | ICD-10-CM

## 2022-11-14 DIAGNOSIS — I10 ESSENTIAL HYPERTENSION: ICD-10-CM

## 2022-11-14 PROCEDURE — G0463 HOSPITAL OUTPT CLINIC VISIT: HCPCS | Mod: PN,RTG | Performed by: NURSE PRACTITIONER

## 2022-11-14 PROCEDURE — 99214 OFFICE O/P EST MOD 30 MIN: CPT | Mod: 95 | Performed by: NURSE PRACTITIONER

## 2022-11-14 ASSESSMENT — PAIN SCALES - GENERAL: PAINLEVEL: NO PAIN (0)

## 2022-11-14 NOTE — PROGRESS NOTES
Joel is a 67 year old who is being evaluated via a billable video visit.      How would you like to obtain your AVS? MyChart  If the video visit is dropped, the invitation should be resent by: Send to e-mail at: [email or web address]  Will anyone else be joining your video visit? Yes: Yes wife will join. How would they like to receive their invitation? Other e-mail: n/a      Suyapa Garcia    Video-Visit Details    Video Start Time: 11:30 AM    Type of service:  Video Visit    Video End Time:11:40 AM    Originating Location (pt. Location): Home        Distant Location (provider location):  On-site    Platform used for Video Visit: [facility] Oncology Followup Note       Date of visit: November 14, 2022      CC:   metastatic prostate cancer     ONCOLOGY HISTORY:   -Elevated PSA noted 2/26/21 at 12.70. Previously normal in 2017.     -4/7/21-Initial urology visit.   -7/2/21-prostate biopsy 4+3, 9/12 biopsies positive.  Ductal component noted.     -7/28/21-MR prostate-PIRADS 5 lesion, R and L sided lesions with likely    neurovascular bundle invasion. No  seminal vesicle involvement. No clear LAD,  but some indeterminate pelvic nodes.  No suspicious bone lesions.     -7/28/21-NM bone scan suspicious lesion of R sacral ala S3 level.     -8/10/21-CT A/P with multiple enlarged periaortic/iliac LN   -8/11/21-First eligard dose 45mg (Q6M dosing). Due next 2/2022.   -8/30/21-Initial medical oncology visit with Dr. Solo.  Unclear if bony lesion is metastasis based on current imaging.  Will start enzalutamide and continue    Eligard (next due 2/2022).     8/30/21 PSA =30.40 Pre Rx  9/27/21 PSA =8.99 (T=6); HgbA1C = 6.1  10/5/21 PSA =3.22  11/26/21 PSA =0.15  7/26/22 PSA <0.01  11/11/22 PSA= <0.01    Interval History:  Doing well in general.   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   No EtOH  No recreational drugs.   No herbs/supplements other than on medication list.      MEDS:  Current Outpatient Medications   Medication Instructions     albuterol (PROAIR HFA) 108 (90 BASE) MCG/ACT Inhaler 1-2 puffs every 2 -4 hrs as needed     ALLEGRA-D ALLERGY & CONGESTION 180-240 MG 24 hr tablet TAKE ONE TABLET BY MOUTH EVERY DAY     blood glucose (HUGO CONTOUR) test strip Use to test blood sugars 1 time daily or as directed.     blood glucose (NO BRAND SPECIFIED) lancets standard Use to test blood sugar one time daily or as directed.     blood glucose calibration (HUGO CONTOUR) NORMAL solution Use to calibrate blood glucose monitor as directed.     buPROPion (WELLBUTRIN XL) 300 MG 24 hr tablet TAKE ONE TABLET BY MOUTH EVERY MORNING     Coenzyme Q-10 capsule 1 capsule, DAILY     esomeprazole (NEXIUM) 40 MG DR capsule TAKE ONE CAPSULE BY MOUTH EVERY MORNING BEFORE BREAKFAST , 30-60 MINUTES BEFORE EATING     ezetimibe (ZETIA) 10 MG tablet TAKE ONE TABLET BY MOUTH ONCE DAILY     fish oil-omega-3 fatty acids 1000 MG capsule Take  by mouth. Take daily       levothyroxine (SYNTHROID/LEVOTHROID) 112 mcg, Oral, DAILY     losartan (COZAAR) 75 mg, Oral, DAILY     Magnesium 500 MG CAPS One twice a day     metFORMIN (GLUCOPHAGE) 500 MG tablet TAKE ONE TABLET BY MOUTH TWICE A DAY WITH MEALS     Misc Natural Products (OSTEO BI-FLEX ADV JOINT SHIELD) TABS Take  by mouth.     multivitamin (OCUVITE) TABS tablet 1 tablet, Oral, DAILY     STATIN NOT PRESCRIBED (INTENTIONAL) Please choose reason not prescribed, below     Vitamin D3 (CHOLECALCIFEROL) 5,000 Units, Oral     ROS-Remainder of 14 point ROS reviewed and negative  except as in HPI.    PHYSICAL EXAM:  Exam:  Video visit   Appears in no distress  Cognitively appropriate     LABS AND IMAGES:    Prostate Biopsy 7/2/21  FINAL DIAGNOSIS:   A: Prostate, left, biopsy   - Adenocarcinoma, Mary's grade 4/3 with ductal component involving 4 of    6 needle core biopsies and 25% of   submitted tissue     B: Prostate, right, biopsy   - Adenocarcinoma, Castalia's grade 4/3 with ductal component involving 5 of    6 needle core biopsies and 25% of   submitted tissue     MR prostate 7/28/21  FINDINGS:  Size: 31 grams  Hemorrhage: Absent  Peripheral zone: Heterogeneous on T2-weighted images. Suspicious  lesions as detailed below.  Transition zone: Enlarged with BPH changes. No suspicious lesions  identified.     Lesion(s) in rank order of severity (highest score- to lowest score,  then by size)      Lesion 1:  Location: Right mid gland peripheral zone at the 7-9 o'clock position  relative to the urethra. Series 5 image 50.  Size: 20 x 10 mm  T2 description: Homogeneously hypointense  T2 numerical assessment: 5  DWI description: Marked restriction diffusion  DWI numerical assessment: 5  DCE assessment: Negative    Prostate margin: Capsular abutment>15 mm with enlarged neurovascular  bundle suggesting tumor involvement  Lesion overall PI-RADS category: 5     Lesion 2:  Location: Left apex peripheral zone at the 4 o'clock position relative  to the urethra. Series 5 image 63.  Size: 16 x 11 mm  T2 description: Homogeneously hypointense  T2 numerical assessment: 5  DWI description: Marked diffusion restriction  DWI numerical assessment: 5  DCE assessment: Positive    Prostate margin: Capsular abutment 6-15 mm with capsular irregularity  and focal bulging   Lesion overall PI-RADS category: 5     Neurovascular bundles: Both neurovascular bundles are likely involved  by malignancy.  A right prostatic vein is markedly enlarged compared  to the left, similar to 8/1/2014 CT.  Seminal vesicles: No seminal  vesicle involvement by malignancy.  Lymph nodes: No clear adenopathy. Indeterminate nodes as follows: Left  pelvic 8 x 5 mm lymph node on series 5 image 27.  Bones: No suspicious lesions  Other pelvic organs: Colonic diverticulosis.                                                                         IMPRESSION:  1. Based on the most suspicious abnormality, this exam is  characterized as PIRADS 5 - Clinically significant cancer is highly  likely to be present.  The most suspicious abnormalities are located  at the right mid peripheral zone and left apical peripheral zone and  there is high suspicion of extraprostatic extension.  2. No suspicious adenopathy or evidence of pelvic metastases.    NM bone scan 7/28/21  FINDINGS:   Small area of mild uptake in the right sacrum inferiorly visible on  the posterior view. SPECT-CT demonstrates that this focal uptake  corresponds to a sclerotic lesion on CT. No other suspicious areas of  focal uptake.     Postsurgical changes of bilateral knee replacements.     Physiologic uptake by the kidneys and urinary bladder.                                                                      IMPRESSION: Single focal uptake by the right sacral ala (S3 level) and  corresponding sclerotic focus. This is highly suspicious for  metastatic focus.    CT A/P 8/10/21  FINDINGS:   LOWER CHEST: Normal.     HEPATOBILIARY: Diffuse hepatic steatosis. No significant mass. No bile  duct dilatation. No calcified gallstones.     PANCREAS: Normal.     SPLEEN: Normal.     ADRENAL GLANDS: Normal.     KIDNEYS/BLADDER: There are multiple bilateral nonobstructing  intrarenal calculi measuring up to 4 mm in largest size. Small  subcentimeter benign cortical cysts. Unremarkable bladder. No evidence  of hydronephrosis.     BOWEL: No evidence of bowel obstruction or inflammatory changes. Mild  sigmoid colon diverticulosis. No evidence of diverticulitis. Normal  appendix.     PELVIC ORGANS: Normal sized  prostate gland.     ADDITIONAL FINDINGS: Multiple enlarged retroperitoneal periaortic and  bilateral iliac chain lymph nodes are highly suspicious for  metastasis. The largest left retroperitoneal periaortic lymph node on  image 44 of series 2 measures 4.5 x 3.4 cm. 2.9 x 2.3 cm aortocaval  lymph node on image 45 of series 2. Distal left periaortic lymph node  on image 54 of series 2 measures 3.2 x 2.6 cm. Right common iliac  chain lymph node on image 65 of series 2 measures 2.3 x 2.2 cm. 1.6 cm  left common iliac chain node on image 59 of series 2.     MUSCULOSKELETAL: Small faint sites of increased bone sclerosis  involving the posterior medial right iliac on image 65 of series 2 and  image 69 of series 2 are indeterminate for possible metastasis.                                                                       11/26/21                                             IMPRESSION:  1.  Significantly decreased retroperitoneal lymphadenopathy since the  prior study dated 8/10/2021 indicates good response to treatment. The  remaining enlarged retroperitoneal lymph node appears to have a  necrotic center.  2.  Minimal pulmonary nodularity was likely present previously.  3.  No other evidence for lymphadenopathy or metastasis is identified.  Specifically no evidence for left sacral metastasis is seen.    Most Recent 3 CBC's:Recent Labs   Lab Test 11/11/22 0805 09/20/22  0821 08/15/22  0913   WBC 5.7 5.7 6.4   HGB 12.4* 12.7* 11.9*   MCV 96 91 90    202 232   ANEUTAUTO 3.8 3.9 4.5     Most Recent 3 BMP's:  Recent Labs   Lab Test 11/11/22 0805 09/20/22  0821 08/15/22  0913    138 139   POTASSIUM 4.6 4.6 4.4   CHLORIDE 108 107 105   CO2 29 26 27   BUN 29 22 21   CR 1.11 1.15 1.03   ANIONGAP 4 5 7   AURORA 9.1 9.3 9.5   * 131* 140*   PROTTOTAL 6.9 7.1 7.1   ALBUMIN 4.1 4.1 4.0    Most Recent 3 LFT's:  Recent Labs   Lab Test 11/11/22  0805 09/20/22  0821 08/15/22  0913   AST 19 15 19   ALT 20 23 29    ALKPHOS 73 80 73   BILITOTAL 0.3 0.5 0.4    Most Recent 2 TSH and T4:  Recent Labs   Lab Test 07/26/22  0837 04/20/22  1418   TSH 4.61* 4.61*   T4 0.90 1.14     I reviewed the above labs today.      IMPRESSION AND PLAN:  Joel Pike is a 67 year old M with PMH of DM2 on metformin, depression/anxiety (pt reports as bipolar d/o), HTN, HLD, and prostate cancer with possible bone metastasis.  Previously, Dr. Solo had a lengthy discussion at his initial and went over his pathology and staging scan results.  They previously discussed that it is not clear whether the lesion in wing of his ala is a true bony metastasis or whether it is another finding/artifact.  They previously discussed that this does not change the overall treatment of his disease with ADT, but may change whether he receives radiation to his bony lesion in the future or not.  The role of ADT and the addition of enzalutamide were previously discussed.  Given his existing DM2, we will attempt to avoid additional prednisone use by choosing enzalutamide.  However, Enzalutamide was declined by patient's insurance company.    -He started apalutamide on 10/5/21. He had CT CAP done 11/26/21 which showed significantly decreased retroperitoneal lymphadenopathy since the prior study dated 8/10/2021 indicates good response to treatment. No other evidence for lymphadenopathy or metastasis is identified. Specifically no evidence for left sacral metastasis is seen. Continue current dose of Apalutamide.   -Completed RT to the primary for oligometastatic disease based on the STAMPEDE study with Dr. Gomez ~09/2022  -Plan is to continue ADT for 2 years (through 8/2023). His next Eligard 45mg is due 02/2023 - can get while here for Dr. Solo in person       30 minutes spent on the date of the encounter doing chart review, review of test results, interpretation of tests, patient visit, documentation and discussion with other provider(s)     Maria Esther Coelho, CNP on 11/14/2022  at 11:52 AM

## 2022-11-16 ENCOUNTER — OFFICE VISIT (OUTPATIENT)
Dept: RADIATION THERAPY | Facility: OUTPATIENT CENTER | Age: 67
End: 2022-11-16
Payer: MEDICARE

## 2022-11-16 VITALS
HEIGHT: 70 IN | BODY MASS INDEX: 32.07 KG/M2 | RESPIRATION RATE: 12 BRPM | DIASTOLIC BLOOD PRESSURE: 72 MMHG | OXYGEN SATURATION: 99 % | WEIGHT: 224 LBS | HEART RATE: 72 BPM | SYSTOLIC BLOOD PRESSURE: 127 MMHG

## 2022-11-16 DIAGNOSIS — C61 PROSTATE CANCER (H): Primary | ICD-10-CM

## 2022-11-16 NOTE — LETTER
2022         RE: Joel Pike  [address]  [facility] [address]        Dear Colleague,    Thank you for referring your patient, Joel Pike, to the [facility]. Please see a copy of my visit note below.       Department of Radiation Oncology  Radiation Therapy Center  [facility]  [place] [address]  [phone]       Radiation Oncology Follow-up Visit  2022      Joel Pike  MRN: [id number]   : 1955     Radiation Oncologist: Isaías Gomez MD  Medical Oncologist: Isaias Solo MD     DIAGNOSIS: Metastatic prostate cancer  PATHOLOGY: Prostate adenocarcinoma, Mary score 4+3 equal 7.                                   STAGE: [id number] (para-aortic nodes, ? R sacral osseous lesion).   CONCURRENT SYSTEMIC THERAPY:   Yes, oral Xtandi     ONCOLOGIC HISTORY:          The patient was noted to have an elevated PSA on 2021 with a value of 12.7.  Repeat PSA on 2021 was 30.4.  Prostate biopsy on 2021 demonstrated prostate adenocarcinoma, Mary score 4+3 equal 7.  MRI of the prostate on 2021 demonstrated PI-RADS 5 lesion with likely extracapsular extension.  There were noted to have indeterminate pelvic lymph nodes at the time.  Bone scan 2021 demonstrated a indeterminate lesion of the right sacral ala at the level of S3.  CT scan of the abdomen pelvis on 8/10/2021 demonstrated multiple large para-aortic and pelvic lymph nodes.  Patient was seen by medical oncologist Dr. Solo.  The patient was started on systemic treatment with Eligard and enzalutamide.  PSA has demonstrated biochemical response. PSA on 8/15/2022 remained undetectable. He was referred to our clinic to discuss nextsteps in management and discussion of possible local treatment with radiation therapy. He completed radiation therapy on 10/5/22.      SITE OF TREATMENT: Prostate     DATES  OF TREATMENT: 22 to 10/5/22     TOTAL DOSE OF TREATMENT: 5,500  "cGy     DOSE PER FRACTION OF TREATMENT: 275 cGy x 20 fractions       SUBJECTIVE:   Joel and his wife Michelle are here today for one month follow up. He is doing well. His energy is not at baseline. Denies blood in urine or stool. On flomax, getting up to urinate 2 x per night. He has no complaints. Most recent PSA was done at urology office, pt saw undetectable results.    PHYSICAL EXAM:  /72   Pulse 72   Resp 12   Ht 1.778 m (5' 10\")   Wt 101.6 kg (224 lb)   SpO2 99%   BMI 32.14 kg/m    Gen: Alert, in NAD  Eyes: PERRL, EOMI, sclera anicteric  HENT     Head: NC/AT     Ears: No external auricular lesions  Neck: Supple, full ROM, no LAD  Pulm: No wheezing, stridor or respiratory distress  CV: Well-perfused, no cyanosis, no pedal edema  Abdominal: Soft, nontender, nondistended, no hepatomegaly  Back: No step-offs or pain to palpation along the thoracolumbar spine, no CVA tenderness    LABS AND IMAGING:  PSA   Date Value Ref Range Status   02/26/2021 12.70 (H) 0 - 4 ug/L Final     Comment:     Assay Method:  Chemiluminescence using Siemens Vista analyzer     PSA Tumor Marker   Date Value Ref Range Status   11/11/2022 <0.01 0.00 - 4.00 ug/L Final       IMPRESSION:   Mr. Pike is a 67 year old male with a metastatic prostate cancer [id number]. He had elevated PSA in February 26, 2021 with a value of 12.7.  Repeat PSA on 8/30/2021 was 30.4.  Prostate biopsy on 7/2/2021 demonstrated prostate adenocarcinoma, Mary score 4+3 equal 7.  MRI of the prostate on 7/28/2021 demonstrated PI-RADS 5 lesion with likely extracapsular extension.  There were noted to have indeterminate pelvic lymph nodes at the time.  Bone scan 7/28/2021 demonstrated a indeterminate lesion of the right sacral ala at the level of S3.  CT scan of the abdomen pelvis on 8/10/2021 demonstrated multiple large para-aortic and pelvic lymph nodes.  Patient was seen by medical oncology team (Dr. Solo).  The patient was started on systemic treatment with " Eligard and enzalutamide.  PSA has demonstrated biochemical response. He completed radiation therapy to prostate one month ago and returns for follow up today. PSA is undetectable.     PLAN:   See me in 6 months. Continue to follow up with medical oncology, defer to them on ordering PSA.     MALLY Guerrero  Department of Radiation Oncology  [facility]

## 2022-11-16 NOTE — PROGRESS NOTES
Department of Radiation Oncology  Radiation Therapy Center  [facility] [place] [address]  [phone]       Radiation Oncology Follow-up Visit  2022      Joel Pike  MRN: [id number]   : 1955     Radiation Oncologist: Isaías Gomez MD  Medical Oncologist: Isaias Solo MD     DIAGNOSIS: Metastatic prostate cancer  PATHOLOGY: Prostate adenocarcinoma, Mary score 4+3 equal 7.                                   STAGE: [id number] (para-aortic nodes, ? R sacral osseous lesion).   CONCURRENT SYSTEMIC THERAPY:   Yes, oral Xtandi     ONCOLOGIC HISTORY:          The patient was noted to have an elevated PSA on 2021 with a value of 12.7.  Repeat PSA on 2021 was 30.4.  Prostate biopsy on 2021 demonstrated prostate adenocarcinoma, Mary score 4+3 equal 7.  MRI of the prostate on 2021 demonstrated PI-RADS 5 lesion with likely extracapsular extension.  There were noted to have indeterminate pelvic lymph nodes at the time.  Bone scan 2021 demonstrated a indeterminate lesion of the right sacral ala at the level of S3.  CT scan of the abdomen pelvis on 8/10/2021 demonstrated multiple large para-aortic and pelvic lymph nodes.  Patient was seen by medical oncologist Dr. Solo.  The patient was started on systemic treatment with Eligard and enzalutamide.  PSA has demonstrated biochemical response. PSA on 8/15/2022 remained undetectable. He was referred to our clinic to discuss nextsteps in management and discussion of possible local treatment with radiation therapy. He completed radiation therapy on 10/5/22.      SITE OF TREATMENT: Prostate     DATES  OF TREATMENT: 22 to 10/5/22     TOTAL DOSE OF TREATMENT: 5,500 cGy     DOSE PER FRACTION OF TREATMENT: 275 cGy x 20 fractions       SUBJECTIVE:   Joel and his wife Michelle are here today for one month follow up. He is doing well. His energy is not at baseline. Denies blood in urine or stool. On flomax,  "getting up to urinate 2 x per night. He has no complaints. Most recent PSA was done at urology office, pt saw undetectable results.    PHYSICAL EXAM:  /72   Pulse 72   Resp 12   Ht 1.778 m (5' 10\")   Wt 101.6 kg (224 lb)   SpO2 99%   BMI 32.14 kg/m    Gen: Alert, in NAD  Eyes: PERRL, EOMI, sclera anicteric  HENT     Head: NC/AT     Ears: No external auricular lesions  Neck: Supple, full ROM, no LAD  Pulm: No wheezing, stridor or respiratory distress  CV: Well-perfused, no cyanosis, no pedal edema  Abdominal: Soft, nontender, nondistended, no hepatomegaly  Back: No step-offs or pain to palpation along the thoracolumbar spine, no CVA tenderness    LABS AND IMAGING:  PSA   Date Value Ref Range Status   02/26/2021 12.70 (H) 0 - 4 ug/L Final     Comment:     Assay Method:  Chemiluminescence using Siemens Vista analyzer     PSA Tumor Marker   Date Value Ref Range Status   11/11/2022 <0.01 0.00 - 4.00 ug/L Final       IMPRESSION:   Mr. Pike is a 67 year old male with a metastatic prostate cancer [id number]. He had elevated PSA in February 26, 2021 with a value of 12.7.  Repeat PSA on 8/30/2021 was 30.4.  Prostate biopsy on 7/2/2021 demonstrated prostate adenocarcinoma, Lowell score 4+3 equal 7.  MRI of the prostate on 7/28/2021 demonstrated PI-RADS 5 lesion with likely extracapsular extension.  There were noted to have indeterminate pelvic lymph nodes at the time.  Bone scan 7/28/2021 demonstrated a indeterminate lesion of the right sacral ala at the level of S3.  CT scan of the abdomen pelvis on 8/10/2021 demonstrated multiple large para-aortic and pelvic lymph nodes.  Patient was seen by medical oncology team (Dr. Solo).  The patient was started on systemic treatment with Eligard and enzalutamide.  PSA has demonstrated biochemical response. He completed radiation therapy to prostate one month ago and returns for follow up today. PSA is undetectable.     PLAN:   See me in 6 months. Continue to follow up with " medical oncology, defer to them on ordering PSA.     MALLY Guerrero  Department of Radiation Oncology  [facility]

## 2022-11-18 ENCOUNTER — TELEPHONE (OUTPATIENT)
Dept: ONCOLOGY | Facility: CLINIC | Age: 67
End: 2022-11-18

## 2022-11-18 DIAGNOSIS — C61 PROSTATE CANCER (H): Primary | ICD-10-CM

## 2022-11-18 NOTE — TELEPHONE ENCOUNTER
Alvaro/ Assistance Approved    Medication Erleada  Amount/ $8000  [facility] [place]  Phone #   Fax #   Effective Dates 10/19/2022-10/18/2023  Additional Information   Patient notified? Yes          Thank you,    America Hannon  Oncology Pharmacy Liaison II  [email or web address]  Phone: [phone]  Fax: [phone]

## 2022-11-19 DIAGNOSIS — F33.42 RECURRENT MAJOR DEPRESSION IN COMPLETE REMISSION (H): ICD-10-CM

## 2022-11-22 RX ORDER — BUPROPION HYDROCHLORIDE 150 MG/1
TABLET ORAL
Qty: 90 TABLET | Refills: 0 | Status: SHIPPED | OUTPATIENT
Start: 2022-11-22 | End: 2023-01-15

## 2022-11-22 NOTE — TELEPHONE ENCOUNTER
Routing refill request to provider for review/approval because:    Requested Prescriptions   Pending Prescriptions Disp Refills    buPROPion (WELLBUTRIN XL) 150 MG 24 hr tablet [Pharmacy Med Name: BUPROPION HCL ER (XL) 150MG TB24] 90 tablet 0     Sig: TAKE TWO TABLETS (300 MG) BY MOUTH EVERY MORNING       SSRIs Protocol Failed - 11/19/2022  5:34 PM        Failed - PHQ-9 score less than 5 in past 6 months     Please review last PHQ-9 score.

## 2022-12-16 DIAGNOSIS — C61 PROSTATE CANCER (H): Primary | ICD-10-CM

## 2022-12-20 DIAGNOSIS — C61 PROSTATE CANCER (H): ICD-10-CM

## 2022-12-20 RX ORDER — TAMSULOSIN HYDROCHLORIDE 0.4 MG/1
0.4 CAPSULE ORAL DAILY
Qty: 30 CAPSULE | Refills: 3 | Status: SHIPPED | OUTPATIENT
Start: 2022-12-20 | End: 2023-03-20

## 2022-12-22 DIAGNOSIS — C61 PROSTATE CANCER (H): ICD-10-CM

## 2022-12-26 RX ORDER — GABAPENTIN 300 MG/1
300 CAPSULE ORAL AT BEDTIME
Qty: 30 CAPSULE | Refills: 0 | Status: SHIPPED | OUTPATIENT
Start: 2022-12-26 | End: 2023-01-24

## 2023-01-04 NOTE — LETTER
6/7/2017       RE: Joel Pike  [address]  [facility] [address]           Dear Colleague,    Thank you for referring your patient, Joel Pike, to the [facility]. Please see a copy of my visit note below.    ORTHOPEDIC CONSULT      Chief Complaint: Joel Pike is a 61 year old male who is being seen for Chief Complaint   Patient presents with     Musculoskeletal Problem     Bilateral CTS     Consult       History of Present Illness:   Presents with bilateral intermittent hand numbness and tingling associated with some pain. He reports bilateral carpal tunnel releases 25 years ago from a work compensation injury. He initially received a short-term improvement however shortly after noticed a return of symptoms. Small finger is generally spared. He has issues with his . He has dropped objects. Over the years he has attempted anti-inflammatories, Tylenol, bracing with no improvement. Pain and numbness bother him when he is at work and at home.      Patient's past medical, surgical, social and family histories reviewed.     Past Medical History:   Diagnosis Date     Calculus of kidney      CKD (chronic kidney disease) stage 2, GFR 60-89 ml/min 10/30/2015     Depressive disorder, not elsewhere classified      Diabetic eye exam (H) 02/06/12, 11/1/13     Diabetic eye exam (H) 12/17/14     Hyperlipidaemia      Hypothyroidism 1/17/2010     Obesity, Class I, BMI 30-34.9 10/30/2015       Past Surgical History:   Procedure Laterality Date     COLONOSCOPY  02/02/09    Repeat in 5 yrs     COLONOSCOPY N/A 9/5/2014    Procedure: COMBINED COLONOSCOPY, SINGLE BIOPSY/POLYPECTOMY BY BIOPSY;  Surgeon: Denis Oakes MD;  Location:  GI     ESOPHAGOSCOPY, GASTROSCOPY, DUODENOSCOPY (EGD), COMBINED N/A 2/20/2015    Procedure: COMBINED ESOPHAGOSCOPY, GASTROSCOPY, DUODENOSCOPY (EGD), BIOPSY SINGLE OR MULTIPLE;  Surgeon: Alfred Young MD;  Location:  GI     HC REMV CATARACT EXTRACAP,INSERT  LENS  2000    Bilateral     HC REVISE MEDIAN N/CARPAL TUNNEL SURG  1991     HC TOOTH EXTRACTION W/FORCEP  1980's    [place] teeth removed       Medications:    Current Outpatient Prescriptions on File Prior to Visit:  aspirin 325 MG EC tablet One a day   cholecalciferol (VITAMIN D3) 5000 UNITS TABS tablet Take 1 tablet (5,000 Units) by mouth   Magnesium 500 MG CAPS One twice a day   multivitamin (OCUVITE) TABS tablet Take 1 tablet by mouth daily   NEW MED Energy and Metabolism Anthony Men daily   metFORMIN (GLUCOPHAGE) 500 MG tablet TAKE 1 TABLET TWICE DAILY WITH MEALS   STATIN NOT PRESCRIBED, INTENTIONAL, Statin not prescribed intentionally due to Intolerance (with supporting documentation of trying a statin at least once within the last 5 years)   ezetimibe (ZETIA) 10 MG tablet Take 1 tablet (10 mg) by mouth daily   esomeprazole (NEXIUM) 40 MG CR capsule TAKE 1 CAPSULE EVERY MORNING BEFORE BREAKFAST, 30 TO 60 MINUTES BEFORE EATING.   blood glucose calibration (HUGO CONTOUR) NORMAL solution Use to calibrate blood glucose monitor as directed.   blood glucose monitoring (HUGO CONTOUR) test strip Use to test blood sugars 1 time daily or as directed.   albuterol (ALBUTEROL) 108 (90 BASE) MCG/ACT inhaler 1-2 puffs every 2 -4 hrs as needed   blood glucose (NO BRAND SPECIFIED) lancets standard Use to test blood sugar one time daily or as directed.   fexofenadine-pseudoePHEDrine (ALLEGRA-D 24) 180-240 MG per tablet Take 1 tablet by mouth daily   buPROPion (WELLBUTRIN XL) 150 MG 24 hr tablet Take 1 tablet (150 mg) by mouth daily   levothyroxine (SYNTHROID, LEVOTHROID) 50 MCG tablet TAKE 1 TABLET EVERY MORNING   losartan (COZAAR) 50 MG tablet Take 1 tablet (50 mg) by mouth daily   Misc Natural Products (OSTEO BI-FLEX ADV JOINT SHIELD) TABS Take  by mouth.   Coenzyme Q-10 capsule Take 1 capsule by mouth daily.   Multiple Vitamins-Minerals CAPS Take  by mouth.   fish oil-omega-3 fatty acids 1000 MG capsule Take  by mouth. Take  "daily   methocarbamol (ROBAXIN) 750 MG tablet Take 1 tablet (750 mg) by mouth 4 times daily as needed     No current facility-administered medications on file prior to visit.     Allergies   Allergen Reactions     Dust Mites Cough     Penicillins Rash       Social History     Occupational History      [employer]     Social History Main Topics     Smoking status: Never Smoker     Smokeless tobacco: Never Used     Alcohol use No     Drug use: No     Sexual activity: No       Family History   Problem Relation Age of Onset     Hypertension Father      DIABETES Father      Adult     HEART DISEASE Father      Hx: Bypass     CANCER Sister      Liver     Thyroid Disease Brother        REVIEW OF SYSTEMS  10 point review systems performed otherwise negative as noted as per history of present illness.    Physical Exam:  Vitals: Temp 98.1  F (36.7  C) (Temporal)  Ht 5' 10.5\" (1.791 m)  Wt 229 lb 1.6 oz (103.9 kg)  BMI 32.41 kg/m2  BMI= Body mass index is 32.41 kg/(m^2).  Constitutional: healthy, alert and no acute distress   Psychiatric: mentation appears normal and affect normal/bright  NEURO: no focal deficits  RESP: Normal with easy respirations and no use of accessory muscles to breathe, no audible wheezing or retractions  CV: bilateral upper extemity:  no edema         Regular rate and rhythm by palpation  SKIN: No erythema, rashes, excoriation, or breakdown. No evidence of infection. There are well-healed incisions along the thenar eminence. No masses.  JOINT/EXTREMITIES:bilateral wrists and hands: No deformity. No atrophy. He has full active range of motion. There is no areas of tenderness. Fingers are warm and dry with good cap refill. Right: Negative Tinel's, positive carpal compression, positive Phalen's. Left positive Tinel's, positive carpal compression, positive Phalen's.  There is no ulnar motor weakness.    GAIT: not tested     Diagnostic Modalities:  bilateral wrist X-ray: No " fractures or dislocations. Some small cysts in the lunate. Joint spaces are maintained.  Independent visualization of the images was performed.      Impression: bilateral carpal tunnel syndrome with a history of previous carpal tunnel release    Plan:  All of the above pertinent physical exam and imaging modalities findings was reviewed with Joel and his wife Michelle.    Given his previous surgery I recommended EMGs for full evaluation. I do anticipate he will most likely need a carpal tunnel release.    Return to clinic to discuss test results, or sooner as needed for changes.  Re-x-ray on return: No    Jacky Berman D.O.    Again, thank you for allowing me to participate in the care of your patient.        Sincerely,              Jason Berman, DO     Negative

## 2023-01-13 DIAGNOSIS — F33.42 RECURRENT MAJOR DEPRESSION IN COMPLETE REMISSION (H): ICD-10-CM

## 2023-01-13 NOTE — TELEPHONE ENCOUNTER
Pending Prescriptions:                       Disp   Refills    buPROPion (WELLBUTRIN XL) 150 MG 24 hr tab*90 tab*0        Sig: TAKE TWO TABLETS BY MOUTH EVERY MORNING      Routing refill request to provider for review/approval because:  Labs not current:  [id number]    Lor Cyr RN on 1/13/2023 at 12:52 PM

## 2023-01-15 RX ORDER — BUPROPION HYDROCHLORIDE 150 MG/1
TABLET ORAL
Qty: 90 TABLET | Refills: 0 | Status: SHIPPED | OUTPATIENT
Start: 2023-01-15 | End: 2023-02-24

## 2023-01-16 DIAGNOSIS — C61 PROSTATE CANCER (H): Primary | ICD-10-CM

## 2023-01-16 NOTE — TELEPHONE ENCOUNTER
Prior Authorization Approval    Authorization Effective Date: 10/25/2022  Authorization Expiration Date: 4/25/2023  Medication: Azael BAL Approved  Approved Dose/Quantity: 120 per 30 DS  Reference #: [id number]   Insurance Company: [employer] - Phone [phone] Fax [phone]  Expected CoPay:       CoPay Card Available:      Foundation Assistance Needed:    Which Pharmacy is filling the prescription (Not needed for infusion/clinic administered): [facility] [place] [address]  Pharmacy Notified: Yes  Patient Notified: Yes          Thank you,    America Hannon  Oncology Pharmacy Liaison II  [email or web address]  Phone: [phone]  Fax: [phone]

## 2023-01-17 ENCOUNTER — TELEPHONE (OUTPATIENT)
Dept: ONCOLOGY | Facility: CLINIC | Age: 68
End: 2023-01-17
Payer: MEDICARE

## 2023-01-17 NOTE — TELEPHONE ENCOUNTER
Alvaro/ Assistance Approved    Medication; Azael  Amount/ $3500  Foundation; [employer]  Phone # [phone]  Fax #   Effective Dates: 1/17/2023-1/18/2024  Additional Information   Patient notified? Yes          Thank you,    America Hannon  Oncology Pharmacy Liaison YUNIOR [email or web address]  Phone: [phone]  Fax: [phone]

## 2023-01-21 DIAGNOSIS — E03.9 HYPOTHYROIDISM, UNSPECIFIED TYPE: ICD-10-CM

## 2023-01-23 DIAGNOSIS — C61 PROSTATE CANCER (H): ICD-10-CM

## 2023-01-23 NOTE — TELEPHONE ENCOUNTER
Gabapentin 300mg capsule  Last prescribing provider: Isaias Solo at 12/26/22    Last clinic visit date: 11/14/22 w/ Maria Esther Coelho    Recommendations for requested medication (if none, N/A): NA    Any other pertinent information (if none, N/A): NA    Refilled: Y/N, if NO, why?    Routed to Dr. Solo.

## 2023-01-24 RX ORDER — LEVOTHYROXINE SODIUM 125 UG/1
TABLET ORAL
Qty: 30 TABLET | Refills: 0 | Status: SHIPPED | OUTPATIENT
Start: 2023-01-24 | End: 2023-02-21

## 2023-01-24 RX ORDER — GABAPENTIN 300 MG/1
300 CAPSULE ORAL AT BEDTIME
Qty: 30 CAPSULE | Refills: 0 | Status: SHIPPED | OUTPATIENT
Start: 2023-01-24 | End: 2023-02-24

## 2023-01-24 NOTE — TELEPHONE ENCOUNTER
Routing refill request to provider for review/approval because:    Requested Prescriptions   Pending Prescriptions Disp Refills    levothyroxine (SYNTHROID/LEVOTHROID) 125 MCG tablet [Pharmacy Med Name: LEVOTHYROXINE SODIUM 125MCG TABS] 90 tablet 0     Sig: TAKE ONE TABLET (125 MCG) BY MOUTH ONCE DAILY       Thyroid Protocol Failed - 1/21/2023  9:46 AM        Failed - Normal TSH on file in past 12 months     Recent Labs   Lab Test 07/26/22  0837   TSH 4.61*

## 2023-01-25 ENCOUNTER — MYC MEDICAL ADVICE (OUTPATIENT)
Dept: FAMILY MEDICINE | Facility: CLINIC | Age: 68
End: 2023-01-25
Payer: MEDICARE

## 2023-01-25 NOTE — TELEPHONE ENCOUNTER
[facility].    Mikaela Antonio, CMA

## 2023-02-10 ENCOUNTER — LAB (OUTPATIENT)
Dept: LAB | Facility: CLINIC | Age: 68
End: 2023-02-10
Payer: MEDICARE

## 2023-02-10 ENCOUNTER — DOCUMENTATION ONLY (OUTPATIENT)
Dept: LAB | Facility: CLINIC | Age: 68
End: 2023-02-10

## 2023-02-10 DIAGNOSIS — E03.4 HYPOTHYROIDISM DUE TO ACQUIRED ATROPHY OF THYROID: Primary | ICD-10-CM

## 2023-02-10 DIAGNOSIS — R94.39 ABNORMAL STRESS TEST: ICD-10-CM

## 2023-02-10 DIAGNOSIS — Z11.59 ENCOUNTER FOR SCREENING FOR OTHER VIRAL DISEASES: ICD-10-CM

## 2023-02-10 DIAGNOSIS — C61 PROSTATE CANCER (H): ICD-10-CM

## 2023-02-10 DIAGNOSIS — E11.9 TYPE 2 DIABETES MELLITUS WITHOUT COMPLICATION, WITHOUT LONG-TERM CURRENT USE OF INSULIN (H): ICD-10-CM

## 2023-02-10 DIAGNOSIS — Z79.899 ENCOUNTER FOR LONG-TERM (CURRENT) USE OF MEDICATIONS: ICD-10-CM

## 2023-02-10 DIAGNOSIS — I48.92 ATRIAL FLUTTER, UNSPECIFIED TYPE (H): ICD-10-CM

## 2023-02-10 LAB
ALBUMIN SERPL BCG-MCNC: 4.1 G/DL (ref 3.5–5.2)
ALP SERPL-CCNC: 85 U/L (ref 40–129)
ALT SERPL W P-5'-P-CCNC: 17 U/L (ref 10–50)
ANION GAP SERPL CALCULATED.3IONS-SCNC: 13 MMOL/L (ref 7–15)
AST SERPL W P-5'-P-CCNC: 22 U/L (ref 10–50)
BASOPHILS # BLD AUTO: 0 10E3/UL (ref 0–0.2)
BASOPHILS NFR BLD AUTO: 1 %
BILIRUB SERPL-MCNC: 0.3 MG/DL
BUN SERPL-MCNC: 25.5 MG/DL (ref 8–23)
CALCIUM SERPL-MCNC: 9.7 MG/DL (ref 8.8–10.2)
CHLORIDE SERPL-SCNC: 99 MMOL/L (ref 98–107)
CREAT SERPL-MCNC: 1.1 MG/DL (ref 0.67–1.17)
DEPRECATED HCO3 PLAS-SCNC: 24 MMOL/L (ref 22–29)
EOSINOPHIL # BLD AUTO: 0.2 10E3/UL (ref 0–0.7)
EOSINOPHIL NFR BLD AUTO: 4 %
ERYTHROCYTE [DISTWIDTH] IN BLOOD BY AUTOMATED COUNT: 12.5 % (ref 10–15)
GFR SERPL CREATININE-BSD FRML MDRD: 74 ML/MIN/1.73M2
GLUCOSE SERPL-MCNC: 144 MG/DL (ref 70–99)
HCT VFR BLD AUTO: 33.2 % (ref 40–53)
HGB BLD-MCNC: 11.3 G/DL (ref 13.3–17.7)
HOLD SPECIMEN: NORMAL
IMM GRANULOCYTES # BLD: 0 10E3/UL
IMM GRANULOCYTES NFR BLD: 1 %
LYMPHOCYTES # BLD AUTO: 0.8 10E3/UL (ref 0.8–5.3)
LYMPHOCYTES NFR BLD AUTO: 13 %
MCH RBC QN AUTO: 31.6 PG (ref 26.5–33)
MCHC RBC AUTO-ENTMCNC: 34 G/DL (ref 31.5–36.5)
MCV RBC AUTO: 93 FL (ref 78–100)
MONOCYTES # BLD AUTO: 0.6 10E3/UL (ref 0–1.3)
MONOCYTES NFR BLD AUTO: 11 %
NEUTROPHILS # BLD AUTO: 4.3 10E3/UL (ref 1.6–8.3)
NEUTROPHILS NFR BLD AUTO: 70 %
NRBC # BLD AUTO: 0 10E3/UL
NRBC BLD AUTO-RTO: 0 /100
PLATELET # BLD AUTO: 166 10E3/UL (ref 150–450)
POTASSIUM SERPL-SCNC: 4.5 MMOL/L (ref 3.4–5.3)
PROT SERPL-MCNC: 6.5 G/DL (ref 6.4–8.3)
PSA SERPL-MCNC: <0.01 NG/ML (ref 0–4.5)
RBC # BLD AUTO: 3.58 10E6/UL (ref 4.4–5.9)
SODIUM SERPL-SCNC: 136 MMOL/L (ref 136–145)
WBC # BLD AUTO: 6 10E3/UL (ref 4–11)

## 2023-02-10 PROCEDURE — 80053 COMPREHEN METABOLIC PANEL: CPT | Performed by: NURSE PRACTITIONER

## 2023-02-10 PROCEDURE — 36415 COLL VENOUS BLD VENIPUNCTURE: CPT

## 2023-02-10 PROCEDURE — 84153 ASSAY OF PSA TOTAL: CPT | Performed by: NURSE PRACTITIONER

## 2023-02-10 PROCEDURE — 85025 COMPLETE CBC W/AUTO DIFF WBC: CPT

## 2023-02-13 ENCOUNTER — VIRTUAL VISIT (OUTPATIENT)
Dept: ONCOLOGY | Facility: CLINIC | Age: 68
End: 2023-02-13
Attending: INTERNAL MEDICINE
Payer: MEDICARE

## 2023-02-13 DIAGNOSIS — C61 PROSTATE CANCER (H): Primary | ICD-10-CM

## 2023-02-13 PROCEDURE — 99214 OFFICE O/P EST MOD 30 MIN: CPT | Mod: VID | Performed by: INTERNAL MEDICINE

## 2023-02-13 NOTE — PROGRESS NOTES
Video-Visit Details    Type of service:  Video Visit    Video Start Time (time video started): 9:30  Video End Time (time video stopped):9:45    Originating Location (pt. Location): Home        Distant Location (provider location):  Off-site    Mode of Communication:  Video Conference via [facility] On[facility] Followup Note       Date of visit: 02/13/23    CC:   metastatic prostate cancer     ONCOLOGY HISTORY:   -Elevated PSA noted 2/26/21 at 12.70. Previously normal in 2017.     -4/7/21-Initial urology visit.   -7/2/21-prostate biopsy 4+3, 9/12 biopsies positive.  Ductal component noted.     -7/28/21-MR prostate-PIRADS 5 lesion, R and L sided lesions with likely    neurovascular bundle invasion. No  seminal vesicle involvement. No clear LAD,  but some indeterminate pelvic nodes.  No suspicious bone lesions.     -7/28/21-NM bone scan suspicious lesion of R sacral ala S3 level.     -8/10/21-CT A/P with multiple enlarged periaortic/iliac LN   -8/11/21-First eligard dose 45mg (Q6M dosing). Due next 2/2022.   -8/30/21-Initial medical oncology visit with Dr. Solo.  Unclear if bony lesion is metastasis based on current imaging.  Will start enzalutamide and continue    Eligard (next due 2/2022).     8/30/21 PSA =30.40 Pre Rx  9/27/21 PSA =8.99 (T=6); HgbA1C = 6.1  10/5/21 PSA =3.22  11/26/21 PSA =0.15  7/26/22 PSA <0.01  11/11/22 PSA= <0.01    Interval History:  Doing well in general.   Dermatitis from radiation is better. 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   No EtOH  No recreational drugs.   No  herbs/supplements other than on medication list.      MEDS:  Current Outpatient Medications   Medication Instructions     albuterol (PROAIR HFA) 108 (90 BASE) MCG/ACT Inhaler 1-2 puffs every 2 -4 hrs as needed     ALLEGRA-D ALLERGY & CONGESTION 180-240 MG 24 hr tablet TAKE ONE TABLET BY MOUTH EVERY DAY     blood glucose (HUGO CONTOUR) test strip Use to test blood sugars 1 time daily or as directed.     blood glucose (NO BRAND SPECIFIED) lancets standard Use to test blood sugar one time daily or as directed.     blood glucose calibration (HUGO CONTOUR) NORMAL solution Use to calibrate blood glucose monitor as directed.     buPROPion (WELLBUTRIN XL) 300 MG 24 hr tablet TAKE ONE TABLET BY MOUTH EVERY MORNING     Coenzyme Q-10 capsule 1 capsule, DAILY     esomeprazole (NEXIUM) 40 MG DR capsule TAKE ONE CAPSULE BY MOUTH EVERY MORNING BEFORE BREAKFAST , 30-60 MINUTES BEFORE EATING     ezetimibe (ZETIA) 10 MG tablet TAKE ONE TABLET BY MOUTH ONCE DAILY     fish oil-omega-3 fatty acids 1000 MG capsule Take  by mouth. Take daily       levothyroxine (SYNTHROID/LEVOTHROID) 112 mcg, Oral, DAILY     losartan (COZAAR) 75 mg, Oral, DAILY     Magnesium 500 MG CAPS One twice a day     metFORMIN (GLUCOPHAGE) 500 MG tablet TAKE ONE TABLET BY MOUTH TWICE A DAY WITH MEALS     Misc Natural Products (OSTEO BI-FLEX ADV JOINT SHIELD) TABS Take  by mouth.     multivitamin (OCUVITE) TABS tablet 1 tablet, Oral, DAILY     STATIN NOT PRESCRIBED (INTENTIONAL) Please choose reason not prescribed, below     Vitamin D3 (CHOLECALCIFEROL) 5,000 Units, Oral     ROS-Remainder of 14 point ROS reviewed and negative except as in HPI.    PHYSICAL EXAM:  Exam:  Video visit   Appears in no distress  Cognitively appropriate     LABS AND IMAGES:    Prostate Biopsy 7/2/21  FINAL DIAGNOSIS:   A: Prostate, left, biopsy   - Adenocarcinoma, Eddy's grade 4/3 with ductal component involving 4 of    6 needle core biopsies and 25% of   submitted tissue     B:  Prostate, right, biopsy   - Adenocarcinoma, Mary's grade 4/3 with ductal component involving 5 of    6 needle core biopsies and 25% of   submitted tissue     MR prostate 7/28/21  FINDINGS:  Size: 31 grams  Hemorrhage: Absent  Peripheral zone: Heterogeneous on T2-weighted images. Suspicious  lesions as detailed below.  Transition zone: Enlarged with BPH changes. No suspicious lesions  identified.     Lesion(s) in rank order of severity (highest score- to lowest score,  then by size)      Lesion 1:  Location: Right mid gland peripheral zone at the 7-9 o'clock position  relative to the urethra. Series 5 image 50.  Size: 20 x 10 mm  T2 description: Homogeneously hypointense  T2 numerical assessment: 5  DWI description: Marked restriction diffusion  DWI numerical assessment: 5  DCE assessment: Negative    Prostate margin: Capsular abutment>15 mm with enlarged neurovascular  bundle suggesting tumor involvement  Lesion overall PI-RADS category: 5     Lesion 2:  Location: Left apex peripheral zone at the 4 o'clock position relative  to the urethra. Series 5 image 63.  Size: 16 x 11 mm  T2 description: Homogeneously hypointense  T2 numerical assessment: 5  DWI description: Marked diffusion restriction  DWI numerical assessment: 5  DCE assessment: Positive    Prostate margin: Capsular abutment 6-15 mm with capsular irregularity  and focal bulging   Lesion overall PI-RADS category: 5     Neurovascular bundles: Both neurovascular bundles are likely involved  by malignancy.  A right prostatic vein is markedly enlarged compared  to the left, similar to 8/1/2014 CT.  Seminal vesicles: No seminal vesicle involvement by malignancy.  Lymph nodes: No clear adenopathy. Indeterminate nodes as follows: Left  pelvic 8 x 5 mm lymph node on series 5 image 27.  Bones: No suspicious lesions  Other pelvic organs: Colonic diverticulosis.                                                                         IMPRESSION:  1. Based on the most  suspicious abnormality, this exam is  characterized as PIRADS 5 - Clinically significant cancer is highly  likely to be present.  The most suspicious abnormalities are located  at the right mid peripheral zone and left apical peripheral zone and  there is high suspicion of extraprostatic extension.  2. No suspicious adenopathy or evidence of pelvic metastases.    NM bone scan 7/28/21  FINDINGS:   Small area of mild uptake in the right sacrum inferiorly visible on  the posterior view. SPECT-CT demonstrates that this focal uptake  corresponds to a sclerotic lesion on CT. No other suspicious areas of  focal uptake.     Postsurgical changes of bilateral knee replacements.     Physiologic uptake by the kidneys and urinary bladder.                                                                      IMPRESSION: Single focal uptake by the right sacral ala (S3 level) and  corresponding sclerotic focus. This is highly suspicious for  metastatic focus.    CT A/P 8/10/21  FINDINGS:   LOWER CHEST: Normal.     HEPATOBILIARY: Diffuse hepatic steatosis. No significant mass. No bile  duct dilatation. No calcified gallstones.     PANCREAS: Normal.     SPLEEN: Normal.     ADRENAL GLANDS: Normal.     KIDNEYS/BLADDER: There are multiple bilateral nonobstructing  intrarenal calculi measuring up to 4 mm in largest size. Small  subcentimeter benign cortical cysts. Unremarkable bladder. No evidence  of hydronephrosis.     BOWEL: No evidence of bowel obstruction or inflammatory changes. Mild  sigmoid colon diverticulosis. No evidence of diverticulitis. Normal  appendix.     PELVIC ORGANS: Normal sized prostate gland.     ADDITIONAL FINDINGS: Multiple enlarged retroperitoneal periaortic and  bilateral iliac chain lymph nodes are highly suspicious for  metastasis. The largest left retroperitoneal periaortic lymph node on  image 44 of series 2 measures 4.5 x 3.4 cm. 2.9 x 2.3 cm aortocaval  lymph node on image 45 of series 2. Distal left  periaortic lymph node  on image 54 of series 2 measures 3.2 x 2.6 cm. Right common iliac  chain lymph node on image 65 of series 2 measures 2.3 x 2.2 cm. 1.6 cm  left common iliac chain node on image 59 of series 2.     MUSCULOSKELETAL: Small faint sites of increased bone sclerosis  involving the posterior medial right iliac on image 65 of series 2 and  image 69 of series 2 are indeterminate for possible metastasis.                                                                       11/26/21                                             IMPRESSION:  1.  Significantly decreased retroperitoneal lymphadenopathy since the  prior study dated 8/10/2021 indicates good response to treatment. The  remaining enlarged retroperitoneal lymph node appears to have a  necrotic center.  2.  Minimal pulmonary nodularity was likely present previously.  3.  No other evidence for lymphadenopathy or metastasis is identified.  Specifically no evidence for left sacral metastasis is seen.    Most Recent 3 CBC's: Most Recent 2 TSH and T4:    I reviewed the above labs today.   Latest Reference Range & Units 02/10/23 09:09   Sodium 136 - 145 mmol/L 136   Potassium 3.4 - 5.3 mmol/L 4.5   Chloride 98 - 107 mmol/L 99   Carbon Dioxide (CO2) 22 - 29 mmol/L 24   Urea Nitrogen 8.0 - 23.0 mg/dL 25.5 (H)   Creatinine 0.67 - 1.17 mg/dL 1.10   GFR Estimate >60 mL/min/1.73m2 74   Calcium 8.8 - 10.2 mg/dL 9.7   Anion Gap 7 - 15 mmol/L 13   Albumin 3.5 - 5.2 g/dL 4.1   Protein Total 6.4 - 8.3 g/dL 6.5   Alkaline Phosphatase 40 - 129 U/L 85   ALT 10 - 50 U/L 17   AST 10 - 50 U/L 22   Bilirubin Total <=1.2 mg/dL 0.3   Glucose 70 - 99 mg/dL 144 (H)   PSA Tumor Marker 0.00 - 4.50 ng/mL <0.01   WBC 4.0 - 11.0 10e3/uL 6.0   Hemoglobin 13.3 - 17.7 g/dL 11.3 (L)   Hematocrit 40.0 - 53.0 % 33.2 (L)   Platelet Count 150 - 450 10e3/uL 166   RBC Count 4.40 - 5.90 10e6/uL 3.58 (L)   MCV 78 - 100 fL 93   MCH 26.5 - 33.0 pg 31.6   MCHC 31.5 - 36.5 g/dL 34.0   RDW 10.0 -  15.0 % 12.5   % Neutrophils % 70   % Lymphocytes % 13   % Monocytes % 11   % Eosinophils % 4   % Basophils % 1   Absolute Basophils 0.0 - 0.2 10e3/uL 0.0   Absolute Eosinophils 0.0 - 0.7 10e3/uL 0.2   Absolute Immature Granulocytes <=0.4 10e3/uL 0.0   Absolute Lymphocytes 0.8 - 5.3 10e3/uL 0.8   Absolute Monocytes 0.0 - 1.3 10e3/uL 0.6   % Immature Granulocytes % 1   Absolute Neutrophils 1.6 - 8.3 10e3/uL 4.3   Absolute NRBCs 10e3/uL 0.0   NRBCs per 100 WBC <1 /100 0   (H): Data is abnormally high  (L): Data is abnormally low    IMPRESSION AND PLAN:  Joel Pike is a 67 year old M with PMH of DM2 on metformin, depression/anxiety (pt reports as bipolar d/o), HTN, HLD, and prostate cancer with possible bone metastasis.  Previously, Dr. Solo had a lengthy discussion at his initial and went over his pathology and staging scan results.  They previously discussed that it is not clear whether the lesion in wing of his ala is a true bony metastasis or whether it is another finding/artifact.  They previously discussed that this does not change the overall treatment of his disease with ADT, but may change whether he receives radiation to his bony lesion in the future or not.  The role of ADT and the addition of enzalutamide were previously discussed.  Given his existing DM2, we will attempt to avoid additional prednisone use by choosing enzalutamide.  However, Enzalutamide was declined by patient's insurance company.    -He started apalutamide on 10/5/21. He had CT CAP done 11/26/21 which showed significantly decreased retroperitoneal lymphadenopathy since the prior study dated 8/10/2021 indicates good response to treatment. No other evidence for lymphadenopathy or metastasis is identified. Specifically no evidence for left sacral metastasis is seen. Continue current dose of Apalutamide.   -Completed RT to the primary for oligometastatic disease based on the STAMPEDE study with Dr. Gomez ~09/2022  -Plan is to continue ADT for  2 years (through 8/2023). His next Eligard 45mg is due 02/2/2023 - will get in [place]  -Followup P[facility] in August  - if psa rising then ( low likelihood) then will have CRPC - if psa is still <0.01 will discontinue all therapy, wait 4-6 months, then recheck PSA and Testosterone.      Isaias Solo MD

## 2023-02-13 NOTE — LETTER
2/13/2023         RE: Joel Pike  [address]  [facility] [address]        Dear Colleague,    Thank you for referring your patient, Joel Pike, to the [facility]. Please see a copy of my visit note below.    Video-Visit Details    Type of service:  Video Visit    Video Start Time (time video started): 9:30  Video End Time (time video stopped):9:45    Originating Location (pt. Location): Home        Distant Location (provider location):  Off-site    Mode of Communication:  Video Conference via [facility] Followup Note       Date of visit: 02/13/23    CC:   metastatic prostate cancer     ONCOLOGY HISTORY:   -Elevated PSA noted 2/26/21 at 12.70. Previously normal in 2017.     -4/7/21-Initial urology visit.   -7/2/21-prostate biopsy 4+3, 9/12 biopsies positive.  Ductal component noted.     -7/28/21-MR prostate-PIRADS 5 lesion, R and L sided lesions with likely    neurovascular bundle invasion. No  seminal vesicle involvement. No clear LAD,  but some indeterminate pelvic nodes.  No suspicious bone lesions.     -7/28/21-NM bone scan suspicious lesion of R sacral ala S3 level.     -8/10/21-CT A/P with multiple enlarged periaortic/iliac LN   -8/11/21-First eligard dose 45mg (Q6M dosing). Due next 2/2022.   -8/30/21-Initial medical oncology visit with Dr. Solo.  Unclear if bony lesion is metastasis based on current imaging.  Will start enzalutamide and continue    Eligard (next due 2/2022).     8/30/21 PSA =30.40 Pre Rx  9/27/21 PSA =8.99 (T=6); HgbA1C = 6.1  10/5/21 PSA =3.22  11/26/21 PSA =0.15  7/26/22 PSA <0.01  11/11/22 PSA= <0.01    Interval History:  Doing well in general.   Dermatitis from radiation is better. No longer pruritic   Hot flashes almost resolved  Energy wise he is doing okay, fatigues easily and has low stamina  Working park time at [employer]       PMH:  HTN, HLD, bipolar disorder, DM2 on metformin     PSH:  Bilateral  TKA, bilateral cataract extraction, bilateral carpal tunnel release     FAMILY HX:  No reported family history of prostate cancer     SOCIAL:  Retired, works at [employer] in [employer] section now  Never smoker.   No EtOH  No recreational drugs.   No herbs/supplements other than on medication list.      MEDS:  Current Outpatient Medications   Medication Instructions     albuterol (PROAIR HFA) 108 (90 BASE) MCG/ACT Inhaler 1-2 puffs every 2 -4 hrs as needed     ALLEGRA-D ALLERGY & CONGESTION 180-240 MG 24 hr tablet TAKE ONE TABLET BY MOUTH EVERY DAY     blood glucose (HUGO CONTOUR) test strip Use to test blood sugars 1 time daily or as directed.     blood glucose (NO BRAND SPECIFIED) lancets standard Use to test blood sugar one time daily or as directed.     blood glucose calibration (HUGO CONTOUR) NORMAL solution Use to calibrate blood glucose monitor as directed.     buPROPion (WELLBUTRIN XL) 300 MG 24 hr tablet TAKE ONE TABLET BY MOUTH EVERY MORNING     Coenzyme Q-10 capsule 1 capsule, DAILY     esomeprazole (NEXIUM) 40 MG DR capsule TAKE ONE CAPSULE BY MOUTH EVERY MORNING BEFORE BREAKFAST , 30-60 MINUTES BEFORE EATING     ezetimibe (ZETIA) 10 MG tablet TAKE ONE TABLET BY MOUTH ONCE DAILY     fish oil-omega-3 fatty acids 1000 MG capsule Take  by mouth. Take daily       levothyroxine (SYNTHROID/LEVOTHROID) 112 mcg, Oral, DAILY     losartan (COZAAR) 75 mg, Oral, DAILY     Magnesium 500 MG CAPS One twice a day     metFORMIN (GLUCOPHAGE) 500 MG tablet TAKE ONE TABLET BY MOUTH TWICE A DAY WITH MEALS     Misc Natural Products (OSTEO BI-FLEX ADV JOINT SHIELD) TABS Take  by mouth.     multivitamin (OCUVITE) TABS tablet 1 tablet, Oral, DAILY     STATIN NOT PRESCRIBED (INTENTIONAL) Please choose reason not prescribed, below     Vitamin D3 (CHOLECALCIFEROL) 5,000 Units, Oral     ROS-Remainder of 14 point ROS reviewed and negative except as in HPI.    PHYSICAL EXAM:  Exam:  Video visit   Appears in no distress  Cognitively  appropriate     LABS AND IMAGES:    Prostate Biopsy 7/2/21  FINAL DIAGNOSIS:   A: Prostate, left, biopsy   - Adenocarcinoma, Lewiston's grade 4/3 with ductal component involving 4 of    6 needle core biopsies and 25% of   submitted tissue     B: Prostate, right, biopsy   - Adenocarcinoma, Lewiston's grade 4/3 with ductal component involving 5 of    6 needle core biopsies and 25% of   submitted tissue     MR prostate 7/28/21  FINDINGS:  Size: 31 grams  Hemorrhage: Absent  Peripheral zone: Heterogeneous on T2-weighted images. Suspicious  lesions as detailed below.  Transition zone: Enlarged with BPH changes. No suspicious lesions  identified.     Lesion(s) in rank order of severity (highest score- to lowest score,  then by size)      Lesion 1:  Location: Right mid gland peripheral zone at the 7-9 o'clock position  relative to the urethra. Series 5 image 50.  Size: 20 x 10 mm  T2 description: Homogeneously hypointense  T2 numerical assessment: 5  DWI description: Marked restriction diffusion  DWI numerical assessment: 5  DCE assessment: Negative    Prostate margin: Capsular abutment>15 mm with enlarged neurovascular  bundle suggesting tumor involvement  Lesion overall PI-RADS category: 5     Lesion 2:  Location: Left apex peripheral zone at the 4 o'clock position relative  to the urethra. Series 5 image 63.  Size: 16 x 11 mm  T2 description: Homogeneously hypointense  T2 numerical assessment: 5  DWI description: Marked diffusion restriction  DWI numerical assessment: 5  DCE assessment: Positive    Prostate margin: Capsular abutment 6-15 mm with capsular irregularity  and focal bulging   Lesion overall PI-RADS category: 5     Neurovascular bundles: Both neurovascular bundles are likely involved  by malignancy.  A right prostatic vein is markedly enlarged compared  to the left, similar to 8/1/2014 CT.  Seminal vesicles: No seminal vesicle involvement by malignancy.  Lymph nodes: No clear adenopathy. Indeterminate nodes as  follows: Left  pelvic 8 x 5 mm lymph node on series 5 image 27.  Bones: No suspicious lesions  Other pelvic organs: Colonic diverticulosis.                                                                         IMPRESSION:  1. Based on the most suspicious abnormality, this exam is  characterized as PIRADS 5 - Clinically significant cancer is highly  likely to be present.  The most suspicious abnormalities are located  at the right mid peripheral zone and left apical peripheral zone and  there is high suspicion of extraprostatic extension.  2. No suspicious adenopathy or evidence of pelvic metastases.    NM bone scan 7/28/21  FINDINGS:   Small area of mild uptake in the right sacrum inferiorly visible on  the posterior view. SPECT-CT demonstrates that this focal uptake  corresponds to a sclerotic lesion on CT. No other suspicious areas of  focal uptake.     Postsurgical changes of bilateral knee replacements.     Physiologic uptake by the kidneys and urinary bladder.                                                                      IMPRESSION: Single focal uptake by the right sacral ala (S3 level) and  corresponding sclerotic focus. This is highly suspicious for  metastatic focus.    CT A/P 8/10/21  FINDINGS:   LOWER CHEST: Normal.     HEPATOBILIARY: Diffuse hepatic steatosis. No significant mass. No bile  duct dilatation. No calcified gallstones.     PANCREAS: Normal.     SPLEEN: Normal.     ADRENAL GLANDS: Normal.     KIDNEYS/BLADDER: There are multiple bilateral nonobstructing  intrarenal calculi measuring up to 4 mm in largest size. Small  subcentimeter benign cortical cysts. Unremarkable bladder. No evidence  of hydronephrosis.     BOWEL: No evidence of bowel obstruction or inflammatory changes. Mild  sigmoid colon diverticulosis. No evidence of diverticulitis. Normal  appendix.     PELVIC ORGANS: Normal sized prostate gland.     ADDITIONAL FINDINGS: Multiple enlarged retroperitoneal periaortic  and  bilateral iliac chain lymph nodes are highly suspicious for  metastasis. The largest left retroperitoneal periaortic lymph node on  image 44 of series 2 measures 4.5 x 3.4 cm. 2.9 x 2.3 cm aortocaval  lymph node on image 45 of series 2. Distal left periaortic lymph node  on image 54 of series 2 measures 3.2 x 2.6 cm. Right common iliac  chain lymph node on image 65 of series 2 measures 2.3 x 2.2 cm. 1.6 cm  left common iliac chain node on image 59 of series 2.     MUSCULOSKELETAL: Small faint sites of increased bone sclerosis  involving the posterior medial right iliac on image 65 of series 2 and  image 69 of series 2 are indeterminate for possible metastasis.                                                                       11/26/21                                             IMPRESSION:  1.  Significantly decreased retroperitoneal lymphadenopathy since the  prior study dated 8/10/2021 indicates good response to treatment. The  remaining enlarged retroperitoneal lymph node appears to have a  necrotic center.  2.  Minimal pulmonary nodularity was likely present previously.  3.  No other evidence for lymphadenopathy or metastasis is identified.  Specifically no evidence for left sacral metastasis is seen.    Most Recent 3 CBC's: Most Recent 2 TSH and T4:    I reviewed the above labs today.   Latest Reference Range & Units 02/10/23 09:09   Sodium 136 - 145 mmol/L 136   Potassium 3.4 - 5.3 mmol/L 4.5   Chloride 98 - 107 mmol/L 99   Carbon Dioxide (CO2) 22 - 29 mmol/L 24   Urea Nitrogen 8.0 - 23.0 mg/dL 25.5 (H)   Creatinine 0.67 - 1.17 mg/dL 1.10   GFR Estimate >60 mL/min/1.73m2 74   Calcium 8.8 - 10.2 mg/dL 9.7   Anion Gap 7 - 15 mmol/L 13   Albumin 3.5 - 5.2 g/dL 4.1   Protein Total 6.4 - 8.3 g/dL 6.5   Alkaline Phosphatase 40 - 129 U/L 85   ALT 10 - 50 U/L 17   AST 10 - 50 U/L 22   Bilirubin Total <=1.2 mg/dL 0.3   Glucose 70 - 99 mg/dL 144 (H)   PSA Tumor Marker 0.00 - 4.50 ng/mL <0.01   WBC 4.0 - 11.0  10e3/uL 6.0   Hemoglobin 13.3 - 17.7 g/dL 11.3 (L)   Hematocrit 40.0 - 53.0 % 33.2 (L)   Platelet Count 150 - 450 10e3/uL 166   RBC Count 4.40 - 5.90 10e6/uL 3.58 (L)   MCV 78 - 100 fL 93   MCH 26.5 - 33.0 pg 31.6   MCHC 31.5 - 36.5 g/dL 34.0   RDW 10.0 - 15.0 % 12.5   % Neutrophils % 70   % Lymphocytes % 13   % Monocytes % 11   % Eosinophils % 4   % Basophils % 1   Absolute Basophils 0.0 - 0.2 10e3/uL 0.0   Absolute Eosinophils 0.0 - 0.7 10e3/uL 0.2   Absolute Immature Granulocytes <=0.4 10e3/uL 0.0   Absolute Lymphocytes 0.8 - 5.3 10e3/uL 0.8   Absolute Monocytes 0.0 - 1.3 10e3/uL 0.6   % Immature Granulocytes % 1   Absolute Neutrophils 1.6 - 8.3 10e3/uL 4.3   Absolute NRBCs 10e3/uL 0.0   NRBCs per 100 WBC <1 /100 0   (H): Data is abnormally high  (L): Data is abnormally low    IMPRESSION AND PLAN:  Joel Pike is a 67 year old M with PMH of DM2 on metformin, depression/anxiety (pt reports as bipolar d/o), HTN, HLD, and prostate cancer with possible bone metastasis.  Previously, Dr. Solo had a lengthy discussion at his initial and went over his pathology and staging scan results.  They previously discussed that it is not clear whether the lesion in wing of his ala is a true bony metastasis or whether it is another finding/artifact.  They previously discussed that this does not change the overall treatment of his disease with ADT, but may change whether he receives radiation to his bony lesion in the future or not.  The role of ADT and the addition of enzalutamide were previously discussed.  Given his existing DM2, we will attempt to avoid additional prednisone use by choosing enzalutamide.  However, Enzalutamide was declined by patient's insurance company.    -He started apalutamide on 10/5/21. He had CT CAP done 11/26/21 which showed significantly decreased retroperitoneal lymphadenopathy since the prior study dated 8/10/2021 indicates good response to treatment. No other evidence for lymphadenopathy or  metastasis is identified. Specifically no evidence for left sacral metastasis is seen. Continue current dose of Apalutamide.   -Completed RT to the primary for oligometastatic disease based on the STAMPEDE study with Dr. Gomez ~09/2022  -Plan is to continue ADT for 2 years (through 8/2023). His next Eligard 45mg is due 02/2/2023 - will get in [place]  -Followup PILAR in August  - if psa rising then ( low likelihood) then will have CRPC - if psa is still <0.01 will discontinue all therapy, wait 4-6 months, then recheck PSA and Testosterone.      Isaias Solo MD

## 2023-02-13 NOTE — NURSING NOTE
Is the patient currently in the state of [place]? YES    Visit mode:VIDEO    If the visit is dropped, the patient can be reconnected by: VIDEO VISIT: Text to cell phone: [phone]    Will anyone else be joining the visit? NO      How would you like to obtain your AVS? MyChart    Are changes needed to the allergy or medication list? NO    Comments or concerns regarding today's visit:   Patient states he takes Senna and Oxycodone as needed. Patient states he uses the Accu-chek soft clix.     Patient also states he takes the following daily:  Bishop Probiotic  Vitamin C 1,000 mg       Fidelia Leon

## 2023-02-19 DIAGNOSIS — E03.9 HYPOTHYROIDISM, UNSPECIFIED TYPE: ICD-10-CM

## 2023-02-21 DIAGNOSIS — E11.22 TYPE 2 DIABETES MELLITUS WITH STAGE 2 CHRONIC KIDNEY DISEASE, WITH LONG-TERM CURRENT USE OF INSULIN (H): ICD-10-CM

## 2023-02-21 DIAGNOSIS — Z79.4 TYPE 2 DIABETES MELLITUS WITH STAGE 2 CHRONIC KIDNEY DISEASE, WITH LONG-TERM CURRENT USE OF INSULIN (H): ICD-10-CM

## 2023-02-21 DIAGNOSIS — N18.2 TYPE 2 DIABETES MELLITUS WITH STAGE 2 CHRONIC KIDNEY DISEASE, WITH LONG-TERM CURRENT USE OF INSULIN (H): ICD-10-CM

## 2023-02-21 DIAGNOSIS — I25.118 ATHEROSCLEROSIS OF NATIVE CORONARY ARTERY OF NATIVE HEART WITH STABLE ANGINA PECTORIS (H): ICD-10-CM

## 2023-02-21 RX ORDER — LEVOTHYROXINE SODIUM 125 UG/1
TABLET ORAL
Qty: 15 TABLET | Refills: 0 | Status: SHIPPED | OUTPATIENT
Start: 2023-02-21 | End: 2023-03-07

## 2023-02-21 NOTE — TELEPHONE ENCOUNTER
Routing refill request to provider for review/approval because:    Requested Prescriptions   Pending Prescriptions Disp Refills    levothyroxine (SYNTHROID/LEVOTHROID) 125 MCG tablet [Pharmacy Med Name: LEVOTHYROXINE SODIUM 125MCG TABS] 30 tablet 0     Sig: TAKE ONE TABLET BY MOUTH ONCE DAILY       Thyroid Protocol Failed - 2/19/2023  8:49 AM        Failed - Normal TSH on file in past 12 months     Recent Labs   Lab Test 07/26/22  0837   TSH 4.61*

## 2023-02-22 ENCOUNTER — LAB (OUTPATIENT)
Dept: LAB | Facility: CLINIC | Age: 68
End: 2023-02-22
Payer: MEDICARE

## 2023-02-22 ENCOUNTER — ALLIED HEALTH/NURSE VISIT (OUTPATIENT)
Dept: UROLOGY | Facility: CLINIC | Age: 68
End: 2023-02-22
Payer: MEDICARE

## 2023-02-22 DIAGNOSIS — C61 PROSTATE CANCER (H): Primary | ICD-10-CM

## 2023-02-22 DIAGNOSIS — C61 PROSTATE CANCER (H): ICD-10-CM

## 2023-02-22 LAB
CHOLEST SERPL-MCNC: 155 MG/DL
HDLC SERPL-MCNC: 35 MG/DL
LDLC SERPL CALC-MCNC: 83 MG/DL
NONHDLC SERPL-MCNC: 120 MG/DL
T4 FREE SERPL-MCNC: 1.03 NG/DL (ref 0.9–1.7)
TRIGL SERPL-MCNC: 183 MG/DL
TSH SERPL DL<=0.005 MIU/L-ACNC: 6.18 UIU/ML (ref 0.3–4.2)

## 2023-02-22 PROCEDURE — 36415 COLL VENOUS BLD VENIPUNCTURE: CPT | Mod: GA

## 2023-02-22 PROCEDURE — 80061 LIPID PANEL: CPT | Mod: GA

## 2023-02-22 PROCEDURE — 84439 ASSAY OF FREE THYROXINE: CPT

## 2023-02-22 PROCEDURE — 96402 CHEMO HORMON ANTINEOPL SQ/IM: CPT | Performed by: UROLOGY

## 2023-02-22 PROCEDURE — 84443 ASSAY THYROID STIM HORMONE: CPT | Mod: GA

## 2023-02-22 NOTE — PROGRESS NOTES
Clinic Administered Medication Documentation    Administrations This Visit     leuprolide (ELIGARD) kit 45 mg     Admin Date  02/22/2023 Action  $Given Dose  45 mg Route  Subcutaneous Site  Right Lower Abdomen Administered By  Dianelys Nuno RN    Ordering Provider: Alexei Ott MD    Patient Supplied?: No                  Injectable Medication Documentation    Patient was given Eligard. Prior to medication administration, verified patients identity using patient s name and date of birth. Please see MAR and medication order for additional information. Patient instructed to report any adverse reaction to staff immediately .    LOT:[id number]  EXP: 05/24  NDC: 11132-787-10    Was entire vial of medication used? Yes  Vial/Syringe: Single dose vial  Expiration Date:  05/24  Was this medication supplied by the patient? No     Dianelys Nuno RN on 2/22/2023 at 9:21 AM

## 2023-02-24 DIAGNOSIS — F33.42 RECURRENT MAJOR DEPRESSION IN COMPLETE REMISSION (H): ICD-10-CM

## 2023-02-24 DIAGNOSIS — C61 PROSTATE CANCER (H): ICD-10-CM

## 2023-02-24 RX ORDER — BUPROPION HYDROCHLORIDE 150 MG/1
TABLET ORAL
Qty: 90 TABLET | Refills: 0 | Status: SHIPPED | OUTPATIENT
Start: 2023-02-24 | End: 2023-03-20

## 2023-02-24 NOTE — LETTER
[address] [id number] [phone]        February 27, 2023    Joel Pike  [address] [facility] [address]          Dear Joel,    We are concerned about your health care.  We recently provided you with a medication refill.  Many medications require routine follow-up with your Doctor.      At this time we ask that: You schedule an appointment for your annual physical. Call the clinic at [phone] Option 1 to schedule.     Your prescription:  No further refills will be given until your follow up care is completed.      Thank you,      Sincerely,        Nishi Lin MD

## 2023-02-24 NOTE — TELEPHONE ENCOUNTER
Routing refill request to provider for review/approval because:    Requested Prescriptions   Pending Prescriptions Disp Refills    buPROPion (WELLBUTRIN XL) 150 MG 24 hr tablet [Pharmacy Med Name: BUPROPION HCL ER (XL) 150MG TB24] 90 tablet 0     Sig: TAKE TWO TABLETS BY MOUTH EVERY MORNING       SSRIs Protocol Failed - 2/24/2023 11:22 AM        Failed - PHQ-9 score less than 5 in past 6 months     Please review last PHQ-9 score.

## 2023-02-24 NOTE — TELEPHONE ENCOUNTER
Gabapentin 300mg capsule  Last prescribing provider: Isaias Solo     Last clinic visit date: 11/14/22 w/ Maria Esther Coelho    Recommendations for requested medication (if none, N/A): NA    Any other pertinent information (if none, N/A): NA    Refilled: Y/N, if NO, why?

## 2023-02-25 NOTE — TELEPHONE ENCOUNTER
3-month supply refilled.  Please follow-up before med run out.  Recommend physical with general follow-up, 40 minutes.

## 2023-02-28 RX ORDER — GABAPENTIN 300 MG/1
300 CAPSULE ORAL AT BEDTIME
Qty: 30 CAPSULE | Refills: 4 | Status: SHIPPED | OUTPATIENT
Start: 2023-02-28 | End: 2023-03-20

## 2023-03-04 DIAGNOSIS — E03.9 HYPOTHYROIDISM, UNSPECIFIED TYPE: ICD-10-CM

## 2023-03-07 RX ORDER — LEVOTHYROXINE SODIUM 125 UG/1
TABLET ORAL
Qty: 30 TABLET | Refills: 0 | Status: SHIPPED | OUTPATIENT
Start: 2023-03-07 | End: 2023-03-20

## 2023-03-09 ENCOUNTER — TELEPHONE (OUTPATIENT)
Dept: ONCOLOGY | Facility: CLINIC | Age: 68
End: 2023-03-09
Payer: MEDICARE

## 2023-03-09 NOTE — TELEPHONE ENCOUNTER
Oral Chemotherapy Monitoring Program    Subjective/Objective:  Joel Pike is a 67 year old male contacted by phone for a follow-up visit for oral chemotherapy.  Joel confirms taking the appropriate dose of Erleada 240mg daily at 10am. Denies missed doses, and recent hospital or ED visits. Patient has not had any recent medication changes. Patient notes that he very rarely experiences hot flashes (a previously noted adverse effect) and they have significantly improved since starting gabapentin in September 2022.     ORAL CHEMOTHERAPY 10/4/2022 10/31/2022 10/31/2022 11/18/2022 12/16/2022 1/16/2023 3/9/2023   Assessment Type Monthly Follow up Incoming phone call;Other Refill Refill Refill Refill Monthly Follow up   Diagnosis Code Prostate Cancer Prostate Cancer Prostate Cancer Prostate Cancer Prostate Cancer Prostate Cancer Prostate Cancer   Providers Dr Edil Solo   [facility]   Drug Name Erleada (apalutamide) Erleada (apalutamide) Erleada (apalutamide) Erleada (apalutamide) Erleada (apalutamide) Erleada (apalutamide) Erleada (apalutamide)   Dose 240 mg - - 240 mg 240 mg 240 mg 240 mg   Current Schedule Daily - - Daily Daily - -   Cycle Details Continuous - - Continuous Continuous Continuous Continuous   Start Date of Last Cycle - - - - - - -   Planned next cycle start date - - - - - - -   Doses missed in last 2 weeks 0 - - - - - 0   Adherence Assessment Adherent - - - - - Adherent   Adverse Effects Other (See Note for Details) - - - - - -   Other (See Note for Details) hot flashes - - - - - Hot Flashes   Pharmacist intervention(other) - - - - - - No   Intervention(s) - - - - - - -   Any new drug interactions? No - - - - - No   Pharmacist Intervention? Yes - - - - - -   Is the dose as ordered appropriate for the patient? Yes - - - - - Yes   Is the patient currently in pain? - - - - - - -   Has the patient  "been assessed within the past 6 months for depression? - - - - - - -   Has the patient missed any days of school, work, or other routine activity? - - - - - - -   Since the last time we talked, have you been hospitalized or used the emergency room? - - - - - - No       Last PHQ-2 Score on record:   PHQ-2 ( 1999 [employer]) 3/12/2022 2/26/2021   Q1: Little interest or pleasure in doing things 1 1   Q2: Feeling down, depressed or hopeless 1 1   PHQ-2 Score 2 2   PHQ-2 Total Score (12-17 Years)- Positive if 3 or more points; Administer PHQ-A if positive - 2   Q1: Little interest or pleasure in doing things Several days Several days   Q2: Feeling down, depressed or hopeless Several days Several days   PHQ-2 Score 2 2       Vitals:  BP:   BP Readings from Last 1 Encounters:   11/16/22 127/72     Wt Readings from Last 1 Encounters:   11/16/22 101.6 kg (224 lb)     Estimated body surface area is 2.24 meters squared as calculated from the following:    Height as of 11/16/22: 1.778 m (5' 10\").    Weight as of 11/16/22: 101.6 kg (224 lb).    Labs:  _  Result Component Current Result Ref Range   Sodium 136 (2/10/2023) 136 - 145 mmol/L     _  Result Component Current Result Ref Range   Potassium 4.5 (2/10/2023) 3.4 - 5.3 mmol/L     _  Result Component Current Result Ref Range   Calcium 9.7 (2/10/2023) 8.8 - 10.2 mg/dL     No results found for Mag within last 30 days.     No results found for Phos within last 30 days.     _  Result Component Current Result Ref Range   Albumin 4.1 (2/10/2023) 3.5 - 5.2 g/dL     _  Result Component Current Result Ref Range   Urea Nitrogen 25.5 (H) (2/10/2023) 8.0 - 23.0 mg/dL     _  Result Component Current Result Ref Range   Creatinine 1.10 (2/10/2023) 0.67 - 1.17 mg/dL     _  Result Component Current Result Ref Range   AST 22 (2/10/2023) 10 - 50 U/L     _  Result Component Current Result Ref Range   ALT 17 (2/10/2023) 10 - 50 U/L     _  Result Component Current Result Ref Range   Bilirubin Total 0.3 " (2/10/2023) <=1.2 mg/dL     _  Result Component Current Result Ref Range   WBC Count 6.0 (2/10/2023) 4.0 - 11.0 10e3/uL     _  Result Component Current Result Ref Range   Hemoglobin 11.3 (L) (2/10/2023) 13.3 - 17.7 g/dL     _  Result Component Current Result Ref Range   Platelet Count 166 (2/10/2023) 150 - 450 10e3/uL     No results found for ANC within last 30 days.     _  Result Component Current Result Ref Range   Absolute Neutrophils 4.3 (2/10/2023) 1.6 - 8.3 10e3/uL        Assessment/Plan:  Joel is tolerating Erleada well, with rare hot flashes that are greatly improved with gabapentin use. Continue therapy as planned.     Follow-Up:  4/7 - Monthly assessment  5/10 - Labs   5/12 - Maria Esther Ceolho appointment     Refill Due:  [facility] to deliver refill on 3/14.     Millicent Dean, Pharmacy Student   [facility]   [phone]

## 2023-03-10 ENCOUNTER — TELEPHONE (OUTPATIENT)
Dept: FAMILY MEDICINE | Facility: CLINIC | Age: 68
End: 2023-03-10
Payer: MEDICARE

## 2023-03-10 DIAGNOSIS — Z79.899 HIGH RISK MEDICATION USE: Primary | ICD-10-CM

## 2023-03-10 NOTE — TELEPHONE ENCOUNTER
Order/Referral Request    Who is requesting: patient    Orders being requested: referral to [facility]ARMAC[facility]    Reason service is needed/diagnosis:  Medication review    When are orders needed by: asap    Has this been discussed with Provider: No    Does patient have a preference on a Group/Provider/Facility? [facility]    Does patient have an appointment scheduled?: No    Where to send orders: N/A    Could we send this information to you in [facility] or would you prefer to receive a phone call?:   Patient would prefer a phone call   Okay to leave a detailed message?: Yes at Home number on file [phone] (home)

## 2023-03-16 ASSESSMENT — ENCOUNTER SYMPTOMS
CONSTIPATION: 0
HEMATURIA: 0
NAUSEA: 1
MYALGIAS: 1
COUGH: 1
FEVER: 0
DIARRHEA: 1
PALPITATIONS: 0
SHORTNESS OF BREATH: 1
ABDOMINAL PAIN: 1
HEARTBURN: 0
ARTHRALGIAS: 0
DYSURIA: 0
DIZZINESS: 1
HEADACHES: 1
HEMATOCHEZIA: 0
FREQUENCY: 1
WEAKNESS: 1
PARESTHESIAS: 1
NERVOUS/ANXIOUS: 0
JOINT SWELLING: 0
SORE THROAT: 0
CHILLS: 1

## 2023-03-16 ASSESSMENT — ACTIVITIES OF DAILY LIVING (ADL): CURRENT_FUNCTION: NO ASSISTANCE NEEDED

## 2023-03-20 ENCOUNTER — OFFICE VISIT (OUTPATIENT)
Dept: FAMILY MEDICINE | Facility: CLINIC | Age: 68
End: 2023-03-20
Payer: MEDICARE

## 2023-03-20 ENCOUNTER — OFFICE VISIT (OUTPATIENT)
Dept: PHARMACY | Facility: CLINIC | Age: 68
End: 2023-03-20
Attending: FAMILY MEDICINE
Payer: COMMERCIAL

## 2023-03-20 VITALS
OXYGEN SATURATION: 100 % | BODY MASS INDEX: 33.92 KG/M2 | SYSTOLIC BLOOD PRESSURE: 120 MMHG | WEIGHT: 229 LBS | RESPIRATION RATE: 14 BRPM | HEIGHT: 69 IN | TEMPERATURE: 97.4 F | HEART RATE: 50 BPM | DIASTOLIC BLOOD PRESSURE: 64 MMHG

## 2023-03-20 DIAGNOSIS — E78.5 HYPERLIPIDEMIA LDL GOAL <100: ICD-10-CM

## 2023-03-20 DIAGNOSIS — N18.2 TYPE 2 DIABETES MELLITUS WITH STAGE 2 CHRONIC KIDNEY DISEASE, WITHOUT LONG-TERM CURRENT USE OF INSULIN (H): ICD-10-CM

## 2023-03-20 DIAGNOSIS — Z95.1 S/P CABG X 4: ICD-10-CM

## 2023-03-20 DIAGNOSIS — E11.22 TYPE 2 DIABETES MELLITUS WITH STAGE 2 CHRONIC KIDNEY DISEASE, WITHOUT LONG-TERM CURRENT USE OF INSULIN (H): ICD-10-CM

## 2023-03-20 DIAGNOSIS — Z78.9 TAKES DIETARY SUPPLEMENTS: ICD-10-CM

## 2023-03-20 DIAGNOSIS — I10 ESSENTIAL HYPERTENSION: ICD-10-CM

## 2023-03-20 DIAGNOSIS — N18.2 TYPE 2 DIABETES MELLITUS WITH STAGE 2 CHRONIC KIDNEY DISEASE, WITHOUT LONG-TERM CURRENT USE OF INSULIN (H): Primary | ICD-10-CM

## 2023-03-20 DIAGNOSIS — G47.33 OSA (OBSTRUCTIVE SLEEP APNEA): ICD-10-CM

## 2023-03-20 DIAGNOSIS — E03.9 HYPOTHYROIDISM, UNSPECIFIED TYPE: ICD-10-CM

## 2023-03-20 DIAGNOSIS — C61 PROSTATE CANCER (H): ICD-10-CM

## 2023-03-20 DIAGNOSIS — F33.42 RECURRENT MAJOR DEPRESSION IN COMPLETE REMISSION (H): ICD-10-CM

## 2023-03-20 DIAGNOSIS — I48.92 ATRIAL FLUTTER, UNSPECIFIED TYPE (H): ICD-10-CM

## 2023-03-20 DIAGNOSIS — E11.22 TYPE 2 DIABETES MELLITUS WITH STAGE 2 CHRONIC KIDNEY DISEASE, WITHOUT LONG-TERM CURRENT USE OF INSULIN (H): Primary | ICD-10-CM

## 2023-03-20 DIAGNOSIS — K21.00 GASTROESOPHAGEAL REFLUX DISEASE WITH ESOPHAGITIS WITHOUT HEMORRHAGE: ICD-10-CM

## 2023-03-20 DIAGNOSIS — I25.118 ATHEROSCLEROSIS OF NATIVE CORONARY ARTERY OF NATIVE HEART WITH STABLE ANGINA PECTORIS (H): ICD-10-CM

## 2023-03-20 DIAGNOSIS — Z95.1 S/P CABG (CORONARY ARTERY BYPASS GRAFT): ICD-10-CM

## 2023-03-20 DIAGNOSIS — E11.9 TYPE 2 DIABETES, HBA1C GOAL < 7% (H): ICD-10-CM

## 2023-03-20 DIAGNOSIS — D63.8 ANEMIA IN OTHER CHRONIC DISEASES CLASSIFIED ELSEWHERE: ICD-10-CM

## 2023-03-20 DIAGNOSIS — Z00.00 ENCOUNTER FOR MEDICARE ANNUAL WELLNESS EXAM: Primary | ICD-10-CM

## 2023-03-20 DIAGNOSIS — K21.9 GASTROESOPHAGEAL REFLUX DISEASE WITHOUT ESOPHAGITIS: ICD-10-CM

## 2023-03-20 PROBLEM — C79.51 SECONDARY MALIGNANT NEOPLASM OF BONE (H): Status: ACTIVE | Noted: 2023-03-20

## 2023-03-20 LAB
ALBUMIN SERPL BCG-MCNC: 4.2 G/DL (ref 3.5–5.2)
ALP SERPL-CCNC: 90 U/L (ref 40–129)
ALT SERPL W P-5'-P-CCNC: 27 U/L (ref 10–50)
ANION GAP SERPL CALCULATED.3IONS-SCNC: 11 MMOL/L (ref 7–15)
AST SERPL W P-5'-P-CCNC: 22 U/L (ref 10–50)
BASOPHILS # BLD AUTO: 0 10E3/UL (ref 0–0.2)
BASOPHILS NFR BLD AUTO: 1 %
BILIRUB SERPL-MCNC: 0.4 MG/DL
BUN SERPL-MCNC: 25.4 MG/DL (ref 8–23)
CALCIUM SERPL-MCNC: 9.9 MG/DL (ref 8.8–10.2)
CHLORIDE SERPL-SCNC: 103 MMOL/L (ref 98–107)
CREAT SERPL-MCNC: 1.24 MG/DL (ref 0.67–1.17)
CREAT UR-MCNC: 110.3 MG/DL
DEPRECATED HCO3 PLAS-SCNC: 26 MMOL/L (ref 22–29)
EOSINOPHIL # BLD AUTO: 0.3 10E3/UL (ref 0–0.7)
EOSINOPHIL NFR BLD AUTO: 6 %
ERYTHROCYTE [DISTWIDTH] IN BLOOD BY AUTOMATED COUNT: 12.8 % (ref 10–15)
FERRITIN SERPL-MCNC: 227 NG/ML (ref 31–409)
GFR SERPL CREATININE-BSD FRML MDRD: 64 ML/MIN/1.73M2
GLUCOSE SERPL-MCNC: 147 MG/DL (ref 70–99)
HBA1C MFR BLD: 6.1 %
HCT VFR BLD AUTO: 34.2 % (ref 40–53)
HGB BLD-MCNC: 11.7 G/DL (ref 13.3–17.7)
IMM GRANULOCYTES # BLD: 0 10E3/UL
IMM GRANULOCYTES NFR BLD: 1 %
IRON BINDING CAPACITY (ROCHE): 254 UG/DL (ref 240–430)
IRON SATN MFR SERPL: 28 % (ref 15–46)
IRON SERPL-MCNC: 70 UG/DL (ref 61–157)
LYMPHOCYTES # BLD AUTO: 0.6 10E3/UL (ref 0.8–5.3)
LYMPHOCYTES NFR BLD AUTO: 10 %
MCH RBC QN AUTO: 31.6 PG (ref 26.5–33)
MCHC RBC AUTO-ENTMCNC: 34.2 G/DL (ref 31.5–36.5)
MCV RBC AUTO: 92 FL (ref 78–100)
MICROALBUMIN UR-MCNC: 106 MG/L
MICROALBUMIN/CREAT UR: 96.1 MG/G CR (ref 0–17)
MONOCYTES # BLD AUTO: 0.5 10E3/UL (ref 0–1.3)
MONOCYTES NFR BLD AUTO: 9 %
NEUTROPHILS # BLD AUTO: 4.7 10E3/UL (ref 1.6–8.3)
NEUTROPHILS NFR BLD AUTO: 73 %
NRBC # BLD AUTO: 0 10E3/UL
NRBC BLD AUTO-RTO: 0 /100
PLATELET # BLD AUTO: 203 10E3/UL (ref 150–450)
POTASSIUM SERPL-SCNC: 4.5 MMOL/L (ref 3.4–5.3)
PROT SERPL-MCNC: 6.9 G/DL (ref 6.4–8.3)
PSA SERPL DL<=0.01 NG/ML-MCNC: <0.01 NG/ML (ref 0–4.5)
RBC # BLD AUTO: 3.7 10E6/UL (ref 4.4–5.9)
SODIUM SERPL-SCNC: 140 MMOL/L (ref 136–145)
WBC # BLD AUTO: 6.2 10E3/UL (ref 4–11)

## 2023-03-20 PROCEDURE — 84153 ASSAY OF PSA TOTAL: CPT | Performed by: FAMILY MEDICINE

## 2023-03-20 PROCEDURE — 36415 COLL VENOUS BLD VENIPUNCTURE: CPT | Performed by: FAMILY MEDICINE

## 2023-03-20 PROCEDURE — 82570 ASSAY OF URINE CREATININE: CPT | Performed by: FAMILY MEDICINE

## 2023-03-20 PROCEDURE — 80053 COMPREHEN METABOLIC PANEL: CPT | Performed by: FAMILY MEDICINE

## 2023-03-20 PROCEDURE — 82728 ASSAY OF FERRITIN: CPT | Performed by: FAMILY MEDICINE

## 2023-03-20 PROCEDURE — 83036 HEMOGLOBIN GLYCOSYLATED A1C: CPT | Performed by: FAMILY MEDICINE

## 2023-03-20 PROCEDURE — 85025 COMPLETE CBC W/AUTO DIFF WBC: CPT | Performed by: FAMILY MEDICINE

## 2023-03-20 PROCEDURE — 99207 PR NO CHARGE LOS: CPT | Performed by: PHARMACIST

## 2023-03-20 PROCEDURE — 83550 IRON BINDING TEST: CPT | Performed by: FAMILY MEDICINE

## 2023-03-20 PROCEDURE — 99207 PR FOOT EXAM NO CHARGE: CPT | Performed by: FAMILY MEDICINE

## 2023-03-20 PROCEDURE — G0438 PPPS, INITIAL VISIT: HCPCS | Performed by: FAMILY MEDICINE

## 2023-03-20 PROCEDURE — 99214 OFFICE O/P EST MOD 30 MIN: CPT | Mod: 25 | Performed by: FAMILY MEDICINE

## 2023-03-20 PROCEDURE — 83540 ASSAY OF IRON: CPT | Performed by: FAMILY MEDICINE

## 2023-03-20 PROCEDURE — 82043 UR ALBUMIN QUANTITATIVE: CPT | Performed by: FAMILY MEDICINE

## 2023-03-20 RX ORDER — PANTOPRAZOLE SODIUM 40 MG/1
40 TABLET, DELAYED RELEASE ORAL DAILY
Qty: 90 TABLET | Refills: 3 | Status: SHIPPED | OUTPATIENT
Start: 2023-03-20 | End: 2024-06-25

## 2023-03-20 RX ORDER — GABAPENTIN 300 MG/1
300 CAPSULE ORAL AT BEDTIME
Qty: 90 CAPSULE | Refills: 3 | Status: SHIPPED | OUTPATIENT
Start: 2023-03-20 | End: 2024-01-25

## 2023-03-20 RX ORDER — MAGNESIUM OXIDE 400 MG/1
400 TABLET ORAL EVERY EVENING
COMMUNITY

## 2023-03-20 RX ORDER — LOSARTAN POTASSIUM 50 MG/1
TABLET ORAL
Qty: 135 TABLET | Refills: 1 | Status: SHIPPED | OUTPATIENT
Start: 2023-03-20 | End: 2023-10-09

## 2023-03-20 RX ORDER — ZINC OXIDE 13 %
1 CREAM (GRAM) TOPICAL DAILY
COMMUNITY

## 2023-03-20 RX ORDER — APALUTAMIDE 60 MG/1
240 TABLET, FILM COATED ORAL DAILY
COMMUNITY
Start: 2023-03-09 | End: 2023-07-05

## 2023-03-20 RX ORDER — LEVOTHYROXINE SODIUM 137 UG/1
137 TABLET ORAL DAILY
Qty: 90 TABLET | Refills: 0 | Status: SHIPPED | OUTPATIENT
Start: 2023-03-20 | End: 2023-06-14

## 2023-03-20 RX ORDER — BUPROPION HYDROCHLORIDE 300 MG/1
300 TABLET ORAL EVERY MORNING
Qty: 90 TABLET | Refills: 1 | Status: SHIPPED | OUTPATIENT
Start: 2023-03-20 | End: 2023-10-09

## 2023-03-20 RX ORDER — AMOXICILLIN 250 MG
1-2 CAPSULE ORAL 2 TIMES DAILY PRN
Qty: 30 TABLET | Refills: 0 | Status: SHIPPED | OUTPATIENT
Start: 2023-03-20 | End: 2023-03-20

## 2023-03-20 RX ORDER — TAMSULOSIN HYDROCHLORIDE 0.4 MG/1
0.4 CAPSULE ORAL DAILY
Qty: 90 CAPSULE | Refills: 3 | Status: SHIPPED | OUTPATIENT
Start: 2023-03-20

## 2023-03-20 RX ORDER — METOPROLOL TARTRATE 25 MG/1
37.5 TABLET, FILM COATED ORAL 2 TIMES DAILY
Qty: 270 TABLET | Refills: 3 | Status: SHIPPED | OUTPATIENT
Start: 2023-03-20 | End: 2024-06-03

## 2023-03-20 RX ORDER — ASCORBIC ACID 500 MG
500 TABLET ORAL DAILY
COMMUNITY

## 2023-03-20 RX ORDER — ROSUVASTATIN CALCIUM 10 MG/1
10 TABLET, COATED ORAL DAILY
Qty: 90 TABLET | Refills: 1 | Status: SHIPPED | OUTPATIENT
Start: 2023-03-20 | End: 2024-02-15

## 2023-03-20 ASSESSMENT — ENCOUNTER SYMPTOMS
PALPITATIONS: 0
DYSURIA: 0
DIZZINESS: 1
NERVOUS/ANXIOUS: 0
HEARTBURN: 0
SHORTNESS OF BREATH: 1
ARTHRALGIAS: 0
CONSTIPATION: 0
FEVER: 0
MYALGIAS: 1
ABDOMINAL PAIN: 1
HEMATOCHEZIA: 0
HEMATURIA: 0
HEADACHES: 1
CHILLS: 1
PARESTHESIAS: 1
NAUSEA: 1
WEAKNESS: 1
COUGH: 1
JOINT SWELLING: 0
SORE THROAT: 0
FREQUENCY: 1

## 2023-03-20 ASSESSMENT — ACTIVITIES OF DAILY LIVING (ADL): CURRENT_FUNCTION: NO ASSISTANCE NEEDED

## 2023-03-20 NOTE — PROGRESS NOTES
"SUBJECTIVE:   Joel is a 67 year old who presents for Preventive Visit.    Patient has been advised of split billing requirements and indicates understanding: Yes     Are you in the first 12 months of your Medicare coverage?  No    Healthy Habits:     In general, how would you rate your overall health?  Good    Frequency of exercise:  1 day/week    Duration of exercise:  Less than 15 minutes    Do you usually eat at least 4 servings of fruit and vegetables a day, include whole grains    & fiber and avoid regularly eating high fat or \"junk\" foods?  Yes    Taking medications regularly:  Yes    Barriers to taking medications:  None    Medication side effects:  No significant flushing, Muscle aches and Lightheadedness    Ability to successfully perform activities of daily living:  No assistance needed    Home Safety:  No safety concerns identified    Hearing Impairment:  Difficulty following a conversation in a noisy restaurant or crowded room, feel that people are mumbling or not speaking clearly, need to ask people to speak up or repeat themselves and difficulty understanding soft or whispered speech    In the past 6 months, have you been bothered by leaking of urine? Yes    In general, how would you rate your overall mental or emotional health?  Good      PHQ-2 Total Score: 2    Additional concerns today:  Yes      Have you ever done Advance Care Planning? (For example, a Health Directive, POLST, or a discussion with a medical provider or your loved ones about your wishes): No, advance care planning information given to patient to review.  Patient plans to discuss their wishes with loved ones or provider.         Fall risk  Fallen 2 or more times in the past year?: Yes  Any fall with injury in the past year?: Yes    Cognitive Screening   1) Repeat 3 items (Leader, Season, Table)    2) Clock draw: NORMAL  3) 3 item recall: Recalls 3 objects  Results: 3 items recalled: COGNITIVE IMPAIRMENT LESS LIKELY    Mini-CogTM " Copyright MEKA Jones. Licensed by the author for use in [facility]; reprinted with permission (barbara@[facility]). All rights reserved.      Do you have sleep apnea, excessive snoring or daytime drowsiness?: yes    Reviewed and updated as needed this visit by clinical staff   Tobacco  Allergies  Meds  Problems  Med Hx  Surg Hx  Fam Hx  Soc   Hx        Reviewed and updated as needed this visit by Provider   Tobacco  Allergies  Meds  Problems  Med Hx  Surg Hx  Fam Hx  Soc   Hx       Social History     Tobacco Use     Smoking status: Never     Smokeless tobacco: Never   Substance Use Topics     Alcohol use: No     Alcohol/week: 0.0 standard drinks         Alcohol Use 3/16/2023   Prescreen: >3 drinks/day or >7 drinks/week? No       Is a 67-year-old has to medical history significant type 2 diabetes, chronic kidney disease stage II, anxiety, depression, hypertension, and status post TKA left (01/2021) and TKA right (02/2020), he is here today for a physical exam and general follow up.  Being taking the medications as prescribed with no side effect.   His depression and anxiety  and well controlled with Wellbutrin with no side effect.  Been on the same dose for couple years and it has been working well.  Not feeling depressed or anxious.  Not seeing counselor by choice.  No suicidal or homicidal ideation.  No hallucination.  No drugs or alcohol.    He has been working closely with the urologist for prostate cancer; completed the chemotherapy with no significant side effects.  Been taking the Erleada as prescribed with no side effect.  Still has problem with nocturia but is tolerable, has no concern about it.  He takes the Flomax daily and that has helped.     His other medical conditions are overall stable and controlled.  He takes losartan for high blood pressure, Zetia and Crestor for high cholesterol and Metformin for diabetes and they have been working well.  He also takes levothyroxine for  hypothyroidism.  Not checking his blood pressure or blood sugar at home.  No headache or dizziness.  No acute change in his vision.  No chest pain, shortness of breath, neck swelling, orthopnea or dyspnea.  No numbness or tingling sensation.  He uses CPAP nightly for sleep apnea and has been working well.    Been some weight over the winter but the CPAP is still effective.  Feels rested and refreshed in the morning; no excessive daytime fatigue or sleepiness.  He is seeing the cardiologist regularly.     Generally, he is doing well.  No headache, dizziness or acute visual change. No runny nose, nasal congestion, coughing, fever or chill. No CP/SOB. No N/V/D/C.  No abdominal pain.  No leg swelling or joint pain.  Not exercising.  No alcohol, drug use or tobacco smoking. No problem with sleeping.  Feels safe, lives with his wife. No other concern today       Current providers sharing in care for this patient include:   Patient Care Team:  Nishi Lin MD as PCP - General (Family Medicine)  Nishi Lin MD as Assigned PCP  Alexei Ott MD as Assigned Surgical Provider  Kamala Murphy MD as MD (Cardiovascular Disease)  Nik Andino PA-C as Assigned Sleep Provider  Isaías Gomez MD as Assigned Cancer Care Provider  Magaly Romo APRN CNP as Assigned Heart and Vascular Provider  Jonah Adams RPH as Pharmacist (Pharmacist Clinician- Clinical Pharmacy Specialist)  Jonah Adams RPH as Assigned MTM Pharmacist  Shanda Tran RN as Specialty Care Coordinator (Hematology & Oncology)    The following health maintenance items are reviewed in Epic and correct as of today:  Health Maintenance   Topic Date Due     A1C  09/20/2023     EYE EXAM  11/10/2023     LIPID  02/22/2024     TSH W/FREE T4 REFLEX  02/22/2024     MEDICARE ANNUAL WELLNESS VISIT  03/20/2024     BMP  03/20/2024     MICROALBUMIN  03/20/2024     DIABETIC FOOT EXAM  03/20/2024     ANNUAL REVIEW OF HM ORDERS  03/20/2024      FALL RISK ASSESSMENT  03/20/2024     PHQ-9  03/20/2024     COLORECTAL CANCER SCREENING  10/09/2024     DTAP/TDAP/TD IMMUNIZATION (2 - Td or Tdap) 04/12/2026     ADVANCE CARE PLANNING  03/28/2028     HEPATITIS C SCREENING  Completed     DEPRESSION ACTION PLAN  Completed     INFLUENZA VACCINE  Completed     Pneumococcal Vaccine: 65+ Years  Completed     URINALYSIS  Completed     ZOSTER IMMUNIZATION  Completed     AORTIC ANEURYSM SCREENING (SYSTEM ASSIGNED)  Completed     COVID-19 Vaccine  Completed     IPV IMMUNIZATION  Aged Out     MENINGITIS IMMUNIZATION  Aged Out     BP Readings from Last 3 Encounters:   03/20/23 120/64   11/16/22 127/72   10/05/22 138/87    Wt Readings from Last 3 Encounters:   03/20/23 103.9 kg (229 lb)   11/16/22 101.6 kg (224 lb)   11/14/22 100.7 kg (222 lb)                  Patient Active Problem List   Diagnosis     Gastroesophageal reflux disease without esophagitis     Degeneration of lumbar or lumbosacral intervertebral disc     Chronic rhinitis     Type 2 diabetes, HbA1c goal < 7% (H)     HILARIO (obstructive sleep apnea)     Tinnitus     Hypothyroidism, unspecified type     Type 2 diabetes mellitus with stage 2 chronic kidney disease (H)     Obesity, Class I, BMI 30-34.9     Recurrent major depression in complete remission (H)     Microalbuminuria     S/P total knee replacement using cement, left     Primary osteoarthritis of right knee     S/P total knee replacement using cement, right     Status post total right knee replacement     Prostate cancer (H)     Essential hypertension     Status post coronary angiogram     Atherosclerosis of native coronary artery of native heart with stable angina pectoris (H)     S/P CABG (coronary artery bypass graft)     Anemia in other chronic diseases classified elsewhere     Atrial flutter, unspecified type (H)     Hyperlipidemia LDL goal <100     S/P CABG x 4     Past Surgical History:   Procedure Laterality Date     ARTHROPLASTY KNEE Left 2/4/2020     Procedure: left total knee replacement;  Surgeon: Jason Berman DO;  Location: [facility]     ARTHROPLASTY KNEE Right 1/18/2021    Procedure: right total knee replacement;  Surgeon: Jason Berman DO;  Location: [facility]     BYPASS GRAFT ARTERY CORONARY N/A 4/8/2022    Procedure: CORONARY ARTERY BYPASS GRAFT x 4 (LIMA - LAD; SV - OM; SV - PDA; SV - DIAGONAL) WITH ENDOSCOPIC SAPHENOUS VEIN HARVEST ON BILATERAL LOWER EXTREMITY, AND ON CARDIOPULMONARY PUMP OXYGENATOR  (INTRAOPERATIVE TRANSESOPHAGEAL ECHOCARDIOGRAM BY ANESTHESIOLOGIST);  Surgeon: Karson Bae MD;  Location: [facility] OR     COLONOSCOPY  02/02/09    Repeat in 5 yrs     COLONOSCOPY N/A 9/5/2014    Procedure: COMBINED COLONOSCOPY, SINGLE BIOPSY/POLYPECTOMY BY BIOPSY;  Surgeon: Denis Oakes MD;  Location: [facility] GI     CV CORONARY ANGIOGRAM N/A 3/28/2022    Procedure: Coronary Angiogram;  Surgeon: Lindy Farooq MD;  Location:  HEART CARDIAC CATH LAB     CV LEFT HEART CATH N/A 3/28/2022    Procedure: Left Heart Catheterization;  Surgeon: Lindy Farooq MD;  Location:  HEART CARDIAC CATH LAB     ESOPHAGOSCOPY, GASTROSCOPY, DUODENOSCOPY (EGD), COMBINED N/A 2/20/2015    Procedure: COMBINED ESOPHAGOSCOPY, GASTROSCOPY, DUODENOSCOPY (EGD), BIOPSY SINGLE OR MULTIPLE;  Surgeon: Alfred Young MD;  Location:  GI     HC REMV CATARACT EXTRACAP,INSERT LENS, W/O ECP  2000    Bilateral     HC REVISE MEDIAN N/CARPAL TUNNEL SURG  1991     HC TOOTH EXTRACTION W/FORCEP  1980's    [place] teeth removed     RELEASE CARPAL TUNNEL Right 6/16/2017    Procedure: RELEASE CARPAL TUNNEL;  Right carpal tunnel release;  Surgeon: Jason Berman DO;  Location: [facility]     RELEASE CARPAL TUNNEL Left 7/11/2017    Procedure: RELEASE CARPAL TUNNEL;  Left carpal tunnel release;  Surgeon: Jason Berman DO;  Location: [facility]     SOFT TISSUE SURGERY         Social History     Tobacco Use     Smoking status: Never     Smokeless tobacco:  Never   Substance Use Topics     Alcohol use: No     Alcohol/week: 0.0 standard drinks     Family History   Problem Relation Age of Onset     Cerebrovascular Disease Mother      Hypertension Mother      Hypertension Father      Diabetes Father         Adult     Heart Disease Father         Hx: Bypass     Unknown/Adopted Maternal Grandmother      Cerebrovascular Disease Maternal Grandmother      No Known Problems Maternal Grandfather      No Known Problems Paternal Grandmother      No Known Problems Paternal Grandfather      Thyroid Disease Brother      Cancer Sister         Liver     No Known Problems Sister      No Known Problems Son          Current Outpatient Medications   Medication Sig Dispense Refill     ACCU-CHEK GUIDE test strip USE TO TEST BLOOD SUGAR 1 TIME DAILY AS DIRECTED. 100 strip 1     alcohol swab prep pads Use to swab area of injection/pankaj as directed. 100 each 3     Alcohol Swabs PADS Use to swab the area of the injection or pankaj as directed Per insurance coverage 100 each 0     aspirin (ASA) 81 MG EC tablet Take 1 tablet (81 mg) by mouth daily       blood glucose (HUGO CONTOUR) test strip Use to test blood sugars 1 time daily or as directed. 100 strip 0     blood glucose (NO BRAND SPECIFIED) lancets standard Use to test blood sugar one time daily or as directed. 100 each 3     blood glucose (NO BRAND SPECIFIED) lancets standard To use to test glucose level in the blood Use to test blood sugar 1 times daily as directed. To accompany glucose monitor brands per insurance coverage. 100 each 0     blood glucose (NO BRAND SPECIFIED) test strip To use to test glucose level in the blood Use to test blood sugar 1 times daily as directed. To accompany glucose monitor brands per insurance coverage. 100 strip 0     Coenzyme Q-10 capsule Take 1 capsule by mouth every morning 30 capsule 12     fish oil-omega-3 fatty acids 1000 MG capsule Take 1 g by mouth every morning 180 capsule 12     gabapentin  (NEURONTIN) 300 MG capsule Take 1 capsule (300 mg) by mouth At Bedtime 90 capsule 3     levothyroxine (SYNTHROID/LEVOTHROID) 137 MCG tablet Take 1 tablet (137 mcg) by mouth daily Recheck lab in 3 momght 90 tablet 0     losartan (COZAAR) 50 MG tablet TAKE ONE AND ONE-HALF TABLETS BY MOUTH EVERY  tablet 1     metFORMIN (GLUCOPHAGE) 1000 MG tablet Take 1 tablet (1,000 mg) by mouth 2 times daily (with meals) 180 tablet 1     metoprolol tartrate (LOPRESSOR) 25 MG tablet Take 1.5 tablets (37.5 mg) by mouth 2 times daily 270 tablet 3     Misc Natural Products (OSTEO BI-FLEX ADV JOINT SHIELD) TABS Take 1 tablet by mouth daily        Multiple Vitamins-Minerals (PRESERVISION AREDS PO) Take 1 tablet by mouth 2 times daily       pantoprazole (PROTONIX) 40 MG EC tablet Take 1 tablet (40 mg) by mouth daily Please stop the nexium 90 tablet 3     rosuvastatin (CRESTOR) 10 MG tablet Take 1 tablet (10 mg) by mouth daily Please stop the Lovosatin 90 tablet 1     Sharps Container MISC Use as directed to dispose of needles, lancets and other sharps Per Insurance coverage 1 each 0     tamsulosin (FLOMAX) 0.4 MG capsule Take 1 capsule (0.4 mg) by mouth daily 90 capsule 3     blood glucose calibration (HUGO CONTOUR) NORMAL solution Use to calibrate blood glucose monitor as directed. 1 each 3     blood glucose monitoring (NO BRAND SPECIFIED) meter device kit Use as directed Per insurance coverage 1 kit 0     blood glucose monitoring (NO BRAND SPECIFIED) meter device kit Use to test blood sugar 1 times daily or as directed. Preferred blood glucose meter OR supplies to accompany: Blood Glucose Monitor Brands: per insurance. 1 kit 0     buPROPion (WELLBUTRIN XL) 300 MG 24 hr tablet Take 1 tablet (300 mg) by mouth every morning 90 tablet 1     Calcium Carb-Cholecalciferol (CALCIUM/VITAMIN D PO) Take 1 tablet by mouth every evening       ERLEADA 60 MG tablet Take 240 mg by mouth daily       ezetimibe (ZETIA) 10 MG tablet TAKE ONE TABLET  BY MOUTH ONCE DAILY 90 tablet 3     magnesium oxide (MAG-OX) 400 MG tablet Take 400 mg by mouth every evening       nitroGLYcerin (NITROSTAT) 0.4 MG sublingual tablet For chest pain place 1 tablet under the tongue every 5 minutes for 3 doses. If symptoms persist 5 minutes after 1st dose call 911. 15 tablet 1     ONETOUCH ULTRA test strip USE TO TEST BLOOD SUGARS 1 TIME DAILY OR AS DIRECTED. 100 strip 1     Probiotic Product (PROBIOTIC DAILY) CAPS Take 1 capsule by mouth daily       vitamin C (ASCORBIC ACID) 500 MG tablet Take 500 mg by mouth daily       Allergies   Allergen Reactions     Dust Mites Cough     Hmg-Coa-R Inhibitors      Body aches     Other Environmental Allergy      Allergic to sunlight ever since 20s.      Penicillins Hives and Rash     Recent Labs   Lab Test 03/20/23  0910 02/22/23  0851 02/10/23  0909 11/11/22  0805 10/14/22  1210 09/20/22  0821 08/15/22  0913 07/26/22  0837 06/17/22  1146 02/14/22  1201 01/31/22  1021 02/26/21  1107 01/06/21  1553 11/04/20  0953   A1C 6.1*  --   --   --  5.9*  --   --   --   --   --  6.3*   < >  --  5.7*   LDL  --  83  --   --   --  77  --   --  80   < > 156*   < >  --  90   HDL  --  35*  --   --   --  37*  --   --  39*   < > 34*   < >  --  34*   TRIG  --  183*  --   --   --  183*  --   --  198*   < > 159*   < >  --  252*   ALT 27  --  17 20  --  23   < > 23 27   < > 34   < >  --  72*   CR 1.24*  --  1.10 1.11  --  1.15   < > 1.12 1.02   < > 1.06   < > 1.21 1.12   GFRESTIMATED 64  --  74 73  --  70   < > 72 82   < > 78   < > 63 69   GFRESTBLACK  --   --   --   --   --   --   --   --   --   --   --   --  73 80   POTASSIUM 4.5  --  4.5 4.6  --  4.6   < > 4.4 4.5   < > 4.6   < > 4.1 4.6   TSH  --  6.18*  --   --   --   --   --  4.61*  --    < > 4.39*   < >  --  6.02*    < > = values in this interval not displayed.      Pneumonia Vaccine: UTD      Review of Systems   Constitutional: Positive for chills. Negative for fever.   HENT: Positive for hearing loss. Negative  "for congestion, ear pain and sore throat.    Eyes: Positive for visual disturbance.   Respiratory: Positive for cough and shortness of breath.    Cardiovascular: Positive for peripheral edema. Negative for chest pain and palpitations.   Gastrointestinal: Positive for abdominal pain and nausea. Negative for constipation, heartburn and hematochezia.   Genitourinary: Positive for frequency, impotence and urgency. Negative for dysuria, genital sores, hematuria and penile discharge.   Musculoskeletal: Positive for myalgias. Negative for arthralgias and joint swelling.   Skin: Negative for rash.   Neurological: Positive for dizziness, weakness, headaches and paresthesias.   Psychiatric/Behavioral: Positive for mood changes. The patient is not nervous/anxious.      Please see subjective otherwise the complete review of system was negative     OBJECTIVE:   /64   Pulse 50   Temp 97.4  F (36.3  C)   Resp 14   Ht 1.76 m (5' 9.3\")   Wt 103.9 kg (229 lb)   SpO2 100%   BMI 33.53 kg/m   Estimated body mass index is 33.53 kg/m  as calculated from the following:    Height as of this encounter: 1.76 m (5' 9.3\").    Weight as of this encounter: 103.9 kg (229 lb).     Physical Exam   GENERAL: healthy, alert and no distress  EYES: Eyes grossly normal to inspection, PERRL and conjunctivae and sclerae normal.  No nystagmus.  All 4 visual fields are intact  HENT: Ear canals and TM's normal.  Nares are non-congested. Oropharynx is pink and moist. No tender with palpation to the sinuses.  NECK: no adenopathy, supple, no lymphadenopathy or thyromegaly.  No tender with palpation to the cervical spine or its paraspinous muscle.  JV distention or carotid bruits.  RESP: lungs clear to auscultation - no rales, rhonchi or wheezes  CV: regular rate and rhythm, no murmur.  ABDOMEN: soft, nondistended, nontender, no palpable masses or organomegaly with normal bowel sounds.  MS: no gross musculoskeletal defects noted, no edema.  Walk with " no limping, normal gait.  All 4 extremities are equally in strength. Ankle, knees, hips, shoulders, elbows and wrists exams normal.  Normal fine motor skills on fingers.  Back is straight, no lordosis or scoliosis.  No tender with palpation to the spine.  SKIN: no suspicious lesions or rashes.  No ecchymosis or petechiae.  NEURO: Normal strength and tone, mentation intact and speech normal.  Cranial nerves II through XII intact, DTR +2 throughout, no focal neurological deficit.  PSYCH: mentation appears normal, affect normal/bright.  Thoughts intact, no hallucination.  No suicidal or homicidal ideation.  LYMPH: no cervical, supraclavicular or axillary adenopathy.   (male): Offered but declined.  He has no concern about it.  Foot: Normal microfilament exam.  No calluses.  Nail care looks good.  No open wound.      Diagnostic Test Results:  Labs reviewed in Epic  Results for orders placed or performed in visit on 03/20/23   Albumin Random Urine Quantitative with Creat Ratio     Status: Abnormal   Result Value Ref Range    Creatinine Urine mg/dL 110.3 mg/dL    Albumin Urine mg/L 106.0 mg/L    Albumin Urine mg/g Cr 96.10 (H) 0.00 - 17.00 mg/g Cr   Hemoglobin A1c     Status: Abnormal   Result Value Ref Range    Hemoglobin A1C 6.1 (H) <5.7 %   Iron and iron binding capacity     Status: Normal   Result Value Ref Range    Iron 70 61 - 157 ug/dL    Iron Binding Capacity 254 240 - 430 ug/dL    Iron Sat Index 28 15 - 46 %   Ferritin     Status: Normal   Result Value Ref Range    Ferritin 227 31 - 409 ng/mL   Comprehensive metabolic panel     Status: Abnormal   Result Value Ref Range    Sodium 140 136 - 145 mmol/L    Potassium 4.5 3.4 - 5.3 mmol/L    Chloride 103 98 - 107 mmol/L    Carbon Dioxide (CO2) 26 22 - 29 mmol/L    Anion Gap 11 7 - 15 mmol/L    Urea Nitrogen 25.4 (H) 8.0 - 23.0 mg/dL    Creatinine 1.24 (H) 0.67 - 1.17 mg/dL    Calcium 9.9 8.8 - 10.2 mg/dL    Glucose 147 (H) 70 - 99 mg/dL    Alkaline Phosphatase 90 40  - 129 U/L    AST 22 10 - 50 U/L    ALT 27 10 - 50 U/L    Protein Total 6.9 6.4 - 8.3 g/dL    Albumin 4.2 3.5 - 5.2 g/dL    Bilirubin Total 0.4 <=1.2 mg/dL    GFR Estimate 64 >60 mL/min/1.73m2   PSA tumor marker     Status: Normal   Result Value Ref Range    PSA Tumor Marker <0.01 0.00 - 4.50 ng/mL    Narrative    This result is obtained using the Roche Elecsys total PSA method on the margarita [id number] immunoassay analyzer. Results obtained with different assay methods or kits cannot be used interchangeably.   CBC with platelets and differential     Status: Abnormal   Result Value Ref Range    WBC Count 6.2 4.0 - 11.0 10e3/uL    RBC Count 3.70 (L) 4.40 - 5.90 10e6/uL    Hemoglobin 11.7 (L) 13.3 - 17.7 g/dL    Hematocrit 34.2 (L) 40.0 - 53.0 %    MCV 92 78 - 100 fL    MCH 31.6 26.5 - 33.0 pg    MCHC 34.2 31.5 - 36.5 g/dL    RDW 12.8 10.0 - 15.0 %    Platelet Count 203 150 - 450 10e3/uL    % Neutrophils 73 %    % Lymphocytes 10 %    % Monocytes 9 %    % Eosinophils 6 %    % Basophils 1 %    % Immature Granulocytes 1 %    NRBCs per 100 WBC 0 <1 /100    Absolute Neutrophils 4.7 1.6 - 8.3 10e3/uL    Absolute Lymphocytes 0.6 (L) 0.8 - 5.3 10e3/uL    Absolute Monocytes 0.5 0.0 - 1.3 10e3/uL    Absolute Eosinophils 0.3 0.0 - 0.7 10e3/uL    Absolute Basophils 0.0 0.0 - 0.2 10e3/uL    Absolute Immature Granulocytes 0.0 <=0.4 10e3/uL    Absolute NRBCs 0.0 10e3/uL   CBC with Platelets & Differential     Status: Abnormal    Narrative    The following orders were created for panel order CBC with Platelets & Differential.  Procedure                               Abnormality         Status                     ---------                               -----------         ------                     CBC with platelets and d...[[id number]]  Abnormal            Final result                 Please view results for these tests on the individual orders.       ASSESSMENT / PLAN:   (Z00.00) Encounter for Medicare annual wellness exam  (primary  encounter diagnosis)  Comment:  Overall Joel is doing well - his chronic medical conditions are stable.  UTD for immunizations.  Discussed about safety issue, healthy diet, exercising, weight management, good sleeping hygiene, advanced directive care, falling prevention, and depression prevention. Recommended daily exercise for at least 30 minutes.  Emphasized on healthy diet with adequate fluid intake and resting. Feels safe.  Follow in 1 year, earlier as needed.  UTD for labs.  UTD for colon cancer screening - last colonoscopy was in 2019 with polyps, repeat in 3 to 5 years - plan to get it next year.   All of his questions were answered.      (I10) Essential hypertension  Comment:   Also has high cholesterol, diabetes, CAD with history of four-vessel CABG, and sleep apnea.  The goal for his blood pressure to be in the 120s-130s/70s-80s.  BP has been normal and stable with losartan and metoprolol; been tolerating them well.  Healthy/low salt diet, excercising and weight loss discussed and encouraged.  Avoid high sugar/caffeine intake. Lab as ordered.  Kidney function was slightly low but has been stable with normal electrolytes. Follow up in 6 month, earlier as needed.      (E78.5) Hyperlipidemia LDL goal <70  Comment: Multiple comorbidity as mentioned above, including high blood pressure, diabetes, high cholesterol and sleep apnea.  The goal for his LDL to be less than 70.  Last month's cholesterol level show LDL of 83 with a total cholesterol 5155, triglyceride 183 and HDL of 35.  Tolerating the Crestor and Zetia well.  No history of liver disease.    Today's liver enzyme was also normal.  No excessive alcohol intake.  Will continue with the Zetia and Crestor.    Strongly encouraged to work on exercising, healthy diet and weight loss as discussed above.  Recheck in 6-month, if the LDL remains to be above 70, will consider increase the Crestor to 15-20 mg daily.      (I25.118) Atherosclerosis of native coronary  artery of native heart with stable angina pectoris (H)  (Z95.1) S/P CABG x 4  Comment: Reviewed the cardiology consult notes.  Overall stable and is doing well.  He is on appropriate medications including aspirin, high intensity statin, metoprolol and ACE inhibitor.  Blood pressure and diabetes are well controlled.  Cholesterol level is also fairly controlled.  He is using the CPAP faithfully for the sleep apnea.  Encouraged to work on weight loss.  He does not smoke, no drugs or excessive alcohol.  Encouraged to exercise daily.  Symptoms that need to be seen or call in discussed.  Symptoms to go to the emergency room also discussed.  Follow-up with the cardiologist as per his/her recommendation.    Plan: rosuvastatin (CRESTOR) 10 MG tablet            (E11.9) Type 2 diabetes, HbA1c goal < 7% (H)  (E11.22,  N18.2) Type 2 diabetes mellitus with stage 2 chronic kidney disease, without long-term current use of insulin (H)  Comment: Well-controlled with today hemoglobin A1c of 6.1.  The goal for his hemoglobin A1c to be around 7.0.  It also has been controlled.  Will continue with the metformin.    Plan: Albumin Random Urine Quantitative with Creat         Ratio, FOOT EXAM, Hemoglobin A1c        Losartan (COZAAR) 50 MG tablet        metFORMIN (GLUCOPHAGE) 1000 MG tablet,     (I48.92) Atrial flutter, unspecified type (H)  Comment: Per cardiology, initially presented to the ED on 4/26/2022 and found to be in atrial flutter with RVR and was successfully cardioverted and placed on anticoagulation with Xarelto.  On 5/4/2022 had similar symptoms and noted to be in atrial fibrillation; underwent a successful cardioversion again and was placed on a oral amiodarone load and taper for a total of 30 day.  Follow up [facility] did not reveal any recurrent atrial arrhythmias therefore amiodarone and Xarelto were discontinued.  He denies of heart palpitation or irregular heartbeat.      (G47.33) HILARIO (obstructive sleep apnea)  Comment:  Stable and is doing well with the CPAP.  Continue with the CPAP - tolerating it well.  Weight loss encouraged.       (E03.9) Hypothyroidism, unspecified type  Comment: Stable and no symptoms.  recent TSH check was therapeutic and therefore will continue with current dose of Levothyroxine.     Plan: levothyroxine (SYNTHROID/LEVOTHROID) 137 MCG         tablet            (C61) Prostate cancer (H)  Comment:  Encouraged to work with the urologist and oncologist closely.  His PSA tumor marker was undetectable today.  Discussed about medication for ED but he declined.  Continue with the Flomax for nocturia.    Plan: tamsulosin (FLOMAX) 0.4 MG capsule, gabapentin         (NEURONTIN) 300 MG capsule            (K21.00) Gastroesophageal reflux disease with esophagitis without hemorrhage  Comment: Doing well, no concern.  Diet modification discussed.  Symptoms that need to be seen or call in discussed.  Continue with Protonix.    Plan: pantoprazole (PROTONIX) 40 MG EC tablet            (D63.8) Anemia in other chronic diseases classified elsewhere  Comment: Hemoglobin has been stable.  Iron study today was normal.  Last colonoscopy in 2019 which showed polyps, recommend repeat in 3 to 5 years.  He would like to wait till next year.  Monitor for melena or hematochezia.  Symptoms that need to be seen or call in discussed.  His oncologist is also aware of it    Plan: Iron and iron binding capacity, Ferritin            (F33.42) Recurrent major depression in complete remission (H)  Comment: Stable and control with the Wellbutrin; no side effect.  He has no concern about his mental health.  PHQ-9 score of 2 today.  No suicidal or homicidal ideation.  No hallucination.  Will continue with the Wellbutrin at the current dose.  Symptoms that need to be seen or call in discussed.      Patient has been advised of split billing requirements and indicates understanding: Yes      COUNSELING:  Reviewed preventive health counseling, as reflected  "in patient instructions       Regular exercise       Healthy diet/nutrition       Vision screening       Hearing screening       Dental care       Bladder control       Fall risk prevention       Colon cancer screening      BMI:   Estimated body mass index is 33.53 kg/m  as calculated from the following:    Height as of this encounter: 1.76 m (5' 9.3\").    Weight as of this encounter: 103.9 kg (229 lb).   Weight management plan: Discussed healthy diet and exercise guidelines      He reports that he has never smoked. He has never used smokeless tobacco.      Appropriate preventive services were discussed with this patient, including applicable screening as appropriate for cardiovascular disease, diabetes, osteopenia/osteoporosis, and glaucoma.  As appropriate for age/gender, discussed screening for colorectal cancer, prostate cancer, breast cancer, and cervical cancer. Checklist reviewing preventive services available has been given to the patient.    Reviewed patients plan of care and provided an AVS. The Basic Care Plan (routine screening as documented in Health Maintenance) for Joel meets the Care Plan requirement. This Care Plan has been established and reviewed with the Patient.          Nishi Hdz Mai, MD  [facility]    Identified Health Risks:  Answers for HPI/ROS submitted by the patient on 3/20/2023  If you checked off any problems, how difficult have these problems made it for you to do your work, take care of things at home, or get along with other people?: Not difficult at all  PHQ9 TOTAL SCORE: 2    Conflicting answers have been found for some questions. Please document the patient's answers manually.         He is at risk for lack of exercise and has been provided with information to increase physical activity for the benefit of his well-being.  The patient was provided with written information regarding signs of hearing loss.  Information on urinary incontinence and treatment " options given to patient.

## 2023-03-20 NOTE — PATIENT INSTRUCTIONS
Patient Education   Personalized Prevention Plan  You are due for the preventive services outlined below.  Your care team is available to assist you in scheduling these services.  If you have already completed any of these items, please share that information with your care team to update in your medical record.  Health Maintenance Due   Topic Date Due    Kidney Microalbumin Urine Test  01/31/2023    ANNUAL REVIEW OF HM ORDERS  01/31/2023    Diabetic Foot Exam  03/16/2023       Exercise for a Healthier Heart  You may wonder how you can improve the health of your heart. If you re thinking about exercise, you re on the right track. You don t need to become an athlete. But you do need a certain amount of brisk exercise to help strengthen your heart. If you have been diagnosed with a heart condition, your healthcare provider may advise exercise to help your condition. To help make exercise a habit, choose safe, fun activities.      Exercise with a friend. When activity is fun, you're more likely to stick with it.     Before you start  Check with your healthcare provider before starting an exercise program. This is especially important if you haven't been active for a while. It's also important if you have a long-term (chronic) health problem such as heart disease, diabetes, or obesity. Also check with your provider if you're at high risk for having these problems.   Why exercise?  Exercising regularly offers many healthy rewards. It can help you do all of these:   Improve your blood cholesterol level to help prevent further heart trouble.  Lower your blood pressure to help prevent a stroke or heart attack.  Control diabetes or reduce your risk of getting this disease.  Improve your heart and lung function.  Reach and stay at a healthy weight.  Make your muscles stronger so you can stay active.  Prevent falls and fractures by slowing the loss of bone mass (osteoporosis).  Manage stress better.  Improve your sense of  self and your body image.  Exercise tips    Ease into your routine. Set small goals. Then build on them. Talk with your healthcare provider first before starting an exercise routine if you're not sure what your activity level should be.  Exercise on most days. Aim for a total of at least 150 minutes (2 hours and 30 minutes) or more of moderate-intensity aerobic activity each week. You could also do 75 minutes (1 hour and 15 minutes) or more of vigorous-intensity aerobic activity each week. Or try for a combination of both. Moderate activity means that you breathe heavier and your heart rate increases, but you can still talk. Think about doing at least 30 minutes of moderate exercise, 5 times a week. It's OK to work up to the 30-minute period over time. Examples of moderate-intensity activity are brisk walking, gardening, and water aerobics.  Step up your daily activity level.  Along with your exercise program, try being more active the whole day. Walk instead of drive. Or park further away so that you take more steps each day. Do more household tasks or yard work. You may not be able to meet the advised amount of physical activity. But doing some moderate- or vigorous-intensity aerobic activity can help reduce your risk for heart disease. Your healthcare provider can help you figure out what is best for you.  Choose 1 or more activities you enjoy.  Walking is one of the easiest things you can do. You can also try swimming, riding a bike, dancing, or taking an exercise class.    Call 911  Call 911 right away if any of these occur:   Chest pain that doesn't go away quickly with rest  New burning, tightness, pressure, or heaviness in your chest, neck, shoulders, back, or arms  Abnormal or severe shortness of breath  A very fast or irregular heartbeat (palpitations)  Fainting  When to call your healthcare provider  Call your healthcare provider if you have any of these:   Dizziness or lightheadedness  Mild shortness of  breath or chest pain  Increased or new joint or muscle pain    [employer] last reviewed this educational content on 7/1/2022 2000-2022 [employer]. All rights reserved. This information is not intended as a substitute for professional medical care. Always follow your healthcare professional's instructions.          Signs of Hearing Loss  Hearing loss is a problem shared by many people. In fact, it's one of the most common health problems, particularly as people age. Most people aged 65 and older have some hearing loss. By age 80, almost everyone does. Hearing loss often occurs slowly over the years. So, you may not realize your hearing has gotten worse.   When sudden hearing loss occurs, it's important to contact your healthcare provider right away. Your provider will do a medical exam and a hearing exam as soon as possible. This is to help find the cause and type of your sudden hearing loss. Based on your diagnosis, your healthcare provider will discuss possible treatments.      Hearing much better with one ear can be a sign of hearing loss.     Have your hearing checked  Call your healthcare provider if you:   Have to strain to hear normal conversation  Have to watch other people s faces very carefully to follow what they re saying  Need to ask people to repeat what they ve said  Often misunderstand what people are saying  Turn the volume of the television or radio up so high that others complain  Feel that people are mumbling when they re talking to you  Find that the effort to hear leaves you feeling tired and irritated  Notice, when using the phone, that you hear better with one ear than the other  [employer] last reviewed this educational content on 6/1/2022 2000-2022 [employer]. All rights reserved. This information is not intended as a substitute for professional medical care. Always follow your healthcare professional's instructions.          Urinary Incontinence (Male)  We  understand that gender is a spectrum. We may use gendered terms to talk about anatomy and health risk. Please use this sheet in a way that works best for you and your provider as you talk about your care.     Urinary incontinence means not being able to control the release of urine from the bladder.   Causes  Common causes of urinary incontinence in men include:  Infection  Certain medicines  Aging  Poor pelvic muscle tone  Bladder spasms  Obesity  Enlarged prostate  Trouble urinating and fully emptying the bladder (urinary retention)  Other things that can cause incontinence are:   Nervous system diseases  Diabetes  Sleep apnea  Urinary tract infections  Prostate surgery  Pelvic injury  Constipation and smoking have also been identified as risk factors.   Symptoms  Urge incontinence (overactive bladder). This is a sudden urge to urinate. It occurs even though there may not be much urine in the bladder. The need to urinate often during the night is common. It's due to overactive bladder muscles.  Stress incontinence. This is urine leakage that you can't control. It can occur with sneezing, coughing, and other actions that put stress on the bladder.    Treatment  Treatment depends on what is causing the condition. Bladder infections are treated with antibiotics. Urinary retention is treated with a bladder catheter.   Home care  Follow these guidelines when caring for yourself at home:  Don't have any foods and drinks that may irritate the bladder. This includes:  Chocolate  Alcohol  Caffeine  Carbonated drinks  Acidic fruits and juices  Limit fluids to 6 to 8 cups a day.  Lose weight if you are overweight. This may reduce your symptoms.  If advised, do regular pelvic muscle-strengthening exercises such as Kegel exercises.  If needed, wear absorbent pads to catch urine. Change the pads often. This is for good hygiene and to prevent skin and bladder infections.  Bathe daily for good hygiene.  If an antibiotic was  prescribed to treat a bladder infection, take it until it's finished. Keep taking it even if you are feeling better. This is to make sure your infection has cleared.  If a catheter was left in place, keep bacteria from getting into the collection bag. Don't disconnect the catheter from the collection bag.  Use a leg band to secure the catheter drainage tube, so it does not pull on the catheter. Drain the collection bag when it becomes full. To do this, use the drain spout at the bottom of the bag. Don't disconnect the bag from the catheter.  Don't pull on or try to remove a catheter. The catheter must be removed by a healthcare provider.  If you smoke, stop. Ask your provider for help if you can't do this on your own.  Follow-up care  Follow up with your healthcare provider, or as advised.  When to get medical advice  Call your healthcare provider right away if any of these occur:   Fever over 100.4 F (38 C), or as directed by your provider  Bladder pain or fullness  Belly swelling, nausea, or vomiting  Back pain  Weakness, dizziness, or fainting  If a catheter was left in place, return if:  The catheter falls out  The catheter stops draining for 6 hours  Your urine gets cloudy or smells bad  [employer] last reviewed this educational content on 6/1/2022 2000-2022 [employer]. All rights reserved. This information is not intended as a substitute for professional medical care. Always follow your healthcare professional's instructions.      Increase the levothyroxine dose, recheck lab in 3 months  Start Senna as needed for constipation  May hold the Flomax to see if it makes a difference with urinary frequency. If not make a difference the may stop  Your cholesterol level is still high, increase the crestor to 10 mg, let me know if develops muscle aches again  Let me know if develops heart palpitation or felt irregular/skipped heart beats  Labs today  Follow up in 6 months

## 2023-03-20 NOTE — PATIENT INSTRUCTIONS
Recommendations from today's MTM visit:                                                    MTM (medication therapy management) is a service provided by a clinical pharmacist designed to help you get the most of out of your medicines.   Today we reviewed what your medicines are for, how to know if they are working, that your medicines are safe and how to make your medicine regimen as easy as possible.      Continue plan to increase doses of ROSUVASTATIN and LEVOTHYROXINE that Dr. Lin ordered earlier today.    I placed an order for the BUPROPION  MG tablets to replace you two 150 mg tablets every morning.  You can get this at your next refill.    Okay to stop FISH OIL - no longer good evidence that this reduces risk for heart attack or stroke and your rosuvastatin should do enough for your triglycerides.    Bladder Retraining     In this packet, you will learn 3 steps to help you improve your bladder control. If you have any questions regarding any of these steps once you are home, please feel free to call the office.   The 3 steps you will learn are:   Double Voiding   Fluid Guidelines   Timed Voiding     Double Voiding   A technique called double voiding can be used to help ensure that you have fully emptied your bladder while on the toilet. The main idea behind double voiding is to try to void two or even three times during each trip to the bathroom.  By doing this you can reduce residual urine volumes and minimize your chance of having an accident, an infection, or leakage later on.   The technique is simple.  Some people find that they can void, remain on the toilet for a rest period of two to five to ten minutes, and void again.  Others find it useful to void, then stand up and sit back down and attempt to void again.   You can also compress the bladder in order to empty more fully.  To do the Crede maneuver, press firmly with one hand (or both hands) directly into the abdomen over the bladder.  You may  also find it helpful to lean forward or rock while sitting on the toilet in order to help empty the bladder better.     Fluid Guidelines   Managing your fluid intake can also help you improve your bladder control.  It is VERY important that you drink at least 5 to 7 glasses of fluid each day. The bulk of this fluid should be water. Drinking appropriate amounts of fluid daily and emptying your bladder at regular intervals helps decrease bladder infections. Managing your problem by restricting fluid intake is counterproductive, and NOT recommended.     Suggestions Regarding Fluid Intake   Try to spread your fluid consumption out over the course of the day rather than consuming large amounts at one sitting and then going long periods of time without drinking.   Try to minimize fluid consumption after your evening meal.   Try to minimize caffeine and alcohol consumption. Use only decaffeinated coffee, tea or soda when possible.     Timed Voiding     It might be a good idea to start this approach to managing your problem on a weekend or when you plan to be at home or near a bathroom facility. The purpose of timed voiding is to gradually:   increase the amount of time between emptying your bladder   increase the amount of fluid your bladder can hold, and hopefully,   diminish the sense of urgency and/or leakage associated with your problem.     Week 1 - After awakening, empty your bladder every half-hour on the hour (even if you do NOT feel the need to go). Make sure you are drinking frequently. During the night only go to the bathroom if you waken and find it necessary     Week 2 - Increase the time between emptying your bladder to once per hour, following the above fluid and night instructions.     Week 3 - Increase the time between emptying your bladder to every 1 1/2 hours, following the above fluid and night instructions.   Week 4 - Increase the time between emptying your bladder to every 2 hours, following the above  fluid and night instructions.  Work up to voiding every 3 to 3   hours if you can.     If you can already hold your bladder longer than 1/2 hour, you do not need to start at Week 1. Start at the point that is appropriate for you and work up from there. Just remember to 1) increase your voiding intervals by no more than 30 minutes at a time, 2) void regularly even if you do not feel the need to go, and 3) during the night, only go to the bathroom if you waken and find it necessary.   You will be the best  of how quickly you can advance to the next step. These instructions are an outline which you can modify (for example, you may find it more comfortable to stretch from 1 to 1 1/4 hours).   You may also increase the pace of this sample schedule, depending on your individual symptoms and bladder capacity. For example, you may increase the hourly increments every 5 days, instead of every 7 days.     Don't Get Discouraged   This program works! The keys to success are self-motivation and gradual increases in the time interval between voids. If you try to progress too rapidly, you will exceed your capabilities and become frustrated.     Some Additional Behavioral Techniques to Help Bladder Control   Do not rush to the bathroom. Try to be calm and maintain control. Rushing to the bathroom can intensify the urge for the bladder to contract.   Do several quick contractions of the pelvic floor muscles. Use effort to keep from leaking. If possible, sit down for direct pressure on the pelvic floor.   Relax. If you have practiced diaphragmatic breathing in the past, use that skill to relax. (Take slow, deep breaths through your nose, and then slowly breathe out through pursed lips.) Use distraction techniques to try and make the urinary urge go away.   When you feel the urge subside, walk slowly and normally to the bathroom. You can repeat the above steps to gain control if the urge returns.   You can slowly proceed to the  "toilet room to empty your bladder once the urge has subsided.     If these lifestyle changes including cutting back on caffeine/timing of caffeine do not help we could look at the dose of your tamsulosin (there is room to increase/optimize it) OR try a trial off to see if it actually is helping.      6.  If you are hoping to reduce the number of tablets you are taking further we could look at changing your metoprolol tartrate to a cousin called metoprolol succinate (once daily long acting version), but would want to talk to cardiology or Dr. Delia maldonado?    Follow-up: I will call you on Monday, April 24th at 10 AM    It was great speaking with you today.  I value your experience and would be very thankful for your time in providing feedback in our clinic survey. In the next few days, you may receive an email or text message from [email or web address] with a link to a survey related to your  clinical pharmacist.\"     To schedule another MTM appointment, please call the clinic directly or you may call the MTM scheduling line at [phone] or toll-free at [phone].     My Clinical Pharmacist's contact information:                                                      Please feel free to contact me with any questions or concerns you have.      Chaitanya Adams, PharmD, [facility]  Medication Therapy Management Pharmacist  Pager: [phone]  "

## 2023-03-20 NOTE — PROGRESS NOTES
Medication Therapy Management (MTM) Encounter    ASSESSMENT:                            Medication Adherence/Access: See below for considerations    Type 2 Diabetes:  Stable. Last A1c is at goal of <8%.     CAD/Hypertension: Stable. Blood pressure is at goal of <140/90 mmHg.    Hypothyroidism: Last TSH was above normal limits, though T4 was within normal limits - plan in place for dose increase.     GERD: Stable.     Prostate Cancer: Stable. Plan in place with oncology. Ongoing urinary symptoms may be due to caffeine intake/timing. Could benefit from some bladder retraining. If no improvement may need to consider either a trial off tamsulosin to verify efficacy or an increase in alpha blocker dose.     Hyperlipidemia: Plan in place for dose increase.     Depression:  Stable per patient. May benefit from change in dosage strength to reduce pill number.     GERD: Stable.      Supplements: Stable. Possible room to reduce supplements but will hold off besides fish oil at this time.     PLAN:                            1. Continue plan to increase doses of ROSUVASTATIN and LEVOTHYROXINE that Dr. Lin ordered earlier today.    2. I placed an order for the BUPROPION  MG tablets to replace you two 150 mg tablets every morning.  You can get this at your next refill.    3. Okay to stop FISH OIL - no longer good evidence that this reduces risk for heart attack or stroke and your rosuvastatin should do enough for your triglycerides.    4. Try to implement double voiding as part of your bladder retraining in addition to adjusting your fluid intake.    5. If these lifestyle changes including cutting back on caffeine/timing of caffeine do not help we could look at the dose of your tamsulosin (there is room to increase/optimize it) OR try a trial off to see if it actually is helping.      6.  If you are hoping to reduce the number of tablets you are taking further we could look at changing your metoprolol tartrate to a cousin  called metoprolol succinate (once daily long acting version), but would want to talk to cardiology or Dr. Lin first?    Follow-up: I will call you on  at 10 AM    SUBJECTIVE/OBJECTIVE:                          Joel Pike is a 67 year old male coming in for an initial visit. He was referred to me from Dr. Lin.      Reason for visit: Comprehensive medication review/Polypharmacy    Allergies/ADRs: Reviewed in chart  Past Medical History: Reviewed in chart  Tobacco: He reports that he has never smoked. He has never used smokeless tobacco.  Alcohol: none  Caffeine: 3-4 cups Keurigs - up until bedtime     Medication Adherence/Access: Patient uses pill box(es).  Patient takes medications 3 time(s) per day.   Per patient, misses medication 0 times per week.   Medication barriers: none.   The patient fills medications at [place]: YES.    Type 2 Diabetes:  Currently taking metformin IR 1000 mg twice daily. Patient is not experiencing side effects.  Blood sugar monitorin-1 time(s) daily. Ranges (patient reported):  mg/dL  Symptoms of low blood sugar? none  Symptoms of high blood sugar? none  Eye exam: up to date  Foot exam: up to date  Diet/Exercise: denies any issues  Aspirin: Taking 81mg daily and denies side effects   Statin: Yes: rosuvastatin   ACEi/ARB: Yes: losartan.   Urine Albumin:   Lab Results   Component Value Date    UMALCR 96.10 (H) 2023      Lab Results   Component Value Date    A1C 5.9 10/14/2022    A1C 6.3 2022    A1C 6.1 2021    A1C 6.0 2021    A1C 5.7 2020    A1C 5.5 2020    A1C 5.5 10/28/2019    A1C 5.7 2019       CAD/Hypertension: Current medications include aspirin 81 mg daily, losartan 75 mg daily, metoprolol tartrate 37.5 mg twice daily, and nitroglycerin SL as needed. History of coronary artery bypass graft.  Patient does not self-monitor blood pressure.  Patient reports no current medication side effects.  BP Readings from Last 3  Encounters:   03/20/23 120/64   11/16/22 127/72   10/05/22 138/87       Hypothyroidism: Patient is taking Levothyroxine 137 mcg daily. Patient is having the following symptoms: none.   TSH   Date Value Ref Range Status   02/22/2023 6.18 (H) 0.30 - 4.20 uIU/mL Final   07/26/2022 4.61 (H) 0.40 - 4.00 mU/L Final   11/04/2020 6.02 (H) 0.40 - 4.00 mU/L Final     T4 Free   Date Value Ref Range Status   11/04/2020 1.08 0.76 - 1.46 ng/dL Final     Free T4   Date Value Ref Range Status   02/22/2023 1.03 0.90 - 1.70 ng/dL Final     GERD: Current medications include: Protonix (pantoprazole) 40 mg once daily. Patient reports no current symptoms.  Patient feels that current regimen is effective.    Prostate Cancer: Patient may be able to go off chemotherapy in October if everything looks good.  Currently takes Erleada 240 mg every morning at 10 AM and leuprolide 45 mg every 6 months. Does have some hot flashes/runs hot at times, but gabapentin 300 mg at bedtime has helped. Dose take tamsulosin 0.4 mg daily. Still gets up multiple times during the night and never passes up a bathroom to urinate during the day.    Hyperlipidemia: Current therapy includes rosuvastatin 10 mg daily (dose increased today yet to start), ezetimibe 10 mg daily, and fish oil 1000 mg daily.  Patient reports no myalgias, but has had issues tolerating statins in the past.    Recent Labs   Lab Test 02/22/23  0851 09/20/22  0821 02/17/16  1137 02/16/15  0908   CHOL 155 151   < > 148   HDL 35* 37*   < > 37*   LDL 83 77   < > 91   TRIG 183* 183*   < > 102   CHOLHDLRATIO  --   --   --  4.0    < > = values in this interval not displayed.     Depression:  Current medications include: bupropion  mg every morning (two 150 mg tablets - this was because they were adjusting dose at one time, but has continued to take this way). Finds to be helpful for mood. Denies any known side effects.   PHQ-9 SCORE 1/6/2021 1/31/2022 3/20/2023   PHQ-9 Total Score - - -   PHQ-9  Total Score MyChart 2 (Minimal depression) - 2 (Minimal depression)   PHQ-9 Total Score 2 7 2       Supplements: Patient is taking calcium/vitamin D every evening, CoQ10 every morning, fish oil every morning, magnesium 400 mg daily, Osteo Bi-Flex daily, eye vitamin twice daily, a daily probiotic, and vitamin C 500 mg daily. Denies any known issues.     Today's Vitals: There were no vitals taken for this visit.  - vitals taken earlier today during PCP visit    BP Readings from Last 3 Encounters:   03/20/23 120/64   11/16/22 127/72   10/05/22 138/87     Wt Readings from Last 5 Encounters:   03/20/23 229 lb (103.9 kg)   11/16/22 224 lb (101.6 kg)   11/14/22 222 lb (100.7 kg)   10/05/22 229 lb (103.9 kg)   09/28/22 220 lb (99.8 kg)     ----------------      I spent 60 minutes with this patient today. All changes were made via collaborative practice agreement with Nishi Hdz Mai, MD. A copy of the visit note was provided to the patient's provider(s).    A summary of these recommendations was given to the patient.    Chaitanya Adams, PharmD, [facility]  Medication Therapy Management Pharmacist  Pager: [phone]     Medication Therapy Recommendations  Takes dietary supplements    Current Medication: fish oil-omega-3 fatty acids 1000 MG capsule   Rationale: No medical indication at this time - Unnecessary medication therapy - Indication   Recommendation: Discontinue Medication - Stop fish oil   Status: Accepted - no CPA Needed

## 2023-03-21 RX ORDER — EZETIMIBE 10 MG/1
TABLET ORAL
Qty: 90 TABLET | Refills: 3 | Status: SHIPPED | OUTPATIENT
Start: 2023-03-21 | End: 2024-03-14

## 2023-03-21 NOTE — TELEPHONE ENCOUNTER
Prescription approved per [facility] Refill Protocol.  Lor Cyr RN on 3/21/2023 at 2:25 PM

## 2023-03-28 ENCOUNTER — DOCUMENTATION ONLY (OUTPATIENT)
Dept: OTHER | Facility: CLINIC | Age: 68
End: 2023-03-28
Payer: MEDICARE

## 2023-04-01 PROBLEM — C79.51 SECONDARY MALIGNANT NEOPLASM OF BONE (H): Status: RESOLVED | Noted: 2023-03-20 | Resolved: 2023-04-01

## 2023-04-06 DIAGNOSIS — C61 PROSTATE CANCER (H): Primary | ICD-10-CM

## 2023-04-10 ENCOUNTER — TELEPHONE (OUTPATIENT)
Dept: ONCOLOGY | Facility: CLINIC | Age: 68
End: 2023-04-10
Payer: MEDICARE

## 2023-04-10 NOTE — TELEPHONE ENCOUNTER
Prior Authorization Not Needed per Insurance    Medication: Erleada- PA  Insurance Company: [employer] - Phone [phone] Fax [phone]  Expected CoPay:      Pharmacy Filling the Rx:    Pharmacy Notified:    Patient Notified:          Thank you,    America Hannon  Oncology Pharmacy Liaison II  [email or web address]  Phone: [phone]  Fax: [phone]

## 2023-04-24 ENCOUNTER — VIRTUAL VISIT (OUTPATIENT)
Dept: PHARMACY | Facility: CLINIC | Age: 68
End: 2023-04-24
Payer: COMMERCIAL

## 2023-04-24 DIAGNOSIS — E11.22 TYPE 2 DIABETES MELLITUS WITH STAGE 2 CHRONIC KIDNEY DISEASE, WITHOUT LONG-TERM CURRENT USE OF INSULIN (H): Primary | ICD-10-CM

## 2023-04-24 DIAGNOSIS — F33.42 RECURRENT MAJOR DEPRESSION IN COMPLETE REMISSION (H): ICD-10-CM

## 2023-04-24 DIAGNOSIS — N18.2 TYPE 2 DIABETES MELLITUS WITH STAGE 2 CHRONIC KIDNEY DISEASE, WITHOUT LONG-TERM CURRENT USE OF INSULIN (H): Primary | ICD-10-CM

## 2023-04-24 DIAGNOSIS — C61 PROSTATE CANCER (H): ICD-10-CM

## 2023-04-24 PROCEDURE — 99207 PR NO CHARGE LOS: CPT | Performed by: PHARMACIST

## 2023-04-24 NOTE — PATIENT INSTRUCTIONS
"Recommendations from today's MTM visit:                                                    MTM (medication therapy management) is a service provided by a clinical pharmacist designed to help you get the most of out of your medicines.      1. Continue to work on reducing your caffeine intake to help with urinary frequency and sleep. Try to reduce both the quantity per day and limit your caffeine to earlier in the day to decrease the impact on your sleep.     Follow-up: 1 year or sooner if needed    It was great speaking with you today.  I value your experience and would be very thankful for your time in providing feedback in our clinic survey. In the next few days, you may receive an email or text message from [employer] with a link to a survey related to your  clinical pharmacist.\"     To schedule another MTM appointment, please call the clinic directly or you may call the MTM scheduling line at [phone] or toll-free at [phone].     My Clinical Pharmacist's contact information:                                                      Please feel free to contact me with any questions or concerns you have.      Chaitanya Adams, [place], BCACP  Medication Therapy Management Pharmacist  Pager: [phone]  "

## 2023-04-24 NOTE — PROGRESS NOTES
Medication Therapy Management (MTM) Encounter    ASSESSMENT:                            Medication Adherence/Access: No issues identified    Type 2 Diabetes: Stable.    Prostate Cancer: Plan in place with oncology. Would encourage continuing to work on reducing caffeine/fluid intake especially in the evening to see if this helps with urinary frequency and sleep concerns. If sleep does not improve may need to consider increase in gabapentin dose.     Depression: Stable per patient.     PLAN:                            1. Continue to work on reducing your caffeine intake to help with urinary frequency and sleep. Try to reduce both the quantity per day and limit your caffeine to earlier in the day to decrease the impact on your sleep.     Follow-up: 1 year or sooner if needed    SUBJECTIVE/OBJECTIVE:                          Joel Pike is a 67 year old male called for a follow-up visit.  Today's visit is a follow-up MTM visit from 3/20/2023     Reason for visit: Urinary issues follow-up.    Allergies/ADRs: Reviewed in chart  Past Medical History: Reviewed in chart  Tobacco: He reports that he has never smoked. He has never used smokeless tobacco.  Alcohol: none  Caffeine: 3-4 cups Keurigs - drinks all the way up until bedtime. Has not reduced since last visit    Medication Adherence/Access: Patient uses pill box(es).  Patient takes medications 3 time(s) per day.   Per patient, misses medication 0 times per week.   Medication barriers: none.   The patient fills medications at [place]: YES.    Type 2 Diabetes:  Currently taking metformin IR 1000 mg twice daily. Patient is not experiencing side effects.  Blood sugar monitorin-1 time(s) daily. Ranges (patient reported): low 100s mg/dL  Symptoms of low blood sugar? none  Symptoms of high blood sugar? none  Eye exam: up to date  Foot exam: up to date  Diet/Exercise: Unchanged  Aspirin: Taking 81mg daily and denies side effects   Statin: Yes: rosuvastatin   ACEi/ARB:  Yes: losartan.   Urine Albumin:   Lab Results   Component Value Date    UMALCR 96.10 (H) 03/20/2023      Lab Results   Component Value Date    A1C 6.1 03/20/2023    A1C 5.9 10/14/2022    A1C 6.3 01/31/2022    A1C 6.1 09/27/2021    A1C 6.0 02/26/2021    A1C 5.7 11/04/2020    A1C 5.5 01/27/2020    A1C 5.5 10/28/2019    A1C 5.7 02/25/2019     Prostate Cancer: Patient may be able to go off chemotherapy in October if everything looks good.  Currently takes Erleada 240 mg every morning at 10 AM and leuprolide 45 mg every 6 months. Does have some hot flashes/runs hot at times - sleep continues to be an issue but realizes caffeine may be impacting both urinary frequency and sleep, but gabapentin 300 mg at bedtime has helped. Dose take tamsulosin 0.4 mg daily. Still gets up multiple times during the night and never passes up a bathroom to urinate during the day. Wife has convinced him he should probably follow through with plan to reduce caffeine intake.     Depression:  Current medications include: bupropion  mg every morning. Finds to be helpful for mood. Denies any known side effects.       1/6/2021     2:29 PM 1/31/2022    10:06 AM 3/20/2023     6:51 AM   PHQ   PHQ-9 Total Score 2 7 2   Q9: Thoughts of better off dead/self-harm past 2 weeks Not at all Not at all Not at all     Today's Vitals: There were no vitals taken for this visit.     BP Readings from Last 3 Encounters:   03/20/23 120/64   11/16/22 127/72   10/05/22 138/87     Wt Readings from Last 5 Encounters:   03/20/23 229 lb (103.9 kg)   11/16/22 224 lb (101.6 kg)   11/14/22 222 lb (100.7 kg)   10/05/22 229 lb (103.9 kg)   09/28/22 220 lb (99.8 kg)     ----------------      I spent 7 minutes with this patient today. A copy of the visit note was provided to the patient's provider(s).    A summary of these recommendations was sent via [employer].    Chaitanya Adams, PharmD, BCACP  Medication Therapy Management Pharmacist  Pager: [phone]    Telemedicine Visit  Details  Type of service:  Telephone visit  Start Time: 10:00 AM  End Time: 10:07 AM     Medication Therapy Recommendations  No medication therapy recommendations to display

## 2023-05-10 ENCOUNTER — LAB (OUTPATIENT)
Dept: LAB | Facility: CLINIC | Age: 68
End: 2023-05-10
Payer: MEDICARE

## 2023-05-10 DIAGNOSIS — C61 PROSTATE CANCER (H): ICD-10-CM

## 2023-05-10 LAB
ALBUMIN SERPL BCG-MCNC: 4.5 G/DL (ref 3.5–5.2)
ALP SERPL-CCNC: 85 U/L (ref 40–129)
ALT SERPL W P-5'-P-CCNC: 22 U/L (ref 10–50)
ANION GAP SERPL CALCULATED.3IONS-SCNC: 13 MMOL/L (ref 7–15)
AST SERPL W P-5'-P-CCNC: 23 U/L (ref 10–50)
BASOPHILS # BLD AUTO: 0 10E3/UL (ref 0–0.2)
BASOPHILS NFR BLD AUTO: 1 %
BILIRUB SERPL-MCNC: 0.4 MG/DL
BUN SERPL-MCNC: 19.4 MG/DL (ref 8–23)
CALCIUM SERPL-MCNC: 9.9 MG/DL (ref 8.8–10.2)
CHLORIDE SERPL-SCNC: 101 MMOL/L (ref 98–107)
CREAT SERPL-MCNC: 1.09 MG/DL (ref 0.67–1.17)
DEPRECATED HCO3 PLAS-SCNC: 24 MMOL/L (ref 22–29)
EOSINOPHIL # BLD AUTO: 0.2 10E3/UL (ref 0–0.7)
EOSINOPHIL NFR BLD AUTO: 3 %
ERYTHROCYTE [DISTWIDTH] IN BLOOD BY AUTOMATED COUNT: 12.8 % (ref 10–15)
GFR SERPL CREATININE-BSD FRML MDRD: 75 ML/MIN/1.73M2
GLUCOSE SERPL-MCNC: 128 MG/DL (ref 70–99)
HCT VFR BLD AUTO: 36.3 % (ref 40–53)
HGB BLD-MCNC: 12.5 G/DL (ref 13.3–17.7)
IMM GRANULOCYTES # BLD: 0 10E3/UL
IMM GRANULOCYTES NFR BLD: 1 %
LYMPHOCYTES # BLD AUTO: 0.7 10E3/UL (ref 0.8–5.3)
LYMPHOCYTES NFR BLD AUTO: 11 %
MCH RBC QN AUTO: 31.9 PG (ref 26.5–33)
MCHC RBC AUTO-ENTMCNC: 34.4 G/DL (ref 31.5–36.5)
MCV RBC AUTO: 93 FL (ref 78–100)
MONOCYTES # BLD AUTO: 0.5 10E3/UL (ref 0–1.3)
MONOCYTES NFR BLD AUTO: 9 %
NEUTROPHILS # BLD AUTO: 4.7 10E3/UL (ref 1.6–8.3)
NEUTROPHILS NFR BLD AUTO: 75 %
NRBC # BLD AUTO: 0 10E3/UL
NRBC BLD AUTO-RTO: 0 /100
PLATELET # BLD AUTO: 218 10E3/UL (ref 150–450)
POTASSIUM SERPL-SCNC: 4.6 MMOL/L (ref 3.4–5.3)
PROT SERPL-MCNC: 7.1 G/DL (ref 6.4–8.3)
PSA SERPL DL<=0.01 NG/ML-MCNC: <0.01 NG/ML (ref 0–4.5)
RBC # BLD AUTO: 3.92 10E6/UL (ref 4.4–5.9)
SODIUM SERPL-SCNC: 138 MMOL/L (ref 136–145)
WBC # BLD AUTO: 6.2 10E3/UL (ref 4–11)

## 2023-05-10 PROCEDURE — 85025 COMPLETE CBC W/AUTO DIFF WBC: CPT

## 2023-05-10 PROCEDURE — 36415 COLL VENOUS BLD VENIPUNCTURE: CPT

## 2023-05-10 PROCEDURE — 84153 ASSAY OF PSA TOTAL: CPT

## 2023-05-10 PROCEDURE — 80053 COMPREHEN METABOLIC PANEL: CPT

## 2023-05-17 NOTE — PROGRESS NOTES
Virtual Visit Details    Type of service:  Video Visit   Video Start Time: 9:55 AM  Video End Time:10:07 AM    Originating Location (pt. Location): Home    Distant Location (provider location):  Off-site  Platform used for Video Visit: [facility] Followup Note       Date of visit: 05/18/23    CC:   metastatic prostate cancer     ONCOLOGY HISTORY:   -Elevated PSA noted 2/26/21 at 12.70. Previously normal in 2017.     -4/7/21-Initial urology visit.   -7/2/21-prostate biopsy 4+3, 9/12 biopsies positive.  Ductal component noted.     -7/28/21-MR prostate-PIRADS 5 lesion, R and L sided lesions with likely    neurovascular bundle invasion. No  seminal vesicle involvement. No clear LAD,  but some indeterminate pelvic nodes.  No suspicious bone lesions.     -7/28/21-NM bone scan suspicious lesion of R sacral ala S3 level.     -8/10/21-CT A/P with multiple enlarged periaortic/iliac LN   -8/11/21-First eligard dose 45mg (Q6M dosing). Due next 2/2022.   -8/30/21-Initial medical oncology visit with Dr. Solo.  Unclear if bony lesion is metastasis based on current imaging.  Will start enzalutamide and continue    Eligard (next due 2/2022).     8/30/21 PSA =30.40 Pre Rx  9/27/21 PSA =8.99 (T=6); HgbA1C = 6.1  10/5/21 PSA =3.22  11/26/21 PSA =0.15  7/26/22 PSA <0.01  11/11/22 PSA= <0.01  02/2023 ELIGARD 45 MG  5/10/23 PSA= <0.01      Interval History:  Doing well in general.   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   No EtOH  No recreational drugs.   No herbs/supplements other than on  medication list.      MEDS:  Current Outpatient Medications   Medication Instructions     albuterol (PROAIR HFA) 108 (90 BASE) MCG/ACT Inhaler 1-2 puffs every 2 -4 hrs as needed     ALLEGRA-D ALLERGY & CONGESTION 180-240 MG 24 hr tablet TAKE ONE TABLET BY MOUTH EVERY DAY     blood glucose (HUGO CONTOUR) test strip Use to test blood sugars 1 time daily or as directed.     blood glucose (NO BRAND SPECIFIED) lancets standard Use to test blood sugar one time daily or as directed.     blood glucose calibration (HUGO CONTOUR) NORMAL solution Use to calibrate blood glucose monitor as directed.     buPROPion (WELLBUTRIN XL) 300 MG 24 hr tablet TAKE ONE TABLET BY MOUTH EVERY MORNING     Coenzyme Q-10 capsule 1 capsule, DAILY     esomeprazole (NEXIUM) 40 MG DR capsule TAKE ONE CAPSULE BY MOUTH EVERY MORNING BEFORE BREAKFAST , 30-60 MINUTES BEFORE EATING     ezetimibe (ZETIA) 10 MG tablet TAKE ONE TABLET BY MOUTH ONCE DAILY     fish oil-omega-3 fatty acids 1000 MG capsule Take  by mouth. Take daily       levothyroxine (SYNTHROID/LEVOTHROID) 112 mcg, Oral, DAILY     losartan (COZAAR) 75 mg, Oral, DAILY     Magnesium 500 MG CAPS One twice a day     metFORMIN (GLUCOPHAGE) 500 MG tablet TAKE ONE TABLET BY MOUTH TWICE A DAY WITH MEALS     Misc Natural Products (OSTEO BI-FLEX ADV JOINT SHIELD) TABS Take  by mouth.     multivitamin (OCUVITE) TABS tablet 1 tablet, Oral, DAILY     STATIN NOT PRESCRIBED (INTENTIONAL) Please choose reason not prescribed, below     Vitamin D3 (CHOLECALCIFEROL) 5,000 Units, Oral     ROS-Remainder of 14 point ROS reviewed and negative except as in HPI.    PHYSICAL EXAM:  Exam:  Video visit   Appears in no distress  Cognitively appropriate     LABS AND IMAGES:    Prostate Biopsy 7/2/21  FINAL DIAGNOSIS:   A: Prostate, left, biopsy   - Adenocarcinoma, Mary's grade 4/3 with ductal component involving 4 of    6 needle core biopsies and 25% of   submitted tissue     B: Prostate, right, biopsy   -  Adenocarcinoma, Mary's grade 4/3 with ductal component involving 5 of    6 needle core biopsies and 25% of   submitted tissue     MR prostate 7/28/21  FINDINGS:  Size: 31 grams  Hemorrhage: Absent  Peripheral zone: Heterogeneous on T2-weighted images. Suspicious  lesions as detailed below.  Transition zone: Enlarged with BPH changes. No suspicious lesions  identified.     Lesion(s) in rank order of severity (highest score- to lowest score,  then by size)      Lesion 1:  Location: Right mid gland peripheral zone at the 7-9 o'clock position  relative to the urethra. Series 5 image 50.  Size: 20 x 10 mm  T2 description: Homogeneously hypointense  T2 numerical assessment: 5  DWI description: Marked restriction diffusion  DWI numerical assessment: 5  DCE assessment: Negative    Prostate margin: Capsular abutment>15 mm with enlarged neurovascular  bundle suggesting tumor involvement  Lesion overall PI-RADS category: 5     Lesion 2:  Location: Left apex peripheral zone at the 4 o'clock position relative  to the urethra. Series 5 image 63.  Size: 16 x 11 mm  T2 description: Homogeneously hypointense  T2 numerical assessment: 5  DWI description: Marked diffusion restriction  DWI numerical assessment: 5  DCE assessment: Positive    Prostate margin: Capsular abutment 6-15 mm with capsular irregularity  and focal bulging   Lesion overall PI-RADS category: 5     Neurovascular bundles: Both neurovascular bundles are likely involved  by malignancy.  A right prostatic vein is markedly enlarged compared  to the left, similar to 8/1/2014 CT.  Seminal vesicles: No seminal vesicle involvement by malignancy.  Lymph nodes: No clear adenopathy. Indeterminate nodes as follows: Left  pelvic 8 x 5 mm lymph node on series 5 image 27.  Bones: No suspicious lesions  Other pelvic organs: Colonic diverticulosis.                                                                         IMPRESSION:  1. Based on the most suspicious abnormality,  this exam is  characterized as PIRADS 5 - Clinically significant cancer is highly  likely to be present.  The most suspicious abnormalities are located  at the right mid peripheral zone and left apical peripheral zone and  there is high suspicion of extraprostatic extension.  2. No suspicious adenopathy or evidence of pelvic metastases.    NM bone scan 7/28/21  FINDINGS:   Small area of mild uptake in the right sacrum inferiorly visible on  the posterior view. SPECT-CT demonstrates that this focal uptake  corresponds to a sclerotic lesion on CT. No other suspicious areas of  focal uptake.     Postsurgical changes of bilateral knee replacements.     Physiologic uptake by the kidneys and urinary bladder.                                                                      IMPRESSION: Single focal uptake by the right sacral ala (S3 level) and  corresponding sclerotic focus. This is highly suspicious for  metastatic focus.    CT A/P 8/10/21  FINDINGS:   LOWER CHEST: Normal.     HEPATOBILIARY: Diffuse hepatic steatosis. No significant mass. No bile  duct dilatation. No calcified gallstones.     PANCREAS: Normal.     SPLEEN: Normal.     ADRENAL GLANDS: Normal.     KIDNEYS/BLADDER: There are multiple bilateral nonobstructing  intrarenal calculi measuring up to 4 mm in largest size. Small  subcentimeter benign cortical cysts. Unremarkable bladder. No evidence  of hydronephrosis.     BOWEL: No evidence of bowel obstruction or inflammatory changes. Mild  sigmoid colon diverticulosis. No evidence of diverticulitis. Normal  appendix.     PELVIC ORGANS: Normal sized prostate gland.     ADDITIONAL FINDINGS: Multiple enlarged retroperitoneal periaortic and  bilateral iliac chain lymph nodes are highly suspicious for  metastasis. The largest left retroperitoneal periaortic lymph node on  image 44 of series 2 measures 4.5 x 3.4 cm. 2.9 x 2.3 cm aortocaval  lymph node on image 45 of series 2. Distal left periaortic lymph node  on  image 54 of series 2 measures 3.2 x 2.6 cm. Right common iliac  chain lymph node on image 65 of series 2 measures 2.3 x 2.2 cm. 1.6 cm  left common iliac chain node on image 59 of series 2.     MUSCULOSKELETAL: Small faint sites of increased bone sclerosis  involving the posterior medial right iliac on image 65 of series 2 and  image 69 of series 2 are indeterminate for possible metastasis.                                                                       11/26/21                                             IMPRESSION:  1.  Significantly decreased retroperitoneal lymphadenopathy since the  prior study dated 8/10/2021 indicates good response to treatment. The  remaining enlarged retroperitoneal lymph node appears to have a  necrotic center.  2.  Minimal pulmonary nodularity was likely present previously.  3.  No other evidence for lymphadenopathy or metastasis is identified.  Specifically no evidence for left sacral metastasis is seen.    Labs:  Most Recent 3 CBC's:Recent Labs   Lab Test 05/10/23  1145 03/20/23  0910 02/10/23  0909   WBC 6.2 6.2 6.0   HGB 12.5* 11.7* 11.3*   MCV 93 92 93    203 166   ANEUTAUTO 4.7 4.7 4.3     Most Recent 3 BMP's:  Recent Labs   Lab Test 05/10/23  1145 03/20/23  0910 02/10/23  0909    140 136   POTASSIUM 4.6 4.5 4.5   CHLORIDE 101 103 99   CO2 24 26 24   BUN 19.4 25.4* 25.5*   CR 1.09 1.24* 1.10   ANIONGAP 13 11 13   AURORA 9.9 9.9 9.7   * 147* 144*   PROTTOTAL 7.1 6.9 6.5   ALBUMIN 4.5 4.2 4.1    Most Recent 3 LFT's:  Recent Labs   Lab Test 05/10/23  1145 03/20/23  0910 02/10/23  0909   AST 23 22 22   ALT 22 27 17   ALKPHOS 85 90 85   BILITOTAL 0.4 0.4 0.3    Most Recent 2 TSH and T4:  Recent Labs   Lab Test 02/22/23  0851 07/26/22  0837   TSH 6.18* 4.61*   T4 1.03 0.90       PSA < 0.01    I reviewed the above labs today.      IMPRESSION AND PLAN:  Joel Pike is a 67 year old M with PMH of DM2 on metformin, depression/anxiety (pt reports as bipolar d/o), HTN,  HLD, and prostate cancer with possible bone metastasis.  Previously, Dr. Solo had a lengthy discussion at his initial and went over his pathology and staging scan results.  They previously discussed that it is not clear whether the lesion in wing of his ala is a true bony metastasis or whether it is another finding/artifact.  They previously discussed that this does not change the overall treatment of his disease with ADT, but may change whether he receives radiation to his bony lesion in the future or not.  The role of ADT and the addition of enzalutamide were previously discussed.  Given his existing DM2, we will attempt to avoid additional prednisone use by choosing enzalutamide.  However, Enzalutamide was declined by patient's insurance company.    -He started apalutamide on 10/5/21. He had CT CAP done 11/26/21 which showed significantly decreased retroperitoneal lymphadenopathy since the prior study dated 8/10/2021 indicates good response to treatment. No other evidence for lymphadenopathy or metastasis is identified. Specifically no evidence for left sacral metastasis is seen. Continue current dose of Apalutamide.   -Completed RT to the primary for oligometastatic disease based on the STAMPEDE study with Dr. Gomez ~09/2022  -Plan is to continue ADT for 2 years (through 8/2023).  Received last planned Eligard 45mg 02/2023 .  -Followup with me in August  - if psa rising then ( low likelihood) then will have CRPC - if psa is still <0.01 will discontinue all therapy, wait 4-6 months, then recheck PSA and Testosterone.    Has urinary frequency and urgency so will refer to urology to discuss mgmt.     30 minutes spent on the date of the encounter doing chart review, review of test results, interpretation of tests, patient visit and documentation     Maria Esther Coelho, CNP

## 2023-05-18 ENCOUNTER — VIRTUAL VISIT (OUTPATIENT)
Dept: ONCOLOGY | Facility: CLINIC | Age: 68
End: 2023-05-18
Attending: NURSE PRACTITIONER
Payer: MEDICARE

## 2023-05-18 DIAGNOSIS — R35.0 URINARY FREQUENCY: ICD-10-CM

## 2023-05-18 DIAGNOSIS — C61 PROSTATE CANCER (H): Primary | ICD-10-CM

## 2023-05-18 PROCEDURE — 99214 OFFICE O/P EST MOD 30 MIN: CPT | Mod: 95 | Performed by: NURSE PRACTITIONER

## 2023-05-18 NOTE — NURSING NOTE
Is the patient currently in the state of [place]? YES    Visit mode:VIDEO    If the visit is dropped, the patient can be reconnected by: VIDEO VISIT: Text to cell phone: [phone]    Will anyone else be joining the visit? NO      How would you like to obtain your AVS? MyChart    Are changes needed to the allergy or medication list? NO  Patient verified medications and allergies are correct via eCheck-in. Patient confirms no changes at this time and/or since last reviewed by clinic staff.    Reason for visit: Oncology Video Visit Return (Prostate cancer, 6 month f/u)  Joel Pike 67 year old male presents today for f/u on prostate cancer and review lab results.  Rupinder Tinajero, Virtual Facilitator

## 2023-05-18 NOTE — Clinical Note
5/18/2023         RE: Joel Pike  [address]  [facility] [address]        Dear Colleague,    Thank you for referring your patient, Joel Pike, to the [facility]. Please see a copy of my visit note below.    Virtual Visit Details    Type of service:  Video Visit   Video Start Time: 9:55 AM  Video End Time:10:07 AM    Originating Location (pt. Location): Home  {PROVIDER LOCATION On-site should be selected for visits conducted from your clinic location or adjoining [facility] hospital, academic office, or other nearby [facility] building. Off-site should be selected for all other provider locations, including home:[id number]}  Distant Location (provider location):  Off-site  Platform used for Video Visit: [facility] Medical Oncology Followup Note       Date of visit: 05/18/23    CC:   metastatic prostate cancer     ONCOLOGY HISTORY:   -Elevated PSA noted 2/26/21 at 12.70. Previously normal in 2017.     -4/7/21-Initial urology visit.   -7/2/21-prostate biopsy 4+3, 9/12 biopsies positive.  Ductal component noted.     -7/28/21-MR prostate-PIRADS 5 lesion, R and L sided lesions with likely    neurovascular bundle invasion. No  seminal vesicle involvement. No clear LAD,  but some indeterminate pelvic nodes.  No suspicious bone lesions.     -7/28/21-NM bone scan suspicious lesion of R sacral ala S3 level.     -8/10/21-CT A/P with multiple enlarged periaortic/iliac LN   -8/11/21-First eligard dose 45mg (Q6M dosing). Due next 2/2022.   -8/30/21-Initial medical oncology visit with Dr. Solo.  Unclear if bony lesion is metastasis based on current imaging.  Will start enzalutamide and continue    Eligard (next due 2/2022).     8/30/21 PSA =30.40 Pre Rx  9/27/21 PSA =8.99 (T=6); HgbA1C = 6.1  10/5/21 PSA =3.22  11/26/21 PSA =0.15  7/26/22 PSA <0.01  11/11/22 PSA= <0.01  02/2023 ELIGARD 45 MG  5/10/23 PSA= <0.01      Interval History:  Doing well in general.   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   No EtOH  No recreational drugs.   No herbs/supplements other than on medication list.      MEDS:  Current Outpatient Medications   Medication Instructions     albuterol (PROAIR HFA) 108 (90 BASE) MCG/ACT Inhaler 1-2 puffs every 2 -4 hrs as needed     ALLEGRA-D ALLERGY & CONGESTION 180-240 MG 24 hr tablet TAKE ONE TABLET BY MOUTH EVERY DAY     blood glucose (HUGO CONTOUR) test strip Use to test blood sugars 1 time daily or as directed.     blood glucose (NO BRAND SPECIFIED) lancets standard Use to test blood sugar one time daily or as directed.     blood glucose calibration (HUGO CONTOUR) NORMAL solution Use to calibrate blood glucose monitor as directed.     buPROPion (WELLBUTRIN XL) 300 MG 24 hr tablet TAKE ONE TABLET BY MOUTH EVERY MORNING     Coenzyme Q-10 capsule 1 capsule, DAILY     esomeprazole (NEXIUM) 40 MG DR capsule TAKE ONE CAPSULE BY MOUTH EVERY MORNING BEFORE BREAKFAST , 30-60 MINUTES BEFORE EATING     ezetimibe (ZETIA) 10 MG tablet TAKE ONE TABLET BY MOUTH ONCE DAILY     fish oil-omega-3 fatty acids 1000 MG capsule Take  by mouth. Take daily       levothyroxine (SYNTHROID/LEVOTHROID) 112 mcg, Oral, DAILY     losartan (COZAAR) 75 mg, Oral, DAILY     Magnesium 500 MG CAPS One twice a day     metFORMIN (GLUCOPHAGE) 500 MG tablet TAKE ONE TABLET BY MOUTH TWICE A DAY WITH MEALS     Misc Natural Products (OSTEO BI-FLEX ADV JOINT SHIELD) TABS Take  by mouth.     multivitamin (OCUVITE) TABS tablet 1 tablet, Oral, DAILY     STATIN NOT PRESCRIBED (INTENTIONAL) Please choose  reason not prescribed, below     Vitamin D3 (CHOLECALCIFEROL) 5,000 Units, Oral     ROS-Remainder of 14 point ROS reviewed and negative except as in HPI.    PHYSICAL EXAM:  Exam:  Video visit   Appears in no distress  Cognitively appropriate     LABS AND IMAGES:    Prostate Biopsy 7/2/21  FINAL DIAGNOSIS:   A: Prostate, left, biopsy   - Adenocarcinoma, Bronaugh's grade 4/3 with ductal component involving 4 of    6 needle core biopsies and 25% of   submitted tissue     B: Prostate, right, biopsy   - Adenocarcinoma, Mary's grade 4/3 with ductal component involving 5 of    6 needle core biopsies and 25% of   submitted tissue     MR prostate 7/28/21  FINDINGS:  Size: 31 grams  Hemorrhage: Absent  Peripheral zone: Heterogeneous on T2-weighted images. Suspicious  lesions as detailed below.  Transition zone: Enlarged with BPH changes. No suspicious lesions  identified.     Lesion(s) in rank order of severity (highest score- to lowest score,  then by size)      Lesion 1:  Location: Right mid gland peripheral zone at the 7-9 o'clock position  relative to the urethra. Series 5 image 50.  Size: 20 x 10 mm  T2 description: Homogeneously hypointense  T2 numerical assessment: 5  DWI description: Marked restriction diffusion  DWI numerical assessment: 5  DCE assessment: Negative    Prostate margin: Capsular abutment>15 mm with enlarged neurovascular  bundle suggesting tumor involvement  Lesion overall PI-RADS category: 5     Lesion 2:  Location: Left apex peripheral zone at the 4 o'clock position relative  to the urethra. Series 5 image 63.  Size: 16 x 11 mm  T2 description: Homogeneously hypointense  T2 numerical assessment: 5  DWI description: Marked diffusion restriction  DWI numerical assessment: 5  DCE assessment: Positive    Prostate margin: Capsular abutment 6-15 mm with capsular irregularity  and focal bulging   Lesion overall PI-RADS category: 5     Neurovascular bundles: Both neurovascular bundles are likely  involved  by malignancy.  A right prostatic vein is markedly enlarged compared  to the left, similar to 8/1/2014 CT.  Seminal vesicles: No seminal vesicle involvement by malignancy.  Lymph nodes: No clear adenopathy. Indeterminate nodes as follows: Left  pelvic 8 x 5 mm lymph node on series 5 image 27.  Bones: No suspicious lesions  Other pelvic organs: Colonic diverticulosis.                                                                         IMPRESSION:  1. Based on the most suspicious abnormality, this exam is  characterized as PIRADS 5 - Clinically significant cancer is highly  likely to be present.  The most suspicious abnormalities are located  at the right mid peripheral zone and left apical peripheral zone and  there is high suspicion of extraprostatic extension.  2. No suspicious adenopathy or evidence of pelvic metastases.    NM bone scan 7/28/21  FINDINGS:   Small area of mild uptake in the right sacrum inferiorly visible on  the posterior view. SPECT-CT demonstrates that this focal uptake  corresponds to a sclerotic lesion on CT. No other suspicious areas of  focal uptake.     Postsurgical changes of bilateral knee replacements.     Physiologic uptake by the kidneys and urinary bladder.                                                                      IMPRESSION: Single focal uptake by the right sacral ala (S3 level) and  corresponding sclerotic focus. This is highly suspicious for  metastatic focus.    CT A/P 8/10/21  FINDINGS:   LOWER CHEST: Normal.     HEPATOBILIARY: Diffuse hepatic steatosis. No significant mass. No bile  duct dilatation. No calcified gallstones.     PANCREAS: Normal.     SPLEEN: Normal.     ADRENAL GLANDS: Normal.     KIDNEYS/BLADDER: There are multiple bilateral nonobstructing  intrarenal calculi measuring up to 4 mm in largest size. Small  subcentimeter benign cortical cysts. Unremarkable bladder. No evidence  of hydronephrosis.     BOWEL: No evidence of bowel obstruction  or inflammatory changes. Mild  sigmoid colon diverticulosis. No evidence of diverticulitis. Normal  appendix.     PELVIC ORGANS: Normal sized prostate gland.     ADDITIONAL FINDINGS: Multiple enlarged retroperitoneal periaortic and  bilateral iliac chain lymph nodes are highly suspicious for  metastasis. The largest left retroperitoneal periaortic lymph node on  image 44 of series 2 measures 4.5 x 3.4 cm. 2.9 x 2.3 cm aortocaval  lymph node on image 45 of series 2. Distal left periaortic lymph node  on image 54 of series 2 measures 3.2 x 2.6 cm. Right common iliac  chain lymph node on image 65 of series 2 measures 2.3 x 2.2 cm. 1.6 cm  left common iliac chain node on image 59 of series 2.     MUSCULOSKELETAL: Small faint sites of increased bone sclerosis  involving the posterior medial right iliac on image 65 of series 2 and  image 69 of series 2 are indeterminate for possible metastasis.                                                                       11/26/21                                             IMPRESSION:  1.  Significantly decreased retroperitoneal lymphadenopathy since the  prior study dated 8/10/2021 indicates good response to treatment. The  remaining enlarged retroperitoneal lymph node appears to have a  necrotic center.  2.  Minimal pulmonary nodularity was likely present previously.  3.  No other evidence for lymphadenopathy or metastasis is identified.  Specifically no evidence for left sacral metastasis is seen.    Labs:  Most Recent 3 CBC's:Recent Labs   Lab Test 05/10/23  1145 03/20/23  0910 02/10/23  0909   WBC 6.2 6.2 6.0   HGB 12.5* 11.7* 11.3*   MCV 93 92 93    203 166   ANEUTAUTO 4.7 4.7 4.3     Most Recent 3 BMP's:  Recent Labs   Lab Test 05/10/23  1145 03/20/23  0910 02/10/23  0909    140 136   POTASSIUM 4.6 4.5 4.5   CHLORIDE 101 103 99   CO2 24 26 24   BUN 19.4 25.4* 25.5*   CR 1.09 1.24* 1.10   ANIONGAP 13 11 13   AURORA 9.9 9.9 9.7   * 147* 144*   PROTTOTAL 7.1  6.9 6.5   ALBUMIN 4.5 4.2 4.1    Most Recent 3 LFT's:  Recent Labs   Lab Test 05/10/23  1145 03/20/23  0910 02/10/23  0909   AST 23 22 22   ALT 22 27 17   ALKPHOS 85 90 85   BILITOTAL 0.4 0.4 0.3    Most Recent 2 TSH and T4:  Recent Labs   Lab Test 02/22/23  0851 07/26/22  0837   TSH 6.18* 4.61*   T4 1.03 0.90       PSA < 0.01    I reviewed the above labs today.      IMPRESSION AND PLAN:  Joel Pike is a 67 year old M with PMH of DM2 on metformin, depression/anxiety (pt reports as bipolar d/o), HTN, HLD, and prostate cancer with possible bone metastasis.  Previously, Dr. Solo had a lengthy discussion at his initial and went over his pathology and staging scan results.  They previously discussed that it is not clear whether the lesion in wing of his ala is a true bony metastasis or whether it is another finding/artifact.  They previously discussed that this does not change the overall treatment of his disease with ADT, but may change whether he receives radiation to his bony lesion in the future or not.  The role of ADT and the addition of enzalutamide were previously discussed.  Given his existing DM2, we will attempt to avoid additional prednisone use by choosing enzalutamide.  However, Enzalutamide was declined by patient's insurance company.    -He started apalutamide on 10/5/21. He had CT CAP done 11/26/21 which showed significantly decreased retroperitoneal lymphadenopathy since the prior study dated 8/10/2021 indicates good response to treatment. No other evidence for lymphadenopathy or metastasis is identified. Specifically no evidence for left sacral metastasis is seen. Continue current dose of Apalutamide.   -Completed RT to the primary for oligometastatic disease based on the IBISEDE study with Dr. Gomez ~09/2022  -Plan is to continue ADT for 2 years (through 8/2023). His next Eligard 45mg is due 02/2/2023 - will get in [place]  -Followup PILAR in August  - if psa rising then ( low likelihood) then will  have CRPC - if psa is still <0.01 will discontinue all therapy, wait 4-6 months, then recheck PSA and Testosterone.    Maria Esther Coelho, CNP             Virtual Visit Details    Type of service:  Video Visit   Video Start Time: 9:55 AM  Video End Time:10:07 AM    Originating Location (pt. Location): Home  {PROVIDER LOCATION On-site should be selected for visits conducted from your clinic location or adjoining [facility] hospital, academic office, or other nearby [facility] building. Off-site should be selected for all other provider locations, including home:[id number]}  Distant Location (provider location):  Off-site  Platform used for Video Visit: [facility] Medical Oncology Followup Note       Date of visit: 05/18/23    CC:   metastatic prostate cancer     ONCOLOGY HISTORY:   -Elevated PSA noted 2/26/21 at 12.70. Previously normal in 2017.     -4/7/21-Initial urology visit.   -7/2/21-prostate biopsy 4+3, 9/12 biopsies positive.  Ductal component noted.     -7/28/21-MR prostate-PIRADS 5 lesion, R and L sided lesions with likely    neurovascular bundle invasion. No  seminal vesicle involvement. No clear LAD,  but some indeterminate pelvic nodes.  No suspicious bone lesions.     -7/28/21-NM bone scan suspicious lesion of R sacral ala S3 level.     -8/10/21-CT A/P with multiple enlarged periaortic/iliac LN   -8/11/21-First eligard dose 45mg (Q6M dosing). Due next 2/2022.   -8/30/21-Initial medical oncology visit with Dr. Solo.  Unclear if bony lesion is metastasis based on current imaging.  Will start enzalutamide and continue    Eligard (next due 2/2022).     8/30/21 PSA =30.40 Pre Rx  9/27/21 PSA =8.99 (T=6); HgbA1C = 6.1  10/5/21 PSA =3.22  11/26/21 PSA =0.15  7/26/22 PSA <0.01  11/11/22 PSA= <0.01  02/2023 ELIGARD 45 MG  5/10/23 PSA= <0.01      Interval History:  Doing well in general.   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   No EtOH  No recreational drugs.   No herbs/supplements other than on medication list.      MEDS:  Current Outpatient Medications   Medication Instructions     albuterol (PROAIR HFA) 108 (90 BASE) MCG/ACT Inhaler 1-2 puffs every 2 -4 hrs as needed     ALLEGRA-D ALLERGY & CONGESTION 180-240 MG 24 hr tablet TAKE ONE TABLET BY MOUTH EVERY DAY     blood glucose (HUGO CONTOUR) test strip Use to test blood sugars 1 time daily or as directed.     blood glucose (NO BRAND SPECIFIED) lancets standard Use to test blood sugar one time daily or as directed.     blood glucose calibration (HUGO CONTOUR) NORMAL solution Use to calibrate blood glucose monitor as directed.     buPROPion (WELLBUTRIN XL) 300 MG 24 hr tablet TAKE ONE TABLET BY MOUTH EVERY MORNING     Coenzyme Q-10 capsule 1 capsule, DAILY     esomeprazole (NEXIUM) 40 MG DR capsule TAKE ONE CAPSULE BY MOUTH EVERY MORNING BEFORE BREAKFAST , 30-60 MINUTES BEFORE EATING     ezetimibe (ZETIA) 10 MG tablet TAKE ONE TABLET BY MOUTH ONCE DAILY     fish oil-omega-3 fatty acids 1000 MG capsule Take  by mouth. Take daily       levothyroxine (SYNTHROID/LEVOTHROID) 112 mcg, Oral, DAILY     losartan (COZAAR) 75 mg, Oral, DAILY     Magnesium 500 MG CAPS One twice a day     metFORMIN (GLUCOPHAGE) 500 MG tablet TAKE ONE TABLET BY MOUTH TWICE A DAY WITH MEALS     Misc Natural Products (OSTEO BI-FLEX ADV JOINT SHIELD) TABS Take  by mouth.     multivitamin (OCUVITE) TABS tablet 1 tablet, Oral, DAILY     STATIN NOT PRESCRIBED (INTENTIONAL) Please choose reason not prescribed, below     Vitamin D3 (CHOLECALCIFEROL) 5,000 Units, Oral     ROS-Remainder of 14 point  ROS reviewed and negative except as in HPI.    PHYSICAL EXAM:  Exam:  Video visit   Appears in no distress  Cognitively appropriate     LABS AND IMAGES:    Prostate Biopsy 7/2/21  FINAL DIAGNOSIS:   A: Prostate, left, biopsy   - Adenocarcinoma, Mary's grade 4/3 with ductal component involving 4 of    6 needle core biopsies and 25% of   submitted tissue     B: Prostate, right, biopsy   - Adenocarcinoma, Pinedale's grade 4/3 with ductal component involving 5 of    6 needle core biopsies and 25% of   submitted tissue     MR prostate 7/28/21  FINDINGS:  Size: 31 grams  Hemorrhage: Absent  Peripheral zone: Heterogeneous on T2-weighted images. Suspicious  lesions as detailed below.  Transition zone: Enlarged with BPH changes. No suspicious lesions  identified.     Lesion(s) in rank order of severity (highest score- to lowest score,  then by size)      Lesion 1:  Location: Right mid gland peripheral zone at the 7-9 o'clock position  relative to the urethra. Series 5 image 50.  Size: 20 x 10 mm  T2 description: Homogeneously hypointense  T2 numerical assessment: 5  DWI description: Marked restriction diffusion  DWI numerical assessment: 5  DCE assessment: Negative    Prostate margin: Capsular abutment>15 mm with enlarged neurovascular  bundle suggesting tumor involvement  Lesion overall PI-RADS category: 5     Lesion 2:  Location: Left apex peripheral zone at the 4 o'clock position relative  to the urethra. Series 5 image 63.  Size: 16 x 11 mm  T2 description: Homogeneously hypointense  T2 numerical assessment: 5  DWI description: Marked diffusion restriction  DWI numerical assessment: 5  DCE assessment: Positive    Prostate margin: Capsular abutment 6-15 mm with capsular irregularity  and focal bulging   Lesion overall PI-RADS category: 5     Neurovascular bundles: Both neurovascular bundles are likely involved  by malignancy.  A right prostatic vein is markedly enlarged compared  to the left, similar to 8/1/2014  CT.  Seminal vesicles: No seminal vesicle involvement by malignancy.  Lymph nodes: No clear adenopathy. Indeterminate nodes as follows: Left  pelvic 8 x 5 mm lymph node on series 5 image 27.  Bones: No suspicious lesions  Other pelvic organs: Colonic diverticulosis.                                                                         IMPRESSION:  1. Based on the most suspicious abnormality, this exam is  characterized as PIRADS 5 - Clinically significant cancer is highly  likely to be present.  The most suspicious abnormalities are located  at the right mid peripheral zone and left apical peripheral zone and  there is high suspicion of extraprostatic extension.  2. No suspicious adenopathy or evidence of pelvic metastases.    NM bone scan 7/28/21  FINDINGS:   Small area of mild uptake in the right sacrum inferiorly visible on  the posterior view. SPECT-CT demonstrates that this focal uptake  corresponds to a sclerotic lesion on CT. No other suspicious areas of  focal uptake.     Postsurgical changes of bilateral knee replacements.     Physiologic uptake by the kidneys and urinary bladder.                                                                      IMPRESSION: Single focal uptake by the right sacral ala (S3 level) and  corresponding sclerotic focus. This is highly suspicious for  metastatic focus.    CT A/P 8/10/21  FINDINGS:   LOWER CHEST: Normal.     HEPATOBILIARY: Diffuse hepatic steatosis. No significant mass. No bile  duct dilatation. No calcified gallstones.     PANCREAS: Normal.     SPLEEN: Normal.     ADRENAL GLANDS: Normal.     KIDNEYS/BLADDER: There are multiple bilateral nonobstructing  intrarenal calculi measuring up to 4 mm in largest size. Small  subcentimeter benign cortical cysts. Unremarkable bladder. No evidence  of hydronephrosis.     BOWEL: No evidence of bowel obstruction or inflammatory changes. Mild  sigmoid colon diverticulosis. No evidence of diverticulitis.  Normal  appendix.     PELVIC ORGANS: Normal sized prostate gland.     ADDITIONAL FINDINGS: Multiple enlarged retroperitoneal periaortic and  bilateral iliac chain lymph nodes are highly suspicious for  metastasis. The largest left retroperitoneal periaortic lymph node on  image 44 of series 2 measures 4.5 x 3.4 cm. 2.9 x 2.3 cm aortocaval  lymph node on image 45 of series 2. Distal left periaortic lymph node  on image 54 of series 2 measures 3.2 x 2.6 cm. Right common iliac  chain lymph node on image 65 of series 2 measures 2.3 x 2.2 cm. 1.6 cm  left common iliac chain node on image 59 of series 2.     MUSCULOSKELETAL: Small faint sites of increased bone sclerosis  involving the posterior medial right iliac on image 65 of series 2 and  image 69 of series 2 are indeterminate for possible metastasis.                                                                       11/26/21                                             IMPRESSION:  1.  Significantly decreased retroperitoneal lymphadenopathy since the  prior study dated 8/10/2021 indicates good response to treatment. The  remaining enlarged retroperitoneal lymph node appears to have a  necrotic center.  2.  Minimal pulmonary nodularity was likely present previously.  3.  No other evidence for lymphadenopathy or metastasis is identified.  Specifically no evidence for left sacral metastasis is seen.    Labs:  Most Recent 3 CBC's:Recent Labs   Lab Test 05/10/23  1145 03/20/23  0910 02/10/23  0909   WBC 6.2 6.2 6.0   HGB 12.5* 11.7* 11.3*   MCV 93 92 93    203 166   ANEUTAUTO 4.7 4.7 4.3     Most Recent 3 BMP's:  Recent Labs   Lab Test 05/10/23  1145 03/20/23  0910 02/10/23  0909    140 136   POTASSIUM 4.6 4.5 4.5   CHLORIDE 101 103 99   CO2 24 26 24   BUN 19.4 25.4* 25.5*   CR 1.09 1.24* 1.10   ANIONGAP 13 11 13   AURORA 9.9 9.9 9.7   * 147* 144*   PROTTOTAL 7.1 6.9 6.5   ALBUMIN 4.5 4.2 4.1    Most Recent 3 LFT's:  Recent Labs   Lab Test 05/10/23  1145  03/20/23  0910 02/10/23  0909   AST 23 22 22   ALT 22 27 17   ALKPHOS 85 90 85   BILITOTAL 0.4 0.4 0.3    Most Recent 2 TSH and T4:  Recent Labs   Lab Test 02/22/23  0851 07/26/22  0837   TSH 6.18* 4.61*   T4 1.03 0.90       PSA < 0.01    I reviewed the above labs today.      IMPRESSION AND PLAN:  Joel Pike is a 67 year old M with PMH of DM2 on metformin, depression/anxiety (pt reports as bipolar d/o), HTN, HLD, and prostate cancer with possible bone metastasis.  Previously, Dr. Solo had a lengthy discussion at his initial and went over his pathology and staging scan results.  They previously discussed that it is not clear whether the lesion in wing of his ala is a true bony metastasis or whether it is another finding/artifact.  They previously discussed that this does not change the overall treatment of his disease with ADT, but may change whether he receives radiation to his bony lesion in the future or not.  The role of ADT and the addition of enzalutamide were previously discussed.  Given his existing DM2, we will attempt to avoid additional prednisone use by choosing enzalutamide.  However, Enzalutamide was declined by patient's insurance company.    -He started apalutamide on 10/5/21. He had CT CAP done 11/26/21 which showed significantly decreased retroperitoneal lymphadenopathy since the prior study dated 8/10/2021 indicates good response to treatment. No other evidence for lymphadenopathy or metastasis is identified. Specifically no evidence for left sacral metastasis is seen. Continue current dose of Apalutamide.   -Completed RT to the primary for oligometastatic disease based on the STAMPEDE study with Dr. Gomez ~09/2022  -Plan is to continue ADT for 2 years (through 8/2023).  Received last planned Eligard 45mg 02/2023 .  -Followup with me in August  - if psa rising then ( low likelihood) then will have CRPC - if psa is still <0.01 will discontinue all therapy, wait 4-6 months, then recheck PSA and  Testosterone.    Has urinary frequency and urgency so will refer to urology to discuss mgmt.     30 minutes spent on the date of the encounter doing chart review, review of test results, interpretation of tests, patient visit and documentation     Maria Esther Coelho CNP               Again, thank you for allowing me to participate in the care of your patient.        Sincerely,        Maria Esther Coelho CNP

## 2023-05-25 NOTE — TELEPHONE ENCOUNTER
1. Keep this bandage on for 24 hours  2. Then begin washing twice daily with soap and water  3. Cover with antibiotic ointment and a bandage, if bandage becomes wet or dirty change it  4. Have sutures removed in 8 days by primary care physician or here at the emergency department  5. Return sooner for any redness around the suture site, swelling, pus or fever.   6. May take Tylenol or Motrin over-the-counter as needed for pain Left detailed message about RX and told to call back with any concerning symptoms/red flag symptoms. Encouraged to continue to wean.     Angi MARLOW, Lead RN, BSN. . .  1/28/2021, 3:22 PM

## 2023-05-31 ENCOUNTER — OFFICE VISIT (OUTPATIENT)
Dept: RADIATION THERAPY | Facility: OUTPATIENT CENTER | Age: 68
End: 2023-05-31
Payer: MEDICARE

## 2023-05-31 VITALS
HEIGHT: 70 IN | OXYGEN SATURATION: 99 % | SYSTOLIC BLOOD PRESSURE: 135 MMHG | DIASTOLIC BLOOD PRESSURE: 60 MMHG | BODY MASS INDEX: 32.78 KG/M2 | RESPIRATION RATE: 12 BRPM | WEIGHT: 229 LBS | HEART RATE: 60 BPM

## 2023-05-31 DIAGNOSIS — C61 PROSTATE CANCER (H): Primary | ICD-10-CM

## 2023-05-31 NOTE — PROGRESS NOTES
Department of Radiation Oncology  Radiation Therapy Center  [facility] [place] [address]  [phone]       Radiation Oncology Follow-up Visit  23      Joel Pike  MRN: [id number]   : 1955     Radiation Oncologist: Isaías Gomez MD  Medical Oncologist: Isaias Solo MD     DIAGNOSIS: Metastatic prostate cancer  PATHOLOGY: Prostate adenocarcinoma, Mary score 4+3= 7                                  STAGE: [id number] (para-aortic nodes, ? R sacral osseous lesion).   CONCURRENT SYSTEMIC THERAPY:   Yes, oral Xtandi     ONCOLOGIC HISTORY:          Mr Pike is a 67 year old male patient who was noted to have an elevated PSA on 2021 with a value of 12.7.  Repeat PSA on 2021 was 30.4.  Prostate biopsy on 2021 demonstrated prostate adenocarcinoma, Mary score 4+3 equal 7.  MRI of the prostate on 2021 demonstrated PI-RADS 5 lesion with likely extracapsular extension.  There were noted to have indeterminate pelvic lymph nodes at the time.  Bone scan 2021 demonstrated a indeterminate lesion of the right sacral ala at the level of S3.  CT scan of the abdomen pelvis on 8/10/2021 demonstrated multiple large para-aortic and pelvic lymph nodes.  Patient was seen by medical oncologist Dr. Solo.  The patient was started on systemic treatment with Eligard and enzalutamide.  PSA has demonstrated biochemical response. PSA on 8/15/2022 remained undetectable. He was referred to our clinic to discuss next steps in management and discussion of possible local treatment with radiation therapy. He completed radiation therapy on 10/5/22.      SITE OF TREATMENT: Prostate     DATES  OF TREATMENT: 22 to 10/5/22     TOTAL DOSE OF TREATMENT: 5,500 cGy     DOSE PER FRACTION OF TREATMENT: 275 cGy x 20 fractions    Interval Since Completion of Radiation Therapy: ~ 8 mo       SUBJECTIVE:   Here with wife Michelle.  Hot flashes several times per week mostly during the day.  "Night sweats nightly but manageable. Energy is lower than prior to RT. Going to the gym 5 days per week with his wife, doing water aerobics balance and agility. A little walking and gardening. Allergic to the sun. Has to wear full clothing and hat and facemask. On flomax stream is good. No blood in urine or stool. Gets up 2 to 5 times per night to urinate.    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 A fib on ASA.     PHYSICAL EXAM:  /60   Pulse 60   Resp 12   Ht 1.778 m (5' 10\")   Wt 103.9 kg (229 lb)   SpO2 99%   BMI 32.86 kg/m     Gen: Alert, in NAD  Eyes: PERRL, EOMI, sclera anicteric  HENT     Head: NC/AT     Ears: No external auricular lesions  Neck: Supple, full ROM, no LAD  Pulm: No wheezing, stridor or respiratory distress  CV: Well-perfused, no cyanosis, no pedal edema  Abdominal: Soft, nontender, nondistended, no hepatomegaly  Back: No step-offs or pain to palpation along the thoracolumbar spine, no CVA tenderness    LABS AND IMAGING:  PSA   Date Value Ref Range Status   02/26/2021 12.70 (H) 0 - 4 ug/L Final     Comment:     Assay Method:  Chemiluminescence using Siemens Vista analyzer     PSA Tumor Marker   Date Value Ref Range Status   05/10/2023 <0.01 0.00 - 4.50 ng/mL Final   11/11/2022 <0.01 0.00 - 4.00 ug/L Final       IMPRESSION:   Mr. Pike is a 67 year old male with a metastatic prostate cancer [id number]. He had elevated PSA in February 26, 2021 with a value of 12.7.  Repeat PSA on 8/30/2021 was 30.4.  Prostate biopsy on 7/2/2021 demonstrated prostate adenocarcinoma, Mary score 4+3 equal 7.  MRI of the prostate on 7/28/2021 demonstrated PI-RADS 5 lesion with likely extracapsular extension.  There were noted to have indeterminate pelvic lymph nodes at the time.  Bone scan 7/28/2021 demonstrated a indeterminate lesion of the right sacral ala at the level of S3.  CT scan of the abdomen pelvis on 8/10/2021 demonstrated multiple large para-aortic and pelvic lymph nodes.  Patient was seen by " medical oncology team (Dr. Solo).  The patient was started on systemic treatment with Eligard and enzalutamide.  PSA has demonstrated biochemical response. He completed radiation therapy to prostate in October 2022 ago and returns for follow up today. PSA is undetectable. Follows with medical oncology. Next visit with them is scheduled for August 2023    PLAN:   See me in 6 months. Continue to follow up with medical oncology, defer to them on ordering PSA.     MALLY Guerrero  Department of Radiation Oncology  [facility]

## 2023-05-31 NOTE — LETTER
2023         RE: Joel Pike  [address]  [facility] [address]        Dear Colleague,    Thank you for referring your patient, Joel Pike, to the [facility] RADIATION THERAPY CLINIC. Please see a copy of my visit note below.       Department of Radiation Oncology  Radiation Therapy Center  [facility] [place] [address]  [phone]       Radiation Oncology Follow-up Visit  23      Joel Pike  MRN: [id number]   : 1955     Radiation Oncologist: Isaías Gomez MD  Medical Oncologist: Isaias Solo MD     DIAGNOSIS: Metastatic prostate cancer  PATHOLOGY: Prostate adenocarcinoma, Mary score 4+3= 7                                  STAGE: [id number] (para-aortic nodes, ? R sacral osseous lesion).   CONCURRENT SYSTEMIC THERAPY:   Yes, oral Xtandi     ONCOLOGIC HISTORY:          Mr Pike is a 67 year old male patient who was noted to have an elevated PSA on 2021 with a value of 12.7.  Repeat PSA on 2021 was 30.4.  Prostate biopsy on 2021 demonstrated prostate adenocarcinoma, Mary score 4+3 equal 7.  MRI of the prostate on 2021 demonstrated PI-RADS 5 lesion with likely extracapsular extension.  There were noted to have indeterminate pelvic lymph nodes at the time.  Bone scan 2021 demonstrated a indeterminate lesion of the right sacral ala at the level of S3.  CT scan of the abdomen pelvis on 8/10/2021 demonstrated multiple large para-aortic and pelvic lymph nodes.  Patient was seen by medical oncologist Dr. Solo.  The patient was started on systemic treatment with Eligard and enzalutamide.  PSA has demonstrated biochemical response. PSA on 8/15/2022 remained undetectable. He was referred to our clinic to discuss next steps in management and discussion of possible local treatment with radiation therapy. He completed radiation therapy on 10/5/22.      SITE OF TREATMENT: Prostate     DATES  OF TREATMENT: 22 to 10/5/22    "  TOTAL DOSE OF TREATMENT: 5,500 cGy     DOSE PER FRACTION OF TREATMENT: 275 cGy x 20 fractions    Interval Since Completion of Radiation Therapy: ~ 8 mo       SUBJECTIVE:   Here with wife Michelle.  Hot flashes several times per week mostly during the day. Night sweats nightly but manageable. Energy is lower than prior to RT. Going to the gym 5 days per week with his wife, doing water aerobics balance and agility. A little walking and gardening. Allergic to the sun. Has to wear full clothing and hat and facemask. On flomax stream is good. No blood in urine or stool. Gets up 2 to 5 times per night to urinate.    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 A fib on ASA.     PHYSICAL EXAM:  /60   Pulse 60   Resp 12   Ht 1.778 m (5' 10\")   Wt 103.9 kg (229 lb)   SpO2 99%   BMI 32.86 kg/m     Gen: Alert, in NAD  Eyes: PERRL, EOMI, sclera anicteric  HENT     Head: NC/AT     Ears: No external auricular lesions  Neck: Supple, full ROM, no LAD  Pulm: No wheezing, stridor or respiratory distress  CV: Well-perfused, no cyanosis, no pedal edema  Abdominal: Soft, nontender, nondistended, no hepatomegaly  Back: No step-offs or pain to palpation along the thoracolumbar spine, no CVA tenderness    LABS AND IMAGING:  PSA   Date Value Ref Range Status   02/26/2021 12.70 (H) 0 - 4 ug/L Final     Comment:     Assay Method:  Chemiluminescence using Siemens Vista analyzer     PSA Tumor Marker   Date Value Ref Range Status   05/10/2023 <0.01 0.00 - 4.50 ng/mL Final   11/11/2022 <0.01 0.00 - 4.00 ug/L Final       IMPRESSION:   Mr. Pike is a 67 year old male with a metastatic prostate cancer [id number]. He had elevated PSA in February 26, 2021 with a value of 12.7.  Repeat PSA on 8/30/2021 was 30.4.  Prostate biopsy on 7/2/2021 demonstrated prostate adenocarcinoma, [place] score 4+3 equal 7.  MRI of the prostate on 7/28/2021 demonstrated PI-RADS 5 lesion with likely extracapsular extension.  There were noted to have indeterminate pelvic " lymph nodes at the time.  Bone scan 7/28/2021 demonstrated a indeterminate lesion of the right sacral ala at the level of S3.  CT scan of the abdomen pelvis on 8/10/2021 demonstrated multiple large para-aortic and pelvic lymph nodes.  Patient was seen by medical oncology team (Dr. Solo).  The patient was started on systemic treatment with Eligard and enzalutamide.  PSA has demonstrated biochemical response. He completed radiation therapy to prostate in October 2022 ago and returns for follow up today. PSA is undetectable. Follows with medical oncology. Next visit with them is scheduled for August 2023    PLAN:   See me in 6 months. Continue to follow up with medical oncology, defer to them on ordering PSA.     MALLY Guerrero  Department of Radiation Oncology  [facility]

## 2023-06-09 ENCOUNTER — TELEPHONE (OUTPATIENT)
Dept: ONCOLOGY | Facility: CLINIC | Age: 68
End: 2023-06-09
Payer: MEDICARE

## 2023-06-09 NOTE — TELEPHONE ENCOUNTER
Oral Chemotherapy Monitoring Program    Subjective/Objective:  Joel Pike is a 67 year old male contacted by phone for a follow-up visit for oral chemotherapy. Joel confirms taking the appropriate dose of Erleada 240mg (4x 60mg tablet) daily. Denies new or worsening side effects, missed doses, medication changes or recent hospital or ED visits. He shares that everything is continuing to go well for him.         10/31/2022    11:00 AM 11/18/2022    10:00 AM 12/16/2022     9:00 AM 1/16/2023     2:00 PM 3/9/2023     1:00 PM 4/6/2023     9:00 AM 6/9/2023     1:00 PM   ORAL CHEMOTHERAPY   Assessment Type Refill Refill Refill Refill Monthly Follow up Refill Quarterly Follow up   Diagnosis Code Prostate Cancer Prostate Cancer Prostate Cancer Prostate Cancer Prostate Cancer Prostate Cancer Prostate Cancer   Providers Dr Edil Solo   [facility]   Is this patient followed by the [facility] OC team?      Yes Yes   Drug Name Erleada (apalutamide) Erleada (apalutamide) Erleada (apalutamide) Erleada (apalutamide) Erleada (apalutamide) Erleada (apalutamide) Erleada (apalutamide)   Dose  240 mg 240 mg 240 mg 240 mg 240 mg 240 mg   Current Schedule  Daily Daily   Daily Daily   Cycle Details  Continuous Continuous Continuous Continuous Continuous Continuous   Doses missed in last 2 weeks     0  0   Adherence Assessment     Adherent  Adherent   Adverse Effects       No AE identified during assessment   Other (See Note for Details)     Hot Flashes     Pharmacist intervention(other)     No     Any new drug interactions?     No  No   Is the dose as ordered appropriate for the patient?     Yes  Yes   Since the last time we talked, have you been hospitalized or used the emergency room?     No  No       Last PHQ-2 Score on record:       3/16/2023     2:31 PM 3/13/2023    10:09 AM   PHQ-2 ( 1999 [employer])   Q1: Little interest or pleasure  "in doing things 1 1   Q2: Feeling down, depressed or hopeless 1 1   PHQ-2 Score 2 2   Q1: Little interest or pleasure in doing things Several days    Q2: Feeling down, depressed or hopeless Several days    PHQ-2 Score 2        Vitals:  BP:   BP Readings from Last 1 Encounters:   05/31/23 135/60     Wt Readings from Last 1 Encounters:   05/31/23 103.9 kg (229 lb)     Estimated body surface area is 2.27 meters squared as calculated from the following:    Height as of 5/31/23: 1.778 m (5' 10\").    Weight as of 5/31/23: 103.9 kg (229 lb).    Labs:  No results found for NA within last 30 days.     No results found for K within last 30 days.     No results found for CA within last 30 days.     No results found for Mag within last 30 days.     No results found for Phos within last 30 days.     No results found for ALBUMIN within last 30 days.     No results found for BUN within last 30 days.     No results found for CR within last 30 days.     No results found for AST within last 30 days.     No results found for ALT within last 30 days.     No results found for BILITOTAL within last 30 days.     No results found for WBC within last 30 days.     No results found for HGB within last 30 days.     No results found for PLT within last 30 days.     No results found for ANC within last 30 days.     No results found for ANC within last 30 days.        Assessment/Plan:  Joel continues to tolerate therapy well. PDC=1.00, no adherence concerns. Continue Erlreada therapy as planned.     Follow-Up:  8/18: labs  8/24: appt with Maria Esther  9/7: quarterly assessment    Refill Due:  [facility] to deliver on 6/13/23      Carole Roa, PharmD, BCACP  Hematology/Oncology Clinical Pharmacist  [facility]  [phone]    "

## 2023-06-13 DIAGNOSIS — E03.9 HYPOTHYROIDISM, UNSPECIFIED TYPE: ICD-10-CM

## 2023-06-13 NOTE — TELEPHONE ENCOUNTER
"Requested Prescriptions   Pending Prescriptions Disp Refills    levothyroxine (SYNTHROID/LEVOTHROID) 137 MCG tablet [Pharmacy Med Name: LEVOTHYROXINE SODIUM 137MCG TABS] 90 tablet 0     Sig: TAKE ONE TABLET BY MOUTH ONCE DAILY- LAB RECHECK IN 3 MONTHS       Thyroid Protocol Failed - 6/13/2023  2:13 PM        Failed - Normal TSH on file in past 12 months     Recent Labs   Lab Test 02/22/23  0851   TSH 6.18*                Passed - Patient is 12 years or older        Passed - Recent (12 mo) or future (30 days) visit within the authorizing provider's specialty     Patient has had an office visit with the authorizing provider or a provider within the authorizing providers department within the previous 12 mos or has a future within next 30 days. See \"Patient Info\" tab in inbasket, or \"Choose Columns\" in Meds & Orders section of the refill encounter.              Passed - Medication is active on med list             "

## 2023-06-14 RX ORDER — LEVOTHYROXINE SODIUM 137 UG/1
TABLET ORAL
Qty: 30 TABLET | Refills: 0 | Status: SHIPPED | OUTPATIENT
Start: 2023-06-14 | End: 2023-07-24

## 2023-06-14 NOTE — TELEPHONE ENCOUNTER
1 month supply refilled.  Please stop by get the TSH/free T4 level checked before med run out.  Thank you

## 2023-06-16 ENCOUNTER — MYC MEDICAL ADVICE (OUTPATIENT)
Dept: FAMILY MEDICINE | Facility: CLINIC | Age: 68
End: 2023-06-16
Payer: MEDICARE

## 2023-06-20 ENCOUNTER — LAB (OUTPATIENT)
Dept: LAB | Facility: CLINIC | Age: 68
End: 2023-06-20
Payer: MEDICARE

## 2023-06-20 DIAGNOSIS — E03.9 HYPOTHYROIDISM, UNSPECIFIED TYPE: ICD-10-CM

## 2023-06-20 LAB
T4 FREE SERPL-MCNC: 1.19 NG/DL (ref 0.9–1.7)
TSH SERPL DL<=0.005 MIU/L-ACNC: 4.24 UIU/ML (ref 0.3–4.2)

## 2023-06-20 PROCEDURE — 84439 ASSAY OF FREE THYROXINE: CPT

## 2023-06-20 PROCEDURE — 84443 ASSAY THYROID STIM HORMONE: CPT

## 2023-06-20 PROCEDURE — 36415 COLL VENOUS BLD VENIPUNCTURE: CPT

## 2023-07-03 DIAGNOSIS — C61 PROSTATE CANCER (H): Primary | ICD-10-CM

## 2023-07-05 DIAGNOSIS — C61 PROSTATE CANCER (H): Primary | ICD-10-CM

## 2023-07-10 DIAGNOSIS — C61 PROSTATE CANCER (H): Primary | ICD-10-CM

## 2023-08-02 ENCOUNTER — OFFICE VISIT (OUTPATIENT)
Dept: UROLOGY | Facility: CLINIC | Age: 68
End: 2023-08-02
Payer: MEDICARE

## 2023-08-02 VITALS
HEIGHT: 70 IN | WEIGHT: 239 LBS | DIASTOLIC BLOOD PRESSURE: 80 MMHG | BODY MASS INDEX: 34.22 KG/M2 | TEMPERATURE: 97.8 F | SYSTOLIC BLOOD PRESSURE: 136 MMHG

## 2023-08-02 DIAGNOSIS — C61 PROSTATE CANCER (H): Primary | ICD-10-CM

## 2023-08-02 PROCEDURE — 99213 OFFICE O/P EST LOW 20 MIN: CPT | Performed by: UROLOGY

## 2023-08-02 ASSESSMENT — PAIN SCALES - GENERAL: PAINLEVEL: NO PAIN (0)
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
## 2023-08-18 ENCOUNTER — LAB (OUTPATIENT)
Dept: LAB | Facility: CLINIC | Age: 68
End: 2023-08-18
Payer: MEDICARE

## 2023-08-18 DIAGNOSIS — C61 PROSTATE CANCER (H): ICD-10-CM

## 2023-08-18 LAB
ALBUMIN SERPL BCG-MCNC: 4.3 G/DL (ref 3.5–5.2)
ALP SERPL-CCNC: 79 U/L (ref 40–129)
ALT SERPL W P-5'-P-CCNC: 24 U/L (ref 0–70)
ANION GAP SERPL CALCULATED.3IONS-SCNC: 13 MMOL/L (ref 7–15)
AST SERPL W P-5'-P-CCNC: 21 U/L (ref 0–45)
BASOPHILS # BLD AUTO: 0 10E3/UL (ref 0–0.2)
BASOPHILS NFR BLD AUTO: 1 %
BILIRUB SERPL-MCNC: 0.4 MG/DL
BUN SERPL-MCNC: 14 MG/DL (ref 8–23)
CALCIUM SERPL-MCNC: 9.3 MG/DL (ref 8.8–10.2)
CHLORIDE SERPL-SCNC: 101 MMOL/L (ref 98–107)
CREAT SERPL-MCNC: 1.1 MG/DL (ref 0.67–1.17)
DEPRECATED HCO3 PLAS-SCNC: 23 MMOL/L (ref 22–29)
EOSINOPHIL # BLD AUTO: 0.2 10E3/UL (ref 0–0.7)
EOSINOPHIL NFR BLD AUTO: 4 %
ERYTHROCYTE [DISTWIDTH] IN BLOOD BY AUTOMATED COUNT: 12.6 % (ref 10–15)
GFR SERPL CREATININE-BSD FRML MDRD: 74 ML/MIN/1.73M2
GLUCOSE SERPL-MCNC: 143 MG/DL (ref 70–99)
HCT VFR BLD AUTO: 34.9 % (ref 40–53)
HGB BLD-MCNC: 11.9 G/DL (ref 13.3–17.7)
IMM GRANULOCYTES # BLD: 0 10E3/UL
IMM GRANULOCYTES NFR BLD: 1 %
LYMPHOCYTES # BLD AUTO: 0.8 10E3/UL (ref 0.8–5.3)
LYMPHOCYTES NFR BLD AUTO: 16 %
MCH RBC QN AUTO: 31.8 PG (ref 26.5–33)
MCHC RBC AUTO-ENTMCNC: 34.1 G/DL (ref 31.5–36.5)
MCV RBC AUTO: 93 FL (ref 78–100)
MONOCYTES # BLD AUTO: 0.5 10E3/UL (ref 0–1.3)
MONOCYTES NFR BLD AUTO: 10 %
NEUTROPHILS # BLD AUTO: 3.5 10E3/UL (ref 1.6–8.3)
NEUTROPHILS NFR BLD AUTO: 68 %
NRBC # BLD AUTO: 0 10E3/UL
NRBC BLD AUTO-RTO: 0 /100
PLATELET # BLD AUTO: 179 10E3/UL (ref 150–450)
POTASSIUM SERPL-SCNC: 4.7 MMOL/L (ref 3.4–5.3)
PROT SERPL-MCNC: 6.5 G/DL (ref 6.4–8.3)
RBC # BLD AUTO: 3.74 10E6/UL (ref 4.4–5.9)
SODIUM SERPL-SCNC: 137 MMOL/L (ref 136–145)
WBC # BLD AUTO: 5.1 10E3/UL (ref 4–11)

## 2023-08-18 PROCEDURE — 84153 ASSAY OF PSA TOTAL: CPT | Performed by: NURSE PRACTITIONER

## 2023-08-18 PROCEDURE — 36415 COLL VENOUS BLD VENIPUNCTURE: CPT

## 2023-08-18 PROCEDURE — 85025 COMPLETE CBC W/AUTO DIFF WBC: CPT

## 2023-08-18 PROCEDURE — 80053 COMPREHEN METABOLIC PANEL: CPT

## 2023-08-24 ENCOUNTER — VIRTUAL VISIT (OUTPATIENT)
Dept: ONCOLOGY | Facility: CLINIC | Age: 68
End: 2023-08-24
Attending: INTERNAL MEDICINE
Payer: MEDICARE

## 2023-08-24 VITALS — WEIGHT: 230 LBS | HEIGHT: 70 IN | BODY MASS INDEX: 32.93 KG/M2

## 2023-08-24 DIAGNOSIS — C61 PROSTATE CANCER (H): Primary | ICD-10-CM

## 2023-08-24 LAB — PSA SERPL DL<=0.01 NG/ML-MCNC: 0.12 NG/ML (ref 0–4.5)

## 2023-08-24 PROCEDURE — 99215 OFFICE O/P EST HI 40 MIN: CPT | Mod: 95 | Performed by: NURSE PRACTITIONER

## 2023-08-24 ASSESSMENT — PAIN SCALES - GENERAL: PAINLEVEL: NO PAIN (0)

## 2023-08-24 NOTE — Clinical Note
"    8/24/2023         RE: Joel Pike  [address]  [facility] [address]        Dear Colleague,    Thank you for referring your patient, Joel Pike, to the [facility]. Please see a copy of my visit note below.    Virtual Visit Details    Type of service:  Video Visit   Video Start Time: 8:59 AM  Video End Time:{video visit start/end time for provider to select:[id number]}    Originating Location (pt. Location): {video visit patient location:[id number]::\"Home\"}  {PROVIDER LOCATION On-site should be selected for visits conducted from your clinic location or adjoining [facility] hospital, academic office, or other nearby [facility] building. Off-site should be selected for all other provider locations, including home:[id number]}  Distant Location (provider location):  Off-site  Platform used for Video Visit: [facility] Medical Oncology Followup Note       Date of visit: 08/24/23    CC:   metastatic prostate cancer     ONCOLOGY HISTORY:   -Elevated PSA noted 2/26/21 at 12.70. Previously normal in 2017.     -4/7/21-Initial urology visit.   -7/2/21-prostate biopsy 4+3, 9/12 biopsies positive.  Ductal component noted.     -7/28/21-MR prostate-PIRADS 5 lesion, R and L sided lesions with likely    neurovascular bundle invasion. No  seminal vesicle involvement. No clear LAD,  but some indeterminate pelvic nodes.  No suspicious bone lesions.     -7/28/21-NM bone scan suspicious lesion of R sacral ala S3 level.     -8/10/21-CT A/P with multiple enlarged periaortic/iliac LN   -8/11/21-First eligard dose 45mg (Q6M dosing). Due next 2/2022.   -8/30/21-Initial medical oncology visit with Dr. Solo.  Unclear if bony lesion is metastasis based on current imaging.  Will start apalutamide and continue    Eligard (next due 2/2022).     8/30/21 PSA =30.40 Pre Rx  9/27/21 PSA =8.99 (T=6); HgbA1C = 6.1  10/5/21 PSA =3.22  11/26/21 PSA =0.15  7/26/22 PSA <0.01  11/11/22 PSA= <0.01  02/2023 PATRICIO Sargent" MG  5/10/23 PSA= <0.01    8/18/23 PSA=0.12      Interval History:  Doing well in general.   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   No EtOH  No recreational drugs.   No herbs/supplements other than on medication list.      MEDS:  Current Outpatient Medications   Medication Instructions     albuterol (PROAIR HFA) 108 (90 BASE) MCG/ACT Inhaler 1-2 puffs every 2 -4 hrs as needed     ALLEGRA-D ALLERGY & CONGESTION 180-240 MG 24 hr tablet TAKE ONE TABLET BY MOUTH EVERY DAY     blood glucose (HUGO CONTOUR) test strip Use to test blood sugars 1 time daily or as directed.     blood glucose (NO BRAND SPECIFIED) lancets standard Use to test blood sugar one time daily or as directed.     blood glucose calibration (HUGO CONTOUR) NORMAL solution Use to calibrate blood glucose monitor as directed.     buPROPion (WELLBUTRIN XL) 300 MG 24 hr tablet TAKE ONE TABLET BY MOUTH EVERY MORNING     Coenzyme Q-10 capsule 1 capsule, DAILY     esomeprazole (NEXIUM) 40 MG DR capsule TAKE ONE CAPSULE BY MOUTH EVERY MORNING BEFORE BREAKFAST , 30-60 MINUTES BEFORE EATING     ezetimibe (ZETIA) 10 MG tablet TAKE ONE TABLET BY MOUTH ONCE DAILY     fish oil-omega-3 fatty acids 1000 MG capsule Take  by mouth. Take daily       levothyroxine (SYNTHROID/LEVOTHROID) 112 mcg, Oral, DAILY     losartan (COZAAR) 75 mg, Oral, DAILY     Magnesium 500 MG CAPS One twice a day     metFORMIN (GLUCOPHAGE) 500 MG tablet TAKE ONE TABLET BY MOUTH TWICE A DAY WITH MEALS     Misc Natural Products (OSTEO BI-FLEX ADV JOINT SHIELD) TABS Take  by mouth.     multivitamin (OCUVITE) TABS tablet 1 tablet, Oral, DAILY     STATIN NOT PRESCRIBED  (INTENTIONAL) Please choose reason not prescribed, below     Vitamin D3 (CHOLECALCIFEROL) 5,000 Units, Oral     ROS-Remainder of 14 point ROS reviewed and negative except as in HPI.    PHYSICAL EXAM:  Exam:  Video visit   Appears in no distress  Cognitively appropriate     LABS AND IMAGES:    Prostate Biopsy 7/2/21  FINAL DIAGNOSIS:   A: Prostate, left, biopsy   - Adenocarcinoma, [place]'s grade 4/3 with ductal component involving 4 of    6 needle core biopsies and 25% of   submitted tissue     B: Prostate, right, biopsy   - Adenocarcinoma, [place]'s grade 4/3 with ductal component involving 5 of    6 needle core biopsies and 25% of   submitted tissue     MR prostate 7/28/21  FINDINGS:  Size: 31 grams  Hemorrhage: Absent  Peripheral zone: Heterogeneous on T2-weighted images. Suspicious  lesions as detailed below.  Transition zone: Enlarged with BPH changes. No suspicious lesions  identified.     Lesion(s) in rank order of severity (highest score- to lowest score,  then by size)      Lesion 1:  Location: Right mid gland peripheral zone at the 7-9 o'clock position  relative to the urethra. Series 5 image 50.  Size: 20 x 10 mm  T2 description: Homogeneously hypointense  T2 numerical assessment: 5  DWI description: Marked restriction diffusion  DWI numerical assessment: 5  DCE assessment: Negative    Prostate margin: Capsular abutment>15 mm with enlarged neurovascular  bundle suggesting tumor involvement  Lesion overall PI-RADS category: 5     Lesion 2:  Location: Left apex peripheral zone at the 4 o'clock position relative  to the urethra. Series 5 image 63.  Size: 16 x 11 mm  T2 description: Homogeneously hypointense  T2 numerical assessment: 5  DWI description: Marked diffusion restriction  DWI numerical assessment: 5  DCE assessment: Positive    Prostate margin: Capsular abutment 6-15 mm with capsular irregularity  and focal bulging   Lesion overall PI-RADS category: 5     Neurovascular bundles: Both neurovascular  bundles are likely involved  by malignancy.  A right prostatic vein is markedly enlarged compared  to the left, similar to 8/1/2014 CT.  Seminal vesicles: No seminal vesicle involvement by malignancy.  Lymph nodes: No clear adenopathy. Indeterminate nodes as follows: Left  pelvic 8 x 5 mm lymph node on series 5 image 27.  Bones: No suspicious lesions  Other pelvic organs: Colonic diverticulosis.                                                                         IMPRESSION:  1. Based on the most suspicious abnormality, this exam is  characterized as PIRADS 5 - Clinically significant cancer is highly  likely to be present.  The most suspicious abnormalities are located  at the right mid peripheral zone and left apical peripheral zone and  there is high suspicion of extraprostatic extension.  2. No suspicious adenopathy or evidence of pelvic metastases.    NM bone scan 7/28/21  FINDINGS:   Small area of mild uptake in the right sacrum inferiorly visible on  the posterior view. SPECT-CT demonstrates that this focal uptake  corresponds to a sclerotic lesion on CT. No other suspicious areas of  focal uptake.     Postsurgical changes of bilateral knee replacements.     Physiologic uptake by the kidneys and urinary bladder.                                                                      IMPRESSION: Single focal uptake by the right sacral ala (S3 level) and  corresponding sclerotic focus. This is highly suspicious for  metastatic focus.    CT A/P 8/10/21  FINDINGS:   LOWER CHEST: Normal.     HEPATOBILIARY: Diffuse hepatic steatosis. No significant mass. No bile  duct dilatation. No calcified gallstones.     PANCREAS: Normal.     SPLEEN: Normal.     ADRENAL GLANDS: Normal.     KIDNEYS/BLADDER: There are multiple bilateral nonobstructing  intrarenal calculi measuring up to 4 mm in largest size. Small  subcentimeter benign cortical cysts. Unremarkable bladder. No evidence  of hydronephrosis.     BOWEL: No evidence of  bowel obstruction or inflammatory changes. Mild  sigmoid colon diverticulosis. No evidence of diverticulitis. Normal  appendix.     PELVIC ORGANS: Normal sized prostate gland.     ADDITIONAL FINDINGS: Multiple enlarged retroperitoneal periaortic and  bilateral iliac chain lymph nodes are highly suspicious for  metastasis. The largest left retroperitoneal periaortic lymph node on  image 44 of series 2 measures 4.5 x 3.4 cm. 2.9 x 2.3 cm aortocaval  lymph node on image 45 of series 2. Distal left periaortic lymph node  on image 54 of series 2 measures 3.2 x 2.6 cm. Right common iliac  chain lymph node on image 65 of series 2 measures 2.3 x 2.2 cm. 1.6 cm  left common iliac chain node on image 59 of series 2.     MUSCULOSKELETAL: Small faint sites of increased bone sclerosis  involving the posterior medial right iliac on image 65 of series 2 and  image 69 of series 2 are indeterminate for possible metastasis.                                                                       11/26/21                                             IMPRESSION:  1.  Significantly decreased retroperitoneal lymphadenopathy since the  prior study dated 8/10/2021 indicates good response to treatment. The  remaining enlarged retroperitoneal lymph node appears to have a  necrotic center.  2.  Minimal pulmonary nodularity was likely present previously.  3.  No other evidence for lymphadenopathy or metastasis is identified.  Specifically no evidence for left sacral metastasis is seen.    Labs:  Most Recent 3 CBC's:  Recent Labs   Lab Test 08/18/23  0910 05/10/23  1145 03/20/23  0910   WBC 5.1 6.2 6.2   HGB 11.9* 12.5* 11.7*   MCV 93 93 92    218 203   ANEUTAUTO 3.5 4.7 4.7     Most Recent 3 BMP's:  Recent Labs   Lab Test 08/18/23  0910 05/10/23  1145 03/20/23  0910    138 140   POTASSIUM 4.7 4.6 4.5   CHLORIDE 101 101 103   CO2 23 24 26   BUN 14.0 19.4 25.4*   CR 1.10 1.09 1.24*   ANIONGAP 13 13 11   AURORA 9.3 9.9 9.9   * 128*  147*   PROTTOTAL 6.5 7.1 6.9   ALBUMIN 4.3 4.5 4.2    Most Recent 3 LFT's:  Recent Labs   Lab Test 08/18/23  0910 05/10/23  1145 03/20/23  0910   AST 21 23 22   ALT 24 22 27   ALKPHOS 79 85 90   BILITOTAL 0.4 0.4 0.4    Most Recent 2 TSH and T4:  Recent Labs   Lab Test 06/20/23  1313 02/22/23  0851   TSH 4.24* 6.18*   T4 1.19 1.03       PSA < 0.01    I reviewed the above labs today.      IMPRESSION AND PLAN:  Joel Pike is a 68 year old M with PMH of DM2 on metformin, depression/anxiety (pt reports as bipolar d/o), HTN, HLD, and prostate cancer with possible bone metastasis.  Previously discussed that it is not clear whether the lesion in wing of his ala is a true bony metastasis or whether it is another finding/artifact. Nonetheless, did not change the overall treatment of his disease with ADT    -He started apalutamide on 10/5/21. He had CT CAP done 11/26/21 which showed significantly decreased retroperitoneal lymphadenopathy since the prior study dated 8/10/2021 indicates good response to treatment. No other evidence for lymphadenopathy or metastasis is identified. Specifically no evidence for left sacral metastasis is seen.   -Completed RT to the primary for oligometastatic disease based on the STAMPEDE study with Dr. Gomez ~09/2022  -Plan is to continue ADT for 2 years (through 8/2023).   -now with rising PSA despite ADT. ? CRPC. Will recheck PSA and T next week to confirm. No clinical concern for progression today.   -Last dose Eligard 45mg 02/2023; in light of rising PSA, recommended we re-dose when he is here 9/6  -follow up with Dr. Nuno 9/6 1pm for transfer of care and assessment for PSMA PET indication       40 minutes spent on the date of the encounter doing chart review, review of test results, interpretation of tests, patient visit and documentation     Maria Esther Coelho, CNP           Virtual Visit Details    Type of service:  Video Visit   Video Start Time: 8:59 AM  Video End Time:9:08  AM    Originating Location (pt. Location): Home  {PROVIDER LOCATION On-site should be selected for visits conducted from your clinic location or adjoining [facility] hospital, academic office, or other nearby [facility] building. Off-site should be selected for all other provider locations, including home:[id number]}  Distant Location (provider location):  Off-site  Platform used for Video Visit: [facility] Medical Oncology Followup Note       Date of visit: 08/24/23    CC:   metastatic prostate cancer     ONCOLOGY HISTORY:   -Elevated PSA noted 2/26/21 at 12.70. Previously normal in 2017.     -4/7/21-Initial urology visit.   -7/2/21-prostate biopsy 4+3, 9/12 biopsies positive.  Ductal component noted.     -7/28/21-MR prostate-PIRADS 5 lesion, R and L sided lesions with likely    neurovascular bundle invasion. No  seminal vesicle involvement. No clear LAD,  but some indeterminate pelvic nodes.  No suspicious bone lesions.     -7/28/21-NM bone scan suspicious lesion of R sacral ala S3 level.     -8/10/21-CT A/P with multiple enlarged periaortic/iliac LN   -8/11/21-First eligard dose 45mg (Q6M dosing). Due next 2/2022.   -8/30/21-Initial medical oncology visit with Dr. Solo.  Unclear if bony lesion is metastasis based on current imaging.  Will start apalutamide and continue    Eligard (next due 2/2022).     8/30/21 PSA =30.40 Pre Rx  9/27/21 PSA =8.99 (T=6); HgbA1C = 6.1  10/5/21 PSA =3.22  11/26/21 PSA =0.15  7/26/22 PSA <0.01  11/11/22 PSA= <0.01  02/2023 ELIGARD 45 MG  5/10/23 PSA= <0.01    8/18/23 PSA=0.12      Interval History:  Doing well in general.   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   No EtOH  No recreational drugs.   No herbs/supplements other than on medication list.      MEDS:  Current Outpatient Medications   Medication Instructions     albuterol (PROAIR HFA) 108 (90 BASE) MCG/ACT Inhaler 1-2 puffs every 2 -4 hrs as needed     ALLEGRA-D ALLERGY & CONGESTION 180-240 MG 24 hr tablet TAKE ONE TABLET BY MOUTH EVERY DAY     blood glucose (HUGO CONTOUR) test strip Use to test blood sugars 1 time daily or as directed.     blood glucose (NO BRAND SPECIFIED) lancets standard Use to test blood sugar one time daily or as directed.     blood glucose calibration (HUGO CONTOUR) NORMAL solution Use to calibrate blood glucose monitor as directed.     buPROPion (WELLBUTRIN XL) 300 MG 24 hr tablet TAKE ONE TABLET BY MOUTH EVERY MORNING     Coenzyme Q-10 capsule 1 capsule, DAILY     esomeprazole (NEXIUM) 40 MG DR capsule TAKE ONE CAPSULE BY MOUTH EVERY MORNING BEFORE BREAKFAST , 30-60 MINUTES BEFORE EATING     ezetimibe (ZETIA) 10 MG tablet TAKE ONE TABLET BY MOUTH ONCE DAILY     fish oil-omega-3 fatty acids 1000 MG capsule Take  by mouth. Take daily       levothyroxine (SYNTHROID/LEVOTHROID) 112 mcg, Oral, DAILY     losartan (COZAAR) 75 mg, Oral, DAILY     Magnesium 500 MG CAPS One twice a day     metFORMIN (GLUCOPHAGE) 500 MG tablet TAKE ONE TABLET BY MOUTH TWICE A DAY WITH MEALS     Misc Natural Products (OSTEO BI-FLEX ADV JOINT SHIELD) TABS Take  by mouth.     multivitamin (OCUVITE) TABS tablet 1 tablet, Oral, DAILY     STATIN NOT PRESCRIBED (INTENTIONAL) Please choose reason not prescribed, below     Vitamin D3 (CHOLECALCIFEROL) 5,000 Units, Oral     ROS-Remainder of 14 point ROS reviewed and negative except as in HPI.    PHYSICAL EXAM:  Exam:  Video visit   Appears in no distress  Cognitively appropriate     LABS AND IMAGES:    Prostate Biopsy 7/2/21  FINAL DIAGNOSIS:   A: Prostate, left, biopsy   - Adenocarcinoma, [place] grade 4/3 with ductal component involving 4 of    6 needle core  biopsies and 25% of   submitted tissue     B: Prostate, right, biopsy   - Adenocarcinoma, [place] grade 4/3 with ductal component involving 5 of    6 needle core biopsies and 25% of   submitted tissue     MR prostate 7/28/21  FINDINGS:  Size: 31 grams  Hemorrhage: Absent  Peripheral zone: Heterogeneous on T2-weighted images. Suspicious  lesions as detailed below.  Transition zone: Enlarged with BPH changes. No suspicious lesions  identified.     Lesion(s) in rank order of severity (highest score- to lowest score,  then by size)      Lesion 1:  Location: Right mid gland peripheral zone at the 7-9 o'clock position  relative to the urethra. Series 5 image 50.  Size: 20 x 10 mm  T2 description: Homogeneously hypointense  T2 numerical assessment: 5  DWI description: Marked restriction diffusion  DWI numerical assessment: 5  DCE assessment: Negative    Prostate margin: Capsular abutment>15 mm with enlarged neurovascular  bundle suggesting tumor involvement  Lesion overall PI-RADS category: 5     Lesion 2:  Location: Left apex peripheral zone at the 4 o'clock position relative  to the urethra. Series 5 image 63.  Size: 16 x 11 mm  T2 description: Homogeneously hypointense  T2 numerical assessment: 5  DWI description: Marked diffusion restriction  DWI numerical assessment: 5  DCE assessment: Positive    Prostate margin: Capsular abutment 6-15 mm with capsular irregularity  and focal bulging   Lesion overall PI-RADS category: 5     Neurovascular bundles: Both neurovascular bundles are likely involved  by malignancy.  A right prostatic vein is markedly enlarged compared  to the left, similar to 8/1/2014 CT.  Seminal vesicles: No seminal vesicle involvement by malignancy.  Lymph nodes: No clear adenopathy. Indeterminate nodes as follows: Left  pelvic 8 x 5 mm lymph node on series 5 image 27.  Bones: No suspicious lesions  Other pelvic organs: Colonic diverticulosis.                                                                          IMPRESSION:  1. Based on the most suspicious abnormality, this exam is  characterized as PIRADS 5 - Clinically significant cancer is highly  likely to be present.  The most suspicious abnormalities are located  at the right mid peripheral zone and left apical peripheral zone and  there is high suspicion of extraprostatic extension.  2. No suspicious adenopathy or evidence of pelvic metastases.    NM bone scan 7/28/21  FINDINGS:   Small area of mild uptake in the right sacrum inferiorly visible on  the posterior view. SPECT-CT demonstrates that this focal uptake  corresponds to a sclerotic lesion on CT. No other suspicious areas of  focal uptake.     Postsurgical changes of bilateral knee replacements.     Physiologic uptake by the kidneys and urinary bladder.                                                                      IMPRESSION: Single focal uptake by the right sacral ala (S3 level) and  corresponding sclerotic focus. This is highly suspicious for  metastatic focus.    CT A/P 8/10/21  FINDINGS:   LOWER CHEST: Normal.     HEPATOBILIARY: Diffuse hepatic steatosis. No significant mass. No bile  duct dilatation. No calcified gallstones.     PANCREAS: Normal.     SPLEEN: Normal.     ADRENAL GLANDS: Normal.     KIDNEYS/BLADDER: There are multiple bilateral nonobstructing  intrarenal calculi measuring up to 4 mm in largest size. Small  subcentimeter benign cortical cysts. Unremarkable bladder. No evidence  of hydronephrosis.     BOWEL: No evidence of bowel obstruction or inflammatory changes. Mild  sigmoid colon diverticulosis. No evidence of diverticulitis. Normal  appendix.     PELVIC ORGANS: Normal sized prostate gland.     ADDITIONAL FINDINGS: Multiple enlarged retroperitoneal periaortic and  bilateral iliac chain lymph nodes are highly suspicious for  metastasis. The largest left retroperitoneal periaortic lymph node on  image 44 of series 2 measures 4.5 x 3.4 cm. 2.9 x 2.3 cm aortocaval  lymph  node on image 45 of series 2. Distal left periaortic lymph node  on image 54 of series 2 measures 3.2 x 2.6 cm. Right common iliac  chain lymph node on image 65 of series 2 measures 2.3 x 2.2 cm. 1.6 cm  left common iliac chain node on image 59 of series 2.     MUSCULOSKELETAL: Small faint sites of increased bone sclerosis  involving the posterior medial right iliac on image 65 of series 2 and  image 69 of series 2 are indeterminate for possible metastasis.                                                                       11/26/21                                             IMPRESSION:  1.  Significantly decreased retroperitoneal lymphadenopathy since the  prior study dated 8/10/2021 indicates good response to treatment. The  remaining enlarged retroperitoneal lymph node appears to have a  necrotic center.  2.  Minimal pulmonary nodularity was likely present previously.  3.  No other evidence for lymphadenopathy or metastasis is identified.  Specifically no evidence for left sacral metastasis is seen.    Labs:  Most Recent 3 CBC's:  Recent Labs   Lab Test 08/18/23  0910 05/10/23  1145 03/20/23  0910   WBC 5.1 6.2 6.2   HGB 11.9* 12.5* 11.7*   MCV 93 93 92    218 203   ANEUTAUTO 3.5 4.7 4.7     Most Recent 3 BMP's:  Recent Labs   Lab Test 08/18/23  0910 05/10/23  1145 03/20/23  0910    138 140   POTASSIUM 4.7 4.6 4.5   CHLORIDE 101 101 103   CO2 23 24 26   BUN 14.0 19.4 25.4*   CR 1.10 1.09 1.24*   ANIONGAP 13 13 11   AURORA 9.3 9.9 9.9   * 128* 147*   PROTTOTAL 6.5 7.1 6.9   ALBUMIN 4.3 4.5 4.2    Most Recent 3 LFT's:  Recent Labs   Lab Test 08/18/23  0910 05/10/23  1145 03/20/23  0910   AST 21 23 22   ALT 24 22 27   ALKPHOS 79 85 90   BILITOTAL 0.4 0.4 0.4    Most Recent 2 TSH and T4:  Recent Labs   Lab Test 06/20/23  1313 02/22/23  0851   TSH 4.24* 6.18*   T4 1.19 1.03       PSA < 0.01    I reviewed the above labs today.      IMPRESSION AND PLAN:  Joel Pike is a 68 year old M with PMH of DM2  on metformin, depression/anxiety (pt reports as bipolar d/o), HTN, HLD, and prostate cancer with possible bone metastasis.  Previously discussed that it is not clear whether the lesion in wing of his ala is a true bony metastasis or whether it is another finding/artifact. Nonetheless, did not change the overall treatment of his disease with ADT    -He started apalutamide on 10/5/21. He had CT CAP done 11/26/21 which showed significantly decreased retroperitoneal lymphadenopathy since the prior study dated 8/10/2021 indicates good response to treatment. No other evidence for lymphadenopathy or metastasis is identified. Specifically no evidence for left sacral metastasis is seen.   -Completed RT to the primary for oligometastatic disease based on the STAMPEDE study with Dr. Gomez ~09/2022  -Plan is to continue ADT for 2 years (through 8/2023).   -now with rising PSA despite ADT. ? CRPC. Will recheck PSA and T next week to confirm. No clinical concern for progression today.   -Last dose Eligard 45mg 02/2023; in light of rising PSA, recommended we re-dose when he is here 9/6  -follow up with Dr. Nuno 9/6 1pm for transfer of care and assessment for PSMA PET indication       40 minutes spent on the date of the encounter doing chart review, review of test results, interpretation of tests, patient visit and documentation     Maria Esther Coelho CNP             Again, thank you for allowing me to participate in the care of your patient.        Sincerely,        Maria Esther Coelho CNP

## 2023-08-24 NOTE — PROGRESS NOTES
Virtual Visit Details    Type of service:  Video Visit   Video Start Time: 8:59 AM  Video End Time:9:08 AM    Originating Location (pt. Location): Home    Distant Location (provider location):  Off-site  Platform used for Video Visit: [facility] Medical Oncology Followup Note       Date of visit: 08/24/23    CC:   metastatic prostate cancer     ONCOLOGY HISTORY:   -Elevated PSA noted 2/26/21 at 12.70. Previously normal in 2017.     -4/7/21-Initial urology visit.   -7/2/21-prostate biopsy 4+3, 9/12 biopsies positive.  Ductal component noted.     -7/28/21-MR prostate-PIRADS 5 lesion, R and L sided lesions with likely    neurovascular bundle invasion. No  seminal vesicle involvement. No clear LAD,  but some indeterminate pelvic nodes.  No suspicious bone lesions.     -7/28/21-NM bone scan suspicious lesion of R sacral ala S3 level.     -8/10/21-CT A/P with multiple enlarged periaortic/iliac LN   -8/11/21-First eligard dose 45mg (Q6M dosing). Due next 2/2022.   -8/30/21-Initial medical oncology visit with Dr. Solo.  Unclear if bony lesion is metastasis based on current imaging.  Will start apalutamide and continue    Eligard (next due 2/2022).     8/30/21 PSA =30.40 Pre Rx  9/27/21 PSA =8.99 (T=6); HgbA1C = 6.1  10/5/21 PSA =3.22  11/26/21 PSA =0.15  7/26/22 PSA <0.01  11/11/22 PSA= <0.01  02/2023 ELIGARD 45 MG  5/10/23 PSA= <0.01    8/18/23 PSA=0.12      Interval History:  Doing well in general.   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   No EtOH  No recreational drugs.   No herbs/supplements other than on medication list.      MEDS:  Current Outpatient Medications   Medication  Instructions     albuterol (PROAIR HFA) 108 (90 BASE) MCG/ACT Inhaler 1-2 puffs every 2 -4 hrs as needed     ALLEGRA-D ALLERGY & CONGESTION 180-240 MG 24 hr tablet TAKE ONE TABLET BY MOUTH EVERY DAY     blood glucose (HUGO CONTOUR) test strip Use to test blood sugars 1 time daily or as directed.     blood glucose (NO BRAND SPECIFIED) lancets standard Use to test blood sugar one time daily or as directed.     blood glucose calibration (HUGO CONTOUR) NORMAL solution Use to calibrate blood glucose monitor as directed.     buPROPion (WELLBUTRIN XL) 300 MG 24 hr tablet TAKE ONE TABLET BY MOUTH EVERY MORNING     Coenzyme Q-10 capsule 1 capsule, DAILY     esomeprazole (NEXIUM) 40 MG DR capsule TAKE ONE CAPSULE BY MOUTH EVERY MORNING BEFORE BREAKFAST , 30-60 MINUTES BEFORE EATING     ezetimibe (ZETIA) 10 MG tablet TAKE ONE TABLET BY MOUTH ONCE DAILY     fish oil-omega-3 fatty acids 1000 MG capsule Take  by mouth. Take daily       levothyroxine (SYNTHROID/LEVOTHROID) 112 mcg, Oral, DAILY     losartan (COZAAR) 75 mg, Oral, DAILY     Magnesium 500 MG CAPS One twice a day     metFORMIN (GLUCOPHAGE) 500 MG tablet TAKE ONE TABLET BY MOUTH TWICE A DAY WITH MEALS     Misc Natural Products (OSTEO BI-FLEX ADV JOINT SHIELD) TABS Take  by mouth.     multivitamin (OCUVITE) TABS tablet 1 tablet, Oral, DAILY     STATIN NOT PRESCRIBED (INTENTIONAL) Please choose reason not prescribed, below     Vitamin D3 (CHOLECALCIFEROL) 5,000 Units, Oral     ROS-Remainder of 14 point ROS reviewed and negative except as in HPI.    PHYSICAL EXAM:  Exam:  Video visit   Appears in no distress  Cognitively appropriate     LABS AND IMAGES:    Prostate Biopsy 7/2/21  FINAL DIAGNOSIS:   A: Prostate, left, biopsy   - Adenocarcinoma, Mary's grade 4/3 with ductal component involving 4 of    6 needle core biopsies and 25% of   submitted tissue     B: Prostate, right, biopsy   - Adenocarcinoma, [place]'s grade 4/3 with ductal component involving 5 of    6  needle core biopsies and 25% of   submitted tissue     MR prostate 7/28/21  FINDINGS:  Size: 31 grams  Hemorrhage: Absent  Peripheral zone: Heterogeneous on T2-weighted images. Suspicious  lesions as detailed below.  Transition zone: Enlarged with BPH changes. No suspicious lesions  identified.     Lesion(s) in rank order of severity (highest score- to lowest score,  then by size)      Lesion 1:  Location: Right mid gland peripheral zone at the 7-9 o'clock position  relative to the urethra. Series 5 image 50.  Size: 20 x 10 mm  T2 description: Homogeneously hypointense  T2 numerical assessment: 5  DWI description: Marked restriction diffusion  DWI numerical assessment: 5  DCE assessment: Negative    Prostate margin: Capsular abutment>15 mm with enlarged neurovascular  bundle suggesting tumor involvement  Lesion overall PI-RADS category: 5     Lesion 2:  Location: Left apex peripheral zone at the 4 o'clock position relative  to the urethra. Series 5 image 63.  Size: 16 x 11 mm  T2 description: Homogeneously hypointense  T2 numerical assessment: 5  DWI description: Marked diffusion restriction  DWI numerical assessment: 5  DCE assessment: Positive    Prostate margin: Capsular abutment 6-15 mm with capsular irregularity  and focal bulging   Lesion overall PI-RADS category: 5     Neurovascular bundles: Both neurovascular bundles are likely involved  by malignancy.  A right prostatic vein is markedly enlarged compared  to the left, similar to 8/1/2014 CT.  Seminal vesicles: No seminal vesicle involvement by malignancy.  Lymph nodes: No clear adenopathy. Indeterminate nodes as follows: Left  pelvic 8 x 5 mm lymph node on series 5 image 27.  Bones: No suspicious lesions  Other pelvic organs: Colonic diverticulosis.                                                                         IMPRESSION:  1. Based on the most suspicious abnormality, this exam is  characterized as PIRADS 5 - Clinically significant cancer is  highly  likely to be present.  The most suspicious abnormalities are located  at the right mid peripheral zone and left apical peripheral zone and  there is high suspicion of extraprostatic extension.  2. No suspicious adenopathy or evidence of pelvic metastases.    NM bone scan 7/28/21  FINDINGS:   Small area of mild uptake in the right sacrum inferiorly visible on  the posterior view. SPECT-CT demonstrates that this focal uptake  corresponds to a sclerotic lesion on CT. No other suspicious areas of  focal uptake.     Postsurgical changes of bilateral knee replacements.     Physiologic uptake by the kidneys and urinary bladder.                                                                      IMPRESSION: Single focal uptake by the right sacral ala (S3 level) and  corresponding sclerotic focus. This is highly suspicious for  metastatic focus.    CT A/P 8/10/21  FINDINGS:   LOWER CHEST: Normal.     HEPATOBILIARY: Diffuse hepatic steatosis. No significant mass. No bile  duct dilatation. No calcified gallstones.     PANCREAS: Normal.     SPLEEN: Normal.     ADRENAL GLANDS: Normal.     KIDNEYS/BLADDER: There are multiple bilateral nonobstructing  intrarenal calculi measuring up to 4 mm in largest size. Small  subcentimeter benign cortical cysts. Unremarkable bladder. No evidence  of hydronephrosis.     BOWEL: No evidence of bowel obstruction or inflammatory changes. Mild  sigmoid colon diverticulosis. No evidence of diverticulitis. Normal  appendix.     PELVIC ORGANS: Normal sized prostate gland.     ADDITIONAL FINDINGS: Multiple enlarged retroperitoneal periaortic and  bilateral iliac chain lymph nodes are highly suspicious for  metastasis. The largest left retroperitoneal periaortic lymph node on  image 44 of series 2 measures 4.5 x 3.4 cm. 2.9 x 2.3 cm aortocaval  lymph node on image 45 of series 2. Distal left periaortic lymph node  on image 54 of series 2 measures 3.2 x 2.6 cm. Right common iliac  chain lymph  node on image 65 of series 2 measures 2.3 x 2.2 cm. 1.6 cm  left common iliac chain node on image 59 of series 2.     MUSCULOSKELETAL: Small faint sites of increased bone sclerosis  involving the posterior medial right iliac on image 65 of series 2 and  image 69 of series 2 are indeterminate for possible metastasis.                                                                       11/26/21                                             IMPRESSION:  1.  Significantly decreased retroperitoneal lymphadenopathy since the  prior study dated 8/10/2021 indicates good response to treatment. The  remaining enlarged retroperitoneal lymph node appears to have a  necrotic center.  2.  Minimal pulmonary nodularity was likely present previously.  3.  No other evidence for lymphadenopathy or metastasis is identified.  Specifically no evidence for left sacral metastasis is seen.    Labs:  Most Recent 3 CBC's:  Recent Labs   Lab Test 08/18/23  0910 05/10/23  1145 03/20/23  0910   WBC 5.1 6.2 6.2   HGB 11.9* 12.5* 11.7*   MCV 93 93 92    218 203   ANEUTAUTO 3.5 4.7 4.7     Most Recent 3 BMP's:  Recent Labs   Lab Test 08/18/23  0910 05/10/23  1145 03/20/23  0910    138 140   POTASSIUM 4.7 4.6 4.5   CHLORIDE 101 101 103   CO2 23 24 26   BUN 14.0 19.4 25.4*   CR 1.10 1.09 1.24*   ANIONGAP 13 13 11   AURORA 9.3 9.9 9.9   * 128* 147*   PROTTOTAL 6.5 7.1 6.9   ALBUMIN 4.3 4.5 4.2    Most Recent 3 LFT's:  Recent Labs   Lab Test 08/18/23  0910 05/10/23  1145 03/20/23  0910   AST 21 23 22   ALT 24 22 27   ALKPHOS 79 85 90   BILITOTAL 0.4 0.4 0.4    Most Recent 2 TSH and T4:  Recent Labs   Lab Test 06/20/23  1313 02/22/23  0851   TSH 4.24* 6.18*   T4 1.19 1.03       PSA < 0.01    I reviewed the above labs today.      IMPRESSION AND PLAN:  Joel Pike is a 68 year old M with PMH of DM2 on metformin, depression/anxiety (pt reports as bipolar d/o), HTN, HLD, and prostate cancer with possible bone metastasis.  Previously  discussed that it is not clear whether the lesion in wing of his ala is a true bony metastasis or whether it is another finding/artifact. Nonetheless, did not change the overall treatment of his disease with ADT    -He started apalutamide on 10/5/21. He had CT CAP done 11/26/21 which showed significantly decreased retroperitoneal lymphadenopathy since the prior study dated 8/10/2021 indicates good response to treatment. No other evidence for lymphadenopathy or metastasis is identified. Specifically no evidence for left sacral metastasis is seen.   -Completed RT to the primary for oligometastatic disease based on the STAMPEDE study with Dr. Gomez ~09/2022  -Plan is to continue ADT for 2 years (through 8/2023).   -now with rising PSA despite ADT. ? CRPC. Will recheck PSA and T next week to confirm. No clinical concern for progression today.   -Last dose Eligard 45mg 02/2023; in light of rising PSA, recommended we re-dose when he is here 9/6  -follow up with Dr. Nuno 9/6 1pm for transfer of care and assessment for PSMA PET indication       40 minutes spent on the date of the encounter doing chart review, review of test results, interpretation of tests, patient visit and documentation     Maria Esther Coelho, CNP

## 2023-08-24 NOTE — NURSING NOTE
Is the patient currently in the state of [place]? YES    Visit mode:VIDEO    If the visit is dropped, the patient can be reconnected by: VIDEO VISIT: Text to cell phone:   Telephone Information:   Mobile [phone]       Will anyone else be joining the visit? NO  (If patient encounters technical issues they should call [phone]    How would you like to obtain your AVS? MyChart    Are changes needed to the allergy or medication list? No    Reason for visit: YEHUDA PATHAK

## 2023-09-01 ENCOUNTER — LAB (OUTPATIENT)
Dept: LAB | Facility: CLINIC | Age: 68
End: 2023-09-01
Payer: MEDICARE

## 2023-09-01 DIAGNOSIS — C61 PROSTATE CANCER (H): ICD-10-CM

## 2023-09-01 LAB — PSA SERPL DL<=0.01 NG/ML-MCNC: <0.01 NG/ML (ref 0–4.5)

## 2023-09-01 PROCEDURE — 36415 COLL VENOUS BLD VENIPUNCTURE: CPT

## 2023-09-01 PROCEDURE — 84153 ASSAY OF PSA TOTAL: CPT | Performed by: NURSE PRACTITIONER

## 2023-09-01 PROCEDURE — 84403 ASSAY OF TOTAL TESTOSTERONE: CPT | Performed by: NURSE PRACTITIONER

## 2023-09-03 NOTE — TELEPHONE ENCOUNTER
RX placed at the [facility].   
Requested Prescriptions   Pending Prescriptions Disp Refills     fexofenadine-pseudoePHEDrine (ALLEGRA-D 24) 180-240 MG 24 hr tablet 90 tablet 3     Sig: Take 1 tablet by mouth daily       There is no refill protocol information for this order        Last Written Prescription Date:  2/23/2018  Last Fill Quantity: 90,  # refills: 3   Last office visit: 2/25/2019 with prescribing provider:     Future Office Visit:      Routing refill request to provider for review/approval because:  Drug not on the [facility] refill protocol     Lay Santamaria RN          
63-year-old male with past medical history of hypertension, diabetes, and schizophrenia complaining of a 2-day history of intermittent now resolved epigastric pain.  Patient still admits to nausea, and 1 episode of nonbloody nonbilious vomiting today.  Patient is otherwise asymptomatic.  Denies current abdominal pain. Denies fevers, chills, dizziness, chest pain, SOB,  dysuria, hematuria, diarrhea, and constipation. Benign physical examination.  discussed with patient about necessity of drawing blood work to screen for electrolyte abnormalities and the need for lipase to evaluate for the pancreas.  Patient is declining blood work at this time, is alert and oriented x4, and has decisional making capacity.  Patient is only requesting for oral antinausea medication and is declining any other work-up at this time.  Will administer Zofran and reassess.  Patient is also requesting for social work for taxi  at discharge.

## 2023-09-04 NOTE — PROGRESS NOTES
[facility] Medical Oncology Return Note       Date of visit: September 6, 2023    CC:   metastatic prostate cancer, possible rising PSA     ONCOLOGY HISTORY:  Elevated PSA noted 2/26/21 at 12.70. Previously normal in 2017.    4/7/21-Initial urology visit.  7/2/21-prostate biopsy 4+3, 9/12 biopsies positive.  Ductal component noted.    7/28/21-MR prostate-PIRADS 5 lesion, R and L sided lesions with likely    neurovascular bundle invasion. No  seminal vesicle involvement. No clear LAD,  but some indeterminate pelvic nodes.  No suspicious bone lesions.    7/28/21-NM bone scan suspicious lesion of R sacral ala S3 level.   8/10/21-CT A/P with multiple enlarged periaortic/iliac LN  8/11/21-First eligard dose 45mg (Q6M dosing). Due next 2/2022.  8/30/21-Initial medical oncology visit with Dr. Solo.  Unclear if bony lesion is metastasis based on current imaging, but this was not irradiated. Start apalutamide/eligard  8/30/21 PSA =30.40 Pre Rx  9/27/21 PSA =8.99 (T=6); HgbA1C = 6.1  10/5/21 PSA =3.22  11/26/21 PSA =0.15  7/26/22 PSA <0.01  8/22  Referral to radiation oncology for EBRT given low volume prostate cancer.  55cGy  in 20 fractions completed on 10/5/22.   11/11/22 PSA= <0.01  02/2023 ELIGARD 45 MG with Dr. Ott of urology.    5/10/23 PSA= <0.01  8/18/23 PSA=0.12, unclear now whether this was a lab error given known inconsistencies with     Some recent PSA tests at [facility].   9/1/23  PSA= <0.01  9/6/23  Eligard 45mg. DUE 3/6/24.  PSA rechecked and pending.  Lupron 6 month shot received  AM before appt.     Interval History:  Still having hot flashes and sweats intermittently.  Pale and then intermittently reddened face, not associated with hot flashes, light headedness or dizziness.  Energy is pretty low.  Not tolerant of heat or humidity so mostly doing housework rather than outside.  Appetite is good.  Not vegetarian. Still working at [employer].  No new pains.  Having significant urinary frequency and  incontinence post-radiation.      PMH:  HTN, HLD, bipolar disorder, DM2 on metformin     PSH:  Bilateral TKA, bilateral cataract extraction, bilateral carpal tunnel release     FAMILY HX:  No reported family history of prostate cancer.  SIster with liver cancer in 20s.    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   No EtOH  No recreational drugs.   No herbs/supplements other than on medication list.      MEDS:  Current Outpatient Medications   Medication Instructions    albuterol (PROAIR HFA) 108 (90 BASE) MCG/ACT Inhaler 1-2 puffs every 2 -4 hrs as needed    ALLEGRA-D ALLERGY & CONGESTION 180-240 MG 24 hr tablet TAKE ONE TABLET BY MOUTH EVERY DAY    blood glucose (HUGO CONTOUR) test strip Use to test blood sugars 1 time daily or as directed.    blood glucose (NO BRAND SPECIFIED) lancets standard Use to test blood sugar one time daily or as directed.    blood glucose calibration (HUGO CONTOUR) NORMAL solution Use to calibrate blood glucose monitor as directed.    buPROPion (WELLBUTRIN XL) 300 MG 24 hr tablet TAKE ONE TABLET BY MOUTH EVERY MORNING    Coenzyme Q-10 capsule 1 capsule, DAILY    esomeprazole (NEXIUM) 40 MG DR capsule TAKE ONE CAPSULE BY MOUTH EVERY MORNING BEFORE BREAKFAST , 30-60 MINUTES BEFORE EATING    ezetimibe (ZETIA) 10 MG tablet TAKE ONE TABLET BY MOUTH ONCE DAILY    fish oil-omega-3 fatty acids 1000 MG capsule Take  by mouth. Take daily      levothyroxine (SYNTHROID/LEVOTHROID) 112 mcg, Oral, DAILY    losartan (COZAAR) 75 mg, Oral, DAILY    Magnesium 500 MG CAPS One twice a day    metFORMIN (GLUCOPHAGE) 500 MG tablet TAKE ONE TABLET BY MOUTH TWICE A DAY WITH MEALS    Misc Natural Products (OSTEO BI-FLEX ADV JOINT SHIELD) TABS Take  by mouth.    multivitamin (OCUVITE) TABS tablet 1 tablet, Oral, DAILY    STATIN NOT PRESCRIBED (INTENTIONAL) Please choose reason not prescribed, below    Vitamin D3 (CHOLECALCIFEROL) 5,000 Units, Oral     ROS-Remainder of 14 point ROS  reviewed and negative except as in HPI.    PHYSICAL EXAM:  /76 (BP Location: Right arm, Patient Position: Sitting, Cuff Size: Adult Regular)   Pulse 62   Temp 98  F (36.7  C) (Oral)   Wt 106.2 kg (234 lb 3.2 oz)   SpO2 99%   BMI 33.60 kg/m    Exam:  Constitutional: healthy, alert, and no distress  Head: Normocephalic. No masses, lesions, tenderness or abnormalities  Neck: Neck supple. No adenopathy. Thyroid symmetric, normal size,  ENT: ENT exam normal, MMM, anicteric sclerae  Cardiovascular: negative, No edema or JVD.  Respiratory: negative, normal WOB on RA, no wheezing or rhonchi.   Gastrointestinal: Abdomen non-distended.   : Deferred  Musculoskeletal: extremities normal- no gross deformities noted and gait normal  Skin: no suspicious lesions or rashes on exposed areas of skin   Neurologic: negative, CNII-XII grossly intact, normal speech and mentation.   Psychiatric: mentation appears normal and affect normal/bright    LABS AND IMAGES:    Prostate Biopsy 7/2/21  FINAL DIAGNOSIS:   A: Prostate, left, biopsy   - Adenocarcinoma, [place]'s grade 4/3 with ductal component involving 4 of    6 needle core biopsies and 25% of   submitted tissue     B: Prostate, right, biopsy   - Adenocarcinoma, [place]'s grade 4/3 with ductal component involving 5 of    6 needle core biopsies and 25% of   submitted tissue     Labs:  Most Recent 3 CBC's:  Recent Labs   Lab Test 08/18/23  0910 05/10/23  1145 03/20/23  0910   WBC 5.1 6.2 6.2   HGB 11.9* 12.5* 11.7*   MCV 93 93 92    218 203   ANEUTAUTO 3.5 4.7 4.7     Most Recent 3 BMP's:  Recent Labs   Lab Test 08/18/23  0910 05/10/23  1145 03/20/23  0910    138 140   POTASSIUM 4.7 4.6 4.5   CHLORIDE 101 101 103   CO2 23 24 26   BUN 14.0 19.4 25.4*   CR 1.10 1.09 1.24*   ANIONGAP 13 13 11   AURORA 9.3 9.9 9.9   * 128* 147*   PROTTOTAL 6.5 7.1 6.9   ALBUMIN 4.3 4.5 4.2    Most Recent 3 LFT's:  Recent Labs   Lab Test 08/18/23  0910 05/10/23  1146  03/20/23  0910   AST 21 23 22   ALT 24 22 27   ALKPHOS 79 85 90   BILITOTAL 0.4 0.4 0.4    Most Recent 2 TSH and T4:  Recent Labs   Lab Test 06/20/23  1313 02/22/23  0851   TSH 4.24* 6.18*   T4 1.19 1.03       PSA trend, see oncology history.      I reviewed the above labs today.      IMPRESSION AND PLAN:  Joel Pike is a 68 year old M with PMH of DM2 on metformin, depression/anxiety (pt reports as bipolar d/o), HTN, HLD, and prostate cancer with charity disease only.  He was treated with EBRT to the prostate for low volume metastatic disease along with ADT and apalutamide.  He has responded well and has had an undetectable PSA since 7/2022.  He had a single PSA thus far of 0.12 in August 2023 that is likely a lab error rather than a true value.  We will check PSA monthly through December 2023 and then if it remains undetectable, we will stop apalutamide and let ADT run its course without a repeat dose.      -He started apalutamide on 10/5/21. He had CT CAP done 11/26/21 which showed significantly decreased retroperitoneal lymphadenopathy since the prior study dated 8/10/2021 indicates good response to treatment. No other evidence for lymphadenopathy or metastasis is identified. Specifically no evidence for left sacral metastasis is seen.   -Completed RT to the primary for oligometastatic disease based on the STAMPEDE study with Dr. Gomez ~09/2022  -Plan is to continue ADT for 2 years (through 8/2023).  He received 6 month shot the AM of 9/6 prior to our appt.  We will monitor monthly and if remaining negative we will stop therapy and monitor for testosterone recovery and PSA trend.    -Consent signed for [employer] whole genome/transcriptome sequencing of prostate biopsy.      # normocytic anemia.    He has mild normocytic anemia but no bleeding source.  He should not be anemic with ADT, so we will do anemia workup.  He is not vegetarian.  I suspect this will be anemia of chronic disease, but we will evaluate and if iron  deficient will require additional workup.      PLAN:    Get labs today.  I will send you a [employer] message with the results.  Monthly PSA and testosterone labs in [place] (1st week of October, November and December).   See me back on 12/4 at 1PM by Video.   Get a PSA a few days before our 12/4 visit.      A total of 80 minutes were spent on this patient on the day of the encounter, of which more than 50% of this time was used for counseling and coordination of care.  The patient was given the opportunity to ask multiple questions today, all of which were answered to their satisfaction.     Michael Nuno MD, PhD   of Medicine   Oncology/BMT/Cellular Therapies

## 2023-09-06 ENCOUNTER — ONCOLOGY VISIT (OUTPATIENT)
Dept: ONCOLOGY | Facility: CLINIC | Age: 68
End: 2023-09-06
Attending: STUDENT IN AN ORGANIZED HEALTH CARE EDUCATION/TRAINING PROGRAM
Payer: MEDICARE

## 2023-09-06 ENCOUNTER — APPOINTMENT (OUTPATIENT)
Dept: LAB | Facility: CLINIC | Age: 68
End: 2023-09-06
Attending: STUDENT IN AN ORGANIZED HEALTH CARE EDUCATION/TRAINING PROGRAM
Payer: MEDICARE

## 2023-09-06 VITALS
SYSTOLIC BLOOD PRESSURE: 137 MMHG | TEMPERATURE: 98 F | HEART RATE: 62 BPM | OXYGEN SATURATION: 99 % | BODY MASS INDEX: 33.6 KG/M2 | WEIGHT: 234.2 LBS | DIASTOLIC BLOOD PRESSURE: 76 MMHG

## 2023-09-06 DIAGNOSIS — C61 HORMONE SENSITIVE PROSTATE CANCER (H): Primary | ICD-10-CM

## 2023-09-06 DIAGNOSIS — Z19.1 HORMONE SENSITIVE PROSTATE CANCER (H): Primary | ICD-10-CM

## 2023-09-06 DIAGNOSIS — D64.9 ANEMIA, UNSPECIFIED TYPE: ICD-10-CM

## 2023-09-06 DIAGNOSIS — C61 PROSTATE CANCER (H): ICD-10-CM

## 2023-09-06 LAB
FERRITIN SERPL-MCNC: 175 NG/ML (ref 31–409)
IRON BINDING CAPACITY (ROCHE): 288 UG/DL (ref 240–430)
IRON SATN MFR SERPL: 31 % (ref 15–46)
IRON SERPL-MCNC: 88 UG/DL (ref 61–157)
PSA SERPL DL<=0.01 NG/ML-MCNC: <0.01 NG/ML (ref 0–4.5)
TRANSFERRIN SERPL-MCNC: 250 MG/DL (ref 200–360)
VIT B12 SERPL-MCNC: 489 PG/ML (ref 232–1245)

## 2023-09-06 PROCEDURE — 84153 ASSAY OF PSA TOTAL: CPT | Performed by: STUDENT IN AN ORGANIZED HEALTH CARE EDUCATION/TRAINING PROGRAM

## 2023-09-06 PROCEDURE — 99417 PROLNG OP E/M EACH 15 MIN: CPT | Performed by: STUDENT IN AN ORGANIZED HEALTH CARE EDUCATION/TRAINING PROGRAM

## 2023-09-06 PROCEDURE — 84403 ASSAY OF TOTAL TESTOSTERONE: CPT | Performed by: STUDENT IN AN ORGANIZED HEALTH CARE EDUCATION/TRAINING PROGRAM

## 2023-09-06 PROCEDURE — 36415 COLL VENOUS BLD VENIPUNCTURE: CPT | Performed by: STUDENT IN AN ORGANIZED HEALTH CARE EDUCATION/TRAINING PROGRAM

## 2023-09-06 PROCEDURE — G0463 HOSPITAL OUTPT CLINIC VISIT: HCPCS | Performed by: STUDENT IN AN ORGANIZED HEALTH CARE EDUCATION/TRAINING PROGRAM

## 2023-09-06 PROCEDURE — 84466 ASSAY OF TRANSFERRIN: CPT | Performed by: STUDENT IN AN ORGANIZED HEALTH CARE EDUCATION/TRAINING PROGRAM

## 2023-09-06 PROCEDURE — 83550 IRON BINDING TEST: CPT | Performed by: STUDENT IN AN ORGANIZED HEALTH CARE EDUCATION/TRAINING PROGRAM

## 2023-09-06 PROCEDURE — 82607 VITAMIN B-12: CPT | Performed by: STUDENT IN AN ORGANIZED HEALTH CARE EDUCATION/TRAINING PROGRAM

## 2023-09-06 PROCEDURE — 99215 OFFICE O/P EST HI 40 MIN: CPT | Performed by: STUDENT IN AN ORGANIZED HEALTH CARE EDUCATION/TRAINING PROGRAM

## 2023-09-06 PROCEDURE — 82728 ASSAY OF FERRITIN: CPT | Performed by: STUDENT IN AN ORGANIZED HEALTH CARE EDUCATION/TRAINING PROGRAM

## 2023-09-06 ASSESSMENT — PAIN SCALES - GENERAL: PAINLEVEL: NO PAIN (0)

## 2023-09-06 NOTE — NURSING NOTE
"Oncology Rooming Note    September 6, 2023 12:54 PM   Joel Pike is a 68 year old male who presents for:    Chief Complaint   Patient presents with    Oncology Clinic Visit     Prostate cancer     Initial Vitals: /76 (BP Location: Right arm, Patient Position: Sitting, Cuff Size: Adult Regular)   Pulse 62   Temp 98  F (36.7  C) (Oral)   Wt 106.2 kg (234 lb 3.2 oz)   SpO2 99%   BMI 33.60 kg/m   Estimated body mass index is 33.6 kg/m  as calculated from the following:    Height as of 8/24/23: 1.778 m (5' 10\").    Weight as of this encounter: 106.2 kg (234 lb 3.2 oz). Body surface area is 2.29 meters squared.  No Pain (0) Comment: Data Unavailable   No LMP for male patient.  Allergies reviewed: Yes  Medications reviewed: Yes    Medications: Medication refills not needed today.  Pharmacy name entered into [employer]:    [facility] [place], [place] - [address] [place] [address] DR  [facility], [place] - [address] [place] [address] - [address] [place] [address] [address]  [facility] - [place], [place] - [facility] [address]    Clinical concerns: pt has been experienced periods of flushed or pale face.       Seven Gann"

## 2023-09-06 NOTE — NURSING NOTE
Chief Complaint   Patient presents with    Oncology Clinic Visit     Prostate cancer    Blood Draw     Labs drawn via  by RN in lab.      Labs collected from venipuncture by RN.     Deepali Frankel RN

## 2023-09-06 NOTE — PATIENT INSTRUCTIONS
Joel,   It was nice seeing you again today.     Get labs today.  I will send you a [employer]s message with the results.  Monthly PSA and testosterone labs in [place] (1st week of October, November and December).   See me back on 12/4 at 1PM by Video.   Get a PSA a few days before our 12/4 visit.

## 2023-09-06 NOTE — LETTER
9/6/2023         RE: Joel Pike  [address]  [facility] [address]        Dear Colleague,    Thank you for referring your patient, Joel Pike, to the [facility]. Please see a copy of my visit note below.          [facility] Medical Oncology Return Note       Date of visit: September 6, 2023    CC:   metastatic prostate cancer, possible rising PSA     ONCOLOGY HISTORY:  Elevated PSA noted 2/26/21 at 12.70. Previously normal in 2017.    4/7/21-Initial urology visit.  7/2/21-prostate biopsy 4+3, 9/12 biopsies positive.  Ductal component noted.    7/28/21-MR prostate-PIRADS 5 lesion, R and L sided lesions with likely    neurovascular bundle invasion. No  seminal vesicle involvement. No clear LAD,  but some indeterminate pelvic nodes.  No suspicious bone lesions.    7/28/21-NM bone scan suspicious lesion of R sacral ala S3 level.   8/10/21-CT A/P with multiple enlarged periaortic/iliac LN  8/11/21-First eligard dose 45mg (Q6M dosing). Due next 2/2022.  8/30/21-Initial medical oncology visit with Dr. Solo.  Unclear if bony lesion is metastasis based on current imaging, but this was not irradiated. Start apalutamide/eligard  8/30/21 PSA =30.40 Pre Rx  9/27/21 PSA =8.99 (T=6); HgbA1C = 6.1  10/5/21 PSA =3.22  11/26/21 PSA =0.15  7/26/22 PSA <0.01  8/22  Referral to radiation oncology for EBRT given low volume prostate cancer.  55cGy  in 20 fractions completed on 10/5/22.   11/11/22 PSA= <0.01  02/2023 ELIGARD 45 MG with Dr. Ott of urology.    5/10/23 PSA= <0.01  8/18/23 PSA=0.12, unclear now whether this was a lab error given known inconsistencies with     Some recent PSA tests at [facility].   9/1/23  PSA= <0.01  9/6/23  Eligard 45mg. DUE 3/6/24.  PSA rechecked and pending.  Lupron 6 month shot received  AM before appt.     Interval History:  Still having hot flashes and sweats intermittently.  Pale and then intermittently reddened face, not associated with hot flashes, light  headedness or dizziness.  Energy is pretty low.  Not tolerant of heat or humidity so mostly doing housework rather than outside.  Appetite is good.  Not vegetarian. Still working at [employer].  No new pains.  Having significant urinary frequency and incontinence post-radiation.      PMH:  HTN, HLD, bipolar disorder, DM2 on metformin     PSH:  Bilateral TKA, bilateral cataract extraction, bilateral carpal tunnel release     FAMILY HX:  No reported family history of prostate cancer.  SIster with liver cancer in 20s.    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   No EtOH  No recreational drugs.   No herbs/supplements other than on medication list.      MEDS:  Current Outpatient Medications   Medication Instructions    albuterol (PROAIR HFA) 108 (90 BASE) MCG/ACT Inhaler 1-2 puffs every 2 -4 hrs as needed    ALLEGRA-D ALLERGY & CONGESTION 180-240 MG 24 hr tablet TAKE ONE TABLET BY MOUTH EVERY DAY    blood glucose (HUGO CONTOUR) test strip Use to test blood sugars 1 time daily or as directed.    blood glucose (NO BRAND SPECIFIED) lancets standard Use to test blood sugar one time daily or as directed.    blood glucose calibration (HUGO CONTOUR) NORMAL solution Use to calibrate blood glucose monitor as directed.    buPROPion (WELLBUTRIN XL) 300 MG 24 hr tablet TAKE ONE TABLET BY MOUTH EVERY MORNING    Coenzyme Q-10 capsule 1 capsule, DAILY    esomeprazole (NEXIUM) 40 MG DR capsule TAKE ONE CAPSULE BY MOUTH EVERY MORNING BEFORE BREAKFAST , 30-60 MINUTES BEFORE EATING    ezetimibe (ZETIA) 10 MG tablet TAKE ONE TABLET BY MOUTH ONCE DAILY    fish oil-omega-3 fatty acids 1000 MG capsule Take  by mouth. Take daily      levothyroxine (SYNTHROID/LEVOTHROID) 112 mcg, Oral, DAILY    losartan (COZAAR) 75 mg, Oral, DAILY    Magnesium 500 MG CAPS One twice a day    metFORMIN (GLUCOPHAGE) 500 MG tablet TAKE ONE TABLET BY MOUTH TWICE A DAY WITH MEALS    Misc Natural Products (OSTEO BI-FLEX ADV JOINT  SHIELD) TABS Take  by mouth.    multivitamin (OCUVITE) TABS tablet 1 tablet, Oral, DAILY    STATIN NOT PRESCRIBED (INTENTIONAL) Please choose reason not prescribed, below    Vitamin D3 (CHOLECALCIFEROL) 5,000 Units, Oral     ROS-Remainder of 14 point ROS reviewed and negative except as in HPI.    PHYSICAL EXAM:  /76 (BP Location: Right arm, Patient Position: Sitting, Cuff Size: Adult Regular)   Pulse 62   Temp 98  F (36.7  C) (Oral)   Wt 106.2 kg (234 lb 3.2 oz)   SpO2 99%   BMI 33.60 kg/m    Exam:  Constitutional: healthy, alert, and no distress  Head: Normocephalic. No masses, lesions, tenderness or abnormalities  Neck: Neck supple. No adenopathy. Thyroid symmetric, normal size,  ENT: ENT exam normal, MMM, anicteric sclerae  Cardiovascular: negative, No edema or JVD.  Respiratory: negative, normal WOB on RA, no wheezing or rhonchi.   Gastrointestinal: Abdomen non-distended.   : Deferred  Musculoskeletal: extremities normal- no gross deformities noted and gait normal  Skin: no suspicious lesions or rashes on exposed areas of skin   Neurologic: negative, CNII-XII grossly intact, normal speech and mentation.   Psychiatric: mentation appears normal and affect normal/bright    LABS AND IMAGES:    Prostate Biopsy 7/2/21  FINAL DIAGNOSIS:   A: Prostate, left, biopsy   - Adenocarcinoma, [place]'s grade 4/3 with ductal component involving 4 of    6 needle core biopsies and 25% of   submitted tissue     B: Prostate, right, biopsy   - Adenocarcinoma, Mary's grade 4/3 with ductal component involving 5 of    6 needle core biopsies and 25% of   submitted tissue     Labs:  Most Recent 3 CBC's:  Recent Labs   Lab Test 08/18/23  0910 05/10/23  1145 03/20/23  0910   WBC 5.1 6.2 6.2   HGB 11.9* 12.5* 11.7*   MCV 93 93 92    218 203   ANEUTAUTO 3.5 4.7 4.7     Most Recent 3 BMP's:  Recent Labs   Lab Test 08/18/23  0910 05/10/23  1145 03/20/23  0910    138 140   POTASSIUM 4.7 4.6 4.5   CHLORIDE 101 101 103    CO2 23 24 26   BUN 14.0 19.4 25.4*   CR 1.10 1.09 1.24*   ANIONGAP 13 13 11   AURORA 9.3 9.9 9.9   * 128* 147*   PROTTOTAL 6.5 7.1 6.9   ALBUMIN 4.3 4.5 4.2    Most Recent 3 LFT's:  Recent Labs   Lab Test 08/18/23  0910 05/10/23  1145 03/20/23  0910   AST 21 23 22   ALT 24 22 27   ALKPHOS 79 85 90   BILITOTAL 0.4 0.4 0.4    Most Recent 2 TSH and T4:  Recent Labs   Lab Test 06/20/23  1313 02/22/23  0851   TSH 4.24* 6.18*   T4 1.19 1.03       PSA trend, see oncology history.      I reviewed the above labs today.      IMPRESSION AND PLAN:  Joel Pike is a 68 year old M with PMH of DM2 on metformin, depression/anxiety (pt reports as bipolar d/o), HTN, HLD, and prostate cancer with charity disease only.  He was treated with EBRT to the prostate for low volume metastatic disease along with ADT and apalutamide.  He has responded well and has had an undetectable PSA since 7/2022.  He had a single PSA thus far of 0.12 in August 2023 that is likely a lab error rather than a true value.  We will check PSA monthly through December 2023 and then if it remains undetectable, we will stop apalutamide and let ADT run its course without a repeat dose.      -He started apalutamide on 10/5/21. He had CT CAP done 11/26/21 which showed significantly decreased retroperitoneal lymphadenopathy since the prior study dated 8/10/2021 indicates good response to treatment. No other evidence for lymphadenopathy or metastasis is identified. Specifically no evidence for left sacral metastasis is seen.   -Completed RT to the primary for oligometastatic disease based on the PAULINOE study with Dr. Gomez ~09/2022  -Plan is to continue ADT for 2 years (through 8/2023).  He received 6 month shot the AM of 9/6 prior to our appt.  We will monitor monthly and if remaining negative we will stop therapy and monitor for testosterone recovery and PSA trend.    -Consent signed for Flora whole genome/transcriptome sequencing of prostate biopsy.      #  normocytic anemia.    He has mild normocytic anemia but no bleeding source.  He should not be anemic with ADT, so we will do anemia workup.  He is not vegetarian.  I suspect this will be anemia of chronic disease, but we will evaluate and if iron deficient will require additional workup.      PLAN:    Get labs today.  I will send you a frestyl message with the results.  Monthly PSA and testosterone labs in [place] (1st week of October, November and December).   See me back on 12/4 at 1PM by Video.   Get a PSA a few days before our 12/4 visit.      A total of 80 minutes were spent on this patient on the day of the encounter, of which more than 50% of this time was used for counseling and coordination of care.  The patient was given the opportunity to ask multiple questions today, all of which were answered to their satisfaction.     Michael Nuno MD, PhD   of Medicine   Oncology/BMT/Cellular Therapies

## 2023-09-07 LAB — TESTOST SERPL-MCNC: 6 NG/DL (ref 240–950)

## 2023-09-08 LAB — TESTOST SERPL-MCNC: 5 NG/DL (ref 240–950)

## 2023-09-12 ENCOUNTER — LAB REQUISITION (OUTPATIENT)
Dept: LAB | Facility: CLINIC | Age: 68
End: 2023-09-12
Payer: MEDICARE

## 2023-09-26 LAB
BKR LAB AP ADD'L TEST STATUS: NORMAL
BKR PATH ADDL TEST FINAL COMMENTS: NORMAL

## 2023-10-02 DIAGNOSIS — C61 PROSTATE CANCER (H): Primary | ICD-10-CM

## 2023-10-04 ENCOUNTER — OFFICE VISIT (OUTPATIENT)
Dept: CARDIOLOGY | Facility: CLINIC | Age: 68
End: 2023-10-04
Payer: MEDICARE

## 2023-10-04 ENCOUNTER — TELEPHONE (OUTPATIENT)
Dept: ONCOLOGY | Facility: CLINIC | Age: 68
End: 2023-10-04

## 2023-10-04 VITALS
WEIGHT: 234 LBS | OXYGEN SATURATION: 99 % | HEIGHT: 70 IN | DIASTOLIC BLOOD PRESSURE: 64 MMHG | SYSTOLIC BLOOD PRESSURE: 108 MMHG | BODY MASS INDEX: 33.5 KG/M2 | HEART RATE: 60 BPM

## 2023-10-04 DIAGNOSIS — Z95.1 S/P CABG (CORONARY ARTERY BYPASS GRAFT): ICD-10-CM

## 2023-10-04 DIAGNOSIS — E78.5 HYPERLIPIDEMIA LDL GOAL <70: ICD-10-CM

## 2023-10-04 DIAGNOSIS — I25.810 CORONARY ARTERY DISEASE INVOLVING AUTOLOGOUS ARTERY CORONARY BYPASS GRAFT WITHOUT ANGINA PECTORIS: ICD-10-CM

## 2023-10-04 PROCEDURE — 99214 OFFICE O/P EST MOD 30 MIN: CPT | Performed by: INTERNAL MEDICINE

## 2023-10-04 NOTE — ORAL ONC MGMT
Oral Chemotherapy Monitoring Program    Subjective/Objective:  Joel Pike is a 68 year old male contacted by phone for a follow-up visit for oral chemotherapy.  Joel confirms taking the appropriate dose of apalutamide 240mg daily. Denies new or worsening side effects, missed doses, and recent hospital or ED visits. Denies any recent medication changes. No questions or concerns today. Aware of next appointments.         3/9/2023     1:00 PM 4/6/2023     9:00 AM 6/9/2023     1:00 PM 7/5/2023     3:00 PM 9/7/2023     6:00 AM 10/2/2023     2:00 PM 10/4/2023     2:00 PM   ORAL CHEMOTHERAPY   Assessment Type Monthly Follow up Refill Quarterly Follow up Refill Chart Review Refill Quarterly Follow up   Diagnosis Code Prostate Cancer Prostate Cancer Prostate Cancer Prostate Cancer Prostate Cancer Prostate Cancer Prostate Cancer   Providers Dr Edil Solo   [facility]   Is this patient followed by the [facility] OC team?  Yes Yes Yes Yes Yes Yes   Drug Name Erleada (apalutamide) Erleada (apalutamide) Erleada (apalutamide) Erleada (apalutamide) Erleada (apalutamide) Erleada (apalutamide) Erleada (apalutamide)   Dose 240 mg 240 mg 240 mg 240 mg 240 mg 240 mg 240 mg   Current Schedule  Daily Daily Daily Daily Daily Daily   Cycle Details Continuous Continuous Continuous Continuous Continuous Continuous Continuous   Doses missed in last 2 weeks 0  0    0   Adherence Assessment Adherent  Adherent    Adherent   Adverse Effects   No AE identified during assessment    No AE identified during assessment   Other (See Note for Details) Hot Flashes         Pharmacist intervention(other) No         Any new drug interactions? No  No    No   Is the dose as ordered appropriate for the patient? Yes  Yes    Yes   Since the last time we talked, have you been hospitalized or used the emergency room? No  No    No       Last PHQ-2 Score on  "record:       3/16/2023     2:31 PM 3/13/2023    10:09 AM   PHQ-2 ( 1999 [employer])   Q1: Little interest or pleasure in doing things 1 1   Q2: Feeling down, depressed or hopeless 1 1   PHQ-2 Score 2 2   Q1: Little interest or pleasure in doing things Several days    Q2: Feeling down, depressed or hopeless Several days    PHQ-2 Score 2        Vitals:  BP:   BP Readings from Last 1 Encounters:   09/06/23 137/76     Wt Readings from Last 1 Encounters:   09/06/23 106.2 kg (234 lb 3.2 oz)     Estimated body surface area is 2.29 meters squared as calculated from the following:    Height as of 8/24/23: 1.778 m (5' 10\").    Weight as of 9/6/23: 106.2 kg (234 lb 3.2 oz).    Assessment/Plan:  Denies new or worse side effects. Continue plan of care.     Adherence: PDC= 1, refills on time each month     Follow-Up:  10/6: Labs     Refill Due:  [facility] will deliver refill 10/6     Alexei Garcia, PharmD  Hematology/Oncology Clinical Pharmacist  [facility]  [facility]  [phone]      "

## 2023-10-04 NOTE — LETTER
10/4/2023    Nishi Hdz Mai, MD  [address] [place] [address]    RE: Joel Pike       Dear Colleague,     I had the pleasure of seeing Joel Pike in the [facility].  CARDIOLOGY CLINIC VISIT  DATE OF SERVICE:  October 4, 2023  PRIMARY CARE PHYSICIAN:  Nishi Hdz Mai    HISTORY OF PRESENT ILLNESS:  Mr. Joel Pike, a pleasant 68 year old patient who has a past medical history significant for type 2 diabetes, hypertension, dyslipidemia, prostate cancer and recent diagnosis of multivessel coronary artery disease status post CABG x4 on 4/8/2022 with Dr. Bae. He was last seen by Magaly Romo CNP in 9/2022 and presents today for follow up.     In brief review, Joel had an abnormal stress test suggestive of LAD territory ischemia and subsequently went on to have a coronary angiogram that revealed multivessel coronary disease.  He was referred to CV surgery and underwent a CABG x4 on 4/8/2022 (LIMA to LAD, SVG to RPDA, SVG to OM, SVG to diagonal).  The Hospital course was uneventful and he was discharged home on 4/13/2022.       He presented to the emergency department at [facility] on 4/26/2022 with elevated heart rates and was found to be in atrial flutter with RVR.  He underwent cardioversion and was started on Xarelto. Unfortunately he represented on 5/4/2022 after he was found to be in atrial fibrillation and cardiac rehab and underwent a second cardioversion successful and was then loaded with p.o. amiodarone. He was prescribed amiodarone 400 mg twice daily x1 week then 200 mg twice daily x1 week then 200 mg daily.  Ultimately his amiodarone was stopped and an event monitor showed no recurrent atrial fibrillation.  His coumadin was discontinued.    In follow up today, Joel reports feeling well.  He exercises at the gym most morning and enjoys water aerobics amongst other things. He denies any exertional chest pain, chest discomfort or shortness of breath.  He denies  any heart palpitations or racing heart.  He denies any light-headedness or dizziness.       PAST MEDICAL HISTORY:  Past Medical History:   Diagnosis Date    Atherosclerosis of native coronary artery of native heart with stable angina pectoris (H) 4/8/2022    Calculus of kidney     CKD (chronic kidney disease) stage 2, GFR 60-89 ml/min 10/30/2015    CKD (chronic kidney disease) stage 2, GFR 60-89 ml/min 10/30/2015    Degeneration of lumbar or lumbosacral intervertebral disc     Diabetic eye exam (H) 02/06/12, 11/1/13    Diabetic eye exam (H) 12/17/14    Obesity, Class I, BMI 30-34.9 10/30/2015    HILARIO (obstructive sleep apnea)     Primary osteoarthritis of left knee     Prostate cancer (H) 7/7/2021    Uncomplicated asthma        MEDICATIONS:  Current Outpatient Medications   Medication    ACCU-CHEK GUIDE test strip    alcohol swab prep pads    Alcohol Swabs PADS    apalutamide (ERLEADA) 60 MG tablet    aspirin (ASA) 81 MG EC tablet    blood glucose (HUGO CONTOUR) test strip    blood glucose (NO BRAND SPECIFIED) lancets standard    blood glucose (NO BRAND SPECIFIED) lancets standard    blood glucose (NO BRAND SPECIFIED) test strip    blood glucose calibration (HUGO CONTOUR) NORMAL solution    blood glucose monitoring (NO BRAND SPECIFIED) meter device kit    blood glucose monitoring (NO BRAND SPECIFIED) meter device kit    buPROPion (WELLBUTRIN XL) 300 MG 24 hr tablet    Calcium Carb-Cholecalciferol (CALCIUM/VITAMIN D PO)    Coenzyme Q-10 capsule    ezetimibe (ZETIA) 10 MG tablet    levothyroxine (SYNTHROID/LEVOTHROID) 137 MCG tablet    losartan (COZAAR) 50 MG tablet    magnesium oxide (MAG-OX) 400 MG tablet    metFORMIN (GLUCOPHAGE) 1000 MG tablet    metoprolol tartrate (LOPRESSOR) 25 MG tablet    Misc Natural Products (OSTEO BI-FLEX ADV JOINT SHIELD) TABS    Multiple Vitamins-Minerals (PRESERVISION AREDS PO)    nitroGLYcerin (NITROSTAT) 0.4 MG sublingual tablet    ONETOUCH ULTRA test strip    pantoprazole (PROTONIX)  40 MG EC tablet    Probiotic Product (PROBIOTIC DAILY) CAPS    rosuvastatin (CRESTOR) 10 MG tablet    Sharps Container MISC    tamsulosin (FLOMAX) 0.4 MG capsule    vitamin C (ASCORBIC ACID) 500 MG tablet    fish oil-omega-3 fatty acids 1000 MG capsule    gabapentin (NEURONTIN) 300 MG capsule     Current Facility-Administered Medications   Medication    leuprolide (ELIGARD) kit 45 mg       ALLERGIES:  Allergies   Allergen Reactions    Dust Mites Cough    Other Environmental Allergy      Allergic to sunlight ever since 20s.     Statins      Body aches    Penicillins Hives and Rash       SOCIAL HISTORY:  I have reviewed this patient's social history and updated it with pertinent information if needed. Joel Pike  reports that he has never smoked. He has never used smokeless tobacco. He reports that he does not drink alcohol and does not use drugs.    FAMILY HISTORY:  I have reviewed this patient's family history and updated it with pertinent information if needed.   Family History   Problem Relation Age of Onset    Cerebrovascular Disease Mother     Hypertension Mother     Hypertension Father     Diabetes Father         Adult    Heart Disease Father         Hx: Bypass    Unknown/Adopted Maternal Grandmother     Cerebrovascular Disease Maternal Grandmother     No Known Problems Maternal Grandfather     No Known Problems Paternal Grandmother     No Known Problems Paternal Grandfather     Thyroid Disease Brother     Cancer Sister         Liver    No Known Problems Sister     No Known Problems Son        REVIEW OF SYSTEMS:  A complete ROS was obtained and the pertinent positives are outlined in the history of present illness above.  The remainder of systems is negative.      PHYSICAL EXAM:      BP: 108/64 Pulse: 60     SpO2: 99 %      Vital Signs with Ranges  Pulse:  [60] 60  BP: (108)/(64) 108/64  SpO2:  [99 %] 99 %  234 lbs 0 oz    Constitutional: awake, alert, no distress  Eyes: PERRL, sclera nonicteric  ENT:  trachea midline  Respiratory: CTAB  Cardiovascular: RRR on m/r/g, no JVD  GI: nondistended, nontender, bowel sounds present  Lymph/Hematologic: no lymphadenopathy  Skin: dry, no rash  Musculoskeletal: good muscle tone, strength 5/5 in upper and lower extremities  Neurologic: no focal deficits  Neuropsychiatric: appropriate affact    DATA:  Labs dated 3/20/23 reviewed personally including CBC, Iron, ferritin, CMP        ASSESSMENT:  Post operative trial fibrillation/atrial flutter  Initially presented to the ED on 4/26/2022 and found to be in atrial flutter with RVR and was successfully cardioverted and placed on anticoagulation  Represented on 5/4/2022 with similar symptoms and noted to be in atrial fibrillation and underwent a successful cardioversion again and was placed on 30 days of amiodarone (now complete)  No recurrence of afib/flutter by 7 day zio patch monitor; atrial fibrillation attributed to post op period.  Coumadin and amiodarone discontinued.      CAD  S/p CABG x 4 with LIMA to LAD, SVG to RPDA, SVG to OM, SVG to diagonal by Dr. Bae on 4/8/2022  LVEF 60 to 65%  No symptoms concerning for recurrent obstructive CAD     Hypertension  Well-controlled     Dyslipidemia, LDL goal less than 70  Currently on rosuvastatin 10 mg daily as previously unable to tolerate higher dose of statin and ezetimibe 10 mg daily; he is tolerating this without difficulty  LDL 83 mg/dL by lipid panel 2.22.23      RECOMMENDATIONS:  1. Doing well from a cardiac standpoint.  Continue ASA, crestor/zetia, metoprolol.  Further titration of crestor limited by myalgias  2.  Reviewed importance of regular exercise, heart healthy eating  3.  Plan for follow up in one year or before than as needed with fasting blood work prior    Kamala Murphy MD [facility]  October 4, 2023    I spent a total of 30 minutes providing patient care.  This time includes chart review, lab review, time spent with the patient during the  clinic visit and time afterwards providing necessary documentation.          Thank you for allowing me to participate in the care of your patient.      Sincerely,     Kamala Murphy MD     [facility]  cc:   SYLVIA Kaminski CNP  [address]  [place] [address]

## 2023-10-04 NOTE — PROGRESS NOTES
CARDIOLOGY CLINIC VISIT  DATE OF SERVICE:  October 4, 2023  PRIMARY CARE PHYSICIAN:  Nishi Hdz Mai    HISTORY OF PRESENT ILLNESS:  Mr. Joel Pike, a pleasant 68 year old patient who has a past medical history significant for type 2 diabetes, hypertension, dyslipidemia, prostate cancer and recent diagnosis of multivessel coronary artery disease status post CABG x4 on 4/8/2022 with Dr. Bae. He was last seen by Magaly Romo CNP in 9/2022 and presents today for follow up.     In brief review, Joel had an abnormal stress test suggestive of LAD territory ischemia and subsequently went on to have a coronary angiogram that revealed multivessel coronary disease.  He was referred to CV surgery and underwent a CABG x4 on 4/8/2022 (LIMA to LAD, SVG to RPDA, SVG to OM, SVG to diagonal).  The Hospital course was uneventful and he was discharged home on 4/13/2022.       He presented to the emergency department at [facility] on 4/26/2022 with elevated heart rates and was found to be in atrial flutter with RVR.  He underwent cardioversion and was started on Xarelto. Unfortunately he represented on 5/4/2022 after he was found to be in atrial fibrillation and cardiac rehab and underwent a second cardioversion successful and was then loaded with p.o. amiodarone. He was prescribed amiodarone 400 mg twice daily x1 week then 200 mg twice daily x1 week then 200 mg daily.  Ultimately his amiodarone was stopped and an event monitor showed no recurrent atrial fibrillation.  His coumadin was discontinued.    In follow up today, Joel reports feeling well.  He exercises at the gym most morning and enjoys water aerobics amongst other things. He denies any exertional chest pain, chest discomfort or shortness of breath.  He denies any heart palpitations or racing heart.  He denies any light-headedness or dizziness.       PAST MEDICAL HISTORY:  Past Medical History:   Diagnosis Date    Atherosclerosis of native coronary  artery of native heart with stable angina pectoris (H) 4/8/2022    Calculus of kidney     CKD (chronic kidney disease) stage 2, GFR 60-89 ml/min 10/30/2015    CKD (chronic kidney disease) stage 2, GFR 60-89 ml/min 10/30/2015    Degeneration of lumbar or lumbosacral intervertebral disc     Diabetic eye exam (H) 02/06/12, 11/1/13    Diabetic eye exam (H) 12/17/14    Obesity, Class I, BMI 30-34.9 10/30/2015    HILARIO (obstructive sleep apnea)     Primary osteoarthritis of left knee     Prostate cancer (H) 7/7/2021    Uncomplicated asthma        MEDICATIONS:  Current Outpatient Medications   Medication    ACCU-CHEK GUIDE test strip    alcohol swab prep pads    Alcohol Swabs PADS    apalutamide (ERLEADA) 60 MG tablet    aspirin (ASA) 81 MG EC tablet    blood glucose (HUGO CONTOUR) test strip    blood glucose (NO BRAND SPECIFIED) lancets standard    blood glucose (NO BRAND SPECIFIED) lancets standard    blood glucose (NO BRAND SPECIFIED) test strip    blood glucose calibration (HUGO CONTOUR) NORMAL solution    blood glucose monitoring (NO BRAND SPECIFIED) meter device kit    blood glucose monitoring (NO BRAND SPECIFIED) meter device kit    buPROPion (WELLBUTRIN XL) 300 MG 24 hr tablet    Calcium Carb-Cholecalciferol (CALCIUM/VITAMIN D PO)    Coenzyme Q-10 capsule    ezetimibe (ZETIA) 10 MG tablet    levothyroxine (SYNTHROID/LEVOTHROID) 137 MCG tablet    losartan (COZAAR) 50 MG tablet    magnesium oxide (MAG-OX) 400 MG tablet    metFORMIN (GLUCOPHAGE) 1000 MG tablet    metoprolol tartrate (LOPRESSOR) 25 MG tablet    Misc Natural Products (OSTEO BI-FLEX ADV JOINT SHIELD) TABS    Multiple Vitamins-Minerals (PRESERVISION AREDS PO)    nitroGLYcerin (NITROSTAT) 0.4 MG sublingual tablet    ONETOUCH ULTRA test strip    pantoprazole (PROTONIX) 40 MG EC tablet    Probiotic Product (PROBIOTIC DAILY) CAPS    rosuvastatin (CRESTOR) 10 MG tablet    Sharps Container MISC    tamsulosin (FLOMAX) 0.4 MG capsule    vitamin C (ASCORBIC ACID)  500 MG tablet    fish oil-omega-3 fatty acids 1000 MG capsule    gabapentin (NEURONTIN) 300 MG capsule     Current Facility-Administered Medications   Medication    leuprolide (ELIGARD) kit 45 mg       ALLERGIES:  Allergies   Allergen Reactions    Dust Mites Cough    Other Environmental Allergy      Allergic to sunlight ever since 20s.     Statins      Body aches    Penicillins Hives and Rash       SOCIAL HISTORY:  I have reviewed this patient's social history and updated it with pertinent information if needed. Joel Pike  reports that he has never smoked. He has never used smokeless tobacco. He reports that he does not drink alcohol and does not use drugs.    FAMILY HISTORY:  I have reviewed this patient's family history and updated it with pertinent information if needed.   Family History   Problem Relation Age of Onset    Cerebrovascular Disease Mother     Hypertension Mother     Hypertension Father     Diabetes Father         Adult    Heart Disease Father         Hx: Bypass    Unknown/Adopted Maternal Grandmother     Cerebrovascular Disease Maternal Grandmother     No Known Problems Maternal Grandfather     No Known Problems Paternal Grandmother     No Known Problems Paternal Grandfather     Thyroid Disease Brother     Cancer Sister         Liver    No Known Problems Sister     No Known Problems Son        REVIEW OF SYSTEMS:  A complete ROS was obtained and the pertinent positives are outlined in the history of present illness above.  The remainder of systems is negative.      PHYSICAL EXAM:      BP: 108/64 Pulse: 60     SpO2: 99 %      Vital Signs with Ranges  Pulse:  [60] 60  BP: (108)/(64) 108/64  SpO2:  [99 %] 99 %  234 lbs 0 oz    Constitutional: awake, alert, no distress  Eyes: PERRL, sclera nonicteric  ENT: trachea midline  Respiratory: CTAB  Cardiovascular: RRR on m/r/g, no JVD  GI: nondistended, nontender, bowel sounds present  Lymph/Hematologic: no lymphadenopathy  Skin: dry, no  rash  Musculoskeletal: good muscle tone, strength 5/5 in upper and lower extremities  Neurologic: no focal deficits  Neuropsychiatric: appropriate affact    DATA:  Labs dated 3/20/23 reviewed personally including CBC, Iron, ferritin, CMP        ASSESSMENT:  Post operative trial fibrillation/atrial flutter  Initially presented to the ED on 4/26/2022 and found to be in atrial flutter with RVR and was successfully cardioverted and placed on anticoagulation  Represented on 5/4/2022 with similar symptoms and noted to be in atrial fibrillation and underwent a successful cardioversion again and was placed on 30 days of amiodarone (now complete)  No recurrence of afib/flutter by 7 day zio patch monitor; atrial fibrillation attributed to post op period.  Coumadin and amiodarone discontinued.      CAD  S/p CABG x 4 with LIMA to LAD, SVG to RPDA, SVG to OM, SVG to diagonal by Dr. Bae on 4/8/2022  LVEF 60 to 65%  No symptoms concerning for recurrent obstructive CAD     Hypertension  Well-controlled     Dyslipidemia, LDL goal less than 70  Currently on rosuvastatin 10 mg daily as previously unable to tolerate higher dose of statin and ezetimibe 10 mg daily; he is tolerating this without difficulty  LDL 83 mg/dL by lipid panel 2.22.23      RECOMMENDATIONS:  1. Doing well from a cardiac standpoint.  Continue ASA, crestor/zetia, metoprolol.  Further titration of crestor limited by myalgias  2.  Reviewed importance of regular exercise, heart healthy eating  3.  Plan for follow up in one year or before than as needed with fasting blood work prior    Kamala Murphy MD [facility]  October 4, 2023    I spent a total of 30 minutes providing patient care.  This time includes chart review, lab review, time spent with the patient during the clinic visit and time afterwards providing necessary documentation.

## 2023-10-06 ENCOUNTER — LAB (OUTPATIENT)
Dept: LAB | Facility: CLINIC | Age: 68
End: 2023-10-06
Payer: MEDICARE

## 2023-10-06 DIAGNOSIS — C61 PROSTATE CANCER (H): ICD-10-CM

## 2023-10-06 DIAGNOSIS — C61 HORMONE SENSITIVE PROSTATE CANCER (H): ICD-10-CM

## 2023-10-06 DIAGNOSIS — I25.810 CORONARY ARTERY DISEASE INVOLVING AUTOLOGOUS ARTERY CORONARY BYPASS GRAFT WITHOUT ANGINA PECTORIS: ICD-10-CM

## 2023-10-06 DIAGNOSIS — Z19.1 HORMONE SENSITIVE PROSTATE CANCER (H): ICD-10-CM

## 2023-10-06 LAB
CHOLEST SERPL-MCNC: 202 MG/DL
HDLC SERPL-MCNC: 31 MG/DL
LDLC SERPL CALC-MCNC: 116 MG/DL
NONHDLC SERPL-MCNC: 171 MG/DL
PSA SERPL DL<=0.01 NG/ML-MCNC: <0.01 NG/ML (ref 0–4.5)
TRIGL SERPL-MCNC: 275 MG/DL

## 2023-10-06 PROCEDURE — 36415 COLL VENOUS BLD VENIPUNCTURE: CPT

## 2023-10-06 PROCEDURE — 84153 ASSAY OF PSA TOTAL: CPT

## 2023-10-06 PROCEDURE — 80061 LIPID PANEL: CPT

## 2023-10-06 PROCEDURE — 84403 ASSAY OF TOTAL TESTOSTERONE: CPT

## 2023-10-08 DIAGNOSIS — N18.2 TYPE 2 DIABETES MELLITUS WITH STAGE 2 CHRONIC KIDNEY DISEASE, WITHOUT LONG-TERM CURRENT USE OF INSULIN (H): ICD-10-CM

## 2023-10-08 DIAGNOSIS — E11.22 TYPE 2 DIABETES MELLITUS WITH STAGE 2 CHRONIC KIDNEY DISEASE, WITHOUT LONG-TERM CURRENT USE OF INSULIN (H): ICD-10-CM

## 2023-10-08 DIAGNOSIS — F33.42 RECURRENT MAJOR DEPRESSION IN COMPLETE REMISSION (H): ICD-10-CM

## 2023-10-09 RX ORDER — LOSARTAN POTASSIUM 50 MG/1
TABLET ORAL
Qty: 135 TABLET | Refills: 1 | Status: SHIPPED | OUTPATIENT
Start: 2023-10-09 | End: 2024-04-01

## 2023-10-09 RX ORDER — BUPROPION HYDROCHLORIDE 300 MG/1
300 TABLET ORAL EVERY MORNING
Qty: 90 TABLET | Refills: 0 | Status: SHIPPED | OUTPATIENT
Start: 2023-10-09 | End: 2024-01-03

## 2023-10-10 LAB — TESTOST SERPL-MCNC: 8 NG/DL (ref 240–950)

## 2023-10-16 ENCOUNTER — TELEPHONE (OUTPATIENT)
Dept: CARDIOLOGY | Facility: CLINIC | Age: 68
End: 2023-10-16
Payer: MEDICARE

## 2023-10-16 DIAGNOSIS — Z95.1 S/P CABG (CORONARY ARTERY BYPASS GRAFT): Primary | ICD-10-CM

## 2023-10-16 DIAGNOSIS — E78.5 HYPERLIPIDEMIA LDL GOAL <70: ICD-10-CM

## 2023-10-16 RX ORDER — EVOLOCUMAB 140 MG/ML
140 INJECTION, SOLUTION SUBCUTANEOUS
Qty: 6 ML | Refills: 3 | Status: SHIPPED | OUTPATIENT
Start: 2023-10-16

## 2023-10-16 NOTE — TELEPHONE ENCOUNTER
Prior Authorization Approval    Authorization Effective Date: 10/16/2023  Authorization Expiration Date: 12/31/2024  Medication: Repatha SureClick 140MG/ML auto-injectors    Approved Dose/Quantity:   Reference #:     Insurance Company: [employer] [phone] Fax [phone]  Expected CoPay:       CoPay Card Available:      Foundation Assistance Needed:    Which Pharmacy is filling the prescription (Not needed for infusion/clinic administered): [facility] [address]   Pharmacy Notified:  yes  Patient Notified:  yes- Pharmacy will contact patient when ready to /ship

## 2023-10-16 NOTE — TELEPHONE ENCOUNTER
Central Prior Authorization Team   Phone: [phone]    PA Initiation    MedicationRepatha SureClick 140MG/ML auto-injectors     Insurance Company: [employer] - Phone [phone] Fax [phone]  Pharmacy Filling the Rx: [address]   Filling Pharmacy Phone: [phone]  Filling Pharmacy Fax:    Start Date: 10/16/2023

## 2023-10-16 NOTE — TELEPHONE ENCOUNTER
----- Message from Kamala Murphy MD sent at 10/16/2023 12:47 PM CDT -----  Lipids reviewed; LDL remains above goal.  He has not been able to tolerate higher dose statins due to side effects and he is already on zetia.  At this point, I would recommend repatha for optimal lipid lowering given his hx of CABG.    Kamala Murphy      Spoke to patient and reviewed results and recommendations above. Pt verbalized understanding and is willing to get started on Repatha. Prescription sent to pharmacy and will route to PA team.

## 2023-10-17 ENCOUNTER — DOCUMENTATION ONLY (OUTPATIENT)
Dept: CARDIOLOGY | Facility: CLINIC | Age: 68
End: 2023-10-17
Payer: MEDICARE

## 2023-10-23 ENCOUNTER — MYC MEDICAL ADVICE (OUTPATIENT)
Dept: FAMILY MEDICINE | Facility: CLINIC | Age: 68
End: 2023-10-23
Payer: MEDICARE

## 2023-10-28 ENCOUNTER — HEALTH MAINTENANCE LETTER (OUTPATIENT)
Age: 68
End: 2023-10-28

## 2023-10-28 ASSESSMENT — ANXIETY QUESTIONNAIRES
5. BEING SO RESTLESS THAT IT IS HARD TO SIT STILL: SEVERAL DAYS
GAD7 TOTAL SCORE: 6
4. TROUBLE RELAXING: SEVERAL DAYS
7. FEELING AFRAID AS IF SOMETHING AWFUL MIGHT HAPPEN: NOT AT ALL
IF YOU CHECKED OFF ANY PROBLEMS ON THIS QUESTIONNAIRE, HOW DIFFICULT HAVE THESE PROBLEMS MADE IT FOR YOU TO DO YOUR WORK, TAKE CARE OF THINGS AT HOME, OR GET ALONG WITH OTHER PEOPLE: SOMEWHAT DIFFICULT
2. NOT BEING ABLE TO STOP OR CONTROL WORRYING: SEVERAL DAYS
1. FEELING NERVOUS, ANXIOUS, OR ON EDGE: SEVERAL DAYS
3. WORRYING TOO MUCH ABOUT DIFFERENT THINGS: NOT AT ALL
GAD7 TOTAL SCORE: 6
6. BECOMING EASILY ANNOYED OR IRRITABLE: MORE THAN HALF THE DAYS

## 2023-10-30 DIAGNOSIS — E03.9 HYPOTHYROIDISM, UNSPECIFIED TYPE: ICD-10-CM

## 2023-10-30 RX ORDER — LEVOTHYROXINE SODIUM 137 UG/1
137 TABLET ORAL DAILY
Qty: 30 TABLET | Refills: 0 | Status: SHIPPED | OUTPATIENT
Start: 2023-10-30 | End: 2023-12-04

## 2023-11-03 ENCOUNTER — LAB (OUTPATIENT)
Dept: LAB | Facility: CLINIC | Age: 68
End: 2023-11-03
Payer: MEDICARE

## 2023-11-03 ENCOUNTER — OFFICE VISIT (OUTPATIENT)
Dept: FAMILY MEDICINE | Facility: CLINIC | Age: 68
End: 2023-11-03
Payer: MEDICARE

## 2023-11-03 ENCOUNTER — HOSPITAL ENCOUNTER (OUTPATIENT)
Dept: GENERAL RADIOLOGY | Facility: CLINIC | Age: 68
Discharge: HOME | End: 2023-11-03
Attending: FAMILY MEDICINE | Admitting: FAMILY MEDICINE
Payer: MEDICARE

## 2023-11-03 ENCOUNTER — MYC MEDICAL ADVICE (OUTPATIENT)
Dept: ONCOLOGY | Facility: CLINIC | Age: 68
End: 2023-11-03

## 2023-11-03 VITALS
RESPIRATION RATE: 16 BRPM | BODY MASS INDEX: 34.07 KG/M2 | WEIGHT: 230 LBS | OXYGEN SATURATION: 99 % | HEART RATE: 86 BPM | TEMPERATURE: 97.9 F | HEIGHT: 69 IN | SYSTOLIC BLOOD PRESSURE: 124 MMHG | DIASTOLIC BLOOD PRESSURE: 72 MMHG

## 2023-11-03 DIAGNOSIS — N18.2 TYPE 2 DIABETES MELLITUS WITH STAGE 2 CHRONIC KIDNEY DISEASE, WITHOUT LONG-TERM CURRENT USE OF INSULIN (H): ICD-10-CM

## 2023-11-03 DIAGNOSIS — I10 ESSENTIAL HYPERTENSION: ICD-10-CM

## 2023-11-03 DIAGNOSIS — Z19.1 HORMONE SENSITIVE PROSTATE CANCER (H): ICD-10-CM

## 2023-11-03 DIAGNOSIS — R35.1 NOCTURIA: ICD-10-CM

## 2023-11-03 DIAGNOSIS — E78.5 HYPERLIPIDEMIA LDL GOAL <70: ICD-10-CM

## 2023-11-03 DIAGNOSIS — F33.42 RECURRENT MAJOR DEPRESSION IN COMPLETE REMISSION (H): ICD-10-CM

## 2023-11-03 DIAGNOSIS — G89.29 CHRONIC BILATERAL LOW BACK PAIN WITHOUT SCIATICA: ICD-10-CM

## 2023-11-03 DIAGNOSIS — J20.9 ACUTE BRONCHITIS WITH SYMPTOMS > 10 DAYS: ICD-10-CM

## 2023-11-03 DIAGNOSIS — N18.2 TYPE 2 DIABETES MELLITUS WITH STAGE 2 CHRONIC KIDNEY DISEASE, WITHOUT LONG-TERM CURRENT USE OF INSULIN (H): Primary | ICD-10-CM

## 2023-11-03 DIAGNOSIS — M54.50 CHRONIC BILATERAL LOW BACK PAIN WITHOUT SCIATICA: ICD-10-CM

## 2023-11-03 DIAGNOSIS — E11.22 TYPE 2 DIABETES MELLITUS WITH STAGE 2 CHRONIC KIDNEY DISEASE, WITHOUT LONG-TERM CURRENT USE OF INSULIN (H): Primary | ICD-10-CM

## 2023-11-03 DIAGNOSIS — C61 PROSTATE CANCER (H): ICD-10-CM

## 2023-11-03 DIAGNOSIS — E11.22 TYPE 2 DIABETES MELLITUS WITH STAGE 2 CHRONIC KIDNEY DISEASE, WITHOUT LONG-TERM CURRENT USE OF INSULIN (H): ICD-10-CM

## 2023-11-03 DIAGNOSIS — C61 HORMONE SENSITIVE PROSTATE CANCER (H): ICD-10-CM

## 2023-11-03 LAB
ALBUMIN SERPL BCG-MCNC: 4.4 G/DL (ref 3.5–5.2)
ALP SERPL-CCNC: 88 U/L (ref 40–129)
ALT SERPL W P-5'-P-CCNC: 25 U/L (ref 0–70)
ANION GAP SERPL CALCULATED.3IONS-SCNC: 11 MMOL/L (ref 7–15)
AST SERPL W P-5'-P-CCNC: 21 U/L (ref 0–45)
BASOPHILS # BLD AUTO: 0 10E3/UL (ref 0–0.2)
BASOPHILS NFR BLD AUTO: 1 %
BILIRUB SERPL-MCNC: 0.4 MG/DL
BUN SERPL-MCNC: 25.6 MG/DL (ref 8–23)
CALCIUM SERPL-MCNC: 9.6 MG/DL (ref 8.8–10.2)
CHLORIDE SERPL-SCNC: 99 MMOL/L (ref 98–107)
CREAT SERPL-MCNC: 1.16 MG/DL (ref 0.67–1.17)
DEPRECATED HCO3 PLAS-SCNC: 27 MMOL/L (ref 22–29)
EGFRCR SERPLBLD CKD-EPI 2021: 69 ML/MIN/1.73M2
EOSINOPHIL # BLD AUTO: 0.3 10E3/UL (ref 0–0.7)
EOSINOPHIL NFR BLD AUTO: 5 %
ERYTHROCYTE [DISTWIDTH] IN BLOOD BY AUTOMATED COUNT: 12.7 % (ref 10–15)
GLUCOSE SERPL-MCNC: 148 MG/DL (ref 70–99)
HBA1C MFR BLD: 6.1 %
HCT VFR BLD AUTO: 34.9 % (ref 40–53)
HGB BLD-MCNC: 11.7 G/DL (ref 13.3–17.7)
IMM GRANULOCYTES # BLD: 0.1 10E3/UL
IMM GRANULOCYTES NFR BLD: 1 %
LYMPHOCYTES # BLD AUTO: 0.9 10E3/UL (ref 0.8–5.3)
LYMPHOCYTES NFR BLD AUTO: 14 %
MCH RBC QN AUTO: 31.3 PG (ref 26.5–33)
MCHC RBC AUTO-ENTMCNC: 33.5 G/DL (ref 31.5–36.5)
MCV RBC AUTO: 93 FL (ref 78–100)
MONOCYTES # BLD AUTO: 0.6 10E3/UL (ref 0–1.3)
MONOCYTES NFR BLD AUTO: 9 %
NEUTROPHILS # BLD AUTO: 4.6 10E3/UL (ref 1.6–8.3)
NEUTROPHILS NFR BLD AUTO: 70 %
NRBC # BLD AUTO: 0 10E3/UL
NRBC BLD AUTO-RTO: 0 /100
PLATELET # BLD AUTO: 251 10E3/UL (ref 150–450)
POTASSIUM SERPL-SCNC: 4.9 MMOL/L (ref 3.4–5.3)
PROT SERPL-MCNC: 6.8 G/DL (ref 6.4–8.3)
PSA SERPL DL<=0.01 NG/ML-MCNC: <0.01 NG/ML (ref 0–4.5)
RBC # BLD AUTO: 3.74 10E6/UL (ref 4.4–5.9)
SODIUM SERPL-SCNC: 137 MMOL/L (ref 135–145)
WBC # BLD AUTO: 6.4 10E3/UL (ref 4–11)

## 2023-11-03 PROCEDURE — 85025 COMPLETE CBC W/AUTO DIFF WBC: CPT | Performed by: NURSE PRACTITIONER

## 2023-11-03 PROCEDURE — 83036 HEMOGLOBIN GLYCOSYLATED A1C: CPT

## 2023-11-03 PROCEDURE — 99214 OFFICE O/P EST MOD 30 MIN: CPT | Mod: 25 | Performed by: FAMILY MEDICINE

## 2023-11-03 PROCEDURE — G0008 ADMIN INFLUENZA VIRUS VAC: HCPCS | Performed by: FAMILY MEDICINE

## 2023-11-03 PROCEDURE — 80053 COMPREHEN METABOLIC PANEL: CPT | Performed by: NURSE PRACTITIONER

## 2023-11-03 PROCEDURE — 72100 X-RAY EXAM L-S SPINE 2/3 VWS: CPT

## 2023-11-03 PROCEDURE — 91320 SARSCV2 VAC 30MCG TRS-SUC IM: CPT | Performed by: FAMILY MEDICINE

## 2023-11-03 PROCEDURE — 36415 COLL VENOUS BLD VENIPUNCTURE: CPT

## 2023-11-03 PROCEDURE — 90480 ADMN SARSCOV2 VAC 1/ONLY CMP: CPT | Performed by: FAMILY MEDICINE

## 2023-11-03 PROCEDURE — 84153 ASSAY OF PSA TOTAL: CPT | Performed by: NURSE PRACTITIONER

## 2023-11-03 PROCEDURE — 84403 ASSAY OF TOTAL TESTOSTERONE: CPT

## 2023-11-03 PROCEDURE — 90662 IIV NO PRSV INCREASED AG IM: CPT | Performed by: FAMILY MEDICINE

## 2023-11-03 RX ORDER — CYCLOBENZAPRINE HCL 10 MG
10 TABLET ORAL 2 TIMES DAILY PRN
Qty: 60 TABLET | Refills: 0 | Status: SHIPPED | OUTPATIENT
Start: 2023-11-03

## 2023-11-03 RX ORDER — AZITHROMYCIN 250 MG/1
TABLET, FILM COATED ORAL
Qty: 6 TABLET | Refills: 0 | Status: SHIPPED | OUTPATIENT
Start: 2023-11-03 | End: 2024-01-25

## 2023-11-03 ASSESSMENT — PATIENT HEALTH QUESTIONNAIRE - PHQ9
SUM OF ALL RESPONSES TO PHQ QUESTIONS 1-9: 4
SUM OF ALL RESPONSES TO PHQ QUESTIONS 1-9: 4
10. IF YOU CHECKED OFF ANY PROBLEMS, HOW DIFFICULT HAVE THESE PROBLEMS MADE IT FOR YOU TO DO YOUR WORK, TAKE CARE OF THINGS AT HOME, OR GET ALONG WITH OTHER PEOPLE: NOT DIFFICULT AT ALL

## 2023-11-03 ASSESSMENT — PAIN SCALES - GENERAL: PAINLEVEL: MODERATE PAIN (4)

## 2023-11-03 NOTE — TELEPHONE ENCOUNTER
Oral Chemotherapy Monitoring Program  Lab Follow Up    Reviewed CBC and CMP lab results from 11/3.        4/6/2023     9:00 AM 6/9/2023     1:00 PM 7/5/2023     3:00 PM 9/7/2023     6:00 AM 10/2/2023     2:00 PM 10/4/2023     2:00 PM 11/3/2023    11:00 AM   ORAL CHEMOTHERAPY   Assessment Type Refill Quarterly Follow up Refill Chart Review Refill Quarterly Follow up;Lab Monitoring Lab Monitoring   Diagnosis Code Prostate Cancer Prostate Cancer Prostate Cancer Prostate Cancer Prostate Cancer Prostate Cancer Prostate Cancer   Providers Dr Edil Nuno   [facility]   Is this patient followed by the [facility] OC team? Yes Yes Yes Yes Yes Yes Yes   Drug Name Erleada (apalutamide) Erleada (apalutamide) Erleada (apalutamide) Erleada (apalutamide) Erleada (apalutamide) Erleada (apalutamide) Erleada (apalutamide)   Dose 240 mg 240 mg 240 mg 240 mg 240 mg 240 mg 240 mg   Current Schedule Daily Daily Daily Daily Daily Daily Daily   Cycle Details Continuous Continuous Continuous Continuous Continuous Continuous Continuous   Doses missed in last 2 weeks  0    0    Adherence Assessment  Adherent    Adherent    Adverse Effects  No AE identified during assessment    No AE identified during assessment    Any new drug interactions?  No    No    Is the dose as ordered appropriate for the patient?  Yes    Yes    Since the last time we talked, have you been hospitalized or used the emergency room?  No    No        Labs:  _  Result Component Current Result Ref Range   Sodium 137 (11/3/2023) 135 - 145 mmol/L     _  Result Component Current Result Ref Range   Potassium 4.9 (11/3/2023) 3.4 - 5.3 mmol/L     _  Result Component Current Result Ref Range   Calcium 9.6 (11/3/2023) 8.8 - 10.2 mg/dL     No results found for Mag within last 30 days.     No results found for Phos within last 30 days.     _  Result Component Current Result Ref Range    Albumin 4.4 (11/3/2023) 3.5 - 5.2 g/dL     _  Result Component Current Result Ref Range   Urea Nitrogen 25.6 (H) (11/3/2023) 8.0 - 23.0 mg/dL     _  Result Component Current Result Ref Range   Creatinine 1.16 (11/3/2023) 0.67 - 1.17 mg/dL     _  Result Component Current Result Ref Range   AST 21 (11/3/2023) 0 - 45 U/L     _  Result Component Current Result Ref Range   ALT 25 (11/3/2023) 0 - 70 U/L     _  Result Component Current Result Ref Range   Bilirubin Total 0.4 (11/3/2023) <=1.2 mg/dL     _  Result Component Current Result Ref Range   WBC Count 6.4 (11/3/2023) 4.0 - 11.0 10e3/uL     _  Result Component Current Result Ref Range   Hemoglobin 11.7 (L) (11/3/2023) 13.3 - 17.7 g/dL     _  Result Component Current Result Ref Range   Platelet Count 251 (11/3/2023) 150 - 450 10e3/uL     No results found for ANC within last 30 days.     _  Result Component Current Result Ref Range   Absolute Neutrophils 4.6 (11/3/2023) 1.6 - 8.3 10e3/uL        Assessment & Plan:  Results from CBC and CMP labs are stable with no concerning abnormalities.    The App3 message sent to Joel.    Follow-Up:  12/4: appt/labs with Dr. Yaakov Urbina PharmD  Hematology/Oncology Clinical Pharmacist  [facility]   [phone]
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
## 2023-11-04 ASSESSMENT — ENCOUNTER SYMPTOMS
FEVER: 0
VOMITING: 0
PALPITATIONS: 0
LIGHT-HEADEDNESS: 0
NAUSEA: 0
SINUS PAIN: 0
SORE THROAT: 1
HEADACHES: 0
NERVOUS/ANXIOUS: 0
COUGH: 1
SHORTNESS OF BREATH: 0

## 2023-11-05 PROBLEM — Z95.1 S/P CABG (CORONARY ARTERY BYPASS GRAFT): Status: RESOLVED | Noted: 2022-04-13 | Resolved: 2023-11-05

## 2023-11-07 LAB — TESTOST SERPL-MCNC: 10 NG/DL (ref 240–950)

## 2023-11-11 DIAGNOSIS — I25.118 ATHEROSCLEROSIS OF NATIVE CORONARY ARTERY OF NATIVE HEART WITH STABLE ANGINA PECTORIS (H): ICD-10-CM

## 2023-11-27 ENCOUNTER — THERAPY VISIT (OUTPATIENT)
Dept: PHYSICAL THERAPY | Facility: CLINIC | Age: 68
End: 2023-11-27
Attending: FAMILY MEDICINE
Payer: MEDICARE

## 2023-11-27 DIAGNOSIS — G89.29 CHRONIC BILATERAL LOW BACK PAIN WITHOUT SCIATICA: ICD-10-CM

## 2023-11-27 DIAGNOSIS — M54.50 CHRONIC BILATERAL LOW BACK PAIN WITHOUT SCIATICA: ICD-10-CM

## 2023-11-27 PROCEDURE — 97112 NEUROMUSCULAR REEDUCATION: CPT | Mod: GP | Performed by: PHYSICAL THERAPIST

## 2023-11-27 PROCEDURE — 97161 PT EVAL LOW COMPLEX 20 MIN: CPT | Mod: GP | Performed by: PHYSICAL THERAPIST
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
## 2023-11-29 ENCOUNTER — THERAPY VISIT (OUTPATIENT)
Dept: PHYSICAL THERAPY | Facility: CLINIC | Age: 68
End: 2023-11-29
Attending: FAMILY MEDICINE
Payer: MEDICARE

## 2023-11-29 DIAGNOSIS — G89.29 CHRONIC BILATERAL LOW BACK PAIN WITHOUT SCIATICA: Primary | ICD-10-CM

## 2023-11-29 DIAGNOSIS — M54.50 CHRONIC BILATERAL LOW BACK PAIN WITHOUT SCIATICA: Primary | ICD-10-CM

## 2023-11-29 PROCEDURE — 97112 NEUROMUSCULAR REEDUCATION: CPT | Mod: GP | Performed by: PHYSICAL THERAPIST

## 2023-12-01 ENCOUNTER — LAB (OUTPATIENT)
Dept: LAB | Facility: CLINIC | Age: 68
End: 2023-12-01
Payer: MEDICARE

## 2023-12-01 DIAGNOSIS — Z19.1 HORMONE SENSITIVE PROSTATE CANCER (H): ICD-10-CM

## 2023-12-01 DIAGNOSIS — E03.9 HYPOTHYROIDISM, UNSPECIFIED TYPE: ICD-10-CM

## 2023-12-01 DIAGNOSIS — C61 HORMONE SENSITIVE PROSTATE CANCER (H): ICD-10-CM

## 2023-12-01 DIAGNOSIS — C61 PROSTATE CANCER (H): ICD-10-CM

## 2023-12-01 LAB
ALBUMIN SERPL BCG-MCNC: 4.2 G/DL (ref 3.5–5.2)
ALP SERPL-CCNC: 90 U/L (ref 40–150)
ALT SERPL W P-5'-P-CCNC: 20 U/L (ref 0–70)
ANION GAP SERPL CALCULATED.3IONS-SCNC: 11 MMOL/L (ref 7–15)
AST SERPL W P-5'-P-CCNC: 18 U/L (ref 0–45)
BASOPHILS # BLD AUTO: 0.1 10E3/UL (ref 0–0.2)
BASOPHILS NFR BLD AUTO: 1 %
BILIRUB SERPL-MCNC: 0.4 MG/DL
BUN SERPL-MCNC: 22.1 MG/DL (ref 8–23)
CALCIUM SERPL-MCNC: 9.8 MG/DL (ref 8.8–10.2)
CHLORIDE SERPL-SCNC: 101 MMOL/L (ref 98–107)
CREAT SERPL-MCNC: 1.19 MG/DL (ref 0.67–1.17)
DEPRECATED HCO3 PLAS-SCNC: 27 MMOL/L (ref 22–29)
EGFRCR SERPLBLD CKD-EPI 2021: 67 ML/MIN/1.73M2
EOSINOPHIL # BLD AUTO: 0.3 10E3/UL (ref 0–0.7)
EOSINOPHIL NFR BLD AUTO: 4 %
ERYTHROCYTE [DISTWIDTH] IN BLOOD BY AUTOMATED COUNT: 13.2 % (ref 10–15)
GLUCOSE SERPL-MCNC: 145 MG/DL (ref 70–99)
HCT VFR BLD AUTO: 34.3 % (ref 40–53)
HGB BLD-MCNC: 12 G/DL (ref 13.3–17.7)
IMM GRANULOCYTES # BLD: 0.1 10E3/UL
IMM GRANULOCYTES NFR BLD: 1 %
LYMPHOCYTES # BLD AUTO: 0.8 10E3/UL (ref 0.8–5.3)
LYMPHOCYTES NFR BLD AUTO: 13 %
MCH RBC QN AUTO: 32.2 PG (ref 26.5–33)
MCHC RBC AUTO-ENTMCNC: 35 G/DL (ref 31.5–36.5)
MCV RBC AUTO: 92 FL (ref 78–100)
MONOCYTES # BLD AUTO: 0.6 10E3/UL (ref 0–1.3)
MONOCYTES NFR BLD AUTO: 9 %
NEUTROPHILS # BLD AUTO: 4.7 10E3/UL (ref 1.6–8.3)
NEUTROPHILS NFR BLD AUTO: 72 %
NRBC # BLD AUTO: 0 10E3/UL
NRBC BLD AUTO-RTO: 0 /100
PLATELET # BLD AUTO: 193 10E3/UL (ref 150–450)
POTASSIUM SERPL-SCNC: 4.8 MMOL/L (ref 3.4–5.3)
PROT SERPL-MCNC: 6.4 G/DL (ref 6.4–8.3)
PSA SERPL DL<=0.01 NG/ML-MCNC: <0.01 NG/ML (ref 0–4.5)
RBC # BLD AUTO: 3.73 10E6/UL (ref 4.4–5.9)
SODIUM SERPL-SCNC: 139 MMOL/L (ref 135–145)
WBC # BLD AUTO: 6.4 10E3/UL (ref 4–11)

## 2023-12-01 PROCEDURE — 80053 COMPREHEN METABOLIC PANEL: CPT | Performed by: NURSE PRACTITIONER

## 2023-12-01 PROCEDURE — 84153 ASSAY OF PSA TOTAL: CPT

## 2023-12-01 PROCEDURE — 36415 COLL VENOUS BLD VENIPUNCTURE: CPT

## 2023-12-01 PROCEDURE — 84403 ASSAY OF TOTAL TESTOSTERONE: CPT

## 2023-12-01 PROCEDURE — 84443 ASSAY THYROID STIM HORMONE: CPT

## 2023-12-01 PROCEDURE — 85025 COMPLETE CBC W/AUTO DIFF WBC: CPT | Performed by: NURSE PRACTITIONER

## 2023-12-01 PROCEDURE — 84439 ASSAY OF FREE THYROXINE: CPT

## 2023-12-04 ENCOUNTER — THERAPY VISIT (OUTPATIENT)
Dept: PHYSICAL THERAPY | Facility: CLINIC | Age: 68
End: 2023-12-04
Attending: FAMILY MEDICINE
Payer: MEDICARE

## 2023-12-04 ENCOUNTER — DOCUMENTATION ONLY (OUTPATIENT)
Dept: ONCOLOGY | Facility: CLINIC | Age: 68
End: 2023-12-04

## 2023-12-04 ENCOUNTER — VIRTUAL VISIT (OUTPATIENT)
Dept: ONCOLOGY | Facility: CLINIC | Age: 68
End: 2023-12-04
Attending: STUDENT IN AN ORGANIZED HEALTH CARE EDUCATION/TRAINING PROGRAM
Payer: MEDICARE

## 2023-12-04 VITALS — HEIGHT: 70 IN | WEIGHT: 225 LBS | BODY MASS INDEX: 32.21 KG/M2

## 2023-12-04 DIAGNOSIS — C79.51 PROSTATE CANCER METASTATIC TO BONE (H): Primary | ICD-10-CM

## 2023-12-04 DIAGNOSIS — G89.29 CHRONIC BILATERAL LOW BACK PAIN WITHOUT SCIATICA: Primary | ICD-10-CM

## 2023-12-04 DIAGNOSIS — M54.50 CHRONIC BILATERAL LOW BACK PAIN WITHOUT SCIATICA: Primary | ICD-10-CM

## 2023-12-04 DIAGNOSIS — C61 HORMONE SENSITIVE PROSTATE CANCER (H): ICD-10-CM

## 2023-12-04 DIAGNOSIS — D63.0 ANEMIA IN NEOPLASTIC DISEASE: ICD-10-CM

## 2023-12-04 DIAGNOSIS — Z19.1 HORMONE SENSITIVE PROSTATE CANCER (H): ICD-10-CM

## 2023-12-04 DIAGNOSIS — C61 PROSTATE CANCER METASTATIC TO BONE (H): Primary | ICD-10-CM

## 2023-12-04 LAB
T4 FREE SERPL-MCNC: 1.23 NG/DL (ref 0.9–1.7)
TSH SERPL DL<=0.005 MIU/L-ACNC: 4.71 UIU/ML (ref 0.3–4.2)

## 2023-12-04 PROCEDURE — 99214 OFFICE O/P EST MOD 30 MIN: CPT | Mod: 95 | Performed by: STUDENT IN AN ORGANIZED HEALTH CARE EDUCATION/TRAINING PROGRAM

## 2023-12-04 PROCEDURE — 97112 NEUROMUSCULAR REEDUCATION: CPT | Mod: GP | Performed by: PHYSICAL THERAPIST

## 2023-12-04 ASSESSMENT — PAIN SCALES - GENERAL: PAINLEVEL: NO PAIN (0)

## 2023-12-04 NOTE — PROGRESS NOTES
Virtual Visit Details    Type of service:  Video Visit   Video Start Time:  1:00  Video End Time: 1:07    Originating Location (pt. Location): Home    Distant Location (provider location):  On-site  Platform used for Video Visit: Shaunna

## 2023-12-04 NOTE — PROGRESS NOTES
[facility] Oral Chemotherapy Monitoring Program    Thank you for the opportunity to be a part in the care of this patient's oral chemotherapy. The oncology pharmacy will no longer be following this patient for oral chemotherapy. If there are any questions or the plan changes, feel free to contact us.        6/9/2023     1:00 PM 7/5/2023     3:00 PM 9/7/2023     6:00 AM 10/2/2023     2:00 PM 10/4/2023     2:00 PM 11/3/2023    11:00 AM 12/4/2023     4:00 PM   ORAL CHEMOTHERAPY   Assessment Type Quarterly Follow up Refill Chart Review Refill Quarterly Follow up;Lab Monitoring Lab Monitoring Discontinuation   Stop Date       12/4/2023   Reason for Discontinuation       Therapy complete   Diagnosis Code Prostate Cancer Prostate Cancer Prostate Cancer Prostate Cancer Prostate Cancer Prostate Cancer Prostate Cancer   Providers Dr Edil Nuno   [facility]   Is this patient followed by the [facility] OC team? Yes Yes Yes Yes Yes Yes    Drug Name Erleada (apalutamide) Erleada (apalutamide) Erleada (apalutamide) Erleada (apalutamide) Erleada (apalutamide) Erleada (apalutamide) Erleada (apalutamide)   Dose 240 mg 240 mg 240 mg 240 mg 240 mg 240 mg 240 mg   Current Schedule Daily Daily Daily Daily Daily Daily Daily   Cycle Details Continuous Continuous Continuous Continuous Continuous Continuous Continuous   Doses missed in last 2 weeks 0    0     Adherence Assessment Adherent    Adherent     Adverse Effects No AE identified during assessment    No AE identified during assessment     Any new drug interactions? No    No     Is the dose as ordered appropriate for the patient? Yes    Yes     Since the last time we talked, have you been hospitalized or used the emergency room? No    No         Natasha Urbina PharmD  Hematology/Oncology Clinical Pharmacist  [facility]   [phone]

## 2023-12-04 NOTE — LETTER
12/4/2023         RE: Joel Pike  [address]  [facility] [facility] [address]        Dear Colleague,    Thank you for referring your patient, Joel Pike, to the [facility]. Please see a copy of my visit note below.    Virtual Visit Details    Type of service:  Video Visit   Video Start Time:  1:00  Video End Time: 1:07    Originating Location (pt. Location): Home    Distant Location (provider location):  On-site  Platform used for Video Visit: [facility] Medical Oncology Return Note       Date of visit: December 4, 2023    CC:   metastatic prostate cancer, now s/p 2 years of therapy with apalutamide/ADT.      ONCOLOGY HISTORY:  Elevated PSA noted 2/26/21 at 12.70. Previously normal in 2017.    4/7/21-Initial urology visit.  7/2/21-prostate biopsy 4+3, 9/12 biopsies positive.  Ductal component noted.    7/28/21-MR prostate-PIRADS 5 lesion, R and L sided lesions with likely    neurovascular bundle invasion. No  seminal vesicle involvement. No clear LAD,  but some indeterminate pelvic nodes.  No suspicious bone lesions.    7/28/21-NM bone scan suspicious lesion of R sacral ala S3 level.   8/10/21-CT A/P with multiple enlarged periaortic/iliac LN  8/11/21-First eligard dose 45mg (Q6M dosing). Due next 2/2022.  8/30/21-Initial medical oncology visit with Dr. Solo.  Unclear if bony lesion is metastasis based on current imaging, but this was not irradiated. Start apalutamide/eligard  8/30/21 PSA =30.40 Pre Rx  9/27/21 PSA =8.99 (T=6); HgbA1C = 6.1  10/5/21 PSA =3.22  11/26/21 PSA =0.15  7/26/22 PSA <0.01  8/22  Referral to radiation oncology for EBRT given low volume prostate cancer.  55cGy  in 20 fractions completed on 10/5/22.   11/11/22 PSA= <0.01  02/2023 ELIGARD 45 MG with Dr. Ott of urology.    5/10/23 PSA= <0.01  8/18/23 PSA=0.12, unclear now whether this was a lab error given known inconsistencies with     Some recent PSA tests at [facility].   9/1/23  PSA=  <0.01  9/6/23  Eligard 45mg. DUE 3/6/24.  PSA rechecked <0.01.  Lupron 6 month shot received  AM before appt.   10/6/23 PSA <0.01  11/3/23  PSA <0.01  12/4/23 PSA <0.01.  Stop Erleada and ADT at a little over 2 years due to likely false elevation of PSA in August and monitor PSA Q3M.        Interval History:  Some intermittent hot flashes.  Energy is still low from ADT/ART.  No new pains.  Happy to be done with his course of therapy at just a little over 2 years.  No additional complaints or concerns.  He has about half a bottle of apalutamide left and will finish it and then stop.       PMH:  HTN, HLD, bipolar disorder, DM2 on metformin     PSH:  Bilateral TKA, bilateral cataract extraction, bilateral carpal tunnel release     FAMILY HX:  No reported family history of prostate cancer.  SIster with liver cancer in 20s.    SOCIAL:  Retired, works at [employer] in [employer] section now  Never smoker.   No EtOH  No recreational drugs.   No herbs/supplements other than on medication list.      MEDS:  Current Outpatient Medications   Medication Instructions    albuterol (PROAIR HFA) 108 (90 BASE) MCG/ACT Inhaler 1-2 puffs every 2 -4 hrs as needed    ALLEGRA-D ALLERGY & CONGESTION 180-240 MG 24 hr tablet TAKE ONE TABLET BY MOUTH EVERY DAY    blood glucose (HUGO CONTOUR) test strip Use to test blood sugars 1 time daily or as directed.    blood glucose (NO BRAND SPECIFIED) lancets standard Use to test blood sugar one time daily or as directed.    blood glucose calibration (HUGO CONTOUR) NORMAL solution Use to calibrate blood glucose monitor as directed.    buPROPion (WELLBUTRIN XL) 300 MG 24 hr tablet TAKE ONE TABLET BY MOUTH EVERY MORNING    Coenzyme Q-10 capsule 1 capsule, DAILY    esomeprazole (NEXIUM) 40 MG DR capsule TAKE ONE CAPSULE BY MOUTH EVERY MORNING BEFORE BREAKFAST , 30-60 MINUTES BEFORE EATING    ezetimibe (ZETIA) 10 MG tablet TAKE ONE TABLET BY MOUTH ONCE DAILY    fish oil-omega-3 fatty acids 1000 MG capsule  "Take  by mouth. Take daily      levothyroxine (SYNTHROID/LEVOTHROID) 112 mcg, Oral, DAILY    losartan (COZAAR) 75 mg, Oral, DAILY    Magnesium 500 MG CAPS One twice a day    metFORMIN (GLUCOPHAGE) 500 MG tablet TAKE ONE TABLET BY MOUTH TWICE A DAY WITH MEALS    Misc Natural Products (OSTEO BI-FLEX ADV JOINT SHIELD) TABS Take  by mouth.    multivitamin (OCUVITE) TABS tablet 1 tablet, Oral, DAILY    STATIN NOT PRESCRIBED (INTENTIONAL) Please choose reason not prescribed, below    Vitamin D3 (CHOLECALCIFEROL) 5,000 Units, Oral     ROS-Remainder of 14 point ROS reviewed and negative except as in HPI.    PHYSICAL EXAM:  Ht 1.778 m (5' 10\")   Wt 102.1 kg (225 lb)   BMI 32.28 kg/m    Video exam  Exam:  Constitutional: healthy, alert, and no distress  Head: Normocephalic. No masses, lesions, tenderness or abnormalities  Neck: trachea midline   ENT: MMM, anicteric sclerae  Respiratory: negative, normal WOB on RA, no wheezing or rhonchi.   Musculoskeletal: extremities normal- no gross deformities noted and gait normal  Skin: no suspicious lesions or rashes on exposed areas of skin   Neurologic: negative, CNII-XII grossly intact, normal speech and mentation.   Psychiatric: mentation appears normal and affect normal/bright    LABS AND IMAGES:    Prostate Biopsy 7/2/21  FINAL DIAGNOSIS:   A: Prostate, left, biopsy   - Adenocarcinoma, Mary's grade 4/3 with ductal component involving 4 of    6 needle core biopsies and 25% of   submitted tissue     B: Prostate, right, biopsy   - Adenocarcinoma, [place]'s grade 4/3 with ductal component involving 5 of    6 needle core biopsies and 25% of   submitted tissue     Labs:  Most Recent 3 CBC's:  Recent Labs   Lab Test 12/01/23  0904 11/03/23  0913 08/18/23  0910   WBC 6.4 6.4 5.1   HGB 12.0* 11.7* 11.9*   MCV 92 93 93    251 179   ANEUTAUTO 4.7 4.6 3.5     Most Recent 3 BMP's:  Recent Labs   Lab Test 12/01/23  0904 11/03/23  0913 08/18/23  0910    137 137   POTASSIUM 4.8 " 4.9 4.7   CHLORIDE 101 99 101   CO2 27 27 23   BUN 22.1 25.6* 14.0   CR 1.19* 1.16 1.10   ANIONGAP 11 11 13   AURORA 9.8 9.6 9.3   * 148* 143*   PROTTOTAL 6.4 6.8 6.5   ALBUMIN 4.2 4.4 4.3    Most Recent 3 LFT's:  Recent Labs   Lab Test 12/01/23  0904 11/03/23  0913 08/18/23  0910   AST 18 21 21   ALT 20 25 24   ALKPHOS 90 88 79   BILITOTAL 0.4 0.4 0.4    Most Recent 2 TSH and T4:  Recent Labs   Lab Test 12/01/23  0904 06/20/23  1313   TSH 4.71* 4.24*   T4 1.23 1.19       PSA trend, see oncology history.      I reviewed the above labs today.      IMPRESSION AND PLAN:  Joel Pike is a 68 year old M with PMH of DM2 on metformin, depression/anxiety (pt reports as bipolar d/o), HTN, HLD, and prostate cancer with charity disease only.  He was treated with EBRT to the prostate for low volume metastatic disease along with ADT and apalutamide.  He has responded well and has had an undetectable PSA since 7/2022.  He had a single PSA thus far of 0.12 in August 2023 that is likely a lab error rather than a true value.  We will check PSA monthly through December 2023 and then if it remains undetectable, we will stop apalutamide and let ADT run its course without a repeat dose.      -He started apalutamide on 10/5/21. He had CT CAP done 11/26/21 which showed significantly decreased retroperitoneal lymphadenopathy since the prior study dated 8/10/2021 indicates good response to treatment. No other evidence for lymphadenopathy or metastasis is identified. Specifically no evidence for left sacral metastasis is seen.   -Completed RT to the primary for oligometastatic disease based on the PAULINOE study with Dr. Gomez ~09/2022  -Plan is to continue ADT for 2 years (through 8/2023).  We extended this for another 3 months due to a rise in PSA that ultimately appeared to be a lab abnormality.  Three subsequent PSAs were then negative.  He received 6 month shot the AM of 9/6 prior to our appt.  At our 12/4/23 appt, he felt good and was  in agreement to stop therapy once his bottle of apalutamide was complete.  We will monitor PSA every 3 months moving forward.    -Consent signed for [facility]ora whole genome/transcriptome sequencing of prostate biopsy.      # normocytic anemia.    He has mild normocytic anemia but no bleeding source.  He should not be anemic with ADT, so we did anemia workup, which was iron replete and normal vitamin B12.  Will continue to watch and monitor without additional interventions to see if Hgb rises with rise in testosterone.      PLAN:    Stop Erleada when you run out.    Lupron shot will last about 3 months more, but we will not do any additional shots.   PSA every 3 months.  See Fawn or Kandi in 3 months, me in 6 months.      A total of 30 minutes were spent on this patient on the day of the encounter, of which more than 50% of this time was used for counseling and coordination of care.  The patient was given the opportunity to ask multiple questions today, all of which were answered to their satisfaction.     Michael Nuno MD, PhD   of Medicine   Oncology/BMT/Cellular Therapies

## 2023-12-04 NOTE — NURSING NOTE
Is the patient currently in the state of [place]? YES    Visit mode:VIDEO    If the visit is dropped, the patient can be reconnected by: VIDEO VISIT: Text to cell phone:   Telephone Information:   Mobile [phone]       Will anyone else be joining the visit? NO  (If patient encounters technical issues they should call [phone]    How would you like to obtain your AVS? MyChart    Are changes needed to the allergy or medication list? Pt stated no changes to allergies and Pt stated no med changes    Reason for visit: YEHUDA Santana LPN

## 2023-12-05 ENCOUNTER — OFFICE VISIT (OUTPATIENT)
Dept: RADIATION THERAPY | Facility: OUTPATIENT CENTER | Age: 68
End: 2023-12-05
Payer: MEDICARE

## 2023-12-05 VITALS
SYSTOLIC BLOOD PRESSURE: 131 MMHG | WEIGHT: 225 LBS | HEIGHT: 70 IN | OXYGEN SATURATION: 98 % | BODY MASS INDEX: 32.21 KG/M2 | HEART RATE: 66 BPM | RESPIRATION RATE: 12 BRPM | DIASTOLIC BLOOD PRESSURE: 66 MMHG

## 2023-12-05 DIAGNOSIS — C61 PROSTATE CANCER (H): Primary | ICD-10-CM

## 2023-12-05 LAB — TESTOST SERPL-MCNC: 3 NG/DL (ref 240–950)

## 2023-12-05 NOTE — LETTER
2023         RE: Joel Pike  [address]  [facility] [address]        Dear Colleague,    Thank you for referring your patient, Joel Pike, to the [facility] RADIATION THERAPY CLINIC. Please see a copy of my visit note below.       Department of Radiation Oncology  Radiation Therapy Center  [facility] [place] [address]  [phone]       Radiation Oncology Follow-up Visit  23      Joel Pike  MRN: [id number]   : 1955     Radiation Oncologist: Isaías Gomez MD  Medical Oncologist: Michael Le MD     DIAGNOSIS: Metastatic prostate cancer  PATHOLOGY: Prostate adenocarcinoma, [place]ore score 4+3= 7                                  STAGE: [id number] (para-aortic nodes, ? R sacral osseous lesion).   CONCURRENT SYSTEMIC THERAPY:   Yes, oral Xtandi     ONCOLOGIC HISTORY:          Mr Pike is a 68year old male patient who was noted to have an elevated PSA on 2021 with a value of 12.7.  Repeat PSA on 2021 was 30.4.  Prostate biopsy on 2021 demonstrated prostate adenocarcinoma, Mary score 4+3 equal 7.  MRI of the prostate on 2021 demonstrated PI-RADS 5 lesion with likely extracapsular extension.  There were noted to have indeterminate pelvic lymph nodes at the time.  Bone scan 2021 demonstrated a indeterminate lesion of the right sacral ala at the level of S3.  CT scan of the abdomen pelvis on 8/10/2021 demonstrated multiple large para-aortic and pelvic lymph nodes.  Patient was seen by medical oncologist Dr. Solo.  The patient was started on systemic treatment with Eligard and enzalutamide.  PSA has demonstrated biochemical response. PSA on 8/15/2022 remained undetectable. He was referred to our clinic to discuss next steps in management and discussion of possible local treatment with radiation therapy. He completed radiation therapy on 10/5/22.      SITE OF TREATMENT: Prostate     DATES  OF TREATMENT: 22 to 10/5/22    "  TOTAL DOSE OF TREATMENT: 5,500 cGy     DOSE PER FRACTION OF TREATMENT: 275 cGy x 20 fractions    Interval Since Completion of Radiation Therapy: 1 year 2 mo       SUBJECTIVE:   Here with wife Michelle.  Hot flashes a few per week mostly during the day. A few night sweats but manageable. Energy is lower than prior to RT. Was going to the gym 5 days per but he stopped. A little walking and gardening. Allergic to the sun. Has to wear full clothing and hat and facemask. On PT for his back pain. On flomax stream is good. No blood in urine or stool. Gets up 2 to 5 times per night to urinate.    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 A fib on ASA.     PHYSICAL EXAM:  /66   Pulse 66   Resp 12   Ht 1.778 m (5' 10\")   Wt 102.1 kg (225 lb)   SpO2 98%   BMI 32.28 kg/m     Gen: Alert, in NAD  Eyes: PERRL, EOMI, sclera anicteric  HENT     Head: NC/AT     Ears: No external auricular lesions  Neck: Supple, full ROM, no LAD  Pulm: No wheezing, stridor or respiratory distress  CV: Well-perfused, no cyanosis, no pedal edema  Abdominal: Soft, nontender, nondistended, no hepatomegaly  Back: No step-offs or pain to palpation along the thoracolumbar spine, no CVA tenderness    LABS AND IMAGING:  PSA   Date Value Ref Range Status   02/26/2021 12.70 (H) 0 - 4 ug/L Final     Comment:     Assay Method:  Chemiluminescence using Siemens Vista analyzer     PSA Tumor Marker   Date Value Ref Range Status   12/01/2023 <0.01 0.00 - 4.50 ng/mL Final   11/11/2022 <0.01 0.00 - 4.00 ug/L Final       IMPRESSION:   Mr. Pike is a 68 year old male with a metastatic prostate cancer [id number]. He had elevated PSA in February 26, 2021 with a value of 12.7.  Repeat PSA on 8/30/2021 was 30.4.  Prostate biopsy on 7/2/2021 demonstrated prostate adenocarcinoma, [place] score 4+3 equal 7.  MRI of the prostate on 7/28/2021 demonstrated PI-RADS 5 lesion with likely extracapsular extension.  There were noted to have indeterminate pelvic lymph nodes at the " time.  Bone scan 7/28/2021 demonstrated a indeterminate lesion of the right sacral ala at the level of S3.  CT scan of the abdomen pelvis on 8/10/2021 demonstrated multiple large para-aortic and pelvic lymph nodes.  Patient was seen by medical oncology team (Dr. Solo).  The patient was started on systemic treatment with Eligard and enzalutamide.  PSA has demonstrated biochemical response. He completed radiation therapy to prostate in October 2022 ago and returns for follow up today. PSA is undetectable. Follows with medical oncology.  Their plan is for him to take apalutamide until it runs out at the end of this month and let ADT run its course without a repeat dose.     PLAN:   See me in 6 months. Continue to follow up with medical oncology, defer to them on ordering PSA.     MALLY Guerrero  Department of Radiation Oncology  [facility]     10 min with pt.

## 2023-12-05 NOTE — PROGRESS NOTES
Department of Radiation Oncology  Radiation Therapy Center  [facility] [place] [address]  [phone]       Radiation Oncology Follow-up Visit  23      Joel Pike  MRN: [id number]   : 1955     Radiation Oncologist: Isaías Gomez MD  Medical Oncologist: Michael Le MD     DIAGNOSIS: Metastatic prostate cancer  PATHOLOGY: Prostate adenocarcinoma, Mary score 4+3= 7                                  STAGE: [id number] (para-aortic nodes, ? R sacral osseous lesion).   CONCURRENT SYSTEMIC THERAPY:   Yes, oral Xtandi     ONCOLOGIC HISTORY:          Mr Pike is a 68year old male patient who was noted to have an elevated PSA on 2021 with a value of 12.7.  Repeat PSA on 2021 was 30.4.  Prostate biopsy on 2021 demonstrated prostate adenocarcinoma, Bryant score 4+3 equal 7.  MRI of the prostate on 2021 demonstrated PI-RADS 5 lesion with likely extracapsular extension.  There were noted to have indeterminate pelvic lymph nodes at the time.  Bone scan 2021 demonstrated a indeterminate lesion of the right sacral ala at the level of S3.  CT scan of the abdomen pelvis on 8/10/2021 demonstrated multiple large para-aortic and pelvic lymph nodes.  Patient was seen by medical oncologist Dr. Solo.  The patient was started on systemic treatment with Eligard and enzalutamide.  PSA has demonstrated biochemical response. PSA on 8/15/2022 remained undetectable. He was referred to our clinic to discuss next steps in management and discussion of possible local treatment with radiation therapy. He completed radiation therapy on 10/5/22.      SITE OF TREATMENT: Prostate     DATES  OF TREATMENT: 22 to 10/5/22     TOTAL DOSE OF TREATMENT: 5,500 cGy     DOSE PER FRACTION OF TREATMENT: 275 cGy x 20 fractions    Interval Since Completion of Radiation Therapy: 1 year 2 mo       SUBJECTIVE:   Here with wife Michelle.  Hot flashes a few per week mostly during the day. A few  "night sweats but manageable. Energy is lower than prior to RT. Was going to the gym 5 days per but he stopped. A little walking and gardening. Allergic to the sun. Has to wear full clothing and hat and facemask. On PT for his back pain. On flomax stream is good. No blood in urine or stool. Gets up 2 to 5 times per night to urinate.    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 A fib on ASA.     PHYSICAL EXAM:  /66   Pulse 66   Resp 12   Ht 1.778 m (5' 10\")   Wt 102.1 kg (225 lb)   SpO2 98%   BMI 32.28 kg/m     Gen: Alert, in NAD  Eyes: PERRL, EOMI, sclera anicteric  HENT     Head: NC/AT     Ears: No external auricular lesions  Neck: Supple, full ROM, no LAD  Pulm: No wheezing, stridor or respiratory distress  CV: Well-perfused, no cyanosis, no pedal edema  Abdominal: Soft, nontender, nondistended, no hepatomegaly  Back: No step-offs or pain to palpation along the thoracolumbar spine, no CVA tenderness    LABS AND IMAGING:  PSA   Date Value Ref Range Status   02/26/2021 12.70 (H) 0 - 4 ug/L Final     Comment:     Assay Method:  Chemiluminescence using Siemens Vista analyzer     PSA Tumor Marker   Date Value Ref Range Status   12/01/2023 <0.01 0.00 - 4.50 ng/mL Final   11/11/2022 <0.01 0.00 - 4.00 ug/L Final       IMPRESSION:   Mr. Pike is a 68 year old male with a metastatic prostate cancer [id number]. He had elevated PSA in February 26, 2021 with a value of 12.7.  Repeat PSA on 8/30/2021 was 30.4.  Prostate biopsy on 7/2/2021 demonstrated prostate adenocarcinoma, [place] score 4+3 equal 7.  MRI of the prostate on 7/28/2021 demonstrated PI-RADS 5 lesion with likely extracapsular extension.  There were noted to have indeterminate pelvic lymph nodes at the time.  Bone scan 7/28/2021 demonstrated a indeterminate lesion of the right sacral ala at the level of S3.  CT scan of the abdomen pelvis on 8/10/2021 demonstrated multiple large para-aortic and pelvic lymph nodes.  Patient was seen by medical oncology team " (Dr. Solo).  The patient was started on systemic treatment with Eligard and enzalutamide.  PSA has demonstrated biochemical response. He completed radiation therapy to prostate in October 2022 ago and returns for follow up today. PSA is undetectable. Follows with medical oncology.  Their plan is for him to take apalutamide until it runs out at the end of this month and let ADT run its course without a repeat dose.     PLAN:   See me in 6 months. Continue to follow up with medical oncology, defer to them on ordering PSA.     MALLY Guerrero  Department of Radiation Oncology  [facility]     10 min with pt.

## 2023-12-06 NOTE — PATIENT INSTRUCTIONS
Joel,   Here is what we discussed today.      Stop Erleada when you run out.    Lupron shot will last about 3 months more, but we will not do any additional shots.   PSA every 3 months.  See Fawn Chau in 3 months, me in 6 months.

## 2023-12-06 NOTE — PROGRESS NOTES
[facility] Medical Oncology Return Note       Date of visit: December 4, 2023    CC:   metastatic prostate cancer, now s/p 2 years of therapy with apalutamide/ADT.      ONCOLOGY HISTORY:  Elevated PSA noted 2/26/21 at 12.70. Previously normal in 2017.    4/7/21-Initial urology visit.  7/2/21-prostate biopsy 4+3, 9/12 biopsies positive.  Ductal component noted.    7/28/21-MR prostate-PIRADS 5 lesion, R and L sided lesions with likely    neurovascular bundle invasion. No  seminal vesicle involvement. No clear LAD,  but some indeterminate pelvic nodes.  No suspicious bone lesions.    7/28/21-NM bone scan suspicious lesion of R sacral ala S3 level.   8/10/21-CT A/P with multiple enlarged periaortic/iliac LN  8/11/21-First eligard dose 45mg (Q6M dosing). Due next 2/2022.  8/30/21-Initial medical oncology visit with Dr. Solo.  Unclear if bony lesion is metastasis based on current imaging, but this was not irradiated. Start apalutamide/eligard  8/30/21 PSA =30.40 Pre Rx  9/27/21 PSA =8.99 (T=6); HgbA1C = 6.1  10/5/21 PSA =3.22  11/26/21 PSA =0.15  7/26/22 PSA <0.01  8/22  Referral to radiation oncology for EBRT given low volume prostate cancer.  55cGy  in 20 fractions completed on 10/5/22.   11/11/22 PSA= <0.01  02/2023 ELIGARD 45 MG with Dr. Ott of urology.    5/10/23 PSA= <0.01  8/18/23 PSA=0.12, unclear now whether this was a lab error given known inconsistencies with     Some recent PSA tests at [facility].   9/1/23  PSA= <0.01  9/6/23  Eligard 45mg. DUE 3/6/24.  PSA rechecked <0.01.  Lupron 6 month shot received  AM before appt.   10/6/23 PSA <0.01  11/3/23  PSA <0.01  12/4/23 PSA <0.01.  Stop Erleada and ADT at a little over 2 years due to likely false elevation of PSA in August and monitor PSA Q3M.        Interval History:  Some intermittent hot flashes.  Energy is still low from ADT/ART.  No new pains.  Happy to be done with his course of therapy at just a little over 2 years.  No additional complaints or  concerns.  He has about half a bottle of apalutamide left and will finish it and then stop.       PMH:  HTN, HLD, bipolar disorder, DM2 on metformin     PSH:  Bilateral TKA, bilateral cataract extraction, bilateral carpal tunnel release     FAMILY HX:  No reported family history of prostate cancer.  SIster with liver cancer in 20s.    SOCIAL:  Retired, works at [employer] in [place]  Never smoker.   No EtOH  No recreational drugs.   No herbs/supplements other than on medication list.      MEDS:  Current Outpatient Medications   Medication Instructions    albuterol (PROAIR HFA) 108 (90 BASE) MCG/ACT Inhaler 1-2 puffs every 2 -4 hrs as needed    ALLEGRA-D ALLERGY & CONGESTION 180-240 MG 24 hr tablet TAKE ONE TABLET BY MOUTH EVERY DAY    blood glucose (HUGO CONTOUR) test strip Use to test blood sugars 1 time daily or as directed.    blood glucose (NO BRAND SPECIFIED) lancets standard Use to test blood sugar one time daily or as directed.    blood glucose calibration (HUGO CONTOUR) NORMAL solution Use to calibrate blood glucose monitor as directed.    buPROPion (WELLBUTRIN XL) 300 MG 24 hr tablet TAKE ONE TABLET BY MOUTH EVERY MORNING    Coenzyme Q-10 capsule 1 capsule, DAILY    esomeprazole (NEXIUM) 40 MG DR capsule TAKE ONE CAPSULE BY MOUTH EVERY MORNING BEFORE BREAKFAST , 30-60 MINUTES BEFORE EATING    ezetimibe (ZETIA) 10 MG tablet TAKE ONE TABLET BY MOUTH ONCE DAILY    fish oil-omega-3 fatty acids 1000 MG capsule Take  by mouth. Take daily      levothyroxine (SYNTHROID/LEVOTHROID) 112 mcg, Oral, DAILY    losartan (COZAAR) 75 mg, Oral, DAILY    Magnesium 500 MG CAPS One twice a day    metFORMIN (GLUCOPHAGE) 500 MG tablet TAKE ONE TABLET BY MOUTH TWICE A DAY WITH MEALS    Misc Natural Products (OSTEO BI-FLEX ADV JOINT SHIELD) TABS Take  by mouth.    multivitamin (OCUVITE) TABS tablet 1 tablet, Oral, DAILY    STATIN NOT PRESCRIBED (INTENTIONAL) Please choose reason not prescribed, below    Vitamin D3  "(CHOLECALCIFEROL) 5,000 Units, Oral     ROS-Remainder of 14 point ROS reviewed and negative except as in HPI.    PHYSICAL EXAM:  Ht 1.778 m (5' 10\")   Wt 102.1 kg (225 lb)   BMI 32.28 kg/m    Video exam  Exam:  Constitutional: healthy, alert, and no distress  Head: Normocephalic. No masses, lesions, tenderness or abnormalities  Neck: trachea midline   ENT: MMM, anicteric sclerae  Respiratory: negative, normal WOB on RA, no wheezing or rhonchi.   Musculoskeletal: extremities normal- no gross deformities noted and gait normal  Skin: no suspicious lesions or rashes on exposed areas of skin   Neurologic: negative, CNII-XII grossly intact, normal speech and mentation.   Psychiatric: mentation appears normal and affect normal/bright    LABS AND IMAGES:    Prostate Biopsy 7/2/21  FINAL DIAGNOSIS:   A: Prostate, left, biopsy   - Adenocarcinoma, Mary's grade 4/3 with ductal component involving 4 of    6 needle core biopsies and 25% of   submitted tissue     B: Prostate, right, biopsy   - Adenocarcinoma, Mary's grade 4/3 with ductal component involving 5 of    6 needle core biopsies and 25% of   submitted tissue     Labs:  Most Recent 3 CBC's:  Recent Labs   Lab Test 12/01/23  0904 11/03/23  0913 08/18/23  0910   WBC 6.4 6.4 5.1   HGB 12.0* 11.7* 11.9*   MCV 92 93 93    251 179   ANEUTAUTO 4.7 4.6 3.5     Most Recent 3 BMP's:  Recent Labs   Lab Test 12/01/23  0904 11/03/23  0913 08/18/23  0910    137 137   POTASSIUM 4.8 4.9 4.7   CHLORIDE 101 99 101   CO2 27 27 23   BUN 22.1 25.6* 14.0   CR 1.19* 1.16 1.10   ANIONGAP 11 11 13   AURORA 9.8 9.6 9.3   * 148* 143*   PROTTOTAL 6.4 6.8 6.5   ALBUMIN 4.2 4.4 4.3    Most Recent 3 LFT's:  Recent Labs   Lab Test 12/01/23  0904 11/03/23  0913 08/18/23  0910   AST 18 21 21   ALT 20 25 24   ALKPHOS 90 88 79   BILITOTAL 0.4 0.4 0.4    Most Recent 2 TSH and T4:  Recent Labs   Lab Test 12/01/23  0904 06/20/23  1313   TSH 4.71* 4.24*   T4 1.23 1.19       PSA trend, " see oncology history.      I reviewed the above labs today.      IMPRESSION AND PLAN:  Joel Pike is a 68 year old M with PMH of DM2 on metformin, depression/anxiety (pt reports as bipolar d/o), HTN, HLD, and prostate cancer with charity disease only.  He was treated with EBRT to the prostate for low volume metastatic disease along with ADT and apalutamide.  He has responded well and has had an undetectable PSA since 7/2022.  He had a single PSA thus far of 0.12 in August 2023 that is likely a lab error rather than a true value.  We will check PSA monthly through December 2023 and then if it remains undetectable, we will stop apalutamide and let ADT run its course without a repeat dose.      -He started apalutamide on 10/5/21. He had CT CAP done 11/26/21 which showed significantly decreased retroperitoneal lymphadenopathy since the prior study dated 8/10/2021 indicates good response to treatment. No other evidence for lymphadenopathy or metastasis is identified. Specifically no evidence for left sacral metastasis is seen.   -Completed RT to the primary for oligometastatic disease based on the STAMPEDE study with Dr. Gomez ~09/2022  -Plan is to continue ADT for 2 years (through 8/2023).  We extended this for another 3 months due to a rise in PSA that ultimately appeared to be a lab abnormality.  Three subsequent PSAs were then negative.  He received 6 month shot the AM of 9/6 prior to our appt.  At our 12/4/23 appt, he felt good and was in agreement to stop therapy once his bottle of apalutamide was complete.  We will monitor PSA every 3 months moving forward.    -Consent signed for Flora whole genome/transcriptome sequencing of prostate biopsy.      # normocytic anemia.    He has mild normocytic anemia but no bleeding source.  He should not be anemic with ADT, so we did anemia workup, which was iron replete and normal vitamin B12.  Will continue to watch and monitor without additional interventions to see if Hgb rises  with rise in testosterone.      PLAN:    Stop Erleada when you run out.    Lupron shot will last about 3 months more, but we will not do any additional shots.   PSA every 3 months.  See Fawn Chau in 3 months, me in 6 months.      A total of 30 minutes were spent on this patient on the day of the encounter, of which more than 50% of this time was used for counseling and coordination of care.  The patient was given the opportunity to ask multiple questions today, all of which were answered to their satisfaction.     Michael Nuno MD, PhD   of Medicine   Oncology/BMT/Cellular Therapies

## 2023-12-07 ENCOUNTER — THERAPY VISIT (OUTPATIENT)
Dept: PHYSICAL THERAPY | Facility: CLINIC | Age: 68
End: 2023-12-07
Attending: FAMILY MEDICINE
Payer: MEDICARE

## 2023-12-07 DIAGNOSIS — M54.50 CHRONIC BILATERAL LOW BACK PAIN WITHOUT SCIATICA: Primary | ICD-10-CM

## 2023-12-07 DIAGNOSIS — G89.29 CHRONIC BILATERAL LOW BACK PAIN WITHOUT SCIATICA: Primary | ICD-10-CM

## 2023-12-07 PROCEDURE — 97110 THERAPEUTIC EXERCISES: CPT | Mod: GP | Performed by: PHYSICAL THERAPIST

## 2023-12-07 PROCEDURE — 97112 NEUROMUSCULAR REEDUCATION: CPT | Mod: GP | Performed by: PHYSICAL THERAPIST

## 2023-12-07 NOTE — PROGRESS NOTES
DISCHARGE  Reason for Discharge: Patient has met all goals.    Equipment Issued: red-green -blue bands    Discharge Plan: Patient to continue home program.  Plans to work out with trainers at gym 5 days per week    Referring Provider:  Nishi Hdz Mai, MD   12/07/23 0500   Appointment Info   Signing clinician's name / credentials Ashley Whitley, PT LAT FPS   Visits Used 4   Medical Diagnosis Chronic bilateral low back pain without sciatica (M54.50, G89.29)   PT Tx Diagnosis Low back pain   Precautions/Limitations Complex medical history, including CAD with CABG, hyperlipidemia, HTN, and sleep apnea, diabetes mellitus   Quick Adds Certification   Progress Note/Certification   Start of Care Date 11/27/23   Onset of illness/injury or Date of Surgery 10/30/23  (approximately 3-4 weeks ago)   Therapy Frequency 2 times per week   Predicted Duration 6 weeks   Certification date from 11/27/23   Certification date to 01/08/24   Progress Note Due Date 12/08/23   Progress Note Completed Date 12/07/23       Present No   GOALS   PT Goals 2   PT Goal 1   Goal Identifier Recreation   Goal Description Joel will be able to manage symptoms with posture and positioning allowing him stand and walk with minimal symptoms so he can hunt and fish   Goal Progress Feels her could kayak and fish   Target Date 12/29/23   Date Met 12/07/23   PT Goal 2   Goal Identifier Home program   Goal Description Joel will complete a home program for strength, balance, ROM as instructed to eliminate or reduce symptoms to 0-1/10 with standing x 15 minutes and walking x 20 minutes   Goal Progress Plans to go to gym 5 days per week for strength, balance, water aerobics   Target Date 01/26/24   Date Met 12/07/23   Subjective Report   Subjective Report No pain, can use posture and positioning to eliminate symptoms. without cancer so will be able to stop radiation and some medication. Ready for discharge   Objective Measures   Objective  Measures Objective Measure 1;Objective Measure 2;Objective Measure 3   Objective Measure 1   Objective Measure home program   Details compteing exercises and positioning, able to eliminate pain independently   Objective Measure 2   Objective Measure LISA   Details 0-1   Objective Measure 3   Objective Measure VANDANA   Details 7-9-25.56 initally 36   Treatment Interventions (PT)   Interventions Therapeutic Procedure/Exercise;Neuromuscular Re-education   Therapeutic Procedure/Exercise   Therapeutic Procedures: strength, endurance, ROM, flexibillity minutes (76907) 20   Ther Proc 1 - Details home program: added Pallof press 1-3 sets x 10 with red->green ->blue band, sidelying gluteal strengthening  - see PTRx, demonstrated ways to use bands for strengthening core and improve endurance - kayaking forward and back, fishing - cast and reverse case in sitting or standing, encouraged ongoiing aerobic activities   Skilled Intervention instruction, demonstration, activities to help with functional activities   Patient Response/Progress no questions   Neuromuscular Re-education   Neuromuscular re-ed of mvmt, balance, coord, kinesthetic sense, posture, proprioception minutes (62029) 8   Neuro Re-ed 1 - Details reviewed posture and positioning, encouraged to complete extension even without symptoms for prevention and certainly after flexion activities   Skilled Intervention review   Patient Response/Progress no questions   Education   Learner/Method Patient;Listening;Reading;Demonstration;Pictures/Video;No Barriers to Learning   Education Comments pallof press, side lying hip abduction, functional activities with band, plan of care and goals, VANDANA and LISA reviewed   Plan   Home program Sit in firmer chairs with lumbar roll, supine extension with lumbar roll hooklying 1 x 5-15 minutes x 4-6 times per day, standing lumbar extension 1 -3 x 10 x 4-6 times per day. Goal of localizing symptoms and decreasing his intensity, pallof  press, side lying hip abduction, functional activities with band   Updates to plan of care discharge   Total Session Time   Timed Code Treatment Minutes 28   Total Treatment Time (sum of timed and untimed services) 28

## 2023-12-14 ENCOUNTER — TRANSFERRED RECORDS (OUTPATIENT)
Dept: HEALTH INFORMATION MANAGEMENT | Facility: CLINIC | Age: 68
End: 2023-12-14
Payer: MEDICARE

## 2023-12-14 LAB — RETINOPATHY: NEGATIVE

## 2023-12-30 DIAGNOSIS — E03.9 HYPOTHYROIDISM, UNSPECIFIED TYPE: ICD-10-CM

## 2024-01-02 RX ORDER — LEVOTHYROXINE SODIUM 137 UG/1
137 TABLET ORAL DAILY
Qty: 90 TABLET | Refills: 0 | Status: SHIPPED | OUTPATIENT
Start: 2024-01-02 | End: 2024-04-01

## 2024-01-03 ENCOUNTER — TELEPHONE (OUTPATIENT)
Dept: CARDIOLOGY | Facility: CLINIC | Age: 69
End: 2024-01-03
Payer: MEDICARE

## 2024-01-03 DIAGNOSIS — F33.42 RECURRENT MAJOR DEPRESSION IN COMPLETE REMISSION (H): ICD-10-CM

## 2024-01-03 RX ORDER — BUPROPION HYDROCHLORIDE 300 MG/1
300 TABLET ORAL EVERY MORNING
Qty: 90 TABLET | Refills: 0 | Status: SHIPPED | OUTPATIENT
Start: 2024-01-03 | End: 2024-04-01

## 2024-01-03 NOTE — TELEPHONE ENCOUNTER
Patient cannot afford Repatha, price $530 for 1 month or $806 for 3 month supply.  Higher copay due to $545 deductible being applied to medication.  Is there an alternative that can be prescribed for him?    Thank you,  Soco Oakes [facility]  [phone]

## 2024-01-16 NOTE — TELEPHONE ENCOUNTER
Called pt and reviewed information below from pharmacy liaison. Pt verbalized understanding and stated he did not realize he had a deductible to meet. Pt stated he would like to continue on Repatha and he will contact his pharmacy and pick this up.     Eleni Eng, Kaci SALAMANCA, RN  Patient has Medicare D through [employer].    Praluent is not covered.    Repatha  --Patient will pay 100% of the first $545 in drugs costs (first month will be as much as $523)  --Subsequent fills will be $131/mo, or $84/mo at [facility], [facility], [facility], Mail Order.  --lf/when total drug costs exceed $5,030, price will increase to a 25% coinsurance, equivalent to $131/mo at all pharmacies.    Leqvio is a Clinic Administered Medication (CAM), and pricing on this can not be estimated by Pharmacy Liaison.      Coverage will be secured by the CAM team when an order for Leqvio is in the chart along with an appointment for administration.  Additional questions should be directed to the Clinic Administered Medication (CAM)  Via Epic: P CAM   Via Email: DEPT-CAM-FINANCE-SPECIALIST  Via Phone: [phone]    If patient would like a price estimate, patient may:  1) contact Member Services department of their insurance or  2) complete a cost of care estimate request at [email or web address] or by calling [phone].    In both cases, patient should reference J-code .    Eleni Eng  Pharmacy Technician/Liaison, Discharge Pharmacy  [phone] (voice or text)  [email or web address]

## 2024-01-25 ENCOUNTER — VIRTUAL VISIT (OUTPATIENT)
Dept: PHARMACY | Facility: CLINIC | Age: 69
End: 2024-01-25
Payer: COMMERCIAL

## 2024-01-25 DIAGNOSIS — C61 PROSTATE CANCER (H): ICD-10-CM

## 2024-01-25 DIAGNOSIS — E11.22 TYPE 2 DIABETES MELLITUS WITH STAGE 2 CHRONIC KIDNEY DISEASE, WITHOUT LONG-TERM CURRENT USE OF INSULIN (H): Primary | ICD-10-CM

## 2024-01-25 DIAGNOSIS — Z78.9 TAKES DIETARY SUPPLEMENTS: ICD-10-CM

## 2024-01-25 DIAGNOSIS — Z95.1 S/P CABG (CORONARY ARTERY BYPASS GRAFT): ICD-10-CM

## 2024-01-25 DIAGNOSIS — F33.42 RECURRENT MAJOR DEPRESSION IN COMPLETE REMISSION (H): ICD-10-CM

## 2024-01-25 DIAGNOSIS — K21.9 GASTROESOPHAGEAL REFLUX DISEASE WITHOUT ESOPHAGITIS: ICD-10-CM

## 2024-01-25 DIAGNOSIS — R07.9 CHEST PAIN, UNSPECIFIED TYPE: ICD-10-CM

## 2024-01-25 DIAGNOSIS — N18.2 TYPE 2 DIABETES MELLITUS WITH STAGE 2 CHRONIC KIDNEY DISEASE, WITHOUT LONG-TERM CURRENT USE OF INSULIN (H): Primary | ICD-10-CM

## 2024-01-25 DIAGNOSIS — E03.9 HYPOTHYROIDISM, UNSPECIFIED TYPE: ICD-10-CM

## 2024-01-25 DIAGNOSIS — E78.5 HYPERLIPIDEMIA LDL GOAL <100: ICD-10-CM

## 2024-01-25 DIAGNOSIS — I10 ESSENTIAL HYPERTENSION: ICD-10-CM

## 2024-01-25 PROCEDURE — 99207 PR NO CHARGE LOS: CPT | Mod: 93 | Performed by: PHARMACIST

## 2024-01-25 RX ORDER — NITROGLYCERIN 0.4 MG/1
TABLET SUBLINGUAL
Qty: 25 TABLET | Refills: 1 | Status: SHIPPED | OUTPATIENT
Start: 2024-01-25

## 2024-01-25 NOTE — PATIENT INSTRUCTIONS
"Recommendations from today's MTM visit:                                                    MTM (medication therapy management) is a service provided by a clinical pharmacist designed to help you get the most of out of your medicines.   Today we reviewed what your medicines are for, how to know if they are working, that your medicines are safe and how to make your medicine regimen as easy as possible.      I refilled your nitroglycerin sublingual tablets    Contact the [place] Prescription Assistance Program/[facility] at [phone] if you have concerns about medicine cost.    You should refill and restart your tamsulosin (Flomax) and rosuvastatin (Crestor) - I could not find documentation to stop these.   If your urinary symptoms do not improve enough we could try a higher dose of tamsulosin.   It appears that Dr. Lin though you could potential stop rosuvastatin in the future, but he wanted to recheck lipid/cholesterol labs after 3 months first.     Follow-up: 6 months or sooner if needed    It was great speaking with you today.  I value your experience and would be very thankful for your time in providing feedback in our clinic survey. In the next few days, you may receive an email or text message from [employer] with a link to a survey related to your  clinical pharmacist.\"     To schedule another MTM appointment, please call the clinic directly or you may call the MTM scheduling line at [phone] or toll-free at [phone].     My Clinical Pharmacist's contact information:                                                      Please feel free to contact me with any questions or concerns you have.      Chaitanya Adams, Jarrell, BCACP  Medication Therapy Management Pharmacist  Pager: [phone]  "

## 2024-01-25 NOTE — PROGRESS NOTES
Medication Therapy Management (MTM) Encounter    ASSESSMENT:                            Medication Adherence/Access: See below for considerations    Type 2 Diabetes:  Stable. A1c is at goal of <8%.     CAD/Hypertension: Stable. Last blood pressure is close to goal of <130/80 mmHg.     Hypothyroidism: Stable. Last T4 is within normal limits.    GERD: Stable.     Prostate Cancer: Would benefit from restarting tamsulosin. Could consider dose increase.       Hyperlipidemia: Patient would likely benefit from restarting statin until lipids are rechecked.     Depression:  Stable per patient.     Supplements: Stable.     PLAN:                            I refilled your nitroglycerin sublingual tablets    Contact the [facility] Prescription Assistance Program/Fund at [phone] if you have concerns about medicine cost.    You should refill and restart your tamsulosin (Flomax) and rosuvastatin (Crestor) - I could not find documentation to stop these.   If your urinary symptoms do not improve enough we could try a higher dose of tamsulosin.   It appears that Dr. Lin though you could potential stop rosuvastatin in the future, but he wanted to recheck lipid/cholesterol labs after 3 months first.     Follow-up: 6 months or sooner if needed    SUBJECTIVE/OBJECTIVE:                          Joel Pike is a 68 year old male called for a follow-up visit from 4/24/2023.       Reason for visit: Comprehensive medication review.    Allergies/ADRs: Reviewed in chart  Past Medical History: Reviewed in chart  Tobacco: He reports that he has never smoked. He has never used smokeless tobacco.  Alcohol: none  Caffeine: 3-4 cups Keurigs - up until bedtime     Medication Adherence/Access: Patient uses pill box(es).  Patient takes medications 3 time(s) per day.   Per patient, misses medication 0 times per week.   Medication barriers: none.   The patient fills medications at [facility]: YES.    Diabetes   Type 2 Diabetes:  Currently taking  metformin IR 1000 mg twice daily. Patient is not experiencing side effects.  Blood sugar monitorin-1 time(s) daily. Ranges (patient reported):  mg/dL  Symptoms of low blood sugar? none  Symptoms of high blood sugar? none  Eye exam: up to date  Foot exam: up to date  Diet/Exercise: denies any issues  Aspirin: Taking 81mg daily and denies side effects   Statin: Yes: rosuvastatin   ACEi/ARB: Yes: losartan.     Eye exam is up to date  Foot exam is up to date  Urine Albumin:   Lab Results   Component Value Date    UMALCR 96.10 (H) 2023      Lab Results   Component Value Date    A1C 6.1 (H) 2023        Hypertension   CAD/Hypertension: Current medications include aspirin 81 mg daily, losartan 75 mg daily, metoprolol tartrate 37.5 mg twice daily, and nitroglycerin SL as needed. History of coronary artery bypass graft.  Patient does not self-monitor blood pressure.  Patient reports no current medication side effects.     BP Readings from Last 3 Encounters:   23 131/66   23 124/72   10/04/23 108/64     Pulse Readings from Last 3 Encounters:   23 66   23 86   10/04/23 60     Hypothyroidism    Levothyroxine 137 mcg daily.   Patient is having the following symptoms: none.      TSH   Date Value Ref Range Status   2023 4.71 (H) 0.30 - 4.20 uIU/mL Final   2022 4.61 (H) 0.40 - 4.00 mU/L Final   2020 6.02 (H) 0.40 - 4.00 mU/L Final     T4 Free   Date Value Ref Range Status   2020 1.08 0.76 - 1.46 ng/dL Final     Free T4   Date Value Ref Range Status   2023 1.23 0.90 - 1.70 ng/dL Final       GERD: Current medications include: Protonix (pantoprazole) 40 mg once daily. Patient reports no current symptoms.  Patient feels that current regimen is effective.    Prostate Cancer: Patient is having lots of urinary frequency. Did cut back on caffeine somewhat, but all day issue. Stopped tamsulosin for unknown reason earlier in .     Hyperlipidemia   Hyperlipidemia:  Current therapy includes Repatha 140 mg every 14 days and, ezetimibe 10 mg daily. Stopped rosuvastatin, but unclear why. Dr. Lin did suggest he could eventually get off, but appears not until 3 month lab recheck. Patient reports no myalgias, but has had issues tolerating statins in the past.  The 10-year ASCVD risk score (Anish CONRAD, et al., 2019) is: 38.9%    Values used to calculate the score:      Age: 68 years      Sex: Male      Is Non- : No      Diabetic: Yes      Tobacco smoker: No      Systolic Blood Pressure: 131 mmHg      Is BP treated: Yes      HDL Cholesterol: 31 mg/dL      Total Cholesterol: 202 mg/dL       Recent Labs   Lab Test 10/06/23  0910 02/22/23  0851   CHOL 202* 155   HDL 31* 35*   * 83   TRIG 275* 183*     Depression:  Current medications include: bupropion  mg every morning. Finds to be helpful for mood. Denies any known side effects.       1/31/2022    10:06 AM 3/20/2023     6:51 AM 11/3/2023    11:08 AM   PHQ   PHQ-9 Total Score 7 2 4   Q9: Thoughts of better off dead/self-harm past 2 weeks Not at all Not at all Not at all       Supplements: Patient is taking calcium/vitamin D every evening, CoQ10 every morning, magnesium 400 mg daily, Osteo Bi-Flex twice daily, eye vitamin twice daily, a daily probiotic, and vitamin C 500 mg daily. Denies any known issues.     Today's Vitals: There were no vitals taken for this visit.    Wt Readings from Last 5 Encounters:   12/05/23 225 lb (102.1 kg)   12/04/23 225 lb (102.1 kg)   11/03/23 230 lb (104.3 kg)   10/04/23 234 lb (106.1 kg)   09/06/23 234 lb 3.2 oz (106.2 kg)     ----------------      I spent 20 minutes with this patient today. All changes were made via collaborative practice agreement with Nishi Hdz Mai, MD. A copy of the visit note was provided to the patient's provider(s).    A summary of these recommendations was sent via [employer].    Chaitanya Adams, PharmD, BCACP  Medication Therapy Management  Pharmacist  Pager: [phone]    Telemedicine Visit Details  Type of service:  Telephone visit  Start Time:  11:07 AM  End Time: 11:27 AM     Medication Therapy Recommendations  Hyperlipidemia LDL goal <70    Current Medication: rosuvastatin (CRESTOR) 10 MG tablet   Rationale: Does not understand instructions - Adherence - Adherence   Recommendation: Provide Education - Restart rosuvastatin   Status: Accepted - no CPA Needed         Prostate cancer (H)    Current Medication: tamsulosin (FLOMAX) 0.4 MG capsule   Rationale: Patient forgets to take - Adherence - Adherence   Recommendation: Provide Education - Restart tamsulosin 0.4 mg daily   Status: Accepted - no CPA Needed

## 2024-02-08 DIAGNOSIS — C61 PROSTATE CANCER (H): ICD-10-CM

## 2024-02-08 DIAGNOSIS — I25.118 ATHEROSCLEROSIS OF NATIVE CORONARY ARTERY OF NATIVE HEART WITH STABLE ANGINA PECTORIS (H): ICD-10-CM

## 2024-02-08 RX ORDER — TAMSULOSIN HYDROCHLORIDE 0.4 MG/1
0.4 CAPSULE ORAL DAILY
Qty: 90 CAPSULE | Refills: 3 | OUTPATIENT
Start: 2024-02-08

## 2024-02-12 RX ORDER — ROSUVASTATIN CALCIUM 10 MG/1
10 TABLET, COATED ORAL DAILY
Qty: 90 TABLET | Refills: 1 | Status: CANCELLED | OUTPATIENT
Start: 2024-02-12

## 2024-02-12 NOTE — TELEPHONE ENCOUNTER
Repatha should replace the Crestor.   Has he been taking the Repatha injection.  If he has not then he should stay on Crestor until then.  Let me know. thx

## 2024-02-12 NOTE — TELEPHONE ENCOUNTER
Patient hasnt been taking the statin for quiet a while now he did get a letter from Chaitanya woodson visit stating he should restart it visit 1/25/24 patient doesn't know why you got the refill request. He is coming in tomorrow morning for the lab appt.

## 2024-02-12 NOTE — TELEPHONE ENCOUNTER
Please check if he is still taking this medication since he is on Rapatha that was prescribed by the cardiologist.

## 2024-02-13 ENCOUNTER — LAB (OUTPATIENT)
Dept: LAB | Facility: CLINIC | Age: 69
End: 2024-02-13
Payer: MEDICARE

## 2024-02-13 DIAGNOSIS — C61 HORMONE SENSITIVE PROSTATE CANCER (H): ICD-10-CM

## 2024-02-13 DIAGNOSIS — I25.118 ATHEROSCLEROSIS OF NATIVE CORONARY ARTERY OF NATIVE HEART WITH STABLE ANGINA PECTORIS (H): ICD-10-CM

## 2024-02-13 DIAGNOSIS — Z19.1 HORMONE SENSITIVE PROSTATE CANCER (H): ICD-10-CM

## 2024-02-13 LAB
CHOLEST SERPL-MCNC: 101 MG/DL
FASTING STATUS PATIENT QL REPORTED: YES
HDLC SERPL-MCNC: 39 MG/DL
LDLC SERPL CALC-MCNC: 19 MG/DL
NONHDLC SERPL-MCNC: 62 MG/DL
PSA SERPL DL<=0.01 NG/ML-MCNC: <0.01 NG/ML (ref 0–4.5)
TRIGL SERPL-MCNC: 215 MG/DL

## 2024-02-13 PROCEDURE — 80061 LIPID PANEL: CPT

## 2024-02-13 PROCEDURE — 84403 ASSAY OF TOTAL TESTOSTERONE: CPT

## 2024-02-13 PROCEDURE — 84153 ASSAY OF PSA TOTAL: CPT

## 2024-02-13 PROCEDURE — 36415 COLL VENOUS BLD VENIPUNCTURE: CPT

## 2024-02-13 NOTE — TELEPHONE ENCOUNTER
Spoke with patient and informed of note below. Patient was taking both repatha injection and crestor. Wasn't sure if he was supposed to stop. Let him know the injection was to replace the Crestor.     Routing to provider as NEFTALI Fritz MA

## 2024-02-15 DIAGNOSIS — I25.118 ATHEROSCLEROSIS OF NATIVE CORONARY ARTERY OF NATIVE HEART WITH STABLE ANGINA PECTORIS (H): ICD-10-CM

## 2024-02-15 LAB — TESTOST SERPL-MCNC: <2 NG/DL (ref 240–950)

## 2024-02-15 RX ORDER — ROSUVASTATIN CALCIUM 10 MG/1
10 TABLET, COATED ORAL DAILY
Qty: 90 TABLET | Refills: 1 | Status: SHIPPED | OUTPATIENT
Start: 2024-02-15

## 2024-02-15 NOTE — PROGRESS NOTES
Please let patient know that I got in touch with his cardiologist, she recommend him to continue with the Zetia and Crestor in addition to Repatha.  Crestor refilled, please have him to restart it as soon as possible..  Thank you

## 2024-03-05 ENCOUNTER — LAB (OUTPATIENT)
Dept: LAB | Facility: CLINIC | Age: 69
End: 2024-03-05
Payer: MEDICARE

## 2024-03-05 DIAGNOSIS — Z19.1 HORMONE SENSITIVE PROSTATE CANCER (H): ICD-10-CM

## 2024-03-05 DIAGNOSIS — C61 HORMONE SENSITIVE PROSTATE CANCER (H): ICD-10-CM

## 2024-03-05 LAB — PSA SERPL DL<=0.01 NG/ML-MCNC: <0.01 NG/ML (ref 0–4.5)

## 2024-03-05 PROCEDURE — 36415 COLL VENOUS BLD VENIPUNCTURE: CPT

## 2024-03-05 PROCEDURE — 84403 ASSAY OF TOTAL TESTOSTERONE: CPT

## 2024-03-05 PROCEDURE — 84153 ASSAY OF PSA TOTAL: CPT

## 2024-03-05 NOTE — PROGRESS NOTES
[facility] Medical Oncology Return Note       Date of visit: Mar 6, 2024    CC:   metastatic prostate cancer, now s/p 2 years of therapy with apalutamide/ADT.      ONCOLOGY HISTORY:  Elevated PSA noted 2/26/21 at 12.70. Previously normal in 2017.    4/7/21-Initial urology visit.  7/2/21-prostate biopsy 4+3, 9/12 biopsies positive.  Ductal component noted.    7/28/21-MR prostate-PIRADS 5 lesion, R and L sided lesions with likely    neurovascular bundle invasion. No  seminal vesicle involvement. No clear LAD,  but some indeterminate pelvic nodes.  No suspicious bone lesions.    7/28/21-NM bone scan suspicious lesion of R sacral ala S3 level.   8/10/21-CT A/P with multiple enlarged periaortic/iliac LN  8/11/21-First eligard dose 45mg (Q6M dosing). Due next 2/2022.  8/30/21-Initial medical oncology visit with Dr. Solo.  Unclear if bony lesion is metastasis based on current imaging, but this was not irradiated. Start apalutamide/eligard  8/30/21 PSA =30.40 Pre Rx  9/27/21 PSA =8.99 (T=6); HgbA1C = 6.1  10/5/21 PSA =3.22  11/26/21 PSA =0.15  7/26/22 PSA <0.01  8/22  Referral to radiation oncology for EBRT given low volume prostate cancer.  55cGy  in 20 fractions completed on 10/5/22.   11/11/22 PSA= <0.01  02/2023 ELIGARD 45 MG with Dr. Ott of urology.    5/10/23 PSA= <0.01  8/18/23 PSA=0.12, unclear now whether this was a lab error given known inconsistencies with     Some recent PSA tests at [facility].   9/1/23  PSA= <0.01  9/6/23  Eligard 45mg. DUE 3/6/24.  PSA rechecked <0.01.  Lupron 6 month shot received  AM before appt.   10/6/23 PSA <0.01  11/3/23  PSA <0.01  12/4/23 PSA <0.01.  Stop Erleada and ADT at a little over 2 years due to likely false elevation of PSA in August and monitor PSA Q3M.    03/05/23         PSA <0.01      Interval History:  Joel is seen virtually today. He is feeling well today. He has some intermittent hot flashes that have been lessening in frequency. Previously was experiencing  multiple hot flashes per day and is now occurring ~1 time a day. He finds his energy is still low but it is improving. He takes a daily nap. No new persistent headaches or vision changes. No recent illness. No fevers or chills. No urinary changes. No bowel changes.     PMH:  HTN, HLD, bipolar disorder, DM2 on metformin     PSH:  Bilateral TKA, bilateral cataract extraction, bilateral carpal tunnel release     FAMILY HX:  No reported family history of prostate cancer.  SIster with liver cancer in 20s.    SOCIAL:  Retired, works at [employer] in [employer] section now  Never smoker.   No EtOH  No recreational drugs.   No herbs/supplements other than on medication list.      MEDS:  Current Outpatient Medications   Medication Instructions    albuterol (PROAIR HFA) 108 (90 BASE) MCG/ACT Inhaler 1-2 puffs every 2 -4 hrs as needed    ALLEGRA-D ALLERGY & CONGESTION 180-240 MG 24 hr tablet TAKE ONE TABLET BY MOUTH EVERY DAY    blood glucose (HUGO CONTOUR) test strip Use to test blood sugars 1 time daily or as directed.    blood glucose (NO BRAND SPECIFIED) lancets standard Use to test blood sugar one time daily or as directed.    blood glucose calibration (HUGO CONTOUR) NORMAL solution Use to calibrate blood glucose monitor as directed.    buPROPion (WELLBUTRIN XL) 300 MG 24 hr tablet TAKE ONE TABLET BY MOUTH EVERY MORNING    Coenzyme Q-10 capsule 1 capsule, DAILY    esomeprazole (NEXIUM) 40 MG DR capsule TAKE ONE CAPSULE BY MOUTH EVERY MORNING BEFORE BREAKFAST , 30-60 MINUTES BEFORE EATING    ezetimibe (ZETIA) 10 MG tablet TAKE ONE TABLET BY MOUTH ONCE DAILY    fish oil-omega-3 fatty acids 1000 MG capsule Take  by mouth. Take daily      levothyroxine (SYNTHROID/LEVOTHROID) 112 mcg, Oral, DAILY    losartan (COZAAR) 75 mg, Oral, DAILY    Magnesium 500 MG CAPS One twice a day    metFORMIN (GLUCOPHAGE) 500 MG tablet TAKE ONE TABLET BY MOUTH TWICE A DAY WITH MEALS    Misc Natural Products (OSTEO BI-FLEX ADV JOINT SHIELD) TABS Take   by mouth.    multivitamin (OCUVITE) TABS tablet 1 tablet, Oral, DAILY    STATIN NOT PRESCRIBED (INTENTIONAL) Please choose reason not prescribed, below    Vitamin D3 (CHOLECALCIFEROL) 5,000 Units, Oral     ROS-Remainder of 14 point ROS reviewed and negative except as in HPI.    PHYSICAL EXAM:  Video physical exam  General: Patient appears well in no acute distress.   Skin: No visualized rash or lesions on visualized skin  Eyes: EOMI, no erythema, sclera icterus or discharge noted  Resp: Appears to be breathing comfortably without accessory muscle usage, speaking in full sentences, no cough  MSK: Appears to have normal range of motion based on visualized movements  Neurologic: No apparent tremors, facial movements symmetric  Psych: affect bright, alert and oriented     LABS AND IMAGES:    Prostate Biopsy 7/2/21  FINAL DIAGNOSIS:   A: Prostate, left, biopsy   - Adenocarcinoma, [place]'s grade 4/3 with ductal component involving 4 of    6 needle core biopsies and 25% of   submitted tissue     B: Prostate, right, biopsy   - Adenocarcinoma, Mary's grade 4/3 with ductal component involving 5 of    6 needle core biopsies and 25% of   submitted tissue     Labs:  Most Recent 3 CBC's:  Recent Labs   Lab Test 12/01/23  0904 11/03/23  0913 08/18/23  0910   WBC 6.4 6.4 5.1   HGB 12.0* 11.7* 11.9*   MCV 92 93 93    251 179   ANEUTAUTO 4.7 4.6 3.5     Most Recent 3 BMP's:  Recent Labs   Lab Test 12/01/23  0904 11/03/23  0913 08/18/23  0910    137 137   POTASSIUM 4.8 4.9 4.7   CHLORIDE 101 99 101   CO2 27 27 23   BUN 22.1 25.6* 14.0   CR 1.19* 1.16 1.10   ANIONGAP 11 11 13   AURORA 9.8 9.6 9.3   * 148* 143*   PROTTOTAL 6.4 6.8 6.5   ALBUMIN 4.2 4.4 4.3    Most Recent 3 LFT's:  Recent Labs   Lab Test 12/01/23  0904 11/03/23  0913 08/18/23  0910   AST 18 21 21   ALT 20 25 24   ALKPHOS 90 88 79   BILITOTAL 0.4 0.4 0.4    Most Recent 2 TSH and T4:  Recent Labs   Lab Test 12/01/23  0904 06/20/23  1313   TSH 4.71*  4.24*   T4 1.23 1.19     Component      Latest Ref Rng 3/5/2024  10:08 AM   PSA Tumor Marker      0.00 - 4.50 ng/mL <0.01      Testosterone 3/5/2024 pending.     PSA trend, see oncology history.      I reviewed the above labs today.      IMPRESSION AND PLAN:  Joel Pike is a 68 year old  M with PMH of DM2 on metformin, depression/anxiety (pt reports as bipolar d/o), HTN, HLD, and prostate cancer with charity disease only.  He was treated with EBRT to the prostate for low volume metastatic disease along with ADT and apalutamide.  He has responded well and has had an undetectable PSA since 7/2022.  He had a single PSA thus far of 0.12 in August 2023 that is likely a lab error rather than a true value. PSA was checked monthly  through December 2023 and then remained undetectable. Therefore, we stopped apalutamide and let ADT in December 2023.     -He started apalutamide on 10/5/21. He had CT CAP done 11/26/21 which showed significantly decreased retroperitoneal lymphadenopathy since the prior study dated 8/10/2021 indicates good response to treatment. No other evidence for lymphadenopathy or metastasis is identified. Specifically no evidence for left sacral metastasis is seen.   -Completed RT to the primary for oligometastatic disease based on the STAMPEDE study with Dr. Gomez ~09/2022  -Plan was to continue ADT for 2 years (through 8/2023).  We extended this for another 3 months due to a rise in PSA that ultimately appeared to be a lab abnormality.  Three subsequent PSAs were then negative.  He received 6 month shot the AM of 9/6/23.  - Discontinued  apalutamide in December 2023.   - We will monitor PSA every 3 months moving forward. No current plans to administer further ADT therapy or restart apalutamide.     - Caris whole genome/transcriptome sequencing of prostate biopsy performed 9/12/2023.     - PSA continues to be undetectable on 3/5/24 labs.     # normocytic anemia.    He has mild normocytic anemia but no  bleeding source.  He should not be anemic with ADT, so we did anemia workup, which was iron replete and normal vitamin B12.  Will continue to watch and monitor without additional interventions to see if Hgb rises with rise in testosterone.  Will recheck in 3 months when patient returns to see Dr. Nuno.     PLAN:   PSA every 3 months. To see Dr. Nuno in 6 months.      24  minutes spent on the date of the encounter doing chart review, review of test results, interpretation of tests, patient visit, and documentation     Fawn Marcus PA-C

## 2024-03-06 ENCOUNTER — VIRTUAL VISIT (OUTPATIENT)
Dept: ONCOLOGY | Facility: CLINIC | Age: 69
End: 2024-03-06
Payer: MEDICARE

## 2024-03-06 VITALS — HEIGHT: 70 IN | BODY MASS INDEX: 31.64 KG/M2 | WEIGHT: 221 LBS

## 2024-03-06 DIAGNOSIS — C79.51 PROSTATE CANCER METASTATIC TO BONE (H): Primary | ICD-10-CM

## 2024-03-06 DIAGNOSIS — C61 HORMONE SENSITIVE PROSTATE CANCER (H): ICD-10-CM

## 2024-03-06 DIAGNOSIS — Z19.1 HORMONE SENSITIVE PROSTATE CANCER (H): ICD-10-CM

## 2024-03-06 DIAGNOSIS — C61 PROSTATE CANCER METASTATIC TO BONE (H): Primary | ICD-10-CM

## 2024-03-06 PROCEDURE — 99213 OFFICE O/P EST LOW 20 MIN: CPT | Mod: 95

## 2024-03-06 ASSESSMENT — PAIN SCALES - GENERAL: PAINLEVEL: NO PAIN (0)

## 2024-03-06 NOTE — LETTER
3/6/2024         RE: Joel Pike  [address]  [facility] [address]        Dear Colleague,    Thank you for referring your patient, Joel Pike, to [facility]. Please see a copy of my visit note below.          [facility] Medical Oncology Return Note       Date of visit: Mar 6, 2024    CC:   metastatic prostate cancer, now s/p 2 years of therapy with apalutamide/ADT.      ONCOLOGY HISTORY:  Elevated PSA noted 2/26/21 at 12.70. Previously normal in 2017.    4/7/21-Initial urology visit.  7/2/21-prostate biopsy 4+3, 9/12 biopsies positive.  Ductal component noted.    7/28/21-MR prostate-PIRADS 5 lesion, R and L sided lesions with likely    neurovascular bundle invasion. No  seminal vesicle involvement. No clear LAD,  but some indeterminate pelvic nodes.  No suspicious bone lesions.    7/28/21-NM bone scan suspicious lesion of R sacral ala S3 level.   8/10/21-CT A/P with multiple enlarged periaortic/iliac LN  8/11/21-First eligard dose 45mg (Q6M dosing). Due next 2/2022.  8/30/21-Initial medical oncology visit with Dr. Solo.  Unclear if bony lesion is metastasis based on current imaging, but this was not irradiated. Start apalutamide/eligard  8/30/21 PSA =30.40 Pre Rx  9/27/21 PSA =8.99 (T=6); HgbA1C = 6.1  10/5/21 PSA =3.22  11/26/21 PSA =0.15  7/26/22 PSA <0.01  8/22  Referral to radiation oncology for EBRT given low volume prostate cancer.  55cGy  in 20 fractions completed on 10/5/22.   11/11/22 PSA= <0.01  02/2023 ELIGARD 45 MG with Dr. Ott of urology.    5/10/23 PSA= <0.01  8/18/23 PSA=0.12, unclear now whether this was a lab error given known inconsistencies with     Some recent PSA tests at [facility].   9/1/23  PSA= <0.01  9/6/23  Eligard 45mg. DUE 3/6/24.  PSA rechecked <0.01.  Lupron 6 month shot received  AM before appt.   10/6/23 PSA <0.01  11/3/23  PSA <0.01  12/4/23 PSA <0.01.  Stop Erleada and ADT at a little over 2 years due to likely false elevation of  PSA in August and monitor PSA Q3M.    03/05/23         PSA <0.01      Interval History:  Joel is seen virtually today. He is feeling well today. He has some intermittent hot flashes that have been lessening in frequency. Previously was experiencing multiple hot flashes per day and is now occurring ~1 time a day. He finds his energy is still low but it is improving. He takes a daily nap. No new persistent headaches or vision changes. No recent illness. No fevers or chills. No urinary changes. No bowel changes.     PMH:  HTN, HLD, bipolar disorder, DM2 on metformin     PSH:  Bilateral TKA, bilateral cataract extraction, bilateral carpal tunnel release     FAMILY HX:  No reported family history of prostate cancer.  SIster with liver cancer in 20s.    SOCIAL:  Retired, works at [employer] in [employer] section now  Never smoker.   No EtOH  No recreational drugs.   No herbs/supplements other than on medication list.      MEDS:  Current Outpatient Medications   Medication Instructions    albuterol (PROAIR HFA) 108 (90 BASE) MCG/ACT Inhaler 1-2 puffs every 2 -4 hrs as needed    ALLEGRA-D ALLERGY & CONGESTION 180-240 MG 24 hr tablet TAKE ONE TABLET BY MOUTH EVERY DAY    blood glucose (HUGO CONTOUR) test strip Use to test blood sugars 1 time daily or as directed.    blood glucose (NO BRAND SPECIFIED) lancets standard Use to test blood sugar one time daily or as directed.    blood glucose calibration (HUGO CONTOUR) NORMAL solution Use to calibrate blood glucose monitor as directed.    buPROPion (WELLBUTRIN XL) 300 MG 24 hr tablet TAKE ONE TABLET BY MOUTH EVERY MORNING    Coenzyme Q-10 capsule 1 capsule, DAILY    esomeprazole (NEXIUM) 40 MG DR capsule TAKE ONE CAPSULE BY MOUTH EVERY MORNING BEFORE BREAKFAST , 30-60 MINUTES BEFORE EATING    ezetimibe (ZETIA) 10 MG tablet TAKE ONE TABLET BY MOUTH ONCE DAILY    fish oil-omega-3 fatty acids 1000 MG capsule Take  by mouth. Take daily      levothyroxine (SYNTHROID/LEVOTHROID) 112 mcg,  Oral, DAILY    losartan (COZAAR) 75 mg, Oral, DAILY    Magnesium 500 MG CAPS One twice a day    metFORMIN (GLUCOPHAGE) 500 MG tablet TAKE ONE TABLET BY MOUTH TWICE A DAY WITH MEALS    Misc Natural Products (OSTEO BI-FLEX ADV JOINT SHIELD) TABS Take  by mouth.    multivitamin (OCUVITE) TABS tablet 1 tablet, Oral, DAILY    STATIN NOT PRESCRIBED (INTENTIONAL) Please choose reason not prescribed, below    Vitamin D3 (CHOLECALCIFEROL) 5,000 Units, Oral     ROS-Remainder of 14 point ROS reviewed and negative except as in HPI.    PHYSICAL EXAM:  Video physical exam  General: Patient appears well in no acute distress.   Skin: No visualized rash or lesions on visualized skin  Eyes: EOMI, no erythema, sclera icterus or discharge noted  Resp: Appears to be breathing comfortably without accessory muscle usage, speaking in full sentences, no cough  MSK: Appears to have normal range of motion based on visualized movements  Neurologic: No apparent tremors, facial movements symmetric  Psych: affect bright, alert and oriented     LABS AND IMAGES:    Prostate Biopsy 7/2/21  FINAL DIAGNOSIS:   A: Prostate, left, biopsy   - Adenocarcinoma, [place]'s grade 4/3 with ductal component involving 4 of    6 needle core biopsies and 25% of   submitted tissue     B: Prostate, right, biopsy   - Adenocarcinoma, Raleigh's grade 4/3 with ductal component involving 5 of    6 needle core biopsies and 25% of   submitted tissue     Labs:  Most Recent 3 CBC's:  Recent Labs   Lab Test 12/01/23  0904 11/03/23  0913 08/18/23  0910   WBC 6.4 6.4 5.1   HGB 12.0* 11.7* 11.9*   MCV 92 93 93    251 179   ANEUTAUTO 4.7 4.6 3.5     Most Recent 3 BMP's:  Recent Labs   Lab Test 12/01/23  0904 11/03/23  0913 08/18/23  0910    137 137   POTASSIUM 4.8 4.9 4.7   CHLORIDE 101 99 101   CO2 27 27 23   BUN 22.1 25.6* 14.0   CR 1.19* 1.16 1.10   ANIONGAP 11 11 13   AURORA 9.8 9.6 9.3   * 148* 143*   PROTTOTAL 6.4 6.8 6.5   ALBUMIN 4.2 4.4 4.3    Most Recent  3 LFT's:  Recent Labs   Lab Test 12/01/23  0904 11/03/23  0913 08/18/23  0910   AST 18 21 21   ALT 20 25 24   ALKPHOS 90 88 79   BILITOTAL 0.4 0.4 0.4    Most Recent 2 TSH and T4:  Recent Labs   Lab Test 12/01/23  0904 06/20/23  1313   TSH 4.71* 4.24*   T4 1.23 1.19     Component      Latest Ref Rng 3/5/2024  10:08 AM   PSA Tumor Marker      0.00 - 4.50 ng/mL <0.01      Testosterone 3/5/2024 pending.     PSA trend, see oncology history.      I reviewed the above labs today.      IMPRESSION AND PLAN:  Joel Pike is a 68 year old  M with PMH of DM2 on metformin, depression/anxiety (pt reports as bipolar d/o), HTN, HLD, and prostate cancer with charity disease only.  He was treated with EBRT to the prostate for low volume metastatic disease along with ADT and apalutamide.  He has responded well and has had an undetectable PSA since 7/2022.  He had a single PSA thus far of 0.12 in August 2023 that is likely a lab error rather than a true value. PSA was checked monthly  through December 2023 and then remained undetectable. Therefore, we stopped apalutamide and let ADT in December 2023.     -He started apalutamide on 10/5/21. He had CT CAP done 11/26/21 which showed significantly decreased retroperitoneal lymphadenopathy since the prior study dated 8/10/2021 indicates good response to treatment. No other evidence for lymphadenopathy or metastasis is identified. Specifically no evidence for left sacral metastasis is seen.   -Completed RT to the primary for oligometastatic disease based on the STAMPEDE study with Dr. Gomez ~09/2022  -Plan was to continue ADT for 2 years (through 8/2023).  We extended this for another 3 months due to a rise in PSA that ultimately appeared to be a lab abnormality.  Three subsequent PSAs were then negative.  He received 6 month shot the AM of 9/6/23.  - Discontinued  apalutamide in December 2023.   - We will monitor PSA every 3 months moving forward. No current plans to administer further ADT  therapy or restart apalutamide.     - Caris whole genome/transcriptome sequencing of prostate biopsy performed 9/12/2023.     - PSA continues to be undetectable on 3/5/24 labs.     # normocytic anemia.    He has mild normocytic anemia but no bleeding source.  He should not be anemic with ADT, so we did anemia workup, which was iron replete and normal vitamin B12.  Will continue to watch and monitor without additional interventions to see if Hgb rises with rise in testosterone.  Will recheck in 3 months when patient returns to see Dr. Nuno.     PLAN:   PSA every 3 months. To see Dr. Nuno in 6 months.      24  minutes spent on the date of the encounter doing chart review, review of test results, interpretation of tests, patient visit, and documentation     Fawn Marcus PA-C

## 2024-03-06 NOTE — NURSING NOTE
Is the patient currently in the state of [place]? NO    Visit mode:VIDEO    If the visit is dropped, the patient can be reconnected by: VIDEO VISIT: Text to cell phone:   Telephone Information:   Mobile [phone]       Will anyone else be joining the visit? Yes, Pt's wife is with the pt and will be joining the video visit per pt  (If patient encounters technical issues they should call [phone]    How would you like to obtain your AVS? MyChart    Are changes needed to the allergy or medication list? Pt stated no med changes    Reason for visit: RECHECK    No other vitals to report per pt    Dahlia GALANF

## 2024-03-07 LAB — TESTOST SERPL-MCNC: 5 NG/DL (ref 240–950)

## 2024-03-14 DIAGNOSIS — E78.5 HYPERLIPIDEMIA LDL GOAL <100: ICD-10-CM

## 2024-03-14 DIAGNOSIS — N18.2 TYPE 2 DIABETES MELLITUS WITH STAGE 2 CHRONIC KIDNEY DISEASE, WITHOUT LONG-TERM CURRENT USE OF INSULIN (H): ICD-10-CM

## 2024-03-14 DIAGNOSIS — E11.22 TYPE 2 DIABETES MELLITUS WITH STAGE 2 CHRONIC KIDNEY DISEASE, WITHOUT LONG-TERM CURRENT USE OF INSULIN (H): ICD-10-CM

## 2024-03-14 RX ORDER — EZETIMIBE 10 MG/1
TABLET ORAL
Qty: 90 TABLET | Refills: 0 | Status: SHIPPED | OUTPATIENT
Start: 2024-03-14 | End: 2024-06-17

## 2024-03-31 DIAGNOSIS — F33.42 RECURRENT MAJOR DEPRESSION IN COMPLETE REMISSION (H): ICD-10-CM

## 2024-03-31 DIAGNOSIS — E03.9 HYPOTHYROIDISM, UNSPECIFIED TYPE: ICD-10-CM

## 2024-03-31 DIAGNOSIS — N18.2 TYPE 2 DIABETES MELLITUS WITH STAGE 2 CHRONIC KIDNEY DISEASE, WITHOUT LONG-TERM CURRENT USE OF INSULIN (H): ICD-10-CM

## 2024-03-31 DIAGNOSIS — E11.22 TYPE 2 DIABETES MELLITUS WITH STAGE 2 CHRONIC KIDNEY DISEASE, WITHOUT LONG-TERM CURRENT USE OF INSULIN (H): ICD-10-CM

## 2024-04-01 RX ORDER — LEVOTHYROXINE SODIUM 137 UG/1
137 TABLET ORAL DAILY
Qty: 90 TABLET | Refills: 0 | Status: SHIPPED | OUTPATIENT
Start: 2024-04-01 | End: 2024-06-25

## 2024-04-01 RX ORDER — LOSARTAN POTASSIUM 50 MG/1
TABLET ORAL
Qty: 135 TABLET | Refills: 0 | Status: SHIPPED | OUTPATIENT
Start: 2024-04-01 | End: 2024-07-08

## 2024-04-01 RX ORDER — BUPROPION HYDROCHLORIDE 300 MG/1
300 TABLET ORAL EVERY MORNING
Qty: 90 TABLET | Refills: 0 | Status: SHIPPED | OUTPATIENT
Start: 2024-04-01 | End: 2024-07-10

## 2024-04-02 NOTE — TELEPHONE ENCOUNTER
Patient informed via mychart to schedule. 4/5 reminder if not read to send letter.   Temitope Fritz MA

## 2024-05-14 ENCOUNTER — OFFICE VISIT (OUTPATIENT)
Dept: FAMILY MEDICINE | Facility: CLINIC | Age: 69
End: 2024-05-14
Payer: MEDICARE

## 2024-05-14 VITALS
HEIGHT: 70 IN | TEMPERATURE: 97.5 F | BODY MASS INDEX: 33.3 KG/M2 | HEART RATE: 55 BPM | SYSTOLIC BLOOD PRESSURE: 124 MMHG | WEIGHT: 232.6 LBS | OXYGEN SATURATION: 99 % | RESPIRATION RATE: 18 BRPM | DIASTOLIC BLOOD PRESSURE: 60 MMHG

## 2024-05-14 DIAGNOSIS — E11.22 TYPE 2 DIABETES MELLITUS WITH STAGE 2 CHRONIC KIDNEY DISEASE, WITHOUT LONG-TERM CURRENT USE OF INSULIN (H): ICD-10-CM

## 2024-05-14 DIAGNOSIS — Z00.00 ENCOUNTER FOR MEDICARE ANNUAL WELLNESS EXAM: Primary | ICD-10-CM

## 2024-05-14 DIAGNOSIS — K21.9 GASTROESOPHAGEAL REFLUX DISEASE WITHOUT ESOPHAGITIS: ICD-10-CM

## 2024-05-14 DIAGNOSIS — G47.33 OSA (OBSTRUCTIVE SLEEP APNEA): ICD-10-CM

## 2024-05-14 DIAGNOSIS — E03.9 HYPOTHYROIDISM, UNSPECIFIED TYPE: ICD-10-CM

## 2024-05-14 DIAGNOSIS — Z95.1 S/P CABG X 4: ICD-10-CM

## 2024-05-14 DIAGNOSIS — I10 ESSENTIAL HYPERTENSION: ICD-10-CM

## 2024-05-14 DIAGNOSIS — F33.42 RECURRENT MAJOR DEPRESSION IN COMPLETE REMISSION (H): ICD-10-CM

## 2024-05-14 DIAGNOSIS — N18.2 TYPE 2 DIABETES MELLITUS WITH STAGE 2 CHRONIC KIDNEY DISEASE, WITHOUT LONG-TERM CURRENT USE OF INSULIN (H): ICD-10-CM

## 2024-05-14 DIAGNOSIS — D63.8 ANEMIA IN OTHER CHRONIC DISEASES CLASSIFIED ELSEWHERE: ICD-10-CM

## 2024-05-14 DIAGNOSIS — I25.118 ATHEROSCLEROSIS OF NATIVE CORONARY ARTERY OF NATIVE HEART WITH STABLE ANGINA PECTORIS (H): ICD-10-CM

## 2024-05-14 DIAGNOSIS — C61 PROSTATE CANCER (H): ICD-10-CM

## 2024-05-14 DIAGNOSIS — Z12.11 COLON CANCER SCREENING: ICD-10-CM

## 2024-05-14 DIAGNOSIS — E78.5 HYPERLIPIDEMIA LDL GOAL <70: ICD-10-CM

## 2024-05-14 DIAGNOSIS — I48.92 ATRIAL FLUTTER, UNSPECIFIED TYPE (H): ICD-10-CM

## 2024-05-14 LAB
CREAT UR-MCNC: 124.5 MG/DL
HBA1C MFR BLD: 6.4 %
MICROALBUMIN UR-MCNC: 201.7 MG/L
MICROALBUMIN/CREAT UR: 162.01 MG/G CR (ref 0–17)

## 2024-05-14 PROCEDURE — G0439 PPPS, SUBSEQ VISIT: HCPCS | Performed by: FAMILY MEDICINE

## 2024-05-14 PROCEDURE — 82570 ASSAY OF URINE CREATININE: CPT | Performed by: FAMILY MEDICINE

## 2024-05-14 PROCEDURE — 99214 OFFICE O/P EST MOD 30 MIN: CPT | Mod: 25 | Performed by: FAMILY MEDICINE

## 2024-05-14 PROCEDURE — 82043 UR ALBUMIN QUANTITATIVE: CPT | Performed by: FAMILY MEDICINE

## 2024-05-14 PROCEDURE — 90480 ADMN SARSCOV2 VAC 1/ONLY CMP: CPT | Performed by: FAMILY MEDICINE

## 2024-05-14 PROCEDURE — 83036 HEMOGLOBIN GLYCOSYLATED A1C: CPT | Performed by: FAMILY MEDICINE

## 2024-05-14 PROCEDURE — 36415 COLL VENOUS BLD VENIPUNCTURE: CPT | Performed by: FAMILY MEDICINE

## 2024-05-14 PROCEDURE — 91320 SARSCV2 VAC 30MCG TRS-SUC IM: CPT | Performed by: FAMILY MEDICINE

## 2024-05-14 PROCEDURE — 99207 PR FOOT EXAM NO CHARGE: CPT | Performed by: FAMILY MEDICINE

## 2024-05-14 ASSESSMENT — PATIENT HEALTH QUESTIONNAIRE - PHQ9
SUM OF ALL RESPONSES TO PHQ QUESTIONS 1-9: 2
10. IF YOU CHECKED OFF ANY PROBLEMS, HOW DIFFICULT HAVE THESE PROBLEMS MADE IT FOR YOU TO DO YOUR WORK, TAKE CARE OF THINGS AT HOME, OR GET ALONG WITH OTHER PEOPLE: NOT DIFFICULT AT ALL
SUM OF ALL RESPONSES TO PHQ QUESTIONS 1-9: 2

## 2024-05-14 ASSESSMENT — PAIN SCALES - GENERAL: PAINLEVEL: NO PAIN (1)

## 2024-05-14 NOTE — Clinical Note
Please check with patient as discussed, I am wondering if you still want to be on the injection for the high cholesterol.  If yes then will need to increase the Crestor dose to 40 mg.  If not then no change in medication for now.  I also added Jardiance for his diabetes and heart disease.  Pick it up only if it is covered by his insurance otherwise it can be very pricey.

## 2024-05-14 NOTE — PROGRESS NOTES
Preventive Care Visit  [facility]  Nishi Hdz Mai, MD, Family Medicine  May 14, 2024      Assessment & Plan     (Z00.00) Encounter for Medicare annual wellness exam  (primary encounter diagnosis)  (Z12.11) Colon cancer screening  Comment: Overall Joel is doing well - his chronic medical conditions are stable.  UTD for immunizations.  Discussed about safety issue, healthy diet, exercising, weight management, good sleeping hygiene, advanced directive care, falling prevention, and depression prevention.  Advanced Directive Care is updated and current per patient report.  He was encouraged to take it easy especially with positional change to prevent falling.  Hallways in his house should be clear of objects and well lighted at all times.  Recommended continue with daily exercise for at least 30 minutes, more as tolerated.  Healthy diet with adequate fluid intake and resting discussed and encouraged.  No safety or mental health concerns.  Follow in 1 year for physical, earlier as needed.  Labs as ordered, results reviewed.  All of his questions were answered.      Colon cancer screening: Last colonoscopy was in 2019 with polyp, he was recommended to repeat in 3 to 5 years.   He was referred for colonoscopy to be done this fall.    Prostate cancer screening: He is being treated for prostate cancer.    Skin cancer prevention: Above the waist skin exam today was normal.  Declined below the waist skin exam.  He was encouraged to monitor and follow-up for any skin lesions     (I10) Essential hypertension  Comment:  He has a complex cardiac history with diabetes, sleep apnea and high cholesterol.  The goal is for his blood pressure to be below 130/85.  Blood pressure was normal has been stable with losartan 50 mg and metoprolol 37.5 mg BID - tolerating them well. No dizziness or lightheadedness.  Recent kidney function and electrolytes were normal.  Will continue with the current medications.   Exercising, healthy diet and weight management discussed and recommended.  Follow-up in 6 months, earlier as needed.      (E78.5) Hyperlipidemia LDL goal <70  Comment:  History of three-vessel coronary disease and CABG x4 on 4/8/22.  Also has high blood pressure, sleep apnea, and diabetes.  LDL was 19 couple months ago.  The goal for his LDL to be less than 70s.  Been on Evolocumab injections but is concerned due to its cost. Discussed about increasing the rosuvastatin to 40 mg, continue with the ezetimibe 10 mg and stop the Evolucumab - will let me know if he is interested otherwise will continue with the current meds.  Exercising, healthy diet and weight management discussed and encouraged.  Follow-up with cardiologist closely.  No history of liver dz and liver enzymes has been normal today.  No excessive alcohol consumption.         (E11.22,  N18.2) Type 2 diabetes mellitus with stage 2 chronic kidney disease, without long-term current use of insulin (H)  Comment: Complex medical history, including CAD with CABG, hyperlipidemia, HTN, and sleep apnea.  Goal for Hgb A1c is around 7.0.  His diabetes has been controlled. Hgb A1c today is 6.4.  Not checking his blood sugar level at home.  Tolerating Metformin 1000 mg BID well.   Also has CKD - stage II.  Will add low dose Jardiance.  Kidney function 4 months ago was slightly low with the GFR of 67%.  Electrolytes were normal.  UTD for retinal exam and encouraged to get it done annually.  Encouraged to keep the good work with exercise and diet.  Continue with the metformin, adding a low-dose of Jardiance if covered by the insurance.  Recheck the hemoglobin A1c in 6 months.  Up-to-date with immunizations.  Foot care regularly and daily to monitor for unintended wound or infection.  Foot exam today indicated decreased in sensation on the posterior half of his feet bilaterally.  He also has been having some neuropathy pain.  Will refer for diabetic shoe.    Plan: Albumin  Random Urine Quantitative with Creat         Ratio, HEMOGLOBIN A1C, FOOT EXAM, empagliflozin        (JARDIANCE) 10 MG TABS tablet            (I25.118) Atherosclerosis of native coronary artery of native heart with stable angina pectoris (H24)  (Z95.1) S/P CABG x 4  Comment:  Overall stable and is doing well.  On appropriate medications including aspirin, high intensity statin, metoprolol and ACE inhibitor.  Emphasized the importance of controlling all modifiable risks.  Blood pressure, cholesterol and diabetes are well controlled.  He is using the CPAP faithfully for the sleep apnea.  Encouraged to work on weight loss.  He does not smoke, no drugs or excessive alcohol.  Continue with daily exercising.  Symptoms that need to be seen or call in discussed.  Symptoms to go to the emergency room also discussed.  Follow-up with the cardiologist as per his/her recommendation.     Plan: empagliflozin (JARDIANCE) 10 MG TABS tablet                (I48.92) Atrial flutter, unspecified type (H)  Comment: Per cardiology, initially presented to the ED on 4/26/2022 and found to be in atrial flutter with RVR and was successfully cardioverted and placed on anticoagulation with Xarelto.  On 5/4/2022 had similar symptoms and noted to be in atrial fibrillation; underwent a successful cardioversion again and was placed on a oral amiodarone load and taper for a total of 30 day.  Follow up Zio Patch did not reveal any recurrent atrial arrhythmias therefore amiodarone and Xarelto were discontinued.  He denies of heart palpitation or irregular heartbeat.  Further management per cardiology.  Continue with the low-dose aspirin.      (G47.33) HILARIO (obstructive sleep apnea)  Comment: Stable and is doing well with the CPAP.  Continue with the CPAP - tolerating it well.  Weight loss encouraged.  It was educated about potential complication associated with uncontrolled sleep apnea.      (K21.9) Gastroesophageal reflux disease without  "esophagitis  Comment: Chronic and controlled with the Protonix.  Encouraged to avoid any known triggers, especially with greasy or spicy food.  Also encouraged to avoid late meals.  Denied of drinking alcohol or taking NSAID excessively.  No history of GI bleeding.  Will continue with Protonix and encouraged to taper it off as tolerated when he is ready.  Symptoms the need to be seen or call in discussed.       (E03.9) Hypothyroidism, unspecified type  Comment: Stable and no symptoms.  Recent TSH check was therapeutic and therefore will continue with current dose of Levothyroxine.  Recheck the level in 6 months      (C61) Prostate cancer (H)  Comment: Encouraged to work with the urologist and oncologist closely.  His PSA tumor marker was undetectable today.  Discussed about medication for ED but he declined.  Continue with the Flomax for nocturia.       (D63.8) Anemia in other chronic diseases classified elsewhere  Comment: Overall stable.  He is due for colonoscopy and therefore he was sent for one as well.  Continue with Protonix.  Avoid excessive NSAID intake.      (F33.42) Recurrent major depression in complete remission (H24)  Comment: Overall doing well with the Wellbutrin, no side effect.  No concern of his mental health.  No suicidal or homicidal ideation.  No hallucination.  No change in medication today.  Symptoms that need to be seen or call in discussed.  Instructed on 911 or go to the ER if develops suicidal thoughts, plan or intention.         BMI  Estimated body mass index is 33.68 kg/m  as calculated from the following:    Height as of this encounter: 1.77 m (5' 9.69\").    Weight as of this encounter: 105.5 kg (232 lb 9.6 oz).   Weight management plan: Discussed healthy diet and exercise guidelines      Work on weight loss  Regular exercise    Clyde Maldonado is a 68 year old, presenting for the following:  Physical        5/14/2024     7:18 AM   Additional Questions   Roomed by Temitope ZAMORA"   Accompanied by Spouse- Michelle         Health Care Directive  Patient has a Health Care Directive on file  Advance care planning document is on file and is current.    HPI    Is a 67-year-old has to medical history significant type 2 diabetes, chronic kidney disease stage II, anxiety, depression, hypertension, and status post TKA left (01/2021) and TKA right (02/2020), he is here today for a physical exam and general follow up.  Being taking the medications as prescribed with no side effect.   His depression and anxiety  and well controlled with Wellbutrin with no side effect.  Been on the same dose for couple years and it has been working well.  Not feeling depressed or anxious.  Not seeing counselor by choice.  No suicidal or homicidal ideation.  No hallucination.  No drugs or alcohol.     He has been working closely with the urologist for prostate cancer; completed the chemotherapy with no significant side effects.  Been taking the Erleada as prescribed with no side effect.  Still has problem with nocturia but is tolerable, has no concern about it.  He takes the Flomax daily and that has helped.     His other medical conditions are overall stable and controlled.  He takes losartan for high blood pressure, Zetia and Crestor for high cholesterol and Metformin for diabetes and they have been working well.  He also takes levothyroxine for hypothyroidism.  Not checking his blood pressure or blood sugar at home.  No headache or dizziness.  No acute change in his vision.  No chest pain, shortness of breath, neck swelling, orthopnea or dyspnea.  No numbness or tingling sensation.  He uses CPAP nightly for sleep apnea and has been working well.    Been some weight over the winter but the CPAP is still effective.  Feels rested and refreshed in the morning; no excessive daytime fatigue or sleepiness.  He is seeing the cardiologist regularly.     Generally, he is doing well.  No headache, dizziness or acute visual change. No  runny nose, nasal congestion, coughing, fever or chill. No CP/SOB. No N/V/D/C.  No abdominal pain.  No leg swelling or joint pain.  Not exercising.  No alcohol, drug use or tobacco smoking. No problem with sleeping.  Feels safe, lives with his wife. No other concern today          3/16/2023   General Health   How would you rate your overall physical health? Good         3/16/2023   Nutrition   At least 4 servings of fruits and vegetables/day Yes         3/16/2023   Exercise   Frequency of exercise: 1 day/week         10/28/2023   Social Factors   Worry food won't last until get money to buy more No   Food not last or not have enough money for food? No   Do you have housing?  Yes   Are you worried about losing your housing? No   Lack of transportation? No   Unable to get utilities (heat,electricity)? No         5/14/2024   Fall Risk   Gait Speed Test (Document in seconds) 3   Gait Speed Test Interpretation Less than or equal to 5.00 seconds - PASS          3/16/2023   Activities of Daily Living- Home Safety   Needs help with the following daily activites NO assistance is needed   Safety concerns in the home None of the above         2/26/2021   Dental   Dentist two times every year? No         3/16/2023   Hearing Screening   Hearing concerns? Difficulty following a conversation in a noisy restaurant or crowded room    Feel that people are mumbling or not speaking clearly    Need to ask people to speak up or repeat themselves    Difficulty understanding soft or whispered speech            No data to display                   Today's PHQ-9 Score:       5/14/2024     6:52 AM   PHQ-9 SCORE   PHQ-9 Total Score MyChart 2 (Minimal depression)   PHQ-9 Total Score 2         3/16/2023   Substance Use   Alcohol more than 3/day or more than 7/wk No     Social History     Tobacco Use    Smoking status: Never     Passive exposure: Past (per pt)    Smokeless tobacco: Never   Vaping Use    Vaping status: Never Used   Substance Use  Topics    Alcohol use: No    Drug use: No       Last PSA:   PSA   Date Value Ref Range Status   2021 12.70 (H) 0 - 4 ug/L Final     Comment:     Assay Method:  Chemiluminescence using Siemens Vista analyzer     PSA Tumor Marker   Date Value Ref Range Status   2024 <0.01 0.00 - 4.50 ng/mL Final   2022 <0.01 0.00 - 4.00 ug/L Final     ASCVD Risk   The ASCVD Risk score (Anish CONRAD, et al., 2019) failed to calculate for the following reasons:    The valid total cholesterol range is 130 to 320 mg/dL    Fracture Risk Assessment Tool  Link to Frax Calculator  Use the information below to complete the Frax calculator  : 1955  Sex: male  Weight (kg): 105.5 kg (actual weight)  Height (cm): 177 cm  Previous Fragility Fracture:  No  History of parent with fractured hip:  No  Current Smoking:  No  Patient has been on glucocorticoids for more than 3 months (5mg/day or more): No  Rheumatoid Arthritis on Problem List:  No  Secondary Osteoporosis on Problem List:  No  Consumes 3 or more units of alcohol per day: No  Femoral Neck BMD (g/cm2)            Reviewed and updated as needed this visit by Provider                    Past Medical History:   Diagnosis Date    Atherosclerosis of native coronary artery of native heart with stable angina pectoris (H24) 2022    Atrial flutter, unspecified type (H) 2022    Calculus of kidney     CKD (chronic kidney disease) stage 2, GFR 60-89 ml/min 10/30/2015    CKD (chronic kidney disease) stage 2, GFR 60-89 ml/min 10/30/2015    Degeneration of lumbar or lumbosacral intervertebral disc     Depressive disorder     Diabetes (H)     Diabetic eye exam (H) 2014    Hypertension     Obesity, Class I, BMI 30-34.9 10/30/2015    HILARIO (obstructive sleep apnea)     Primary osteoarthritis of left knee     Prostate cancer (H) 2021    Thyroid disease 2010    Uncomplicated asthma      Past Surgical History:   Procedure Laterality Date    ARTHROPLASTY  KNEE Left 02/04/2020    Procedure: left total knee replacement;  Surgeon: Jason Berman DO;  [facility]    ARTHROPLASTY KNEE Right 01/18/2021    Procedure: right total knee replacement;  Surgeon: Jason Berman DO;  [facility]: [facility]    BIOPSY      BYPASS GRAFT ARTERY CORONARY N/A 04/08/2022    Procedure: CORONARY ARTERY BYPASS GRAFT x 4 (LIMA - LAD; SV - OM; SV - PDA; SV - DIAGONAL) WITH ENDOSCOPIC SAPHENOUS VEIN HARVEST ON BILATERAL LOWER EXTREMITY, AND ON CARDIOPULMONARY PUMP OXYGENATOR  (INTRAOPERATIVE TRANSESOPHAGEAL ECHOCARDIOGRAM BY ANESTHESIOLOGIST);  Surgeon: Karson Bae MD;  Location: [facility] OR    COLONOSCOPY  02/02/2009    Repeat in 5 yrs    COLONOSCOPY N/A 09/05/2014    Procedure: COMBINED COLONOSCOPY, SINGLE BIOPSY/POLYPECTOMY BY BIOPSY;  Surgeon: Denis Oakes MD;  Location: [facility]    CV CORONARY ANGIOGRAM N/A 03/28/2022    Procedure: Coronary Angiogram;  Surgeon: Lindy Farooq MD;  Location:  [facility] CARD[facility] CATH LAB    CV LEFT HEART CATH N/A 03/28/2022    Procedure: Left Heart Catheterization;  Surgeon: Lindy Farooq MD;  Location:  HEART CARD[facility]AC CATH LAB    ESOPHAGOSCOPY, GASTROSCOPY, DUODENOSCOPY (EGD), COMBINED N/A 02/20/2015    Procedure: COMBINED ESOPHAGOSCOPY, GASTROSCOPY, DUODENOSCOPY (EGD), BIOPSY SINGLE OR MULTIPLE;  Surgeon: Alfred Young MD;  Location:  GI    HC REMV CATARACT EXTRACAP,INSERT LENS, W/O ECP  2000    Bilateral    HC REVISE MEDIAN N/CARPAL TUNNEL SURG  1991    HC TOOTH EXTRACTION W/FORCEP  1980's    [place] teeth removed    RELEASE CARPAL TUNNEL Right 06/16/2017    Procedure: RELEASE CARPAL TUNNEL;  Right carpal tunnel release;  Surgeon: Jason Berman DO;  [facility]    RELEASE CARPAL TUNNEL Left 07/11/2017    Procedure: RELEASE CARPAL TUNNEL;  Left carpal tunnel release;  Surgeon: Jason Berman DO;  [facility]    SOFT TISSUE SURGERY       BP Readings from Last 3 Encounters:   05/14/24  124/60   12/05/23 131/66   11/03/23 124/72    Wt Readings from Last 3 Encounters:   05/14/24 105.5 kg (232 lb 9.6 oz)   03/06/24 100.2 kg (221 lb)   12/05/23 102.1 kg (225 lb)                  Patient Active Problem List   Diagnosis    Gastroesophageal reflux disease without esophagitis    Degeneration of lumbar or lumbosacral intervertebral disc    Chronic rhinitis    HILARIO (obstructive sleep apnea)    Tinnitus    Hypothyroidism, unspecified type    Type 2 diabetes mellitus with stage 2 chronic kidney disease (H)    Obesity, Class I, BMI 30-34.9    Recurrent major depression in complete remission (H24)    Microalbuminuria    S/P total knee replacement using cement, left    Primary osteoarthritis of right knee    S/P total knee replacement using cement, right    Status post total right knee replacement    Prostate cancer (H)    Essential hypertension    Status post coronary angiogram    Atherosclerosis of native coronary artery of native heart with stable angina pectoris (H24)    Anemia in other chronic diseases classified elsewhere    Hyperlipidemia LDL goal <70    S/P CABG x 4    Chronic bilateral low back pain without sciatica     Past Surgical History:   Procedure Laterality Date    ARTHROPLASTY KNEE Left 02/04/2020    Procedure: left total knee replacement;  Surgeon: Jason Berman DO;  [facility]    ARTHROPLASTY KNEE Right 01/18/2021    Procedure: right total knee replacement;  Surgeon: Jason Berman DO;  [facility]    BIOPSY      BYPASS GRAFT ARTERY CORONARY N/A 04/08/2022    Procedure: CORONARY ARTERY BYPASS GRAFT x 4 (LIMA - LAD; SV - OM; SV - PDA; SV - DIAGONAL) WITH ENDOSCOPIC SAPHENOUS VEIN HARVEST ON BILATERAL LOWER EXTREMITY, AND ON CARDIOPULMONARY PUMP OXYGENATOR  (INTRAOPERATIVE TRANSESOPHAGEAL ECHOCARDIOGRAM BY ANESTHESIOLOGIST);  Surgeon: Kasron Bae MD;  [facility]    COLONOSCOPY  02/02/2009    Repeat in 5 yrs    COLONOSCOPY N/A 09/05/2014     Procedure: COMBINED COLONOSCOPY, SINGLE BIOPSY/POLYPECTOMY BY BIOPSY;  Surgeon: Denis Oakes MD;  [facility]    CV CORONARY ANGIOGRAM N/A 03/28/2022    Procedure: Coronary Angiogram;  Surgeon: Lindy Farooq MD;  Location: [facility] CARDIAC CATH LAB    CV LEFT HEART CATH N/A 03/28/2022    Procedure: Left Heart Catheterization;  Surgeon: Lindy Farooq MD;  Location:  HEART CARDIAC CATH LAB    ESOPHAGOSCOPY, GASTROSCOPY, DUODENOSCOPY (EGD), COMBINED N/A 02/20/2015    Procedure: COMBINED ESOPHAGOSCOPY, GASTROSCOPY, DUODENOSCOPY (EGD), BIOPSY SINGLE OR MULTIPLE;  Surgeon: Alfred Young MD;  [facility]    HC REMV CATARACT EXTRACAP,INSERT LENS, W/O ECP  2000    Bilateral    HC REVISE MEDIAN N/CARPAL TUNNEL SURG  1991    HC TOOTH EXTRACTION W/FORCEP  1980's    [place] teeth removed    RELEASE CARPAL TUNNEL Right 06/16/2017    Procedure: RELEASE CARPAL TUNNEL;  Right carpal tunnel release;  Surgeon: Jason Berman DO;  [facility]    RELEASE CARPAL TUNNEL Left 07/11/2017    Procedure: RELEASE CARPAL TUNNEL;  Left carpal tunnel release;  Surgeon: Jason Berman DO;  [facility]    SOFT TISSUE SURGERY         Social History     Tobacco Use    Smoking status: Never     Passive exposure: Past (per pt)    Smokeless tobacco: Never   Substance Use Topics    Alcohol use: No     Family History   Problem Relation Age of Onset    Cerebrovascular Disease Mother     Hypertension Mother     Hypertension Father     Diabetes Father         Adult    Heart Disease Father         Hx: Bypass    Cancer Sister         Liver    No Known Problems Sister     Hypertension Brother     Thyroid Disease Brother     Unknown/Adopted Maternal Grandmother     Cerebrovascular Disease Maternal Grandmother     No Known Problems Maternal Grandfather     No Known Problems Paternal Grandmother     No Known Problems Paternal Grandfather     No Known Problems Son          Current Outpatient Medications    Medication Sig Dispense Refill    ACCU-CHEK GUIDE test strip USE TO TEST BLOOD SUGAR 1 TIME DAILY AS DIRECTED. 100 strip 1    alcohol swab prep pads Use to swab area of injection/pankaj as directed. 100 each 3    Alcohol Swabs PADS Use to swab the area of the injection or pankaj as directed Per insurance coverage 100 each 0    aspirin (ASA) 81 MG EC tablet Take 1 tablet (81 mg) by mouth daily      blood glucose (HUGO CONTOUR) test strip Use to test blood sugars 1 time daily or as directed. 100 strip 0    blood glucose (NO BRAND SPECIFIED) lancets standard Use to test blood sugar one time daily or as directed. 100 each 3    blood glucose (NO BRAND SPECIFIED) lancets standard To use to test glucose level in the blood Use to test blood sugar 1 times daily as directed. To accompany glucose monitor brands per insurance coverage. 100 each 0    blood glucose (NO BRAND SPECIFIED) test strip To use to test glucose level in the blood Use to test blood sugar 1 times daily as directed. To accompany glucose monitor brands per insurance coverage. 100 strip 0    blood glucose calibration (HUGO CONTOUR) NORMAL solution Use to calibrate blood glucose monitor as directed. 1 each 3    blood glucose monitoring (NO BRAND SPECIFIED) meter device kit Use as directed Per insurance coverage 1 kit 0    blood glucose monitoring (NO BRAND SPECIFIED) meter device kit Use to test blood sugar 1 times daily or as directed. Preferred blood glucose meter OR supplies to accompany: Blood Glucose Monitor Brands: per insurance. 1 kit 0    buPROPion (WELLBUTRIN XL) 300 MG 24 hr tablet TAKE 1 TABLET (300 MG) BY MOUTH EVERY MORNING 90 tablet 0    Calcium Carb-Cholecalciferol (CALCIUM/VITAMIN D PO) Take 1 tablet by mouth every evening      Coenzyme Q-10 capsule Take 1 capsule by mouth every morning 30 capsule 12    cyclobenzaprine (FLEXERIL) 10 MG tablet Take 1 tablet (10 mg) by mouth 2 times daily as needed for muscle spasms 60 tablet 0    empagliflozin  (JARDIANCE) 10 MG TABS tablet Take 1 tablet (10 mg) by mouth daily 90 tablet 1    evolocumab (REPATHA SURECLICK) 140 MG/ML prefilled autoinjector Inject 1 mL (140 mg) Subcutaneous every 14 days 6 mL 3    ezetimibe (ZETIA) 10 MG tablet TAKE ONE TABLET BY MOUTH ONCE DAILY 90 tablet 0    levothyroxine (SYNTHROID/LEVOTHROID) 137 MCG tablet TAKE ONE TABLET BY MOUTH ONCE DAILY 90 tablet 0    losartan (COZAAR) 50 MG tablet TAKE ONE AND ONE-HALF TABLETS BY MOUTH EVERY  tablet 0    magnesium oxide (MAG-OX) 400 MG tablet Take 400 mg by mouth every evening      metFORMIN (GLUCOPHAGE) 1000 MG tablet TAKE 1 TABLET (1,000 MG) BY MOUTH 2 TIMES DAILY (WITH MEALS) 180 tablet 1    metoprolol tartrate (LOPRESSOR) 25 MG tablet Take 1.5 tablets (37.5 mg) by mouth 2 times daily 270 tablet 3    [facility] Natural Products (OSTEO BI-FLEX ADV JOINT SHIELD) TABS Take 1 tablet by mouth daily       Multiple Vitamins-Minerals (PRESERVISION AREDS PO) Take 1 tablet by mouth 2 times daily      nitroGLYcerin (NITROSTAT) 0.4 MG sublingual tablet For chest pain place 1 tablet under the tongue every 5 minutes for 3 doses. If symptoms persist 5 minutes after 1st dose call 911. 25 tablet 1    ONETOUCH ULTRA test strip USE TO TEST BLOOD SUGARS 1 TIME DAILY OR AS DIRECTED. 100 strip 1    pantoprazole (PROTONIX) 40 MG EC tablet Take 1 tablet (40 mg) by mouth daily Please stop the nexium 90 tablet 3    Probiotic Product (PROBIOTIC DAILY) CAPS Take 1 capsule by mouth daily      rosuvastatin (CRESTOR) 10 MG tablet Take 1 tablet (10 mg) by mouth daily Please stop the Lovosatin 90 tablet 1    Sharps Container MISC Use as directed to dispose of needles, lancets and other sharps Per Insurance coverage 1 each 0    tamsulosin (FLOMAX) 0.4 MG capsule Take 1 capsule (0.4 mg) by mouth daily 90 capsule 3    vitamin C (ASCORBIC ACID) 500 MG tablet Take 500 mg by mouth daily       Allergies   Allergen Reactions    Dust Mites Cough    Other Environmental Allergy       Allergic to sunlight ever since 20s.     Statins      Body aches    Penicillins Hives and Rash     Recent Labs   Lab Test 05/14/24  0900 02/13/24  0739 12/01/23  0904 11/03/23  0913 10/06/23  0910 08/18/23  0910 06/20/23  1313 05/10/23  1145 03/20/23  0910 02/22/23  0851 02/26/21  1107 01/06/21  1553 11/04/20  0953   A1C 6.4*  --   --  6.1*  --   --   --   --  6.1*  --    < >  --  5.7*   LDL  --  19  --   --  116*  --   --   --   --  83   < >  --  90   HDL  --  39*  --   --  31*  --   --   --   --  35*   < >  --  34*   TRIG  --  215*  --   --  275*  --   --   --   --  183*   < >  --  252*   ALT  --   --  20 25  --  24  --    < > 27  --    < >  --  72*   CR  --   --  1.19* 1.16  --  1.10  --    < > 1.24*  --    < > 1.21 1.12   GFRESTIMATED  --   --  67 69  --  74  --    < > 64  --    < > 63 69   GFRESTBLACK  --   --   --   --   --   --   --   --   --   --   --  73 80   POTASSIUM  --   --  4.8 4.9  --  4.7  --    < > 4.5  --    < > 4.1 4.6   TSH  --   --  4.71*  --   --   --  4.24*  --   --  6.18*   < >  --  6.02*    < > = values in this interval not displayed.      Current providers sharing in care for this patient include:  Patient Care Team:  Nishi Lin MD as PCP - General (Family Medicine)  Nishi Lin MD as Assigned PCP  Alexei Ott MD as Assigned Surgical Provider  Kamala Murphy MD as MD (Cardiovascular Disease)  Jonah Adams RP as Pharmacist (Pharmacist Clinician- Clinical Pharmacy Specialist)  Jonah Adams RPH as Assigned MTM Pharmacist  Kamala Murphy MD as Assigned Heart and Vascular Provider  Chloe Merchant, RN as Specialty Care Coordinator (Hematology & Oncology)  Trice Garcia PA-C as Assigned Cancer Care Provider    The following health maintenance items are reviewed in Epic and correct as of today:  Health Maintenance   Topic Date Due    COVID-19 Vaccine (6 - 2023-24 season) 12/29/2023    MEDICARE ANNUAL WELLNESS VISIT  03/20/2024    MICROALBUMIN   "03/20/2024    DIABETIC FOOT EXAM  03/20/2024    ANNUAL REVIEW OF HM ORDERS  03/20/2024    A1C  05/03/2024    COLORECTAL CANCER SCREENING  10/09/2024    BMP  12/01/2024    TSH W/FREE T4 REFLEX  12/01/2024    EYE EXAM  12/14/2024    LIPID  02/13/2025    FALL RISK ASSESSMENT  05/14/2025    PHQ-9  05/14/2025    DTAP/TDAP/TD IMMUNIZATION (2 - Td or Tdap) 04/12/2026    ADVANCE CARE PLANNING  05/14/2029    HEPATITIS C SCREENING  Completed    DEPRESSION ACTION PLAN  Completed    INFLUENZA VACCINE  Completed    Pneumococcal Vaccine: 65+ Years  Completed    URINALYSIS  Completed    ZOSTER IMMUNIZATION  Completed    RSV VACCINE (Pregnancy & 60+)  Completed    IPV IMMUNIZATION  Aged Out    HPV IMMUNIZATION  Aged Out    MENINGITIS IMMUNIZATION  Aged Out    RSV MONOCLONAL ANTIBODY  Aged Out         Review of Systems  Constitutional, HEENT, cardiovascular, pulmonary, GI, , musculoskeletal, neuro, skin, endocrine and psych systems are negative, except as otherwise noted.     Objective    Exam  /60   Pulse 55   Temp 97.5  F (36.4  C) (Temporal)   Resp 18   Ht 1.77 m (5' 9.69\")   Wt 105.5 kg (232 lb 9.6 oz)   SpO2 99%   BMI 33.68 kg/m     Estimated body mass index is 33.68 kg/m  as calculated from the following:    Height as of this encounter: 1.77 m (5' 9.69\").    Weight as of this encounter: 105.5 kg (232 lb 9.6 oz).    Physical Exam  GENERAL: healthy, alert and no distress, speaking in full sentences.  EYES: Eyes grossly normal to inspection, PERRL and conjunctivae and sclerae normal.  No nystagmus.  All 4 visual fields are intact  HENT: Ear canals and TM's normal.  Nares are non-congested. Oropharynx is pink and moist. No tender with palpation to the sinuses.  NECK: no adenopathy, supple, no lymphadenopathy or thyromegaly.  No tender with palpation to the cervical spine or its paraspinous muscle.  JV distention or carotid bruits.  RESP: lungs clear to auscultation - no rales, rhonchi or wheezes  CV: regular rate " and rhythm, no murmur.  ABDOMEN: soft, nondistended, nontender, no palpable masses or organomegaly with normal bowel sounds.  MS: no gross musculoskeletal defects noted, no edema.  Walk with no limping, normal gait.  All 4 extremities are equally in strength. Ankle, knees, hips, shoulders, elbows and wrists exams normal.  Normal fine motor skills on fingers.  Back is straight, no lordosis or scoliosis.  No tender with palpation to the spine.  SKIN: Above the waist skin exam, no suspicious lesions or rashes.  No ecchymosis or petechiae.  Declined a below the waist the skin exam  NEURO: Normal strength and tone, mentation intact and speech normal.  Cranial nerves II through XII intact, DTR +2 throughout, no focal neurological deficit.  PSYCH: mentation appears normal, affect normal/bright.  Thoughts intact, no hallucination.  No suicidal or homicidal ideation.  LYMPH: no cervical, supraclavicular or axillary adenopathy.   (male): Offered but declined.  He has no concern about it.  Foot: Decreased sensation on the microfilament exam on the posterior half of the feet bilaterally.  No calluses.  Nail care looks good.  No open wound.          5/14/2024   Mini Cog   Clock Draw Score 2 Normal   3 Item Recall 3 objects recalled   Mini Cog Total Score 5           Results for orders placed or performed in visit on 05/14/24   Albumin Random Urine Quantitative with Creat Ratio     Status: Abnormal   Result Value Ref Range    Creatinine Urine mg/dL 124.5 mg/dL    Albumin Urine mg/L 201.7 mg/L    Albumin Urine mg/g Cr 162.01 (H) 0.00 - 17.00 mg/g Cr   HEMOGLOBIN A1C     Status: Abnormal   Result Value Ref Range    Hemoglobin A1C 6.4 (H) <5.7 %          Signed Electronically by: Nishi Hdz Mai, MD      Answers submitted by the patient for this visit:  Patient Health Questionnaire (Submitted on 5/14/2024)  If you checked off any problems, how difficult have these problems made it for you to do your work, take care of things at home,  or get along with other people?: Not difficult at all  PHQ9 TOTAL SCORE: 2

## 2024-05-14 NOTE — PATIENT INSTRUCTIONS
"Preventive Care Advice   This is general advice we often give to help people stay healthy. Your care team may have specific advice just for you. Please talk to your care team about your own preventive care needs.  Lifestyle  Exercise at least 150 minutes each week (30 minutes a day, 5 days a week).  Do muscle strengthening activities 2 days a week. These help control your weight and prevent disease.  No smoking.  Wear sunscreen to prevent skin cancer.  Have your home tested for radon every 2 to 5 years. Radon is a colorless, odorless gas that can harm your lungs. To learn more, go to www.health.[facility].mn. and search for \"Radon in Homes.\"  Keep guns unloaded and locked up in a safe place like a safe or gun vault, or, use a gun lock and hide the keys. Always lock away bullets separately. To learn more, visit Who Works Around You.mn.gov and search for \"safe gun storage.\"  Nutrition  Eat 5 or more servings of fruits and vegetables each day.  Try wheat bread, brown rice and whole grain pasta (instead of white bread, rice, and pasta).  Get enough calcium and vitamin D. Check the label on foods and aim for 100% of the RDA (recommended daily allowance).  Regular exams  Have a dental exam and cleaning every 6 months.  See your health care team every year to talk about:  Any changes in your health.  Any medicines your care team has prescribed.  Preventive care, family planning, and ways to prevent chronic diseases.  Shots (vaccines)   HPV shots (up to age 26), if you've never had them before.  Hepatitis B shots (up to age 59), if you've never had them before.  COVID-19 shot: Get this shot when it's due.  Flu shot: Get a flu shot every year.  Tetanus shot: Get a tetanus shot every 10 years.  Pneumococcal, hepatitis A, and RSV shots: Ask your care team if you need these based on your risk.  Shingles shot (for age 50 and up).  General health tests  Diabetes screening:  Starting at age 35, Get screened for diabetes at least every 3 years.  If " you are younger than age 35, ask your care team if you should be screened for diabetes.  Cholesterol test: At age 39, start having a cholesterol test every 5 years, or more often if advised.  Bone density scan (DEXA): At age 50, ask your care team if you should have this scan for osteoporosis (brittle bones).  Hepatitis C: Get tested at least once in your life.  Abdominal aortic aneurysm screening: Talk to your doctor about having this screening if you:  Have ever smoked; and  Are biologically male; and  Are between the ages of 65 and 75.  STIs (sexually transmitted infections)  Before age 24: Ask your care team if you should be screened for STIs.  After age 24: Get screened for STIs if you're at risk. You are at risk for STIs (including HIV) if:  You are sexually active with more than one person.  You don't use condoms every time.  You or a partner was diagnosed with a sexually transmitted infection.  If you are at risk for HIV, ask about PrEP medicine to prevent HIV.  Get tested for HIV at least once in your life, whether you are at risk for HIV or not.  Cancer screening tests  Cervical cancer screening: If you have a cervix, begin getting regular cervical cancer screening tests at age 21. Most people who have regular screenings with normal results can stop after age 65. Talk about this with your provider.  Breast cancer scan (mammogram): If you've ever had breasts, begin having regular mammograms starting at age 40. This is a scan to check for breast cancer.  Colon cancer screening: It is important to start screening for colon cancer at age 45.  Have a colonoscopy test every 10 years (or more often if you're at risk) Or, ask your provider about stool tests like a FIT test every year or Cologuard test every 3 years.  To learn more about your testing options, visit: www.pic5/578777.pdf.  For help making a decision, visit: cameron/nv03430.  Prostate cancer screening test: If you have a prostate and are age 55  to 69, ask your provider if you would benefit from a yearly prostate cancer screening test.  Lung cancer screening: If you are a current or former smoker age 50 to 80, ask your care team if ongoing lung cancer screenings are right for you.  For informational purposes only. Not to replace the advice of your health care provider. Copyright   2023 [employer]. All rights reserved. Clinically reviewed by the [facility] Transitions Program. [employer] [id number] - REV 04/24.    Preventing Falls: Care Instructions  Injuries and health problems such as trouble walking or poor eyesight can increase your risk of falling. So can some medicines. But there are things you can do to help prevent falls. You can exercise to get stronger. You can also arrange your home to make it safer.    Talk to your doctor about the medicines you take. Ask if any of them increase the risk of falls and whether they can be changed or stopped.   Try to exercise regularly. It can help improve your strength and balance. This can help lower your risk of falling.     Practice fall safety and prevention.    Wear low-heeled shoes that fit well and give your feet good support. Talk to your doctor if you have foot problems that make this hard.  Carry a cellphone or wear a medical alert device that you can use to call for help.  Use stepladders instead of chairs to reach high objects. Don't climb if you're at risk for falls. Ask for help, if needed.  Wear the correct eyeglasses, if you need them.    Make your home safer.    Remove rugs, cords, clutter, and furniture from walkways.  Keep your house well lit. Use night-lights in hallways and bathrooms.  Install and use sturdy handrails on stairways.  Wear nonskid footwear, even inside. Don't walk barefoot or in socks without shoes.    Be safe outside.    Use handrails, curb cuts, and ramps whenever possible.  Keep your hands free by using a shoulder bag or backpack.  Try to walk in well-lit  "areas. Watch out for uneven ground, changes in pavement, and debris.  Be careful in the winter. Walk on the grass or gravel when sidewalks are slippery. Use de-icer on steps and walkways. Add non-slip devices to shoes.    Put grab bars and nonskid mats in your shower or tub and near the toilet. Try to use a shower chair or bath bench when bathing.   Get into a tub or shower by putting in your weaker leg first. Get out with your strong side first. Have a phone or medical alert device in the bathroom with you.   Where can you learn more?  Go to https://www.[employer].net/patiented  Enter G117 in the search box to learn more about \"Preventing Falls: Care Instructions.\"  Current as of: July 17, 2023               Content Version: 14.0    4769-5459 [employer].   Care instructions adapted under license by your healthcare professional. If you have questions about a medical condition or this instruction, always ask your healthcare professional. [employer] disclaims any warranty or liability for your use of this information.      "

## 2024-05-28 ENCOUNTER — TELEPHONE (OUTPATIENT)
Dept: GASTROENTEROLOGY | Facility: CLINIC | Age: 69
End: 2024-05-28
Payer: MEDICARE

## 2024-05-28 ENCOUNTER — TELEPHONE (OUTPATIENT)
Dept: FAMILY MEDICINE | Facility: CLINIC | Age: 69
End: 2024-05-28
Payer: MEDICARE

## 2024-05-28 DIAGNOSIS — Z86.0100 HISTORY OF COLONIC POLYPS: ICD-10-CM

## 2024-05-28 DIAGNOSIS — Z12.11 SPECIAL SCREENING FOR MALIGNANT NEOPLASMS, COLON: Primary | ICD-10-CM

## 2024-05-28 NOTE — TELEPHONE ENCOUNTER
"Endoscopy Scheduling Screen    Have you had a positive Covid test in the last 14 days?  No    What is your communication preference for Instructions and/or Bowel Prep?   MyChart    What insurance is in the chart?  Other:  MEDICARE    Ordering/Referring Provider: JAMI BOB   (If ordering provider performs procedure, schedule with ordering provider unless otherwise instructed. )    BMI: Estimated body mass index is 33.68 kg/m  as calculated from the following:    Height as of 5/14/24: 1.77 m (5' 9.69\").    Weight as of 5/14/24: 105.5 kg (232 lb 9.6 oz).     Sedation Ordered  moderate sedation.   If patient BMI > 50 do not schedule in [facility].    If patient BMI > 45 do not schedule at [facility].    Are you taking methadone or Suboxone?  No    Have you had difficulties, pain, or discomfort during past endoscopy procedures?  No    Are you taking any prescription medications for pain 3 or more times per week?   NO, No RN review required.    Do you have a history of malignant hyperthermia?  No    (Females) Are you currently pregnant?   No     Have you been diagnosed or told you have pulmonary hypertension?   No    Do you have an LVAD?  No    Have you been told you have moderate to severe sleep apnea?  Yes (RN Review required for scheduling unless scheduling in Hospital.)    Have you been told you have COPD, asthma, or any other lung disease?  No    Do you have any heart conditions?  Yes     In the past year, have you had any hospitalizations for heart related issues including cardiomyopathy, heart attack, or stent placement?  No    Do you have any implantable devices in your body (pacemaker, ICD)?  No    Do you take nitroglycerine?  No    Have you ever had or are you waiting for an organ transplant?  No. Continue scheduling, no site restrictions.    Have you had a stroke or transient ischemic attack (TIA aka \"mini stroke\" in the last 6 months?   No    Have you been diagnosed with or been told you have cirrhosis of the " "liver?   No    Are you currently on dialysis?   No    Do you need assistance transferring?   No    BMI: Estimated body mass index is 33.68 kg/m  as calculated from the following:    Height as of 5/14/24: 1.77 m (5' 9.69\").    Weight as of 5/14/24: 105.5 kg (232 lb 9.6 oz).     Is patients BMI > 40 and scheduling location [facility]?  No    Do you take an injectable medication for weight loss or diabetes (excluding insulin)?  No    Do you take the medication Naltrexone?  No    Do you take blood thinners?  No       Prep   Are you currently on dialysis or do you have chronic kidney disease?  No    Do you have a diagnosis of diabetes?  Yes (Golytely Prep)    Do you have a diagnosis of cystic fibrosis (CF)?  No    On a regular basis do you go 3 -5 days between bowel movements?  No    BMI > 40?  No    Preferred Pharmacy:    [facility] [place] [address]  Phone: [phone] Fax: [phone]    Final Scheduling Details     Procedure scheduled  Colonoscopy    Surgeon:  DONITA     Date of procedure:  7/17/24     Pre-OP / PAC:   No - Not required for this site.    Location  [facility] - Per order.    Sedation   MAC/Deep Sedation  Per location.      Patient Reminders:   You will receive a call from a Nurse to review instructions and health history.  This assessment must be completed prior to your procedure.  Failure to complete the Nurse assessment may result in the procedure being cancelled.      On the day of your procedure, please designate an adult(s) who can drive you home stay with you for the next 24 hours. The medicines used in the exam will make you sleepy. You will not be able to drive.      You cannot take public transportation, ride share services, or non-medical taxi service without a responsible caregiver.  Medical transport services are allowed with the requirement that a responsible caregiver will receive you at your destination.  We require that drivers " and caregivers are confirmed prior to your procedure.

## 2024-05-28 NOTE — TELEPHONE ENCOUNTER
Nishi Lin MD  P [facility]  Please check with patient as discussed, I am wondering if you still want to be on the injection for the high cholesterol.  If yes then will need to increase the Crestor dose to 40 mg.  If not then no change in medication for now.  I also added Jardiance for his diabetes and heart disease.  Pick it up only if it is covered by his insurance otherwise it can be very pricey.      Nishi Lin MD P [facility]  Also please help to set up for the diabetic shoe - ordered

## 2024-06-07 ENCOUNTER — VIRTUAL VISIT (OUTPATIENT)
Dept: RADIATION THERAPY | Facility: OUTPATIENT CENTER | Age: 69
End: 2024-06-07
Payer: MEDICARE

## 2024-06-07 DIAGNOSIS — C61 PROSTATE CANCER (H): Primary | ICD-10-CM

## 2024-06-07 NOTE — LETTER
2024      Joel Pike  [address]  [facility] [address]      Dear Colleague,    Thank you for referring your patient, Joel Pike, to the [facility] RAD[facility]ATION [facility]. Please see a copy of my visit note below.       Department of Radiation Oncology  Radiation Therapy Center  [facility]  [place] [address]  [phone]       Radiation Oncology Follow-up Visit  24      Joel Pike  MRN: [id number]   : 1955     Telephone visit pt in [place], provider in clinic    Radiation Oncologist: Isaías Gomez MD  Medical Oncologist: Michael Le MD     DIAGNOSIS: Metastatic prostate cancer  PATHOLOGY: Prostate adenocarcinoma, [place] score 4+3= 7                                  STAGE: [id number] (para-aortic nodes, ? R sacral osseous lesion).   CONCURRENT SYSTEMIC THERAPY:   Yes, oral Xtandi     ONCOLOGIC HISTORY:          Mr Pike is a 68year old male patient who was noted to have an elevated PSA on 2021 with a value of 12.7.  Repeat PSA on 2021 was 30.4.  Prostate biopsy on 2021 demonstrated prostate adenocarcinoma, [place] score 4+3 equal 7.  MRI of the prostate on 2021 demonstrated PI-RADS 5 lesion with likely extracapsular extension.  There were noted to have indeterminate pelvic lymph nodes at the time.  Bone scan 2021 demonstrated a indeterminate lesion of the right sacral ala at the level of S3.  CT scan of the abdomen pelvis on 8/10/2021 demonstrated multiple large para-aortic and pelvic lymph nodes.  Patient was seen by medical oncologist Dr. Solo.  The patient was started on systemic treatment with Eligard and enzalutamide.  PSA has demonstrated biochemical response. PSA on 8/15/2022 remained undetectable. He was referred to our clinic to discuss next steps in management and discussion of possible local treatment with radiation therapy. He completed radiation therapy on 10/5/22.      SITE OF TREATMENT: Prostate     DATES  OF  TREATMENT: 9/8/22 to 10/5/22     TOTAL DOSE OF TREATMENT: 5,500 cGy     DOSE PER FRACTION OF TREATMENT: 275 cGy x 20 fractions    Interval Since Completion of Radiation Therapy: 1 year 4 mo       SUBJECTIVE:   Here with wife Michelle.  Hot flashes a few per week mostly during the day. A few night sweats but manageable. Energy is lower than prior to RT. Was going to the gym 5 days per but he stopped. A little walking and gardening. Allergic to the sun. Has to wear full clothing and hat and facemask. On PT for his back pain. On flomax stream is good. No blood in urine or stool. Gets up 2 to 5 times per night to urinate.    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 A fib on ASA.     PHYSICAL EXAM:  There were no vitals taken for this visit.   Gen: Alert, in NAD  Eyes: PERRL, EOMI, sclera anicteric  HENT     Head: NC/AT     Ears: No external auricular lesions  Neck: Supple, full ROM, no LAD  Pulm: No wheezing, stridor or respiratory distress  CV: Well-perfused, no cyanosis, no pedal edema  Abdominal: Soft, nontender, nondistended, no hepatomegaly  Back: No step-offs or pain to palpation along the thoracolumbar spine, no CVA tenderness    LABS AND IMAGING:  PSA   Date Value Ref Range Status   02/26/2021 12.70 (H) 0 - 4 ug/L Final     Comment:     Assay Method:  Chemiluminescence using Siemens Vista analyzer     PSA Tumor Marker   Date Value Ref Range Status   03/05/2024 <0.01 0.00 - 4.50 ng/mL Final   11/11/2022 <0.01 0.00 - 4.00 ug/L Final       IMPRESSION:   Mr. Pike is a 68 year old male with a metastatic prostate cancer [id number]. He had elevated PSA in February 26, 2021 with a value of 12.7.  Repeat PSA on 8/30/2021 was 30.4.  Prostate biopsy on 7/2/2021 demonstrated prostate adenocarcinoma, [place] score 4+3 equal 7.  MRI of the prostate on 7/28/2021 demonstrated PI-RADS 5 lesion with likely extracapsular extension.  There were noted to have indeterminate pelvic lymph nodes at the time.  Bone scan 7/28/2021 demonstrated a  indeterminate lesion of the right sacral ala at the level of S3.  CT scan of the abdomen pelvis on 8/10/2021 demonstrated multiple large para-aortic and pelvic lymph nodes.  Patient was seen by medical oncology team (Dr. Solo).  The patient was started on systemic treatment with Eligard and enzalutamide.  PSA has demonstrated biochemical response. He completed radiation therapy to prostate in October 2022 ago and returns for follow up today. PSA is undetectable. Follows with medical oncology. Completed apalutamide and  ADT.    PLAN:   See me in 6 months. Continue to follow up with medical oncology, defer to them on ordering PSA.     MALLY Guerrero  Department of Radiation Oncology  [facility]     10 min with pt. 11:15 to 11:25 am

## 2024-06-07 NOTE — PROGRESS NOTES
Department of Radiation Oncology  Radiation Therapy Center  [facility]  [place] [address]  [phone]       Radiation Oncology Follow-up Visit  24      Joel Pike  MRN: [id number]   : 1955     Telephone visit pt in [place], provider in clinic    Radiation Oncologist: Isaías Gomez MD  Medical Oncologist: Michael Le MD     DIAGNOSIS: Metastatic prostate cancer  PATHOLOGY: Prostate adenocarcinoma, Mary score 4+3= 7                                  STAGE: [id number] (para-aortic nodes, ? R sacral osseous lesion).   CONCURRENT SYSTEMIC THERAPY:   Yes, oral Xtandi     ONCOLOGIC HISTORY:          Mr Pike is a 68year old male patient who was noted to have an elevated PSA on 2021 with a value of 12.7.  Repeat PSA on 2021 was 30.4.  Prostate biopsy on 2021 demonstrated prostate adenocarcinoma, Mary score 4+3 equal 7.  MRI of the prostate on 2021 demonstrated PI-RADS 5 lesion with likely extracapsular extension.  There were noted to have indeterminate pelvic lymph nodes at the time.  Bone scan 2021 demonstrated a indeterminate lesion of the right sacral ala at the level of S3.  CT scan of the abdomen pelvis on 8/10/2021 demonstrated multiple large para-aortic and pelvic lymph nodes.  Patient was seen by medical oncologist Dr. Solo.  The patient was started on systemic treatment with Eligard and enzalutamide.  PSA has demonstrated biochemical response. PSA on 8/15/2022 remained undetectable. He was referred to our clinic to discuss next steps in management and discussion of possible local treatment with radiation therapy. He completed radiation therapy on 10/5/22.      SITE OF TREATMENT: Prostate     DATES  OF TREATMENT: 22 to 10/5/22     TOTAL DOSE OF TREATMENT: 5,500 cGy     DOSE PER FRACTION OF TREATMENT: 275 cGy x 20 fractions    Interval Since Completion of Radiation Therapy: 1 year 4 mo       SUBJECTIVE:   Here with wife MichelleSimone Ca  flashes a few per week mostly during the day. A few night sweats but manageable. Energy is lower than prior to RT. Was going to the gym 5 days per but he stopped. A little walking and gardening. Allergic to the sun. Has to wear full clothing and hat and facemask. On PT for his back pain. On flomax stream is good. No blood in urine or stool. Gets up 2 to 5 times per night to urinate.    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 A fib on ASA.     PHYSICAL EXAM:  There were no vitals taken for this visit.   Gen: Alert, in NAD  Eyes: PERRL, EOMI, sclera anicteric  HENT     Head: NC/AT     Ears: No external auricular lesions  Neck: Supple, full ROM, no LAD  Pulm: No wheezing, stridor or respiratory distress  CV: Well-perfused, no cyanosis, no pedal edema  Abdominal: Soft, nontender, nondistended, no hepatomegaly  Back: No step-offs or pain to palpation along the thoracolumbar spine, no CVA tenderness    LABS AND IMAGING:  PSA   Date Value Ref Range Status   02/26/2021 12.70 (H) 0 - 4 ug/L Final     Comment:     Assay Method:  Chemiluminescence using Siemens Vista analyzer     PSA Tumor Marker   Date Value Ref Range Status   03/05/2024 <0.01 0.00 - 4.50 ng/mL Final   11/11/2022 <0.01 0.00 - 4.00 ug/L Final       IMPRESSION:   Mr. Pike is a 68 year old male with a metastatic prostate cancer [id number]. He had elevated PSA in February 26, 2021 with a value of 12.7.  Repeat PSA on 8/30/2021 was 30.4.  Prostate biopsy on 7/2/2021 demonstrated prostate adenocarcinoma, [place] score 4+3 equal 7.  MRI of the prostate on 7/28/2021 demonstrated PI-RADS 5 lesion with likely extracapsular extension.  There were noted to have indeterminate pelvic lymph nodes at the time.  Bone scan 7/28/2021 demonstrated a indeterminate lesion of the right sacral ala at the level of S3.  CT scan of the abdomen pelvis on 8/10/2021 demonstrated multiple large para-aortic and pelvic lymph nodes.  Patient was seen by medical oncology team (Dr. Solo).  The  patient was started on systemic treatment with Eligard and enzalutamide.  PSA has demonstrated biochemical response. He completed radiation therapy to prostate in October 2022 ago and returns for follow up today. PSA is undetectable. Follows with medical oncology. Completed apalutamide and  ADT.    PLAN:   See me in 6 months. Continue to follow up with medical oncology, defer to them on ordering PSA.     MALLY Guerrero  Department of Radiation Oncology  [facility]     10 min with pt. 11:15 to 11:25 am

## 2024-06-14 ENCOUNTER — LAB (OUTPATIENT)
Dept: LAB | Facility: CLINIC | Age: 69
End: 2024-06-14
Payer: MEDICARE

## 2024-06-14 DIAGNOSIS — Z19.1 HORMONE SENSITIVE PROSTATE CANCER (H): ICD-10-CM

## 2024-06-14 DIAGNOSIS — C61 HORMONE SENSITIVE PROSTATE CANCER (H): ICD-10-CM

## 2024-06-14 LAB
BASOPHILS # BLD AUTO: 0 10E3/UL (ref 0–0.2)
BASOPHILS NFR BLD AUTO: 1 %
EOSINOPHIL # BLD AUTO: 0.3 10E3/UL (ref 0–0.7)
EOSINOPHIL NFR BLD AUTO: 4 %
ERYTHROCYTE [DISTWIDTH] IN BLOOD BY AUTOMATED COUNT: 12.4 % (ref 10–15)
HCT VFR BLD AUTO: 37.7 % (ref 40–53)
HGB BLD-MCNC: 13 G/DL (ref 13.3–17.7)
IMM GRANULOCYTES # BLD: 0.1 10E3/UL
IMM GRANULOCYTES NFR BLD: 1 %
LYMPHOCYTES # BLD AUTO: 1.1 10E3/UL (ref 0.8–5.3)
LYMPHOCYTES NFR BLD AUTO: 16 %
MCH RBC QN AUTO: 31.3 PG (ref 26.5–33)
MCHC RBC AUTO-ENTMCNC: 34.5 G/DL (ref 31.5–36.5)
MCV RBC AUTO: 91 FL (ref 78–100)
MONOCYTES # BLD AUTO: 0.6 10E3/UL (ref 0–1.3)
MONOCYTES NFR BLD AUTO: 9 %
NEUTROPHILS # BLD AUTO: 4.5 10E3/UL (ref 1.6–8.3)
NEUTROPHILS NFR BLD AUTO: 69 %
NRBC # BLD AUTO: 0 10E3/UL
NRBC BLD AUTO-RTO: 0 /100
PLATELET # BLD AUTO: 224 10E3/UL (ref 150–450)
PSA SERPL DL<=0.01 NG/ML-MCNC: <0.01 NG/ML (ref 0–4.5)
RBC # BLD AUTO: 4.15 10E6/UL (ref 4.4–5.9)
WBC # BLD AUTO: 6.5 10E3/UL (ref 4–11)

## 2024-06-14 PROCEDURE — 36415 COLL VENOUS BLD VENIPUNCTURE: CPT

## 2024-06-14 PROCEDURE — 84403 ASSAY OF TOTAL TESTOSTERONE: CPT

## 2024-06-14 PROCEDURE — 85025 COMPLETE CBC W/AUTO DIFF WBC: CPT

## 2024-06-14 PROCEDURE — 84153 ASSAY OF PSA TOTAL: CPT

## 2024-06-15 DIAGNOSIS — E11.22 TYPE 2 DIABETES MELLITUS WITH STAGE 2 CHRONIC KIDNEY DISEASE, WITHOUT LONG-TERM CURRENT USE OF INSULIN (H): ICD-10-CM

## 2024-06-15 DIAGNOSIS — E78.5 HYPERLIPIDEMIA LDL GOAL <100: ICD-10-CM

## 2024-06-15 DIAGNOSIS — N18.2 TYPE 2 DIABETES MELLITUS WITH STAGE 2 CHRONIC KIDNEY DISEASE, WITHOUT LONG-TERM CURRENT USE OF INSULIN (H): ICD-10-CM

## 2024-06-16 NOTE — PROGRESS NOTES
[facility] Medical Oncology Return Note       Date of visit: Jun 17, 2024    CC:   metastatic prostate cancer, now s/p 2 years of therapy with apalutamide/ADT, currently on surveillance    ONCOLOGY HISTORY:  Elevated PSA noted 2/26/21 at 12.70. Previously normal in 2017.    4/7/21-Initial urology visit.  7/2/21-prostate biopsy 4+3, 9/12 biopsies positive.  Ductal component noted.    7/28/21-MR prostate-PIRADS 5 lesion, R and L sided lesions with likely    neurovascular bundle invasion. No  seminal vesicle involvement. No clear LAD,  but some indeterminate pelvic nodes.  No suspicious bone lesions.    7/28/21-NM bone scan suspicious lesion of R sacral ala S3 level.   8/10/21-CT A/P with multiple enlarged periaortic/iliac LN  8/11/21-First eligard dose 45mg (Q6M dosing). Due next 2/2022.  8/30/21-Initial medical oncology visit with Dr. Solo.  Unclear if bony lesion is metastasis based on current imaging, but this was not irradiated. Start apalutamide/eligard  8/30/21 PSA =30.40 Pre Rx  9/27/21 PSA =8.99 (T=6); HgbA1C = 6.1  10/5/21 PSA =3.22  11/26/21 PSA =0.15  7/26/22 PSA <0.01  8/22  Referral to radiation oncology for EBRT given low volume prostate cancer.  55cGy  in 20 fractions completed on 10/5/22.   11/11/22 PSA= <0.01  02/2023 ELIGARD 45 MG with Dr. Ott of urology.    5/10/23 PSA= <0.01  8/18/23 PSA=0.12, unclear now whether this was a lab error given known inconsistencies with     Some recent PSA tests at [facility].   9/1/23  PSA= <0.01  9/6/23  Eligard 45mg. DUE 3/6/24.  PSA rechecked <0.01.  Lupron 6 month shot received  AM before appt.   10/6/23 PSA <0.01  11/3/23  PSA <0.01  12/4/23 PSA <0.01.  Stop Erleada and ADT at a little over 2 years due to likely false elevation of PSA in August and monitor PSA Q3M.    03/05/23         PSA <0.01  6/17/24 PSA <0.01     Interval History:  Feeling okay.  Still really fatigued.  Having considerable hot flashes which actually seem to have gotten worse since  stopping ADT plus apalutamide.  Appetite is okay.  Needs to take rest in the afternoon usually.  Happy to see his PSA is undetectable still.  No other complaints or concerns at the moment of that he would not like to start any new medications if possible.    PMH:  HTN, HLD, bipolar disorder, DM2 on metformin     PSH:  Bilateral TKA, bilateral cataract extraction, bilateral carpal tunnel release     FAMILY HX:  No reported family history of prostate cancer.  SIster with liver cancer in 20s.    SOCIAL:  Retired, works at [employer] in [place] section now  Never smoker.   No EtOH  No recreational drugs.   No herbs/supplements other than on medication list.      MEDS:  Current Outpatient Medications   Medication Instructions    albuterol (PROAIR HFA) 108 (90 BASE) MCG/ACT Inhaler 1-2 puffs every 2 -4 hrs as needed    ALLEGRA-D ALLERGY & CONGESTION 180-240 MG 24 hr tablet TAKE ONE TABLET BY MOUTH EVERY DAY    blood glucose (HUGO CONTOUR) test strip Use to test blood sugars 1 time daily or as directed.    blood glucose (NO BRAND SPECIFIED) lancets standard Use to test blood sugar one time daily or as directed.    blood glucose calibration (HUGO CONTOUR) NORMAL solution Use to calibrate blood glucose monitor as directed.    buPROPion (WELLBUTRIN XL) 300 MG 24 hr tablet TAKE ONE TABLET BY MOUTH EVERY MORNING    Coenzyme Q-10 capsule 1 capsule, DAILY    esomeprazole (NEXIUM) 40 MG DR capsule TAKE ONE CAPSULE BY MOUTH EVERY MORNING BEFORE BREAKFAST , 30-60 MINUTES BEFORE EATING    ezetimibe (ZETIA) 10 MG tablet TAKE ONE TABLET BY MOUTH ONCE DAILY    fish oil-omega-3 fatty acids 1000 MG capsule Take  by mouth. Take daily      levothyroxine (SYNTHROID/LEVOTHROID) 112 mcg, Oral, DAILY    losartan (COZAAR) 75 mg, Oral, DAILY    Magnesium 500 MG CAPS One twice a day    metFORMIN (GLUCOPHAGE) 500 MG tablet TAKE ONE TABLET BY MOUTH TWICE A DAY WITH MEALS    Misc Natural Products (OSTEO BI-FLEX ADV JOINT SHIELD) TABS Take  by mouth.     multivitamin (OCUVITE) TABS tablet 1 tablet, Oral, DAILY    STATIN NOT PRESCRIBED (INTENTIONAL) Please choose reason not prescribed, below    Vitamin D3 (CHOLECALCIFEROL) 5,000 Units, Oral     ROS-Remainder of 14 point ROS reviewed and negative except as in HPI.    PHYSICAL EXAM:  Video physical exam  General: Patient appears well in no acute distress.   Skin: No visualized rash or lesions on visualized skin  Eyes: EOMI, no erythema, sclera icterus or discharge noted  Resp: Appears to be breathing comfortably without accessory muscle usage, speaking in full sentences, no cough  MSK: Appears to have normal range of motion based on visualized movements  Neurologic: No apparent tremors, facial movements symmetric  Psych: affect bright, alert and oriented     LABS AND IMAGES:    Prostate Biopsy 7/2/21  FINAL DIAGNOSIS:   A: Prostate, left, biopsy   - Adenocarcinoma, [place]'s grade 4/3 with ductal component involving 4 of    6 needle core biopsies and 25% of   submitted tissue     B: Prostate, right, biopsy   - Adenocarcinoma, Mary's grade 4/3 with ductal component involving 5 of    6 needle core biopsies and 25% of   submitted tissue     Labs:  Most Recent 3 CBC's:  Recent Labs   Lab Test 06/14/24  1024 12/01/23  0904 11/03/23  0913   WBC 6.5 6.4 6.4   HGB 13.0* 12.0* 11.7*   MCV 91 92 93    193 251   ANEUTAUTO 4.5 4.7 4.6     Most Recent 3 BMP's:  Recent Labs   Lab Test 12/01/23  0904 11/03/23  0913 08/18/23  0910    137 137   POTASSIUM 4.8 4.9 4.7   CHLORIDE 101 99 101   CO2 27 27 23   BUN 22.1 25.6* 14.0   CR 1.19* 1.16 1.10   ANIONGAP 11 11 13   AURORA 9.8 9.6 9.3   * 148* 143*   PROTTOTAL 6.4 6.8 6.5   ALBUMIN 4.2 4.4 4.3    Most Recent 3 LFT's:  Recent Labs   Lab Test 12/01/23  0904 11/03/23  0913 08/18/23  0910   AST 18 21 21   ALT 20 25 24   ALKPHOS 90 88 79   BILITOTAL 0.4 0.4 0.4    Most Recent 2 TSH and T4:  Recent Labs   Lab Test 12/01/23  0904 06/20/23  1313   TSH 4.71* 4.24*   T4  1.23 1.19     Component      Latest Ref Rng 3/5/2024  10:08 AM   PSA Tumor Marker      0.00 - 4.50 ng/mL <0.01      Testosterone 6/14/2024 pending.     PSA trend, see oncology history.      IMPRESSION AND PLAN:  Joel Pike is a 68 year old  M with PMH of DM2 on metformin, depression/anxiety (pt reports as bipolar d/o), HTN, HLD, and prostate cancer with charity disease only.  He was treated with EBRT to the prostate for low volume metastatic disease along with ADT and apalutamide.  He has responded well and has had an undetectable PSA since 7/2022.  He had a single PSA thus far of 0.12 in August 2023 that is likely a lab error rather than a true value. PSA was checked monthly  through December 2023 and then remained undetectable. Therefore, we stopped apalutamide and ADT in December 2023.     -He started apalutamide on 10/5/21. He had CT CAP done 11/26/21 which showed significantly decreased retroperitoneal lymphadenopathy since the prior study dated 8/10/2021 indicates good response to treatment. No other evidence for lymphadenopathy or metastasis is identified. Specifically no evidence for left sacral metastasis is seen.   -Completed RT to the primary for oligometastatic disease based on the STAMPEDE study with Dr. Gomez ~09/2022  -Plan was to continue ADT for 2 years (through 8/2023).  We extended this for another 3 months due to a rise in PSA that ultimately appeared to be a lab abnormality.  Three subsequent PSAs were then negative.  He received 6 month shot the AM of 9/6/23.  - Discontinued  apalutamide in December 2023.   - We will monitor PSA every 3 months moving forward. No current plans to administer further ADT therapy or restart apalutamide.     - [facility] whole genome/transcriptome sequencing of prostate biopsy performed 9/12/2023.   - PSA continues to be undetectable on 6/14/24 labs.   -Return with Kandi or Fawn in 3 months labs week prior.  Sees Yaakov in 6 months with labs week prior.  -He prefers not  to start any new medications for hot flashes at our June 2024 visit (note he is also on a very stable dose of Wellbutrin since 2009 that would require medication changes).      # normocytic anemia.    He has mild normocytic anemia but no bleeding source.  He should not be anemic with ADT, so we did anemia workup, which was iron replete and normal vitamin B12.  Will continue to watch and monitor without additional interventions to see if Hgb rises with rise in testosterone.      PLAN:   Continue to monitor every 3 months with PSA.    See Kandi Augustine in 3 months and Yaakov in 6 months.  Both by video.    Let us know if the hot flashes become more of an issue or are intolerable.  We [facility] at the [facility]     A total of 30 minutes spent on the date of the encounter doing chart review, review of test results, interpretation of tests, patient visit, and documentation. The patient was given the opportunity to ask multiple questions today, all of which were answered to their satisfaction.     Michael Nuno MD, PhD   of Medicine   Oncology/BMT/Cellular Therapies

## 2024-06-17 ENCOUNTER — VIRTUAL VISIT (OUTPATIENT)
Dept: ONCOLOGY | Facility: CLINIC | Age: 69
End: 2024-06-17
Attending: STUDENT IN AN ORGANIZED HEALTH CARE EDUCATION/TRAINING PROGRAM
Payer: MEDICARE

## 2024-06-17 VITALS — WEIGHT: 230 LBS | BODY MASS INDEX: 32.93 KG/M2 | HEIGHT: 70 IN

## 2024-06-17 DIAGNOSIS — C61 HORMONE SENSITIVE PROSTATE CANCER (H): ICD-10-CM

## 2024-06-17 DIAGNOSIS — Z19.1 HORMONE SENSITIVE PROSTATE CANCER (H): ICD-10-CM

## 2024-06-17 DIAGNOSIS — C79.51 PROSTATE CANCER METASTATIC TO BONE (H): Primary | ICD-10-CM

## 2024-06-17 DIAGNOSIS — C61 PROSTATE CANCER METASTATIC TO BONE (H): Primary | ICD-10-CM

## 2024-06-17 DIAGNOSIS — R23.2 HOT FLASHES: ICD-10-CM

## 2024-06-17 DIAGNOSIS — D64.9 NORMOCYTIC ANEMIA: ICD-10-CM

## 2024-06-17 PROCEDURE — 99214 OFFICE O/P EST MOD 30 MIN: CPT | Mod: 95 | Performed by: STUDENT IN AN ORGANIZED HEALTH CARE EDUCATION/TRAINING PROGRAM

## 2024-06-17 RX ORDER — EZETIMIBE 10 MG/1
TABLET ORAL
Qty: 90 TABLET | Refills: 0 | Status: SHIPPED | OUTPATIENT
Start: 2024-06-17 | End: 2024-09-12

## 2024-06-17 ASSESSMENT — PAIN SCALES - GENERAL: PAINLEVEL: NO PAIN (0)

## 2024-06-17 NOTE — PROGRESS NOTES
Virtual Visit Details    Type of service:  Video Visit   Video Start Time:  9:21  Video End Time: 9:30    Originating Location (pt. Location): Home  Distant Location (provider location):  On-site  Platform used for Video Visit: Shaunna

## 2024-06-17 NOTE — LETTER
6/17/2024      Joel Pike  [address]  [place] [address]      Dear Colleague,    Thank you for referring your patient, Joel Pike, to the [facility]. Please see a copy of my visit note below.          [facility] Medical Oncology Return Note       Date of visit: Jun 17, 2024    CC:   metastatic prostate cancer, now s/p 2 years of therapy with apalutamide/ADT, currently on surveillance    ONCOLOGY HISTORY:  Elevated PSA noted 2/26/21 at 12.70. Previously normal in 2017.    4/7/21-Initial urology visit.  7/2/21-prostate biopsy 4+3, 9/12 biopsies positive.  Ductal component noted.    7/28/21-MR prostate-PIRADS 5 lesion, R and L sided lesions with likely    neurovascular bundle invasion. No  seminal vesicle involvement. No clear LAD,  but some indeterminate pelvic nodes.  No suspicious bone lesions.    7/28/21-NM bone scan suspicious lesion of R sacral ala S3 level.   8/10/21-CT A/P with multiple enlarged periaortic/iliac LN  8/11/21-First eligard dose 45mg (Q6M dosing). Due next 2/2022.  8/30/21-Initial medical oncology visit with Dr. Solo.  Unclear if bony lesion is metastasis based on current imaging, but this was not irradiated. Start apalutamide/eligard  8/30/21 PSA =30.40 Pre Rx  9/27/21 PSA =8.99 (T=6); HgbA1C = 6.1  10/5/21 PSA =3.22  11/26/21 PSA =0.15  7/26/22 PSA <0.01  8/22  Referral to radiation oncology for EBRT given low volume prostate cancer.  55cGy  in 20 fractions completed on 10/5/22.   11/11/22 PSA= <0.01  02/2023 ELIGARD 45 MG with Dr. Ott of urology.    5/10/23 PSA= <0.01  8/18/23 PSA=0.12, unclear now whether this was a lab error given known inconsistencies with     Some recent PSA tests at [facility].   9/1/23  PSA= <0.01  9/6/23  Eligard 45mg. DUE 3/6/24.  PSA rechecked <0.01.  Lupron 6 month shot received  AM before appt.   10/6/23 PSA <0.01  11/3/23  PSA <0.01  12/4/23 PSA <0.01.  Stop Erleada and ADT at a little over 2 years due to likely false  elevation of PSA in August and monitor PSA Q3M.    03/05/23         PSA <0.01  6/17/24 PSA <0.01     Interval History:  Feeling okay.  Still really fatigued.  Having considerable hot flashes which actually seem to have gotten worse since stopping ADT plus apalutamide.  Appetite is okay.  Needs to take rest in the afternoon usually.  Happy to see his PSA is undetectable still.  No other complaints or concerns at the moment of that he would not like to start any new medications if possible.    PMH:  HTN, HLD, bipolar disorder, DM2 on metformin     PSH:  Bilateral TKA, bilateral cataract extraction, bilateral carpal tunnel release     FAMILY HX:  No reported family history of prostate cancer.  SIster with liver cancer in 20s.    SOCIAL:  Retired, works at [employer] in [place] section now  Never smoker.   No EtOH  No recreational drugs.   No herbs/supplements other than on medication list.      MEDS:  Current Outpatient Medications   Medication Instructions     albuterol (PROAIR HFA) 108 (90 BASE) MCG/ACT Inhaler 1-2 puffs every 2 -4 hrs as needed     ALLEGRA-D ALLERGY & CONGESTION 180-240 MG 24 hr tablet TAKE ONE TABLET BY MOUTH EVERY DAY     blood glucose (HUGO CONTOUR) test strip Use to test blood sugars 1 time daily or as directed.     blood glucose (NO BRAND SPECIFIED) lancets standard Use to test blood sugar one time daily or as directed.     blood glucose calibration (HUGO CONTOUR) NORMAL solution Use to calibrate blood glucose monitor as directed.     buPROPion (WELLBUTRIN XL) 300 MG 24 hr tablet TAKE ONE TABLET BY MOUTH EVERY MORNING     Coenzyme Q-10 capsule 1 capsule, DAILY     esomeprazole (NEXIUM) 40 MG DR capsule TAKE ONE CAPSULE BY MOUTH EVERY MORNING BEFORE BREAKFAST , 30-60 MINUTES BEFORE EATING     ezetimibe (ZETIA) 10 MG tablet TAKE ONE TABLET BY MOUTH ONCE DAILY     fish oil-omega-3 fatty acids 1000 MG capsule Take  by mouth. Take daily       levothyroxine (SYNTHROID/LEVOTHROID) 112 mcg, Oral, DAILY      losartan (COZAAR) 75 mg, Oral, DAILY     Magnesium 500 MG CAPS One twice a day     metFORMIN (GLUCOPHAGE) 500 MG tablet TAKE ONE TABLET BY MOUTH TWICE A DAY WITH MEALS     Misc Natural Products (OSTEO BI-FLEX ADV JOINT SHIELD) TABS Take  by mouth.     multivitamin (OCUVITE) TABS tablet 1 tablet, Oral, DAILY     STATIN NOT PRESCRIBED (INTENTIONAL) Please choose reason not prescribed, below     Vitamin D3 (CHOLECALCIFEROL) 5,000 Units, Oral     ROS-Remainder of 14 point ROS reviewed and negative except as in HPI.    PHYSICAL EXAM:  Video physical exam  General: Patient appears well in no acute distress.   Skin: No visualized rash or lesions on visualized skin  Eyes: EOMI, no erythema, sclera icterus or discharge noted  Resp: Appears to be breathing comfortably without accessory muscle usage, speaking in full sentences, no cough  MSK: Appears to have normal range of motion based on visualized movements  Neurologic: No apparent tremors, facial movements symmetric  Psych: affect bright, alert and oriented     LABS AND IMAGES:    Prostate Biopsy 7/2/21  FINAL DIAGNOSIS:   A: Prostate, left, biopsy   - Adenocarcinoma, Mary's grade 4/3 with ductal component involving 4 of    6 needle core biopsies and 25% of   submitted tissue     B: Prostate, right, biopsy   - Adenocarcinoma, [place]'s grade 4/3 with ductal component involving 5 of    6 needle core biopsies and 25% of   submitted tissue     Labs:  Most Recent 3 CBC's:  Recent Labs   Lab Test 06/14/24  1024 12/01/23  0904 11/03/23  0913   WBC 6.5 6.4 6.4   HGB 13.0* 12.0* 11.7*   MCV 91 92 93    193 251   ANEUTAUTO 4.5 4.7 4.6     Most Recent 3 BMP's:  Recent Labs   Lab Test 12/01/23  0904 11/03/23  0913 08/18/23  0910    137 137   POTASSIUM 4.8 4.9 4.7   CHLORIDE 101 99 101   CO2 27 27 23   BUN 22.1 25.6* 14.0   CR 1.19* 1.16 1.10   ANIONGAP 11 11 13   AURORA 9.8 9.6 9.3   * 148* 143*   PROTTOTAL 6.4 6.8 6.5   ALBUMIN 4.2 4.4 4.3    Most Recent 3  LFT's:  Recent Labs   Lab Test 12/01/23  0904 11/03/23  0913 08/18/23  0910   AST 18 21 21   ALT 20 25 24   ALKPHOS 90 88 79   BILITOTAL 0.4 0.4 0.4    Most Recent 2 TSH and T4:  Recent Labs   Lab Test 12/01/23  0904 06/20/23  1313   TSH 4.71* 4.24*   T4 1.23 1.19     Component      Latest Ref Rng 3/5/2024  10:08 AM   PSA Tumor Marker      0.00 - 4.50 ng/mL <0.01      Testosterone 6/14/2024 pending.     PSA trend, see oncology history.      IMPRESSION AND PLAN:  Joel Pike is a 68 year old  M with PMH of DM2 on metformin, depression/anxiety (pt reports as bipolar d/o), HTN, HLD, and prostate cancer with charity disease only.  He was treated with EBRT to the prostate for low volume metastatic disease along with ADT and apalutamide.  He has responded well and has had an undetectable PSA since 7/2022.  He had a single PSA thus far of 0.12 in August 2023 that is likely a lab error rather than a true value. PSA was checked monthly  through December 2023 and then remained undetectable. Therefore, we stopped apalutamide and ADT in December 2023.     -He started apalutamide on 10/5/21. He had CT CAP done 11/26/21 which showed significantly decreased retroperitoneal lymphadenopathy since the prior study dated 8/10/2021 indicates good response to treatment. No other evidence for lymphadenopathy or metastasis is identified. Specifically no evidence for left sacral metastasis is seen.   -Completed RT to the primary for oligometastatic disease based on the STAMPEDE study with Dr. Gomez ~09/2022  -Plan was to continue ADT for 2 years (through 8/2023).  We extended this for another 3 months due to a rise in PSA that ultimately appeared to be a lab abnormality.  Three subsequent PSAs were then negative.  He received 6 month shot the AM of 9/6/23.  - Discontinued  apalutamide in December 2023.   - We will monitor PSA every 3 months moving forward. No current plans to administer further ADT therapy or restart apalutamide.     - [facility]  whole genome/transcriptome sequencing of prostate biopsy performed 9/12/2023.   - PSA continues to be undetectable on 6/14/24 labs.   -Return with Kandi Augustine in 3 months labs week prior.  Sees Yaakov in 6 months with labs week prior.  -He prefers not to start any new medications for hot flashes at our June 2024 visit (note he is also on a very stable dose of Wellbutrin since 2009 that would require medication changes).      # normocytic anemia.    He has mild normocytic anemia but no bleeding source.  He should not be anemic with ADT, so we did anemia workup, which was iron replete and normal vitamin B12.  Will continue to watch and monitor without additional interventions to see if Hgb rises with rise in testosterone.      PLAN:   Continue to monitor every 3 months with PSA.    See Kandi Augustine in 3 months and Yaakov in 6 months.  Both by video.    Let us know if the hot flashes become more of an issue or are intolerable.  We will not start any new medications for you at the [facility]     A total of 30 minutes spent on the date of the encounter doing chart review, review of test results, interpretation of tests, patient visit, and documentation. The patient was given the opportunity to ask multiple questions today, all of which were answered to their satisfaction.     Michael Nuno MD, PhD   of Medicine   Oncology/BMT/Cellular Therapies              Virtual Visit Details    Type of service:  Video Visit   Video Start Time:  9:21  Video End Time: 9:30    Originating Location (pt. Location): Home  Distant Location (provider location):  On-site  Platform used for Video Visit: Shaunna      Again, thank you for allowing me to participate in the care of your patient.        Sincerely,        Michael Nuno MD

## 2024-06-17 NOTE — NURSING NOTE
Is the patient currently in the state of [place]? YES    Visit mode:VIDEO    If the visit is dropped, the patient can be reconnected by: VIDEO VISIT: Text to cell phone:   Telephone Information:   Mobile [phone]       Will anyone else be joining the visit? NO  (If patient encounters technical issues they should call [phone]    How would you like to obtain your AVS? MyChart    Are changes needed to the allergy or medication list? Pt stated no changes to allergies and Pt stated no med changes    Are refills needed on medications prescribed by this physician? NO    Reason for visit: YEHUDA Santana LPN

## 2024-06-18 LAB — TESTOST SERPL-MCNC: 3 NG/DL (ref 240–950)

## 2024-06-18 RX ORDER — BISACODYL 5 MG/1
TABLET, DELAYED RELEASE ORAL
Qty: 4 TABLET | Refills: 0 | Status: SHIPPED | OUTPATIENT
Start: 2024-06-18

## 2024-06-18 NOTE — TELEPHONE ENCOUNTER
Standard Golytely Bowel Prep. Instructions were sent via [employer]. Bowel prep [facility] 6/18/2024 [facility]    [place] [address] [facility]

## 2024-06-19 ENCOUNTER — PATIENT OUTREACH (OUTPATIENT)
Dept: GASTROENTEROLOGY | Facility: CLINIC | Age: 69
End: 2024-06-19
Payer: MEDICARE

## 2024-06-25 DIAGNOSIS — K21.00 GASTROESOPHAGEAL REFLUX DISEASE WITH ESOPHAGITIS WITHOUT HEMORRHAGE: ICD-10-CM

## 2024-06-25 DIAGNOSIS — E03.9 HYPOTHYROIDISM, UNSPECIFIED TYPE: ICD-10-CM

## 2024-06-25 RX ORDER — LEVOTHYROXINE SODIUM 137 UG/1
137 TABLET ORAL DAILY
Qty: 90 TABLET | Refills: 0 | Status: SHIPPED | OUTPATIENT
Start: 2024-06-25 | End: 2024-09-30

## 2024-06-25 RX ORDER — PANTOPRAZOLE SODIUM 40 MG/1
40 TABLET, DELAYED RELEASE ORAL DAILY
Qty: 90 TABLET | Refills: 3 | Status: SHIPPED | OUTPATIENT
Start: 2024-06-25

## 2024-06-27 ENCOUNTER — LAB (OUTPATIENT)
Dept: LAB | Facility: CLINIC | Age: 69
End: 2024-06-27
Payer: MEDICARE

## 2024-06-27 DIAGNOSIS — E03.9 HYPOTHYROIDISM, UNSPECIFIED TYPE: ICD-10-CM

## 2024-06-27 LAB — TSH SERPL DL<=0.005 MIU/L-ACNC: 1.41 UIU/ML (ref 0.3–4.2)

## 2024-06-27 PROCEDURE — 84443 ASSAY THYROID STIM HORMONE: CPT

## 2024-06-27 PROCEDURE — 36415 COLL VENOUS BLD VENIPUNCTURE: CPT

## 2024-07-07 DIAGNOSIS — F33.42 RECURRENT MAJOR DEPRESSION IN COMPLETE REMISSION (H): ICD-10-CM

## 2024-07-07 DIAGNOSIS — N18.2 TYPE 2 DIABETES MELLITUS WITH STAGE 2 CHRONIC KIDNEY DISEASE, WITHOUT LONG-TERM CURRENT USE OF INSULIN (H): ICD-10-CM

## 2024-07-07 DIAGNOSIS — E11.22 TYPE 2 DIABETES MELLITUS WITH STAGE 2 CHRONIC KIDNEY DISEASE, WITHOUT LONG-TERM CURRENT USE OF INSULIN (H): ICD-10-CM

## 2024-07-08 RX ORDER — LOSARTAN POTASSIUM 50 MG/1
TABLET ORAL
Qty: 135 TABLET | Refills: 0 | Status: SHIPPED | OUTPATIENT
Start: 2024-07-08

## 2024-07-10 RX ORDER — BUPROPION HYDROCHLORIDE 300 MG/1
300 TABLET ORAL EVERY MORNING
Qty: 90 TABLET | Refills: 3 | Status: SHIPPED | OUTPATIENT
Start: 2024-07-10

## 2024-07-16 NOTE — H&P
[facility] Anesthesia Pre-op History and Physical    Joel Pike MRN# [id number]   Age: 69 year old YOB: 1955      Date of Surgery: 7/17/2024 Location [facility]      Date of Exam 7/17/2024 Facility (In hospital)       Home clinic: [facility]  Primary care provider: Nishi Lin         Chief Complaint and/or Reason for Procedure:   No chief complaint on file.  Colonoscopy  Exam 2019, hx adenoma 2014       Active problem list:     Patient Active Problem List    Diagnosis Date Noted    Chronic bilateral low back pain without sciatica 11/27/2023     Priority: Medium    Hyperlipidemia LDL goal <70 03/20/2023     Priority: Medium    S/P CABG x 4 03/20/2023     Priority: Medium    Anemia in other chronic diseases classified elsewhere 04/13/2022     Priority: Medium    Atherosclerosis of native coronary artery of native heart with stable angina pectoris (H24) 04/08/2022     Priority: Medium    Status post coronary angiogram 03/28/2022     Priority: Medium    Essential hypertension 02/05/2022     Priority: Medium    Prostate cancer (H) 07/07/2021     Priority: Medium    Status post total right knee replacement 02/01/2021     Priority: Medium    S/P total knee replacement using cement, right 01/18/2021     Priority: Medium    Primary osteoarthritis of right knee 08/27/2020     Priority: Medium     Added automatically from request for surgery [id number]      S/P total knee replacement using cement, left 02/04/2020     Priority: Medium    Microalbuminuria 02/26/2019     Priority: Medium    Recurrent major depression in complete remission (H24) 11/08/2017     Priority: Medium    Obesity, Class I, BMI 30-34.9 10/30/2015     Priority: Medium    Type 2 diabetes mellitus with stage 2 chronic kidney disease (H) 04/19/2013     Priority: Medium    Hypothyroidism, unspecified type 03/02/2012     Priority: Medium    Tinnitus 07/22/2011     Priority: Medium      right ear, began after accident, immediately      HILARIO (obstructive sleep apnea) 01/28/2011     Priority: Medium    Chronic rhinitis 01/15/2010     Priority: Medium    Degeneration of lumbar or lumbosacral intervertebral disc 12/19/2005     Priority: Medium    Gastroesophageal reflux disease without esophagitis 05/14/2003     Priority: Medium            Medications (include herbals and vitamins):   Any Plavix use in the last 7 days? No     Current Facility-Administered Medications   Medication Dose Route Frequency Provider Last Rate Last Admin    leuprolide (ELIGARD) kit 45 mg  45 mg Subcutaneous Q6 Months Alexei Ott MD   45 mg at 02/16/22 0746     Current Outpatient Medications   Medication Sig Dispense Refill    ACCU-CHEK GUIDE test strip USE TO TEST BLOOD SUGAR 1 TIME DAILY AS DIRECTED. 100 strip 1    alcohol swab prep pads Use to swab area of injection/pankaj as directed. 100 each 3    Alcohol Swabs PADS Use to swab the area of the injection or pankaj as directed Per insurance coverage 100 each 0    aspirin (ASA) 81 MG EC tablet Take 1 tablet (81 mg) by mouth daily      bisacodyl (DULCOLAX) 5 MG EC tablet Take 2 tablets at 3 pm the day before your procedure. If your procedure is before 11 am, take 2 additional tablets at 11 pm. If your procedure is after 11 am, take 2 additional tablets at 6 am. For additional instructions refer to your colonoscopy prep instructions. 4 tablet 0    blood glucose (HUGO CONTOUR) test strip Use to test blood sugars 1 time daily or as directed. 100 strip 0    blood glucose (NO BRAND SPECIFIED) lancets standard Use to test blood sugar one time daily or as directed. 100 each 3    blood glucose (NO BRAND SPECIFIED) lancets standard To use to test glucose level in the blood Use to test blood sugar 1 times daily as directed. To accompany glucose monitor brands per insurance coverage. 100 each 0    blood glucose (NO BRAND SPECIFIED) test strip To use to test glucose level in the  blood Use to test blood sugar 1 times daily as directed. To accompany glucose monitor brands per insurance coverage. 100 strip 0    blood glucose calibration (HUGO CONTOUR) NORMAL solution Use to calibrate blood glucose monitor as directed. 1 each 3    blood glucose monitoring (NO BRAND SPECIFIED) meter device kit Use as directed Per insurance coverage 1 kit 0    blood glucose monitoring (NO BRAND SPECIFIED) meter device kit Use to test blood sugar 1 times daily or as directed. Preferred blood glucose meter OR supplies to accompany: Blood Glucose Monitor Brands: per insurance. 1 kit 0    buPROPion (WELLBUTRIN XL) 300 MG 24 hr tablet TAKE 1 TABLET (300 MG) BY MOUTH EVERY MORNING 90 tablet 3    Calcium Carb-Cholecalciferol (CALCIUM/VITAMIN D PO) Take 1 tablet by mouth every evening      Coenzyme Q-10 capsule Take 1 capsule by mouth every morning 30 capsule 12    cyclobenzaprine (FLEXERIL) 10 MG tablet Take 1 tablet (10 mg) by mouth 2 times daily as needed for muscle spasms 60 tablet 0    empagliflozin (JARDIANCE) 10 MG TABS tablet Take 1 tablet (10 mg) by mouth daily 90 tablet 1    evolocumab (REPATHA SURECLICK) 140 MG/ML prefilled autoinjector Inject 1 mL (140 mg) Subcutaneous every 14 days 6 mL 3    ezetimibe (ZETIA) 10 MG tablet TAKE ONE TABLET BY MOUTH ONCE DAILY 90 tablet 0    levothyroxine (SYNTHROID/LEVOTHROID) 137 MCG tablet TAKE ONE TABLET BY MOUTH ONCE DAILY 90 tablet 0    losartan (COZAAR) 50 MG tablet TAKE ONE AND ONE-HALF TABLETS (75MG) BY MOUTH EVERY  tablet 0    magnesium oxide (MAG-OX) 400 MG tablet Take 400 mg by mouth every evening      metFORMIN (GLUCOPHAGE) 1000 MG tablet TAKE 1 TABLET (1,000 MG) BY MOUTH 2 TIMES DAILY (WITH MEALS) 180 tablet 1    metoprolol tartrate (LOPRESSOR) 25 MG tablet TAKE ONE AND ONE HALF TABLETS (37.5 MG) BY MOUTH 2 TIMES DAILY 270 tablet 3    Misc Natural Products (OSTEO BI-FLEX ADV JOINT SHIELD) TABS Take 1 tablet by mouth daily       Multiple Vitamins-Minerals  (PRESERVISION AREDS PO) Take 1 tablet by mouth 2 times daily      nitroGLYcerin (NITROSTAT) 0.4 MG sublingual tablet For chest pain place 1 tablet under the tongue every 5 minutes for 3 doses. If symptoms persist 5 minutes after 1st dose call 911. 25 tablet 1    ONETOUCH ULTRA test strip USE TO TEST BLOOD SUGARS 1 TIME DAILY OR AS DIRECTED. 100 strip 1    pantoprazole (PROTONIX) 40 MG EC tablet Take 1 tablet (40 mg) by mouth daily 90 tablet 3    polyethylene glycol (GOLYTELY) 236 g suspension The night before the exam at 6 pm drink an 8-ounce glass every 15 minutes until the jug is half empty. If you arrive before 11 AM: Drink the other half of the Golytely jug at 11 PM night before procedure. If you arrive after 11 AM: Drink the other half of the Golytely jug at 6 AM day of procedure. For additional instructions refer to your colonoscopy prep instructions. 4000 mL 0    Probiotic Product (PROBIOTIC DAILY) CAPS Take 1 capsule by mouth daily      rosuvastatin (CRESTOR) 10 MG tablet Take 1 tablet (10 mg) by mouth daily Please stop the Lovosatin 90 tablet 1    Sharps Container MISC Use as directed to dispose of needles, lancets and other sharps Per Insurance coverage 1 each 0    tamsulosin (FLOMAX) 0.4 MG capsule Take 1 capsule (0.4 mg) by mouth daily 90 capsule 3    vitamin C (ASCORBIC ACID) 500 MG tablet Take 500 mg by mouth daily               Allergies:      Allergies   Allergen Reactions    Dust Mites Cough    Other Environmental Allergy      Allergic to sunlight ever since 20s.     Statins      Body aches    Penicillins Hives and Rash     Allergy to Latex? No  Allergy to tape?   No  Intolerances:             Physical Exam:   All vitals have been reviewed  No data found.  No intake/output data recorded.  Lungs:   No increased work of breathing, good air exchange, clear to auscultation bilaterally, no crackles or wheezing     Cardiovascular:   Normal apical impulse, regular rate and rhythm, normal S1 and S2, no S3  or S4, and no murmur noted             Lab / Radiology Results:            Anesthetic risk and/or ASA classification:       Alexei Morales MD

## 2024-07-17 ENCOUNTER — HOSPITAL ENCOUNTER (OUTPATIENT)
Facility: CLINIC | Age: 69
Discharge: HOME | End: 2024-07-17
Attending: INTERNAL MEDICINE | Admitting: INTERNAL MEDICINE
Payer: MEDICARE

## 2024-07-17 ENCOUNTER — ANESTHESIA EVENT (OUTPATIENT)
Dept: GASTROENTEROLOGY | Facility: CLINIC | Age: 69
End: 2024-07-17
Payer: MEDICARE

## 2024-07-17 ENCOUNTER — ANESTHESIA (OUTPATIENT)
Dept: GASTROENTEROLOGY | Facility: CLINIC | Age: 69
End: 2024-07-17
Payer: MEDICARE

## 2024-07-17 VITALS
TEMPERATURE: 98.3 F | HEART RATE: 67 BPM | OXYGEN SATURATION: 100 % | RESPIRATION RATE: 16 BRPM | SYSTOLIC BLOOD PRESSURE: 130 MMHG | DIASTOLIC BLOOD PRESSURE: 84 MMHG

## 2024-07-17 LAB
COLONOSCOPY: NORMAL
GLUCOSE BLDC GLUCOMTR-MCNC: 163 MG/DL (ref 70–99)

## 2024-07-17 PROCEDURE — 250N000009 HC RX 250: Performed by: NURSE ANESTHETIST, CERTIFIED REGISTERED

## 2024-07-17 PROCEDURE — 250N000011 HC RX IP 250 OP 636: Performed by: NURSE ANESTHETIST, CERTIFIED REGISTERED

## 2024-07-17 PROCEDURE — G0105 COLORECTAL SCRN; HI RISK IND: HCPCS | Performed by: INTERNAL MEDICINE

## 2024-07-17 PROCEDURE — 370N000017 HC ANESTHESIA TECHNICAL FEE, PER MIN: Performed by: INTERNAL MEDICINE

## 2024-07-17 PROCEDURE — 82962 GLUCOSE BLOOD TEST: CPT

## 2024-07-17 PROCEDURE — 45378 DIAGNOSTIC COLONOSCOPY: CPT | Performed by: INTERNAL MEDICINE

## 2024-07-17 PROCEDURE — 258N000003 HC RX IP 258 OP 636: Performed by: NURSE ANESTHETIST, CERTIFIED REGISTERED

## 2024-07-17 RX ORDER — ONDANSETRON 2 MG/ML
4 INJECTION INTRAMUSCULAR; INTRAVENOUS EVERY 30 MIN PRN
Status: CANCELLED | OUTPATIENT
Start: 2024-07-17

## 2024-07-17 RX ORDER — PROPOFOL 10 MG/ML
INJECTION, EMULSION INTRAVENOUS PRN
Status: DISCONTINUED | OUTPATIENT
Start: 2024-07-17 | End: 2024-07-17

## 2024-07-17 RX ORDER — LIDOCAINE HYDROCHLORIDE 10 MG/ML
INJECTION, SOLUTION INFILTRATION; PERINEURAL PRN
Status: DISCONTINUED | OUTPATIENT
Start: 2024-07-17 | End: 2024-07-17

## 2024-07-17 RX ORDER — DEXAMETHASONE SODIUM PHOSPHATE 10 MG/ML
4 INJECTION, SOLUTION INTRAMUSCULAR; INTRAVENOUS
Status: CANCELLED | OUTPATIENT
Start: 2024-07-17

## 2024-07-17 RX ORDER — PROPOFOL 10 MG/ML
INJECTION, EMULSION INTRAVENOUS CONTINUOUS PRN
Status: DISCONTINUED | OUTPATIENT
Start: 2024-07-17 | End: 2024-07-17

## 2024-07-17 RX ORDER — ONDANSETRON 4 MG/1
4 TABLET, ORALLY DISINTEGRATING ORAL EVERY 30 MIN PRN
Status: CANCELLED | OUTPATIENT
Start: 2024-07-17

## 2024-07-17 RX ORDER — NALOXONE HYDROCHLORIDE 0.4 MG/ML
0.1 INJECTION, SOLUTION INTRAMUSCULAR; INTRAVENOUS; SUBCUTANEOUS
Status: CANCELLED | OUTPATIENT
Start: 2024-07-17

## 2024-07-17 RX ORDER — SODIUM CHLORIDE, SODIUM LACTATE, POTASSIUM CHLORIDE, CALCIUM CHLORIDE 600; 310; 30; 20 MG/100ML; MG/100ML; MG/100ML; MG/100ML
INJECTION, SOLUTION INTRAVENOUS CONTINUOUS
Status: DISCONTINUED | OUTPATIENT
Start: 2024-07-17 | End: 2024-07-17 | Stop reason: HOSPADM

## 2024-07-17 RX ADMIN — PROPOFOL 50 MG: 10 INJECTION, EMULSION INTRAVENOUS at 10:08

## 2024-07-17 RX ADMIN — PROPOFOL 50 MG: 10 INJECTION, EMULSION INTRAVENOUS at 10:07

## 2024-07-17 RX ADMIN — PROPOFOL 200 MCG/KG/MIN: 10 INJECTION, EMULSION INTRAVENOUS at 10:08

## 2024-07-17 RX ADMIN — SODIUM CHLORIDE, POTASSIUM CHLORIDE, SODIUM LACTATE AND CALCIUM CHLORIDE: 600; 310; 30; 20 INJECTION, SOLUTION INTRAVENOUS at 09:33

## 2024-07-17 RX ADMIN — LIDOCAINE HYDROCHLORIDE 50 MG: 10 INJECTION, SOLUTION INFILTRATION; PERINEURAL at 10:08

## 2024-07-17 ASSESSMENT — ACTIVITIES OF DAILY LIVING (ADL)
ADLS_ACUITY_SCORE: 38

## 2024-07-17 NOTE — ANESTHESIA PREPROCEDURE EVALUATION
Anesthesia Pre-Procedure Evaluation    Patient: Joel Pike   MRN: [id number] : 1955        Procedure : Procedure(s):  Colonoscopy          Past Medical History:   Diagnosis Date    Atherosclerosis of native coronary artery of native heart with stable angina pectoris (H24) 2022    Atrial flutter, unspecified type (H) 2022    Calculus of kidney     CKD (chronic kidney disease) stage 2, GFR 60-89 ml/min 10/30/2015    CKD (chronic kidney disease) stage 2, GFR 60-89 ml/min 10/30/2015    Degeneration of lumbar or lumbosacral intervertebral disc     Depressive disorder     Diabetes (H)     Diabetic eye exam (H) 2014    Hypertension     Obesity, Class I, BMI 30-34.9 10/30/2015    HILARIO (obstructive sleep apnea)     Primary osteoarthritis of left knee     Prostate cancer (H) 2021    Thyroid disease 2010    Uncomplicated asthma       Past Surgical History:   Procedure Laterality Date    ARTHROPLASTY KNEE Left 2020    Procedure: left total knee replacement;  Surgeon: Jason Berman DO;  Location: [facility] OR    ARTHROPLASTY KNEE Right 2021    Procedure: right total knee replacement;  Surgeon: Jason Berman DO;  Location:  OR    BIOPSY      BYPASS GRAFT ARTERY CORONARY N/A 2022    Procedure: CORONARY ARTERY BYPASS GRAFT x 4 (LIMA - LAD; SV - OM; SV - PDA; SV - DIAGONAL) WITH ENDOSCOPIC SAPHENOUS VEIN HARVEST ON BILATERAL LOWER EXTREMITY, AND ON CARDIOPULMONARY PUMP OXYGENATOR  (INTRAOPERATIVE TRANSESOPHAGEAL ECHOCARDIOGRAM BY ANESTHESIOLOGIST);  Surgeon: Karson Bae MD;  Location:  OR    COLONOSCOPY  2009    Repeat in 5 yrs    COLONOSCOPY N/A 2014    Procedure: COMBINED COLONOSCOPY, SINGLE BIOPSY/POLYPECTOMY BY BIOPSY;  Surgeon: Denis Oakes MD;  Location:  GI    CV CORONARY ANGIOGRAM N/A 2022    Procedure: Coronary Angiogram;  Surgeon: Lindy Farooq MD;  Location:  HEART CARDIAC CATH LAB    CV LEFT HEART  CATH N/A 03/28/2022    Procedure: Left Heart Catheterization;  Surgeon: Lindy Farooq MD;  Location:  HEART CARDIAC CATH LAB    ESOPHAGOSCOPY, GASTROSCOPY, DUODENOSCOPY (EGD), COMBINED N/A 02/20/2015    Procedure: COMBINED ESOPHAGOSCOPY, GASTROSCOPY, DUODENOSCOPY (EGD), BIOPSY SINGLE OR MULTIPLE;  Surgeon: Alfred Young MD;  Location:  GI    HC REMV CATARACT EXTRACAP,INSERT LENS, W/O ECP  2000    Bilateral    HC REVISE MEDIAN N/CARPAL TUNNEL SURG  1991    HC TOOTH EXTRACTION W/FORCEP  1980's    [place] teeth removed    RELEASE CARPAL TUNNEL Right 06/16/2017    Procedure: RELEASE CARPAL TUNNEL;  Right carpal tunnel release;  Surgeon: Jason Berman DO;  Location: [facility]    RELEASE CARPAL TUNNEL Left 07/11/2017    Procedure: RELEASE CARPAL TUNNEL;  Left carpal tunnel release;  Surgeon: Jason Berman DO;  Location: [facility]    SOFT TISSUE SURGERY        Allergies   Allergen Reactions    Dust Mites Cough    Other Environmental Allergy      Allergic to sunlight ever since 20s.     Statins      Body aches    Penicillins Hives and Rash      Social History     Tobacco Use    Smoking status: Never     Passive exposure: Past (per pt)    Smokeless tobacco: Never   Substance Use Topics    Alcohol use: No      Wt Readings from Last 1 Encounters:   06/17/24 104.3 kg (230 lb)        Anesthesia Evaluation   Pt has had prior anesthetic.     No history of anesthetic complications       ROS/MED HX  ENT/Pulmonary:     (+) sleep apnea, uses CPAP,                   Intermittent, asthma                  Neurologic:  - neg neurologic ROS     Cardiovascular:     (+) Dyslipidemia hypertension- -  CAD -  CABG-date: 4/8/22 :  x 4 (LIMA - LAD; SV - OM; SV - PDA; SV - DIAGONAL) WITH ENDOSCOPIC SAPHENOUS VEIN HARVEST ON BILATERAL LOWER EXTREMITY. -                                 Previous cardiac testing   Echo: Date: 3/28/22 Results:  Left ventricular systolic function is normal.  The visual ejection fraction is  60-65%.  The right ventricle is normal in structure, function and size.  No significant valve dysfunction.  The inferior vena cava was normal in size with preserved respiratory  variability.  There is no pericardial effusion.  Stress Test:  Date: Results:  1. The patient exercised 7:31. Average functional capacity for age.  2. The patient exhibited no chest pain during exercise. Patient expereinced  shortness of breath with exercise.  3. This was an abnormal stress EKG; up to 2 mm ST depression at peak exercise  in the inferolateral leads, resolved with rest.  4. There is mid to distal anterior, anteroseptal and apical wall hypokinesis  with stress. Left ventricular function became worse with stress.  5. Findings suggestive of LAD ischemia. Cardiology consult arranged today.  ______________________________________________________________________________  ECG Reviewed:  Date: 5/4/22 Results:  ST,   Cath:  Date: Results:    Multi-vessel coronary artery disease identified angiographically.    LM: Distal 60% lesion.    LAD: Ostial 70% lesion, followed by mid 90% lesion after large diagonal takeoff. Long 40-50% lesion distal to this in the mid LAD.    LCX: Co-dominant vessel with a moderate 50% ostial lesion and 50% stenosis in the AV groove branch of the LCX.    RCA: Mid 70% lesion and distal PDA has a 80% lesion.    Left ventricular filling pressures are normal.        METS/Exercise Tolerance:     Hematologic:       Musculoskeletal:   (+)  arthritis,             GI/Hepatic:     (+) GERD, Asymptomatic on medication,                  Renal/Genitourinary:     (+) renal disease, type: CRI,     Nephrolithiasis ,       Endo:     (+)  type II DM,        thyroid problem, hypothyroidism,           Psychiatric/Substance Use:  - neg psychiatric ROS     Infectious Disease:       Malignancy:   (+) Malignancy, History of Prostate.    Other:  - neg other ROS          Physical Exam    Airway        Mallampati: II   TM distance:  > 3 FB   Neck ROM: full   Mouth opening: > 3 cm    Respiratory Devices and Support         Dental           Cardiovascular   cardiovascular exam normal          Pulmonary   pulmonary exam normal                OUTSIDE LABS:  CBC:   Lab Results   Component Value Date    WBC 6.5 06/14/2024    WBC 6.4 12/01/2023    HGB 13.0 (L) 06/14/2024    HGB 12.0 (L) 12/01/2023    HCT 37.7 (L) 06/14/2024    HCT 34.3 (L) 12/01/2023     06/14/2024     12/01/2023     BMP:   Lab Results   Component Value Date     12/01/2023     11/03/2023    POTASSIUM 4.8 12/01/2023    POTASSIUM 4.9 11/03/2023    CHLORIDE 101 12/01/2023    CHLORIDE 99 11/03/2023    CO2 27 12/01/2023    CO2 27 11/03/2023    BUN 22.1 12/01/2023    BUN 25.6 (H) 11/03/2023    CR 1.19 (H) 12/01/2023    CR 1.16 11/03/2023     (H) 12/01/2023     (H) 11/03/2023     COAGS:   Lab Results   Component Value Date    PTT 27 04/08/2022    INR 3.4 (H) 06/23/2022    FIBR 197 04/08/2022     POC:   Lab Results   Component Value Date     (H) 01/19/2021     HEPATIC:   Lab Results   Component Value Date    ALBUMIN 4.2 12/01/2023    PROTTOTAL 6.4 12/01/2023    ALT 20 12/01/2023    AST 18 12/01/2023    ALKPHOS 90 12/01/2023    BILITOTAL 0.4 12/01/2023     OTHER:   Lab Results   Component Value Date    PH 7.38 04/08/2022    LACT 3.2 (H) 04/08/2022    A1C 6.4 (H) 05/14/2024    AURORA 9.8 12/01/2023    PHOS 4.3 04/13/2022    MAG 2.1 04/13/2022    TSH 1.41 06/27/2024    T4 1.23 12/01/2023    SED 6 12/20/2006       Anesthesia Plan    ASA Status:  3    NPO Status:  NPO Appropriate    Anesthesia Type: MAC.     - Reason for MAC: immobility needed, straight local not clinically adequate   Induction: Intravenous, Propofol.   Maintenance: Balanced.        Consents    Anesthesia Plan(s) and associated risks, benefits, and realistic alternatives discussed. Questions answered and patient/representative(s) expressed understanding.     - Discussed:     - Discussed  "with:  Patient      - Extended Intubation/Ventilatory Support Discussed: No.      - Patient is DNR/DNI Status: No     Use of blood products discussed: No .     Postoperative Care    Pain management: Multi-modal analgesia.   PONV prophylaxis: Background Propofol Infusion     Comments:    Other Comments: The risks and benefits of anesthesia, and the alternatives where applicable, have been discussed with the patient, and they wish to proceed.               SYLVIA Barber CRNA    I have reviewed the pertinent notes and labs in the chart from the past 30 days and (re)examined the patient.  Any updates or changes from those notes are reflected in this note.              # Obesity: Estimated body mass index is 33.3 kg/m  as calculated from the following:    Height as of 6/17/24: 1.77 m (5' 9.69\").    Weight as of 6/17/24: 104.3 kg (230 lb).      "

## 2024-07-17 NOTE — ANESTHESIA POSTPROCEDURE EVALUATION
Patient: Joel Pike    Procedure: Procedure(s):  Colonoscopy       Anesthesia Type:  MAC    Note:  Disposition: Outpatient   Postop Pain Control: Uneventful            Sign Out: Well controlled pain   PONV: No   Neuro/Psych: Uneventful            Sign Out: Acceptable/Baseline neuro status   Airway/Respiratory: Uneventful            Sign Out: Acceptable/Baseline resp. status   CV/Hemodynamics: Uneventful            Sign Out: Acceptable CV status   Other NRE: NONE   DID A NON-ROUTINE EVENT OCCUR? No    Event details/Postop Comments:  Pt was happy with anesthesia care.  No complications.  I will follow up with the pt if needed.           Last vitals:  Vitals Value Taken Time   /93 07/17/24 1024   Temp     Pulse 74 07/17/24 1024   Resp     SpO2 98 % 07/17/24 1033   Vitals shown include unfiled device data.    Electronically Signed By: SYLVIA Barber CRNA  July 17, 2024  10:35 AM

## 2024-07-17 NOTE — DISCHARGE INSTRUCTIONS
[facility]    Home Care Following Endoscopy          Activity:  You have just undergone an endoscopic procedure usually performed with conscious sedation.  Do not work or operate machinery (including a car) for at least 12 hours.      Diet:  Return to the diet you were on before your procedure but eat lightly for the first 12-24 hours.  Drink plenty of water.  Resume any regular medications unless otherwise advised by your physician.  Please begin any new medication prescribed as a result of your procedure as directed by your physician.   Pain:  You may take Tylenol as needed for pain.  Expected Recovery:  You can expect some mild abdominal fullness and/or discomfort due to the air used to inflate your intestinal tract. I encourage you to walk and attempt to pass this air as soon as possible.    Call Your Physician if You Have:  After Colonoscopy:  Worsening persisting abdominal pain which is worse with activity.  Fevers (>101 degrees F), chills or shakes.  Passage of continued blood with bowel movements.     Any questions or concerns about your recovery, please call [phone] or after hours [phone] ([phone]) Nurse Advice Line.    Follow-up Care:  You did NOT have polyps/biopsy tissue sample(s) removed.

## 2024-07-17 NOTE — ANESTHESIA CARE TRANSFER NOTE
Patient: Joel Pike    Procedure: Procedure(s):  Colonoscopy       Diagnosis: Colon cancer screening [Z12.11]  Diagnosis Additional Information: No value filed.    Anesthesia Type:   MAC     Note:    Oropharynx: oropharynx clear of all foreign objects and spontaneously breathing  Level of Consciousness: drowsy  Oxygen Supplementation: room air    Independent Airway: airway patency satisfactory and stable  Dentition: dentition unchanged  Vital Signs Stable: post-procedure vital signs reviewed and stable  Report to RN Given: handoff report given  Patient transferred to: Phase II    Handoff Report: Identifed the Patient, Identified the Reponsible Provider, Reviewed the pertinent medical history, Discussed the surgical course, Reviewed Intra-OP anesthesia mangement and issues during anesthesia, Set expectations for post-procedure period and Allowed opportunity for questions and acknowledgement of understanding      Vitals:  Vitals Value Taken Time   /93 07/17/24 1024   Temp     Pulse 74 07/17/24 1024   Resp     SpO2 98 % 07/17/24 1033   Vitals shown include unfiled device data.    Electronically Signed By: SYLVIA Barber CRNA  July 17, 2024  10:34 AM

## 2024-08-06 DIAGNOSIS — I25.118 ATHEROSCLEROSIS OF NATIVE CORONARY ARTERY OF NATIVE HEART WITH STABLE ANGINA PECTORIS (H): ICD-10-CM

## 2024-08-26 ENCOUNTER — TELEPHONE (OUTPATIENT)
Dept: FAMILY MEDICINE | Facility: CLINIC | Age: 69
End: 2024-08-26
Payer: MEDICARE

## 2024-08-26 NOTE — TELEPHONE ENCOUNTER
Patient Quality Outreach    Patient is due for the following:   Diabetes -  A1C    Next Steps:   Schedule a office visit for diabetes    Type of outreach:    Sent [employer] message.      Questions for provider review:    None           Roseanna Bueno MA

## 2024-09-09 ENCOUNTER — LAB (OUTPATIENT)
Dept: LAB | Facility: CLINIC | Age: 69
End: 2024-09-09
Payer: MEDICARE

## 2024-09-09 DIAGNOSIS — C79.51 PROSTATE CANCER METASTATIC TO BONE (H): ICD-10-CM

## 2024-09-09 DIAGNOSIS — C61 HORMONE SENSITIVE PROSTATE CANCER (H): ICD-10-CM

## 2024-09-09 DIAGNOSIS — Z19.1 HORMONE SENSITIVE PROSTATE CANCER (H): ICD-10-CM

## 2024-09-09 DIAGNOSIS — C61 PROSTATE CANCER METASTATIC TO BONE (H): ICD-10-CM

## 2024-09-09 LAB — PSA SERPL DL<=0.01 NG/ML-MCNC: <0.01 NG/ML (ref 0–4.5)

## 2024-09-09 PROCEDURE — 84403 ASSAY OF TOTAL TESTOSTERONE: CPT

## 2024-09-09 PROCEDURE — 84153 ASSAY OF PSA TOTAL: CPT

## 2024-09-09 PROCEDURE — 36415 COLL VENOUS BLD VENIPUNCTURE: CPT

## 2024-09-11 LAB — TESTOST SERPL-MCNC: 32 NG/DL (ref 240–950)

## 2024-09-12 DIAGNOSIS — N18.2 TYPE 2 DIABETES MELLITUS WITH STAGE 2 CHRONIC KIDNEY DISEASE, WITHOUT LONG-TERM CURRENT USE OF INSULIN (H): ICD-10-CM

## 2024-09-12 DIAGNOSIS — E11.22 TYPE 2 DIABETES MELLITUS WITH STAGE 2 CHRONIC KIDNEY DISEASE, WITHOUT LONG-TERM CURRENT USE OF INSULIN (H): ICD-10-CM

## 2024-09-12 DIAGNOSIS — E78.5 HYPERLIPIDEMIA LDL GOAL <100: ICD-10-CM

## 2024-09-12 RX ORDER — EZETIMIBE 10 MG/1
TABLET ORAL
Qty: 90 TABLET | Refills: 2 | Status: SHIPPED | OUTPATIENT
Start: 2024-09-12

## 2024-09-13 NOTE — PROGRESS NOTES
Virtual Visit Details    Type of service:  Video Visit   Video Start Time: 9:29 AM  Video End Time:9:36 AM    Originating Location (pt. Location): Home    Distant Location (provider location):  Off-site  Platform used for Video Visit: [facility] Medical Oncology Return Note       Date of visit: Sep 16, 2024    CC:   metastatic prostate cancer, now s/p 2.5 years of therapy with apalutamide/ADT, currently on surveillance    ONCOLOGY HISTORY:  Elevated PSA noted 2/26/21 at 12.70. Previously normal in 2017.    4/7/21-Initial urology visit.  7/2/21-prostate biopsy 4+3, 9/12 biopsies positive.  Ductal component noted.    7/28/21-MR prostate-PIRADS 5 lesion, R and L sided lesions with likely    neurovascular bundle invasion. No  seminal vesicle involvement. No clear LAD,  but some indeterminate pelvic nodes.  No suspicious bone lesions.    7/28/21-NM bone scan suspicious lesion of R sacral ala S3 level.   8/10/21-CT A/P with multiple enlarged periaortic/iliac LN  8/11/21-First eligard dose 45mg (Q6M dosing). Due next 2/2022.  8/30/21-Initial medical oncology visit with Dr. Solo.  Unclear if bony lesion is metastasis based on current imaging, but this was not irradiated. Start apalutamide/eligard  8/30/21 PSA =30.40 Pre Rx  9/27/21 PSA =8.99 (T=6); HgbA1C = 6.1  10/5/21 PSA =3.22  11/26/21 PSA =0.15  7/26/22 PSA <0.01  8/22  Referral to radiation oncology for EBRT given low volume prostate cancer.  55cGy  in 20 fractions completed on 10/5/22.   11/11/22 PSA= <0.01  02/2023 ELIGARD 45 MG with Dr. Ott of urology.    5/10/23 PSA= <0.01  8/18/23 PSA=0.12, unclear now whether this was a lab error given known inconsistencies with     Some recent PSA tests at [facility].   9/1/23  PSA= <0.01  9/6/23  Eligard 45mg. DUE 3/6/24.  PSA rechecked <0.01.  Lupron 6 month shot received  AM before appt.   10/6/23 PSA <0.01  11/3/23  PSA <0.01  12/4/23 PSA <0.01.  Stop Erleada and ADT at a little over 2 years due to likely  false elevation of PSA in August and monitor PSA Q3M.    03/05/23         PSA <0.01  6/17/24 PSA <0.01   9/16/24          PSA <0.01, T 32    Interval History:  Joel reports he is doing well today. His energy level continues to be low. Michelle thinks the heat doesn't help his fatigue. He still has hot flashes which also seem to be worse with the heat. The hot flashes come and go. His appetite is good. He tries to go to the gym 4 times a week with exercise classes that averages 6-8 hours at the gym per week. He has knee, hip, and low back pain which is typical for him. He is happy about his PSA being undetectable. No pelvic pain or changes in urination. He has frequent urination which is unchanged. No new concerns today. He has otherwise been in good health.     PMH:  HTN, HLD, bipolar disorder, DM2 on metformin     PSH:  Bilateral TKA, bilateral cataract extraction, bilateral carpal tunnel release     FAMILY HX:  No reported family history of prostate cancer.  SIster with liver cancer in 20s.    SOCIAL:  Retired, works at [employer] in [employer] section now  Never smoker.   No EtOH  No recreational drugs.   No herbs/supplements other than on medication list.      MEDS:  Current Outpatient Medications   Medication Instructions    albuterol (PROAIR HFA) 108 (90 BASE) MCG/ACT Inhaler 1-2 puffs every 2 -4 hrs as needed    ALLEGRA-D ALLERGY & CONGESTION 180-240 MG 24 hr tablet TAKE ONE TABLET BY MOUTH EVERY DAY    blood glucose (HUGO CONTOUR) test strip Use to test blood sugars 1 time daily or as directed.    blood glucose (NO BRAND SPECIFIED) lancets standard Use to test blood sugar one time daily or as directed.    blood glucose calibration (HUGO CONTOUR) NORMAL solution Use to calibrate blood glucose monitor as directed.    buPROPion (WELLBUTRIN XL) 300 MG 24 hr tablet TAKE ONE TABLET BY MOUTH EVERY MORNING    Coenzyme Q-10 capsule 1 capsule, DAILY    esomeprazole (NEXIUM) 40 MG DR capsule TAKE ONE CAPSULE BY MOUTH EVERY  MORNING BEFORE BREAKFAST , 30-60 MINUTES BEFORE EATING    ezetimibe (ZETIA) 10 MG tablet TAKE ONE TABLET BY MOUTH ONCE DAILY    fish oil-omega-3 fatty acids 1000 MG capsule Take  by mouth. Take daily      levothyroxine (SYNTHROID/LEVOTHROID) 112 mcg, Oral, DAILY    losartan (COZAAR) 75 mg, Oral, DAILY    Magnesium 500 MG CAPS One twice a day    metFORMIN (GLUCOPHAGE) 500 MG tablet TAKE ONE TABLET BY MOUTH TWICE A DAY WITH MEALS    Misc Natural Products (OSTEO BI-FLEX ADV JOINT SHIELD) TABS Take  by mouth.    multivitamin (OCUVITE) TABS tablet 1 tablet, Oral, DAILY    STATIN NOT PRESCRIBED (INTENTIONAL) Please choose reason not prescribed, below    Vitamin D3 (CHOLECALCIFEROL) 5,000 Units, Oral     ROS-Remainder of 14 point ROS reviewed and negative except as in HPI.    PHYSICAL EXAM:  Video physical exam  General: Patient appears well in no acute distress.   Skin: No visualized rash or lesions on visualized skin  Eyes: EOMI, no erythema, sclera icterus or discharge noted  Resp: Appears to be breathing comfortably without accessory muscle usage, speaking in full sentences, no cough  MSK: Appears to have normal range of motion based on visualized movements  Neurologic: No apparent tremors, facial movements symmetric  Psych: affect bright, alert and oriented     LABS AND IMAGES:    Prostate Biopsy 7/2/21  FINAL DIAGNOSIS:   A: Prostate, left, biopsy   - Adenocarcinoma, Mary's grade 4/3 with ductal component involving 4 of    6 needle core biopsies and 25% of   submitted tissue     B: Prostate, right, biopsy   - Adenocarcinoma, Mary's grade 4/3 with ductal component involving 5 of    6 needle core biopsies and 25% of   submitted tissue     Labs:  Most Recent 3 CBC's:  Recent Labs   Lab Test 06/14/24  1024 12/01/23  0904 11/03/23  0913   WBC 6.5 6.4 6.4   HGB 13.0* 12.0* 11.7*   MCV 91 92 93    193 251   ANEUTAUTO 4.5 4.7 4.6     Most Recent 3 BMP's:  Recent Labs   Lab Test 07/17/24  0931 12/01/23  0904  11/03/23  0913 08/18/23  0910   NA  --  139 137 137   POTASSIUM  --  4.8 4.9 4.7   CHLORIDE  --  101 99 101   CO2  --  27 27 23   BUN  --  22.1 25.6* 14.0   CR  --  1.19* 1.16 1.10   ANIONGAP  --  11 11 13   AURORA  --  9.8 9.6 9.3   * 145* 148* 143*   PROTTOTAL  --  6.4 6.8 6.5   ALBUMIN  --  4.2 4.4 4.3    Most Recent 3 LFT's:  Recent Labs   Lab Test 12/01/23  0904 11/03/23  0913 08/18/23  0910   AST 18 21 21   ALT 20 25 24   ALKPHOS 90 88 79   BILITOTAL 0.4 0.4 0.4    Most Recent 2 TSH and T4:  Recent Labs   Lab Test 06/27/24  1106 12/01/23  0904 06/20/23  1313   TSH 1.41 4.71* 4.24*   T4  --  1.23 1.19      Latest Reference Range & Units 09/09/24 09:50   PSA Tumor Marker 0.00 - 4.50 ng/mL <0.01   Testosterone Total 240 - 950 ng/dL 32 (L)       PSA trend, see oncology history.      IMPRESSION AND PLAN:  Joel Pike is a 69 year old  M with PMH of DM2 on metformin, depression/anxiety (pt reports as bipolar d/o), HTN, HLD, and prostate cancer with charity disease only.  He was treated with EBRT to the prostate for low volume metastatic disease along with ADT and apalutamide.  He has responded well and has had an undetectable PSA since 7/2022.  He had a single PSA thus far of 0.12 in August 2023 that is likely a lab error rather than a true value. PSA was checked monthly  through December 2023 and then remained undetectable. Therefore, we stopped apalutamide and ADT in December 2023.     -He started apalutamide on 10/5/21. He had CT CAP done 11/26/21 which showed significantly decreased retroperitoneal lymphadenopathy since the prior study dated 8/10/2021 indicates good response to treatment. No other evidence for lymphadenopathy or metastasis is identified. Specifically no evidence for left sacral metastasis is seen.   -Completed RT to the primary for oligometastatic disease based on the STAMPEDE study with Dr. Gomez ~09/2022  -Plan was to continue ADT for 2 years (through 8/2023).  We extended this for another 3  months due to a rise in PSA that ultimately appeared to be a lab abnormality.  Three subsequent PSAs were then negative.  He received 6 month shot the AM of 9/6/23.  - Discontinued  apalutamide in December 2023.   - We will monitor PSA every 3 months moving forward. No current plans to administer further ADT therapy or restart apalutamide.     - [facility] whole genome/transcriptome sequencing of prostate biopsy performed 9/12/2023.   - PSA continues to be undetectable on 9/16/24 labs. Testosterone 32, starting to recover but still in the castration range.   -Return with Dr. Nuno in 3 months labs week prior.  Sees Yaakov in 6 months with labs week prior.  -He prefers not to start any new medications for hot flashes at his June 2024 visit and again at his visit today 9/16/24 (note he is also on a very stable dose of Wellbutrin since 2009 that would require medication changes).      # normocytic anemia.    He has mild normocytic anemia but no bleeding source.  He should not be anemic with ADT, so we did anemia workup, which was iron replete and normal vitamin B12.  Will continue to watch and monitor without additional interventions to see if Hgb rises with rise in testosterone.      PLAN:   Continue to monitor every 3 months with PSA.    RTC with Yaakov in 3 months virtually.     15 minutes spent on the date of the encounter doing chart review, review of test results, interpretation of tests, patient visit, and documentation     Fawn Marcus PA-C

## 2024-09-16 ENCOUNTER — VIRTUAL VISIT (OUTPATIENT)
Dept: ONCOLOGY | Facility: CLINIC | Age: 69
End: 2024-09-16
Attending: STUDENT IN AN ORGANIZED HEALTH CARE EDUCATION/TRAINING PROGRAM
Payer: MEDICARE

## 2024-09-16 VITALS — WEIGHT: 223 LBS | HEIGHT: 71 IN | BODY MASS INDEX: 31.22 KG/M2

## 2024-09-16 DIAGNOSIS — C61 HORMONE SENSITIVE PROSTATE CANCER (H): Primary | ICD-10-CM

## 2024-09-16 DIAGNOSIS — Z19.1 HORMONE SENSITIVE PROSTATE CANCER (H): Primary | ICD-10-CM

## 2024-09-16 PROCEDURE — 99212 OFFICE O/P EST SF 10 MIN: CPT | Mod: 95

## 2024-09-16 ASSESSMENT — PAIN SCALES - GENERAL: PAINLEVEL: NO PAIN (0)

## 2024-09-16 NOTE — Clinical Note
"9/16/2024      Joel Pike  [address]  [facility] [address]      Dear Colleague,    Thank you for referring your patient, Joel Pike, to the [facility]. Please see a copy of my visit note below.    Virtual Visit Details    Type of service:  Video Visit   Video Start Time: {video visit start/end time for provider to select:789017}  Video End Time:{video visit start/end time for provider to select:301778}    Originating Location (pt. Location): {video visit patient location:074078::\"Home\"}  {PROVIDER LOCATION On-site should be selected for visits conducted from your clinic location or adjoining [facility] hospital, academic office, or other nearby [facility] building. Off-site should be selected for all other provider locations, including home:320294}  Distant Location (provider location):  {virtual location provider:093230}  Platform used for Video Visit: {Virtual Visit Platforms:644116::\"[employer]\"}      [facility] Medical Oncology Return Note       Date of visit: Sep 16, 2024    CC:   metastatic prostate cancer, now s/p 2 years of therapy with apalutamide/ADT, currently on surveillance    ONCOLOGY HISTORY:  Elevated PSA noted 2/26/21 at 12.70. Previously normal in 2017.    4/7/21-Initial urology visit.  7/2/21-prostate biopsy 4+3, 9/12 biopsies positive.  Ductal component noted.    7/28/21-MR prostate-PIRADS 5 lesion, R and L sided lesions with likely    neurovascular bundle invasion. No  seminal vesicle involvement. No clear LAD,  but some indeterminate pelvic nodes.  No suspicious bone lesions.    7/28/21-NM bone scan suspicious lesion of R sacral ala S3 level.   8/10/21-CT A/P with multiple enlarged periaortic/iliac LN  8/11/21-First eligard dose 45mg (Q6M dosing). Due next 2/2022.  8/30/21-Initial medical oncology visit with Dr. Solo.  Unclear if bony lesion is metastasis based on current imaging, but this was not irradiated. Start apalutamide/eligard  8/30/21 PSA =30.40 Pre " Rx  9/27/21 PSA =8.99 (T=6); HgbA1C = 6.1  10/5/21 PSA =3.22  11/26/21 PSA =0.15  7/26/22 PSA <0.01  8/22  Referral to radiation oncology for EBRT given low volume prostate cancer.  55cGy  in 20 fractions completed on 10/5/22.   11/11/22 PSA= <0.01  02/2023 ELIGARD 45 MG with Dr. Ott of urology.    5/10/23 PSA= <0.01  8/18/23 PSA=0.12, unclear now whether this was a lab error given known inconsistencies with     Some recent PSA tests at [facility].   9/1/23  PSA= <0.01  9/6/23  Eligard 45mg. DUE 3/6/24.  PSA rechecked <0.01.  Lupron 6 month shot received  AM before appt.   10/6/23 PSA <0.01  11/3/23  PSA <0.01  12/4/23 PSA <0.01.  Stop Erleada and ADT at a little over 2 years due to likely false elevation of PSA in August and monitor PSA Q3M.    03/05/23         PSA <0.01  6/17/24 PSA <0.01     Interval History:  Feeling okay.  Still really fatigued.  Having considerable hot flashes which actually seem to have gotten worse since stopping ADT plus apalutamide.  Appetite is okay.  Needs to take rest in the afternoon usually.  Happy to see his PSA is undetectable still.  No other complaints or concerns at the moment of that he would not like to start any new medications if possible.    PMH:  HTN, HLD, bipolar disorder, DM2 on metformin     PSH:  Bilateral TKA, bilateral cataract extraction, bilateral carpal tunnel release     FAMILY HX:  No reported family history of prostate cancer.  SIster with liver cancer in 20s.    SOCIAL:  Retired, works at [employer] in [employer] section now  Never smoker.   No EtOH  No recreational drugs.   No herbs/supplements other than on medication list.      MEDS:  Current Outpatient Medications   Medication Instructions    albuterol (PROAIR HFA) 108 (90 BASE) MCG/ACT Inhaler 1-2 puffs every 2 -4 hrs as needed    ALLEGRA-D ALLERGY & CONGESTION 180-240 MG 24 hr tablet TAKE ONE TABLET BY MOUTH EVERY DAY    blood glucose (HUGO CONTOUR) test strip Use to test blood sugars 1 time daily or as  directed.    blood glucose (NO BRAND SPECIFIED) lancets standard Use to test blood sugar one time daily or as directed.    blood glucose calibration (HUGO CONTOUR) NORMAL solution Use to calibrate blood glucose monitor as directed.    buPROPion (WELLBUTRIN XL) 300 MG 24 hr tablet TAKE ONE TABLET BY MOUTH EVERY MORNING    Coenzyme Q-10 capsule 1 capsule, DAILY    esomeprazole (NEXIUM) 40 MG DR capsule TAKE ONE CAPSULE BY MOUTH EVERY MORNING BEFORE BREAKFAST , 30-60 MINUTES BEFORE EATING    ezetimibe (ZETIA) 10 MG tablet TAKE ONE TABLET BY MOUTH ONCE DAILY    fish oil-omega-3 fatty acids 1000 MG capsule Take  by mouth. Take daily      levothyroxine (SYNTHROID/LEVOTHROID) 112 mcg, Oral, DAILY    losartan (COZAAR) 75 mg, Oral, DAILY    Magnesium 500 MG CAPS One twice a day    metFORMIN (GLUCOPHAGE) 500 MG tablet TAKE ONE TABLET BY MOUTH TWICE A DAY WITH MEALS    Misc Natural Products (OSTEO BI-FLEX ADV JOINT SHIELD) TABS Take  by mouth.    multivitamin (OCUVITE) TABS tablet 1 tablet, Oral, DAILY    STATIN NOT PRESCRIBED (INTENTIONAL) Please choose reason not prescribed, below    Vitamin D3 (CHOLECALCIFEROL) 5,000 Units, Oral     ROS-Remainder of 14 point ROS reviewed and negative except as in HPI.    PHYSICAL EXAM:  Video physical exam  General: Patient appears well in no acute distress.   Skin: No visualized rash or lesions on visualized skin  Eyes: EOMI, no erythema, sclera icterus or discharge noted  Resp: Appears to be breathing comfortably without accessory muscle usage, speaking in full sentences, no cough  MSK: Appears to have normal range of motion based on visualized movements  Neurologic: No apparent tremors, facial movements symmetric  Psych: affect bright, alert and oriented     LABS AND IMAGES:    Prostate Biopsy 7/2/21  FINAL DIAGNOSIS:   A: Prostate, left, biopsy   - Adenocarcinoma, Hustle's grade 4/3 with ductal component involving 4 of    6 needle core biopsies and 25% of   submitted tissue     B:  Prostate, right, biopsy   - Adenocarcinoma, Donalds's grade 4/3 with ductal component involving 5 of    6 needle core biopsies and 25% of   submitted tissue     Labs:  Most Recent 3 CBC's:  Recent Labs   Lab Test 06/14/24  1024 12/01/23  0904 11/03/23  0913   WBC 6.5 6.4 6.4   HGB 13.0* 12.0* 11.7*   MCV 91 92 93    193 251   ANEUTAUTO 4.5 4.7 4.6     Most Recent 3 BMP's:  Recent Labs   Lab Test 07/17/24  0931 12/01/23  0904 11/03/23  0913 08/18/23  0910   NA  --  139 137 137   POTASSIUM  --  4.8 4.9 4.7   CHLORIDE  --  101 99 101   CO2  --  27 27 23   BUN  --  22.1 25.6* 14.0   CR  --  1.19* 1.16 1.10   ANIONGAP  --  11 11 13   AURORA  --  9.8 9.6 9.3   * 145* 148* 143*   PROTTOTAL  --  6.4 6.8 6.5   ALBUMIN  --  4.2 4.4 4.3    Most Recent 3 LFT's:  Recent Labs   Lab Test 12/01/23  0904 11/03/23  0913 08/18/23  0910   AST 18 21 21   ALT 20 25 24   ALKPHOS 90 88 79   BILITOTAL 0.4 0.4 0.4    Most Recent 2 TSH and T4:  Recent Labs   Lab Test 06/27/24  1106 12/01/23  0904 06/20/23  1313   TSH 1.41 4.71* 4.24*   T4  --  1.23 1.19     Component      Latest Ref Rng 3/5/2024  10:08 AM   PSA Tumor Marker      0.00 - 4.50 ng/mL <0.01      Testosterone 6/14/2024 pending.     PSA trend, see oncology history.      IMPRESSION AND PLAN:  Joel Pike is a 69 year old  M with PMH of DM2 on metformin, depression/anxiety (pt reports as bipolar d/o), HTN, HLD, and prostate cancer with charity disease only.  He was treated with EBRT to the prostate for low volume metastatic disease along with ADT and apalutamide.  He has responded well and has had an undetectable PSA since 7/2022.  He had a single PSA thus far of 0.12 in August 2023 that is likely a lab error rather than a true value. PSA was checked monthly  through December 2023 and then remained undetectable. Therefore, we stopped apalutamide and ADT in December 2023.     -He started apalutamide on 10/5/21. He had CT CAP done 11/26/21 which showed significantly decreased  retroperitoneal lymphadenopathy since the prior study dated 8/10/2021 indicates good response to treatment. No other evidence for lymphadenopathy or metastasis is identified. Specifically no evidence for left sacral metastasis is seen.   -Completed RT to the primary for oligometastatic disease based on the STAMPEDE study with Dr. Gomez ~09/2022  -Plan was to continue ADT for 2 years (through 8/2023).  We extended this for another 3 months due to a rise in PSA that ultimately appeared to be a lab abnormality.  Three subsequent PSAs were then negative.  He received 6 month shot the AM of 9/6/23.  - Discontinued  apalutamide in December 2023.   - We will monitor PSA every 3 months moving forward. No current plans to administer further ADT therapy or restart apalutamide.     - Caris whole genome/transcriptome sequencing of prostate biopsy performed 9/12/2023.   - PSA continues to be undetectable on 6/14/24 labs.   -Return with Kandi Augustine in 3 months labs week prior.  Sees Yaakov in 6 months with labs week prior.  -He prefers not to start any new medications for hot flashes at our June 2024 visit (note he is also on a very stable dose of Wellbutrin since 2009 that would require medication changes).      # normocytic anemia.    He has mild normocytic anemia but no bleeding source.  He should not be anemic with ADT, so we did anemia workup, which was iron replete and normal vitamin B12.  Will continue to watch and monitor without additional interventions to see if Hgb rises with rise in testosterone.      PLAN:   Continue to monitor every 3 months with PSA.    See Kandi Augustine in 3 months and Yaakov in 6 months.  Both by video.    Let us know if the hot flashes become more of an issue or are intolerable.  We will not start any new medications for you at the [facility]         Virtual Visit Details    Type of service:  Video Visit   Video Start Time: 9:29 AM  Video End Time:{video visit start/end time for provider to  select:[id number]}    Originating Location (pt. Location): Home    Distant Location (provider location):  Off-site  Platform used for Video Visit: [facility] Medical Oncology Return Note       Date of visit: Sep 16, 2024    CC:   metastatic prostate cancer, now s/p 2.5 years of therapy with apalutamide/ADT, currently on surveillance    ONCOLOGY HISTORY:  Elevated PSA noted 2/26/21 at 12.70. Previously normal in 2017.    4/7/21-Initial urology visit.  7/2/21-prostate biopsy 4+3, 9/12 biopsies positive.  Ductal component noted.    7/28/21-MR prostate-PIRADS 5 lesion, R and L sided lesions with likely    neurovascular bundle invasion. No  seminal vesicle involvement. No clear LAD,  but some indeterminate pelvic nodes.  No suspicious bone lesions.    7/28/21-NM bone scan suspicious lesion of R sacral ala S3 level.   8/10/21-CT A/P with multiple enlarged periaortic/iliac LN  8/11/21-First eligard dose 45mg (Q6M dosing). Due next 2/2022.  8/30/21-Initial medical oncology visit with Dr. Solo.  Unclear if bony lesion is metastasis based on current imaging, but this was not irradiated. Start apalutamide/eligard  8/30/21 PSA =30.40 Pre Rx  9/27/21 PSA =8.99 (T=6); HgbA1C = 6.1  10/5/21 PSA =3.22  11/26/21 PSA =0.15  7/26/22 PSA <0.01  8/22  Referral to radiation oncology for EBRT given low volume prostate cancer.  55cGy  in 20 fractions completed on 10/5/22.   11/11/22 PSA= <0.01  02/2023 ELIGARD 45 MG with Dr. Ott of urology.    5/10/23 PSA= <0.01  8/18/23 PSA=0.12, unclear now whether this was a lab error given known inconsistencies with     Some recent PSA tests at [facility].   9/1/23  PSA= <0.01  9/6/23  Eligard 45mg. DUE 3/6/24.  PSA rechecked <0.01.  Lupron 6 month shot received  AM before appt.   10/6/23 PSA <0.01  11/3/23  PSA <0.01  12/4/23 PSA <0.01.  Stop Erleada and ADT at a little over 2 years due to likely false elevation of PSA in August and monitor PSA Q3M.    03/05/23         PSA <0.01  6/17/24 PSA  <0.01   9/16/24          PSA <0.01, T 32    Interval History:  Joel reports he is doing well today. His energy level continues to be low. Michelle thinks the heat doesn't help his fatigue. He still has hot flashes which also seem to be worse with the heat. The hot flashes come and go. His appetite is good. He tries to go to the gym 4 times a week with exercise classes that averages 6-8 hours at the gym per week. He has knee, hip, and low back pain which is typical for him. He is happy about his PSA being undetectable. No pelvic pain or changes in urination. He has frequent urination which is unchanged.     PMH:  HTN, HLD, bipolar disorder, DM2 on metformin     PSH:  Bilateral TKA, bilateral cataract extraction, bilateral carpal tunnel release     FAMILY HX:  No reported family history of prostate cancer.  SIster with liver cancer in 20s.    SOCIAL:  Retired, works at [employer] in [place] section now  Never smoker.   No EtOH  No recreational drugs.   No herbs/supplements other than on medication list.      MEDS:  Current Outpatient Medications   Medication Instructions     albuterol (PROAIR HFA) 108 (90 BASE) MCG/ACT Inhaler 1-2 puffs every 2 -4 hrs as needed     ALLEGRA-D ALLERGY & CONGESTION 180-240 MG 24 hr tablet TAKE ONE TABLET BY MOUTH EVERY DAY     blood glucose (HUGO CONTOUR) test strip Use to test blood sugars 1 time daily or as directed.     blood glucose (NO BRAND SPECIFIED) lancets standard Use to test blood sugar one time daily or as directed.     blood glucose calibration (HUGO CONTOUR) NORMAL solution Use to calibrate blood glucose monitor as directed.     buPROPion (WELLBUTRIN XL) 300 MG 24 hr tablet TAKE ONE TABLET BY MOUTH EVERY MORNING     Coenzyme Q-10 capsule 1 capsule, DAILY     esomeprazole (NEXIUM) 40 MG DR capsule TAKE ONE CAPSULE BY MOUTH EVERY MORNING BEFORE BREAKFAST , 30-60 MINUTES BEFORE EATING     ezetimibe (ZETIA) 10 MG tablet TAKE ONE TABLET BY MOUTH ONCE DAILY     fish oil-omega-3  fatty acids 1000 MG capsule Take  by mouth. Take daily       levothyroxine (SYNTHROID/LEVOTHROID) 112 mcg, Oral, DAILY     losartan (COZAAR) 75 mg, Oral, DAILY     Magnesium 500 MG CAPS One twice a day     metFORMIN (GLUCOPHAGE) 500 MG tablet TAKE ONE TABLET BY MOUTH TWICE A DAY WITH MEALS     Misc Natural Products (OSTEO BI-FLEX ADV JOINT SHIELD) TABS Take  by mouth.     multivitamin (OCUVITE) TABS tablet 1 tablet, Oral, DAILY     STATIN NOT PRESCRIBED (INTENTIONAL) Please choose reason not prescribed, below     Vitamin D3 (CHOLECALCIFEROL) 5,000 Units, Oral     ROS-Remainder of 14 point ROS reviewed and negative except as in HPI.    PHYSICAL EXAM:  Video physical exam  General: Patient appears well in no acute distress.   Skin: No visualized rash or lesions on visualized skin  Eyes: EOMI, no erythema, sclera icterus or discharge noted  Resp: Appears to be breathing comfortably without accessory muscle usage, speaking in full sentences, no cough  MSK: Appears to have normal range of motion based on visualized movements  Neurologic: No apparent tremors, facial movements symmetric  Psych: affect bright, alert and oriented     LABS AND IMAGES:    Prostate Biopsy 7/2/21  FINAL DIAGNOSIS:   A: Prostate, left, biopsy   - Adenocarcinoma, Mary's grade 4/3 with ductal component involving 4 of    6 needle core biopsies and 25% of   submitted tissue     B: Prostate, right, biopsy   - Adenocarcinoma, Bremerton's grade 4/3 with ductal component involving 5 of    6 needle core biopsies and 25% of   submitted tissue     Labs:  Most Recent 3 CBC's:  Recent Labs   Lab Test 06/14/24  1024 12/01/23  0904 11/03/23  0913   WBC 6.5 6.4 6.4   HGB 13.0* 12.0* 11.7*   MCV 91 92 93    193 251   ANEUTAUTO 4.5 4.7 4.6     Most Recent 3 BMP's:  Recent Labs   Lab Test 07/17/24  0931 12/01/23  0904 11/03/23  0913 08/18/23  0910   NA  --  139 137 137   POTASSIUM  --  4.8 4.9 4.7   CHLORIDE  --  101 99 101   CO2  --  27 27 23   BUN  --   22.1 25.6* 14.0   CR  --  1.19* 1.16 1.10   ANIONGAP  --  11 11 13   AURORA  --  9.8 9.6 9.3   * 145* 148* 143*   PROTTOTAL  --  6.4 6.8 6.5   ALBUMIN  --  4.2 4.4 4.3    Most Recent 3 LFT's:  Recent Labs   Lab Test 12/01/23  0904 11/03/23  0913 08/18/23  0910   AST 18 21 21   ALT 20 25 24   ALKPHOS 90 88 79   BILITOTAL 0.4 0.4 0.4    Most Recent 2 TSH and T4:  Recent Labs   Lab Test 06/27/24  1106 12/01/23  0904 06/20/23  1313   TSH 1.41 4.71* 4.24*   T4  --  1.23 1.19     Component      Latest Ref Rng 3/5/2024  10:08 AM   PSA Tumor Marker      0.00 - 4.50 ng/mL <0.01      Testosterone 6/14/2024 pending.     PSA trend, see oncology history.      IMPRESSION AND PLAN:  Joel Pike is a 69 year old  M with PMH of DM2 on metformin, depression/anxiety (pt reports as bipolar d/o), HTN, HLD, and prostate cancer with charity disease only.  He was treated with EBRT to the prostate for low volume metastatic disease along with ADT and apalutamide.  He has responded well and has had an undetectable PSA since 7/2022.  He had a single PSA thus far of 0.12 in August 2023 that is likely a lab error rather than a true value. PSA was checked monthly  through December 2023 and then remained undetectable. Therefore, we stopped apalutamide and ADT in December 2023.     -He started apalutamide on 10/5/21. He had CT CAP done 11/26/21 which showed significantly decreased retroperitoneal lymphadenopathy since the prior study dated 8/10/2021 indicates good response to treatment. No other evidence for lymphadenopathy or metastasis is identified. Specifically no evidence for left sacral metastasis is seen.   -Completed RT to the primary for oligometastatic disease based on the STAMPEDE study with Dr. Gomez ~09/2022  -Plan was to continue ADT for 2 years (through 8/2023).  We extended this for another 3 months due to a rise in PSA that ultimately appeared to be a lab abnormality.  Three subsequent PSAs were then negative.  He received 6 month  shot the AM of 9/6/23.  - Discontinued  apalutamide in December 2023.   - We will monitor PSA every 3 months moving forward. No current plans to administer further ADT therapy or restart apalutamide.     - Caris whole genome/transcriptome sequencing of prostate biopsy performed 9/12/2023.   - PSA continues to be undetectable on 6/14/24 labs.   -Return with Kandi Augustine in 3 months labs week prior.  Sees Yaakov in 6 months with labs week prior.  -He prefers not to start any new medications for hot flashes at our June 2024 visit (note he is also on a very stable dose of Wellbutrin since 2009 that would require medication changes).      # normocytic anemia.    He has mild normocytic anemia but no bleeding source.  He should not be anemic with ADT, so we did anemia workup, which was iron replete and normal vitamin B12.  Will continue to watch and monitor without additional interventions to see if Hgb rises with rise in testosterone.      PLAN:   Continue to monitor every 3 months with PSA.    See Kandi Augustine in 3 months and Yaakov in 6 months.  Both by video.    Let us know if the hot flashes become more of an issue or are intolerable.  We will not start any new medications for you at the [facility]           Again, thank you for allowing me to participate in the care of your patient.        Sincerely,        Fawn Marcus PA-C

## 2024-09-16 NOTE — NURSING NOTE
Patient confirms medications and allergies are accurate via patients echeck in completion, and or denies any changes since last reviewed/verified.       Current patient location: [id number]  [facility] [address]    Is the patient currently in the state of [place]? YES    Visit mode:VIDEO    If the visit is dropped, the patient can be reconnected by: VIDEO VISIT: Text to cell phone:   Telephone Information:   Mobile [phone]       Will anyone else be joining the visit? Michelle Wife  (If patient encounters technical issues they should call [phone])    How would you like to obtain your AVS? MyChart    Are changes needed to the allergy or medication list? No    Are refills needed on medications prescribed by this physician? NO    Rooming Documentation:  Questionnaire(s) completed      Reason for visit: RECHECK    Nataly PATHAK

## 2024-09-23 ENCOUNTER — TELEPHONE (OUTPATIENT)
Dept: FAMILY MEDICINE | Facility: CLINIC | Age: 69
End: 2024-09-23
Payer: MEDICARE

## 2024-09-23 NOTE — TELEPHONE ENCOUNTER
Patient Quality Outreach    Patient is due for the following:   Diabetes -      Next Steps:   No follow up needed at this time.    Type of outreach:    Chart review performed, no outreach needed.      Questions for provider review:    None           Roseanna Bueno MA

## 2024-09-29 DIAGNOSIS — E03.9 HYPOTHYROIDISM, UNSPECIFIED TYPE: ICD-10-CM

## 2024-09-30 RX ORDER — LEVOTHYROXINE SODIUM 137 UG/1
137 TABLET ORAL DAILY
Qty: 90 TABLET | Refills: 0 | Status: SHIPPED | OUTPATIENT
Start: 2024-09-30

## 2024-09-30 NOTE — TELEPHONE ENCOUNTER
Prescription approved per [facility].  Yadira Lozoya RN  [facility]

## 2024-10-09 ENCOUNTER — OFFICE VISIT (OUTPATIENT)
Dept: CARDIOLOGY | Facility: CLINIC | Age: 69
End: 2024-10-09
Payer: MEDICARE

## 2024-10-09 VITALS
SYSTOLIC BLOOD PRESSURE: 124 MMHG | BODY MASS INDEX: 32.78 KG/M2 | HEART RATE: 55 BPM | RESPIRATION RATE: 22 BRPM | WEIGHT: 229 LBS | OXYGEN SATURATION: 98 % | DIASTOLIC BLOOD PRESSURE: 58 MMHG | HEIGHT: 70 IN

## 2024-10-09 DIAGNOSIS — E11.22 TYPE 2 DIABETES MELLITUS WITH STAGE 2 CHRONIC KIDNEY DISEASE, WITHOUT LONG-TERM CURRENT USE OF INSULIN (H): ICD-10-CM

## 2024-10-09 DIAGNOSIS — N18.2 TYPE 2 DIABETES MELLITUS WITH STAGE 2 CHRONIC KIDNEY DISEASE, WITHOUT LONG-TERM CURRENT USE OF INSULIN (H): ICD-10-CM

## 2024-10-09 DIAGNOSIS — Z95.1 S/P CABG (CORONARY ARTERY BYPASS GRAFT): Primary | ICD-10-CM

## 2024-10-09 DIAGNOSIS — I25.810 CORONARY ARTERY DISEASE INVOLVING AUTOLOGOUS ARTERY CORONARY BYPASS GRAFT WITHOUT ANGINA PECTORIS: ICD-10-CM

## 2024-10-09 DIAGNOSIS — S22.23XK CLOSED STERNAL MANUBRIAL DISSOCIATION FRACTURE WITH NONUNION, SUBSEQUENT ENCOUNTER: ICD-10-CM

## 2024-10-09 DIAGNOSIS — E78.5 HYPERLIPIDEMIA LDL GOAL <70: ICD-10-CM

## 2024-10-09 DIAGNOSIS — I10 ESSENTIAL HYPERTENSION: ICD-10-CM

## 2024-10-09 PROCEDURE — 99214 OFFICE O/P EST MOD 30 MIN: CPT | Performed by: INTERNAL MEDICINE

## 2024-10-09 PROCEDURE — G2211 COMPLEX E/M VISIT ADD ON: HCPCS | Performed by: INTERNAL MEDICINE

## 2024-10-09 ASSESSMENT — PAIN SCALES - GENERAL: PAINLEVEL: NO PAIN (0)
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
## 2024-10-13 DIAGNOSIS — N18.2 TYPE 2 DIABETES MELLITUS WITH STAGE 2 CHRONIC KIDNEY DISEASE, WITHOUT LONG-TERM CURRENT USE OF INSULIN (H): ICD-10-CM

## 2024-10-13 DIAGNOSIS — E11.22 TYPE 2 DIABETES MELLITUS WITH STAGE 2 CHRONIC KIDNEY DISEASE, WITHOUT LONG-TERM CURRENT USE OF INSULIN (H): ICD-10-CM

## 2024-10-14 RX ORDER — LOSARTAN POTASSIUM 50 MG/1
TABLET ORAL
Qty: 135 TABLET | Refills: 0 | Status: SHIPPED | OUTPATIENT
Start: 2024-10-14

## 2024-10-17 ENCOUNTER — HOSPITAL ENCOUNTER (OUTPATIENT)
Dept: CT IMAGING | Facility: CLINIC | Age: 69
Discharge: HOME | End: 2024-10-17
Attending: STUDENT IN AN ORGANIZED HEALTH CARE EDUCATION/TRAINING PROGRAM | Admitting: STUDENT IN AN ORGANIZED HEALTH CARE EDUCATION/TRAINING PROGRAM
Payer: MEDICARE

## 2024-10-17 DIAGNOSIS — Z95.1 S/P CABG (CORONARY ARTERY BYPASS GRAFT): ICD-10-CM

## 2024-10-17 PROCEDURE — 71250 CT THORAX DX C-: CPT | Mod: MG

## 2024-10-22 ENCOUNTER — OFFICE VISIT (OUTPATIENT)
Dept: CARDIOLOGY | Facility: CLINIC | Age: 69
End: 2024-10-22
Attending: INTERNAL MEDICINE
Payer: MEDICARE

## 2024-10-22 VITALS
OXYGEN SATURATION: 98 % | DIASTOLIC BLOOD PRESSURE: 76 MMHG | HEIGHT: 70 IN | SYSTOLIC BLOOD PRESSURE: 118 MMHG | HEART RATE: 61 BPM | WEIGHT: 225.9 LBS | BODY MASS INDEX: 32.34 KG/M2

## 2024-10-22 DIAGNOSIS — S22.23XK CLOSED STERNAL MANUBRIAL DISSOCIATION FRACTURE WITH NONUNION, SUBSEQUENT ENCOUNTER: ICD-10-CM

## 2024-10-22 DIAGNOSIS — R07.89 STERNAL PAIN: Primary | ICD-10-CM

## 2024-10-22 PROCEDURE — 99203 OFFICE O/P NEW LOW 30 MIN: CPT | Performed by: STUDENT IN AN ORGANIZED HEALTH CARE EDUCATION/TRAINING PROGRAM

## 2024-10-23 ENCOUNTER — CARE COORDINATION (OUTPATIENT)
Dept: CARDIOLOGY | Facility: CLINIC | Age: 69
End: 2024-10-23

## 2024-10-23 ENCOUNTER — LAB (OUTPATIENT)
Dept: LAB | Facility: CLINIC | Age: 69
End: 2024-10-23
Payer: MEDICARE

## 2024-10-23 DIAGNOSIS — E11.22 TYPE 2 DIABETES MELLITUS WITH STAGE 2 CHRONIC KIDNEY DISEASE, WITHOUT LONG-TERM CURRENT USE OF INSULIN (H): ICD-10-CM

## 2024-10-23 DIAGNOSIS — N18.2 TYPE 2 DIABETES MELLITUS WITH STAGE 2 CHRONIC KIDNEY DISEASE, WITHOUT LONG-TERM CURRENT USE OF INSULIN (H): ICD-10-CM

## 2024-10-23 DIAGNOSIS — Z95.1 S/P CABG (CORONARY ARTERY BYPASS GRAFT): ICD-10-CM

## 2024-10-23 DIAGNOSIS — I10 ESSENTIAL HYPERTENSION: ICD-10-CM

## 2024-10-23 DIAGNOSIS — S22.23XK CLOSED STERNAL MANUBRIAL DISSOCIATION FRACTURE WITH NONUNION, SUBSEQUENT ENCOUNTER: ICD-10-CM

## 2024-10-23 DIAGNOSIS — C61 PROSTATE CANCER METASTATIC TO BONE (H): ICD-10-CM

## 2024-10-23 DIAGNOSIS — I25.810 CORONARY ARTERY DISEASE INVOLVING AUTOLOGOUS ARTERY CORONARY BYPASS GRAFT WITHOUT ANGINA PECTORIS: ICD-10-CM

## 2024-10-23 DIAGNOSIS — E78.5 HYPERLIPIDEMIA LDL GOAL <70: ICD-10-CM

## 2024-10-23 DIAGNOSIS — Z19.1 HORMONE SENSITIVE PROSTATE CANCER (H): ICD-10-CM

## 2024-10-23 DIAGNOSIS — C79.51 PROSTATE CANCER METASTATIC TO BONE (H): ICD-10-CM

## 2024-10-23 DIAGNOSIS — C61 HORMONE SENSITIVE PROSTATE CANCER (H): ICD-10-CM

## 2024-10-23 LAB
ALT SERPL W P-5'-P-CCNC: 25 U/L (ref 0–70)
ANION GAP SERPL CALCULATED.3IONS-SCNC: 15 MMOL/L (ref 7–15)
BUN SERPL-MCNC: 34 MG/DL (ref 8–23)
CALCIUM SERPL-MCNC: 10.1 MG/DL (ref 8.8–10.4)
CHLORIDE SERPL-SCNC: 102 MMOL/L (ref 98–107)
CHOLEST SERPL-MCNC: 132 MG/DL
CREAT SERPL-MCNC: 1.29 MG/DL (ref 0.67–1.17)
EGFRCR SERPLBLD CKD-EPI 2021: 60 ML/MIN/1.73M2
ERYTHROCYTE [DISTWIDTH] IN BLOOD BY AUTOMATED COUNT: 12.6 % (ref 10–15)
EST. AVERAGE GLUCOSE BLD GHB EST-MCNC: 120 MG/DL
FASTING STATUS PATIENT QL REPORTED: YES
FASTING STATUS PATIENT QL REPORTED: YES
GLUCOSE SERPL-MCNC: 137 MG/DL (ref 70–99)
HBA1C MFR BLD: 5.8 %
HCO3 SERPL-SCNC: 23 MMOL/L (ref 22–29)
HCT VFR BLD AUTO: 39.9 % (ref 40–53)
HDLC SERPL-MCNC: 29 MG/DL
HGB BLD-MCNC: 13.7 G/DL (ref 13.3–17.7)
HOLD SPECIMEN: NORMAL
LDLC SERPL CALC-MCNC: 65 MG/DL
MCH RBC QN AUTO: 31.3 PG (ref 26.5–33)
MCHC RBC AUTO-ENTMCNC: 34.3 G/DL (ref 31.5–36.5)
MCV RBC AUTO: 91 FL (ref 78–100)
NONHDLC SERPL-MCNC: 103 MG/DL
PLATELET # BLD AUTO: 199 10E3/UL (ref 150–450)
POTASSIUM SERPL-SCNC: 4.6 MMOL/L (ref 3.4–5.3)
PSA SERPL DL<=0.01 NG/ML-MCNC: <0.01 NG/ML (ref 0–4.5)
RBC # BLD AUTO: 4.38 10E6/UL (ref 4.4–5.9)
SODIUM SERPL-SCNC: 140 MMOL/L (ref 135–145)
TRIGL SERPL-MCNC: 191 MG/DL
WBC # BLD AUTO: 6.8 10E3/UL (ref 4–11)

## 2024-10-23 PROCEDURE — 85027 COMPLETE CBC AUTOMATED: CPT

## 2024-10-23 PROCEDURE — 80048 BASIC METABOLIC PNL TOTAL CA: CPT

## 2024-10-23 PROCEDURE — 83036 HEMOGLOBIN GLYCOSYLATED A1C: CPT

## 2024-10-23 PROCEDURE — 80061 LIPID PANEL: CPT

## 2024-10-23 PROCEDURE — 84153 ASSAY OF PSA TOTAL: CPT

## 2024-10-23 PROCEDURE — 84403 ASSAY OF TOTAL TESTOSTERONE: CPT

## 2024-10-23 PROCEDURE — 84460 ALANINE AMINO (ALT) (SGPT): CPT

## 2024-10-23 PROCEDURE — 36415 COLL VENOUS BLD VENIPUNCTURE: CPT

## 2024-10-23 NOTE — PROGRESS NOTES
Lab results from 10/23 noted. Pt had 10/9 visit with  for CAD (s/p CABG x 4 in 2022), hyperlipidemia, and HTN. Will send an update to . Wilma BYRNE October 23, 2024, 12:46 PM    Component      Latest Ref Rng 10/23/2024  7:36 AM   Sodium      135 - 145 mmol/L 140    Potassium      3.4 - 5.3 mmol/L 4.6    Chloride      98 - 107 mmol/L 102    Carbon Dioxide (CO2)      22 - 29 mmol/L 23    Anion Gap      7 - 15 mmol/L 15    Urea Nitrogen      8.0 - 23.0 mg/dL 34.0 (H)    Creatinine      0.67 - 1.17 mg/dL 1.29 (H)    GFR Estimate      >60 mL/min/1.73m2 60 (L)    Calcium      8.8 - 10.4 mg/dL 10.1    Glucose      70 - 99 mg/dL 137 (H)    Patient Fasting? Yes    Patient Fasting? Yes    WBC      4.0 - 11.0 10e3/uL 6.8    RBC Count      4.40 - 5.90 10e6/uL 4.38 (L)    Hemoglobin      13.3 - 17.7 g/dL 13.7    Hematocrit      40.0 - 53.0 % 39.9 (L)    MCV      78 - 100 fL 91    MCH      26.5 - 33.0 pg 31.3    MCHC      31.5 - 36.5 g/dL 34.3    RDW      10.0 - 15.0 % 12.6    Platelet Count      150 - 450 10e3/uL 199    Cholesterol      <200 mg/dL 132    Triglycerides      <150 mg/dL 191 (H)    HDL Cholesterol      >=40 mg/dL 29 (L)    LDL Cholesterol Calculated      <100 mg/dL 65    Non HDL Cholesterol      <130 mg/dL 103    Estimated Average Glucose      <117 mg/dL 120 (H)    Hemoglobin A1C      <5.7 % 5.8 (H)    ALT      0 - 70 U/L 25       Legend:  (H) High  (L) Low

## 2024-10-25 LAB — TESTOST SERPL-MCNC: 62 NG/DL (ref 240–950)

## 2024-10-29 NOTE — PROGRESS NOTES
Cardiac Surgery Consultation      Joel Pike MRN# [id number]   YOB: 1955 Age: 69 year old      Reason for consult: Chest wall discomfort     Primary Cardiologist: Dr. Rodriguez       Assessment and Plan:   Mr. 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    I outlined the treatment options for him. The only way to resolve his symptoms completely is to undergo a redo sternotomy and plan for sternal rewiring/plating to reinforce the sternal closure. This carries with it a slight amount of risk given prior coronary bypass surgery and the need for re-entry into the mediastinal space. We would need a CTA of his chest to assess coronary anatomy and ensure that his grafts do not lie in close proximity to the sternum prior to this.    The alternative would be to proceed conservatively. He states that although his symptoms are notable, they do not largely affect his quality of life or limit his activities significantly.     He will consider these options and will inform us if he would like to proceed with surgical intervention.    Plan    -conservative management for now.   -patient will inform us if he would like to proceed with sternal re-wiring/plating.         History of Present Illness:   This patient is a 69 year old male with a PMHx significant for multi-vessel CAD s/p CABG x 4 in '02, obesity, chronic kidney disease, stage II, depression, type 2 diabetes, HILARIO, and hypertension, who presents to clinic with complaints of sternal discomfort/clicking.    He reports that his symptoms are intermittent. He does not report constant clicking or popping with normal respirations or mild activities. The discomfort is mainly localized to the lower portion of his sternum. It does not largely affect his quality of life.    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                Past Medical History:     Past Medical History:   Diagnosis Date    Atherosclerosis of native coronary artery of native heart with stable angina pectoris (H) 04/08/2022    Atrial flutter, unspecified type (H) 04/29/2022    Calculus of kidney     CKD (chronic kidney disease) stage 2, GFR 60-89 ml/min 10/30/2015    CKD (chronic kidney disease) stage 2, GFR 60-89 ml/min 10/30/2015    Degeneration of lumbar or lumbosacral intervertebral disc     Depressive disorder     Diabetes (H)     Diabetic eye exam (H) 12/17/2014    Hypertension     Obesity, Class I, BMI 30-34.9 10/30/2015    HILARIO (obstructive sleep apnea)     Primary osteoarthritis of left knee     Prostate cancer (H) 07/07/2021    Thyroid disease 1/17/2010    Uncomplicated asthma              Past Surgical History:     Past Surgical History:   Procedure Laterality Date    ARTHROPLASTY KNEE Left 02/04/2020    Procedure: left total knee replacement;  Surgeon: Jason Berman DO;  Location: [facility]    ARTHROPLASTY KNEE Right 01/18/2021    Procedure: right total knee replacement;  Surgeon: Jason Berman DO;  Location: [facility]    BIOPSY      BYPASS GRAFT ARTERY CORONARY N/A 04/08/2022    Procedure: CORONARY ARTERY BYPASS GRAFT x 4 (LIMA - LAD; SV - OM; SV - PDA; SV - DIAGONAL) WITH ENDOSCOPIC SAPHENOUS VEIN HARVEST ON BILATERAL LOWER EXTREMITY, AND ON CARDIOPULMONARY PUMP OXYGENATOR  (INTRAOPERATIVE TRANSESOPHAGEAL ECHOCARDIOGRAM BY ANESTHESIOLOGIST);  Surgeon: Karson Bae MD;  Location: [facility]    COLONOSCOPY  02/02/2009    Repeat in 5 yrs    COLONOSCOPY N/A 09/05/2014    Procedure: COMBINED COLONOSCOPY, SINGLE BIOPSY/POLYPECTOMY BY BIOPSY;  Surgeon: Denis Oakes MD;  Location:  GI    COLONOSCOPY N/A 7/17/2024    Procedure: Colonoscopy;  Surgeon: Alexei Morales MD;  Location:  GI    CV CORONARY ANGIOGRAM N/A 03/28/2022    Procedure: Coronary Angiogram;  Surgeon: Lindy Farooq MD;   Location:  HEART CARDIAC CATH LAB    CV LEFT HEART CATH N/A 03/28/2022    Procedure: Left Heart Catheterization;  Surgeon: Lindy Farooq MD;  Location:  HEART CARDIAC CATH LAB    ESOPHAGOSCOPY, GASTROSCOPY, DUODENOSCOPY (EGD), COMBINED N/A 02/20/2015    Procedure: COMBINED ESOPHAGOSCOPY, GASTROSCOPY, DUODENOSCOPY (EGD), BIOPSY SINGLE OR MULTIPLE;  Surgeon: Alfred Young MD;  Location:  GI    HC REMV CATARACT EXTRACAP,INSERT LENS, W/O ECP  2000    Bilateral    HC REVISE MEDIAN N/CARPAL TUNNEL SURG  1991    HC TOOTH EXTRACTION W/FORCEP  1980's    Dodson teeth removed    RELEASE CARPAL TUNNEL Right 06/16/2017    Procedure: RELEASE CARPAL TUNNEL;  Right carpal tunnel release;  Surgeon: Jason Berman DO;  Location: [facility]    RELEASE CARPAL TUNNEL Left 07/11/2017    Procedure: RELEASE CARPAL TUNNEL;  Left carpal tunnel release;  Surgeon: Jason Berman DO;  Location: [facility]    SOFT TISSUE SURGERY                 Social History:     Social History     Tobacco Use    Smoking status: Never     Passive exposure: Past (per pt)    Smokeless tobacco: Never   Substance Use Topics    Alcohol use: No             Family History:     Family History   Problem Relation Age of Onset    Cerebrovascular Disease Mother     Hypertension Mother     Hypertension Father     Diabetes Father         Adult    Heart Disease Father         Hx: Bypass    Cancer Sister         Liver    No Known Problems Sister     Hypertension Brother     Thyroid Disease Brother     Unknown/Adopted Maternal Grandmother     Cerebrovascular Disease Maternal Grandmother     No Known Problems Maternal Grandfather     No Known Problems Paternal Grandmother     No Known Problems Paternal Grandfather     No Known Problems Son              Medications:     Current Outpatient Medications   Medication Sig Dispense Refill    ACCU-CHEK GUIDE test strip USE TO TEST BLOOD SUGAR 1 TIME DAILY AS DIRECTED. 100 strip 1    alcohol swab prep pads Use  to swab area of injection/pankaj as directed. 100 each 3    Alcohol Swabs PADS Use to swab the area of the injection or pankaj as directed Per insurance coverage 100 each 0    aspirin (ASA) 81 MG EC tablet Take 1 tablet (81 mg) by mouth daily      blood glucose (HUGO CONTOUR) test strip Use to test blood sugars 1 time daily or as directed. 100 strip 0    blood glucose (NO BRAND SPECIFIED) lancets standard Use to test blood sugar one time daily or as directed. 100 each 3    blood glucose (NO BRAND SPECIFIED) lancets standard To use to test glucose level in the blood Use to test blood sugar 1 times daily as directed. To accompany glucose monitor brands per insurance coverage. 100 each 0    blood glucose (NO BRAND SPECIFIED) test strip To use to test glucose level in the blood Use to test blood sugar 1 times daily as directed. To accompany glucose monitor brands per insurance coverage. 100 strip 0    blood glucose calibration (HUGO CONTOUR) NORMAL solution Use to calibrate blood glucose monitor as directed. 1 each 3    blood glucose monitoring (NO BRAND SPECIFIED) meter device kit Use as directed Per insurance coverage 1 kit 0    blood glucose monitoring (NO BRAND SPECIFIED) meter device kit Use to test blood sugar 1 times daily or as directed. Preferred blood glucose meter OR supplies to accompany: Blood Glucose Monitor Brands: per insurance. 1 kit 0    buPROPion (WELLBUTRIN XL) 300 MG 24 hr tablet TAKE 1 TABLET (300 MG) BY MOUTH EVERY MORNING 90 tablet 3    Calcium Carb-Cholecalciferol (CALCIUM/VITAMIN D PO) Take 1 tablet by mouth every evening      Coenzyme Q-10 capsule Take 1 capsule by mouth every morning 30 capsule 12    cyclobenzaprine (FLEXERIL) 10 MG tablet Take 1 tablet (10 mg) by mouth 2 times daily as needed for muscle spasms 60 tablet 0    empagliflozin (JARDIANCE) 10 MG TABS tablet Take 1 tablet (10 mg) by mouth daily 90 tablet 1    ezetimibe (ZETIA) 10 MG tablet TAKE ONE TABLET BY MOUTH ONCE DAILY 90  tablet 2    levothyroxine (SYNTHROID/LEVOTHROID) 137 MCG tablet TAKE ONE TABLET BY MOUTH ONCE DAILY 90 tablet 0    losartan (COZAAR) 50 MG tablet TAKE ONE AND ONE-HALF TABLETS (75MG) BY MOUTH EVERY  tablet 0    magnesium oxide (MAG-OX) 400 MG tablet Take 400 mg by mouth every evening      metFORMIN (GLUCOPHAGE) 1000 MG tablet TAKE 1 TABLET (1,000 MG) BY MOUTH 2 TIMES DAILY (WITH MEALS) 180 tablet 1    metoprolol tartrate (LOPRESSOR) 25 MG tablet TAKE ONE AND ONE HALF TABLETS (37.5 MG) BY MOUTH 2 TIMES DAILY 270 tablet 3    Misc Natural Products (OSTEO BI-FLEX ADV JOINT SHIELD) TABS Take 1 tablet by mouth daily       Multiple Vitamins-Minerals (PRESERVISION AREDS PO) Take 1 tablet by mouth 2 times daily      nitroGLYcerin (NITROSTAT) 0.4 MG sublingual tablet For chest pain place 1 tablet under the tongue every 5 minutes for 3 doses. If symptoms persist 5 minutes after 1st dose call 911. 25 tablet 1    ONETOUCH ULTRA test strip USE TO TEST BLOOD SUGARS 1 TIME DAILY OR AS DIRECTED. 100 strip 1    pantoprazole (PROTONIX) 40 MG EC tablet Take 1 tablet (40 mg) by mouth daily 90 tablet 3    Probiotic Product (PROBIOTIC DAILY) CAPS Take 1 capsule by mouth daily      rosuvastatin (CRESTOR) 10 MG tablet Take 1 tablet (10 mg) by mouth daily Please stop the Lovosatin 90 tablet 1    Sharps Container MISC Use as directed to dispose of needles, lancets and other sharps Per Insurance coverage 1 each 0    tamsulosin (FLOMAX) 0.4 MG capsule Take 1 capsule (0.4 mg) by mouth daily 90 capsule 3    vitamin C (ASCORBIC ACID) 500 MG tablet Take 500 mg by mouth daily      bisacodyl (DULCOLAX) 5 MG EC tablet Take 2 tablets at 3 pm the day before your procedure. If your procedure is before 11 am, take 2 additional tablets at 11 pm. If your procedure is after 11 am, take 2 additional tablets at 6 am. For additional instructions refer to your colonoscopy prep instructions. 4 tablet 0    polyethylene glycol (GOLYTELY) 236 g suspension The  night before the exam at 6 pm drink an 8-ounce glass every 15 minutes until the jug is half empty. If you arrive before 11 AM: Drink the other half of the Golytely jug at 11 PM night before procedure. If you arrive after 11 AM: Drink the other half of the Golytely jug at 6 AM day of procedure. For additional instructions refer to your colonoscopy prep instructions. (Patient not taking: Reported on 10/9/2024) 4000 mL 0     Current Facility-Administered Medications   Medication Dose Route Frequency Provider Last Rate Last Admin    leuprolide (ELIGARD) kit 45 mg  45 mg Subcutaneous Q6 Months Alexei Ott MD   45 mg at 02/16/22 0746             Review of Systems:     The 10 point Review of Systems is negative other than noted in the HPI            Physical Exam:   Vitals were reviewed  Initial vitals were reviewed  All vitals stable    Gen: resting comfortably in NAD  CV: RRR on tele  Pulm: normal work of breathing on room air  Abd: soft, non-tender, non-distended  Ext: warm and well perfused         Data:     Lab Results   Component Value Date    WBC 6.8 10/23/2024    HGB 13.7 10/23/2024    HCT 39.9 (L) 10/23/2024     10/23/2024     10/23/2024    POTASSIUM 4.6 10/23/2024    CHLORIDE 102 10/23/2024    CO2 23 10/23/2024    BUN 34.0 (H) 10/23/2024    CR 1.29 (H) 10/23/2024     (H) 10/23/2024    SED 6 12/20/2006    AST 18 12/01/2023    ALT 25 10/23/2024    ALKPHOS 90 12/01/2023    BILITOTAL 0.4 12/01/2023    INR 3.4 (H) 06/23/2022     All cardiac studies reviewed by me.  All imaging studies reviewed by me.      ELE Peterson MD  Cardiothoracic Surgery  Cell: [phone]; Pager [phone]

## 2024-10-30 NOTE — TELEPHONE ENCOUNTER
The labs look good in general.  The hemoglobin A1c is down nicely from 6.4-5.8.  Cholesterol numbers are good.  Creatinine is up slightly but still just borderline abnormal.  Oftentimes, this is a medication effect.  This can also occur with fasting for the blood tests due to dehydration.  Overall, no changes are recommended for medical management.  Eugene Rodriguez MD

## 2024-11-20 DIAGNOSIS — E11.22 TYPE 2 DIABETES MELLITUS WITH STAGE 2 CHRONIC KIDNEY DISEASE, WITHOUT LONG-TERM CURRENT USE OF INSULIN (H): ICD-10-CM

## 2024-11-20 DIAGNOSIS — N18.2 TYPE 2 DIABETES MELLITUS WITH STAGE 2 CHRONIC KIDNEY DISEASE, WITHOUT LONG-TERM CURRENT USE OF INSULIN (H): ICD-10-CM

## 2024-11-20 DIAGNOSIS — I25.118 ATHEROSCLEROSIS OF NATIVE CORONARY ARTERY OF NATIVE HEART WITH STABLE ANGINA PECTORIS (H): ICD-10-CM

## 2024-11-20 RX ORDER — EMPAGLIFLOZIN 10 MG/1
10 TABLET, FILM COATED ORAL DAILY
Qty: 90 TABLET | Refills: 0 | Status: SHIPPED | OUTPATIENT
Start: 2024-11-20

## 2024-11-20 RX ORDER — ROSUVASTATIN CALCIUM 10 MG/1
10 TABLET, COATED ORAL DAILY
Qty: 90 TABLET | Refills: 1 | Status: SHIPPED | OUTPATIENT
Start: 2024-11-20

## 2024-11-25 ENCOUNTER — LAB (OUTPATIENT)
Dept: LAB | Facility: CLINIC | Age: 69
End: 2024-11-25
Payer: MEDICARE

## 2024-11-25 DIAGNOSIS — Z19.1 HORMONE SENSITIVE PROSTATE CANCER (H): ICD-10-CM

## 2024-11-25 DIAGNOSIS — C61 HORMONE SENSITIVE PROSTATE CANCER (H): ICD-10-CM

## 2024-11-25 DIAGNOSIS — C79.51 PROSTATE CANCER METASTATIC TO BONE (H): ICD-10-CM

## 2024-11-25 DIAGNOSIS — C61 PROSTATE CANCER METASTATIC TO BONE (H): ICD-10-CM

## 2024-11-25 LAB — PSA SERPL DL<=0.01 NG/ML-MCNC: <0.01 NG/ML (ref 0–4.5)

## 2024-11-25 PROCEDURE — 84403 ASSAY OF TOTAL TESTOSTERONE: CPT

## 2024-11-25 PROCEDURE — 84153 ASSAY OF PSA TOTAL: CPT

## 2024-11-25 PROCEDURE — 36415 COLL VENOUS BLD VENIPUNCTURE: CPT

## 2024-11-27 LAB — TESTOST SERPL-MCNC: 94 NG/DL (ref 240–950)

## 2024-12-02 ENCOUNTER — VIRTUAL VISIT (OUTPATIENT)
Dept: ONCOLOGY | Facility: CLINIC | Age: 69
End: 2024-12-02
Attending: STUDENT IN AN ORGANIZED HEALTH CARE EDUCATION/TRAINING PROGRAM
Payer: MEDICARE

## 2024-12-02 DIAGNOSIS — C61 HORMONE SENSITIVE PROSTATE CANCER (H): Primary | ICD-10-CM

## 2024-12-02 DIAGNOSIS — Z19.1 HORMONE SENSITIVE PROSTATE CANCER (H): Primary | ICD-10-CM

## 2024-12-02 PROCEDURE — G2211 COMPLEX E/M VISIT ADD ON: HCPCS | Mod: 95 | Performed by: STUDENT IN AN ORGANIZED HEALTH CARE EDUCATION/TRAINING PROGRAM

## 2024-12-02 PROCEDURE — 99213 OFFICE O/P EST LOW 20 MIN: CPT | Mod: 95 | Performed by: STUDENT IN AN ORGANIZED HEALTH CARE EDUCATION/TRAINING PROGRAM

## 2024-12-02 NOTE — NURSING NOTE
Current patient location: [id number] [address] [address]    Is the patient currently in the state of [place]? YES    Visit mode:VIDEO    If the visit is dropped, the patient can be reconnected by:VIDEO VISIT: Text to cell phone:   Telephone Information:   Mobile [phone]       Will anyone else be joining the visit? NO  (If patient encounters technical issues they should call [phone]    Are changes needed to the allergy or medication list? Pt stated no changes to allergies and Pt stated no med changes    Are refills needed on medications prescribed by this physician? NO    Rooming Documentation:  Questionnaire(s) not pre-assigned    Reason for visit: YEHUDA GALANF

## 2024-12-02 NOTE — PROGRESS NOTES
Virtual Visit Details    Type of service:  Video Visit   Video Start Time:  7:56  Video End Time: 8:04    Originating Location (pt. Location): Home  Distant Location (provider location):  On-site  Platform used for Video Visit: Shaunna

## 2024-12-02 NOTE — LETTER
12/2/2024      Joel Pike  [address]  [facility] [address]      Dear Colleague,    Thank you for referring your patient, Joel Pike, to the [facility]. Please see a copy of my visit note below.    Virtual Visit Details    Type of service:  Video Visit   Video Start Time:  7:56  Video End Time: 8:04    Originating Location (pt. Location): Home  Distant Location (provider location):  On-site  Platform used for Video Visit: [facility] Medical Oncology Return Note       Date of visit: Dec 2, 2024    CC:   metastatic prostate cancer, now s/p 2.5 years of therapy with apalutamide/ADT, currently on surveillance    ONCOLOGY HISTORY:  Elevated PSA noted 2/26/21 at 12.70. Previously normal in 2017.    4/7/21-Initial urology visit.  7/2/21-prostate biopsy 4+3, 9/12 biopsies positive.  Ductal component noted.    7/28/21-MR prostate-PIRADS 5 lesion, R and L sided lesions with likely    neurovascular bundle invasion. No  seminal vesicle involvement. No clear LAD,  but some indeterminate pelvic nodes.  No suspicious bone lesions.    7/28/21-NM bone scan suspicious lesion of R sacral ala S3 level.   8/10/21-CT A/P with multiple enlarged periaortic/iliac LN  8/11/21-First eligard dose 45mg (Q6M dosing). Due next 2/2022.  8/30/21-Initial medical oncology visit with Dr. Solo.  Unclear if bony lesion is metastasis based on current imaging, but this was not irradiated. Start apalutamide/eligard  8/30/21 PSA =30.40 Pre Rx  9/27/21 PSA =8.99 (T=6); HgbA1C = 6.1  10/5/21 PSA =3.22  11/26/21 PSA =0.15  7/26/22 PSA <0.01  8/22  Referral to radiation oncology for EBRT given low volume prostate cancer.  55cGy  in 20 fractions completed on 10/5/22.   11/11/22 PSA= <0.01  02/2023 ELIGARD 45 MG with Dr. Ott of urology.    5/10/23 PSA= <0.01  8/18/23 PSA=0.12, unclear now whether this was a lab error given known inconsistencies with     Some recent PSA tests at [facility].    9/1/23  PSA= <0.01  9/6/23  Eligard 45mg. DUE 3/6/24.  PSA rechecked <0.01.  Lupron 6 month shot received  AM before appt.   10/6/23 PSA <0.01  11/3/23  PSA <0.01  12/4/23 PSA <0.01.  Stop Erleada and ADT at a little over 2 years due to likely false elevation of PSA in August and monitor PSA Q3M.    03/05/23         PSA <0.01  6/17/24 PSA <0.01   9/16/24          PSA <0.01, T 32  11/25/24 PSA <0.01, T 94    Interval History:  Joel reports he is doing well today. Still needing naps and fatigues easily.  Appetite is good.  No nausea or vomiting.  Hot flashes are a little less frequent and shorter duration.  Otherwise no complaints.      PMH:  HTN, HLD, bipolar disorder, DM2 on metformin     PSH:  Bilateral TKA, bilateral cataract extraction, bilateral carpal tunnel release     FAMILY HX:  No reported family history of prostate cancer.  SIster with liver cancer in 20s.    SOCIAL:  Retired, works at [employer] in [place].   Never smoker.   No EtOH  No recreational drugs.   No herbs/supplements other than on medication list.      MEDS:  Current Outpatient Medications   Medication Instructions     albuterol (PROAIR HFA) 108 (90 BASE) MCG/ACT Inhaler 1-2 puffs every 2 -4 hrs as needed     ALLEGRA-D ALLERGY & CONGESTION 180-240 MG 24 hr tablet TAKE ONE TABLET BY MOUTH EVERY DAY     blood glucose (HUGO CONTOUR) test strip Use to test blood sugars 1 time daily or as directed.     blood glucose (NO BRAND SPECIFIED) lancets standard Use to test blood sugar one time daily or as directed.     blood glucose calibration (HUGO CONTOUR) NORMAL solution Use to calibrate blood glucose monitor as directed.     buPROPion (WELLBUTRIN XL) 300 MG 24 hr tablet TAKE ONE TABLET BY MOUTH EVERY MORNING     Coenzyme Q-10 capsule 1 capsule, DAILY     esomeprazole (NEXIUM) 40 MG DR capsule TAKE ONE CAPSULE BY MOUTH EVERY MORNING BEFORE BREAKFAST , 30-60 MINUTES BEFORE EATING     ezetimibe (ZETIA) 10 MG tablet TAKE ONE TABLET BY MOUTH  ONCE DAILY     fish oil-omega-3 fatty acids 1000 MG capsule Take  by mouth. Take daily       levothyroxine (SYNTHROID/LEVOTHROID) 112 mcg, Oral, DAILY     losartan (COZAAR) 75 mg, Oral, DAILY     Magnesium 500 MG CAPS One twice a day     metFORMIN (GLUCOPHAGE) 500 MG tablet TAKE ONE TABLET BY MOUTH TWICE A DAY WITH MEALS     Misc Natural Products (OSTEO BI-FLEX ADV JOINT SHIELD) TABS Take  by mouth.     multivitamin (OCUVITE) TABS tablet 1 tablet, Oral, DAILY     STATIN NOT PRESCRIBED (INTENTIONAL) Please choose reason not prescribed, below     Vitamin D3 (CHOLECALCIFEROL) 5,000 Units, Oral     ROS-Remainder of 14 point ROS reviewed and negative except as in HPI.    PHYSICAL EXAM:  Video physical exam  General: Patient appears well in no acute distress.   Skin: No visualized rash or lesions on visualized skin  Eyes: EOMI, no erythema, sclera icterus or discharge noted  Resp: Appears to be breathing comfortably without accessory muscle usage, speaking in full sentences, no cough  MSK: Appears to have normal range of motion based on visualized movements  Neurologic: No apparent tremors, facial movements symmetric  Psych: affect bright, alert and oriented     LABS AND IMAGES:    Prostate Biopsy 7/2/21  FINAL DIAGNOSIS:   A: Prostate, left, biopsy   - Adenocarcinoma, Danevang's grade 4/3 with ductal component involving 4 of    6 needle core biopsies and 25% of   submitted tissue     B: Prostate, right, biopsy   - Adenocarcinoma, Danevang's grade 4/3 with ductal component involving 5 of    6 needle core biopsies and 25% of   submitted tissue     Labs:  Most Recent 3 CBC's:  Recent Labs   Lab Test 10/23/24  0736 06/14/24  1024 12/01/23  0904 11/03/23  0913   WBC 6.8 6.5 6.4 6.4   HGB 13.7 13.0* 12.0* 11.7*   MCV 91 91 92 93    224 193 251   ANEUTAUTO  --  4.5 4.7 4.6     Most Recent 3 BMP's:  Recent Labs   Lab Test 10/23/24  0736 07/17/24  0931 12/01/23  0904 11/03/23  0913 08/18/23  0910     --  139 137 137    POTASSIUM 4.6  --  4.8 4.9 4.7   CHLORIDE 102  --  101 99 101   CO2 23  --  27 27 23   BUN 34.0*  --  22.1 25.6* 14.0   CR 1.29*  --  1.19* 1.16 1.10   ANIONGAP 15  --  11 11 13   AURORA 10.1  --  9.8 9.6 9.3   * 163* 145* 148* 143*   PROTTOTAL  --   --  6.4 6.8 6.5   ALBUMIN  --   --  4.2 4.4 4.3    Most Recent 3 LFT's:  Recent Labs   Lab Test 10/23/24  0736 12/01/23  0904 11/03/23  0913 08/18/23  0910   AST  --  18 21 21   ALT 25 20 25 24   ALKPHOS  --  90 88 79   BILITOTAL  --  0.4 0.4 0.4    Most Recent 2 TSH and T4:  Recent Labs   Lab Test 06/27/24  1106 12/01/23  0904 06/20/23  1313   TSH 1.41 4.71* 4.24*   T4  --  1.23 1.19      Latest Reference Range & Units 09/09/24 09:50   PSA Tumor Marker 0.00 - 4.50 ng/mL <0.01   Testosterone Total 240 - 950 ng/dL 32 (L)       PSA trend, see oncology history.      IMPRESSION AND PLAN:  Joel Pike is a 69 year old  M with PMH of DM2 on metformin, depression/anxiety (pt reports as bipolar d/o), HTN, HLD, and prostate cancer with charity disease only.  He was treated with EBRT to the prostate for low volume metastatic disease along with ADT and apalutamide.  He has responded well and has had an undetectable PSA since 7/2022.  He had a single PSA thus far of 0.12 in August 2023 that is likely a lab error rather than a true value. PSA was checked monthly  through December 2023 and then remained undetectable. Therefore, we stopped apalutamide and ADT in December 2023.     -He started apalutamide on 10/5/21. He had CT CAP done 11/26/21 which showed significantly decreased retroperitoneal lymphadenopathy since the prior study dated 8/10/2021 indicates good response to treatment. No other evidence for lymphadenopathy or metastasis is identified. Specifically no evidence for left sacral metastasis is seen.   -Completed RT to the primary for oligometastatic disease based on the STAMPEDE study with Dr. Gomez ~09/2022  -Plan was to continue ADT for 2 years (through 8/2023).  We  extended this for another 3 months due to a rise in PSA that ultimately appeared to be a lab abnormality.  Three subsequent PSAs were then negative.  He received 6 month shot the AM of 9/6/23.  - Discontinued  apalutamide in December 2023.   - We will monitor PSA every 3 months moving forward. No current plans to administer further ADT therapy or restart apalutamide.     - Caris whole genome/transcriptome sequencing of prostate biopsy performed 9/12/2023.   - PSA continues to be undetectable on 9/16/24 labs. Testosterone 94 and hot flashes improving.    -Return with lorena in 3 months labs week prior.  Sees Yaakov in 6 months with labs week prior.  -He prefers not to start any new medications for hot flashes at his June 2024 visit and again at his visit today 9/16/24 (note he is also on a very stable dose of Wellbutrin since 2009 that would require medication changes).      # normocytic anemia.    He has mild normocytic anemia but no bleeding source.  He should not be anemic with ADT, so we did anemia workup, which was iron replete and normal vitamin B12.  Will continue to watch and monitor without additional interventions to see if Hgb rises with rise in testosterone.      PLAN:   Continue to monitor every 3 months with PSA.    RTC with Fawn Mayo or Kandi in 3 months virtually.   Return with Yaakov in 3 months virtually.      A total of 20 minutes spent on the date of the encounter doing chart review, review of test results, interpretation of tests, patient visit, and documentation.  The patient was given the opportunity to ask multiple questions today, all of which were answered to their satisfaction.    The longitudinal plan of care for hormone sensitive prostate cancer was addressed during this visit. Due to the added complexity in care, I will continue to support Joel Pike in the subsequent management of this condition(s) and with the ongoing continuity of care of this condition(s).      Michael Nuno MD,  PhD   of Medicine   Oncology/BMT/Cellular Therapies             Again, thank you for allowing me to participate in the care of your patient.        Sincerely,        Michael Nuno MD

## 2024-12-02 NOTE — PROGRESS NOTES
[facility] Medical Oncology Return Note       Date of visit: Dec 2, 2024    CC:   metastatic prostate cancer, now s/p 2.5 years of therapy with apalutamide/ADT, currently on surveillance    ONCOLOGY HISTORY:  Elevated PSA noted 2/26/21 at 12.70. Previously normal in 2017.    4/7/21-Initial urology visit.  7/2/21-prostate biopsy 4+3, 9/12 biopsies positive.  Ductal component noted.    7/28/21-MR prostate-PIRADS 5 lesion, R and L sided lesions with likely    neurovascular bundle invasion. No  seminal vesicle involvement. No clear LAD,  but some indeterminate pelvic nodes.  No suspicious bone lesions.    7/28/21-NM bone scan suspicious lesion of R sacral ala S3 level.   8/10/21-CT A/P with multiple enlarged periaortic/iliac LN  8/11/21-First eligard dose 45mg (Q6M dosing). Due next 2/2022.  8/30/21-Initial medical oncology visit with Dr. Solo.  Unclear if bony lesion is metastasis based on current imaging, but this was not irradiated. Start apalutamide/eligard  8/30/21 PSA =30.40 Pre Rx  9/27/21 PSA =8.99 (T=6); HgbA1C = 6.1  10/5/21 PSA =3.22  11/26/21 PSA =0.15  7/26/22 PSA <0.01  8/22  Referral to radiation oncology for EBRT given low volume prostate cancer.  55cGy  in 20 fractions completed on 10/5/22.   11/11/22 PSA= <0.01  02/2023 ELIGARD 45 MG with Dr. Ott of urology.    5/10/23 PSA= <0.01  8/18/23 PSA=0.12, unclear now whether this was a lab error given known inconsistencies with     Some recent PSA tests at [facility].   9/1/23  PSA= <0.01  9/6/23  Eligard 45mg. DUE 3/6/24.  PSA rechecked <0.01.  Lupron 6 month shot received  AM before appt.   10/6/23 PSA <0.01  11/3/23  PSA <0.01  12/4/23 PSA <0.01.  Stop Erleada and ADT at a little over 2 years due to likely false elevation of PSA in August and monitor PSA Q3M.    03/05/23         PSA <0.01  6/17/24 PSA <0.01   9/16/24          PSA <0.01, T 32  11/25/24 PSA <0.01, T 94    Interval History:  Joel reports he is doing well today. Still needing naps and  fatigues easily.  Appetite is good.  No nausea or vomiting.  Hot flashes are a little less frequent and shorter duration.  Otherwise no complaints.      PMH:  HTN, HLD, bipolar disorder, DM2 on metformin     PSH:  Bilateral TKA, bilateral cataract extraction, bilateral carpal tunnel release     FAMILY HX:  No reported family history of prostate cancer.  SIster with liver cancer in 20s.    SOCIAL:  Retired, works at [employer] in [place].   Never smoker.   No EtOH  No recreational drugs.   No herbs/supplements other than on medication list.      MEDS:  Current Outpatient Medications   Medication Instructions    albuterol (PROAIR HFA) 108 (90 BASE) MCG/ACT Inhaler 1-2 puffs every 2 -4 hrs as needed    ALLEGRA-D ALLERGY & CONGESTION 180-240 MG 24 hr tablet TAKE ONE TABLET BY MOUTH EVERY DAY    blood glucose (HUGO CONTOUR) test strip Use to test blood sugars 1 time daily or as directed.    blood glucose (NO BRAND SPECIFIED) lancets standard Use to test blood sugar one time daily or as directed.    blood glucose calibration (HUGO CONTOUR) NORMAL solution Use to calibrate blood glucose monitor as directed.    buPROPion (WELLBUTRIN XL) 300 MG 24 hr tablet TAKE ONE TABLET BY MOUTH EVERY MORNING    Coenzyme Q-10 capsule 1 capsule, DAILY    esomeprazole (NEXIUM) 40 MG DR capsule TAKE ONE CAPSULE BY MOUTH EVERY MORNING BEFORE BREAKFAST , 30-60 MINUTES BEFORE EATING    ezetimibe (ZETIA) 10 MG tablet TAKE ONE TABLET BY MOUTH ONCE DAILY    fish oil-omega-3 fatty acids 1000 MG capsule Take  by mouth. Take daily      levothyroxine (SYNTHROID/LEVOTHROID) 112 mcg, Oral, DAILY    losartan (COZAAR) 75 mg, Oral, DAILY    Magnesium 500 MG CAPS One twice a day    metFORMIN (GLUCOPHAGE) 500 MG tablet TAKE ONE TABLET BY MOUTH TWICE A DAY WITH MEALS    Misc Natural Products (OSTEO BI-FLEX ADV JOINT SHIELD) TABS Take  by mouth.    multivitamin (OCUVITE) TABS tablet 1 tablet, Oral, DAILY    STATIN NOT PRESCRIBED (INTENTIONAL) Please  choose reason not prescribed, below    Vitamin D3 (CHOLECALCIFEROL) 5,000 Units, Oral     ROS-Remainder of 14 point ROS reviewed and negative except as in HPI.    PHYSICAL EXAM:  Video physical exam  General: Patient appears well in no acute distress.   Skin: No visualized rash or lesions on visualized skin  Eyes: EOMI, no erythema, sclera icterus or discharge noted  Resp: Appears to be breathing comfortably without accessory muscle usage, speaking in full sentences, no cough  MSK: Appears to have normal range of motion based on visualized movements  Neurologic: No apparent tremors, facial movements symmetric  Psych: affect bright, alert and oriented     LABS AND IMAGES:    Prostate Biopsy 7/2/21  FINAL DIAGNOSIS:   A: Prostate, left, biopsy   - Adenocarcinoma, [place]'s grade 4/3 with ductal component involving 4 of    6 needle core biopsies and 25% of   submitted tissue     B: Prostate, right, biopsy   - Adenocarcinoma, [place]'s grade 4/3 with ductal component involving 5 of    6 needle core biopsies and 25% of   submitted tissue     Labs:  Most Recent 3 CBC's:  Recent Labs   Lab Test 10/23/24  0736 06/14/24  1024 12/01/23  0904 11/03/23  0913   WBC 6.8 6.5 6.4 6.4   HGB 13.7 13.0* 12.0* 11.7*   MCV 91 91 92 93    224 193 251   ANEUTAUTO  --  4.5 4.7 4.6     Most Recent 3 BMP's:  Recent Labs   Lab Test 10/23/24  0736 07/17/24  0931 12/01/23  0904 11/03/23  0913 08/18/23  0910     --  139 137 137   POTASSIUM 4.6  --  4.8 4.9 4.7   CHLORIDE 102  --  101 99 101   CO2 23  --  27 27 23   BUN 34.0*  --  22.1 25.6* 14.0   CR 1.29*  --  1.19* 1.16 1.10   ANIONGAP 15  --  11 11 13   AURORA 10.1  --  9.8 9.6 9.3   * 163* 145* 148* 143*   PROTTOTAL  --   --  6.4 6.8 6.5   ALBUMIN  --   --  4.2 4.4 4.3    Most Recent 3 LFT's:  Recent Labs   Lab Test 10/23/24  0736 12/01/23  0904 11/03/23  0913 08/18/23  0910   AST  --  18 21 21   ALT 25 20 25 24   ALKPHOS  --  90 88 79   BILITOTAL  --  0.4 0.4 0.4    Most  Recent 2 TSH and T4:  Recent Labs   Lab Test 06/27/24  1106 12/01/23  0904 06/20/23  1313   TSH 1.41 4.71* 4.24*   T4  --  1.23 1.19      Latest Reference Range & Units 09/09/24 09:50   PSA Tumor Marker 0.00 - 4.50 ng/mL <0.01   Testosterone Total 240 - 950 ng/dL 32 (L)       PSA trend, see oncology history.      IMPRESSION AND PLAN:  Joel Pike is a 69 year old  M with PMH of DM2 on metformin, depression/anxiety (pt reports as bipolar d/o), HTN, HLD, and prostate cancer with charity disease only.  He was treated with EBRT to the prostate for low volume metastatic disease along with ADT and apalutamide.  He has responded well and has had an undetectable PSA since 7/2022.  He had a single PSA thus far of 0.12 in August 2023 that is likely a lab error rather than a true value. PSA was checked monthly  through December 2023 and then remained undetectable. Therefore, we stopped apalutamide and ADT in December 2023.     -He started apalutamide on 10/5/21. He had CT CAP done 11/26/21 which showed significantly decreased retroperitoneal lymphadenopathy since the prior study dated 8/10/2021 indicates good response to treatment. No other evidence for lymphadenopathy or metastasis is identified. Specifically no evidence for left sacral metastasis is seen.   -Completed RT to the primary for oligometastatic disease based on the STAMPEDE study with Dr. Gomez ~09/2022  -Plan was to continue ADT for 2 years (through 8/2023).  We extended this for another 3 months due to a rise in PSA that ultimately appeared to be a lab abnormality.  Three subsequent PSAs were then negative.  He received 6 month shot the AM of 9/6/23.  - Discontinued  apalutamide in December 2023.   - We will monitor PSA every 3 months moving forward. No current plans to administer further ADT therapy or restart apalutamide.     - Caris whole genome/transcriptome sequencing of prostate biopsy performed 9/12/2023.   - PSA continues to be undetectable on 9/16/24 labs.  Testosterone 94 and hot flashes improving.    -Return with lorena in 3 months labs week prior.  Sees Yaakov in 6 months with labs week prior.  -He prefers not to start any new medications for hot flashes at his June 2024 visit and again at his visit today 9/16/24 (note he is also on a very stable dose of Wellbutrin since 2009 that would require medication changes).      # normocytic anemia.    He has mild normocytic anemia but no bleeding source.  He should not be anemic with ADT, so we did anemia workup, which was iron replete and normal vitamin B12.  Will continue to watch and monitor without additional interventions to see if Hgb rises with rise in testosterone.      PLAN:   Continue to monitor every 3 months with PSA.    RTC with Fawn Mayo or Kandi in 3 months virtually.   Return with Yaakov in 3 months virtually.      A total of 20 minutes spent on the date of the encounter doing chart review, review of test results, interpretation of tests, patient visit, and documentation.  The patient was given the opportunity to ask multiple questions today, all of which were answered to their satisfaction.    The longitudinal plan of care for hormone sensitive prostate cancer was addressed during this visit. Due to the added complexity in care, I will continue to support Joel Pike in the subsequent management of this condition(s) and with the ongoing continuity of care of this condition(s).      Michael Nuno MD, PhD   of Medicine   Oncology/BMT/Cellular Therapies

## 2024-12-09 ENCOUNTER — LAB (OUTPATIENT)
Dept: LAB | Facility: CLINIC | Age: 69
End: 2024-12-09
Payer: MEDICARE

## 2024-12-09 DIAGNOSIS — Z19.1 HORMONE SENSITIVE PROSTATE CANCER (H): ICD-10-CM

## 2024-12-09 DIAGNOSIS — C61 HORMONE SENSITIVE PROSTATE CANCER (H): ICD-10-CM

## 2024-12-09 LAB — PSA SERPL DL<=0.01 NG/ML-MCNC: <0.01 NG/ML (ref 0–4.5)

## 2024-12-09 PROCEDURE — 84403 ASSAY OF TOTAL TESTOSTERONE: CPT

## 2024-12-09 PROCEDURE — 84153 ASSAY OF PSA TOTAL: CPT

## 2024-12-09 PROCEDURE — 36415 COLL VENOUS BLD VENIPUNCTURE: CPT

## 2024-12-10 ENCOUNTER — VIRTUAL VISIT (OUTPATIENT)
Dept: RADIATION THERAPY | Facility: OUTPATIENT CENTER | Age: 69
End: 2024-12-10
Payer: MEDICARE

## 2024-12-10 DIAGNOSIS — C61 PROSTATE CANCER (H): Primary | ICD-10-CM

## 2024-12-10 NOTE — PROGRESS NOTES
Department of Radiation Oncology  Radiation Therapy Center  [facility] [place] [address]  [phone]       Radiation Oncology Follow-up Visit  12/10/24      Joel Pike  MRN: [id number]   : 1955     Telephone visit pt in [place], provider in clinic    Radiation Oncologist: Isaías Gomez MD  Medical Oncologist: Michael Nuno MD  Provider: MALLY Guerrero  LCV: 24     DIAGNOSIS: Metastatic prostate cancer  PATHOLOGY: Prostate adenocarcinoma, [place] score 4+3= 7                                  STAGE: [id number] (para-aortic nodes, ? R sacral osseous lesion).   CONCURRENT SYSTEMIC THERAPY:   Yes, oral Xtandi     ONCOLOGIC HISTORY:          Mr Pike is a 69 year old male patient who was noted to have an elevated PSA on 2021 with a value of 12.7.  Repeat PSA on 2021 was 30.4.  Prostate biopsy on 2021 demonstrated prostate adenocarcinoma, [place] score 4+3 equal 7.  MRI of the prostate on 2021 demonstrated PI-RADS 5 lesion with likely extracapsular extension.  There were noted to have indeterminate pelvic lymph nodes at the time.  Bone scan 2021 demonstrated a indeterminate lesion of the right sacral ala at the level of S3.  CT scan of the abdomen pelvis on 8/10/2021 demonstrated multiple large para-aortic and pelvic lymph nodes.  Patient was seen by medical oncologist Dr. Solo.  The patient was started on systemic treatment with Eligard and enzalutamide.  PSA has demonstrated biochemical response. PSA on 8/15/2022 remained undetectable. He was referred to our clinic to discuss next steps in management and discussion of possible local treatment with radiation therapy. He completed radiation therapy on 10/5/22. He follows with Dr Nuno in Medical Oncology     SITE OF TREATMENT: Prostate     DATES  OF TREATMENT: 22 to 10/5/22     TOTAL DOSE OF TREATMENT: 5,500 cGy     DOSE PER FRACTION OF TREATMENT: 275 cGy x 20 fractions    Interval Since  Completion of Radiation Therapy: 2 years 2 mo       SUBJECTIVE:     Hot flashes a few per week mostly during the day. A few night sweats but manageable. Energy is lower than prior to RT. Was going to the gym 5 days per but he stopped.  Allergic to the sun. Has to wear full clothing and hat and facemask. On PT for his back pain. On flomax stream is good. No blood in urine or stool. Gets up 2 to 5 times per night to urinate.    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 A fib on ASA.     PHYSICAL EXAM:  There were no vitals taken for this visit.     LABS AND IMAGING:  PSA   Date Value Ref Range Status   02/26/2021 12.70 (H) 0 - 4 ug/L Final     Comment:     Assay Method:  Chemiluminescence using Siemens Vista analyzer     PSA Tumor Marker   Date Value Ref Range Status   12/09/2024 <0.01 0.00 - 4.50 ng/mL Final   11/11/2022 <0.01 0.00 - 4.00 ug/L Final       IMPRESSION:   Mr. Pike is a 69 year old male with a metastatic prostate cancer [id number]. He had elevated PSA in February 26, 2021 with a value of 12.7.  Repeat PSA on 8/30/2021 was 30.4.  Prostate biopsy on 7/2/2021 demonstrated prostate adenocarcinoma, [place] score 4+3 equal 7.  MRI of the prostate on 7/28/2021 demonstrated PI-RADS 5 lesion with likely extracapsular extension.  There were noted to have indeterminate pelvic lymph nodes at the time.  Bone scan 7/28/2021 demonstrated a indeterminate lesion of the right sacral ala at the level of S3.  CT scan of the abdomen pelvis on 8/10/2021 demonstrated multiple large para-aortic and pelvic lymph nodes.  Patient was seen by medical oncology team (Dr. Solo).  The patient was started on systemic treatment with Eligard and enzalutamide.  PSA has demonstrated biochemical response. He completed radiation therapy to prostate in October 2022 ago and returns for follow up today. PSA is undetectable. Follows with medical oncology. Completed apalutamide and  ADT December 2023.    PLAN:   See me in 6 months. Continue to follow  up with medical oncology, defer to them on ordering PSA.     MALLY Guerrero  Department of Radiation Oncology  [facility]     10 min on the phone with the patient. 9:00 to 9:10 am

## 2024-12-10 NOTE — LETTER
12/10/2024      Joel Pike  [address]  [place] [facility] [address]      Dear Colleague,    Thank you for referring your patient, Joel Pike, to the [facility] RADIATION THERAPY CLINIC. Please see a copy of my visit note below.       Department of Radiation Oncology  Radiation Therapy Center  [facility]  [place] [address]  [phone]       Radiation Oncology Follow-up Visit  12/10/24      Joel Pike  MRN: [id number]   : 1955     [place] in [place], provider in clinic    Radiation Oncologist: Isaías Gomez MD  Medical Oncologist: Michael Nuno MD  Provider: MALLY Guerrero  LCV: 24     DIAGNOSIS: Metastatic prostate cancer  PATHOLOGY: Prostate adenocarcinoma, [place] score 4+3= 7                                  STAGE: [id number] (para-aortic nodes, ? R sacral osseous lesion).   CONCURRENT SYSTEMIC THERAPY:   Yes, oral Xtandi     ONCOLOGIC HISTORY:          Mr Pike is a 69 year old male patient who was noted to have an elevated PSA on 2021 with a value of 12.7.  Repeat PSA on 2021 was 30.4.  Prostate biopsy on 2021 demonstrated prostate adenocarcinoma, [place] score 4+3 equal 7.  MRI of the prostate on 2021 demonstrated PI-RADS 5 lesion with likely extracapsular extension.  There were noted to have indeterminate pelvic lymph nodes at the time.  Bone scan 2021 demonstrated a indeterminate lesion of the right sacral ala at the level of S3.  CT scan of the abdomen pelvis on 8/10/2021 demonstrated multiple large para-aortic and pelvic lymph nodes.  Patient was seen by medical oncologist Dr. Solo.  The patient was started on systemic treatment with Eligard and enzalutamide.  PSA has demonstrated biochemical response. PSA on 8/15/2022 remained undetectable. He was referred to our clinic to discuss next steps in management and discussion of possible local treatment with radiation therapy. He completed radiation therapy on 10/5/22.  He follows with Dr Nuno in Medical Oncology     SITE OF TREATMENT: Prostate     DATES  OF TREATMENT: 9/8/22 to 10/5/22     TOTAL DOSE OF TREATMENT: 5,500 cGy     DOSE PER FRACTION OF TREATMENT: 275 cGy x 20 fractions    Interval Since Completion of Radiation Therapy: 2 years 2 mo       SUBJECTIVE:     Hot flashes a few per week mostly during the day. A few night sweats but manageable. Energy is lower than prior to RT. Was going to the gym 5 days per but he stopped.  Allergic to the sun. Has to wear full clothing and hat and facemask. On PT for his back pain. On flomax stream is good. No blood in urine or stool. Gets up 2 to 5 times per night to urinate.    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 A fib on ASA.     PHYSICAL EXAM:  There were no vitals taken for this visit.     LABS AND IMAGING:  PSA   Date Value Ref Range Status   02/26/2021 12.70 (H) 0 - 4 ug/L Final     Comment:     Assay Method:  Chemiluminescence using Siemens Vista analyzer     PSA Tumor Marker   Date Value Ref Range Status   12/09/2024 <0.01 0.00 - 4.50 ng/mL Final   11/11/2022 <0.01 0.00 - 4.00 ug/L Final       IMPRESSION:   Mr. Pike is a 69 year old male with a metastatic prostate cancer [id number]. He had elevated PSA in February 26, 2021 with a value of 12.7.  Repeat PSA on 8/30/2021 was 30.4.  Prostate biopsy on 7/2/2021 demonstrated prostate adenocarcinoma, Mary score 4+3 equal 7.  MRI of the prostate on 7/28/2021 demonstrated PI-RADS 5 lesion with likely extracapsular extension.  There were noted to have indeterminate pelvic lymph nodes at the time.  Bone scan 7/28/2021 demonstrated a indeterminate lesion of the right sacral ala at the level of S3.  CT scan of the abdomen pelvis on 8/10/2021 demonstrated multiple large para-aortic and pelvic lymph nodes.  Patient was seen by medical oncology team (Dr. Solo).  The patient was started on systemic treatment with Eligard and enzalutamide.  PSA has demonstrated biochemical response. He  completed radiation therapy to prostate in October 2022 ago and returns for follow up today. PSA is undetectable. Follows with medical oncology. Completed apalutamide and  ADT December 2023.    PLAN:   See me in 6 months. Continue to follow up with medical oncology, defer to them on ordering PSA.     MALLY Guerrero  Department of Radiation Oncology  [facility]     10 min on the phone with the patient. 9:00 to 9:10 am        Again, thank you for allowing me to participate in the care of your patient.        Sincerely,        Trice Garcia PA-C

## 2024-12-11 LAB — TESTOST SERPL-MCNC: 111 NG/DL (ref 240–950)

## 2024-12-29 DIAGNOSIS — E03.9 HYPOTHYROIDISM, UNSPECIFIED TYPE: ICD-10-CM

## 2024-12-30 RX ORDER — LEVOTHYROXINE SODIUM 137 UG/1
137 TABLET ORAL DAILY
Qty: 90 TABLET | Refills: 0 | Status: SHIPPED | OUTPATIENT
Start: 2024-12-30

## 2025-01-27 ENCOUNTER — TELEPHONE (OUTPATIENT)
Dept: FAMILY MEDICINE | Facility: CLINIC | Age: 70
End: 2025-01-27

## 2025-01-27 ENCOUNTER — LAB (OUTPATIENT)
Dept: LAB | Facility: CLINIC | Age: 70
End: 2025-01-27
Payer: MEDICARE

## 2025-01-27 DIAGNOSIS — Z19.1 HORMONE SENSITIVE PROSTATE CANCER (H): ICD-10-CM

## 2025-01-27 DIAGNOSIS — E03.9 HYPOTHYROIDISM, UNSPECIFIED TYPE: ICD-10-CM

## 2025-01-27 DIAGNOSIS — C61 HORMONE SENSITIVE PROSTATE CANCER (H): ICD-10-CM

## 2025-01-27 DIAGNOSIS — I10 ESSENTIAL HYPERTENSION: ICD-10-CM

## 2025-01-27 DIAGNOSIS — N18.2 TYPE 2 DIABETES MELLITUS WITH STAGE 2 CHRONIC KIDNEY DISEASE, WITH LONG-TERM CURRENT USE OF INSULIN (H): Primary | ICD-10-CM

## 2025-01-27 DIAGNOSIS — Z79.4 TYPE 2 DIABETES MELLITUS WITH STAGE 2 CHRONIC KIDNEY DISEASE, WITH LONG-TERM CURRENT USE OF INSULIN (H): Primary | ICD-10-CM

## 2025-01-27 DIAGNOSIS — E11.22 TYPE 2 DIABETES MELLITUS WITH STAGE 2 CHRONIC KIDNEY DISEASE, WITH LONG-TERM CURRENT USE OF INSULIN (H): Primary | ICD-10-CM

## 2025-01-27 LAB
ANION GAP SERPL CALCULATED.3IONS-SCNC: 14 MMOL/L (ref 7–15)
BUN SERPL-MCNC: 26.6 MG/DL (ref 8–23)
CALCIUM SERPL-MCNC: 9.6 MG/DL (ref 8.8–10.4)
CHLORIDE SERPL-SCNC: 102 MMOL/L (ref 98–107)
CREAT SERPL-MCNC: 1.19 MG/DL (ref 0.67–1.17)
EGFRCR SERPLBLD CKD-EPI 2021: 67 ML/MIN/1.73M2
GLUCOSE SERPL-MCNC: 148 MG/DL (ref 70–99)
HCO3 SERPL-SCNC: 21 MMOL/L (ref 22–29)
POTASSIUM SERPL-SCNC: 4.6 MMOL/L (ref 3.4–5.3)
PSA SERPL DL<=0.01 NG/ML-MCNC: <0.01 NG/ML (ref 0–4.5)
SODIUM SERPL-SCNC: 137 MMOL/L (ref 135–145)
TSH SERPL DL<=0.005 MIU/L-ACNC: 1.6 UIU/ML (ref 0.3–4.2)

## 2025-01-27 PROCEDURE — 36415 COLL VENOUS BLD VENIPUNCTURE: CPT

## 2025-01-27 PROCEDURE — 80048 BASIC METABOLIC PNL TOTAL CA: CPT

## 2025-01-27 PROCEDURE — 84443 ASSAY THYROID STIM HORMONE: CPT

## 2025-01-27 PROCEDURE — 84153 ASSAY OF PSA TOTAL: CPT

## 2025-01-27 PROCEDURE — 84403 ASSAY OF TOTAL TESTOSTERONE: CPT

## 2025-01-27 NOTE — TELEPHONE ENCOUNTER
Patient is requesting lab orders to be placed for basic lab work to be completed AlC, cholesterol  etc.     Routing to provider to advise.     John Raman, VF

## 2025-01-29 ENCOUNTER — LAB (OUTPATIENT)
Dept: LAB | Facility: CLINIC | Age: 70
End: 2025-01-29
Payer: MEDICARE

## 2025-01-29 DIAGNOSIS — Z79.4 TYPE 2 DIABETES MELLITUS WITH STAGE 2 CHRONIC KIDNEY DISEASE, WITH LONG-TERM CURRENT USE OF INSULIN (H): ICD-10-CM

## 2025-01-29 DIAGNOSIS — E11.22 TYPE 2 DIABETES MELLITUS WITH STAGE 2 CHRONIC KIDNEY DISEASE, WITH LONG-TERM CURRENT USE OF INSULIN (H): ICD-10-CM

## 2025-01-29 DIAGNOSIS — N18.2 TYPE 2 DIABETES MELLITUS WITH STAGE 2 CHRONIC KIDNEY DISEASE, WITH LONG-TERM CURRENT USE OF INSULIN (H): ICD-10-CM

## 2025-01-29 LAB
EST. AVERAGE GLUCOSE BLD GHB EST-MCNC: 131 MG/DL
HBA1C MFR BLD: 6.2 %
TESTOST SERPL-MCNC: 134 NG/DL (ref 240–950)

## 2025-01-29 PROCEDURE — 36415 COLL VENOUS BLD VENIPUNCTURE: CPT

## 2025-01-29 PROCEDURE — 83036 HEMOGLOBIN GLYCOSYLATED A1C: CPT

## 2025-02-16 DIAGNOSIS — I25.118 ATHEROSCLEROSIS OF NATIVE CORONARY ARTERY OF NATIVE HEART WITH STABLE ANGINA PECTORIS: ICD-10-CM

## 2025-02-24 ENCOUNTER — LAB (OUTPATIENT)
Dept: LAB | Facility: CLINIC | Age: 70
End: 2025-02-24
Payer: MEDICARE

## 2025-02-24 DIAGNOSIS — Z19.1 HORMONE SENSITIVE PROSTATE CANCER (H): ICD-10-CM

## 2025-02-24 DIAGNOSIS — C61 HORMONE SENSITIVE PROSTATE CANCER (H): ICD-10-CM

## 2025-02-24 LAB — PSA SERPL DL<=0.01 NG/ML-MCNC: 0.01 NG/ML (ref 0–4.5)

## 2025-02-24 PROCEDURE — 84403 ASSAY OF TOTAL TESTOSTERONE: CPT

## 2025-02-24 PROCEDURE — 84153 ASSAY OF PSA TOTAL: CPT

## 2025-02-24 PROCEDURE — 36415 COLL VENOUS BLD VENIPUNCTURE: CPT

## 2025-02-25 DIAGNOSIS — I25.118 ATHEROSCLEROSIS OF NATIVE CORONARY ARTERY OF NATIVE HEART WITH STABLE ANGINA PECTORIS: ICD-10-CM

## 2025-02-25 DIAGNOSIS — E11.22 TYPE 2 DIABETES MELLITUS WITH STAGE 2 CHRONIC KIDNEY DISEASE, WITHOUT LONG-TERM CURRENT USE OF INSULIN (H): ICD-10-CM

## 2025-02-25 DIAGNOSIS — N18.2 TYPE 2 DIABETES MELLITUS WITH STAGE 2 CHRONIC KIDNEY DISEASE, WITHOUT LONG-TERM CURRENT USE OF INSULIN (H): ICD-10-CM

## 2025-02-25 RX ORDER — EMPAGLIFLOZIN 10 MG/1
10 TABLET, FILM COATED ORAL DAILY
Qty: 90 TABLET | Refills: 1 | Status: SHIPPED | OUTPATIENT
Start: 2025-02-25

## 2025-02-25 NOTE — PROGRESS NOTES
Virtual Visit Details    Type of service:  Video Visit   Video Start Time: 9:07 AM  Video End Time:9:23 AM    Originating Location (pt. Location): Home  Distant Location (provider location):  Off-site  Platform used for Video Visit: [facility] Medical Oncology Return Note       Date of visit: Feb 26, 2025    CC:   metastatic prostate cancer, now s/p 2.5 years of therapy with apalutamide/ADT, currently on surveillance    ONCOLOGY HISTORY:  Elevated PSA noted 2/26/21 at 12.70. Previously normal in 2017.    4/7/21-Initial urology visit.  7/2/21-prostate biopsy 4+3, 9/12 biopsies positive.  Ductal component noted.    7/28/21-MR prostate-PIRADS 5 lesion, R and L sided lesions with likely    neurovascular bundle invasion. No  seminal vesicle involvement. No clear LAD,  but some indeterminate pelvic nodes.  No suspicious bone lesions.    7/28/21-NM bone scan suspicious lesion of R sacral ala S3 level.   8/10/21-CT A/P with multiple enlarged periaortic/iliac LN  8/11/21-First eligard dose 45mg (Q6M dosing). Due next 2/2022.  8/30/21-Initial medical oncology visit with Dr. Solo.  Unclear if bony lesion is metastasis based on current imaging, but this was not irradiated. Start apalutamide/eligard  8/30/21 PSA =30.40 Pre Rx  9/27/21 PSA =8.99 (T=6); HgbA1C = 6.1  10/5/21 PSA =3.22  11/26/21 PSA =0.15  7/26/22 PSA <0.01  8/22  Referral to radiation oncology for EBRT given low volume prostate cancer.  55cGy  in 20 fractions completed on 10/5/22.   11/11/22 PSA= <0.01  02/2023 ELIGARD 45 MG with Dr. Ott of urology.    5/10/23 PSA= <0.01  8/18/23 PSA=0.12, unclear now whether this was a lab error given known inconsistencies with     Some recent PSA tests at [facility].   9/1/23  PSA= <0.01  9/6/23  Eligard 45mg. DUE 3/6/24.  PSA rechecked <0.01.  Lupron 6 month shot received  AM before appt.   10/6/23 PSA <0.01  11/3/23  PSA <0.01  12/4/23 PSA <0.01.  Stop Erleada and ADT at a little over 2 years due to likely  "false elevation of PSA in August and monitor PSA Q3M.    03/05/23         PSA <0.01  6/17/24 PSA <0.01   9/16/24          PSA <0.01, T 32  11/25/24 PSA <0.01, T 94  02/24/25        PSA 0.01, T pending.     Interval History:  Joel is seen virtually today. He is noticing he is gaining some weight. He has some low back and \"kidney pain\". He has had this on and off for a long time. He has an appointment coming up with his PCP to discuss this per his report. He feels this is may have started when he started jardiance. He has aches and pains in his legs/feet. He is noticing is bladder is constantly requiring him to go to the bathroom. He has not been taking tamsulosin for quite a while. His energy is about \"half throttle\" this is stable. Appetite has been too good. Michelle has been sick so they haven't been going to the gym. He has no pain with urination.        PMH:  HTN, HLD, bipolar disorder, DM2 on metformin     PSH:  Bilateral TKA, bilateral cataract extraction, bilateral carpal tunnel release     FAMILY HX:  No reported family history of prostate cancer.  SIster with liver cancer in 20s.    SOCIAL:  Retired, works at [employer] in [employer] section.   Never smoker.   No EtOH  No recreational drugs.   No herbs/supplements other than on medication list.      MEDS:  Current Outpatient Medications   Medication Instructions    albuterol (PROAIR HFA) 108 (90 BASE) MCG/ACT Inhaler 1-2 puffs every 2 -4 hrs as needed    ALLEGRA-D ALLERGY & CONGESTION 180-240 MG 24 hr tablet TAKE ONE TABLET BY MOUTH EVERY DAY    blood glucose (HUGO CONTOUR) test strip Use to test blood sugars 1 time daily or as directed.    blood glucose (NO BRAND SPECIFIED) lancets standard Use to test blood sugar one time daily or as directed.    blood glucose calibration (HUGO CONTOUR) NORMAL solution Use to calibrate blood glucose monitor as directed.    buPROPion (WELLBUTRIN XL) 300 MG 24 hr tablet TAKE ONE TABLET BY MOUTH EVERY MORNING    Coenzyme Q-10 " capsule 1 capsule, DAILY    esomeprazole (NEXIUM) 40 MG DR capsule TAKE ONE CAPSULE BY MOUTH EVERY MORNING BEFORE BREAKFAST , 30-60 MINUTES BEFORE EATING    ezetimibe (ZETIA) 10 MG tablet TAKE ONE TABLET BY MOUTH ONCE DAILY    fish oil-omega-3 fatty acids 1000 MG capsule Take  by mouth. Take daily      levothyroxine (SYNTHROID/LEVOTHROID) 112 mcg, Oral, DAILY    losartan (COZAAR) 75 mg, Oral, DAILY    Magnesium 500 MG CAPS One twice a day    metFORMIN (GLUCOPHAGE) 500 MG tablet TAKE ONE TABLET BY MOUTH TWICE A DAY WITH MEALS    Misc Natural Products (OSTEO BI-FLEX ADV JOINT SHIELD) TABS Take  by mouth.    multivitamin (OCUVITE) TABS tablet 1 tablet, Oral, DAILY    STATIN NOT PRESCRIBED (INTENTIONAL) Please choose reason not prescribed, below    Vitamin D3 (CHOLECALCIFEROL) 5,000 Units, Oral     ROS-Remainder of 14 point ROS reviewed and negative except as in HPI.    PHYSICAL EXAM:  Video physical exam  General: Patient appears well in no acute distress.   Skin: No visualized rash or lesions on visualized skin  Eyes: EOMI, no erythema, sclera icterus or discharge noted  Resp: Appears to be breathing comfortably without accessory muscle usage, speaking in full sentences, no cough  MSK: Appears to have normal range of motion based on visualized movements  Neurologic: No apparent tremors, facial movements symmetric  Psych: affect bright, alert and oriented     LABS AND IMAGES:    Prostate Biopsy 7/2/21  FINAL DIAGNOSIS:   A: Prostate, left, biopsy   - Adenocarcinoma, Mary's grade 4/3 with ductal component involving 4 of    6 needle core biopsies and 25% of   submitted tissue     B: Prostate, right, biopsy   - Adenocarcinoma, B[place]'s grade 4/3 with ductal component involving 5 of    6 needle core biopsies and 25% of   submitted tissue     Labs:  PSA trend, see oncology history.      IMPRESSION AND PLAN:  Joel Pike is a 69 year old  M with PMH of DM2 on metformin, depression/anxiety (pt reports as bipolar d/o),  HTN, HLD, and prostate cancer with charity disease only.  He was treated with EBRT to the prostate for low volume metastatic disease along with ADT and apalutamide.  He has responded well and has had an undetectable PSA since 7/2022.  He had a single PSA thus far of 0.12 in August 2023 that is likely a lab error rather than a true value. PSA was checked monthly  through December 2023 and then remained undetectable. Therefore, we stopped apalutamide and ADT in December 2023.     -He started apalutamide on 10/5/21. He had CT CAP done 11/26/21 which showed significantly decreased retroperitoneal lymphadenopathy since the prior study dated 8/10/2021 indicates good response to treatment. No other evidence for lymphadenopathy or metastasis is identified. Specifically no evidence for left sacral metastasis is seen.   -Completed RT to the primary for oligometastatic disease based on the STAMPBLADIMIRE study with Dr. Gomez ~09/2022  -Plan was to continue ADT for 2 years (through 8/2023).  We extended this for another 3 months due to a rise in PSA that ultimately appeared to be a lab abnormality.  Three subsequent PSAs were then negative.  He received 6 month shot the AM of 9/6/23.  - Discontinued apalutamide in December 2023.   - We will monitor PSA every 3 months moving forward. No current plans to administer further ADT therapy or restart apalutamide.     - [facility] whole genome/transcriptome sequencing of prostate biopsy performed 9/12/2023.   - PSA just barely detectable on 2/24/25 labs. Testosterone pending. He does have ongoing hot flashes. continues to be undetectable on 9/16/24 labs. Testosterone 94 and hot flashes improving.    -Return with Dr. Nuno in 3 months labs week prior.    -No medications started for hot flashes on 2/26/25 visit (note he is also on a very stable dose of Wellbutrin since 2009 that would require medication changes).      # normocytic anemia.    He has mild normocytic anemia but no bleeding source.  He  should not be anemic with ADT, so we did anemia workup, which was iron replete and normal vitamin B12.  Will continue to watch and monitor without additional interventions to see if Hgb rises with rise in testosterone.    #Urinary frequency  - restart tamsulosin. RX sent.   - referral to urology for additional management.       PLAN:   Continue to monitor every 3 months with PSA.    RTC with Dr. Nuno on 6/2/25 as scheduled.   Urology referral. Restart tamsulosin.    Follow up with PCP for back pain.     20 minutes spent on the date of the encounter doing chart review, review of test results, interpretation of tests, patient visit, and documentation     Fawn Marcus PA-C

## 2025-02-26 ENCOUNTER — VIRTUAL VISIT (OUTPATIENT)
Dept: ONCOLOGY | Facility: CLINIC | Age: 70
End: 2025-02-26
Attending: STUDENT IN AN ORGANIZED HEALTH CARE EDUCATION/TRAINING PROGRAM
Payer: MEDICARE

## 2025-02-26 VITALS — WEIGHT: 235 LBS | BODY MASS INDEX: 33.64 KG/M2 | HEIGHT: 70 IN

## 2025-02-26 DIAGNOSIS — R35.0 FREQUENT URINATION: ICD-10-CM

## 2025-02-26 DIAGNOSIS — C61 PROSTATE CANCER (H): Primary | ICD-10-CM

## 2025-02-26 RX ORDER — TAMSULOSIN HYDROCHLORIDE 0.4 MG/1
0.4 CAPSULE ORAL DAILY
Qty: 90 CAPSULE | Refills: 3 | Status: SHIPPED | OUTPATIENT
Start: 2025-02-26

## 2025-02-26 ASSESSMENT — PAIN SCALES - GENERAL: PAINLEVEL_OUTOF10: MODERATE PAIN (4)

## 2025-02-26 NOTE — NURSING NOTE
Current patient location: [id number] [address] [id number]    Is the patient currently in the state of [place]? YES    Visit mode: VIDEO    If the visit is dropped, the patient can be reconnected by:VIDEO VISIT: Text to cell phone:   Telephone Information:   Mobile [phone]       Will anyone else be joining the visit? NO  (If patient encounters technical issues they should call [phone]    Are changes needed to the allergy or medication list? Pt stated no changes to allergies and Pt stated no med changes    Are refills needed on medications prescribed by this physician? NO    Rooming Documentation:  Questionnaire(s) completed    Reason for visit: YEHUDA PATHAK

## 2025-02-26 NOTE — LETTER
2/26/2025      Joel Pike  [id number]  [facility] [address]      Dear Colleague,    Thank you for referring your patient, Joel Pike, to the [facility]. Please see a copy of my visit note below.    Virtual Visit Details    Type of service:  Video Visit   Video Start Time: 9:07 AM  Video End Time:9:23 AM    Originating Location (pt. Location): Home  Distant Location (provider location):  Off-site  Platform used for Video Visit: [facility] Medical Oncology Return Note       Date of visit: Feb 26, 2025    CC:   metastatic prostate cancer, now s/p 2.5 years of therapy with apalutamide/ADT, currently on surveillance    ONCOLOGY HISTORY:  Elevated PSA noted 2/26/21 at 12.70. Previously normal in 2017.    4/7/21-Initial urology visit.  7/2/21-prostate biopsy 4+3, 9/12 biopsies positive.  Ductal component noted.    7/28/21-MR prostate-PIRADS 5 lesion, R and L sided lesions with likely    neurovascular bundle invasion. No  seminal vesicle involvement. No clear LAD,  but some indeterminate pelvic nodes.  No suspicious bone lesions.    7/28/21-NM bone scan suspicious lesion of R sacral ala S3 level.   8/10/21-CT A/P with multiple enlarged periaortic/iliac LN  8/11/21-First eligard dose 45mg (Q6M dosing). Due next 2/2022.  8/30/21-Initial medical oncology visit with Dr. Solo.  Unclear if bony lesion is metastasis based on current imaging, but this was not irradiated. Start apalutamide/eligard  8/30/21 PSA =30.40 Pre Rx  9/27/21 PSA =8.99 (T=6); HgbA1C = 6.1  10/5/21 PSA =3.22  11/26/21 PSA =0.15  7/26/22 PSA <0.01  8/22  Referral to radiation oncology for EBRT given low volume prostate cancer.  55cGy  in 20 fractions completed on 10/5/22.   11/11/22 PSA= <0.01  02/2023 ELIGARD 45 MG with Dr. Ott of urology.    5/10/23 PSA= <0.01  8/18/23 PSA=0.12, unclear now whether this was a lab error given known inconsistencies with     Some recent PSA tests at [facility].  "  9/1/23  PSA= <0.01  9/6/23  Eligard 45mg. DUE 3/6/24.  PSA rechecked <0.01.  Lupron 6 month shot received  AM before appt.   10/6/23 PSA <0.01  11/3/23  PSA <0.01  12/4/23 PSA <0.01.  Stop Erleada and ADT at a little over 2 years due to likely false elevation of PSA in August and monitor PSA Q3M.    03/05/23         PSA <0.01  6/17/24 PSA <0.01   9/16/24          PSA <0.01, T 32  11/25/24 PSA <0.01, T 94  02/24/25        PSA 0.01, T pending.     Interval History:  Joel is seen virtually today. He is noticing he is gaining some weight. He has some low back and \"kidney pain\". He has had this on and off for a long time. He has an appointment coming up with his PCP to discuss this per his report. He feels this is may have started when he started jardiance. He has aches and pains in his legs/feet. He is noticing is bladder is constantly requiring him to go to the bathroom. He has not been taking tamsulosin for quite a while. His energy is about \"half throttle\" this is stable. Appetite has been too good. Michelle has been sick so they haven't been going to the gym. He has no pain with urination.        PMH:  HTN, HLD, bipolar disorder, DM2 on metformin     PSH:  Bilateral TKA, bilateral cataract extraction, bilateral carpal tunnel release     FAMILY HX:  No reported family history of prostate cancer.  SIster with liver cancer in 20s.    SOCIAL:  Retired, works at [employer] in [employer].   Never smoker.   No EtOH  No recreational drugs.   No herbs/supplements other than on medication list.      MEDS:  Current Outpatient Medications   Medication Instructions     albuterol (PROAIR HFA) 108 (90 BASE) MCG/ACT Inhaler 1-2 puffs every 2 -4 hrs as needed     ALLEGRA-D ALLERGY & CONGESTION 180-240 MG 24 hr tablet TAKE ONE TABLET BY MOUTH EVERY DAY     blood glucose (HUGO CONTOUR) test strip Use to test blood sugars 1 time daily or as directed.     blood glucose (NO BRAND SPECIFIED) lancets standard Use to test blood sugar " one time daily or as directed.     blood glucose calibration (HUGO CONTOUR) NORMAL solution Use to calibrate blood glucose monitor as directed.     buPROPion (WELLBUTRIN XL) 300 MG 24 hr tablet TAKE ONE TABLET BY MOUTH EVERY MORNING     Coenzyme Q-10 capsule 1 capsule, DAILY     esomeprazole (NEXIUM) 40 MG DR capsule TAKE ONE CAPSULE BY MOUTH EVERY MORNING BEFORE BREAKFAST , 30-60 MINUTES BEFORE EATING     ezetimibe (ZETIA) 10 MG tablet TAKE ONE TABLET BY MOUTH ONCE DAILY     fish oil-omega-3 fatty acids 1000 MG capsule Take  by mouth. Take daily       levothyroxine (SYNTHROID/LEVOTHROID) 112 mcg, Oral, DAILY     losartan (COZAAR) 75 mg, Oral, DAILY     Magnesium 500 MG CAPS One twice a day     metFORMIN (GLUCOPHAGE) 500 MG tablet TAKE ONE TABLET BY MOUTH TWICE A DAY WITH MEALS     Misc Natural Products (OSTEO BI-FLEX ADV JOINT SHIELD) TABS Take  by mouth.     multivitamin (OCUVITE) TABS tablet 1 tablet, Oral, DAILY     STATIN NOT PRESCRIBED (INTENTIONAL) Please choose reason not prescribed, below     Vitamin D3 (CHOLECALCIFEROL) 5,000 Units, Oral     ROS-Remainder of 14 point ROS reviewed and negative except as in HPI.    PHYSICAL EXAM:  Video physical exam  General: Patient appears well in no acute distress.   Skin: No visualized rash or lesions on visualized skin  Eyes: EOMI, no erythema, sclera icterus or discharge noted  Resp: Appears to be breathing comfortably without accessory muscle usage, speaking in full sentences, no cough  MSK: Appears to have normal range of motion based on visualized movements  Neurologic: No apparent tremors, facial movements symmetric  Psych: affect bright, alert and oriented     LABS AND IMAGES:    Prostate Biopsy 7/2/21  FINAL DIAGNOSIS:   A: Prostate, left, biopsy   - Adenocarcinoma, [place]'s grade 4/3 with ductal component involving 4 of    6 needle core biopsies and 25% of   submitted tissue     B: Prostate, right, biopsy   - Adenocarcinoma, Mary's grade 4/3 with ductal  component involving 5 of    6 needle core biopsies and 25% of   submitted tissue     Labs:  PSA trend, see oncology history.      IMPRESSION AND PLAN:  Joel Pike is a 69 year old  M with PMH of DM2 on metformin, depression/anxiety (pt reports as bipolar d/o), HTN, HLD, and prostate cancer with charity disease only.  He was treated with EBRT to the prostate for low volume metastatic disease along with ADT and apalutamide.  He has responded well and has had an undetectable PSA since 7/2022.  He had a single PSA thus far of 0.12 in August 2023 that is likely a lab error rather than a true value. PSA was checked monthly  through December 2023 and then remained undetectable. Therefore, we stopped apalutamide and ADT in December 2023.     -He started apalutamide on 10/5/21. He had CT CAP done 11/26/21 which showed significantly decreased retroperitoneal lymphadenopathy since the prior study dated 8/10/2021 indicates good response to treatment. No other evidence for lymphadenopathy or metastasis is identified. Specifically no evidence for left sacral metastasis is seen.   -Completed RT to the primary for oligometastatic disease based on the STAMPEDE study with Dr. Gomez ~09/2022  -Plan was to continue ADT for 2 years (through 8/2023).  We extended this for another 3 months due to a rise in PSA that ultimately appeared to be a lab abnormality.  Three subsequent PSAs were then negative.  He received 6 month shot the AM of 9/6/23.  - Discontinued apalutamide in December 2023.   - We will monitor PSA every 3 months moving forward. No current plans to administer further ADT therapy or restart apalutamide.     - [facility] whole genome/transcriptome sequencing of prostate biopsy performed 9/12/2023.   - PSA just barely detectable on 2/24/25 labs. Testosterone pending. He does have ongoing hot flashes. continues to be undetectable on 9/16/24 labs. Testosterone 94 and hot flashes improving.    -Return with Dr. Nuno in 3 months labs  week prior.    -No medications started for hot flashes on 2/26/25 visit (note he is also on a very stable dose of Wellbutrin since 2009 that would require medication changes).      # normocytic anemia.    He has mild normocytic anemia but no bleeding source.  He should not be anemic with ADT, so we did anemia workup, which was iron replete and normal vitamin B12.  Will continue to watch and monitor without additional interventions to see if Hgb rises with rise in testosterone.    #Urinary frequency  - restart tamsulosin. RX sent.   - referral to urology for additional management.       PLAN:   Continue to monitor every 3 months with PSA.    RTC with Dr. Nuno on 6/2/25 as scheduled.   Urology referral. Restart tamsulosin.    Follow up with PCP for back pain.     20 minutes spent on the date of the encounter doing chart review, review of test results, interpretation of tests, patient visit, and documentation     Fawn Marcus PA-C          Again, thank you for allowing me to participate in the care of your patient.        Sincerely,        Fawn Marcus PA-C    Electronically signed

## 2025-02-27 LAB — TESTOST SERPL-MCNC: 136 NG/DL (ref 240–950)

## 2025-03-10 ENCOUNTER — OFFICE VISIT (OUTPATIENT)
Dept: UROLOGY | Facility: CLINIC | Age: 70
End: 2025-03-10
Payer: MEDICARE

## 2025-03-10 VITALS
DIASTOLIC BLOOD PRESSURE: 60 MMHG | WEIGHT: 235 LBS | SYSTOLIC BLOOD PRESSURE: 92 MMHG | BODY MASS INDEX: 33.64 KG/M2 | TEMPERATURE: 97.5 F | HEIGHT: 70 IN

## 2025-03-10 DIAGNOSIS — R35.0 FREQUENT URINATION: ICD-10-CM

## 2025-03-10 DIAGNOSIS — C61 PROSTATE CANCER (H): ICD-10-CM

## 2025-03-10 PROCEDURE — 99213 OFFICE O/P EST LOW 20 MIN: CPT | Performed by: UROLOGY

## 2025-03-10 PROCEDURE — 1125F AMNT PAIN NOTED PAIN PRSNT: CPT | Performed by: UROLOGY

## 2025-03-10 PROCEDURE — 3074F SYST BP LT 130 MM HG: CPT | Performed by: UROLOGY

## 2025-03-10 PROCEDURE — 3078F DIAST BP <80 MM HG: CPT | Performed by: UROLOGY

## 2025-03-10 ASSESSMENT — PAIN SCALES - GENERAL: PAINLEVEL_OUTOF10: MILD PAIN (3)
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
## 2025-03-12 ENCOUNTER — DOCUMENTATION ONLY (OUTPATIENT)
Dept: SLEEP MEDICINE | Facility: CLINIC | Age: 70
End: 2025-03-12
Payer: MEDICARE

## 2025-03-12 DIAGNOSIS — G47.33 OSA (OBSTRUCTIVE SLEEP APNEA): Primary | ICD-10-CM

## 2025-03-30 DIAGNOSIS — E03.9 HYPOTHYROIDISM, UNSPECIFIED TYPE: ICD-10-CM

## 2025-03-31 RX ORDER — LEVOTHYROXINE SODIUM 137 UG/1
137 TABLET ORAL DAILY
Qty: 90 TABLET | Refills: 0 | Status: SHIPPED | OUTPATIENT
Start: 2025-03-31

## 2025-04-14 ENCOUNTER — OFFICE VISIT (OUTPATIENT)
Dept: FAMILY MEDICINE | Facility: CLINIC | Age: 70
End: 2025-04-14
Payer: MEDICARE

## 2025-04-14 ENCOUNTER — OFFICE VISIT (OUTPATIENT)
Dept: PHARMACY | Facility: CLINIC | Age: 70
End: 2025-04-14
Payer: COMMERCIAL

## 2025-04-14 VITALS — DIASTOLIC BLOOD PRESSURE: 74 MMHG | BODY MASS INDEX: 34.8 KG/M2 | WEIGHT: 244.2 LBS | SYSTOLIC BLOOD PRESSURE: 114 MMHG

## 2025-04-14 VITALS
HEART RATE: 58 BPM | TEMPERATURE: 98.5 F | DIASTOLIC BLOOD PRESSURE: 74 MMHG | SYSTOLIC BLOOD PRESSURE: 114 MMHG | BODY MASS INDEX: 34.78 KG/M2 | OXYGEN SATURATION: 96 % | WEIGHT: 244 LBS

## 2025-04-14 DIAGNOSIS — I25.118 ATHEROSCLEROSIS OF NATIVE CORONARY ARTERY OF NATIVE HEART WITH STABLE ANGINA PECTORIS: ICD-10-CM

## 2025-04-14 DIAGNOSIS — N18.2 TYPE 2 DIABETES MELLITUS WITH STAGE 2 CHRONIC KIDNEY DISEASE, WITH LONG-TERM CURRENT USE OF INSULIN (H): Primary | ICD-10-CM

## 2025-04-14 DIAGNOSIS — E11.22 TYPE 2 DIABETES MELLITUS WITH STAGE 2 CHRONIC KIDNEY DISEASE, WITH LONG-TERM CURRENT USE OF INSULIN (H): Primary | ICD-10-CM

## 2025-04-14 DIAGNOSIS — E78.5 HYPERLIPIDEMIA LDL GOAL <100: ICD-10-CM

## 2025-04-14 DIAGNOSIS — Z79.4 TYPE 2 DIABETES MELLITUS WITH STAGE 2 CHRONIC KIDNEY DISEASE, WITH LONG-TERM CURRENT USE OF INSULIN (H): Primary | ICD-10-CM

## 2025-04-14 DIAGNOSIS — N18.2 TYPE 2 DIABETES MELLITUS WITH STAGE 2 CHRONIC KIDNEY DISEASE, WITHOUT LONG-TERM CURRENT USE OF INSULIN (H): Primary | ICD-10-CM

## 2025-04-14 DIAGNOSIS — Z78.9 TAKES DIETARY SUPPLEMENTS: ICD-10-CM

## 2025-04-14 DIAGNOSIS — K21.9 GASTROESOPHAGEAL REFLUX DISEASE WITHOUT ESOPHAGITIS: ICD-10-CM

## 2025-04-14 DIAGNOSIS — E03.9 HYPOTHYROIDISM, UNSPECIFIED TYPE: ICD-10-CM

## 2025-04-14 DIAGNOSIS — I10 ESSENTIAL HYPERTENSION: ICD-10-CM

## 2025-04-14 DIAGNOSIS — C61 PROSTATE CANCER (H): ICD-10-CM

## 2025-04-14 DIAGNOSIS — E11.22 TYPE 2 DIABETES MELLITUS WITH STAGE 2 CHRONIC KIDNEY DISEASE, WITHOUT LONG-TERM CURRENT USE OF INSULIN (H): Primary | ICD-10-CM

## 2025-04-14 DIAGNOSIS — E11.42 DIABETIC POLYNEUROPATHY ASSOCIATED WITH TYPE 2 DIABETES MELLITUS (H): ICD-10-CM

## 2025-04-14 DIAGNOSIS — Z95.1 S/P CABG X 4: ICD-10-CM

## 2025-04-14 DIAGNOSIS — F33.42 RECURRENT MAJOR DEPRESSION IN COMPLETE REMISSION: ICD-10-CM

## 2025-04-14 PROCEDURE — 99207 PR NO CHARGE LOS: CPT | Performed by: PHARMACIST

## 2025-04-14 PROCEDURE — G2211 COMPLEX E/M VISIT ADD ON: HCPCS | Performed by: NURSE PRACTITIONER

## 2025-04-14 PROCEDURE — 3074F SYST BP LT 130 MM HG: CPT | Performed by: NURSE PRACTITIONER

## 2025-04-14 PROCEDURE — 99214 OFFICE O/P EST MOD 30 MIN: CPT | Performed by: NURSE PRACTITIONER

## 2025-04-14 PROCEDURE — 3078F DIAST BP <80 MM HG: CPT | Performed by: NURSE PRACTITIONER

## 2025-04-14 RX ORDER — GABAPENTIN 100 MG/1
100 CAPSULE ORAL 3 TIMES DAILY
Qty: 90 CAPSULE | Refills: 0 | Status: SHIPPED | OUTPATIENT
Start: 2025-04-14

## 2025-04-14 ASSESSMENT — PATIENT HEALTH QUESTIONNAIRE - PHQ9
SUM OF ALL RESPONSES TO PHQ QUESTIONS 1-9: 14
SUM OF ALL RESPONSES TO PHQ QUESTIONS 1-9: 14
10. IF YOU CHECKED OFF ANY PROBLEMS, HOW DIFFICULT HAVE THESE PROBLEMS MADE IT FOR YOU TO DO YOUR WORK, TAKE CARE OF THINGS AT HOME, OR GET ALONG WITH OTHER PEOPLE: NOT DIFFICULT AT ALL

## 2025-04-14 NOTE — PATIENT INSTRUCTIONS
"  Caller: Petty Artis \"Petty Artis w\"    Relationship: Self    Best call back number: [phone]    What is the best time to reach you: ANYTIME    Who are you requesting to speak with (clinical staff, provider,  specific staff member): CLINICAL    What was the call regarding: PT IS REQUESTING A CALL BACK FROM JAN IN REGARDS TO HOSPICE      " "Recommendations from today's MTM visit:                                                    MTM (medication therapy management) is a service provided by a clinical pharmacist designed to help you get the most of out of your medicines.   Today we reviewed what your medicines are for, how to know if they are working, that your medicines are safe and how to make your medicine regimen as easy as possible.        Restart Jardiance 10 mg by mouth every morning - we will see if this helps with swelling and shortness of breath - more for heart disease than diabetes in your situation.     Continue metformin - this is not harmful to the kidneys but is removed by your kidneys so we need to monitor how strong your kidneys are.      Follow-up: 6 months or sooner if needed    It was great speaking with you today.  I value your experience and would be very thankful for your time in providing feedback in our clinic survey. In the next few days, you may receive an email or text message from [employer] with a link to a survey related to your  clinical pharmacist.\"     To schedule another MTM appointment, please call the clinic directly or you may call the MTM scheduling line at [phone] or toll-free at [phone].     My Clinical Pharmacist's contact information:                                                      Please feel free to contact me with any questions or concerns you have.      Chaitanya Adams, PharmD, [facility]  Medication Therapy Management Pharmacist  Voicemail: [phone]  "

## 2025-04-14 NOTE — PROGRESS NOTES
Medication Therapy Management (MTM) Encounter    ASSESSMENT:                            Medication Adherence/Access: See below for considerations.    Type 2 Diabetes: A1c is at goal of <8%.  Unclear if changes in foot are sign of infection - would benefit from being seen by provider.     CAD/Hypertension: Blood pressure is at goal of <130/80 mmHg. Swelling and shortness of breath worst since going off Jardiance. Would benefit from restarting to see if this improves.     Hyperlipidemia: Stable.     GERD: Stable.     Depression:  Stable per patient.     Hypothyroidism: Stable. Last TSH is within normal limits.    Prostate cancer: Somewhat improved. Will see how he tolerates restarting Jardiance in terms of urinary symptoms.     Supplements: Stable.     PLAN:                            Restart Jardiance 10 mg by mouth every morning - we will see if this helps with swelling and shortness of breath - more for heart disease than diabetes in your situation.   Continue metformin - this is not harmful to the kidneys but is removed by your kidneys so we need to monitor how strong your kidneys are.    Patient was scheduled to see Jaqueline Miller NP today regarding toe concerns.     Follow-up: 6 months or sooner if needed    SUBJECTIVE/OBJECTIVE:                          Joel Pike is a 69 year old male seen for a follow-up visit.       Reason for visit: Comprehensive medication review.    Allergies/ADRs: Reviewed in chart  Past Medical History: Reviewed in chart  Tobacco: He reports that he has never smoked. He has been exposed to tobacco smoke. He has never used smokeless tobacco.  Alcohol: none    Medication Adherence/Access: no issues reported.    Diabetes   Type 2 Diabetes:  Currently taking metformin IR 1000 mg twice daily. He had been taking Jardiance 10 mg daily as well, but stopped about 2 months ago after he could not get a refill (it's unclear why as it appears it was refilled). He was having some kidney discomfort  and wonders if related to metformin since he keeps hearing something about this. Patient is not experiencing side effects.  Blood sugar monitoring: rare  Symptoms of low blood sugar? none  Symptoms of high blood sugar? none  Eye exam: up to date  Foot exam: up to date  Diet/Exercise: denies any issues  Aspirin: Taking 81mg daily and denies side effects   Statin: Yes: rosuvastatin   ACEi/ARB: Yes: losartan.     Eye exam in the last 12 months? No  Foot exam: due  Lab Results   Component Value Date    A1C 6.2 01/29/2025    A1C 5.8 10/23/2024    A1C 6.4 05/14/2024    A1C 6.1 11/03/2023    A1C 6.1 03/20/2023    A1C 6.0 02/26/2021    A1C 5.7 11/04/2020    A1C 5.5 01/27/2020    A1C 5.5 10/28/2019    A1C 5.7 02/25/2019       Hypertension   CAD/Hypertension: Current medications include aspirin 81 mg daily, losartan 75 mg daily, metoprolol tartrate 37.5 mg twice daily, and nitroglycerin SL as needed. History of coronary artery bypass graft. Not currently taking Jardiance 10 mg daily due to not being able to get a refill. He does note an increase in shortness of breath and swelling since off. Weight gain as well. Shows some redness in his feet/toes - more of an issue on right foot, but starting on left foot and spreading a bit.  States that it can be warm to the touch - ice packs help with pain. Difficult when walking far distances. Patient does not self-monitor blood pressure.  Patient reports no current medication side effects.     BP Readings from Last 3 Encounters:   04/14/25 114/74   04/14/25 114/74   03/10/25 92/60     Hyperlipidemia   Hyperlipidemia: Current therapy includes Rosuvastatin 10 mg daily and ezetimibe 10 mg daily. No longer taking Repatha. Patient reports no myalgias, but has had issues tolerating statins in the past.  The 10-year ASCVD risk score (Anish CONRAD, et al., 2019) is: 38.9%    Values used to calculate the score:      Age: 68 years      Sex: Male      Is Non- : No       Diabetic: Yes      Tobacco smoker: No      Systolic Blood Pressure: 131 mmHg      Is BP treated: Yes      HDL Cholesterol: 31 mg/dL      Total Cholesterol: 202 mg/dL     Recent Labs   Lab Test 10/23/24  0736 02/13/24  0739   CHOL 132 101   HDL 29* 39*   LDL 65 19   TRIG 191* 215*       GERD    Pantoprazole 40 mg daily in the morning    Patient reports no current symptoms.   Patient feels that current regimen is effective.       Mental Health   Depression:  Current medications include: bupropion  mg every morning. Finds to be helpful for mood. Denies any known side effects.        Hypothyroidism   Levothyroxine 137 mcg daily.   Patient is having the following symptoms: none.      TSH   Date Value Ref Range Status   01/27/2025 1.60 0.30 - 4.20 uIU/mL Final   07/26/2022 4.61 (H) 0.40 - 4.00 mU/L Final   11/04/2020 6.02 (H) 0.40 - 4.00 mU/L Final      Prostate cancer: Patient is taking tamsulosin 0.4 mg daily and leuprolide every 6 months. Felt like this has helped him maybe empty bladder better, but still having some urinary frequency. He went off Jardiance around the time he started this so not clear if improvement related. Recently seen by oncology/urology. Denies any known side effects.     Supplements   Supplements: Patient is taking calcium/vitamin D every evening, CoQ10 every morning, magnesium 400 mg daily, Osteo Bi-Flex twice daily, eye vitamin twice daily, a daily probiotic. Denies any known issues.        Today's Vitals: /74   Wt 244 lb 3.2 oz (110.8 kg)   BMI 34.80 kg/m      Wt Readings from Last 5 Encounters:   04/14/25 244 lb (110.7 kg)   04/14/25 244 lb 3.2 oz (110.8 kg)   03/10/25 235 lb (106.6 kg)   02/26/25 235 lb (106.6 kg)   10/22/24 225 lb 14.4 oz (102.5 kg)       ----------------      I spent 25 minutes with this patient today. All changes were made via collaborative practice agreement with Nishi Hdz Mai, MD.     A summary of these recommendations was given to the patient.    Chaitanya Adams  PharmD, BCACP  Medication Therapy Management Pharmacist  Voicemail: [phone]           Medication Therapy Recommendations  Type 2 diabetes mellitus with stage 2 chronic kidney disease (H)   1 Current Medication: empagliflozin (JARDIANCE) 10 MG TABS tablet   Current Medication Sig: Take 1 tablet (10 mg) by mouth daily.   Rationale: Patient forgets to take - Adherence - Adherence   Recommendation: Start Medication   Status: Accepted per CPA   Identified Date: 4/14/2025 Completed Date: 4/14/2025

## 2025-04-14 NOTE — PROGRESS NOTES
Assessment & Plan     Type 2 diabetes mellitus with stage 2 chronic kidney disease, with long-term current use of insulin (H)  - gabapentin (NEURONTIN) 100 MG capsule; Take 1 capsule (100 mg) by mouth 3 times daily.  - Vascular Surgery Referral; Future    Diabetic polyneuropathy associated with type 2 diabetes mellitus (H)  - gabapentin (NEURONTIN) 100 MG capsule; Take 1 capsule (100 mg) by mouth 3 times daily.  - Vascular Surgery Referral; Future    Atherosclerosis of native coronary artery of native heart with stable angina pectoris    S/P CABG x 4    Joel presents to clinic today with his wife for concerns of tingling, burning and itching of his bilateral feet, worse on the right than left and worse in the toes than the remainder of the foot. He has known history of diabetes, certainly could suspect some polyneuropathy relating to his symptoms. Also discussed with his known CAD and previous CABG, there is question of possible peripheral vascular disease and atherosclerosis that could be contributing to his symptoms as well. I do not have concern of acute circulation compromise today, pulses are palpable and capillary refill is at 2 seconds. We discussed starting gabapentin for his burning, pain and itching symptoms, start with 100mg nightly and increase to up to three times daily as needed for his symptoms. Side effects were reviewed. Referral to vascular also placed for further evaluation of peripheral vascular disease. Worrisome symptoms needing more urgent evaluation were reviewed. He is scheduled follow-up with his PCP in 1 month, follow-up sooner if needed.     I explained my diagnostic considerations and recommendations to the patient, who voiced understanding and agreement with the assessment and treatment plan. All questions were answered to patient's apparent satisfaction. We discussed potential side effects of any prescribed or recommended therapies, as well as expectations for response to treatments  and importance of lifestyle measures that may improve symptoms. Patient was advised to contact our office if there are new symptoms or no improvement or worsening of conditions or symptoms.    The longitudinal plan of care for the diagnosis(es)/condition(s) as documented were addressed during this visit. Due to the added complexity in care, I will continue to support Joel in the subsequent management and with ongoing continuity of care.      Subjective   Joel is a 69 year old, presenting for the following health issues:  Musculoskeletal Problem (Redness- diabetic )      4/14/2025     9:06 AM   Additional Questions   Roomed by Mario     History of Present Illness       Reason for visit:  Redness of feet   He is taking medications regularly.      Joel was seen at [facility] this morning for follow-up of his coronary artery disease, and type 2 diabetes. During this visit, he was noted to have symptoms of burning, tingling, pain and itching in the toes of his right foot. He has had symptoms similar to this for the past several years, however over the past few weeks this has worsened. The symptoms are now present throughout the day when previously were only bothersome at night. He notes some symptoms in the left foot as well when previously was focused on the right. He notes his toes are often cold. He has had issues with varicose veins in the past. He has also had CABG x4 in the past, grafting from his lower extremities. He denies any known history of peripheral arterial disease or neuropathy. His most recent A1c was 6.2 on 1/29/25. He denies any pain in his calves, chest pain or difficulty breathing.     Patient Active Problem List   Diagnosis    Gastroesophageal reflux disease without esophagitis    Degeneration of lumbar or lumbosacral intervertebral disc    Chronic rhinitis    HILARIO (obstructive sleep apnea)    Tinnitus    Hypothyroidism, unspecified type    Type 2 diabetes mellitus with stage 2 chronic kidney disease (H)     Obesity, Class I, BMI 30-34.9    Recurrent major depression in complete remission    Microalbuminuria    S/P total knee replacement using cement, left    Primary osteoarthritis of right knee    S/P total knee replacement using cement, right    Status post total right knee replacement    Prostate cancer (H)    Essential hypertension    Status post coronary angiogram    Atherosclerosis of native coronary artery of native heart with stable angina pectoris    Anemia in other chronic diseases classified elsewhere    Hyperlipidemia LDL goal <70    S/P CABG x 4    Chronic bilateral low back pain without sciatica     Current Outpatient Medications   Medication Sig Dispense Refill    ACCU-CHEK GUIDE test strip USE TO TEST BLOOD SUGAR 1 TIME DAILY AS DIRECTED. 100 strip 1    alcohol swab prep pads Use to swab area of injection/pankaj as directed. 100 each 3    Alcohol Swabs PADS Use to swab the area of the injection or pankaj as directed Per insurance coverage 100 each 0    aspirin (ASA) 81 MG EC tablet Take 1 tablet (81 mg) by mouth daily      blood glucose (HUGO CONTOUR) test strip Use to test blood sugars 1 time daily or as directed. 100 strip 0    blood glucose (NO BRAND SPECIFIED) lancets standard Use to test blood sugar one time daily or as directed. 100 each 3    blood glucose (NO BRAND SPECIFIED) lancets standard To use to test glucose level in the blood Use to test blood sugar 1 times daily as directed. To accompany glucose monitor brands per insurance coverage. 100 each 0    blood glucose (NO BRAND SPECIFIED) test strip To use to test glucose level in the blood Use to test blood sugar 1 times daily as directed. To accompany glucose monitor brands per insurance coverage. 100 strip 0    blood glucose calibration (HUGO CONTOUR) NORMAL solution Use to calibrate blood glucose monitor as directed. 1 each 3    blood glucose monitoring (NO BRAND SPECIFIED) meter device kit Use as directed Per insurance coverage 1 kit 0     blood glucose monitoring (NO BRAND SPECIFIED) meter device kit Use to test blood sugar 1 times daily or as directed. Preferred blood glucose meter OR supplies to accompany: Blood Glucose Monitor Brands: per insurance. 1 kit 0    buPROPion (WELLBUTRIN XL) 300 MG 24 hr tablet TAKE 1 TABLET (300 MG) BY MOUTH EVERY MORNING 90 tablet 3    Coenzyme Q-10 capsule Take 1 capsule by mouth every morning 30 capsule 12    cyclobenzaprine (FLEXERIL) 10 MG tablet Take 1 tablet (10 mg) by mouth 2 times daily as needed for muscle spasms 60 tablet 0    empagliflozin (JARDIANCE) 10 MG TABS tablet Take 1 tablet (10 mg) by mouth daily. 90 tablet 1    ezetimibe (ZETIA) 10 MG tablet TAKE ONE TABLET BY MOUTH ONCE DAILY 90 tablet 2    gabapentin (NEURONTIN) 100 MG capsule Take 1 capsule (100 mg) by mouth 3 times daily. 90 capsule 0    levothyroxine (SYNTHROID/LEVOTHROID) 137 MCG tablet TAKE ONE TABLET BY MOUTH ONCE DAILY 90 tablet 0    losartan (COZAAR) 50 MG tablet TAKE ONE AND ONE-HALF TABLETS (75MG) BY MOUTH EVERY  tablet 1    magnesium oxide (MAG-OX) 400 MG tablet Take 400 mg by mouth every evening      metFORMIN (GLUCOPHAGE) 1000 MG tablet Take 1 tablet (1,000 mg) by mouth 2 times daily (with meals). 180 tablet 1    metoprolol tartrate (LOPRESSOR) 25 MG tablet TAKE ONE AND ONE HALF TABLETS (37.5 MG) BY MOUTH 2 TIMES DAILY 270 tablet 3    Misc Natural Products (OSTEO BI-FLEX ADV JOINT SHIELD) TABS Take 1 tablet by mouth daily       Multiple Vitamins-Minerals (PRESERVISION AREDS PO) Take 1 tablet by mouth 2 times daily      nitroGLYcerin (NITROSTAT) 0.4 MG sublingual tablet For chest pain place 1 tablet under the tongue every 5 minutes for 3 doses. If symptoms persist 5 minutes after 1st dose call 911. 25 tablet 1    ONETOUCH ULTRA test strip USE TO TEST BLOOD SUGARS 1 TIME DAILY OR AS DIRECTED. 100 strip 1    pantoprazole (PROTONIX) 40 MG EC tablet Take 1 tablet (40 mg) by mouth daily 90 tablet 3    Probiotic Product  (PROBIOTIC DAILY) CAPS Take 1 capsule by mouth daily      rosuvastatin (CRESTOR) 10 MG tablet Take 1 tablet (10 mg) by mouth daily. 90 tablet 1    Sharps Container MISC Use as directed to dispose of needles, lancets and other sharps Per Insurance coverage 1 each 0    tamsulosin (FLOMAX) 0.4 MG capsule Take 1 capsule (0.4 mg) by mouth daily. 90 capsule 3     Current Facility-Administered Medications   Medication Dose Route Frequency Provider Last Rate Last Admin    leuprolide (ELIGARD) kit 45 mg  45 mg Subcutaneous Q6 Months Alexei Ott MD   45 mg at 02/16/22 0746         Review of Systems  Constitutional, HEENT, cardiovascular, pulmonary, gi and gu systems are negative, except as otherwise noted.      Objective    /74   Pulse 58   Temp 98.5  F (36.9  C) (Temporal)   Wt 110.7 kg (244 lb)   SpO2 96%   BMI 34.78 kg/m    Body mass index is 34.78 kg/m .  Physical Exam  Vitals reviewed.   Constitutional:       General: He is not in acute distress.     Appearance: Normal appearance.   Eyes:      Extraocular Movements: Extraocular movements intact.      Conjunctiva/sclera: Conjunctivae normal.   Cardiovascular:      Rate and Rhythm: Normal rate and regular rhythm.      Pulses:           Dorsalis pedis pulses are 1+ on the right side and 1+ on the left side.        Posterior tibial pulses are 1+ on the right side and 1+ on the left side.      Heart sounds: Normal heart sounds.   Pulmonary:      Effort: Pulmonary effort is normal.      Breath sounds: Normal breath sounds.   Feet:      Right foot:      Skin integrity: Erythema present.      Toenail Condition: Fungal disease present.     Left foot:      Skin integrity: Erythema present.      Toenail Condition: Fungal disease present.     Comments: Capillary refill less than 2 seconds. Shiny, erythema/purple colored skin from toes to distal metatarsals, cool to touch. Worse on right than left. No hair growth present. Varicose veins in distal extremities.    Skin:     General: Skin is warm and dry.   Neurological:      Mental Status: He is alert and oriented to person, place, and time. Mental status is at baseline.   Psychiatric:         Mood and Affect: Mood normal.         Behavior: Behavior normal.                    Signed Electronically by: Jaqueline Dave NP

## 2025-04-15 ENCOUNTER — TELEPHONE (OUTPATIENT)
Dept: VASCULAR SURGERY | Facility: CLINIC | Age: 70
End: 2025-04-15
Payer: MEDICARE

## 2025-04-15 DIAGNOSIS — R09.89 OTHER SPECIFIED SYMPTOMS AND SIGNS INVOLVING THE CIRCULATORY AND RESPIRATORY SYSTEMS: ICD-10-CM

## 2025-04-15 DIAGNOSIS — I25.118 ATHEROSCLEROSIS OF NATIVE CORONARY ARTERY OF NATIVE HEART WITH STABLE ANGINA PECTORIS: Primary | ICD-10-CM

## 2025-04-15 NOTE — TELEPHONE ENCOUNTER
Vascular Referral Intake    Appointment note (to be pasted into appt note. Also add where additional info is located ie: outside images pushed to PACS, in Epic, sent to HIM, etc): Tingling, burning and itching of bilateral feet, R > L, worse in toes, pulses palpable     Referred by Jaqueline Dave NP for Type 2 diabetes mellitus with stage 2 chronic kidney disease, with long-term current use of insulin (H), Diabetic polyneuropathy associated with type 2 diabetes mellitus (H), Atherosclerosis of native coronary artery of native heart with stable angina pectoris, S/P CABG x 4     Testing/Imaging Needed Before Consult: Needs abis    Saud or bed needed: No    *Schedulers: Please send welcome letter to patient after appointment(s) scheduled*

## 2025-04-17 ENCOUNTER — HOSPITAL ENCOUNTER (OUTPATIENT)
Dept: ULTRASOUND IMAGING | Facility: CLINIC | Age: 70
Discharge: HOME | End: 2025-04-17
Attending: SURGERY
Payer: MEDICARE

## 2025-04-17 DIAGNOSIS — R09.89 OTHER SPECIFIED SYMPTOMS AND SIGNS INVOLVING THE CIRCULATORY AND RESPIRATORY SYSTEMS: ICD-10-CM

## 2025-04-17 DIAGNOSIS — I25.118 ATHEROSCLEROSIS OF NATIVE CORONARY ARTERY OF NATIVE HEART WITH STABLE ANGINA PECTORIS: ICD-10-CM

## 2025-04-17 PROCEDURE — 93922 UPR/L XTREMITY ART 2 LEVELS: CPT

## 2025-04-29 ENCOUNTER — LAB (OUTPATIENT)
Dept: LAB | Facility: CLINIC | Age: 70
End: 2025-04-29
Payer: MEDICARE

## 2025-04-29 DIAGNOSIS — C61 HORMONE SENSITIVE PROSTATE CANCER (H): ICD-10-CM

## 2025-04-29 DIAGNOSIS — Z19.1 HORMONE SENSITIVE PROSTATE CANCER (H): ICD-10-CM

## 2025-04-29 DIAGNOSIS — N18.2 TYPE 2 DIABETES MELLITUS WITH STAGE 2 CHRONIC KIDNEY DISEASE (H): Primary | ICD-10-CM

## 2025-04-29 DIAGNOSIS — E11.22 TYPE 2 DIABETES MELLITUS WITH STAGE 2 CHRONIC KIDNEY DISEASE (H): Primary | ICD-10-CM

## 2025-04-29 LAB
CREAT UR-MCNC: 115.3 MG/DL
MICROALBUMIN UR-MCNC: 274.8 MG/L
MICROALBUMIN/CREAT UR: 238.33 MG/G CR (ref 0–17)
PSA SERPL DL<=0.01 NG/ML-MCNC: 0.02 NG/ML (ref 0–4.5)

## 2025-04-29 PROCEDURE — 84153 ASSAY OF PSA TOTAL: CPT

## 2025-04-29 PROCEDURE — 82043 UR ALBUMIN QUANTITATIVE: CPT

## 2025-04-29 PROCEDURE — 84403 ASSAY OF TOTAL TESTOSTERONE: CPT

## 2025-04-29 PROCEDURE — 82570 ASSAY OF URINE CREATININE: CPT

## 2025-04-29 PROCEDURE — 36415 COLL VENOUS BLD VENIPUNCTURE: CPT

## 2025-05-01 LAB — TESTOST SERPL-MCNC: 180 NG/DL (ref 240–950)

## 2025-05-18 SDOH — HEALTH STABILITY: PHYSICAL HEALTH: ON AVERAGE, HOW MANY DAYS PER WEEK DO YOU ENGAGE IN MODERATE TO STRENUOUS EXERCISE (LIKE A BRISK WALK)?: 4 DAYS

## 2025-05-18 SDOH — HEALTH STABILITY: PHYSICAL HEALTH: ON AVERAGE, HOW MANY MINUTES DO YOU ENGAGE IN EXERCISE AT THIS LEVEL?: 60 MIN

## 2025-05-18 ASSESSMENT — SOCIAL DETERMINANTS OF HEALTH (SDOH): HOW OFTEN DO YOU GET TOGETHER WITH FRIENDS OR RELATIVES?: MORE THAN THREE TIMES A WEEK

## 2025-05-19 DIAGNOSIS — N18.2 TYPE 2 DIABETES MELLITUS WITH STAGE 2 CHRONIC KIDNEY DISEASE, WITH LONG-TERM CURRENT USE OF INSULIN (H): ICD-10-CM

## 2025-05-19 DIAGNOSIS — N18.2 TYPE 2 DIABETES MELLITUS WITH STAGE 2 CHRONIC KIDNEY DISEASE, WITHOUT LONG-TERM CURRENT USE OF INSULIN (H): ICD-10-CM

## 2025-05-19 DIAGNOSIS — E78.5 HYPERLIPIDEMIA LDL GOAL <100: ICD-10-CM

## 2025-05-19 DIAGNOSIS — E11.22 TYPE 2 DIABETES MELLITUS WITH STAGE 2 CHRONIC KIDNEY DISEASE, WITHOUT LONG-TERM CURRENT USE OF INSULIN (H): ICD-10-CM

## 2025-05-19 DIAGNOSIS — E11.42 DIABETIC POLYNEUROPATHY ASSOCIATED WITH TYPE 2 DIABETES MELLITUS (H): ICD-10-CM

## 2025-05-19 DIAGNOSIS — E11.22 TYPE 2 DIABETES MELLITUS WITH STAGE 2 CHRONIC KIDNEY DISEASE, WITH LONG-TERM CURRENT USE OF INSULIN (H): ICD-10-CM

## 2025-05-19 DIAGNOSIS — Z79.4 TYPE 2 DIABETES MELLITUS WITH STAGE 2 CHRONIC KIDNEY DISEASE, WITH LONG-TERM CURRENT USE OF INSULIN (H): ICD-10-CM

## 2025-05-20 DIAGNOSIS — E78.5 HYPERLIPIDEMIA LDL GOAL <100: ICD-10-CM

## 2025-05-20 DIAGNOSIS — N18.2 TYPE 2 DIABETES MELLITUS WITH STAGE 2 CHRONIC KIDNEY DISEASE, WITHOUT LONG-TERM CURRENT USE OF INSULIN (H): ICD-10-CM

## 2025-05-20 DIAGNOSIS — E11.22 TYPE 2 DIABETES MELLITUS WITH STAGE 2 CHRONIC KIDNEY DISEASE, WITHOUT LONG-TERM CURRENT USE OF INSULIN (H): ICD-10-CM

## 2025-05-20 RX ORDER — GABAPENTIN 100 MG/1
100 CAPSULE ORAL 3 TIMES DAILY
Qty: 90 CAPSULE | Refills: 0 | Status: SHIPPED | OUTPATIENT
Start: 2025-05-20

## 2025-05-20 RX ORDER — EZETIMIBE 10 MG/1
10 TABLET ORAL
Qty: 90 TABLET | Refills: 2 | OUTPATIENT
Start: 2025-05-20

## 2025-05-20 RX ORDER — EZETIMIBE 10 MG/1
10 TABLET ORAL
Qty: 90 TABLET | Refills: 0 | Status: SHIPPED | OUTPATIENT
Start: 2025-05-20

## 2025-05-21 ENCOUNTER — RESULTS FOLLOW-UP (OUTPATIENT)
Dept: FAMILY MEDICINE | Facility: CLINIC | Age: 70
End: 2025-05-21

## 2025-05-21 ENCOUNTER — OFFICE VISIT (OUTPATIENT)
Dept: FAMILY MEDICINE | Facility: CLINIC | Age: 70
End: 2025-05-21
Payer: MEDICARE

## 2025-05-21 VITALS
HEART RATE: 65 BPM | RESPIRATION RATE: 20 BRPM | SYSTOLIC BLOOD PRESSURE: 130 MMHG | HEIGHT: 69 IN | DIASTOLIC BLOOD PRESSURE: 84 MMHG | WEIGHT: 243.6 LBS | BODY MASS INDEX: 36.08 KG/M2 | TEMPERATURE: 98.2 F | OXYGEN SATURATION: 100 %

## 2025-05-21 DIAGNOSIS — F33.42 RECURRENT MAJOR DEPRESSION IN COMPLETE REMISSION: ICD-10-CM

## 2025-05-21 DIAGNOSIS — C61 PROSTATE CANCER (H): ICD-10-CM

## 2025-05-21 DIAGNOSIS — Z79.4 TYPE 2 DIABETES MELLITUS WITH STAGE 2 CHRONIC KIDNEY DISEASE, WITH LONG-TERM CURRENT USE OF INSULIN (H): ICD-10-CM

## 2025-05-21 DIAGNOSIS — E03.9 HYPOTHYROIDISM, UNSPECIFIED TYPE: ICD-10-CM

## 2025-05-21 DIAGNOSIS — Z00.00 ENCOUNTER FOR MEDICARE ANNUAL WELLNESS EXAM: Primary | ICD-10-CM

## 2025-05-21 DIAGNOSIS — D63.8 ANEMIA IN OTHER CHRONIC DISEASES CLASSIFIED ELSEWHERE: ICD-10-CM

## 2025-05-21 DIAGNOSIS — K21.9 GASTROESOPHAGEAL REFLUX DISEASE WITHOUT ESOPHAGITIS: ICD-10-CM

## 2025-05-21 DIAGNOSIS — I25.118 ATHEROSCLEROSIS OF NATIVE CORONARY ARTERY OF NATIVE HEART WITH STABLE ANGINA PECTORIS: ICD-10-CM

## 2025-05-21 DIAGNOSIS — R07.9 CHEST PAIN, UNSPECIFIED TYPE: ICD-10-CM

## 2025-05-21 DIAGNOSIS — E11.22 TYPE 2 DIABETES MELLITUS WITH STAGE 2 CHRONIC KIDNEY DISEASE, WITH LONG-TERM CURRENT USE OF INSULIN (H): ICD-10-CM

## 2025-05-21 DIAGNOSIS — N18.2 TYPE 2 DIABETES MELLITUS WITH STAGE 2 CHRONIC KIDNEY DISEASE, WITH LONG-TERM CURRENT USE OF INSULIN (H): ICD-10-CM

## 2025-05-21 DIAGNOSIS — G89.29 CHRONIC BILATERAL LOW BACK PAIN WITHOUT SCIATICA: ICD-10-CM

## 2025-05-21 DIAGNOSIS — I10 ESSENTIAL HYPERTENSION: ICD-10-CM

## 2025-05-21 DIAGNOSIS — M54.50 CHRONIC BILATERAL LOW BACK PAIN WITHOUT SCIATICA: ICD-10-CM

## 2025-05-21 DIAGNOSIS — E66.01 CLASS 2 SEVERE OBESITY WITH BODY MASS INDEX (BMI) OF 35 TO 39.9 WITH SERIOUS COMORBIDITY (H): ICD-10-CM

## 2025-05-21 DIAGNOSIS — Z95.1 S/P CABG X 4: ICD-10-CM

## 2025-05-21 DIAGNOSIS — G47.33 OSA (OBSTRUCTIVE SLEEP APNEA): ICD-10-CM

## 2025-05-21 DIAGNOSIS — E66.812 CLASS 2 SEVERE OBESITY WITH BODY MASS INDEX (BMI) OF 35 TO 39.9 WITH SERIOUS COMORBIDITY (H): ICD-10-CM

## 2025-05-21 DIAGNOSIS — Z98.890 STATUS POST CORONARY ANGIOGRAM: ICD-10-CM

## 2025-05-21 DIAGNOSIS — E78.5 HYPERLIPIDEMIA LDL GOAL <70: ICD-10-CM

## 2025-05-21 LAB
ANION GAP SERPL CALCULATED.3IONS-SCNC: 12 MMOL/L (ref 7–15)
BUN SERPL-MCNC: 21.4 MG/DL (ref 8–23)
CALCIUM SERPL-MCNC: 9.7 MG/DL (ref 8.8–10.4)
CHLORIDE SERPL-SCNC: 105 MMOL/L (ref 98–107)
CREAT SERPL-MCNC: 1.24 MG/DL (ref 0.67–1.17)
EGFRCR SERPLBLD CKD-EPI 2021: 63 ML/MIN/1.73M2
ERYTHROCYTE [DISTWIDTH] IN BLOOD BY AUTOMATED COUNT: 13.2 % (ref 10–15)
GLUCOSE SERPL-MCNC: 138 MG/DL (ref 70–99)
HCO3 SERPL-SCNC: 21 MMOL/L (ref 22–29)
HCT VFR BLD AUTO: 39.9 % (ref 40–53)
HGB BLD-MCNC: 13.8 G/DL (ref 13.3–17.7)
MCH RBC QN AUTO: 31.7 PG (ref 26.5–33)
MCHC RBC AUTO-ENTMCNC: 34.6 G/DL (ref 31.5–36.5)
MCV RBC AUTO: 92 FL (ref 78–100)
PLATELET # BLD AUTO: 189 10E3/UL (ref 150–450)
POTASSIUM SERPL-SCNC: 4.4 MMOL/L (ref 3.4–5.3)
RBC # BLD AUTO: 4.35 10E6/UL (ref 4.4–5.9)
SODIUM SERPL-SCNC: 138 MMOL/L (ref 135–145)
TSH SERPL DL<=0.005 MIU/L-ACNC: 1.52 UIU/ML (ref 0.3–4.2)
WBC # BLD AUTO: 7.4 10E3/UL (ref 4–11)

## 2025-05-21 PROCEDURE — 1125F AMNT PAIN NOTED PAIN PRSNT: CPT | Performed by: FAMILY MEDICINE

## 2025-05-21 PROCEDURE — 99214 OFFICE O/P EST MOD 30 MIN: CPT | Mod: 25 | Performed by: FAMILY MEDICINE

## 2025-05-21 PROCEDURE — 85027 COMPLETE CBC AUTOMATED: CPT | Performed by: FAMILY MEDICINE

## 2025-05-21 PROCEDURE — 84443 ASSAY THYROID STIM HORMONE: CPT | Performed by: FAMILY MEDICINE

## 2025-05-21 PROCEDURE — 3075F SYST BP GE 130 - 139MM HG: CPT | Performed by: FAMILY MEDICINE

## 2025-05-21 PROCEDURE — G0439 PPPS, SUBSEQ VISIT: HCPCS | Performed by: FAMILY MEDICINE

## 2025-05-21 PROCEDURE — 3079F DIAST BP 80-89 MM HG: CPT | Performed by: FAMILY MEDICINE

## 2025-05-21 PROCEDURE — 36415 COLL VENOUS BLD VENIPUNCTURE: CPT | Performed by: FAMILY MEDICINE

## 2025-05-21 PROCEDURE — 99207 PR FOOT EXAM NO CHARGE: CPT | Performed by: FAMILY MEDICINE

## 2025-05-21 PROCEDURE — 80048 BASIC METABOLIC PNL TOTAL CA: CPT | Performed by: FAMILY MEDICINE

## 2025-05-21 PROCEDURE — 91320 SARSCV2 VAC 30MCG TRS-SUC IM: CPT | Performed by: FAMILY MEDICINE

## 2025-05-21 PROCEDURE — 90480 ADMN SARSCOV2 VAC 1/ONLY CMP: CPT | Performed by: FAMILY MEDICINE

## 2025-05-21 RX ORDER — NITROGLYCERIN 0.4 MG/1
TABLET SUBLINGUAL
Qty: 25 TABLET | Refills: 11 | Status: SHIPPED | OUTPATIENT
Start: 2025-05-21

## 2025-05-21 RX ORDER — HYDROCODONE BITARTRATE AND ACETAMINOPHEN 5; 325 MG/1; MG/1
1 TABLET ORAL DAILY PRN
Qty: 20 TABLET | Refills: 0 | Status: SHIPPED | OUTPATIENT
Start: 2025-05-21

## 2025-05-21 RX ORDER — CYCLOBENZAPRINE HCL 10 MG
10 TABLET ORAL 2 TIMES DAILY PRN
Qty: 180 TABLET | Refills: 3 | Status: SHIPPED | OUTPATIENT
Start: 2025-05-21

## 2025-05-21 ASSESSMENT — PATIENT HEALTH QUESTIONNAIRE - PHQ9
SUM OF ALL RESPONSES TO PHQ QUESTIONS 1-9: 7
10. IF YOU CHECKED OFF ANY PROBLEMS, HOW DIFFICULT HAVE THESE PROBLEMS MADE IT FOR YOU TO DO YOUR WORK, TAKE CARE OF THINGS AT HOME, OR GET ALONG WITH OTHER PEOPLE: NOT DIFFICULT AT ALL
SUM OF ALL RESPONSES TO PHQ QUESTIONS 1-9: 7

## 2025-05-21 ASSESSMENT — PAIN SCALES - GENERAL: PAINLEVEL_OUTOF10: MILD PAIN (3)

## 2025-05-21 NOTE — PATIENT INSTRUCTIONS
Patient Education   Preventive Care Advice   This is general advice given by our system to help you stay healthy. However, your care team may have specific advice just for you. Please talk to your care team about your preventive care needs.  Nutrition  Eat 5 or more servings of fruits and vegetables each day.  Try wheat bread, brown rice and whole grain pasta (instead of white bread, rice, and pasta).  Get enough calcium and vitamin D. Check the label on foods and aim for 100% of the RDA (recommended daily allowance).  Lifestyle  Exercise at least 150 minutes each week  (30 minutes a day, 5 days a week).  Do muscle strengthening activities 2 days a week. These help control your weight and prevent disease.  No smoking.  Wear sunscreen to prevent skin cancer.  Have a dental exam and cleaning every 6 months.  Yearly exams  See your health care team every year to talk about:  Any changes in your health.  Any medicines your care team has prescribed.  Preventive care, family planning, and ways to prevent chronic diseases.  Shots (vaccines)   HPV shots (up to age 26), if you've never had them before.  Hepatitis B shots (up to age 59), if you've never had them before.  COVID-19 shot: Get this shot when it's due.  Flu shot: Get a flu shot every year.  Tetanus shot: Get a tetanus shot every 10 years.  Pneumococcal, hepatitis A, and RSV shots: Ask your care team if you need these based on your risk.  Shingles shot (for age 50 and up)  General health tests  Diabetes screening:  Starting at age 35, Get screened for diabetes at least every 3 years.  If you are younger than age 35, ask your care team if you should be screened for diabetes.  Cholesterol test: At age 39, start having a cholesterol test every 5 years, or more often if advised.  Bone density scan (DEXA): At age 50, ask your care team if you should have this scan for osteoporosis (brittle bones).  Hepatitis C: Get tested at least once in your life.  STIs (sexually  transmitted infections)  Before age 24: Ask your care team if you should be screened for STIs.  After age 24: Get screened for STIs if you're at risk. You are at risk for STIs (including HIV) if:  You are sexually active with more than one person.  You don't use condoms every time.  You or a partner was diagnosed with a sexually transmitted infection.  If you are at risk for HIV, ask about PrEP medicine to prevent HIV.  Get tested for HIV at least once in your life, whether you are at risk for HIV or not.  Cancer screening tests  Cervical cancer screening: If you have a cervix, begin getting regular cervical cancer screening tests starting at age 21.  Breast cancer scan (mammogram): If you've ever had breasts, begin having regular mammograms starting at age 40. This is a scan to check for breast cancer.  Colon cancer screening: It is important to start screening for colon cancer at age 45.  Have a colonoscopy test every 10 years (or more often if you're at risk) Or, ask your provider about stool tests like a FIT test every year or Cologuard test every 3 years.  To learn more about your testing options, visit:   .  For help making a decision, visit:   https://bit.ly/sy59916.  Prostate cancer screening test: If you have a prostate, ask your care team if a prostate cancer screening test (PSA) at age 55 is right for you.  Lung cancer screening: If you are a current or former smoker ages 50 to 80, ask your care team if ongoing lung cancer screenings are right for you.  For informational purposes only. Not to replace the advice of your health care provider. Copyright   2023 [facility]. All rights reserved. Clinically reviewed by the [facility]. [facility] [id number] - REV 01/24.  Preventing Falls: Care Instructions  Injuries and health problems such as trouble walking or poor eyesight can increase your risk of falling. So can some medicines. But there are things you can do to help  "prevent falls. You can exercise to get stronger. You can also arrange your home to make it safer.    Talk to your doctor about the medicines you take. Ask if any of them increase the risk of falls and whether they can be changed or stopped.   Try to exercise regularly. It can help improve your strength and balance. This can help lower your risk of falling.         Practice fall safety and prevention.   Wear low-heeled shoes that fit well and give your feet good support. Talk to your doctor if you have foot problems that make this hard.  Carry a cellphone or wear a medical alert device that you can use to call for help.  Use stepladders instead of chairs to reach high objects. Don't climb if you're at risk for falls. Ask for help, if needed.  Wear the correct eyeglasses, if you need them.        Make your home safer.   Remove rugs, cords, clutter, and furniture from walkways.  Keep your house well lit. Use night-lights in hallways and bathrooms.  Install and use sturdy handrails on stairways.  Wear nonskid footwear, even inside. Don't walk barefoot or in socks without shoes.        Be safe outside.   Use handrails, curb cuts, and ramps whenever possible.  Keep your hands free by using a shoulder bag or backpack.  Try to walk in well-lit areas. Watch out for uneven ground, changes in pavement, and debris.  Be careful in the winter. Walk on the grass or gravel when sidewalks are slippery. Use de-icer on steps and walkways. Add non-slip devices to shoes.    Put grab bars and nonskid mats in your shower or tub and near the toilet. Try to use a shower chair or bath bench when bathing.   Get into a tub or shower by putting in your weaker leg first. Get out with your strong side first. Have a phone or medical alert device in the bathroom with you.   Where can you learn more?  Go to https://www.Smart Educationwise.net/patiented  Enter G117 in the search box to learn more about \"Preventing Falls: Care Instructions.\"  Current as of: " July 31, 2024  Content Version: 14.4    0205-1842 [employer].   Care instructions adapted under license by your healthcare professional. If you have questions about a medical condition or this instruction, always ask your healthcare professional. [employer] disclaims any warranty or liability for your use of this information.    Hearing Loss: Care Instructions  Overview     Hearing loss is a sudden or slow decrease in how well you hear. It can range from slight to profound. Permanent hearing loss can occur with aging. It also can happen when you are exposed long-term to loud noise. Examples include listening to loud music, riding motorcycles, or being around other loud machines.  Hearing loss can affect your work and home life. It can make you feel lonely or depressed. You may feel that you have lost your independence. But hearing aids and other devices can help you hear better and feel connected to others.  Follow-up care is a key part of your treatment and safety. Be sure to make and go to all appointments, and call your doctor if you are having problems. It's also a good idea to know your test results and keep a list of the medicines you take.  How can you care for yourself at home?  Avoid loud noises whenever possible. This helps keep your hearing from getting worse.  Always wear hearing protection around loud noises.  Wear a hearing aid as directed.  A professional can help you pick a hearing aid that will work best for you.  You can also get hearing aids over the counter for mild to moderate hearing loss.  Have hearing tests as your doctor suggests. They can show whether your hearing has changed. Your hearing aid may need to be adjusted.  Use other devices as needed. These may include:  Telephone amplifiers and hearing aids that can connect to a television, stereo, radio, or microphone.  Devices that use lights or vibrations. These alert you to the doorbell, a ringing telephone, or a  "baby monitor.  Television closed-captioning. This shows the words at the bottom of the screen. Most new TVs can do this.  TTY (text telephone). This lets you type messages back and forth on the telephone instead of talking or listening. These devices are also called TDD. When messages are typed on the keyboard, they are sent over the phone line to a receiving TTY. The message is shown on a monitor.  Use text messaging, social media, and email if it is hard for you to communicate by telephone.  Try to learn a listening technique called speechreading. It is not lipreading. You pay attention to people's gestures, expressions, posture, and tone of voice. These clues can help you understand what a person is saying. Face the person you are talking to, and have them face you. Make sure the lighting is good. You need to see the other person's face clearly.  Think about counseling if you need help to adjust to your hearing loss.  When should you call for help?  Watch closely for changes in your health, and be sure to contact your doctor if:    You think your hearing is getting worse.     You have new symptoms, such as dizziness or nausea.   Where can you learn more?  Go to https://www.[employer].net/patiented  Enter R798 in the search box to learn more about \"Hearing Loss: Care Instructions.\"  Current as of: October 27, 2024  Content Version: 14.4 2024-2025 [employer].   Care instructions adapted under license by your healthcare professional. If you have questions about a medical condition or this instruction, always ask your healthcare professional. [employer] disclaims any warranty or liability for your use of this information.    Learning About Sleeping Well  What does sleeping well mean?     Sleeping well means getting enough sleep to feel good and stay healthy. How much sleep is enough varies among people.  The number of hours you sleep and how you feel when you wake up are both important. If " you do not feel refreshed, you probably need more sleep. Another sign of not getting enough sleep is feeling tired during the day.  Experts recommend that adults get at least 7 or more hours of sleep per day. Children and older adults need more sleep.  Why is getting enough sleep important?  Getting enough quality sleep is a basic part of good health. When your sleep suffers, your physical health, mood, and your thoughts can suffer too. You may find yourself feeling more grumpy or stressed. Not getting enough sleep also can lead to serious problems, including injury, accidents, anxiety, and depression.  What might cause poor sleeping?  Many things can cause sleep problems, including:  Changes to your sleep schedule.  Stress. Stress can be caused by fear about a single event, such as giving a speech. Or you may have ongoing stress, such as worry about work or school.  Depression, anxiety, and other mental or emotional conditions.  Changes in your sleep habits or surroundings. This includes changes that happen where you sleep, such as noise, light, or sleeping in a different bed. It also includes changes in your sleep pattern, such as having jet lag or working a late shift.  Health problems, such as pain, breathing problems, and restless legs syndrome.  Lack of regular exercise.  Using alcohol, nicotine, or caffeine before bed.  How can you help yourself?  Here are some tips that may help you sleep more soundly and wake up feeling more refreshed.  Your sleeping area   Use your bedroom only for sleeping and sex. A bit of light reading may help you fall asleep. But if it doesn't, do your reading elsewhere in the house. Try not to use your TV, computer, smartphone, or tablet while you are in bed.  Be sure your bed is big enough to stretch out comfortably, especially if you have a sleep partner.  Keep your bedroom quiet, dark, and cool. Use curtains, blinds, or a sleep mask to block out light. To block out noise, use  "earplugs, soothing music, or a \"white noise\" machine.  Your evening and bedtime routine   Create a relaxing bedtime routine. You might want to take a warm shower or bath, or listen to soothing music.  Go to bed at the same time every night. And get up at the same time every morning, even if you feel tired.  What to avoid   Limit caffeine (coffee, tea, caffeinated sodas) during the day, and don't have any for at least 6 hours before bedtime.  Avoid drinking alcohol before bedtime. Alcohol can cause you to wake up more often during the night.  Try not to smoke or use tobacco, especially in the evening. Nicotine can keep you awake.  Limit naps during the day, especially close to bedtime.  Avoid lying in bed awake for too long. If you can't fall asleep or if you wake up in the middle of the night and can't get back to sleep within about 20 minutes, get out of bed and go to another room until you feel sleepy.  Avoid taking medicine right before bed that may keep you awake or make you feel hyper or energized. Your doctor can tell you if your medicine may do this and if you can take it earlier in the day.  If you can't sleep   Imagine yourself in a peaceful, pleasant scene. Focus on the details and feelings of being in a place that is relaxing.  Get up and do a quiet or boring activity until you feel sleepy.  Avoid drinking any liquids before going to bed to help prevent waking up often to use the bathroom.  Where can you learn more?  Go to https://www.[employer]able.net/patiented  Enter J942 in the search box to learn more about \"Learning About Sleeping Well.\"  Current as of: July 31, 2024  Content Version: 14.4 2024-2025 [employer].   Care instructions adapted under license by your healthcare professional. If you have questions about a medical condition or this instruction, always ask your healthcare professional. [employer] disclaims any warranty or liability for your use of this " information.    Bladder Training: Care Instructions  Your Care Instructions     Bladder training is used to treat urge incontinence and stress incontinence. Urge incontinence means that the need to urinate comes on so fast that you can't get to a toilet in time. Stress incontinence means that you leak urine because of pressure on your bladder. For example, it may happen when you laugh, cough, or lift something heavy.  Bladder training can increase how long you can wait before you have to urinate. It can also help your bladder hold more urine. And it can give you better control over the urge to urinate.  It is important to remember that bladder training takes a few weeks to a few months to make a difference. You may not see results right away, but don't give up.  Follow-up care is a key part of your treatment and safety. Be sure to make and go to all appointments, and call your doctor if you are having problems. It's also a good idea to know your test results and keep a list of the medicines you take.  How can you care for yourself at home?  Work with your doctor to come up with a bladder training program that is right for you. You may use one or more of the following methods.  Delayed urination  In the beginning, try to keep from urinating for 5 minutes after you first feel the need to go.  While you wait, take deep, slow breaths to relax. Kegel exercises can also help you delay the need to go to the bathroom.  After some practice, when you can easily wait 5 minutes to urinate, try to wait 10 minutes before you urinate.  Slowly increase the waiting period until you are able to control when you have to urinate.  Scheduled urination  Empty your bladder when you first wake up in the morning.  Schedule times throughout the day when you will urinate.  Start by going to the bathroom every hour, even if you don't need to go.  Slowly increase the time between trips to the bathroom.  When you have found a schedule that works  "well for you, keep doing it.  If you wake up during the night and have to urinate, do it. Apply your schedule to waking hours only.  Kegel exercises  These tighten and strengthen pelvic muscles, which can help you control the flow of urine. (If doing these exercises causes pain, stop doing them and talk with your doctor.) To do Kegel exercises:  Squeeze your muscles as if you were trying not to pass gas. Or squeeze your muscles as if you were stopping the flow of urine. Your belly, legs, and buttocks shouldn't move.  Hold the squeeze for 3 seconds, then relax for 5 to 10 seconds.  Start with 3 seconds, then add 1 second each week until you are able to squeeze for 10 seconds.  Repeat the exercise 10 times a session. Do 3 to 8 sessions a day.  When should you call for help?  Watch closely for changes in your health, and be sure to contact your doctor if:    Your incontinence is getting worse.     You do not get better as expected.   Where can you learn more?  Go to https://www.[employer].net/patiented  Enter V684 in the search box to learn more about \"Bladder Training: Care Instructions.\"  Current as of: April 30, 2024  Content Version: 14.4    4173-1936 [employer].   Care instructions adapted under license by your healthcare professional. If you have questions about a medical condition or this instruction, always ask your healthcare professional. [employer] disclaims any warranty or liability for your use of this information.    Learning About Depression Screening  What is depression screening?  Depression screening is a way to see if you have depression symptoms. It may be done by a doctor or counselor. It's often part of a routine checkup. That's because your mental health is just as important as your physical health.  Depression is a mental health condition that affects how you feel, think, and act. You may:  Have less energy.  Lose interest in your daily activities.  Feel sad and grouchy " "for a long time.  Depression is very common. It affects people of all ages.  Many things can lead to depression. Some people become depressed after they have a stroke or find out they have a major illness like cancer or heart disease. The death of a loved one or a breakup may lead to depression. It can run in families. Most experts believe that a combination of inherited genes and stressful life events can cause it.  What happens during screening?  You may be asked to fill out a form about your depression symptoms. You and the doctor will discuss your answers. The doctor may ask you more questions to learn more about how you think, act, and feel.  What happens after screening?  If you have symptoms of depression, your doctor will talk to you about your options.  Doctors usually treat depression with medicines or counseling. Often, combining the two works best. Many people don't get help because they think that they'll get over the depression on their own. But people with depression may not get better unless they get treatment.  The cause of depression is not well understood. There may be many factors involved. But if you have depression, it's not your fault.  A serious symptom of depression is thinking about death or suicide. If you or someone you care about talks about this or about feeling hopeless, get help right away.  It's important to know that depression can be treated. Medicine, counseling, and self-care may help.  Where can you learn more?  Go to https://www.[employer].net/patiented  Enter T185 in the search box to learn more about \"Learning About Depression Screening.\"  Current as of: July 31, 2024  Content Version: 14.4    0235-0908 [employer].   Care instructions adapted under license by your healthcare professional. If you have questions about a medical condition or this instruction, always ask your healthcare professional. [employer] disclaims any warranty or liability for your " use of this information.              Half the metformin dose when you start the Ozempic; Once the Ozempic dose reach 1 mg then stop the metformin  If Ozempic is not cover then will continue with the Metformin

## 2025-05-21 NOTE — Clinical Note
Please let patient know that I increased his Crestor dose from 20 mg to 20 mg.  The new prescription was given.  He may take 2 tablets of what he has until it is gone and then with a new prescription, take 1 tablet daily.

## 2025-05-21 NOTE — PROGRESS NOTES
Preventive Care Visit  [facility]  Nishi Hdz Mai, MD, Family Medicine  May 21, 2025  {Provider  Link to SmartSet :176916}    {PROVIDER CHARTING PREFERENCE:615029}    Clyde Maldonado is a 69 year old, presenting for the following:  Annual Visit        5/21/2025     1:17 PM   Additional Questions   Roomed by Vanessa GUILLORY     {MA/LPN/RN Pre-Provider Visit Orders- hCG/UA/Strep (Optional):591371}  {SUPERLIST (Optional):065586}  {additonal problems for provider to add (Optional):010598}  Advance Care Planning  {The storyboard will display whether the patient has ACP docs on file. Hover over the Code section in the storyboard to access the ACP documents. :136085}  Document on file is a Health Care Directive or POLST.        5/18/2025   General Health   How would you rate your overall physical health? (!) FAIR   Feel stress (tense, anxious, or unable to sleep) Only a little   (!) STRESS CONCERN      5/18/2025   Nutrition   Diet: Low salt    Low fat/cholesterol    Diabetic       Multiple values from one day are sorted in reverse-chronological order         5/18/2025   Exercise   Days per week of moderate/strenous exercise 4 days   Average minutes spent exercising at this level 60 min         5/18/2025   Social Factors   Frequency of gathering with friends or relatives More than three times a week   Worry food won't last until get money to buy more No   Food not last or not have enough money for food? No   Do you have housing? (Housing is defined as stable permanent housing and does not include staying outside in a car, in a tent, in an abandoned building, in an overnight shelter, or couch-surfing.) Yes   Are you worried about losing your housing? No   Lack of transportation? No   Unable to get utilities (heat,electricity)? No         5/18/2025   Fall Risk   Fallen 2 or more times in the past year? No   Trouble with walking or balance? Yes     {Positive Fall Risk- Gait Speed Test is  required and was not documented before note was started.  If results do not appear, ask staff to complete.  Once completed, refresh note to pull in results Click here to link Gait Speed Test  :756862}      5/18/2025   Activities of Daily Living- Home Safety   Needs help with the following daily activites None of the above   Safety concerns in the home None of the above         5/18/2025   Dental   Dentist two times every year? (!) NO         5/18/2025   Hearing Screening   Hearing concerns? (!) I FEEL THAT PEOPLE ARE MUMBLING OR NOT SPEAKING CLEARLY.    (!) I NEED TO ASK PEOPLE TO SPEAK UP OR REPEAT THEMSELVES.    (!) IT'S HARD TO FOLLOW A CONVERSATION IN A NOISY RESTAURANT OR CROWDED ROOM.    (!) TROUBLE UNDERSTANDING SOFT OR WHISPERED SPEECH.    (!) TROUBLE UNDERSTANDING SPEECH ON THE TELEPHONE       Multiple values from one day are sorted in reverse-chronological order         5/18/2025   Driving Risk Screening   Patient/family members have concerns about driving No         5/18/2025   General Alertness/Fatigue Screening   Have you been more tired than usual lately? (!) YES         5/18/2025   Urinary Incontinence Screening   Bothered by leaking urine in past 6 months Yes       { Rooming Staff Patient needs a PHQ as part of the AWV.  Use this link to complete and then refresh the note to pull results Link to PHQ9 Assessment :607953}  Today's PHQ-9 Score:       5/21/2025     1:20 PM   PHQ-9 SCORE   PHQ-9 Total Score MyChart 7 (Mild depression)   PHQ-9 Total Score 7        Patient-reported           5/18/2025   Substance Use   Alcohol more than 3/day or more than 7/wk Not Applicable   Do you have a current opioid prescription? No   How severe/bad is pain from 1 to 10? 6/10   Do you use any other substances recreationally? No     Social History     Tobacco Use    Smoking status: Never     Passive exposure: Past (per pt)    Smokeless tobacco: Never   Vaping Use    Vaping status: Never Used   Substance Use Topics     Alcohol use: No    Drug use: No     {Provider  If there are gaps in the social history shown above, please follow the link to update and then refresh the note Link to Social and Substance History :980708}      5/18/2025   AAA Screening   Family history of Abdominal Aortic Aneurysm (AAA)? No   Last PSA:   PSA   Date Value Ref Range Status   02/26/2021 12.70 (H) 0 - 4 ug/L Final     Comment:     Assay Method:  Chemiluminescence using Siemens Vista analyzer     PSA Tumor Marker   Date Value Ref Range Status   04/29/2025 0.02 0.00 - 4.50 ng/mL Final   11/11/2022 <0.01 0.00 - 4.00 ug/L Final     ASCVD Risk   The 10-year ASCVD risk score (Anish CONRAD, et al., 2019) is: 27.8%    Values used to calculate the score:      Age: 69 years      Sex: Male      Is Non- : No      Diabetic: Yes      Tobacco smoker: No      Systolic Blood Pressure: 114 mmHg      Is BP treated: Yes      HDL Cholesterol: 29 mg/dL      Total Cholesterol: 132 mg/dL    {Link to Fracture Risk Assessment Tool (Optional):153144}    {Provider  REQUIRED FOR AWV Use the storyboard to review patient history, after sections have been marked as reviewed, refresh note to capture documentation:924058}  {Provider   REQUIRED AWV use this link to review and update sexual activity history  after section has been marked as reviewed, refresh note to capture documentation:651849}  Reviewed and updated as needed this visit by Provider                    {HISTORY OPTIONS (Optional):246031}  Current providers sharing in care for this patient include:  Patient Care Team:  Nishi Lin MD as PCP - General (Family Medicine)  Nishi Lin MD as Assigned PCP  Jonah Adams [facility] as Assigned MTM Pharmacist  Chloe Merchant RN as Specialty Care Coordinator (Hematology & Oncology)  Alexei Ott MD as MD (Urology)  Hari Peterson MD as Assigned Heart and Vascular Surgical Provider  Alexei Ott MD as Assigned Surgical  "Provider  Fawn Marcus PA-C as Assigned Cancer Care Provider  Eugene Rodriguez MD as Assigned Heart and Vascular Provider    The following health maintenance items are reviewed in Epic and correct as of today:  Health Maintenance   Topic Date Due    COVID-19 Vaccine (7 - 2024-25 season) 09/01/2024    EYE EXAM  12/14/2024    DIABETIC FOOT EXAM  05/14/2025    ANNUAL REVIEW OF HM ORDERS  05/14/2025    MEDICARE ANNUAL WELLNESS VISIT  05/14/2025    A1C  07/29/2025    INFLUENZA VACCINE (Season Ended) 09/01/2025    LIPID  10/23/2025    BMP  01/27/2026    TSH W/FREE T4 REFLEX  01/27/2026    DTAP/TDAP/TD IMMUNIZATION (2 - Td or Tdap) 04/12/2026    MICROALBUMIN  04/29/2026    FALL RISK ASSESSMENT  05/21/2026    PHQ-9  05/21/2026    ADVANCE CARE PLANNING  05/26/2029    COLORECTAL CANCER SCREENING  07/17/2031    HEPATITIS C SCREENING  Completed    DEPRESSION ACTION PLAN  Completed    Pneumococcal Vaccine: 50+ Years  Completed    URINALYSIS  Completed    ZOSTER IMMUNIZATION  Completed    RSV VACCINE  Completed    HPV IMMUNIZATION  Aged Out    MENINGITIS IMMUNIZATION  Aged Out       {ROS Picklists (Optional):897038}     Objective    Exam  There were no vitals taken for this visit.   Estimated body mass index is 34.78 kg/m  as calculated from the following:    Height as of 3/10/25: 1.784 m (5' 10.24\").    Weight as of 4/14/25: 110.7 kg (244 lb).    Physical Exam  {Exam Choices (Optional):868240}  {Provider  The Mini-Cog is incomplete, use link to complete and refresh note Link to Mini-Cog :825373}      5/14/2024   Mini Cog   Clock Draw Score 2 Normal   3 Item Recall 3 objects recalled   Mini Cog Total Score 5     {A Mini-Cog total score of 0-2 suggests the possibility of dementia, score of 3-5 suggests no dementia:485988}         Signed Electronically by: Nishi Hdz Mai, MD  {Email feedback regarding this note to [email or web address]   :269809}  .undefined[^^  Answers submitted by the patient for this " visit:  Patient Health Questionnaire (Submitted on 5/21/2025)  If you checked off any problems, how difficult have these problems made it for you to do your work, take care of things at home, or get along with other people?: Not difficult at all  PHQ9 TOTAL SCORE: 7     He was provided with information regarding depression.       Follow-up    Follow-up Visit   Expected date:  May 28, 2026 (Approximate)      Follow Up Appointment Details:     Follow-up with whom?: PCP    Follow-Up for what?: Medicare Wellness    Welcome or Annual?: Annual Wellness    How?: In Person                 Clyde Maldonado is a 69 year old, presenting for the following:  Annual Visit        5/21/2025     1:17 PM   Additional Questions   Roomed by Vanessa GUILLORY    Joel is a 69-year-old male with type 2 diabetes, chronic kidney disease stage II, CAD with s/p CABG, anxiety, depression, hypertension, HILARIO, and status post TKA left (01/2021) and TKA right (02/2020), he is here today for a physical exam and general follow up.  Being taking the medications as prescribed with no side effect.   His depression and anxiety and well controlled with Wellbutrin with no side effect.  Been on the same dose for couple years and it has been working well.  Not feeling depressed or anxious.  Not seeing counselor by choice.  No suicidal or homicidal ideation.  No hallucination.  No drugs or alcohol.     He has been working closely with the urologist for prostate cancer.  Still has problem with nocturia but is tolerable, has no concern about it.  He takes the Flomax daily and that has helped.     His other medical conditions are overall stable and controlled.  He has an extensive history of cardiac disease which has been stable.  No chest pain or shortness of breath.  No orthopnea or dyspnea.  He takes losartan and metoprolol for high blood pressure, Zetia and Crestor for high cholesterol and Metformin and Jardiance for diabetes and they have been working well.  He also takes levothyroxine for hypothyroidism.  Not checking his blood pressure or blood sugar at home.  No headache or dizziness.  No acute change in his vision.  No chest pain, shortness of breath, neck swelling, orthopnea or dyspnea.  The numbness or tingling  sensation on his feet are controlled with the gabapentin.  He uses CPAP nightly for sleep apnea and has been working well.    Been some weight over the winter but the CPAP is still effective.  Feels rested and refreshed in the morning; no excessive daytime fatigue or sleepiness.  He is seeing the cardiologist regularly.     Generally, he is doing well.  No headache, dizziness or acute visual change. No runny nose, nasal congestion, coughing, fever or chill. No CP/SOB. No N/V/D/C.  No abdominal pain.  No leg swelling, orthopnea or dyspnea.  Having the lower back pain with stiffness.  Tylenol and Motrin has not been helping.  Not exercising.  No alcohol, drug or tobacco use. No problem with sleeping.  No safety or mental health concerns, lives with his wife.  No other concern today        Advance Care Planning    Document on file is a Health Care Directive or POLST.        5/18/2025   General Health   How would you rate your overall physical health? (!) FAIR   Feel stress (tense, anxious, or unable to sleep) Only a little   (!) STRESS CONCERN      5/18/2025   Nutrition   Diet: Low salt    Low fat/cholesterol    Diabetic       Multiple values from one day are sorted in reverse-chronological order         5/18/2025   Exercise   Days per week of moderate/strenous exercise 4 days   Average minutes spent exercising at this level 60 min         5/18/2025   Social Factors   Frequency of gathering with friends or relatives More than three times a week   Worry food won't last until get money to buy more No   Food not last or not have enough money for food? No   Do you have housing? (Housing is defined as stable permanent housing and does not include staying outside in a car, in a tent, in an abandoned building, in an overnight shelter, or couch-surfing.) Yes   Are you worried about losing your housing? No   Lack of transportation? No   Unable to get utilities (heat,electricity)? No         5/18/2025   Fall Risk   Fallen 2 or  more times in the past year? No   Trouble with walking or balance? Yes           5/18/2025   Activities of Daily Living- Home Safety   Needs help with the following daily activites None of the above   Safety concerns in the home None of the above         5/18/2025   Dental   Dentist two times every year? (!) NO         5/18/2025   Hearing Screening   Hearing concerns? (!) I FEEL THAT PEOPLE ARE MUMBLING OR NOT SPEAKING CLEARLY.    (!) I NEED TO ASK PEOPLE TO SPEAK UP OR REPEAT THEMSELVES.    (!) IT'S HARD TO FOLLOW A CONVERSATION IN A NOISY RESTAURANT OR CROWDED ROOM.    (!) TROUBLE UNDERSTANDING SOFT OR WHISPERED SPEECH.    (!) TROUBLE UNDERSTANDING SPEECH ON THE TELEPHONE       Multiple values from one day are sorted in reverse-chronological order         5/18/2025   Driving Risk Screening   Patient/family members have concerns about driving No         5/18/2025   General Alertness/Fatigue Screening   Have you been more tired than usual lately? (!) YES         5/18/2025   Urinary Incontinence Screening   Bothered by leaking urine in past 6 months Yes         Today's PHQ-9 Score:       5/21/2025     1:20 PM   PHQ-9 SCORE   PHQ-9 Total Score MyChart 7 (Mild depression)   PHQ-9 Total Score 7        Patient-reported           5/18/2025   Substance Use   Alcohol more than 3/day or more than 7/wk Not Applicable   Do you have a current opioid prescription? No   How severe/bad is pain from 1 to 10? 6/10   Do you use any other substances recreationally? No     Social History     Tobacco Use    Smoking status: Never     Passive exposure: Past (per pt)    Smokeless tobacco: Never   Vaping Use    Vaping status: Never Used   Substance Use Topics    Alcohol use: No    Drug use: No           5/18/2025   AAA Screening   Family history of Abdominal Aortic Aneurysm (AAA)? No   Last PSA:   PSA   Date Value Ref Range Status   02/26/2021 12.70 (H) 0 - 4 ug/L Final     Comment:     Assay Method:  Chemiluminescence using Siemens Vista  analyzer     PSA Tumor Marker   Date Value Ref Range Status   2025 0.02 0.00 - 4.50 ng/mL Final   2022 <0.01 0.00 - 4.00 ug/L Final     ASCVD Risk   The 10-year ASCVD risk score (Anish CONRAD, et al., 2019) is: 33.8%    Values used to calculate the score:      Age: 69 years      Sex: Male      Is Non- : No      Diabetic: Yes      Tobacco smoker: No      Systolic Blood Pressure: 130 mmHg      Is BP treated: Yes      HDL Cholesterol: 29 mg/dL      Total Cholesterol: 132 mg/dL    Fracture Risk Assessment Tool  Link to Frax Calculator  Use the information below to complete the Frax calculator  : 1955  Sex: male  Weight (kg): 110.5 kg (actual weight)  Height (cm): 176.5 cm  Previous Fragility Fracture:  No  History of parent with fractured hip:  No  Current Smoking:  No  Patient has been on glucocorticoids for more than 3 months (5mg/day or more): No  Rheumatoid Arthritis on Problem List:  No  Secondary Osteoporosis on Problem List:  No  Consumes 3 or more units of alcohol per day: No  Femoral Neck BMD (g/cm2)            Reviewed and updated as needed this visit by Provider   Tobacco  Allergies  Meds  Problems  Med Hx  Surg Hx  Fam Hx  Soc   Hx Sexual Activity          Past Medical History:   Diagnosis Date    Atherosclerosis of native coronary artery of native heart with stable angina pectoris 2022    Atrial flutter, unspecified type (H) 2022    Calculus of kidney     CKD (chronic kidney disease) stage 2, GFR 60-89 ml/min 10/30/2015    Degeneration of lumbar or lumbosacral intervertebral disc     Depressive disorder     Diabetes (H)     Diabetic eye exam (H) 2014    Hypertension     Obesity, Class I, BMI 30-34.9 10/30/2015    HILARIO (obstructive sleep apnea)     Primary osteoarthritis of left knee     Prostate cancer (H) 2021    Thyroid disease 2010    Uncomplicated asthma      Past Surgical History:   Procedure Laterality Date     ARTHROPLASTY KNEE Left 02/04/2020    Procedure: left total knee replacement;  Surgeon: Jason Berman DO;  [facility]    ARTHROPLASTY KNEE Right 01/18/2021    Procedure: right total knee replacement;  Surgeon: Jason Berman DO;  [facility]    BIOPSY      BYPASS GRAFT ARTERY CORONARY N/A 04/08/2022    Procedure: CORONARY ARTERY BYPASS GRAFT x 4 (LIMA - LAD; SV - OM; SV - PDA; SV - DIAGONAL) WITH ENDOSCOPIC SAPHENOUS VEIN HARVEST ON BILATERAL LOWER EXTREMITY, AND ON CARDIOPULMONARY PUMP OXYGENATOR  (INTRAOPERATIVE TRANSESOPHAGEAL ECHOCARDIOGRAM BY ANESTHESIOLOGIST);  Surgeon: Karson Bae MD;  Location:  OR    COLONOSCOPY  02/02/2009    Repeat in 5 yrs    COLONOSCOPY N/A 09/05/2014    Procedure: COMBINED COLONOSCOPY, SINGLE BIOPSY/POLYPECTOMY BY BIOPSY;  Surgeon: Denis Oakes MD;  Location:  GI    COLONOSCOPY N/A 7/17/2024    Procedure: Colonoscopy;  Surgeon: Alexei Morales MD;  Location:  GI    CV CORONARY ANGIOGRAM N/A 03/28/2022    Procedure: Coronary Angiogram;  Surgeon: Lindy Farooq MD;  Location:  HEART CARDIAC CATH LAB    CV LEFT HEART CATH N/A 03/28/2022    Procedure: Left Heart Catheterization;  Surgeon: Lindy Farooq MD;  Location:  HEART CARDIAC CATH LAB    ESOPHAGOSCOPY, GASTROSCOPY, DUODENOSCOPY (EGD), COMBINED N/A 02/20/2015    Procedure: COMBINED ESOPHAGOSCOPY, GASTROSCOPY, DUODENOSCOPY (EGD), BIOPSY SINGLE OR MULTIPLE;  Surgeon: Alfred Young MD;  Location:  GI    HC REMV CATARACT EXTRACAP,INSERT LENS, W/O ECP  2000    Bilateral    HC REVISE MEDIAN N/CARPAL TUNNEL SURG  1991    HC TOOTH EXTRACTION W/FORCEP  1980's    [place] teeth removed    RELEASE CARPAL TUNNEL Right 06/16/2017    Procedure: RELEASE CARPAL TUNNEL;  Right carpal tunnel release;  Surgeon: Jason Berman DO;  [facility]    RELEASE CARPAL TUNNEL Left 07/11/2017    Procedure: RELEASE CARPAL TUNNEL;  Left carpal tunnel release;  Surgeon: Cullen  Jason Ruano DO;  Location: [facility]    SOFT TISSUE SURGERY       BP Readings from Last 3 Encounters:   05/21/25 130/84   04/14/25 114/74   04/14/25 114/74    Wt Readings from Last 3 Encounters:   05/21/25 110.5 kg (243 lb 9.6 oz)   04/14/25 110.7 kg (244 lb)   04/14/25 110.8 kg (244 lb 3.2 oz)                  Patient Active Problem List   Diagnosis    Gastroesophageal reflux disease without esophagitis    Degeneration of lumbar or lumbosacral intervertebral disc    Chronic rhinitis    HILARIO (obstructive sleep apnea)    Tinnitus    Hypothyroidism, unspecified type    Type 2 diabetes mellitus with stage 2 chronic kidney disease (H)    Obesity, Class I, BMI 30-34.9    Recurrent major depression in complete remission    Microalbuminuria    S/P total knee replacement using cement, left    Primary osteoarthritis of right knee    S/P total knee replacement using cement, right    Status post total right knee replacement    Prostate cancer (H)    Essential hypertension    Status post coronary angiogram    Atherosclerosis of native coronary artery of native heart with stable angina pectoris    Anemia in other chronic diseases classified elsewhere    Hyperlipidemia LDL goal <70    S/P CABG x 4    Chronic bilateral low back pain without sciatica    Class 2 severe obesity with body mass index (BMI) of 35 to 39.9 with serious comorbidity (H)     Past Surgical History:   Procedure Laterality Date    ARTHROPLASTY KNEE Left 02/04/2020    Procedure: left total knee replacement;  Surgeon: Jason Berman DO;  [facility]    ARTHROPLASTY KNEE Right 01/18/2021    Procedure: right total knee replacement;  Surgeon: Jason Berman DO;  [facility]    BIOPSY      BYPASS GRAFT ARTERY CORONARY N/A 04/08/2022    Procedure: CORONARY ARTERY BYPASS GRAFT x 4 (LIMA - LAD; SV - OM; SV - PDA; SV - DIAGONAL) WITH ENDOSCOPIC SAPHENOUS VEIN HARVEST ON BILATERAL LOWER EXTREMITY, AND ON CARDIOPULMONARY PUMP OXYGENATOR   (INTRAOPERATIVE TRANSESOPHAGEAL ECHOCARDIOGRAM BY ANESTHESIOLOGIST);  Surgeon: Karson Bae MD;  Location:  OR    COLONOSCOPY  02/02/2009    Repeat in 5 yrs    COLONOSCOPY N/A 09/05/2014    Procedure: COMBINED COLONOSCOPY, SINGLE BIOPSY/POLYPECTOMY BY BIOPSY;  Surgeon: Denis Oakes MD;  [facility]    COLONOSCOPY N/A 7/17/2024    Procedure: Colonoscopy;  Surgeon: Alexei Morales MD;  [facility]    CV CORONARY ANGIOGRAM N/A 03/28/2022    Procedure: Coronary Angiogram;  Surgeon: Lindy Farooq MD;  Location:  HEART CARDIAC CATH LAB    CV LEFT HEART CATH N/A 03/28/2022    Procedure: Left Heart Catheterization;  Surgeon: Lindy Farooq MD;  Location:  HEART CARDIAC CATH LAB    ESOPHAGOSCOPY, GASTROSCOPY, DUODENOSCOPY (EGD), COMBINED N/A 02/20/2015    Procedure: COMBINED ESOPHAGOSCOPY, GASTROSCOPY, DUODENOSCOPY (EGD), BIOPSY SINGLE OR MULTIPLE;  Surgeon: Alfred Young MD;  Location:  GI    HC REMV CATARACT EXTRACAP,INSERT LENS, W/O ECP  2000    Bilateral    HC REVISE MEDIAN N/CARPAL TUNNEL SURG  1991    HC TOOTH EXTRACTION W/FORCEP  1980's    [place] teeth removed    RELEASE CARPAL TUNNEL Right 06/16/2017    Procedure: RELEASE CARPAL TUNNEL;  Right carpal tunnel release;  Surgeon: Jason Berman DO;  [facility]    RELEASE CARPAL TUNNEL Left 07/11/2017    Procedure: RELEASE CARPAL TUNNEL;  Left carpal tunnel release;  Surgeon: Jason Berman DO;  Location:  OR    SOFT TISSUE SURGERY         Social History     Tobacco Use    Smoking status: Never     Passive exposure: Past (per pt)    Smokeless tobacco: Never   Substance Use Topics    Alcohol use: No     Family History   Problem Relation Age of Onset    Cerebrovascular Disease Mother     Hypertension Mother     Hypertension Father     Diabetes Father         Adult    Heart Disease Father         Hx: Bypass    Cancer Sister         Liver    No Known Problems Sister     Hypertension Brother      Thyroid Disease Brother     Unknown/Adopted Maternal Grandmother     Cerebrovascular Disease Maternal Grandmother     No Known Problems Maternal Grandfather     No Known Problems Paternal Grandmother     No Known Problems Paternal Grandfather     No Known Problems Son          Current Outpatient Medications   Medication Sig Dispense Refill    ACCU-CHEK GUIDE test strip USE TO TEST BLOOD SUGAR 1 TIME DAILY AS DIRECTED. 100 strip 1    alcohol swab prep pads Use to swab area of injection/pankaj as directed. 100 each 3    Alcohol Swabs PADS Use to swab the area of the injection or pankaj as directed Per insurance coverage 100 each 0    aspirin (ASA) 81 MG EC tablet Take 1 tablet (81 mg) by mouth daily      blood glucose (HUGO CONTOUR) test strip Use to test blood sugars 1 time daily or as directed. 100 strip 0    blood glucose (NO BRAND SPECIFIED) lancets standard Use to test blood sugar one time daily or as directed. 100 each 3    blood glucose (NO BRAND SPECIFIED) lancets standard To use to test glucose level in the blood Use to test blood sugar 1 times daily as directed. To accompany glucose monitor brands per insurance coverage. 100 each 0    blood glucose (NO BRAND SPECIFIED) test strip To use to test glucose level in the blood Use to test blood sugar 1 times daily as directed. To accompany glucose monitor brands per insurance coverage. 100 strip 0    blood glucose calibration (HUGO CONTOUR) NORMAL solution Use to calibrate blood glucose monitor as directed. 1 each 3    blood glucose monitoring (NO BRAND SPECIFIED) meter device kit Use as directed Per insurance coverage 1 kit 0    blood glucose monitoring (NO BRAND SPECIFIED) meter device kit Use to test blood sugar 1 times daily or as directed. Preferred blood glucose meter OR supplies to accompany: Blood Glucose Monitor Brands: per insurance. 1 kit 0    buPROPion (WELLBUTRIN XL) 300 MG 24 hr tablet TAKE 1 TABLET (300 MG) BY MOUTH EVERY MORNING 90 tablet 3     Coenzyme Q-10 capsule Take 1 capsule by mouth every morning 30 capsule 12    cyclobenzaprine (FLEXERIL) 10 MG tablet Take 1 tablet (10 mg) by mouth 2 times daily as needed for muscle spasms. 180 tablet 3    empagliflozin (JARDIANCE) 10 MG TABS tablet Take 1 tablet (10 mg) by mouth daily. 90 tablet 1    ezetimibe (ZETIA) 10 MG tablet TAKE ONE TABLET BY MOUTH ONCE DAILY 90 tablet 0    gabapentin (NEURONTIN) 100 MG capsule TAKE ONE CAPSULE BY MOUTH THREE TIMES A DAY 90 capsule 0    HYDROcodone-acetaminophen (NORCO) 5-325 MG tablet Take 1 tablet by mouth daily as needed for pain. 20 tablet 0    levothyroxine (SYNTHROID/LEVOTHROID) 137 MCG tablet TAKE ONE TABLET BY MOUTH ONCE DAILY 90 tablet 0    losartan (COZAAR) 50 MG tablet TAKE ONE AND ONE-HALF TABLETS (75MG) BY MOUTH EVERY  tablet 1    magnesium oxide (MAG-OX) 400 MG tablet Take 400 mg by mouth every evening      metFORMIN (GLUCOPHAGE) 1000 MG tablet Take 1 tablet (1,000 mg) by mouth 2 times daily (with meals). 180 tablet 1    metoprolol tartrate (LOPRESSOR) 25 MG tablet TAKE ONE AND ONE HALF TABLETS (37.5 MG) BY MOUTH 2 TIMES DAILY 270 tablet 3    Misc Natural Products (OSTEO BI-FLEX ADV JOINT SHIELD) TABS Take 1 tablet by mouth daily       Multiple Vitamins-Minerals (PRESERVISION AREDS PO) Take 1 tablet by mouth 2 times daily      nitroGLYcerin (NITROSTAT) 0.4 MG sublingual tablet For chest pain place 1 tablet under the tongue every 5 minutes for 3 doses. If symptoms persist 5 minutes after 1st dose call 911. 25 tablet 11    ONETOUCH ULTRA test strip USE TO TEST BLOOD SUGARS 1 TIME DAILY OR AS DIRECTED. 100 strip 1    pantoprazole (PROTONIX) 40 MG EC tablet Take 1 tablet (40 mg) by mouth daily 90 tablet 3    Probiotic Product (PROBIOTIC DAILY) CAPS Take 1 capsule by mouth daily      rosuvastatin (CRESTOR) 10 MG tablet Take 1 tablet (10 mg) by mouth daily. 90 tablet 1    semaglutide (OZEMPIC) 2 MG/3ML pen Inject 0.25 mg subcutaneously every 7 days. 3 mL 0     Sharps Container MISC Use as directed to dispose of needles, lancets and other sharps Per Insurance coverage 1 each 0    tamsulosin (FLOMAX) 0.4 MG capsule Take 1 capsule (0.4 mg) by mouth daily. 90 capsule 3     Allergies   Allergen Reactions    Dust Mites Cough    Other Environmental Allergy      Allergic to sunlight ever since 20s.     Statins      Body aches    Penicillins Hives and Rash     Recent Labs   Lab Test 05/21/25  1420 01/29/25  0805 01/27/25  0829 10/23/24  0736 06/27/24  1106 05/14/24  0900 02/13/24  0739 12/01/23  0904 11/03/23  0913 10/06/23  0910 02/26/21  1107 01/06/21  1553 11/04/20  0953   A1C  --  6.2*  --  5.8*  --  6.4*  --   --  6.1*  --    < >  --  5.7*   LDL  --   --   --  65  --   --  19  --   --  116*   < >  --  90   HDL  --   --   --  29*  --   --  39*  --   --  31*   < >  --  34*   TRIG  --   --   --  191*  --   --  215*  --   --  275*   < >  --  252*   ALT  --   --   --  25  --   --   --  20 25  --    < >  --  72*   CR 1.24*  --  1.19* 1.29*  --   --   --  1.19* 1.16  --    < > 1.21 1.12   GFRESTIMATED 63  --  67 60*  --   --   --  67 69  --    < > 63 69   GFRESTBLACK  --   --   --   --   --   --   --   --   --   --   --  73 80   POTASSIUM 4.4  --  4.6 4.6  --   --   --  4.8 4.9  --    < > 4.1 4.6   TSH 1.52  --  1.60  --    < >  --   --  4.71*  --   --    < >  --  6.02*    < > = values in this interval not displayed.      Current providers sharing in care for this patient include:  Patient Care Team:  Nishi Lin MD as PCP - General (Family Medicine)  Nishi Lin MD as Assigned PCP  Jonah Adams [facility] as Assigned MTM Pharmacist  Chloe Merchant RN as Specialty Care Coordinator (Hematology & Oncology)  Alexei Ott MD as MD (Urology)  Hari Peterson MD as Assigned Heart and Vascular Surgical Provider  Alexei Ott MD as Assigned Surgical Provider  Fawn Marcus PA-C as Assigned Cancer Care Provider  Eugene Rodriguez MD as Assigned Heart and  "Vascular Provider    The following health maintenance items are reviewed in Epic and correct as of today:  Health Maintenance   Topic Date Due    EYE EXAM  12/14/2024    A1C  07/29/2025    INFLUENZA VACCINE (Season Ended) 09/01/2025    LIPID  10/23/2025    COVID-19 Vaccine (8 - Moderna risk 2024-25 season) 11/21/2025    DTAP/TDAP/TD IMMUNIZATION (2 - Td or Tdap) 04/12/2026    MICROALBUMIN  04/29/2026    MEDICARE ANNUAL WELLNESS VISIT  05/21/2026    BMP  05/21/2026    TSH W/FREE T4 REFLEX  05/21/2026    DIABETIC FOOT EXAM  05/21/2026    ANNUAL REVIEW OF HM ORDERS  05/21/2026    FALL RISK ASSESSMENT  05/21/2026    PHQ-9  05/21/2026    ADVANCE CARE PLANNING  05/21/2030    COLORECTAL CANCER SCREENING  07/17/2031    HEPATITIS C SCREENING  Completed    DEPRESSION ACTION PLAN  Completed    Pneumococcal Vaccine: 50+ Years  Completed    URINALYSIS  Completed    ZOSTER IMMUNIZATION  Completed    RSV VACCINE  Completed    HPV IMMUNIZATION  Aged Out    MENINGITIS IMMUNIZATION  Aged Out         Review of Systems  Constitutional, HEENT, cardiovascular, pulmonary, GI, , musculoskeletal, neuro, skin, endocrine and psych systems are negative, except as otherwise noted.     Objective    Exam  /84   Pulse 65   Temp 98.2  F (36.8  C) (Temporal)   Resp 20   Ht 1.765 m (5' 9.49\")   Wt 110.5 kg (243 lb 9.6 oz)   SpO2 100%   BMI 35.47 kg/m     Estimated body mass index is 35.47 kg/m  as calculated from the following:    Height as of this encounter: 1.765 m (5' 9.49\").    Weight as of this encounter: 110.5 kg (243 lb 9.6 oz).    Physical Exam  GENERAL: healthy, alert and no distress, speaking in full sentences.  EYES: Eyes grossly normal to inspection, PERRL and conjunctivae and sclerae normal.  No nystagmus.  All 4 visual fields are intact  HENT: Ear canals and TM's normal.  Nares are non-congested. Oropharynx is pink and moist. No tender with palpation to the sinuses.  NECK: no adenopathy, supple, no lymphadenopathy or " thyromegaly.  No tender with palpation to the cervical spine or its paraspinous muscle.  JV distention or carotid bruits.  RESP: lungs clear to auscultation - no rales, rhonchi or wheezes  CV: regular rate and rhythm, no murmur.  ABDOMEN: soft, nondistended, nontender, no palpable masses or organomegaly with normal bowel sounds.  MS: No gross musculoskeletal defects noted, no edema.  Walk with no limping, normal gait.  All 4 extremities are equally in strength. Ankle, knees, hips, shoulders, elbows and wrists exams normal.  Normal fine motor skills on fingers.  Back is straight, no lordosis or scoliosis.  No tender with palpation to the spine.  SKIN: Above the waist skin exam, no suspicious lesions or rashes.  No ecchymosis or petechiae.  Declined a below the waist the skin exam  NEURO: Normal strength and tone, mentation intact and speech normal.  Cranial nerves II through XII intact, DTR +2 throughout, no focal neurological deficit.  PSYCH: mentation appears normal, affect normal/bright.  Thoughts intact, no hallucination.  No suicidal or homicidal ideation.  LYMPH: no cervical or supraclavicular adenopathy.   (male): Offered but declined.  He has no concern about it.  Foot: Decreased sensation on the microfilament exam on the posterior half of the feet bilaterally.  No calluses.  Nail care looks good.  No open wound.      Results for orders placed or performed in visit on 05/21/25   Basic metabolic panel  (Ca, Cl, CO2, Creat, Gluc, K, Na, BUN)     Status: Abnormal   Result Value Ref Range    Sodium 138 135 - 145 mmol/L    Potassium 4.4 3.4 - 5.3 mmol/L    Chloride 105 98 - 107 mmol/L    Carbon Dioxide (CO2) 21 (L) 22 - 29 mmol/L    Anion Gap 12 7 - 15 mmol/L    Urea Nitrogen 21.4 8.0 - 23.0 mg/dL    Creatinine 1.24 (H) 0.67 - 1.17 mg/dL    GFR Estimate 63 >60 mL/min/1.73m2    Calcium 9.7 8.8 - 10.4 mg/dL    Glucose 138 (H) 70 - 99 mg/dL   CBC with platelets     Status: Abnormal   Result Value Ref Range    WBC  Count 7.4 4.0 - 11.0 10e3/uL    RBC Count 4.35 (L) 4.40 - 5.90 10e6/uL    Hemoglobin 13.8 13.3 - 17.7 g/dL    Hematocrit 39.9 (L) 40.0 - 53.0 %    MCV 92 78 - 100 fL    MCH 31.7 26.5 - 33.0 pg    MCHC 34.6 31.5 - 36.5 g/dL    RDW 13.2 10.0 - 15.0 %    Platelet Count 189 150 - 450 10e3/uL   TSH with free T4 reflex     Status: Normal   Result Value Ref Range    TSH 1.52 0.30 - 4.20 uIU/mL                 5/21/2025   Mini Cog   Clock Draw Score 2 Normal   3 Item Recall 3 objects recalled   Mini Cog Total Score 5              Signed Electronically by: Nishi Hdz Mai, MD      Answers submitted by the patient for this visit:  Patient Health Questionnaire (Submitted on 5/21/2025)  If you checked off any problems, how difficult have these problems made it for you to do your work, take care of things at home, or get along with other people?: Not difficult at all  PHQ9 TOTAL SCORE: 7

## 2025-05-25 RX ORDER — ROSUVASTATIN CALCIUM 20 MG/1
20 TABLET, COATED ORAL DAILY
Qty: 90 TABLET | Refills: 1 | Status: SHIPPED | OUTPATIENT
Start: 2025-05-25

## 2025-05-27 ENCOUNTER — RESULTS FOLLOW-UP (OUTPATIENT)
Dept: ONCOLOGY | Facility: CLINIC | Age: 70
End: 2025-05-27

## 2025-05-27 ENCOUNTER — MYC MEDICAL ADVICE (OUTPATIENT)
Dept: FAMILY MEDICINE | Facility: CLINIC | Age: 70
End: 2025-05-27

## 2025-05-27 ENCOUNTER — LAB (OUTPATIENT)
Dept: LAB | Facility: CLINIC | Age: 70
End: 2025-05-27
Payer: MEDICARE

## 2025-05-27 DIAGNOSIS — C61 HORMONE SENSITIVE PROSTATE CANCER (H): ICD-10-CM

## 2025-05-27 DIAGNOSIS — Z19.1 HORMONE SENSITIVE PROSTATE CANCER (H): ICD-10-CM

## 2025-05-27 LAB — PSA SERPL DL<=0.01 NG/ML-MCNC: 0.02 NG/ML (ref 0–4.5)

## 2025-05-27 PROCEDURE — 36415 COLL VENOUS BLD VENIPUNCTURE: CPT

## 2025-05-27 PROCEDURE — 84153 ASSAY OF PSA TOTAL: CPT

## 2025-05-27 PROCEDURE — 84403 ASSAY OF TOTAL TESTOSTERONE: CPT

## 2025-05-27 NOTE — LETTER
May 29, 2025      Joel Pike  [id number] [facility] [id number]          Joel, we are writing to inform you that   increased your Crestor dose from 20 mg to 20 mg.  The new prescription was given.  You may take 2 tablets of what you have until it is gone and then with a new prescription, take 1 tablet daily.      If you have any questions/concerns please reach out to the care team     Thank you  DAMON Campos

## 2025-05-29 ENCOUNTER — TELEPHONE (OUTPATIENT)
Dept: FAMILY MEDICINE | Facility: CLINIC | Age: 70
End: 2025-05-29
Payer: MEDICARE

## 2025-05-29 DIAGNOSIS — E11.22 TYPE 2 DIABETES MELLITUS WITH STAGE 2 CHRONIC KIDNEY DISEASE, WITHOUT LONG-TERM CURRENT USE OF INSULIN (H): ICD-10-CM

## 2025-05-29 DIAGNOSIS — N18.2 TYPE 2 DIABETES MELLITUS WITH STAGE 2 CHRONIC KIDNEY DISEASE, WITHOUT LONG-TERM CURRENT USE OF INSULIN (H): ICD-10-CM

## 2025-05-29 DIAGNOSIS — E78.5 HYPERLIPIDEMIA LDL GOAL <100: ICD-10-CM

## 2025-05-29 LAB — TESTOST SERPL-MCNC: 171 NG/DL (ref 240–950)

## 2025-05-29 NOTE — TELEPHONE ENCOUNTER
eJremi, mistyped.  Should increase from 10 mg to 20 mg.  The new prescription was sent to the pharmacy last week.

## 2025-05-29 NOTE — TELEPHONE ENCOUNTER
Patient notified via phone.      please clarify what dose you want patient taking? Patient is planning to  Crestor 20 mg from pharmacy today     John Raman, VF

## 2025-05-29 NOTE — TELEPHONE ENCOUNTER
Nishi Lin MD  [facility]  Please let patient know that I increased his Crestor dose from 20 mg to 20 mg.  The new prescription was given.  He may take 2 tablets of what he has until it is gone and then with a new prescription, take 1 tablet daily.

## 2025-05-29 NOTE — TELEPHONE ENCOUNTER
LVM for patient to call back with any questions. And that medication is ready for  at pharmacy.    Shanna PAYNE, VF

## 2025-06-03 ENCOUNTER — VIRTUAL VISIT (OUTPATIENT)
Dept: ONCOLOGY | Facility: CLINIC | Age: 70
End: 2025-06-03
Payer: MEDICARE

## 2025-06-03 VITALS — WEIGHT: 229 LBS | BODY MASS INDEX: 32.78 KG/M2 | HEIGHT: 70 IN

## 2025-06-03 DIAGNOSIS — C61 HORMONE SENSITIVE PROSTATE CANCER (H): Primary | ICD-10-CM

## 2025-06-03 DIAGNOSIS — Z19.1 HORMONE SENSITIVE PROSTATE CANCER (H): Primary | ICD-10-CM

## 2025-06-03 DIAGNOSIS — C79.51 PROSTATE CANCER METASTATIC TO BONE (H): ICD-10-CM

## 2025-06-03 DIAGNOSIS — C61 PROSTATE CANCER METASTATIC TO BONE (H): ICD-10-CM

## 2025-06-03 PROCEDURE — G2211 COMPLEX E/M VISIT ADD ON: HCPCS | Performed by: STUDENT IN AN ORGANIZED HEALTH CARE EDUCATION/TRAINING PROGRAM

## 2025-06-03 PROCEDURE — 98005 SYNCH AUDIO-VIDEO EST LOW 20: CPT | Performed by: STUDENT IN AN ORGANIZED HEALTH CARE EDUCATION/TRAINING PROGRAM

## 2025-06-03 PROCEDURE — 1125F AMNT PAIN NOTED PAIN PRSNT: CPT | Performed by: STUDENT IN AN ORGANIZED HEALTH CARE EDUCATION/TRAINING PROGRAM

## 2025-06-03 ASSESSMENT — PAIN SCALES - GENERAL: PAINLEVEL_OUTOF10: MODERATE PAIN (5)

## 2025-06-03 NOTE — PATIENT INSTRUCTIONS
Joel,   Here is what we discussed today:     See Fawn Chau or Maria Esther in 3 months. See me in 6 months.   PSA and testosterone every 3 months the week prior to your appointment with us.

## 2025-06-03 NOTE — PROGRESS NOTES
Virtual Visit Details    Type of service:  Video Visit   Video Start Time: 7:44  Video End Time:7:54    Originating Location (pt. Location): Home  Distant Location (provider location):  On-site  Platform used for Video Visit: [facility] Medical Oncology Return Note       Date of visit: Juan 3, 2025    CC:   metastatic prostate cancer, now s/p 2.5 years of therapy with apalutamide/ADT, currently on surveillance    ONCOLOGY HISTORY:  Elevated PSA noted 2/26/21 at 12.70. Previously normal in 2017.    4/7/21-Initial urology visit.  7/2/21-prostate biopsy 4+3, 9/12 biopsies positive.  Ductal component noted.    7/28/21-MR prostate-PIRADS 5 lesion, R and L sided lesions with likely    neurovascular bundle invasion. No  seminal vesicle involvement. No clear LAD,  but some indeterminate pelvic nodes.  No suspicious bone lesions.    7/28/21-NM bone scan suspicious lesion of R sacral ala S3 level.   8/10/21-CT A/P with multiple enlarged periaortic/iliac LN  8/11/21-First eligard dose 45mg (Q6M dosing). Due next 2/2022.  8/30/21-Initial medical oncology visit with Dr. Solo.  Unclear if bony lesion is metastasis based on current imaging, but this was not irradiated. Start apalutamide/eligard  8/30/21 PSA =30.40 Pre Rx  9/27/21 PSA =8.99 (T=6); HgbA1C = 6.1  10/5/21 PSA =3.22  11/26/21 PSA =0.15  7/26/22 PSA <0.01  8/22  Referral to radiation oncology for EBRT given low volume prostate cancer.  55cGy  in 20 fractions completed on 10/5/22.   11/11/22 PSA= <0.01  02/2023 ELIGARD 45 MG with Dr. Ott of urology.    5/10/23 PSA= <0.01  8/18/23 PSA=0.12, unclear now whether this was a lab error given known inconsistencies with     Some recent PSA tests at [facility].   9/1/23  PSA= <0.01  9/6/23  Eligard 45mg. DUE 3/6/24.  PSA rechecked <0.01.  Lupron 6 month shot received  AM before appt.   10/6/23 PSA <0.01  11/3/23  PSA <0.01  12/4/23 PSA <0.01.  Stop Erleada and ADT at a little over 2 years due to likely false elevation  of PSA in August and monitor PSA Q3M.    03/05/23         PSA <0.01  6/17/24 PSA <0.01   9/16/24          PSA <0.01, T 32  11/25/24 PSA <0.01, T 94  02/24/25        PSA 0.01, T pending.     Interval History:  Joel is seen virtually today. He just started ozempic last week with his PCP and had some questions about the pen.  Energy is hit or miss.  Some days are better than others.  Appetite is good.  Hot flashes are improving overall in terms of duration and frequency, but not quite completely gone.  Has the typical aches and pains.  Glad to see his hemoglobin has gotten better.  Stopped at [employer] for now.  Looking at other part time jobs to keep himself busy.     SOCIAL:  Retired.      MEDS:  Current Outpatient Medications   Medication Instructions    albuterol (PROAIR HFA) 108 (90 BASE) MCG/ACT Inhaler 1-2 puffs every 2 -4 hrs as needed    ALLEGRA-D ALLERGY & CONGESTION 180-240 MG 24 hr tablet TAKE ONE TABLET BY MOUTH EVERY DAY    blood glucose (HUGO CONTOUR) test strip Use to test blood sugars 1 time daily or as directed.    blood glucose (NO BRAND SPECIFIED) lancets standard Use to test blood sugar one time daily or as directed.    blood glucose calibration (HUGO CONTOUR) NORMAL solution Use to calibrate blood glucose monitor as directed.    buPROPion (WELLBUTRIN XL) 300 MG 24 hr tablet TAKE ONE TABLET BY MOUTH EVERY MORNING    Coenzyme Q-10 capsule 1 capsule, DAILY    esomeprazole (NEXIUM) 40 MG DR capsule TAKE ONE CAPSULE BY MOUTH EVERY MORNING BEFORE BREAKFAST , 30-60 MINUTES BEFORE EATING    ezetimibe (ZETIA) 10 MG tablet TAKE ONE TABLET BY MOUTH ONCE DAILY    fish oil-omega-3 fatty acids 1000 MG capsule Take  by mouth. Take daily      levothyroxine (SYNTHROID/LEVOTHROID) 112 mcg, Oral, DAILY    losartan (COZAAR) 75 mg, Oral, DAILY    Magnesium 500 MG CAPS One twice a day    metFORMIN (GLUCOPHAGE) 500 MG tablet TAKE ONE TABLET BY MOUTH TWICE A DAY WITH MEALS    [facility] Natural Products (OSTEO BI-FLEX ADV  JOINT SHIELD) TABS Take  by mouth.    multivitamin (OCUVITE) TABS tablet 1 tablet, Oral, DAILY    STATIN NOT PRESCRIBED (INTENTIONAL) Please choose reason not prescribed, below    Vitamin D3 (CHOLECALCIFEROL) 5,000 Units, Oral     ROS-Remainder of 14 point ROS reviewed and negative except as in HPI.    PHYSICAL EXAM:  Video physical exam  General: Patient appears well in no acute distress.   Skin: No visualized rash or lesions on visualized skin  Eyes: EOMI, no erythema, sclera icterus or discharge noted  Resp: Appears to be breathing comfortably without accessory muscle usage, speaking in full sentences, no cough  MSK: Appears to have normal range of motion based on visualized movements  Neurologic: No apparent tremors, facial movements symmetric  Psych: affect bright, alert and oriented     LABS AND IMAGES:    Prostate Biopsy 7/2/21  FINAL DIAGNOSIS:   A: Prostate, left, biopsy   - Adenocarcinoma, Mary's grade 4/3 with ductal component involving 4 of    6 needle core biopsies and 25% of   submitted tissue     B: Prostate, right, biopsy   - Adenocarcinoma, Mary's grade 4/3 with ductal component involving 5 of    6 needle core biopsies and 25% of   submitted tissue     Labs:  PSA trend, see oncology history.      IMPRESSION AND PLAN:  Joel Pike is a 69 year old  M with PMH of DM2 on metformin, depression/anxiety (pt reports as bipolar d/o), HTN, HLD, and prostate cancer with charity disease only.  He was treated with EBRT to the prostate for low volume metastatic disease along with ADT and apalutamide.  He has responded well and has had an undetectable PSA since 7/2022.  He had a single PSA thus far of 0.12 in August 2023 that is likely a lab error rather than a true value. PSA was checked monthly  through December 2023 and then remained undetectable. Therefore, we stopped apalutamide and ADT in December 2023.     # hormone sensitive prostate cancer   -He started apalutamide on 10/5/21. He had CT CAP done  11/26/21 which showed significantly decreased retroperitoneal lymphadenopathy since the prior study dated 8/10/2021 indicates good response to treatment. No other evidence for lymphadenopathy or metastasis is identified. Specifically no evidence for left sacral metastasis is seen.   -Completed RT to the primary for oligometastatic disease based on the STAMPEDE study with Dr. Gomez ~09/2022  -Plan was to continue ADT for 2 years (through 8/2023).  We extended this for another 3 months due to a rise in PSA that ultimately appeared to be a lab abnormality.  Three subsequent PSAs were then negative.   - Discontinued apalutamide in December 2023.   - Caris whole genome/transcriptome sequencing of prostate biopsy performed 9/12/2023.   - We will monitor PSA every 3 months moving forward. No current plans to administer further ADT therapy or restart apalutamide at his current rate of PSA rise and level (which is barely detectable).      # normocytic anemia, resolved  He has mild normocytic anemia but no bleeding source.  He should not be anemic with ADT, so we did anemia workup, which was iron replete and normal vitamin B12.  Will planned to watch and monitor without additional interventions to see if Hgb rises with rise in testosterone, which indeed it did upon return of more physiologic levels of testosterone.     #Urinary frequency  - restart tamsulosin.  - referral to urology for additional management.       PLAN:   See Fawn Chau or Maria Esther in 3 months. See me in 6 months.   PSA and testosterone every 3 months the week prior to your appointment with us.     A total of 20 minutes spent on the date of the encounter doing chart review, review of test results, interpretation of tests, patient visit, and documentation.  The patient was given the opportunity to ask multiple questions today, all of which were answered to their satisfaction.    The longitudinal plan of care for hormone sensitive prostate cancer was addressed  during this visit. Due to the added complexity in care, I will continue to support Joel Pike in the subsequent management of this condition(s) and with the ongoing continuity of care of this condition(s).     Michael Nuno MD, PhD   of Medicine   Oncology/BMT/Cellular Therapies

## 2025-06-03 NOTE — NURSING NOTE
Patient reviewed medications and allergies in [facility] during e-check in and said everything looked correct.      Current patient location: [id number] [place] [id number]    Is the patient currently in the state of [place]? YES    Visit mode: VIDEO    If the visit is dropped, the patient can be reconnected by:VIDEO VISIT: Text to cell phone:   Telephone Information:   Mobile [phone]       Will anyone else be joining the visit? Michelle-Pt's Wife   (If patient encounters technical issues they should call [phone]    Are changes needed to the allergy or medication list? Pt declined med review and Pt stated no med changes    Are refills needed on medications prescribed by this physician? NO    Rooming Documentation:  Questionnaire(s) completed    Reason for visit: RECHFAITH GALANF

## 2025-06-03 NOTE — LETTER
6/3/2025      Joel Pike  [address]  [facility] [address]      Dear Colleague,    Thank you for referring your patient, Joel Pike, to the [facility]. Please see a copy of my visit note below.    Virtual Visit Details    Type of service:  Video Visit   Video Start Time: 7:44  Video End Time:7:54    Originating Location (pt. Location): Home  Distant Location (provider location):  On-site  Platform used for Video Visit: [facility] Medical Oncology Return Note       Date of visit: Juan 3, 2025    CC:   metastatic prostate cancer, now s/p 2.5 years of therapy with apalutamide/ADT, currently on surveillance    ONCOLOGY HISTORY:  Elevated PSA noted 2/26/21 at 12.70. Previously normal in 2017.    4/7/21-Initial urology visit.  7/2/21-prostate biopsy 4+3, 9/12 biopsies positive.  Ductal component noted.    7/28/21-MR prostate-PIRADS 5 lesion, R and L sided lesions with likely    neurovascular bundle invasion. No  seminal vesicle involvement. No clear LAD,  but some indeterminate pelvic nodes.  No suspicious bone lesions.    7/28/21-NM bone scan suspicious lesion of R sacral ala S3 level.   8/10/21-CT A/P with multiple enlarged periaortic/iliac LN  8/11/21-First eligard dose 45mg (Q6M dosing). Due next 2/2022.  8/30/21-Initial medical oncology visit with Dr. Solo.  Unclear if bony lesion is metastasis based on current imaging, but this was not irradiated. Start apalutamide/eligard  8/30/21 PSA =30.40 Pre Rx  9/27/21 PSA =8.99 (T=6); HgbA1C = 6.1  10/5/21 PSA =3.22  11/26/21 PSA =0.15  7/26/22 PSA <0.01  8/22  Referral to radiation oncology for EBRT given low volume prostate cancer.  55cGy  in 20 fractions completed on 10/5/22.   11/11/22 PSA= <0.01  02/2023 ELIGARD 45 MG with Dr. Ott of urology.    5/10/23 PSA= <0.01  8/18/23 PSA=0.12, unclear now whether this was a lab error given known inconsistencies with     Some recent PSA tests at [facility].   9/1/23  PSA=  <0.01  9/6/23  Eligard 45mg. DUE 3/6/24.  PSA rechecked <0.01.  Lupron 6 month shot received  AM before appt.   10/6/23 PSA <0.01  11/3/23  PSA <0.01  12/4/23 PSA <0.01.  Stop Erleada and ADT at a little over 2 years due to likely false elevation of PSA in August and monitor PSA Q3M.    03/05/23         PSA <0.01  6/17/24 PSA <0.01   9/16/24          PSA <0.01, T 32  11/25/24 PSA <0.01, T 94  02/24/25        PSA 0.01, T pending.     Interval History:  Joel is seen virtually today. He just started ozempic last week with his PCP and had some questions about the pen.  Energy is hit or miss.  Some days are better than others.  Appetite is good.  Hot flashes are improving overall in terms of duration and frequency, but not quite completely gone.  Has the typical aches and pains.  Glad to see his hemoglobin has gotten better.  Stopped at [employer] for now.  Looking at other part time jobs to keep himself busy.     SOCIAL:  Retired.      MEDS:  Current Outpatient Medications   Medication Instructions     albuterol (PROAIR HFA) 108 (90 BASE) MCG/ACT Inhaler 1-2 puffs every 2 -4 hrs as needed     ALLEGRA-D ALLERGY & CONGESTION 180-240 MG 24 hr tablet TAKE ONE TABLET BY MOUTH EVERY DAY     blood glucose (HUGO CONTOUR) test strip Use to test blood sugars 1 time daily or as directed.     blood glucose (NO BRAND SPECIFIED) lancets standard Use to test blood sugar one time daily or as directed.     blood glucose calibration (HUGO CONTOUR) NORMAL solution Use to calibrate blood glucose monitor as directed.     buPROPion (WELLBUTRIN XL) 300 MG 24 hr tablet TAKE ONE TABLET BY MOUTH EVERY MORNING     Coenzyme Q-10 capsule 1 capsule, DAILY     esomeprazole (NEXIUM) 40 MG DR capsule TAKE ONE CAPSULE BY MOUTH EVERY MORNING BEFORE BREAKFAST , 30-60 MINUTES BEFORE EATING     ezetimibe (ZETIA) 10 MG tablet TAKE ONE TABLET BY MOUTH ONCE DAILY     fish oil-omega-3 fatty acids 1000 MG capsule Take  by mouth. Take daily       levothyroxine  (SYNTHROID/LEVOTHROID) 112 mcg, Oral, DAILY     losartan (COZAAR) 75 mg, Oral, DAILY     Magnesium 500 MG CAPS One twice a day     metFORMIN (GLUCOPHAGE) 500 MG tablet TAKE ONE TABLET BY MOUTH TWICE A DAY WITH MEALS     Misc Natural Products (OSTEO BI-FLEX ADV JOINT SHIELD) TABS Take  by mouth.     multivitamin (OCUVITE) TABS tablet 1 tablet, Oral, DAILY     STATIN NOT PRESCRIBED (INTENTIONAL) Please choose reason not prescribed, below     Vitamin D3 (CHOLECALCIFEROL) 5,000 Units, Oral     ROS-Remainder of 14 point ROS reviewed and negative except as in HPI.    PHYSICAL EXAM:  Video physical exam  General: Patient appears well in no acute distress.   Skin: No visualized rash or lesions on visualized skin  Eyes: EOMI, no erythema, sclera icterus or discharge noted  Resp: Appears to be breathing comfortably without accessory muscle usage, speaking in full sentences, no cough  MSK: Appears to have normal range of motion based on visualized movements  Neurologic: No apparent tremors, facial movements symmetric  Psych: affect bright, alert and oriented     LABS AND IMAGES:    Prostate Biopsy 7/2/21  FINAL DIAGNOSIS:   A: Prostate, left, biopsy   - Adenocarcinoma, Mary's grade 4/3 with ductal component involving 4 of    6 needle core biopsies and 25% of   submitted tissue     B: Prostate, right, biopsy   - Adenocarcinoma, [place]'s grade 4/3 with ductal component involving 5 of    6 needle core biopsies and 25% of   submitted tissue     Labs:  PSA trend, see oncology history.      IMPRESSION AND PLAN:  Joel Pike is a 69 year old  M with PMH of DM2 on metformin, depression/anxiety (pt reports as bipolar d/o), HTN, HLD, and prostate cancer with charity disease only.  He was treated with EBRT to the prostate for low volume metastatic disease along with ADT and apalutamide.  He has responded well and has had an undetectable PSA since 7/2022.  He had a single PSA thus far of 0.12 in August 2023 that is likely a lab error  rather than a true value. PSA was checked monthly  through December 2023 and then remained undetectable. Therefore, we stopped apalutamide and ADT in December 2023.     # hormone sensitive prostate cancer   -He started apalutamide on 10/5/21. He had CT CAP done 11/26/21 which showed significantly decreased retroperitoneal lymphadenopathy since the prior study dated 8/10/2021 indicates good response to treatment. No other evidence for lymphadenopathy or metastasis is identified. Specifically no evidence for left sacral metastasis is seen.   -Completed RT to the primary for oligometastatic disease based on the STAMPEDE study with Dr. Gomez ~09/2022  -Plan was to continue ADT for 2 years (through 8/2023).  We extended this for another 3 months due to a rise in PSA that ultimately appeared to be a lab abnormality.  Three subsequent PSAs were then negative.   - Discontinued apalutamide in December 2023.   - Caris whole genome/transcriptome sequencing of prostate biopsy performed 9/12/2023.   - We will monitor PSA every 3 months moving forward. No current plans to administer further ADT therapy or restart apalutamide at his current rate of PSA rise and level (which is barely detectable).      # normocytic anemia, resolved  He has mild normocytic anemia but no bleeding source.  He should not be anemic with ADT, so we did anemia workup, which was iron replete and normal vitamin B12.  Will planned to watch and monitor without additional interventions to see if Hgb rises with rise in testosterone, which indeed it did upon return of more physiologic levels of testosterone.     #Urinary frequency  - restart tamsulosin.  - referral to urology for additional management.       PLAN:   See Fawn Chau or Maria Esther in 3 months. See me in 6 months.   PSA and testosterone every 3 months the week prior to your appointment with us.     A total of 20 minutes spent on the date of the encounter doing chart review, review of test results,  interpretation of tests, patient visit, and documentation.  The patient was given the opportunity to ask multiple questions today, all of which were answered to their satisfaction.    The longitudinal plan of care for hormone sensitive prostate cancer was addressed during this visit. Due to the added complexity in care, I will continue to support Joel Pike in the subsequent management of this condition(s) and with the ongoing continuity of care of this condition(s).     Michael Nuno MD, PhD   of Medicine   Oncology/BMT/Cellular Therapies          Again, thank you for allowing me to participate in the care of your patient.        Sincerely,        Michael Nuno MD    Electronically signed

## 2025-06-08 DIAGNOSIS — I25.118 ATHEROSCLEROSIS OF NATIVE CORONARY ARTERY OF NATIVE HEART WITH STABLE ANGINA PECTORIS: ICD-10-CM

## 2025-06-08 DIAGNOSIS — I10 ESSENTIAL HYPERTENSION: ICD-10-CM

## 2025-06-09 RX ORDER — METOPROLOL TARTRATE 25 MG/1
TABLET, FILM COATED ORAL
Qty: 270 TABLET | Refills: 3 | Status: SHIPPED | OUTPATIENT
Start: 2025-06-09

## 2025-06-10 ENCOUNTER — VIRTUAL VISIT (OUTPATIENT)
Dept: RADIATION THERAPY | Facility: OUTPATIENT CENTER | Age: 70
End: 2025-06-10
Payer: MEDICARE

## 2025-06-10 DIAGNOSIS — C61 PROSTATE CANCER (H): Primary | ICD-10-CM

## 2025-06-10 NOTE — PROGRESS NOTES
Department of Radiation Oncology  Radiation Therapy Center  [facility]  [place] [address]  [phone]       Radiation Oncology Follow-up Visit  6/10/25    Joel Pike  MRN: [id number]   : 1955     Telephone visit pt in [place], provider in clinic    Radiation Oncologist: Isaías Gomez MD  Medical Oncologist: Michael Nuno MD  Provider: MALLY Guerrero  LCV: 12/10/2024     DIAGNOSIS: Metastatic prostate cancer  PATHOLOGY: Prostate adenocarcinoma, Mary score 4+3= 7                                  STAGE: [id number] (para-aortic nodes, ? R sacral osseous lesion).   CONCURRENT SYSTEMIC THERAPY:   Yes, oral Xtandi     ONCOLOGIC HISTORY:          Mr Pike is a 69 year old male patient who was noted to have an elevated PSA on 2021 with a value of 12.7. Repeat PSA on 2021 was 30.4.  Prostate biopsy on 2021 demonstrated prostate adenocarcinoma, Howe score 4+3 equal 7.  MRI of the prostate on 2021 demonstrated PI-RADS 5 lesion with likely extracapsular extension.  There were indeterminate pelvic lymph nodes at the time. Bone scan 2021 demonstrated a indeterminate lesion of the right sacral ala at the level of S3.  CT scan of the abdomen pelvis on 8/10/2021 demonstrated multiple large para-aortic and pelvic lymph nodes.  Patient was seen by medical oncologist Dr. Solo.  The patient was started on systemic treatment with Eligard and enzalutamide.  PSA has demonstrated biochemical response. PSA on 8/15/2022 remained undetectable. He was referred to our clinic to discuss next steps in management and discussion of possible local treatment with radiation therapy. He completed radiation therapy on 10/5/22. He follows with Dr Nuno in Medical Oncology     SITE OF TREATMENT: Prostate     DATES  OF TREATMENT: 22 to 10/5/22     TOTAL DOSE OF TREATMENT: 5,500 cGy     DOSE PER FRACTION OF TREATMENT: 275 cGy x 20 fractions    Interval Since Completion of  Radiation Therapy: 2 years 8 mo       SUBJECTIVE:   Hot flashes a few per week mostly during the day. A few night sweats but manageable. Energy is lower than prior to RT. Going to the gym 3 to 5 days/week.  Allergic to the sun. Has to wear full clothing and hat and facemask.  On flomax stream is good. No blood in urine or stool. Gets up 2 to 5 times per night to urinate. On Ozempic since last week,    Quadruple heart bypass in 2022. A fib on ASA.     PHYSICAL EXAM:  There were no vitals taken for this visit.     LABS AND IMAGIN25  0.02  25            <0.01  24            <0.01   5/10/23            <0.01  2022           <0.01  10/2022 Radiation Therapy  2022  Eligard 45 mg (6 mo)  21  Eligard 45 mg (6 mo)  22              0.04  21 0.15  10/5/21  3.22  21  8.99  21             30.40  21             12.70      IMPRESSION:   Mr. Pike is a 69 year old male with a metastatic prostate cancer [id number]. He had elevated PSA in 2021 with a value of 12.7.  Repeat PSA on 2021 was 30.4.  Prostate biopsy on 2021 demonstrated prostate adenocarcinoma, Mary score 4+3 equal 7.  MRI of the prostate on 2021 demonstrated PI-RADS 5 lesion with likely extracapsular extension.  There were noted to have indeterminate pelvic lymph nodes at the time.  Bone scan 2021 demonstrated a indeterminate lesion of the right sacral ala at the level of S3.  CT scan of the abdomen pelvis on 8/10/2021 demonstrated multiple large para-aortic and pelvic lymph nodes.  Patient was seen by medical oncology team (Dr. Solo).  The patient was started on systemic treatment with Eligard and enzalutamide.  PSA has demonstrated biochemical response. He completed radiation therapy to prostate in 2022 ago and returns for follow up today. PSA is undetectable. Follows with medical oncology. Completed apalutamide and  ADT 2023.    PLAN:   See me in 6 months.  "Continue to follow up with medical oncology, defer to them on ordering PSA.     MALLY Guerrero  Department of Radiation Oncology  [facility]     10 min on the phone with the patient. 9:00 to 9:10 am  \"Provider has audio-video available, but the patient preferred audio-only (or did not consent to video visit).\"        "

## 2025-06-10 NOTE — LETTER
6/10/2025      Joel Pike  [address]  [facility] [address]      Dear Colleague,    Thank you for referring your patient, Joel Pike, to the [facility] RAD[facility]ATION TH[facility]APY [facility]IC. Please see a copy of my visit note below.       Department of Radiation Oncology  Radiation Therapy Center  [facility]  [place] [address]  [phone]       Radiation Oncology Follow-up Visit  6/10/25    Joel Pike  MRN: [id number]   : 1955     [place] in [place], provider in clinic    Radiation Oncologist: Isaías Gomez MD  Medical Oncologist: Michael Nnuo MD  Provider: MALLY Guerrero  LCV: 12/10/2024     DIAGNOSIS: Metastatic prostate cancer  PATHOLOGY: Prostate adenocarcinoma, Mary score 4+3= 7                                  STAGE: [id number] (para-aortic nodes, ? R sacral osseous lesion).   CONCURRENT SYSTEMIC THERAPY:   Yes, oral Xtandi     ONCOLOGIC HISTORY:          Mr Pike is a 69 year old male patient who was noted to have an elevated PSA on 2021 with a value of 12.7. Repeat PSA on 2021 was 30.4.  Prostate biopsy on 2021 demonstrated prostate adenocarcinoma, [place] score 4+3 equal 7.  MRI of the prostate on 2021 demonstrated PI-RADS 5 lesion with likely extracapsular extension.  There were indeterminate pelvic lymph nodes at the time. Bone scan 2021 demonstrated a indeterminate lesion of the right sacral ala at the level of S3.  CT scan of the abdomen pelvis on 8/10/2021 demonstrated multiple large para-aortic and pelvic lymph nodes.  Patient was seen by medical oncologist Dr. Solo.  The patient was started on systemic treatment with Eligard and enzalutamide.  PSA has demonstrated biochemical response. PSA on 8/15/2022 remained undetectable. He was referred to our clinic to discuss next steps in management and discussion of possible local treatment with radiation therapy. He completed radiation therapy on 10/5/22. He follows with  Dr Nuno in Medical Oncology     SITE OF TREATMENT: Prostate     DATES  OF TREATMENT: 22 to 10/5/22     TOTAL DOSE OF TREATMENT: 5,500 cGy     DOSE PER FRACTION OF TREATMENT: 275 cGy x 20 fractions    Interval Since Completion of Radiation Therapy: 2 years 8 mo       SUBJECTIVE:   Hot flashes a few per week mostly during the day. A few night sweats but manageable. Energy is lower than prior to RT. Going to the gym 3 to 5 days/week.  Allergic to the sun. Has to wear full clothing and hat and facemask.  On flomax stream is good. No blood in urine or stool. Gets up 2 to 5 times per night to urinate. On Ozempic since last week,    Quadruple heart bypass in 2022. A fib on ASA.     PHYSICAL EXAM:  There were no vitals taken for this visit.     LABS AND IMAGIN25  0.02  25            <0.01  24            <0.01   5/10/23            <0.01  2022           <0.01  10/2022 Radiation Therapy  2022  Eligard 45 mg (6 mo)  21  Eligard 45 mg (6 mo)  22              0.04  21 0.15  10/5/21  3.22  21  8.99  21             30.40  21             12.70      IMPRESSION:   Mr. Pike is a 69 year old male with a metastatic prostate cancer [id number]. He had elevated PSA in 2021 with a value of 12.7.  Repeat PSA on 2021 was 30.4.  Prostate biopsy on 2021 demonstrated prostate adenocarcinoma, [place] score 4+3 equal 7.  MRI of the prostate on 2021 demonstrated PI-RADS 5 lesion with likely extracapsular extension.  There were noted to have indeterminate pelvic lymph nodes at the time.  Bone scan 2021 demonstrated a indeterminate lesion of the right sacral ala at the level of S3.  CT scan of the abdomen pelvis on 8/10/2021 demonstrated multiple large para-aortic and pelvic lymph nodes.  Patient was seen by medical oncology team (Dr. Solo).  The patient was started on systemic treatment with Eligard and enzalutamide.  PSA has demonstrated biochemical  "response. He completed radiation therapy to prostate in October 2022 ago and returns for follow up today. PSA is undetectable. Follows with medical oncology. Completed apalutamide and  ADT December 2023.    PLAN:   See me in 6 months. Continue to follow up with medical oncology, defer to them on ordering PSA.     MALLY Guerrero  Department of Radiation Oncology  [facility]     10 min on the phone with the patient. 9:00 to 9:10 am  \"Provider has audio-video available, but the patient preferred audio-only (or did not consent to video visit).\"          Again, thank you for allowing me to participate in the care of your patient.        Sincerely,        Trice Garcia PA-C    Electronically signed"

## 2025-06-29 DIAGNOSIS — K21.00 GASTROESOPHAGEAL REFLUX DISEASE WITH ESOPHAGITIS WITHOUT HEMORRHAGE: ICD-10-CM

## 2025-06-29 DIAGNOSIS — E03.9 HYPOTHYROIDISM, UNSPECIFIED TYPE: ICD-10-CM

## 2025-06-30 RX ORDER — LEVOTHYROXINE SODIUM 137 UG/1
137 TABLET ORAL DAILY
Qty: 90 TABLET | Refills: 3 | Status: SHIPPED | OUTPATIENT
Start: 2025-06-30

## 2025-06-30 RX ORDER — PANTOPRAZOLE SODIUM 40 MG/1
40 TABLET, DELAYED RELEASE ORAL DAILY
Qty: 90 TABLET | Refills: 3 | Status: SHIPPED | OUTPATIENT
Start: 2025-06-30

## 2025-07-06 DIAGNOSIS — E11.42 DIABETIC POLYNEUROPATHY ASSOCIATED WITH TYPE 2 DIABETES MELLITUS (H): ICD-10-CM

## 2025-07-06 DIAGNOSIS — E11.22 TYPE 2 DIABETES MELLITUS WITH STAGE 2 CHRONIC KIDNEY DISEASE, WITH LONG-TERM CURRENT USE OF INSULIN (H): ICD-10-CM

## 2025-07-06 DIAGNOSIS — Z79.4 TYPE 2 DIABETES MELLITUS WITH STAGE 2 CHRONIC KIDNEY DISEASE, WITH LONG-TERM CURRENT USE OF INSULIN (H): ICD-10-CM

## 2025-07-06 DIAGNOSIS — F33.42 RECURRENT MAJOR DEPRESSION IN COMPLETE REMISSION: ICD-10-CM

## 2025-07-06 DIAGNOSIS — N18.2 TYPE 2 DIABETES MELLITUS WITH STAGE 2 CHRONIC KIDNEY DISEASE, WITH LONG-TERM CURRENT USE OF INSULIN (H): ICD-10-CM

## 2025-07-07 RX ORDER — GABAPENTIN 100 MG/1
100 CAPSULE ORAL 3 TIMES DAILY
Qty: 90 CAPSULE | Refills: 0 | Status: SHIPPED | OUTPATIENT
Start: 2025-07-07

## 2025-07-07 RX ORDER — BUPROPION HYDROCHLORIDE 300 MG/1
300 TABLET ORAL EVERY MORNING
Qty: 90 TABLET | Refills: 3 | Status: SHIPPED | OUTPATIENT
Start: 2025-07-07

## 2025-07-30 DIAGNOSIS — E11.22 TYPE 2 DIABETES MELLITUS WITH STAGE 2 CHRONIC KIDNEY DISEASE, WITHOUT LONG-TERM CURRENT USE OF INSULIN (H): ICD-10-CM

## 2025-07-30 DIAGNOSIS — N18.2 TYPE 2 DIABETES MELLITUS WITH STAGE 2 CHRONIC KIDNEY DISEASE, WITHOUT LONG-TERM CURRENT USE OF INSULIN (H): ICD-10-CM

## 2025-07-30 RX ORDER — LOSARTAN POTASSIUM 50 MG/1
75 TABLET ORAL DAILY
Qty: 135 TABLET | Refills: 2 | Status: SHIPPED | OUTPATIENT
Start: 2025-07-30

## 2025-08-14 ENCOUNTER — PATIENT OUTREACH (OUTPATIENT)
Dept: ONCOLOGY | Facility: CLINIC | Age: 70
End: 2025-08-14
Payer: MEDICARE

## 2025-08-16 ENCOUNTER — HEALTH MAINTENANCE LETTER (OUTPATIENT)
Age: 70
End: 2025-08-16

## 2025-08-24 DIAGNOSIS — E11.42 DIABETIC POLYNEUROPATHY ASSOCIATED WITH TYPE 2 DIABETES MELLITUS (H): ICD-10-CM

## 2025-08-24 DIAGNOSIS — E78.5 HYPERLIPIDEMIA LDL GOAL <100: ICD-10-CM

## 2025-08-24 DIAGNOSIS — N18.2 TYPE 2 DIABETES MELLITUS WITH STAGE 2 CHRONIC KIDNEY DISEASE, WITH LONG-TERM CURRENT USE OF INSULIN (H): ICD-10-CM

## 2025-08-24 DIAGNOSIS — E11.22 TYPE 2 DIABETES MELLITUS WITH STAGE 2 CHRONIC KIDNEY DISEASE, WITHOUT LONG-TERM CURRENT USE OF INSULIN (H): ICD-10-CM

## 2025-08-24 DIAGNOSIS — E11.22 TYPE 2 DIABETES MELLITUS WITH STAGE 2 CHRONIC KIDNEY DISEASE, WITH LONG-TERM CURRENT USE OF INSULIN (H): ICD-10-CM

## 2025-08-24 DIAGNOSIS — Z79.4 TYPE 2 DIABETES MELLITUS WITH STAGE 2 CHRONIC KIDNEY DISEASE, WITH LONG-TERM CURRENT USE OF INSULIN (H): ICD-10-CM

## 2025-08-24 DIAGNOSIS — I25.118 ATHEROSCLEROSIS OF NATIVE CORONARY ARTERY OF NATIVE HEART WITH STABLE ANGINA PECTORIS: ICD-10-CM

## 2025-08-24 DIAGNOSIS — N18.2 TYPE 2 DIABETES MELLITUS WITH STAGE 2 CHRONIC KIDNEY DISEASE, WITHOUT LONG-TERM CURRENT USE OF INSULIN (H): ICD-10-CM

## 2025-08-25 RX ORDER — EZETIMIBE 10 MG/1
10 TABLET ORAL DAILY
Qty: 90 TABLET | Refills: 1 | Status: SHIPPED | OUTPATIENT
Start: 2025-08-25

## 2025-08-26 ENCOUNTER — LAB (OUTPATIENT)
Dept: LAB | Facility: CLINIC | Age: 70
End: 2025-08-26
Payer: MEDICARE

## 2025-08-26 DIAGNOSIS — E11.22 TYPE 2 DIABETES MELLITUS WITH STAGE 2 CHRONIC KIDNEY DISEASE (H): Primary | ICD-10-CM

## 2025-08-26 DIAGNOSIS — C79.51 PROSTATE CANCER METASTATIC TO BONE (H): ICD-10-CM

## 2025-08-26 DIAGNOSIS — C61 PROSTATE CANCER METASTATIC TO BONE (H): ICD-10-CM

## 2025-08-26 DIAGNOSIS — Z19.1 HORMONE SENSITIVE PROSTATE CANCER (H): ICD-10-CM

## 2025-08-26 DIAGNOSIS — N18.2 TYPE 2 DIABETES MELLITUS WITH STAGE 2 CHRONIC KIDNEY DISEASE (H): Primary | ICD-10-CM

## 2025-08-26 DIAGNOSIS — C61 HORMONE SENSITIVE PROSTATE CANCER (H): ICD-10-CM

## 2025-08-26 LAB
EST. AVERAGE GLUCOSE BLD GHB EST-MCNC: 134 MG/DL
HBA1C MFR BLD: 6.3 %
PSA SERPL DL<=0.01 NG/ML-MCNC: 0.04 NG/ML (ref 0–4.5)

## 2025-08-26 PROCEDURE — 36415 COLL VENOUS BLD VENIPUNCTURE: CPT

## 2025-08-26 PROCEDURE — 84153 ASSAY OF PSA TOTAL: CPT

## 2025-08-26 PROCEDURE — 84403 ASSAY OF TOTAL TESTOSTERONE: CPT

## 2025-08-26 PROCEDURE — 83036 HEMOGLOBIN GLYCOSYLATED A1C: CPT

## 2025-08-26 RX ORDER — GABAPENTIN 100 MG/1
100 CAPSULE ORAL 3 TIMES DAILY
Qty: 270 CAPSULE | Refills: 1 | Status: SHIPPED | OUTPATIENT
Start: 2025-08-26

## 2025-08-28 LAB — TESTOST SERPL-MCNC: 159 NG/DL (ref 240–950)

## 2025-09-02 ENCOUNTER — VIRTUAL VISIT (OUTPATIENT)
Dept: ONCOLOGY | Facility: CLINIC | Age: 70
End: 2025-09-02
Attending: STUDENT IN AN ORGANIZED HEALTH CARE EDUCATION/TRAINING PROGRAM
Payer: MEDICARE

## 2025-09-02 VITALS — BODY MASS INDEX: 32.64 KG/M2 | WEIGHT: 228 LBS | HEIGHT: 70 IN

## 2025-09-02 DIAGNOSIS — C61 HORMONE SENSITIVE PROSTATE CANCER (H): Primary | ICD-10-CM

## 2025-09-02 DIAGNOSIS — Z19.1 HORMONE SENSITIVE PROSTATE CANCER (H): Primary | ICD-10-CM

## 2025-09-02 PROCEDURE — 98007 SYNCH AUDIO-VIDEO EST HI 40: CPT

## 2025-09-02 PROCEDURE — G2211 COMPLEX E/M VISIT ADD ON: HCPCS | Mod: 95

## 2025-09-02 ASSESSMENT — PATIENT HEALTH QUESTIONNAIRE - PHQ9
10. IF YOU CHECKED OFF ANY PROBLEMS, HOW DIFFICULT HAVE THESE PROBLEMS MADE IT FOR YOU TO DO YOUR WORK, TAKE CARE OF THINGS AT HOME, OR GET ALONG WITH OTHER PEOPLE: NOT DIFFICULT AT ALL
SUM OF ALL RESPONSES TO PHQ QUESTIONS 1-9: 6
SUM OF ALL RESPONSES TO PHQ QUESTIONS 1-9: 6

## 2025-09-03 ENCOUNTER — OFFICE VISIT (OUTPATIENT)
Dept: FAMILY MEDICINE | Facility: CLINIC | Age: 70
End: 2025-09-03
Payer: MEDICARE

## 2025-09-03 VITALS
OXYGEN SATURATION: 98 % | HEART RATE: 62 BPM | BODY MASS INDEX: 34.57 KG/M2 | DIASTOLIC BLOOD PRESSURE: 62 MMHG | HEIGHT: 69 IN | RESPIRATION RATE: 16 BRPM | WEIGHT: 233.4 LBS | SYSTOLIC BLOOD PRESSURE: 112 MMHG | TEMPERATURE: 97.2 F

## 2025-09-03 DIAGNOSIS — N18.2 TYPE 2 DIABETES MELLITUS WITH STAGE 2 CHRONIC KIDNEY DISEASE, WITHOUT LONG-TERM CURRENT USE OF INSULIN (H): ICD-10-CM

## 2025-09-03 DIAGNOSIS — B37.42 CANDIDAL BALANITIS: Primary | ICD-10-CM

## 2025-09-03 DIAGNOSIS — E11.22 TYPE 2 DIABETES MELLITUS WITH STAGE 2 CHRONIC KIDNEY DISEASE, WITHOUT LONG-TERM CURRENT USE OF INSULIN (H): ICD-10-CM

## 2025-09-03 PROCEDURE — G2211 COMPLEX E/M VISIT ADD ON: HCPCS | Performed by: NURSE PRACTITIONER

## 2025-09-03 PROCEDURE — 99214 OFFICE O/P EST MOD 30 MIN: CPT | Performed by: NURSE PRACTITIONER

## 2025-09-03 PROCEDURE — 3074F SYST BP LT 130 MM HG: CPT | Performed by: NURSE PRACTITIONER

## 2025-09-03 PROCEDURE — 3078F DIAST BP <80 MM HG: CPT | Performed by: NURSE PRACTITIONER

## 2025-09-03 RX ORDER — CLOTRIMAZOLE 1 %
CREAM (GRAM) TOPICAL 2 TIMES DAILY
Qty: 30 G | Refills: 1 | Status: SHIPPED | OUTPATIENT
Start: 2025-09-03

## 2025-09-03 RX ORDER — FLUCONAZOLE 150 MG/1
150 TABLET ORAL ONCE
Qty: 1 TABLET | Refills: 0 | Status: SHIPPED | OUTPATIENT
Start: 2025-09-03 | End: 2025-09-03

## (undated) DEVICE — BLADE KNIFE BEAVER MINI BEAVER6400

## (undated) DEVICE — CANNULA PERFUSION AORTIC ROOT 22FR 20012

## (undated) DEVICE — DRAPE SHEET REV FOLD 3/4 9349

## (undated) DEVICE — KIT ENDO VASOVIEW HEMOPRO 2 VH-4000

## (undated) DEVICE — SOL WATER IRRIG 1000ML BOTTLE 07139-09

## (undated) DEVICE — DRAIN CHEST TUBE RIGHT ANGLED 28FR 8128

## (undated) DEVICE — PREP CHLORAPREP 26ML TINTED ORANGE  260815

## (undated) DEVICE — SU PROLENE 5-0 C-1DA 36" 8720H

## (undated) DEVICE — SU VICRYL 0 OS-6 18" UND J754T

## (undated) DEVICE — SOL NACL 0.9% IRRIG 1000ML BOTTLE 07138-09

## (undated) DEVICE — INTRO GLIDESHEATH SLENDER 6FR 10X45CM 60-1060

## (undated) DEVICE — GLOVE BIOGEL PI ULTRATOUCH G SZ 7.0 42170

## (undated) DEVICE — SU STEEL 6 CCS 4X18" M654G

## (undated) DEVICE — DRAPE STERI TOWEL SM 1000

## (undated) DEVICE — LEAD ELEC MYOCARDIO PACING TEMPORARY MEDTRONIC

## (undated) DEVICE — DEVICE ASSEMBLY SUCTION/ANTI COAG BTC93

## (undated) DEVICE — GLOVE ESTEEM BLUE W/NEU-THERA 8.0  2D73PB80

## (undated) DEVICE — PACK HAND WRIST FOREARM CUSTOM

## (undated) DEVICE — ADPT PERFUSION MULTIPLE

## (undated) DEVICE — ESU ELEC BLADE 2.75" COATED/INSULATED E1455

## (undated) DEVICE — BONE CLEANING TIP INTERPULSE  0210-010-000

## (undated) DEVICE — CLIP HORIZON MULTI SM YELLOW 001204

## (undated) DEVICE — CANNULA VESSEL DLP  30001

## (undated) DEVICE — GLOVE PROTEXIS W/NEU-THERA 7.5  2D73TE75

## (undated) DEVICE — CATH TRAY FOLEY 16FR DRAINAGE BAG STATLOCK 899916

## (undated) DEVICE — SU SILK 1 TIE 10X30" SA87G

## (undated) DEVICE — MANIFOLD KIT ANGIO AUTOMATED 014613

## (undated) DEVICE — NDL COUNTER 40CT  31142311

## (undated) DEVICE — SU PROLENE 6-0 C-1DA 30" M8706

## (undated) DEVICE — DRAPE EXTREMITY W/ARMBOARD 29405

## (undated) DEVICE — CAST PADDING 6" WEBRIL STERILE

## (undated) DEVICE — LINEN TOWEL PACK X6 WHITE 5487

## (undated) DEVICE — SUCTION TIP YANKAUER ORTHO SUPER SUCKER EFS-111

## (undated) DEVICE — SU PROLENE 4-0 SHDA 36" 8521H

## (undated) DEVICE — SU STRATAFIX 2-0 MH 36" SXPD2B412

## (undated) DEVICE — SOL NACL 0.9% IRRIG 3000ML BAG 07972-08

## (undated) DEVICE — CABLE MYO/LEAD PACING WHITE DISP 019-530

## (undated) DEVICE — CATH ANGIO INFINITI JR4 4FRX100CM 538421

## (undated) DEVICE — SU STRATAFIX PDS PLUS 0 CT-1 18" SXPP1A401

## (undated) DEVICE — GLOVE ESTEEM BLUE W/NEU-THERA 7.5  2D73PB75

## (undated) DEVICE — SUCTION CANISTER MEDIVAC LINER 3000ML W/LID 65651-530

## (undated) DEVICE — BASIN SET MINOR DISP

## (undated) DEVICE — SOMASENSOR CEREBRAL OXIMETER ADULT SAFB-SM

## (undated) DEVICE — SU VICRYL 2-0 CT-1 27" UND J259H

## (undated) DEVICE — SYR ANGIOGRAPHY MULTIUSE KIT ACIST 014612

## (undated) DEVICE — PACK TOTAL JOINT STD LATEX

## (undated) DEVICE — PEN MARKING SKIN

## (undated) DEVICE — BLADE SAW SAGITTAL STRK 18X90X1.19MM HD SYS 6 6118-119-090

## (undated) DEVICE — KIT HAND CONTROL ANGIOTOUCH ACIST 65CM AT-P65

## (undated) DEVICE — SU PLEDGET SOFT TFE 3/8"X3/26"X1/16" PCP40

## (undated) DEVICE — CAST PADDING 6" COTTON WEBRIL UNSTERILE 9086

## (undated) DEVICE — DRAPE CONVERTORS U-DRAPE 60X72" 8476

## (undated) DEVICE — SPONGE PEANUT

## (undated) DEVICE — DRAPE POUCH INSTRUMENT 1018

## (undated) DEVICE — KIT ENDO TURNOVER/PROCEDURE CARRY-ON 101822

## (undated) DEVICE — NDL SPINAL 18GA 3.5" 405184

## (undated) DEVICE — ESU ELEC BLADE 6" COATED/INSULATED E1455-6

## (undated) DEVICE — SU ETHIBOND 2-0 SHDA 30" X563H

## (undated) DEVICE — DECANTER BAG 2002S

## (undated) DEVICE — HOOD FLYTE 0408-800-000

## (undated) DEVICE — GLOVE PROTEXIS W/NEU-THERA 7.0  2D73TE70

## (undated) DEVICE — ESU PENCIL SMOKE EVAC W/ROCKER SWITCH 0703-047-000

## (undated) DEVICE — SU ETHIBOND 0 CT-1 CR 8X18" CX21D

## (undated) DEVICE — GLOVE PROTEXIS BLUE W/NEU-THERA 6.5  2D73EB65

## (undated) DEVICE — SOL WATER IRRIG 1000ML BOTTLE 2F7114

## (undated) DEVICE — DRSG XEROFORM 1X8"

## (undated) DEVICE — TOURNIQUET CUFF 34"

## (undated) DEVICE — TUBING SUCTION 6"X3/16" N56A

## (undated) DEVICE — BNDG COBAN 6"X5YDS STERILE

## (undated) DEVICE — DRAPE IOBAN INCISE 13X13" 6640EZ

## (undated) DEVICE — SUCTION IRR SYSTEM W/O TIP INTERPULSE HANDPIECE 0210-100-000

## (undated) DEVICE — Device

## (undated) DEVICE — BLADE SAW OSCIL/SAG STRK 11X90X1.19MM 4/2000 4111-119-090

## (undated) DEVICE — PUNCH AORTIC 4.0MMX8" RCB40

## (undated) DEVICE — PROBE TEMPERATURE MYOCARDIAL 30MMX22GA MTS-40030

## (undated) DEVICE — SYR 10ML PREFILLED 0.9% NACL INJ NOT STERILE 306500

## (undated) DEVICE — ADH SKIN CLOSURE PREMIERPRO EXOFIN 1.0ML 3470

## (undated) DEVICE — GLOVE PROTEXIS BLUE W/NEU-THERA 8.0  2D73EB80

## (undated) DEVICE — DRAIN CHEST TUBE 32FR STR 8032

## (undated) DEVICE — LINE MONITOR NASAL SMART CAPNOLINE ADULT LONG 12463

## (undated) DEVICE — DEFIB PRO-PADZ LVP LQD GEL ADULT 8900-2105-01

## (undated) DEVICE — DRSG GAUZE 4X4" TRAY

## (undated) DEVICE — LINEN TOWEL PACK X30 5481

## (undated) DEVICE — CANNULA PERFUSION ARTERIAL 20FR 12" 77420

## (undated) DEVICE — SU VICRYL 2-0 CP-2 18" UND J762D

## (undated) DEVICE — CABLE MYO/LEAD PACING BLUE DISP 019-535

## (undated) DEVICE — LINEN LEG ROLL 5489

## (undated) DEVICE — SOL NACL 0.9% IRRIG 1000ML BOTTLE 2F7124

## (undated) DEVICE — SU STRATAFIX MONOCRYL 4-0 SPIRAL 30CM PS-2 SXMP1B117

## (undated) DEVICE — GLOVE PROTEXIS W/NEU-THERA 6.5  2D73TE65

## (undated) DEVICE — SPONGE LAP 18X18" X8435

## (undated) DEVICE — LINEN TOWEL PACK X5 5464

## (undated) DEVICE — DRSG KERLIX 4 1/2"X4YDS ROLL 6715

## (undated) DEVICE — SUCTION MANIFOLD NEPTUNE 2 SYS 1 PORT 702-025-000

## (undated) DEVICE — LEAD PACER MYOCARDIAL BIPOLAR TEMPORARY 53CM 6495F

## (undated) DEVICE — SPONGE RAY-TEC 4X8" 7318

## (undated) DEVICE — GOWN IMPERVIOUS BREATHABLE 2XL/XLONG

## (undated) DEVICE — DRAPE U SPLIT 74X120" 29440

## (undated) DEVICE — 5FR X 100CM INFINITI TL DIAGNOSTIC CATHETER, JUDKINS LEFT CORONARY, JL 3.5, FEMORAL SELECTIVE THRULUMEN, SMALL, 0.038IN MAX GUIDEWIRE (EA/1)

## (undated) DEVICE — CONNECTOR DRAIN CHEST Y EXTENSION SET 19909

## (undated) DEVICE — DEVICE TISSUE STABILIZATION OCTOBASE 28707

## (undated) DEVICE — CANNULA VENOUS 2 STAGE 32-40FR

## (undated) DEVICE — SU PROLENE 7-0 BV-1DA 4X24" M8702

## (undated) DEVICE — SYR 50ML LL W/O NDL 309653

## (undated) DEVICE — PROTECTOR ARM ONE-STEP TRENDELENBURG 40418

## (undated) DEVICE — SURGICEL HEMOSTAT 2X14" 1951

## (undated) DEVICE — CAST PADDING 6" UNSTERILE 9046

## (undated) DEVICE — POSITIONER ASSIST ESSTECH 3S T401210S

## (undated) DEVICE — PACK OPEN HEART PV12OH524

## (undated) DEVICE — CAST PADDING 4" COTTON WEBRIL STERILE 9084S

## (undated) DEVICE — PACK TUBING MINI VAC CUSTOM 1/2X3/8T BB9J78R4

## (undated) DEVICE — LINEN GOWN OVERSIZE 5408

## (undated) DEVICE — SLEEVE TR BAND RADIAL COMPRESSION DEVICE 24CM TRB24-REG

## (undated) DEVICE — BONE CEMENT MIXING BOWL W/SPATULA

## (undated) DEVICE — RAD G/W INQWIRE .035X260CM J-TIP EXCHANGE IQ35F260J1O5RS

## (undated) DEVICE — CLIP HORIZON MULTI MED BLUE 002204

## (undated) DEVICE — CONNECTOR PERFUSION STR 1/2X1/2" W/O LL 6025

## (undated) DEVICE — SOL RINGERS LACTATED 1000ML BAG 2B2324X

## (undated) DEVICE — TOURNIQUET HEMACLEAR 60 BLUE 30-60CM PRH-060-BL-01A

## (undated) DEVICE — SU PROLENE 4-0 RB-1DA 36" 8557H

## (undated) DEVICE — PREP CHLORAPREP W/ORANGE TINT 10.5ML 930715

## (undated) DEVICE — STPL SKIN 35W 059037

## (undated) DEVICE — COVER TABLE POLY 65X90" 8186

## (undated) DEVICE — SU ETHILON 4-0 PS-2 18" 1667G

## (undated) DEVICE — PACK MINI VAC CUSTOM CARDOPULMONARY BB5Z97R15

## (undated) DEVICE — KIT WASH CELL SAVING ATL2001

## (undated) DEVICE — BNDG ESMARK 6" STERILE

## (undated) DEVICE — SU ETHIBOND 3-0 BBDA 36" X588H

## (undated) DEVICE — SU MONOCRYL 4-0 PS-2 18" UND Y496G

## (undated) DEVICE — SUCTION CATH AIRLIFE TRI-FLO W/CONTROL PORT 14FR  T60C

## (undated) DEVICE — BLOWER/MISTER CLEARVIEW 22150

## (undated) DEVICE — RX SURGIFLO HEMOSTATIC MATRIX W/THROMBIN 8ML 2994

## (undated) DEVICE — RESERVOIR CELL SAVING BLOOD COLLECTION EL2120

## (undated) DEVICE — TOTE ANGIO CORP PC15AT SAN32CC83O

## (undated) DEVICE — GOWN IMPERVIOUS SPECIALTY XL/XLONG 39049

## (undated) RX ORDER — BUPIVACAINE HYDROCHLORIDE 5 MG/ML
INJECTION, SOLUTION EPIDURAL; INTRACAUDAL
Status: DISPENSED
Start: 2017-07-11

## (undated) RX ORDER — NITROGLYCERIN 5 MG/ML
VIAL (ML) INTRAVENOUS
Status: DISPENSED
Start: 2022-03-28

## (undated) RX ORDER — ASPIRIN 81 MG/1
TABLET, CHEWABLE ORAL
Status: DISPENSED
Start: 2022-04-08

## (undated) RX ORDER — FENTANYL CITRATE 0.05 MG/ML
INJECTION, SOLUTION INTRAMUSCULAR; INTRAVENOUS
Status: DISPENSED
Start: 2022-04-08

## (undated) RX ORDER — HEPARIN SODIUM 1000 [USP'U]/ML
INJECTION, SOLUTION INTRAVENOUS; SUBCUTANEOUS
Status: DISPENSED
Start: 2022-04-08

## (undated) RX ORDER — VECURONIUM BROMIDE 1 MG/ML
INJECTION, POWDER, LYOPHILIZED, FOR SOLUTION INTRAVENOUS
Status: DISPENSED
Start: 2022-04-08

## (undated) RX ORDER — VANCOMYCIN HYDROCHLORIDE 500 MG/10ML
INJECTION, POWDER, LYOPHILIZED, FOR SOLUTION INTRAVENOUS
Status: DISPENSED
Start: 2022-04-08

## (undated) RX ORDER — PROPOFOL 10 MG/ML
INJECTION, EMULSION INTRAVENOUS
Status: DISPENSED
Start: 2021-01-07

## (undated) RX ORDER — LIDOCAINE HYDROCHLORIDE 20 MG/ML
INJECTION, SOLUTION EPIDURAL; INFILTRATION; INTRACAUDAL; PERINEURAL
Status: DISPENSED
Start: 2021-01-06

## (undated) RX ORDER — BUPIVACAINE HYDROCHLORIDE 5 MG/ML
INJECTION, SOLUTION EPIDURAL; INTRACAUDAL
Status: DISPENSED
Start: 2017-06-16

## (undated) RX ORDER — LIDOCAINE HYDROCHLORIDE 20 MG/ML
INJECTION, SOLUTION EPIDURAL; INFILTRATION; INTRACAUDAL; PERINEURAL
Status: DISPENSED
Start: 2022-04-08

## (undated) RX ORDER — LIDOCAINE HYDROCHLORIDE 20 MG/ML
INJECTION, SOLUTION EPIDURAL; INFILTRATION; INTRACAUDAL; PERINEURAL
Status: DISPENSED
Start: 2020-02-04

## (undated) RX ORDER — PROTAMINE SULFATE 10 MG/ML
INJECTION, SOLUTION INTRAVENOUS
Status: DISPENSED
Start: 2022-04-08

## (undated) RX ORDER — HEPARIN SODIUM 200 [USP'U]/100ML
INJECTION, SOLUTION INTRAVENOUS
Status: DISPENSED
Start: 2022-03-28

## (undated) RX ORDER — PROPOFOL 10 MG/ML
INJECTION, EMULSION INTRAVENOUS
Status: DISPENSED
Start: 2022-04-08

## (undated) RX ORDER — ONDANSETRON 2 MG/ML
INJECTION INTRAMUSCULAR; INTRAVENOUS
Status: DISPENSED
Start: 2021-01-18

## (undated) RX ORDER — PROPOFOL 10 MG/ML
INJECTION, EMULSION INTRAVENOUS
Status: DISPENSED
Start: 2020-02-04

## (undated) RX ORDER — CHLORHEXIDINE GLUCONATE ORAL RINSE 1.2 MG/ML
SOLUTION DENTAL
Status: DISPENSED
Start: 2022-04-08

## (undated) RX ORDER — FAMOTIDINE 20 MG/1
TABLET, FILM COATED ORAL
Status: DISPENSED
Start: 2022-04-08

## (undated) RX ORDER — SODIUM CHLORIDE, SODIUM GLUCONATE, SODIUM ACETATE, POTASSIUM CHLORIDE AND MAGNESIUM CHLORIDE 526; 502; 368; 37; 30 MG/100ML; MG/100ML; MG/100ML; MG/100ML; MG/100ML
INJECTION, SOLUTION INTRAVENOUS
Status: DISPENSED
Start: 2022-04-08

## (undated) RX ORDER — DEXAMETHASONE SODIUM PHOSPHATE 10 MG/ML
INJECTION, SOLUTION INTRAMUSCULAR; INTRAVENOUS
Status: DISPENSED
Start: 2021-01-18

## (undated) RX ORDER — GLYCOPYRROLATE 0.2 MG/ML
INJECTION INTRAMUSCULAR; INTRAVENOUS
Status: DISPENSED
Start: 2021-01-08

## (undated) RX ORDER — VERAPAMIL HYDROCHLORIDE 2.5 MG/ML
INJECTION, SOLUTION INTRAVENOUS
Status: DISPENSED
Start: 2022-03-28

## (undated) RX ORDER — FENTANYL CITRATE 50 UG/ML
INJECTION, SOLUTION INTRAMUSCULAR; INTRAVENOUS
Status: DISPENSED
Start: 2020-02-04

## (undated) RX ORDER — FUROSEMIDE 10 MG/ML
INJECTION INTRAMUSCULAR; INTRAVENOUS
Status: DISPENSED
Start: 2022-04-08

## (undated) RX ORDER — CLINDAMYCIN PHOSPHATE 900 MG/50ML
INJECTION, SOLUTION INTRAVENOUS
Status: DISPENSED
Start: 2022-04-08

## (undated) RX ORDER — FENTANYL CITRATE 50 UG/ML
INJECTION, SOLUTION INTRAMUSCULAR; INTRAVENOUS
Status: DISPENSED
Start: 2022-03-28

## (undated) RX ORDER — FENTANYL CITRATE 50 UG/ML
INJECTION, SOLUTION INTRAMUSCULAR; INTRAVENOUS
Status: DISPENSED
Start: 2022-04-08

## (undated) RX ORDER — HEPARIN SODIUM 1000 [USP'U]/ML
INJECTION, SOLUTION INTRAVENOUS; SUBCUTANEOUS
Status: DISPENSED
Start: 2022-03-28

## (undated) RX ORDER — LIDOCAINE HYDROCHLORIDE 10 MG/ML
INJECTION, SOLUTION EPIDURAL; INFILTRATION; INTRACAUDAL; PERINEURAL
Status: DISPENSED
Start: 2022-03-28

## (undated) RX ORDER — FENTANYL CITRATE 50 UG/ML
INJECTION, SOLUTION INTRAMUSCULAR; INTRAVENOUS
Status: DISPENSED
Start: 2021-01-18

## (undated) RX ORDER — PROPOFOL 10 MG/ML
INJECTION, EMULSION INTRAVENOUS
Status: DISPENSED
Start: 2021-01-06

## (undated) RX ORDER — ONDANSETRON 2 MG/ML
INJECTION INTRAMUSCULAR; INTRAVENOUS
Status: DISPENSED
Start: 2020-02-04

## (undated) RX ORDER — PAPAVERINE HYDROCHLORIDE 30 MG/ML
INJECTION INTRAMUSCULAR; INTRAVENOUS
Status: DISPENSED
Start: 2022-04-08